# Patient Record
Sex: MALE | Race: WHITE | NOT HISPANIC OR LATINO | Employment: OTHER | URBAN - METROPOLITAN AREA
[De-identification: names, ages, dates, MRNs, and addresses within clinical notes are randomized per-mention and may not be internally consistent; named-entity substitution may affect disease eponyms.]

---

## 2022-07-24 ENCOUNTER — HOSPITAL ENCOUNTER (EMERGENCY)
Facility: MEDICAL CENTER | Age: 82
End: 2022-07-24
Attending: EMERGENCY MEDICINE
Payer: MEDICARE

## 2022-07-24 VITALS
BODY MASS INDEX: 25.38 KG/M2 | HEART RATE: 62 BPM | TEMPERATURE: 96.8 F | SYSTOLIC BLOOD PRESSURE: 105 MMHG | DIASTOLIC BLOOD PRESSURE: 59 MMHG | HEIGHT: 74 IN | RESPIRATION RATE: 16 BRPM | OXYGEN SATURATION: 93 % | WEIGHT: 197.75 LBS

## 2022-07-24 DIAGNOSIS — S51.812A LACERATION OF LEFT FOREARM, INITIAL ENCOUNTER: ICD-10-CM

## 2022-07-24 PROCEDURE — 90715 TDAP VACCINE 7 YRS/> IM: CPT | Performed by: EMERGENCY MEDICINE

## 2022-07-24 PROCEDURE — 90471 IMMUNIZATION ADMIN: CPT

## 2022-07-24 PROCEDURE — 304217 HCHG IRRIGATION SYSTEM

## 2022-07-24 PROCEDURE — 304999 HCHG REPAIR-SIMPLE/INTERMED LEVEL 1

## 2022-07-24 PROCEDURE — 700111 HCHG RX REV CODE 636 W/ 250 OVERRIDE (IP): Performed by: EMERGENCY MEDICINE

## 2022-07-24 PROCEDURE — 99284 EMERGENCY DEPT VISIT MOD MDM: CPT

## 2022-07-24 PROCEDURE — 303353 HCHG DERMABOND SKIN ADHESIVE

## 2022-07-24 RX ADMIN — CLOSTRIDIUM TETANI TOXOID ANTIGEN (FORMALDEHYDE INACTIVATED), CORYNEBACTERIUM DIPHTHERIAE TOXOID ANTIGEN (FORMALDEHYDE INACTIVATED), BORDETELLA PERTUSSIS TOXOID ANTIGEN (GLUTARALDEHYDE INACTIVATED), BORDETELLA PERTUSSIS FILAMENTOUS HEMAGGLUTININ ANTIGEN (FORMALDEHYDE INACTIVATED), BORDETELLA PERTUSSIS PERTACTIN ANTIGEN, AND BORDETELLA PERTUSSIS FIMBRIAE 2/3 ANTIGEN 0.5 ML: 5; 2; 2.5; 5; 3; 5 INJECTION, SUSPENSION INTRAMUSCULAR at 10:40

## 2022-07-24 NOTE — ED NOTES
Reviewed discharge instructions w/ pt and visitor, verbalized understanding to information provided including follow up care, return precautions and wound care, denied questions/concerns.  Pt ambulated from ED w/ visitor.

## 2022-07-24 NOTE — DISCHARGE INSTRUCTIONS
This type of skin injury does not do well with sutures.  We have closed it with Steri-Strips and tissue glue called Dermabond.    Keep our compression bandage in place for 72 hours.  You can then remove it, and return to all normal activities.  The glue is waterproof, so it is all right to get it wet after allowing the first 3 days of initial healing.  Do not scrub the area directly.  There is no reason to apply antibiotic ointment or alcohol or peroxide or anything else.    Gradually, the ends of the Steri-Strips will curl up.  Trim them off, do not pull them off.  Over the course of about a week, they will fully detach, and the glue will fully dissolve.

## 2022-07-24 NOTE — ED TRIAGE NOTES
Chief Complaint   Patient presents with   • Fall   • Laceration     Slipped in the shower this morning , no head injury, no loss of consciousness. Left arm laceration. denies any other injuries.

## 2022-07-24 NOTE — ED PROVIDER NOTES
"ED Provider Note    Scribed for Kristofer Whitney M.D. by Kristofer Whitney M.D.. 7/24/2022,  10:19 AM.    CHIEF COMPLAINT  Chief Complaint   Patient presents with   • Fall   • Laceration       HPI  Star Centeno is a 81 y.o. male who presents to the Emergency Department with a left forearm soft tissue injury after slipping in the shower.  He says that he did fall, but did not hit his head, and got up on his own.  He is quite healthy and strong for his age.  He is awake and alert, no active bleeding, no blood thinners, no significant complaints.  His last tetanus shot was in 2011 and will be updated.  No complaint of recent respiratory or GI illness.    REVIEW OF SYSTEMS  See HPI for further details. All other systems are negative.     PAST MEDICAL HISTORY   No anticoagulation.  No easy bleeding or bruising.    SOCIAL HISTORY  Social History     Tobacco Use   • Smoking status: Former Smoker   • Smokeless tobacco: Never Used   Substance and Sexual Activity   • Alcohol use: Yes     Alcohol/week: 8.4 oz     Types: 14 Glasses of wine per week     Comment: 2 glasses of wine daily   • Drug use: Never   • Sexual activity: Not on file     Social History     Substance and Sexual Activity   Drug Use Never       SURGICAL HISTORY  patient denies any surgical history    CURRENT MEDICATIONS  Home Medications    **Home medications have not yet been reviewed for this encounter**         ALLERGIES  No Known Allergies    PHYSICAL EXAM  VITAL SIGNS: /58   Pulse 64   Temp 36.6 °C (97.8 °F) (Temporal)   Resp 16   Ht 1.88 m (6' 2\")   Wt 89.7 kg (197 lb 12 oz)   SpO2 94%   BMI 25.39 kg/m²   Pulse ox interpretation: I interpret this pulse ox as normal.  Constitutional: Alert in no apparent distress.  HENT: No signs of trauma, Bilateral external ears normal, Nose normal.   Eyes: Pupils are equal and reactive, Conjunctiva normal, Non-icteric.   Neck: Normal range of motion, Supple, No stridor.    Cardiovascular: Normal peripheral " perfusion  Thorax & Lungs: Unlabored respirations, equal chest expansion, no accessory muscle use  Abdomen: Non-distended  Skin: Ulnar aspect of mid left forearm shows an approximately 2.5 cm irregular skin tear/avulsion.  Involves the subcutaneous soft tissue, but no deep tissues or structure exposed.  Back: Normal alignment and ROM  Extremities: No gross deformity  Musculoskeletal: Good range of motion in all major joints.   Neurologic: Alert, Normal motor function, No focal deficits noted.   Psychiatric: Affect normal, Judgment normal, Mood normal.      DIAGNOSTIC STUDIES / PROCEDURES    Laceration Repair Procedure Note    Indication: Skin tear    Procedure: The patient was placed in the appropriate position and anesthesia around the laceration was unnecessary because of Steri-Strip and Dermabond closure.  The area was then irrigated with normal saline. The laceration was closed with Dermabond and steri strips. There were no additional lacerations requiring repair. The wound area was then dressed with a bandage.      Total repaired wound length: 2.5 cm.     Other Items: None    The patient tolerated the procedure well.    Complications: None          COURSE & MEDICAL DECISION MAKING  Nursing notes, VS, PMSFHx reviewed in chart.     10:19 AM Patient seen and examined at bedside.  He has a skin tear type of soft tissue injury, and with his fragile skin which is age-related, this will not suture well, so will be closed with Steri-Strips and Dermabond.  Patient understands this, and agrees.    10:44 AM Laceration closed as above.  Tetanus shot updated.  Patient will leave our bandage in place for 72 hours, then remove it, and wound care instructions were given.     The patient will return for new or worsening symptoms and is stable at the time of discharge.    The patient is referred to a primary physician for blood pressure management, diabetic screening, and for all other preventative health  concerns.      DISPOSITION:  Patient will be discharged home in stable condition.    FOLLOW UP:  No follow-up provider specified.    OUTPATIENT MEDICATIONS:  New Prescriptions    No medications on file         FINAL IMPRESSION  1. Laceration of left forearm, initial encounter

## 2022-07-25 ENCOUNTER — OFFICE VISIT (OUTPATIENT)
Dept: MEDICAL GROUP | Facility: LAB | Age: 82
End: 2022-07-25
Payer: MEDICARE

## 2022-07-25 VITALS
SYSTOLIC BLOOD PRESSURE: 106 MMHG | HEART RATE: 68 BPM | TEMPERATURE: 97.7 F | HEIGHT: 71 IN | DIASTOLIC BLOOD PRESSURE: 64 MMHG | BODY MASS INDEX: 27.3 KG/M2 | WEIGHT: 195 LBS | RESPIRATION RATE: 15 BRPM | OXYGEN SATURATION: 93 %

## 2022-07-25 DIAGNOSIS — I24.0 ISCHEMIC HEART DISEASE DUE TO CORONARY ARTERY OBSTRUCTION (HCC): ICD-10-CM

## 2022-07-25 DIAGNOSIS — E11.9 NON-INSULIN DEPENDENT TYPE 2 DIABETES MELLITUS (HCC): ICD-10-CM

## 2022-07-25 DIAGNOSIS — Z76.89 ENCOUNTER TO ESTABLISH CARE: ICD-10-CM

## 2022-07-25 DIAGNOSIS — Z85.46 HISTORY OF ADENOCARCINOMA OF PROSTATE: ICD-10-CM

## 2022-07-25 DIAGNOSIS — I25.9 ISCHEMIC HEART DISEASE DUE TO CORONARY ARTERY OBSTRUCTION (HCC): ICD-10-CM

## 2022-07-25 DIAGNOSIS — I72.0 CAROTID ARTERY ANEURYSM (HCC): ICD-10-CM

## 2022-07-25 DIAGNOSIS — Z12.5 ENCOUNTER FOR SCREENING FOR MALIGNANT NEOPLASM OF PROSTATE: ICD-10-CM

## 2022-07-25 DIAGNOSIS — I77.810 AORTIC ROOT DILATATION (HCC): ICD-10-CM

## 2022-07-25 PROBLEM — R47.01 APHASIA: Status: ACTIVE | Noted: 2019-09-24

## 2022-07-25 PROBLEM — S22.080A COMPRESSION FRACTURE OF T12 VERTEBRA (HCC): Status: ACTIVE | Noted: 2018-11-27

## 2022-07-25 PROBLEM — R47.01 APHASIA: Status: RESOLVED | Noted: 2019-09-24 | Resolved: 2022-07-25

## 2022-07-25 PROCEDURE — 3078F DIAST BP <80 MM HG: CPT | Performed by: FAMILY MEDICINE

## 2022-07-25 PROCEDURE — 3074F SYST BP LT 130 MM HG: CPT | Performed by: FAMILY MEDICINE

## 2022-07-25 PROCEDURE — 99214 OFFICE O/P EST MOD 30 MIN: CPT | Performed by: FAMILY MEDICINE

## 2022-07-25 RX ORDER — HYDROCHLOROTHIAZIDE 25 MG/1
25 TABLET ORAL DAILY
COMMUNITY
End: 2023-05-18

## 2022-07-25 RX ORDER — METOPROLOL TARTRATE 100 MG/1
TABLET ORAL
COMMUNITY
End: 2022-09-20

## 2022-07-25 RX ORDER — FLUOXETINE 10 MG/1
10 CAPSULE ORAL DAILY
COMMUNITY
Start: 2022-05-19 | End: 2022-07-25

## 2022-07-25 RX ORDER — GLIPIZIDE 10 MG/1
5 TABLET ORAL 2 TIMES DAILY
COMMUNITY
End: 2023-05-11 | Stop reason: SDUPTHER

## 2022-07-25 RX ORDER — QUINAPRIL 20 MG/1
20 TABLET ORAL DAILY
COMMUNITY
End: 2023-05-18 | Stop reason: SDUPTHER

## 2022-07-25 RX ORDER — FLUOXETINE 10 MG/1
1 TABLET ORAL
COMMUNITY
Start: 2022-05-17 | End: 2022-07-25

## 2022-07-25 RX ORDER — CHLORAL HYDRATE 500 MG
CAPSULE ORAL
COMMUNITY
End: 2022-07-25

## 2022-07-25 RX ORDER — EZETIMIBE 10 MG/1
TABLET ORAL
COMMUNITY
End: 2023-05-18 | Stop reason: SDUPTHER

## 2022-07-25 RX ORDER — ASPIRIN 81 MG/1
81 TABLET ORAL DAILY
COMMUNITY

## 2022-07-25 RX ORDER — ALPRAZOLAM 0.25 MG/1
0.25 TABLET ORAL
COMMUNITY
Start: 2022-02-18 | End: 2022-07-25

## 2022-07-25 RX ORDER — GLIPIZIDE 5 MG/1
1 TABLET, FILM COATED, EXTENDED RELEASE ORAL 2 TIMES DAILY
COMMUNITY
Start: 2022-06-13 | End: 2022-07-25

## 2022-07-25 RX ORDER — ROSUVASTATIN CALCIUM 5 MG/1
TABLET, COATED ORAL
COMMUNITY
End: 2022-09-08 | Stop reason: SDUPTHER

## 2022-07-25 SDOH — ECONOMIC STABILITY: HOUSING INSECURITY: IN THE LAST 12 MONTHS, HOW MANY PLACES HAVE YOU LIVED?: 2

## 2022-07-25 SDOH — ECONOMIC STABILITY: TRANSPORTATION INSECURITY
IN THE PAST 12 MONTHS, HAS THE LACK OF TRANSPORTATION KEPT YOU FROM MEDICAL APPOINTMENTS OR FROM GETTING MEDICATIONS?: NO

## 2022-07-25 SDOH — ECONOMIC STABILITY: HOUSING INSECURITY
IN THE LAST 12 MONTHS, WAS THERE A TIME WHEN YOU DID NOT HAVE A STEADY PLACE TO SLEEP OR SLEPT IN A SHELTER (INCLUDING NOW)?: NO

## 2022-07-25 SDOH — HEALTH STABILITY: PHYSICAL HEALTH: ON AVERAGE, HOW MANY DAYS PER WEEK DO YOU ENGAGE IN MODERATE TO STRENUOUS EXERCISE (LIKE A BRISK WALK)?: 0 DAYS

## 2022-07-25 SDOH — ECONOMIC STABILITY: FOOD INSECURITY: WITHIN THE PAST 12 MONTHS, THE FOOD YOU BOUGHT JUST DIDN'T LAST AND YOU DIDN'T HAVE MONEY TO GET MORE.: NEVER TRUE

## 2022-07-25 SDOH — ECONOMIC STABILITY: INCOME INSECURITY: IN THE LAST 12 MONTHS, WAS THERE A TIME WHEN YOU WERE NOT ABLE TO PAY THE MORTGAGE OR RENT ON TIME?: NO

## 2022-07-25 SDOH — ECONOMIC STABILITY: INCOME INSECURITY: HOW HARD IS IT FOR YOU TO PAY FOR THE VERY BASICS LIKE FOOD, HOUSING, MEDICAL CARE, AND HEATING?: NOT HARD AT ALL

## 2022-07-25 SDOH — ECONOMIC STABILITY: TRANSPORTATION INSECURITY
IN THE PAST 12 MONTHS, HAS LACK OF TRANSPORTATION KEPT YOU FROM MEETINGS, WORK, OR FROM GETTING THINGS NEEDED FOR DAILY LIVING?: NO

## 2022-07-25 SDOH — HEALTH STABILITY: MENTAL HEALTH
STRESS IS WHEN SOMEONE FEELS TENSE, NERVOUS, ANXIOUS, OR CAN'T SLEEP AT NIGHT BECAUSE THEIR MIND IS TROUBLED. HOW STRESSED ARE YOU?: TO SOME EXTENT

## 2022-07-25 SDOH — ECONOMIC STABILITY: FOOD INSECURITY: WITHIN THE PAST 12 MONTHS, YOU WORRIED THAT YOUR FOOD WOULD RUN OUT BEFORE YOU GOT MONEY TO BUY MORE.: NEVER TRUE

## 2022-07-25 SDOH — ECONOMIC STABILITY: TRANSPORTATION INSECURITY
IN THE PAST 12 MONTHS, HAS LACK OF RELIABLE TRANSPORTATION KEPT YOU FROM MEDICAL APPOINTMENTS, MEETINGS, WORK OR FROM GETTING THINGS NEEDED FOR DAILY LIVING?: NO

## 2022-07-25 SDOH — HEALTH STABILITY: PHYSICAL HEALTH: ON AVERAGE, HOW MANY MINUTES DO YOU ENGAGE IN EXERCISE AT THIS LEVEL?: 0 MIN

## 2022-07-25 ASSESSMENT — ENCOUNTER SYMPTOMS
CHILLS: 0
WHEEZING: 0
NERVOUS/ANXIOUS: 0
DEPRESSION: 0
PALPITATIONS: 0
BLURRED VISION: 0
NAUSEA: 0
DIARRHEA: 0
CONSTIPATION: 0
VOMITING: 0
ABDOMINAL PAIN: 0
FEVER: 0
SHORTNESS OF BREATH: 0

## 2022-07-25 ASSESSMENT — SOCIAL DETERMINANTS OF HEALTH (SDOH)
HOW OFTEN DO YOU GET TOGETHER WITH FRIENDS OR RELATIVES?: TWICE A WEEK
HOW OFTEN DO YOU ATTENT MEETINGS OF THE CLUB OR ORGANIZATION YOU BELONG TO?: NEVER
HOW MANY DRINKS CONTAINING ALCOHOL DO YOU HAVE ON A TYPICAL DAY WHEN YOU ARE DRINKING: 1 OR 2
HOW OFTEN DO YOU ATTENT MEETINGS OF THE CLUB OR ORGANIZATION YOU BELONG TO?: NEVER
WITHIN THE PAST 12 MONTHS, YOU WORRIED THAT YOUR FOOD WOULD RUN OUT BEFORE YOU GOT THE MONEY TO BUY MORE: NEVER TRUE
HOW OFTEN DO YOU HAVE SIX OR MORE DRINKS ON ONE OCCASION: NEVER
DO YOU BELONG TO ANY CLUBS OR ORGANIZATIONS SUCH AS CHURCH GROUPS UNIONS, FRATERNAL OR ATHLETIC GROUPS, OR SCHOOL GROUPS?: YES
IN A TYPICAL WEEK, HOW MANY TIMES DO YOU TALK ON THE PHONE WITH FAMILY, FRIENDS, OR NEIGHBORS?: MORE THAN THREE TIMES A WEEK
HOW OFTEN DO YOU ATTEND CHURCH OR RELIGIOUS SERVICES?: NEVER
HOW OFTEN DO YOU GET TOGETHER WITH FRIENDS OR RELATIVES?: TWICE A WEEK
HOW OFTEN DO YOU HAVE A DRINK CONTAINING ALCOHOL: 4 OR MORE TIMES A WEEK
IN A TYPICAL WEEK, HOW MANY TIMES DO YOU TALK ON THE PHONE WITH FAMILY, FRIENDS, OR NEIGHBORS?: MORE THAN THREE TIMES A WEEK
HOW OFTEN DO YOU ATTEND CHURCH OR RELIGIOUS SERVICES?: NEVER
HOW HARD IS IT FOR YOU TO PAY FOR THE VERY BASICS LIKE FOOD, HOUSING, MEDICAL CARE, AND HEATING?: NOT HARD AT ALL
DO YOU BELONG TO ANY CLUBS OR ORGANIZATIONS SUCH AS CHURCH GROUPS UNIONS, FRATERNAL OR ATHLETIC GROUPS, OR SCHOOL GROUPS?: YES

## 2022-07-25 ASSESSMENT — LIFESTYLE VARIABLES
SKIP TO QUESTIONS 9-10: 1
HOW OFTEN DO YOU HAVE A DRINK CONTAINING ALCOHOL: 4 OR MORE TIMES A WEEK
AUDIT-C TOTAL SCORE: 4
HOW MANY STANDARD DRINKS CONTAINING ALCOHOL DO YOU HAVE ON A TYPICAL DAY: 1 OR 2
HOW OFTEN DO YOU HAVE SIX OR MORE DRINKS ON ONE OCCASION: NEVER

## 2022-07-25 ASSESSMENT — ANXIETY QUESTIONNAIRES
2. NOT BEING ABLE TO STOP OR CONTROL WORRYING: NOT AT ALL
7. FEELING AFRAID AS IF SOMETHING AWFUL MIGHT HAPPEN: SEVERAL DAYS
5. BEING SO RESTLESS THAT IT IS HARD TO SIT STILL: NOT AT ALL
4. TROUBLE RELAXING: NOT AT ALL
6. BECOMING EASILY ANNOYED OR IRRITABLE: NOT AT ALL
3. WORRYING TOO MUCH ABOUT DIFFERENT THINGS: SEVERAL DAYS
1. FEELING NERVOUS, ANXIOUS, OR ON EDGE: SEVERAL DAYS
GAD7 TOTAL SCORE: 3

## 2022-07-25 ASSESSMENT — PATIENT HEALTH QUESTIONNAIRE - PHQ9
5. POOR APPETITE OR OVEREATING: 1 - SEVERAL DAYS
CLINICAL INTERPRETATION OF PHQ2 SCORE: 2
SUM OF ALL RESPONSES TO PHQ QUESTIONS 1-9: 6

## 2022-07-25 NOTE — PROGRESS NOTES
Star Centeno is a 81 y.o. male here for   Chief Complaint   Patient presents with    Establish Care       HPI:  Star is a very pleasant 81 y.o. male.     #Establish care   -Reviewed all past medical history, family history, social history.  -Reviewed all screening/vaccinations: Due for labs including PSA   -Diet and Exercise: appropriate for age and condition.   -Tobacco, alcohol, recreational drug use: no to all.   -Sexually active: no current partners.       Current medicines (including changes today)  Current Outpatient Medications   Medication Sig Dispense Refill    Coenzyme Q10 (COQ-10) 30 MG Cap       Omega-3 Fatty Acids (FISH OIL) 1000 MG Cap capsule       Probiotic Product (PROBIOTIC DAILY PO) Take  by mouth.      aspirin 81 MG EC tablet Take 81 mg by mouth every day.      ezetimibe (ZETIA) 10 MG Tab       hydroCHLOROthiazide (HYDRODIURIL) 25 MG Tab Take 25 mg by mouth every day.      glipiZIDE (GLUCOTROL) 10 MG Tab Take 5 mg by mouth 2 times a day.      metformin (GLUCOPHAGE) 1000 MG tablet Take 500 mg by mouth 2 times a day with meals.      metoprolol tartrate (LOPRESSOR) 100 MG Tab       quinapril (ACCUPRIL) 20 MG Tab Take 20 mg by mouth every day.      rosuvastatin (CRESTOR) 5 MG Tab       Prenatal Multivit-Min-Fe-FA (PRE-COLE FORMULA PO) Take  by mouth.       No current facility-administered medications for this visit.     He  has a past medical history of Cancer (HCC), Cataract, Hyperlipidemia, and Hypertension.  He  has a past surgical history that includes eye surgery and prostatectomy, radical retro.  Social History     Tobacco Use    Smoking status: Former Smoker     Packs/day: 1.00     Years: 45.00     Pack years: 45.00     Types: Cigarettes     Start date: 1956     Quit date: 2001     Years since quittin.5    Smokeless tobacco: Never Used   Vaping Use    Vaping Use: Never used   Substance Use Topics    Alcohol use: Yes     Alcohol/week: 8.4 oz     Types: 14 Glasses of wine per week  "    Comment: 2 glasses of wine daily    Drug use: Never     Social History     Social History Narrative    Not on file     Family History   Problem Relation Age of Onset    Genetic Disorder Brother     Diabetes Brother     Hypertension Brother     Hyperlipidemia Brother     Genetic Disorder Sister     Diabetes Sister     Genetic Disorder Sister      Family Status   Relation Name Status    Adi Centeno (Not Specified)    Kianna Browne (Not Specified)    Sis Shilpi (Not Specified)     ROS  Review of Systems   Constitutional: Negative for chills and fever.   HENT: Negative for hearing loss.    Eyes: Negative for blurred vision.   Respiratory: Negative for shortness of breath and wheezing.    Cardiovascular: Negative for chest pain and palpitations.   Gastrointestinal: Negative for abdominal pain, constipation, diarrhea, nausea and vomiting.   Psychiatric/Behavioral: Negative for depression. The patient is not nervous/anxious.         Objective:     /64   Pulse 68   Temp 36.5 °C (97.7 °F) (Temporal)   Resp 15   Ht 1.803 m (5' 11\")   Wt 88.5 kg (195 lb)   SpO2 93%  Body mass index is 27.2 kg/m².  Physical Exam:    Constitutional: Alert, no distress.  Skin: Warm, dry, good turgor, no rashes in visible areas.  Eye: Equal, round and reactive, conjunctiva clear, lids normal.  ENMT: Lips without lesions, good dentition, oropharynx clear. TM's pearly gray with normal light reflexes bilaterally  Neck: Trachea midline, no masses, no thyromegaly. No cervical or supraclavicular lymphadenopathy.  Respiratory: Unlabored respiratory effort, lungs clear to auscultation bilaterally, no wheezes, rales, or ronchi.  Cardiovascular: Normal S1, S2, RRR, no murmur, no edema.  Abdomen: Soft, non-tender, no masses, no hepatosplenomegaly.  Psych: Alert and oriented x3, normal affect and mood.        Assessment and Plan:   The following treatment plan was discussed    1. Aortic root dilatation (HCC)    - REFERRAL TO CARDIOLOGY    2. 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 Carotid artery aneurysm (HCC)    - REFERRAL TO CARDIOLOGY    4. Non-insulin dependent type 2 diabetes mellitus (HCC)    - CBC WITHOUT DIFFERENTIAL; Future  - Comp Metabolic Panel; Future  - Lipid Profile; Future  - MICROALBUMIN CREAT RATIO URINE; Future  - HEMOGLOBIN A1C; Future    5. Encounter to establish care  - CBC WITHOUT DIFFERENTIAL; Future  - Comp Metabolic Panel; Future    6. Encounter for screening for malignant neoplasm of prostate    - PROSTATE SPECIFIC AG SCREENING; Future    7. History of adenocarcinoma of prostate      Records requested.  Followup: No follow-ups on file.         This note was created using voice recognition software. I have made every reasonable attempt to correct errors, however, I do anticipate some grammatical errors.

## 2022-08-04 ENCOUNTER — HOSPITAL ENCOUNTER (OUTPATIENT)
Dept: LAB | Facility: MEDICAL CENTER | Age: 82
End: 2022-08-04
Attending: FAMILY MEDICINE
Payer: MEDICARE

## 2022-08-04 DIAGNOSIS — Z12.5 ENCOUNTER FOR SCREENING FOR MALIGNANT NEOPLASM OF PROSTATE: ICD-10-CM

## 2022-08-04 DIAGNOSIS — E11.9 NON-INSULIN DEPENDENT TYPE 2 DIABETES MELLITUS (HCC): ICD-10-CM

## 2022-08-04 DIAGNOSIS — Z76.89 ENCOUNTER TO ESTABLISH CARE: ICD-10-CM

## 2022-08-04 LAB
ALBUMIN SERPL BCP-MCNC: 4.4 G/DL (ref 3.2–4.9)
ALBUMIN/GLOB SERPL: 1.7 G/DL
ALP SERPL-CCNC: 62 U/L (ref 30–99)
ALT SERPL-CCNC: 16 U/L (ref 2–50)
ANION GAP SERPL CALC-SCNC: 12 MMOL/L (ref 7–16)
AST SERPL-CCNC: 22 U/L (ref 12–45)
BILIRUB SERPL-MCNC: 0.6 MG/DL (ref 0.1–1.5)
BUN SERPL-MCNC: 21 MG/DL (ref 8–22)
CALCIUM SERPL-MCNC: 9.5 MG/DL (ref 8.5–10.5)
CHLORIDE SERPL-SCNC: 96 MMOL/L (ref 96–112)
CHOLEST SERPL-MCNC: 123 MG/DL (ref 100–199)
CO2 SERPL-SCNC: 23 MMOL/L (ref 20–33)
CREAT SERPL-MCNC: 0.94 MG/DL (ref 0.5–1.4)
ERYTHROCYTE [DISTWIDTH] IN BLOOD BY AUTOMATED COUNT: 45.4 FL (ref 35.9–50)
EST. AVERAGE GLUCOSE BLD GHB EST-MCNC: 148 MG/DL
FASTING STATUS PATIENT QL REPORTED: NORMAL
GFR SERPLBLD CREATININE-BSD FMLA CKD-EPI: 80 ML/MIN/1.73 M 2
GLOBULIN SER CALC-MCNC: 2.6 G/DL (ref 1.9–3.5)
GLUCOSE SERPL-MCNC: 122 MG/DL (ref 65–99)
HBA1C MFR BLD: 6.8 % (ref 4–5.6)
HCT VFR BLD AUTO: 38.4 % (ref 42–52)
HDLC SERPL-MCNC: 47 MG/DL
HGB BLD-MCNC: 13.2 G/DL (ref 14–18)
LDLC SERPL CALC-MCNC: 57 MG/DL
MCH RBC QN AUTO: 32.5 PG (ref 27–33)
MCHC RBC AUTO-ENTMCNC: 34.4 G/DL (ref 33.7–35.3)
MCV RBC AUTO: 94.6 FL (ref 81.4–97.8)
PLATELET # BLD AUTO: 244 K/UL (ref 164–446)
PMV BLD AUTO: 9.5 FL (ref 9–12.9)
POTASSIUM SERPL-SCNC: 5 MMOL/L (ref 3.6–5.5)
PROT SERPL-MCNC: 7 G/DL (ref 6–8.2)
PSA SERPL-MCNC: <0.02 NG/ML (ref 0–4)
RBC # BLD AUTO: 4.06 M/UL (ref 4.7–6.1)
SODIUM SERPL-SCNC: 131 MMOL/L (ref 135–145)
TRIGL SERPL-MCNC: 96 MG/DL (ref 0–149)
WBC # BLD AUTO: 7.7 K/UL (ref 4.8–10.8)

## 2022-08-04 PROCEDURE — 84153 ASSAY OF PSA TOTAL: CPT

## 2022-08-04 PROCEDURE — 80061 LIPID PANEL: CPT

## 2022-08-04 PROCEDURE — 82570 ASSAY OF URINE CREATININE: CPT

## 2022-08-04 PROCEDURE — 83036 HEMOGLOBIN GLYCOSYLATED A1C: CPT

## 2022-08-04 PROCEDURE — 36415 COLL VENOUS BLD VENIPUNCTURE: CPT

## 2022-08-04 PROCEDURE — 82043 UR ALBUMIN QUANTITATIVE: CPT

## 2022-08-04 PROCEDURE — 85027 COMPLETE CBC AUTOMATED: CPT

## 2022-08-04 PROCEDURE — 80053 COMPREHEN METABOLIC PANEL: CPT

## 2022-08-05 LAB
CREAT UR-MCNC: 78.77 MG/DL
MICROALBUMIN UR-MCNC: <1.2 MG/DL
MICROALBUMIN/CREAT UR: NORMAL MG/G (ref 0–30)

## 2022-09-08 DIAGNOSIS — E78.5 HYPERLIPIDEMIA, UNSPECIFIED HYPERLIPIDEMIA TYPE: ICD-10-CM

## 2022-09-08 RX ORDER — ROSUVASTATIN CALCIUM 5 MG/1
5 TABLET, COATED ORAL DAILY
Qty: 90 TABLET | Refills: 3 | Status: SHIPPED | OUTPATIENT
Start: 2022-09-08 | End: 2023-03-14 | Stop reason: SDUPTHER

## 2022-09-08 NOTE — PROGRESS NOTES
Phone Number Called: [phone] (home)     Call outcome: Spoke to patient regarding message below.    Message: Spoke with Star and let him know that his rosuvastatin was sent to his pharmacy

## 2022-09-08 NOTE — PROGRESS NOTES
Received request via: Patient's daughter Peyton called and requested a refill for him     Was the patient seen in the last year in this department? Yes    Does the patient have an active prescription (recently filled or refills available) for medication(s) requested? No

## 2022-09-17 ENCOUNTER — TELEPHONE (OUTPATIENT)
Dept: CARDIOLOGY | Facility: MEDICAL CENTER | Age: 82
End: 2022-09-17
Payer: MEDICARE

## 2022-09-17 NOTE — TELEPHONE ENCOUNTER
LVM for pt to confirm all testing and notes are in chart. Per chart review pt was last seen by cardio in 10/11/21 when they Saw Dr. Haskins, all notes are available through Care Everywhere.    Pending call back from pt to confirm all  notes and testing are recent in chart.

## 2022-09-20 ENCOUNTER — OFFICE VISIT (OUTPATIENT)
Dept: CARDIOLOGY | Facility: MEDICAL CENTER | Age: 82
End: 2022-09-20
Attending: FAMILY MEDICINE
Payer: MEDICARE

## 2022-09-20 VITALS
RESPIRATION RATE: 16 BRPM | WEIGHT: 201 LBS | BODY MASS INDEX: 28.14 KG/M2 | HEIGHT: 71 IN | DIASTOLIC BLOOD PRESSURE: 72 MMHG | HEART RATE: 57 BPM | SYSTOLIC BLOOD PRESSURE: 124 MMHG | OXYGEN SATURATION: 97 %

## 2022-09-20 DIAGNOSIS — E11.9 NON-INSULIN DEPENDENT TYPE 2 DIABETES MELLITUS (HCC): ICD-10-CM

## 2022-09-20 DIAGNOSIS — I11.9 HYPERTENSIVE HEART DISEASE WITHOUT HEART FAILURE: ICD-10-CM

## 2022-09-20 DIAGNOSIS — I71.21 ANEURYSM OF ASCENDING AORTA (HCC): ICD-10-CM

## 2022-09-20 DIAGNOSIS — I25.10 CORONARY ARTERY DISEASE INVOLVING NATIVE CORONARY ARTERY OF NATIVE HEART WITHOUT ANGINA PECTORIS: ICD-10-CM

## 2022-09-20 DIAGNOSIS — I10 ESSENTIAL HYPERTENSION: ICD-10-CM

## 2022-09-20 DIAGNOSIS — E78.5 DYSLIPIDEMIA: ICD-10-CM

## 2022-09-20 LAB — EKG IMPRESSION: NORMAL

## 2022-09-20 PROCEDURE — 99204 OFFICE O/P NEW MOD 45 MIN: CPT | Performed by: INTERNAL MEDICINE

## 2022-09-20 PROCEDURE — 93000 ELECTROCARDIOGRAM COMPLETE: CPT | Performed by: INTERNAL MEDICINE

## 2022-09-20 RX ORDER — METOPROLOL SUCCINATE 100 MG/1
100 TABLET, EXTENDED RELEASE ORAL DAILY
COMMUNITY
Start: 2022-08-09 | End: 2023-05-18 | Stop reason: SDUPTHER

## 2022-09-20 ASSESSMENT — ENCOUNTER SYMPTOMS
MYALGIAS: 0
WHEEZING: 0
NEAR-SYNCOPE: 0
ORTHOPNEA: 0
PALPITATIONS: 0
FALLS: 0
EXCESSIVE DAYTIME SLEEPINESS: 0
NUMBNESS: 0
BLURRED VISION: 0
LIGHT-HEADEDNESS: 0
PARESTHESIAS: 0
SYNCOPE: 0
IRREGULAR HEARTBEAT: 0
SHORTNESS OF BREATH: 0
SLEEP DISTURBANCES DUE TO BREATHING: 0
PND: 0
FLANK PAIN: 0
FEVER: 0
BLOATING: 0
NAUSEA: 0
WEAKNESS: 0
LOSS OF BALANCE: 0
DIZZINESS: 0
BACK PAIN: 0
CONSTIPATION: 0
VOMITING: 0
DIAPHORESIS: 0
COUGH: 0
DIARRHEA: 0
DECREASED APPETITE: 0
HEADACHES: 0
NIGHT SWEATS: 0
DYSPNEA ON EXERTION: 0
SORE THROAT: 0
DOUBLE VISION: 0

## 2022-09-20 ASSESSMENT — FIBROSIS 4 INDEX: FIB4 SCORE: 1.87

## 2022-09-20 NOTE — PROGRESS NOTES
Cardiology Initial Consultation Note    Date of note:    9/20/2022    Primary Care Provider: Nirmal Gentile M.D.  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 Star Centeno is a 81 y.o. male whose current medical problems include CAD, type 2 diabetes mellitus, hypertension and dyslipidemia who is here for cardiac consultation for CAD and to establish care.    Pertinent history:  Essential hypertension  Non-insulin-dependent type 2 diabetes mellitus  CAD s/p AVA to RCA and left circumflex artery in 2002  Mixed hyperlipidemia    Patient states that he has been doing well from a cardiovascular perspective.  States that about a year ago he felt symptoms concerning for angina.  Did not undergo repeat angiogram and his symptoms dissipated.  Has been feeling well with no chest pain, pressure or shortness of breath.    In terms of physical activity, tries to stay active.  Moved to [place] to be closer to his daughter.    Cardiovascular Risk Factors:  1. Smoking status: Former smoker, quit 2003  2. Type II Diabetes Mellitus: Yes  Lab Results   Component Value Date/Time    HBA1C 6.8 (H) 08/04/2022 11:00 AM    HBA1C 6.4 (H) 06/24/2021 11:54 AM    HBA1C 6.9 (H) 04/16/2021 02:55 PM     3. Hypertension: Yes  4. Dyslipidemia: Yes  Cholesterol,Tot   Date Value Ref Range Status   08/04/2022 123 100 - 199 mg/dL Final     LDL   Date Value Ref Range Status   08/04/2022 57 <100 mg/dL Final     HDL   Date Value Ref Range Status   08/04/2022 47 >=40 mg/dL Final     Triglycerides   Date Value Ref Range Status   08/04/2022 96 0 - 149 mg/dL Final     5. Family history of early Coronary Artery Disease in a first degree relative (Male less than 55 years of age; Female less than 65 years  of age): Denies, brothers have hemochromatosis  6.  Obesity and/or Metabolic Syndrome: No  7. Sedentary lifestyle: No    Review of Systems   Constitutional: Positive for malaise/fatigue. Negative for decreased appetite, diaphoresis, fever and night sweats.   HENT:  Negative for congestion and sore throat.    Eyes:  Negative for blurred vision and double vision.   Cardiovascular:  Negative for chest pain, cyanosis, dyspnea on exertion, irregular heartbeat, leg swelling, near-syncope, orthopnea, palpitations, paroxysmal nocturnal dyspnea and syncope.   Respiratory:  Negative for cough, shortness of breath, sleep disturbances due to breathing and wheezing.    Endocrine: Negative for cold intolerance and heat intolerance.   Musculoskeletal:  Negative for back pain, falls and myalgias.   Gastrointestinal:  Negative for bloating, constipation, diarrhea, nausea and vomiting.   Genitourinary:  Negative for dysuria and flank pain.   Neurological:  Negative for excessive daytime sleepiness, dizziness, headaches, light-headedness, loss of balance, numbness, paresthesias and weakness.       Past Medical History:   Diagnosis Date    Cancer (HCC)     Cataract     Hyperlipidemia     Hypertension          Past Surgical History:   Procedure Laterality Date    EYE SURGERY      PROSTATECTOMY, RADICAL RETRO           Current Outpatient Medications   Medication Sig Dispense Refill    metoprolol SR (TOPROL XL) 100 MG TABLET SR 24 HR Take 100 mg by mouth every day.      rosuvastatin (CRESTOR) 5 MG Tab Take 1 Tablet by mouth every day for 360 days. 90 Tablet 3    Coenzyme Q10 (COQ-10) 30 MG Cap       Omega-3 Fatty Acids (FISH OIL) 1000 MG Cap capsule       Probiotic Product (PROBIOTIC DAILY PO) Take  by mouth.      aspirin 81 MG EC tablet Take 81 mg by mouth every day.      ezetimibe (ZETIA) 10 MG Tab       hydroCHLOROthiazide (HYDRODIURIL) 25 MG Tab Take 25 mg by mouth every day.      glipiZIDE (GLUCOTROL) 10 MG Tab Take 5 mg by mouth 2  times a day.      metformin (GLUCOPHAGE) 1000 MG tablet Take 500 mg by mouth 2 times a day with meals.      quinapril (ACCUPRIL) 20 MG Tab Take 20 mg by mouth every day.      Prenatal Multivit-Min-Fe-FA (PRE-COEL FORMULA PO) Take  by mouth.       No current facility-administered medications for this visit.         No Known Allergies      Family History   Problem Relation Age of Onset    Genetic Disorder Brother     Diabetes Brother     Hypertension Brother     Hyperlipidemia Brother     Genetic Disorder Sister     Diabetes Sister     Genetic Disorder Sister          Social History     Socioeconomic History    Marital status:      Spouse name: Not on file    Number of children: Not on file    Years of education: Not on file    Highest education level: Associate degree: academic program   Occupational History    Not on file   Tobacco Use    Smoking status: Former     Packs/day: 1.00     Years: 45.00     Pack years: 45.00     Types: Cigarettes     Start date: 1956     Quit date: 2001     Years since quittin.7    Smokeless tobacco: Never   Vaping Use    Vaping Use: Never used   Substance and Sexual Activity    Alcohol use: Yes     Alcohol/week: 8.4 oz     Types: 14 Glasses of wine per week     Comment: 2 glasses of wine daily    Drug use: Never    Sexual activity: Not Currently     Partners: Female     Birth control/protection: Condom   Other Topics Concern    Not on file   Social History Narrative    Not on file     Social Determinants of Health     Financial Resource Strain: Low Risk     Difficulty of Paying Living Expenses: Not hard at all   Food Insecurity: No Food Insecurity    Worried About Running Out of Food in the Last Year: Never true    Ran Out of Food in the Last Year: Never true   Transportation Needs: No Transportation Needs    Lack of Transportation (Medical): No    Lack of Transportation (Non-Medical): No   Physical Activity: Inactive    Days of Exercise per Week: 0 days    Minutes  "of Exercise per Session: 0 min   Stress: Stress Concern Present    Feeling of Stress : To some extent   Social Connections: Moderately Isolated    Frequency of Communication with Friends and Family: More than three times a week    Frequency of Social Gatherings with Friends and Family: Twice a week    Attends Yazidism Services: Never    Active Member of Clubs or Organizations: Yes    Attends Club or Organization Meetings: Never    Marital Status:    Intimate Partner Violence: Not on file   Housing Stability: Low Risk     Unable to Pay for Housing in the Last Year: No    Number of Places Lived in the Last Year: 2    Unstable Housing in the Last Year: No         Physical Exam:  Ambulatory Vitals  /72 (BP Location: Left arm, Patient Position: Sitting, BP Cuff Size: Adult)   Pulse (!) 57   Resp 16   Ht 1.803 m (5' 11\")   Wt 91.2 kg (201 lb)   SpO2 97%    Oxygen Therapy:  Pulse Oximetry: 97 %  BP Readings from Last 4 Encounters:   09/20/22 124/72   07/25/22 106/64   07/24/22 105/59       Weight/BMI: Body mass index is 28.03 kg/m².  Wt Readings from Last 4 Encounters:   09/20/22 91.2 kg (201 lb)   07/25/22 88.5 kg (195 lb)   07/24/22 89.7 kg (197 lb 12 oz)         General: Well appearing and in no apparent distress  Eyes: nl conjunctiva, no icteric sclera  ENT: wearing a mask, normal external appearance of ears  Neck: no visible JVP,  no carotid bruits  Lungs: normal respiratory effort, CTAB  Heart: RRR, no murmurs, no rubs or gallops,  no edema bilateral lower extremities. No LV/RV heave on cardiac palpatation. 2+ bilateral radial pulses.  2+ bilateral dp pulses.   Abdomen: soft, non tender, non distended, no masses, normal bowel sounds.  No HSM.  Extremities/MSK: no clubbing, no cyanosis  Neurological: No focal sensory deficits  Psychiatric: Appropriate affect, A/O x 3, intact judgement and insight  Skin: Warm extremities      Lab Data Review:  Lab Results   Component Value Date/Time    CHOLSTRLTOT " 123 08/04/2022 11:00 AM    LDL 57 08/04/2022 11:00 AM    HDL 47 08/04/2022 11:00 AM    TRIGLYCERIDE 96 08/04/2022 11:00 AM       Lab Results   Component Value Date/Time    SODIUM 131 (L) 08/04/2022 11:00 AM    POTASSIUM 5.0 08/04/2022 11:00 AM    CHLORIDE 96 08/04/2022 11:00 AM    CO2 23 08/04/2022 11:00 AM    GLUCOSE 122 (H) 08/04/2022 11:00 AM    BUN 21 08/04/2022 11:00 AM    CREATININE 0.94 08/04/2022 11:00 AM    BUNCREATRAT 15 06/24/2021 11:54 AM     Lab Results   Component Value Date/Time    ALKPHOSPHAT 62 08/04/2022 11:00 AM    ASTSGOT 22 08/04/2022 11:00 AM    ALTSGPT 16 08/04/2022 11:00 AM    TBILIRUBIN 0.6 08/04/2022 11:00 AM      Lab Results   Component Value Date/Time    WBC 7.7 08/04/2022 11:00 AM     Lab Results   Component Value Date/Time    HBA1C 6.8 (H) 08/04/2022 11:00 AM    HBA1C 6.4 (H) 06/24/2021 11:54 AM    HBA1C 6.9 (H) 04/16/2021 02:55 PM         Cardiac Imaging and Procedures Review:    EKG dated 9/20/2022: My personal interpretation is sinus bradycardia, first-degree AV block with  ms    Outside Echo dated 10/11/2021:   LVEF 64%, mild LVH, sclerotic aortic valve    Outside [facility] (7/2/2002):   RT dom, LM-, LAD M20%, LCx M75%, RCA P75%/M90%/D85%  S/p 6 stents to the  RCA     Outside [facility] (11/19/2002):   RT dom, LM-, LAD M20%, LCx M85%, RCA PIS 20%/MIS 50%  S/p stent to the LCx      Radiology test Review:  Outside CT chest without contrast 7/29/2021:  IMPRESSION:     1. Aneurysmal dilatation at the aortic root with maximal diameter of 4.3 cm, not appreciably changed from the prior study allowing for limitations of the study.   2.  Aortic and coronary artery calcifications.   3.  5 mm right lower lobe nodule, stable compared to 10/07/2020.   4.  Emphysematous changes.     Outside CTA chest 10/7/2020:  IMPRESSION:       1.  Mild aortic root dilatation at the level of the sinuses of   Valsalva, 4.3 cm. The remainder of the ascending aorta measures   up to 3.9 cm, near the upper limit of normal.   Minimal fusiform   ectasia of the isthmic segment, 3.3 cm.   Mild dilatation of the   innominate and proximal right subclavian arteries, 1.8 cm and 1.6   cm, respectively.     2. Diffuse atherosclerosis. No evidence of aortic dissection. Few   incidental findings.       Assessment & Plan     1. Coronary artery disease involving native coronary artery of native heart without angina pectoris  EKG    EC-ECHOCARDIOGRAM COMPLETE W/O CONT      2. Essential hypertension        3. Dyslipidemia        4. Non-insulin dependent type 2 diabetes mellitus (HCC)        5. Hypertensive heart disease without heart failure  EC-ECHOCARDIOGRAM COMPLETE W/O CONT      6. Aneurysm of ascending aorta (HCC)  EC-ECHOCARDIOGRAM COMPLETE W/O CONT            Shared Medical Decision Making:  Obtain transthoracic echocardiogram to evaluate underlying cardiac structure and function.  Previous echocardiogram showed hypertensive heart disease with LVH.  Also monitor ascending aorta size given dilated root and ascending aorta.    Blood pressure well controlled.  Continue hydrochlorothiazide 25 mg and quinapril 20 mg daily.    For CAD and ascending aortic aneurysm, continue metoprolol  mg daily.    Lipid panel reviewed with LDL within goal.  Continue Crestor 5 mg and Zetia 10 mg daily.  Has statin intolerance but is able to tolerate 5 mg dose.  Continue co-Q10 supplement.      All of patient that his daughter's excellent questions were answered to the best of my knowledge and to their satisfaction.  It was a pleasure seeing Mr. Star Centeno in my clinic today. Return in about 6 months (around 3/20/2023). Patient is aware to call the cardiology clinic with any questions or concerns.      Trino Sheets MD  [facility]  [facility], [address]  [address] [address]  [place] [address]  Phone: [phone]  Fax: [phone]    Please note that this dictation was created using voice recognition  software. I have made every reasonable attempt to correct obvious errors, but it is possible there are errors of grammar and possibly content that I did not discover before finalizing the note.

## 2022-11-08 ENCOUNTER — PATIENT MESSAGE (OUTPATIENT)
Dept: HEALTH INFORMATION MANAGEMENT | Facility: OTHER | Age: 82
End: 2022-11-08

## 2022-11-16 ENCOUNTER — ANCILLARY PROCEDURE (OUTPATIENT)
Dept: CARDIOLOGY | Facility: MEDICAL CENTER | Age: 82
End: 2022-11-16
Attending: INTERNAL MEDICINE
Payer: MEDICARE

## 2022-11-16 DIAGNOSIS — I11.9 HYPERTENSIVE HEART DISEASE WITHOUT HEART FAILURE: ICD-10-CM

## 2022-11-16 DIAGNOSIS — I25.10 CORONARY ARTERY DISEASE INVOLVING NATIVE CORONARY ARTERY OF NATIVE HEART WITHOUT ANGINA PECTORIS: ICD-10-CM

## 2022-11-16 DIAGNOSIS — I71.21 ANEURYSM OF ASCENDING AORTA (HCC): ICD-10-CM

## 2022-11-16 PROCEDURE — 93306 TTE W/DOPPLER COMPLETE: CPT

## 2022-11-17 LAB
LV EJECT FRACT  99904: 60
LV EJECT FRACT MOD 2C 99903: 69.2
LV EJECT FRACT MOD 4C 99902: 73.12
LV EJECT FRACT MOD BP 99901: 66.84

## 2022-11-17 PROCEDURE — 93306 TTE W/DOPPLER COMPLETE: CPT | Mod: 26 | Performed by: INTERNAL MEDICINE

## 2023-03-08 ENCOUNTER — TELEPHONE (OUTPATIENT)
Dept: HEALTH INFORMATION MANAGEMENT | Facility: OTHER | Age: 83
End: 2023-03-08
Payer: MEDICARE

## 2023-03-09 PROBLEM — I77.89 ECTASIA OF ARTERY (HCC): Status: ACTIVE | Noted: 2023-03-09

## 2023-03-09 PROBLEM — R41.3 MEMORY LOSS: Status: ACTIVE | Noted: 2023-03-09

## 2023-03-09 PROBLEM — I25.10 CORONARY ARTERY DISEASE INVOLVING NATIVE CORONARY ARTERY WITHOUT ANGINA PECTORIS: Status: ACTIVE | Noted: 2023-03-09

## 2023-03-09 PROBLEM — I70.0 AORTIC ATHEROSCLEROSIS (HCC): Status: ACTIVE | Noted: 2023-03-09

## 2023-03-09 PROBLEM — I27.20 PULMONARY HYPERTENSION (HCC): Status: ACTIVE | Noted: 2023-03-09

## 2023-03-09 PROBLEM — N18.2 STAGE 2 CHRONIC KIDNEY DISEASE: Status: ACTIVE | Noted: 2023-03-09

## 2023-03-09 PROBLEM — I77.810 AORTIC ROOT DILATION (HCC): Status: ACTIVE | Noted: 2023-03-09

## 2023-03-12 ENCOUNTER — PATIENT MESSAGE (OUTPATIENT)
Dept: MEDICAL GROUP | Facility: LAB | Age: 83
End: 2023-03-12
Payer: MEDICARE

## 2023-03-12 DIAGNOSIS — E78.5 HYPERLIPIDEMIA, UNSPECIFIED HYPERLIPIDEMIA TYPE: ICD-10-CM

## 2023-03-15 RX ORDER — ROSUVASTATIN CALCIUM 5 MG/1
5 TABLET, COATED ORAL DAILY
Qty: 90 TABLET | Refills: 3 | Status: SHIPPED | OUTPATIENT
Start: 2023-03-15 | End: 2023-05-18 | Stop reason: SDUPTHER

## 2023-03-23 ENCOUNTER — TELEPHONE (OUTPATIENT)
Dept: HEALTH INFORMATION MANAGEMENT | Facility: OTHER | Age: 83
End: 2023-03-23

## 2023-05-02 ENCOUNTER — OFFICE VISIT (OUTPATIENT)
Dept: URGENT CARE | Facility: CLINIC | Age: 83
End: 2023-05-02
Payer: MEDICARE

## 2023-05-02 VITALS
SYSTOLIC BLOOD PRESSURE: 96 MMHG | BODY MASS INDEX: 27.58 KG/M2 | DIASTOLIC BLOOD PRESSURE: 46 MMHG | WEIGHT: 197 LBS | OXYGEN SATURATION: 93 % | HEIGHT: 71 IN | HEART RATE: 73 BPM | RESPIRATION RATE: 18 BRPM | TEMPERATURE: 98 F

## 2023-05-02 DIAGNOSIS — S51.819A SKIN TEAR OF FOREARM WITHOUT COMPLICATION, INITIAL ENCOUNTER: ICD-10-CM

## 2023-05-02 DIAGNOSIS — E11.9 NON-INSULIN DEPENDENT TYPE 2 DIABETES MELLITUS (HCC): ICD-10-CM

## 2023-05-02 PROCEDURE — 99214 OFFICE O/P EST MOD 30 MIN: CPT | Performed by: PHYSICIAN ASSISTANT

## 2023-05-02 RX ORDER — CEPHALEXIN 500 MG/1
500 CAPSULE ORAL 4 TIMES DAILY
Qty: 20 CAPSULE | Refills: 0 | Status: SHIPPED | OUTPATIENT
Start: 2023-05-02 | End: 2023-05-07

## 2023-05-02 ASSESSMENT — ENCOUNTER SYMPTOMS
CHILLS: 0
FEVER: 0
NAUSEA: 0
VOMITING: 0

## 2023-05-02 ASSESSMENT — FIBROSIS 4 INDEX: FIB4 SCORE: 1.89

## 2023-05-02 NOTE — PROGRESS NOTES
Subjective:   Star Centeno is a 82 y.o. male who presents for Laceration (X3days, Right arm, wants to make sure he doesn't have any issues with it about to go on a trip )        Patient with history of type 2 diabetes presents with concerns of a skin tear to his right arm that occurred 3 days ago.  He states that he brushed the arm against a tree while he was working outside.  He has been cleaning the wound with hydrogen peroxide and applying Neosporin.  Denies worsening redness, swelling, pain, purulent discharge from the site.  Denies red streaking.  No nausea, vomiting, fevers, chills.  He believes that his tetanus is up-to-date.  Patient is leaving town tomorrow to attend a wedding in [place].  He is concerned about the wound becoming infected while he is gone.    Review of Systems   Constitutional:  Negative for chills and fever.   Gastrointestinal:  Negative for nausea and vomiting.     PMH:  has a past medical history of Cancer (HCC), Cataract, Hyperlipidemia, and Hypertension.  MEDS:   Current Outpatient Medications:     metFORMIN (GLUCOPHAGE) 500 MG Tab, TAKE 1.5 TABS IN MORNING AND 1 IN THE EVENING, Disp: , Rfl:     cephALEXin (KEFLEX) 500 MG Cap, Take 1 Capsule by mouth 4 times a day for 5 days., Disp: 20 Capsule, Rfl: 0    rosuvastatin (CRESTOR) 5 MG Tab, Take 1 Tablet by mouth every day for 360 days., Disp: 90 Tablet, Rfl: 3    metoprolol SR (TOPROL XL) 100 MG TABLET SR 24 HR, Take 100 mg by mouth every day., Disp: , Rfl:     Coenzyme Q10 (COQ-10) 30 MG Cap, , Disp: , Rfl:     Omega-3 Fatty Acids (FISH OIL) 1000 MG Cap capsule, , Disp: , Rfl:     aspirin 81 MG EC tablet, Take 81 mg by mouth every day., Disp: , Rfl:     ezetimibe (ZETIA) 10 MG Tab, , Disp: , Rfl:     hydroCHLOROthiazide (HYDRODIURIL) 25 MG Tab, Take 25 mg by mouth every day., Disp: , Rfl:     glipiZIDE (GLUCOTROL) 10 MG Tab, Take 5 mg by mouth 2 times a day., Disp: , Rfl:     quinapril (ACCUPRIL) 20 MG Tab, Take 20 mg by mouth  "every day., Disp: , Rfl:     Prenatal Multivit-Min-Fe-FA (PRE- FORMULA PO), Take  by mouth., Disp: , Rfl:     metFORMIN (GLUCOPHAGE) 500 MG Tab, TAKE 1.5 TABLETS BY MOUTH IN THE MORNING AND 1 TABLET BY MOUTH IN THE EVENING (Patient not taking: Reported on 2023), Disp: , Rfl:     Probiotic Product (PROBIOTIC DAILY PO), Take  by mouth. (Patient not taking: Reported on 3/9/2023), Disp: , Rfl:     metformin (GLUCOPHAGE) 1000 MG tablet, Take 500 mg by mouth 2 times a day with meals. (Patient not taking: Reported on 2023), Disp: , Rfl:   ALLERGIES: No Known Allergies  SURGHX:   Past Surgical History:   Procedure Laterality Date    EYE SURGERY      PROSTATECTOMY, RADICAL RETRO       SOCHX:  reports that he quit smoking about 22 years ago. His smoking use included cigarettes. He started smoking about 67 years ago. He has a 45.00 pack-year smoking history. He has never used smokeless tobacco. He reports current alcohol use of about 8.4 oz per week. He reports that he does not use drugs.  FH: Family history was reviewed, no pertinent findings to report   Objective:   BP 96/46   Pulse 73   Temp 36.7 °C (98 °F) (Temporal)   Resp 18   Ht 1.803 m (5' 11\")   Wt 89.4 kg (197 lb)   SpO2 93%   BMI 27.48 kg/m²   Physical Exam  Vitals reviewed.   Constitutional:       General: He is not in acute distress.     Appearance: Normal appearance. He is well-developed. He is not toxic-appearing.   HENT:      Head: Normocephalic and atraumatic.      Right Ear: External ear normal.      Left Ear: External ear normal.      Nose: Nose normal.   Cardiovascular:      Rate and Rhythm: Normal rate and regular rhythm.   Pulmonary:      Effort: Pulmonary effort is normal. No respiratory distress.      Breath sounds: No stridor.   Skin:     General: Skin is dry.      Comments: 2 small skin tears to the dorsal mid right forearm with approximately a centimeter and a half of surrounding ecchymosis.  No erythema.  No edema.  Mildly tender " to palpation.  No warmth.  No abnormal discharge.  No lymphangitis.  Neurovascularly intact distally.   Neurological:      Comments: Alert and oriented.    Psychiatric:         Speech: Speech normal.         Behavior: Behavior normal.         Assessment/Plan:   1. Skin tear of forearm without complication, initial encounter  - cephALEXin (KEFLEX) 500 MG Cap; Take 1 Capsule by mouth 4 times a day for 5 days.  Dispense: 20 Capsule; Refill: 0    2. Non-insulin dependent type 2 diabetes mellitus (HCC)    Other orders  - metFORMIN (GLUCOPHAGE) 500 MG Tab; TAKE 1.5 TABS IN MORNING AND 1 IN THE EVENING  - metFORMIN (GLUCOPHAGE) 500 MG Tab; TAKE 1.5 TABLETS BY MOUTH IN THE MORNING AND 1 TABLET BY MOUTH IN THE EVENING (Patient not taking: Reported on 5/2/2023)    Patient records reviewed.  Tdap last updated in 2022.  Patient has 2 small skin tears of the dorsal right forearm without signs of secondary infection.  As patient will be leaving town and will have limited access to medical resources I will send in a contingent prescription for Keflex.  I would like him to pick this up and take it on his trip with him.  If he develops any signs or symptoms of infection I would like him to begin taking this and seek medical reevaluation.  Wound care instructions reviewed.

## 2023-05-11 ENCOUNTER — PATIENT MESSAGE (OUTPATIENT)
Dept: MEDICAL GROUP | Facility: LAB | Age: 83
End: 2023-05-11
Payer: MEDICARE

## 2023-05-11 ENCOUNTER — PATIENT MESSAGE (OUTPATIENT)
Dept: CARDIOLOGY | Facility: MEDICAL CENTER | Age: 83
End: 2023-05-11
Payer: MEDICARE

## 2023-05-11 DIAGNOSIS — I25.10 CORONARY ARTERY DISEASE INVOLVING NATIVE CORONARY ARTERY OF NATIVE HEART WITHOUT ANGINA PECTORIS: ICD-10-CM

## 2023-05-11 DIAGNOSIS — E78.5 DYSLIPIDEMIA: ICD-10-CM

## 2023-05-11 DIAGNOSIS — E78.5 HYPERLIPIDEMIA, UNSPECIFIED HYPERLIPIDEMIA TYPE: ICD-10-CM

## 2023-05-11 DIAGNOSIS — H72.90 PERFORATION OF TYMPANIC MEMBRANE, UNSPECIFIED LATERALITY: ICD-10-CM

## 2023-05-11 DIAGNOSIS — I10 ESSENTIAL HYPERTENSION: ICD-10-CM

## 2023-05-15 RX ORDER — GLIPIZIDE 5 MG/1
5 TABLET ORAL 2 TIMES DAILY
Qty: 180 TABLET | Refills: 3 | Status: SHIPPED | OUTPATIENT
Start: 2023-05-15 | End: 2023-05-25

## 2023-05-17 NOTE — TELEPHONE ENCOUNTER
Received request via: Pharmacy    Was the patient seen in the last year in this department? Yes    Does the patient have an active prescription (recently filled or refills available) for medication(s) requested? No    Does the patient have [facility] Plus and need 100 day supply (blood pressure, diabetes and cholesterol meds only)? Yes, quantity updated to 100 days

## 2023-05-18 ENCOUNTER — OFFICE VISIT (OUTPATIENT)
Dept: MEDICAL GROUP | Facility: LAB | Age: 83
End: 2023-05-18
Payer: MEDICARE

## 2023-05-18 VITALS
HEIGHT: 71 IN | WEIGHT: 193 LBS | SYSTOLIC BLOOD PRESSURE: 86 MMHG | HEART RATE: 65 BPM | OXYGEN SATURATION: 93 % | TEMPERATURE: 96.5 F | BODY MASS INDEX: 27.02 KG/M2 | DIASTOLIC BLOOD PRESSURE: 56 MMHG

## 2023-05-18 DIAGNOSIS — E11.9 TYPE 2 DIABETES MELLITUS WITHOUT COMPLICATION, WITHOUT LONG-TERM CURRENT USE OF INSULIN (HCC): ICD-10-CM

## 2023-05-18 DIAGNOSIS — R19.7 DIARRHEA, UNSPECIFIED TYPE: ICD-10-CM

## 2023-05-18 PROCEDURE — 99214 OFFICE O/P EST MOD 30 MIN: CPT | Performed by: FAMILY MEDICINE

## 2023-05-18 PROCEDURE — 3074F SYST BP LT 130 MM HG: CPT | Performed by: FAMILY MEDICINE

## 2023-05-18 PROCEDURE — 3078F DIAST BP <80 MM HG: CPT | Performed by: FAMILY MEDICINE

## 2023-05-18 RX ORDER — EZETIMIBE 10 MG/1
10 TABLET ORAL DAILY
Qty: 100 TABLET | Refills: 0 | Status: SHIPPED | OUTPATIENT
Start: 2023-05-18 | End: 2023-12-01 | Stop reason: SDUPTHER

## 2023-05-18 RX ORDER — ROSUVASTATIN CALCIUM 5 MG/1
5 TABLET, COATED ORAL DAILY
Qty: 100 TABLET | Refills: 0 | Status: SHIPPED | OUTPATIENT
Start: 2023-05-18 | End: 2023-08-29

## 2023-05-18 RX ORDER — QUINAPRIL 20 MG/1
20 TABLET ORAL DAILY
Qty: 100 TABLET | Refills: 0 | Status: SHIPPED | OUTPATIENT
Start: 2023-05-18 | End: 2023-05-26

## 2023-05-18 RX ORDER — METOPROLOL SUCCINATE 100 MG/1
100 TABLET, EXTENDED RELEASE ORAL DAILY
Qty: 100 TABLET | Refills: 0 | Status: ON HOLD | OUTPATIENT
Start: 2023-05-18 | End: 2023-10-25 | Stop reason: SDUPTHER

## 2023-05-18 ASSESSMENT — FIBROSIS 4 INDEX: FIB4 SCORE: 1.89

## 2023-05-18 NOTE — PROGRESS NOTES
"Subjective:   Star Centeno is a 82 y.o. male here today for   Chief Complaint   Patient presents with    Other     Handicap placard. X1 week, eats something hurts his stomach and needs to have a bowel movement, feels like he can't empty out his gut when he has a bowel movement, seems like he has to go 5-6 times a day when usually it's once in the morning.     #Changes to bowel movements:  -Over the last week patient's been having issues with bowel movements and occasional abdominal cramping.  He states that symptoms will occur in the morning.  He states that after eating small breakfast he will have some light abdominal cramping followed by some watery, diarrhea like bowel movements.  He states that they are \"small amount\" and occurring 5-6 times in the morning.  He states he is usually back to normal bowel regiment around 11:00 in the morning.  He states this is a change from what he is previously used to.  Normally he is only having 1 bowel movement in the morning with no abdominal cramping.  -Patient denies any new significant changes in diet.  Denies any changes in medication.  Denies any fevers, chills, nausea, vomiting.  Denies any blood in stool, black dark or tarry stools.  Is drinking several glasses of water a day, no concerns for dehydration.    #Type 2 diabetes:  -Currently treated with metformin, glipizide daily.  Has been working on appropriate diet.  No real exercise regiment.  Denies any polydipsia, polyuria, nausea/tingle/pain in the lower extremities.  -Is taking aspirin daily as well as losartan daily.    No Known Allergies      Current medicines (including changes today)  Current Outpatient Medications   Medication Sig Dispense Refill    metFORMIN (GLUCOPHAGE) 500 MG Tab Take 1 Tablet by mouth 2 times a day with meals for 360 days. 200 Tablet 1    glipiZIDE (GLUCOTROL) 5 MG Tab Take 1 Tablet by mouth 2 times a day for 360 days. 180 Tablet 3    rosuvastatin (CRESTOR) 5 MG Tab Take 1 Tablet by " "mouth every day for 360 days. 90 Tablet 3    metoprolol SR (TOPROL XL) 100 MG TABLET SR 24 HR Take 100 mg by mouth every day.      Coenzyme Q10 (COQ-10) 30 MG Cap       Omega-3 Fatty Acids (FISH OIL) 1000 MG Cap capsule       Probiotic Product (PROBIOTIC DAILY PO) Take  by mouth. (Patient not taking: Reported on 3/9/2023)      aspirin 81 MG EC tablet Take 81 mg by mouth every day.      ezetimibe (ZETIA) 10 MG Tab       quinapril (ACCUPRIL) 20 MG Tab Take 20 mg by mouth every day.      Prenatal Multivit-Min-Fe-FA (PRE- FORMULA PO) Take  by mouth.       No current facility-administered medications for this visit.     He  has a past medical history of Cancer (HCC), Cataract, Hyperlipidemia, and Hypertension.    ROS   -See HPI       Objective:     Physical Exam:  BP (!) 86/56   Pulse 65   Temp 35.8 °C (96.5 °F) (Temporal)   Ht 1.803 m (5' 11\")   Wt 87.5 kg (193 lb)   SpO2 93%  Body mass index is 26.92 kg/m².   Constitutional: Alert, no distress.  Skin: Warm, dry, good turgor, no rashes in visible areas.  Eye: Equal, round and reactive, conjunctiva clear, lids normal.  Respiratory: Unlabored respiratory effort, lungs clear to auscultation, no wheezes, no rhonchi.  Cardiovascular: Normal S1, S2, no murmur, no edema.  Abdomen: Soft, non-tender, no masses, no hepatosplenomegaly.  Psych: Alert and oriented x3, normal affect and mood.    Assessment and Plan:     1. Diarrhea, unspecified type  Thin uncertain etiology of diarrhea at this time.  Differential diagnosis includes potential changes to diet versus medications including metformin versus possible infectious etiology.  Discussed conservative treatment measures, continuing with appropriate hydration, continue with high-protein meals especially with metformin.  No significant red flag symptoms at this time we will continue watchful observation of symptoms and follow-up as needed.    2. Type 2 diabetes mellitus without complication, without long-term current use " of insulin (HCC)  Stable.  Patient is due for yearly screenings.  We will order labs as below.  Continue with metformin and glipizide.  - Comp Metabolic Panel; Future  - HEMOGLOBIN A1C; Future  - Lipid Profile; Future  - MICROALBUMIN CREAT RATIO URINE; Future    Followup: No follow-ups on file.         PLEASE NOTE: This dictation was created using voice recognition software. I have made every reasonable attempt to correct obvious errors, but I expect that there are errors of grammar and possibly content that I did not discover before finalizing the note.

## 2023-05-22 ENCOUNTER — HOSPITAL ENCOUNTER (OUTPATIENT)
Dept: LAB | Facility: MEDICAL CENTER | Age: 83
End: 2023-05-22
Attending: FAMILY MEDICINE
Payer: MEDICARE

## 2023-05-22 DIAGNOSIS — E11.9 TYPE 2 DIABETES MELLITUS WITHOUT COMPLICATION, WITHOUT LONG-TERM CURRENT USE OF INSULIN (HCC): ICD-10-CM

## 2023-05-22 DIAGNOSIS — I25.10 CORONARY ARTERY DISEASE INVOLVING NATIVE CORONARY ARTERY OF NATIVE HEART WITHOUT ANGINA PECTORIS: ICD-10-CM

## 2023-05-22 DIAGNOSIS — I10 ESSENTIAL HYPERTENSION: ICD-10-CM

## 2023-05-22 LAB
ALBUMIN SERPL BCP-MCNC: 4 G/DL (ref 3.2–4.9)
ALBUMIN/GLOB SERPL: 1.5 G/DL
ALP SERPL-CCNC: 59 U/L (ref 30–99)
ALT SERPL-CCNC: 9 U/L (ref 2–50)
ANION GAP SERPL CALC-SCNC: 14 MMOL/L (ref 7–16)
AST SERPL-CCNC: 14 U/L (ref 12–45)
BILIRUB SERPL-MCNC: 0.4 MG/DL (ref 0.1–1.5)
BUN SERPL-MCNC: 38 MG/DL (ref 8–22)
CALCIUM ALBUM COR SERPL-MCNC: 9.3 MG/DL (ref 8.5–10.5)
CALCIUM SERPL-MCNC: 9.3 MG/DL (ref 8.5–10.5)
CHLORIDE SERPL-SCNC: 110 MMOL/L (ref 96–112)
CHOLEST SERPL-MCNC: 168 MG/DL (ref 100–199)
CO2 SERPL-SCNC: 18 MMOL/L (ref 20–33)
CREAT SERPL-MCNC: 1.53 MG/DL (ref 0.5–1.4)
CREAT UR-MCNC: 83.53 MG/DL
EST. AVERAGE GLUCOSE BLD GHB EST-MCNC: 148 MG/DL
GFR SERPLBLD CREATININE-BSD FMLA CKD-EPI: 44 ML/MIN/1.73 M 2
GLOBULIN SER CALC-MCNC: 2.7 G/DL (ref 1.9–3.5)
GLUCOSE SERPL-MCNC: 178 MG/DL (ref 65–99)
HBA1C MFR BLD: 6.8 % (ref 4–5.6)
HDLC SERPL-MCNC: 36 MG/DL
LDLC SERPL CALC-MCNC: 95 MG/DL
MICROALBUMIN UR-MCNC: 2.9 MG/DL
MICROALBUMIN/CREAT UR: 35 MG/G (ref 0–30)
POTASSIUM SERPL-SCNC: 4.8 MMOL/L (ref 3.6–5.5)
PROT SERPL-MCNC: 6.7 G/DL (ref 6–8.2)
SODIUM SERPL-SCNC: 142 MMOL/L (ref 135–145)
TRIGL SERPL-MCNC: 187 MG/DL (ref 0–149)

## 2023-05-22 PROCEDURE — 80061 LIPID PANEL: CPT

## 2023-05-22 PROCEDURE — 82043 UR ALBUMIN QUANTITATIVE: CPT

## 2023-05-22 PROCEDURE — 80053 COMPREHEN METABOLIC PANEL: CPT

## 2023-05-22 PROCEDURE — 36415 COLL VENOUS BLD VENIPUNCTURE: CPT

## 2023-05-22 PROCEDURE — 83036 HEMOGLOBIN GLYCOSYLATED A1C: CPT

## 2023-05-22 PROCEDURE — 82570 ASSAY OF URINE CREATININE: CPT

## 2023-05-24 ENCOUNTER — TELEPHONE (OUTPATIENT)
Dept: MEDICAL GROUP | Facility: LAB | Age: 83
End: 2023-05-24
Payer: MEDICARE

## 2023-05-25 ENCOUNTER — OFFICE VISIT (OUTPATIENT)
Dept: MEDICAL GROUP | Facility: LAB | Age: 83
End: 2023-05-25
Payer: MEDICARE

## 2023-05-25 VITALS
OXYGEN SATURATION: 93 % | WEIGHT: 193 LBS | BODY MASS INDEX: 27.02 KG/M2 | SYSTOLIC BLOOD PRESSURE: 110 MMHG | DIASTOLIC BLOOD PRESSURE: 62 MMHG | HEART RATE: 79 BPM | HEIGHT: 71 IN | TEMPERATURE: 97.3 F

## 2023-05-25 DIAGNOSIS — E11.9 TYPE 2 DIABETES MELLITUS WITHOUT COMPLICATION, WITHOUT LONG-TERM CURRENT USE OF INSULIN (HCC): ICD-10-CM

## 2023-05-25 DIAGNOSIS — N18.32 STAGE 3B CHRONIC KIDNEY DISEASE: ICD-10-CM

## 2023-05-25 DIAGNOSIS — M54.50 CHRONIC BILATERAL LOW BACK PAIN WITHOUT SCIATICA: ICD-10-CM

## 2023-05-25 DIAGNOSIS — G89.29 CHRONIC BILATERAL LOW BACK PAIN WITHOUT SCIATICA: ICD-10-CM

## 2023-05-25 PROCEDURE — 3074F SYST BP LT 130 MM HG: CPT | Performed by: FAMILY MEDICINE

## 2023-05-25 PROCEDURE — 3078F DIAST BP <80 MM HG: CPT | Performed by: FAMILY MEDICINE

## 2023-05-25 PROCEDURE — 99214 OFFICE O/P EST MOD 30 MIN: CPT | Performed by: FAMILY MEDICINE

## 2023-05-25 ASSESSMENT — FIBROSIS 4 INDEX: FIB4 SCORE: 1.606557377049180328

## 2023-05-25 NOTE — TELEPHONE ENCOUNTER
ESTABLISHED PATIENT PRE-VISIT PLANNING     Patient was NOT contacted to complete PVP.     Note: Patient will not be contacted if there is no indication to call.     1.  Reviewed notes from the last few office visits within the medical group: Yes    2.  If any orders were placed at last visit or intended to be done for this visit (i.e. 6 mos follow-up), do we have Results/Consult Notes?           Labs - Labs ordered, completed on 5/22/23 and results are in chart.  Note: If patient appointment is for lab review and patient did not complete labs, check with provider if OK to reschedule patient until labs completed.         Imaging - Imaging was not ordered at last office visit.         Referrals - No referrals were ordered at last office visit.    3. Is this appointment scheduled as a Hospital Follow-Up? No    4.  Immunizations were updated in Epic using Reconcile Outside Information activity? Yes    5.  Patient is due for the following Health Maintenance Topics:   Health Maintenance Due   Topic Date Due    DIABETES MONOFILAMENT / LE EXAM  Never done    IMM ZOSTER VACCINES (2 of 2) 09/21/2022    COVID-19 Vaccine (4 - Booster for Moderna series) 12/14/2022    RETINAL SCREENING  06/01/2023     6.  AHA (Pulse8) form printed for Provider? N/A

## 2023-05-26 ENCOUNTER — HOSPITAL ENCOUNTER (OUTPATIENT)
Dept: RADIOLOGY | Facility: MEDICAL CENTER | Age: 83
End: 2023-05-26
Attending: FAMILY MEDICINE
Payer: MEDICARE

## 2023-05-26 DIAGNOSIS — G89.29 CHRONIC BILATERAL LOW BACK PAIN WITHOUT SCIATICA: ICD-10-CM

## 2023-05-26 DIAGNOSIS — M54.50 CHRONIC BILATERAL LOW BACK PAIN WITHOUT SCIATICA: ICD-10-CM

## 2023-05-26 PROCEDURE — 72100 X-RAY EXAM L-S SPINE 2/3 VWS: CPT

## 2023-05-26 RX ORDER — LISINOPRIL 20 MG/1
20 TABLET ORAL DAILY
Qty: 90 TABLET | Refills: 2 | Status: SHIPPED | OUTPATIENT
Start: 2023-05-26 | End: 2023-06-09 | Stop reason: SDUPTHER

## 2023-05-26 RX ORDER — QUINAPRIL 20 MG/1
20 TABLET ORAL DAILY
Qty: 100 TABLET | Refills: 0 | OUTPATIENT
Start: 2023-05-26

## 2023-05-26 NOTE — TELEPHONE ENCOUNTER
-----------------------------------------------------  lisinopril (PRINIVIL) 20 MG Tab 90 Tablet 2/2 5/26/2023     Sig - Route: Take 1 Tablet by mouth every day. - Oral    Sent to pharmacy as: Lisinopril 20 MG Oral Tablet (PRINIVIL)    Notes to Pharmacy: To replace Quinapril 20mg per Dr Sudeep Ewing for Ordering: Required by Trino Sheets M.D.    E-Prescribing Status: Receipt confirmed by pharmacy (5/26/2023  4:31 PM PDT)      Pharmacy    [id number] - [place] - [address]

## 2023-05-30 DIAGNOSIS — E11.9 TYPE 2 DIABETES MELLITUS WITHOUT COMPLICATION, WITHOUT LONG-TERM CURRENT USE OF INSULIN (HCC): ICD-10-CM

## 2023-05-30 DIAGNOSIS — N18.32 STAGE 3B CHRONIC KIDNEY DISEASE: ICD-10-CM

## 2023-08-06 ENCOUNTER — APPOINTMENT (OUTPATIENT)
Dept: TELEHEALTH | Facility: TELEMEDICINE | Age: 83
End: 2023-08-06
Payer: MEDICARE

## 2023-08-08 ENCOUNTER — TELEPHONE (OUTPATIENT)
Dept: MEDICAL GROUP | Facility: LAB | Age: 83
End: 2023-08-08
Payer: MEDICARE

## 2023-08-08 NOTE — TELEPHONE ENCOUNTER
ESTABLISHED PATIENT PRE-VISIT PLANNING     Patient was NOT contacted to complete PVP.     Note: Patient will not be contacted if there is no indication to call.     1.  Reviewed notes from the last few office visits within the medical group: Yes    2.  If any orders were placed at last visit or intended to be done for this visit (i.e. 6 mos follow-up), do we have Results/Consult Notes?           Labs - Labs were not ordered at last office visit.  Note: If patient appointment is for lab review and patient did not complete labs, check with provider if OK to reschedule patient until labs completed.         Imaging - Imaging ordered, completed and results are in chart.         Referrals - No referrals were ordered at last office visit.    3. Is this appointment scheduled as a Hospital Follow-Up? No    4.  Immunizations were updated in Epic using Reconcile Outside Information activity? Yes    5.  Patient is due for the following Health Maintenance Topics:   Health Maintenance Due   Topic Date Due    DIABETES MONOFILAMENT / LE EXAM  Never done    IMM ZOSTER VACCINES (2 of 2) 09/21/2022    COVID-19 Vaccine (2 - Pfizer series) 02/19/2023    RETINAL SCREENING  06/01/2023   6.  AHA (Pulse8) form printed for Provider? N/A

## 2023-08-21 NOTE — PROGRESS NOTES
Cardiology Follow-up Consultation Note    Date of note:    8/22/2023    Primary Care Provider: Nirmal Gentile M.D.    Patient Name: Star Cenetno   YOB: 1940  MRN:              [id number]    Chief Complaint: Follow-up CAD    History of Present Illness: Mr. Star Centeno is a 82 y.o. male whose current medical problems include hypertension, diabetes, CAD status post PCI to RCA and LCx 2002, dyslipidemia, and ascending aortic aneurysm who is here for follow-up CAD.    The patient was previously a patient of Dr. Sheets, seen 9/20/2022.  The patient was doing well during the last visit, and no changes were made to his medications.  Echocardiogram was completed 11/16/2022, which showed normal LVEF as well as normal ascending aortic diameter (CT chest in 2021 however showed ascending aortic aneurysm).     The patient had a previously cardiologist in [place], Dr. Baldeamr Haskins. The patient also saw Dr. Salazar for unclear diagnosis (aneurysm of unclear vessel, Dr. Salazar is interventional radiologist/vascular surgery).  No records are found.    The patient returns today for follow-up.  The patient presents with his daughter.  The patient reports feeling well today.  He is quite active, goes to the gym 3 times a week without any chest pain or shortness of breath.  He denies any orthopnea, PND, or leg swelling.  No palpitations.  No syncope or presyncopal episodes.    Cardiovascular Risk Factors:  1. Smoking status: Smoker  2. Type II Diabetes Mellitus: On medication  Lab Results   Component Value Date/Time    HBA1C 6.8 (H) 05/22/2023 08:34 AM    HBA1C 6.8 (H) 08/04/2022 11:00 AM     3. Hypertension: On medication  4. Dyslipidemia: On statin  Cholesterol,Tot   Date Value Ref Range Status   05/22/2023 168 100 - 199 mg/dL Final     LDL   Date Value Ref Range Status   05/22/2023 95 <100 mg/dL Final     HDL   Date Value Ref Range Status   05/22/2023 36 (A) >=40 mg/dL Final     Triglycerides   Date Value Ref Range  Status   2023 187 (H) 0 - 149 mg/dL Final     5. Family history of early Coronary Artery Disease in a first degree relative (Male less than 55 years of age; Female less than 65 years of age): None  6.  Obesity and/or Metabolic Syndrome: Body mass index is 26.78 kg/m².  7. Sedentary lifestyle: Active, goes to the gym 3 time a week    Review of Systems   Constitutional: Negative for fever, malaise/fatigue and night sweats.   Cardiovascular:  Negative for chest pain, dyspnea on exertion, irregular heartbeat, leg swelling, near-syncope, orthopnea, palpitations, paroxysmal nocturnal dyspnea and syncope.   Respiratory:  Negative for cough, shortness of breath and wheezing.    Gastrointestinal:  Negative for abdominal pain, diarrhea, nausea and vomiting.   Neurological:  Negative for dizziness, focal weakness and weakness.       All other systems reviewed and are negative.       Current Outpatient Medications   Medication Sig Dispense Refill    lisinopril (PRINIVIL) 20 MG Tab Take 1 Tablet by mouth every day. 100 Tablet 3    Empagliflozin 25 MG Tab Take 25 mg by mouth every day for 360 days. 100 Tablet 3    ezetimibe (ZETIA) 10 MG Tab Take 1 Tablet by mouth every day. 100 Tablet 0    metoprolol SR (TOPROL XL) 100 MG TABLET SR 24 HR Take 1 Tablet by mouth every day. 100 Tablet 0    rosuvastatin (CRESTOR) 5 MG Tab Take 1 Tablet by mouth every day. 100 Tablet 0    metFORMIN (GLUCOPHAGE) 500 MG Tab Take 1 Tablet by mouth 2 times a day with meals for 360 days. 200 Tablet 1    Coenzyme Q10 (COQ-10) 30 MG Cap       Omega-3 Fatty Acids (FISH OIL) 1000 MG Cap capsule       aspirin 81 MG EC tablet Take 81 mg by mouth every day.      Prenatal Multivit-Min-Fe-FA (PRE-COLE FORMULA PO) Take  by mouth.      Probiotic Product (PROBIOTIC DAILY PO) Take  by mouth. (Patient not taking: Reported on 3/9/2023)       No current facility-administered medications for this visit.         No Known Allergies  Physical Exam:  Ambulatory  "Vitals  /50 (BP Location: Left arm, Patient Position: Sitting, BP Cuff Size: Adult)   Pulse 66   Resp 16   Ht 1.803 m (5' 11\")   Wt 87.1 kg (192 lb)   SpO2 96%    Oxygen Therapy:  Pulse Oximetry: 96 %  BP Readings from Last 4 Encounters:   08/22/23 100/50   05/25/23 110/62   05/18/23 (!) 86/56   05/02/23 96/46       Weight/BMI: Body mass index is 26.78 kg/m².  Wt Readings from Last 4 Encounters:   08/22/23 87.1 kg (192 lb)   05/25/23 87.5 kg (193 lb)   05/18/23 87.5 kg (193 lb)   05/02/23 89.4 kg (197 lb)       General: Well appearing and in no apparent distress  Eyes: nl conjunctiva, no icteric sclera  ENT: normal external appearance of ears, nose, and throat  Neck: no visible JVP,  no carotid bruits  Lungs: normal respiratory effort, CTAB  Heart: RRR, no murmurs, no rubs or gallops,  no edema bilateral lower extremities. No LV/RV heave on cardiac palpatation. + bilateral radial pulses.  + bilateral dp pulses.   Abdomen: soft, non tender, non distended, no masses, normal bowel sounds.  No HSM.  Extremities/MSK: no clubbing, no cyanosis  Neurological: No focal sensory deficits  Psychiatric: Appropriate affect, A/O x 3, intact judgement and insight  Skin: Warm extremities      Lab Data Review:  Lab Results   Component Value Date/Time    CHOLSTRLTOT 168 05/22/2023 08:34 AM    LDL 95 05/22/2023 08:34 AM    HDL 36 (A) 05/22/2023 08:34 AM    TRIGLYCERIDE 187 (H) 05/22/2023 08:34 AM       Lab Results   Component Value Date/Time    SODIUM 142 05/22/2023 08:34 AM    POTASSIUM 4.8 05/22/2023 08:34 AM    CHLORIDE 110 05/22/2023 08:34 AM    CO2 18 (L) 05/22/2023 08:34 AM    GLUCOSE 178 (H) 05/22/2023 08:34 AM    BUN 38 (H) 05/22/2023 08:34 AM    CREATININE 1.53 (H) 05/22/2023 08:34 AM    BUNCREATRAT 15 06/24/2021 11:54 AM     Lab Results   Component Value Date/Time    ALKPHOSPHAT 59 05/22/2023 08:34 AM    ASTSGOT 14 05/22/2023 08:34 AM    ALTSGPT 9 05/22/2023 08:34 AM    TBILIRUBIN 0.4 05/22/2023 08:34 AM      Lab " Results   Component Value Date/Time    WBC 7.7 08/04/2022 11:00 AM    HEMOGLOBIN 13.2 (L) 08/04/2022 11:00 AM     Lab Results   Component Value Date/Time    HBA1C 6.8 (H) 05/22/2023 08:34 AM    HBA1C 6.8 (H) 08/04/2022 11:00 AM         Cardiac Imaging and Procedures Review:    EKG dated 9/20/2022: My personal interpretation is sinus bradycardia, prolonged TN interval    Echo dated 11/16/2022:   CONCLUSIONS  No prior study is available for comparison.   Normal left ventricular size and systolic function.  Left ventricle ejection fraction estimated to be 60%.  No regional wall motion abnormality noted.  No hemodynamically significant valvular heart disease noted.  Estimated right ventricular systolic pressure is 35 mmHg.    CT chest 7/29/2021:   IMPRESSION:       1. Aneurysmal dilatation at the aortic root with maximal diameter of 4.3 cm, not appreciably changed from the prior study allowing for limitations of the study.     2.  Aortic and coronary artery calcifications.     3.  5 mm right lower lobe nodule, stable compared to 10/07/2020.     4.  Emphysematous changes.      Assessment & Plan     1. Aneurysm of ascending aorta without rupture (HCC)  CT-CTA CHEST WITH & W/O-POST PROCESS    Basic Metabolic Panel      2. H/O heart artery stent        3. Essential hypertension        4. Peripheral artery disease (HCC)        5. Coronary artery disease due to lipid rich plaque        6. Dyslipidemia              Shared Medical Decision Making:    CAD  Status post PCI to RCA and LCx 2002  Asymptomatic  -Continue aspirin 81 mg daily  -Continue rosuvastatin with ezetimibe  -Continue metoprolol 100 mg daily and lisinopril 20 mg daily  -Counseled on heart healthy diet    Hypertension  BP well controlled this visit  -Continue metoprolol 100 mg daily and lisinopril    Dyslipidemia   -Continue rosuvastatin and ezetimibe    Ascending aortic aneurysm  CT chest 7/2021 showed ascending aortic aneurysm measuring 4.3 cm.  Recent echo did  not show any aortic aneurysm.  -We will plan for CT chest with and without postprocessing in order to assess ascending aorta size    Peripheral artery disease  The patient and his daughter reports that the patient was seeing vascular surgery/interventional radiologist for some aneurysm.  Dr. William Salazar ([place]) was seeing him for some vascular problems - we will get records.     Blood clotting disorder in family  Patient brought records of his brother who has blood clotting disorder.  -I have recommended the patient to discuss with primary care physician regarding obtaining blood test      A total of 41 minutes of time was spent on day of encounter reviewing medical record, performing history and examination, counseling, ordering medication/test/consults and documentation.    All of the patient's excellent questions were answered to the best of my knowledge and to his satisfaction.  It was a pleasure seeing Mr. Star Centeno in my clinic today. Return in about 6 months (around 2/22/2024). Patient is aware to call the cardiology clinic with any questions or concerns.      Finesse Monte MD  [facility], [address]  [address] [address]  Phone: [phone]  Fax: [phone]

## 2023-08-22 ENCOUNTER — OFFICE VISIT (OUTPATIENT)
Dept: CARDIOLOGY | Facility: MEDICAL CENTER | Age: 83
End: 2023-08-22
Attending: STUDENT IN AN ORGANIZED HEALTH CARE EDUCATION/TRAINING PROGRAM
Payer: MEDICARE

## 2023-08-22 VITALS
DIASTOLIC BLOOD PRESSURE: 50 MMHG | HEIGHT: 71 IN | SYSTOLIC BLOOD PRESSURE: 100 MMHG | OXYGEN SATURATION: 96 % | HEART RATE: 66 BPM | WEIGHT: 192 LBS | RESPIRATION RATE: 16 BRPM | BODY MASS INDEX: 26.88 KG/M2

## 2023-08-22 DIAGNOSIS — I10 ESSENTIAL HYPERTENSION: ICD-10-CM

## 2023-08-22 DIAGNOSIS — I71.21 ANEURYSM OF ASCENDING AORTA WITHOUT RUPTURE (HCC): ICD-10-CM

## 2023-08-22 DIAGNOSIS — E78.5 DYSLIPIDEMIA: ICD-10-CM

## 2023-08-22 DIAGNOSIS — I25.10 CORONARY ARTERY DISEASE DUE TO LIPID RICH PLAQUE: ICD-10-CM

## 2023-08-22 DIAGNOSIS — I25.83 CORONARY ARTERY DISEASE DUE TO LIPID RICH PLAQUE: ICD-10-CM

## 2023-08-22 DIAGNOSIS — Z95.5 H/O HEART ARTERY STENT: ICD-10-CM

## 2023-08-22 DIAGNOSIS — I73.9 PERIPHERAL ARTERY DISEASE (HCC): ICD-10-CM

## 2023-08-22 PROCEDURE — 99215 OFFICE O/P EST HI 40 MIN: CPT | Performed by: STUDENT IN AN ORGANIZED HEALTH CARE EDUCATION/TRAINING PROGRAM

## 2023-08-22 PROCEDURE — 3074F SYST BP LT 130 MM HG: CPT | Performed by: STUDENT IN AN ORGANIZED HEALTH CARE EDUCATION/TRAINING PROGRAM

## 2023-08-22 PROCEDURE — 99213 OFFICE O/P EST LOW 20 MIN: CPT | Performed by: STUDENT IN AN ORGANIZED HEALTH CARE EDUCATION/TRAINING PROGRAM

## 2023-08-22 PROCEDURE — 3078F DIAST BP <80 MM HG: CPT | Performed by: STUDENT IN AN ORGANIZED HEALTH CARE EDUCATION/TRAINING PROGRAM

## 2023-08-22 ASSESSMENT — ENCOUNTER SYMPTOMS
IRREGULAR HEARTBEAT: 0
PND: 0
FEVER: 0
ORTHOPNEA: 0
DIARRHEA: 0
ABDOMINAL PAIN: 0
VOMITING: 0
PALPITATIONS: 0
NIGHT SWEATS: 0
FOCAL WEAKNESS: 0
NAUSEA: 0
COUGH: 0
NEAR-SYNCOPE: 0
SYNCOPE: 0
DYSPNEA ON EXERTION: 0
SHORTNESS OF BREATH: 0
WHEEZING: 0
WEAKNESS: 0
DIZZINESS: 0

## 2023-08-22 ASSESSMENT — FIBROSIS 4 INDEX: FIB4 SCORE: 1.606557377049180328

## 2023-08-23 ENCOUNTER — PATIENT MESSAGE (OUTPATIENT)
Dept: CARDIOLOGY | Facility: MEDICAL CENTER | Age: 83
End: 2023-08-23
Payer: MEDICARE

## 2023-08-23 DIAGNOSIS — I72.0 CAROTID ARTERY ANEURYSM (HCC): ICD-10-CM

## 2023-08-24 ENCOUNTER — HOSPITAL ENCOUNTER (OUTPATIENT)
Dept: LAB | Facility: MEDICAL CENTER | Age: 83
End: 2023-08-24
Attending: STUDENT IN AN ORGANIZED HEALTH CARE EDUCATION/TRAINING PROGRAM
Payer: MEDICARE

## 2023-08-24 DIAGNOSIS — I71.21 ANEURYSM OF ASCENDING AORTA WITHOUT RUPTURE (HCC): ICD-10-CM

## 2023-08-24 LAB
ANION GAP SERPL CALC-SCNC: 12 MMOL/L (ref 7–16)
BUN SERPL-MCNC: 33 MG/DL (ref 8–22)
CALCIUM SERPL-MCNC: 9 MG/DL (ref 8.5–10.5)
CHLORIDE SERPL-SCNC: 107 MMOL/L (ref 96–112)
CO2 SERPL-SCNC: 21 MMOL/L (ref 20–33)
CREAT SERPL-MCNC: 1.55 MG/DL (ref 0.5–1.4)
GFR SERPLBLD CREATININE-BSD FMLA CKD-EPI: 44 ML/MIN/1.73 M 2
GLUCOSE SERPL-MCNC: 179 MG/DL (ref 65–99)
POTASSIUM SERPL-SCNC: 4.8 MMOL/L (ref 3.6–5.5)
SODIUM SERPL-SCNC: 140 MMOL/L (ref 135–145)

## 2023-08-24 PROCEDURE — 36415 COLL VENOUS BLD VENIPUNCTURE: CPT

## 2023-08-24 PROCEDURE — 80048 BASIC METABOLIC PNL TOTAL CA: CPT

## 2023-08-24 NOTE — PATIENT COMMUNICATION
Finesse Monte M.D.  You 6 minutes ago (11:01 AM)     Thank you - please send referral to vascular surgery for him and let his daughter know too. Thank you so much.        Referral placed.

## 2023-08-25 ENCOUNTER — OFFICE VISIT (OUTPATIENT)
Dept: MEDICAL GROUP | Facility: LAB | Age: 83
End: 2023-08-25
Payer: MEDICARE

## 2023-08-25 VITALS
BODY MASS INDEX: 26.32 KG/M2 | HEART RATE: 56 BPM | HEIGHT: 71 IN | WEIGHT: 188 LBS | SYSTOLIC BLOOD PRESSURE: 108 MMHG | TEMPERATURE: 97.6 F | RESPIRATION RATE: 16 BRPM | DIASTOLIC BLOOD PRESSURE: 62 MMHG | OXYGEN SATURATION: 96 %

## 2023-08-25 DIAGNOSIS — N18.2 STAGE 2 CHRONIC KIDNEY DISEASE: ICD-10-CM

## 2023-08-25 DIAGNOSIS — E11.9 TYPE 2 DIABETES MELLITUS WITHOUT COMPLICATION, WITHOUT LONG-TERM CURRENT USE OF INSULIN (HCC): ICD-10-CM

## 2023-08-25 LAB
HBA1C MFR BLD: 7.1 % (ref ?–5.8)
POCT INT CON NEG: NEGATIVE
POCT INT CON POS: POSITIVE

## 2023-08-25 PROCEDURE — 3074F SYST BP LT 130 MM HG: CPT | Performed by: FAMILY MEDICINE

## 2023-08-25 PROCEDURE — 99214 OFFICE O/P EST MOD 30 MIN: CPT | Performed by: FAMILY MEDICINE

## 2023-08-25 PROCEDURE — 3078F DIAST BP <80 MM HG: CPT | Performed by: FAMILY MEDICINE

## 2023-08-25 PROCEDURE — 83036 HEMOGLOBIN GLYCOSYLATED A1C: CPT | Performed by: FAMILY MEDICINE

## 2023-08-25 ASSESSMENT — FIBROSIS 4 INDEX: FIB4 SCORE: 1.606557377049180328

## 2023-08-25 NOTE — PROGRESS NOTES
Subjective:   Star Centeno is a 82 y.o. male here today for   No chief complaint on file.      #DM  Presents for follow up of diabetes mellitus. He indicates that he is feeling well and denies any symptoms referable to this diagnoses.  Specifically denies chest pain, palpitations, dyspnea, orthopnea, PND or peripheral edema, poyluria, polydipsia, urinary complaints, abdominal complaints, myalgias, numbness, weakness or other related symptoms.   Current medications: Empagliflozin 25 mg, metformin.  Last A1c:   Glycohemoglobin   Date Value Ref Range Status   05/22/2023 6.8 (H) 4.0 - 5.6 % Final     Comment:     Increased risk for diabetes:  5.7 -6.4%  Diabetes:  >6.4%  Glycemic control for adults with diabetes:  <7.0%    The above interpretations are per ADA guidelines.  Diagnosis  of diabetes mellitus on the basis of elevated Hemoglobin A1c  should be confirmed by repeating the Hb A1c test.       Last Microalb/Cr ratio:   Microalbumin, Urine Random   Date Value Ref Range Status   05/22/2023 2.9 mg/dL Final     Creatinine, Urine   Date Value Ref Range Status   05/22/2023 83.53 mg/dL Final     Micro Alb Creat Ratio   Date Value Ref Range Status   05/22/2023 35 (H) 0 - 30 mg/g Final     Last lipid:   Cholesterol,Tot   Date Value Ref Range Status   05/22/2023 168 100 - 199 mg/dL Final     HDL   Date Value Ref Range Status   05/22/2023 36 (A) >=40 mg/dL Final     LDL   Date Value Ref Range Status   05/22/2023 95 <100 mg/dL Final     Triglycerides   Date Value Ref Range Status   05/22/2023 187 (H) 0 - 149 mg/dL Final     Fasting sugars: 100 fasting  Diabetic foot exam: up to date  Retinal eye exam: up to date  ACEi/ARB? yes  Statin? yes  Aspirin? yes  Concomitant HTN? At goal   Nightly foot checks? yes    #CKD:  -Recently labs completed showing slightly worsened GFR.  Patient is working on appropriate hydration, currently on ARB as well as SGLT2 inhibitor.  He is working on improving diet although has been having a lot  "of canned soups.  He is working on decreasing sodium intake.    No Known Allergies      Current medicines (including changes today)  Current Outpatient Medications   Medication Sig Dispense Refill    lisinopril (PRINIVIL) 20 MG Tab Take 1 Tablet by mouth every day. 100 Tablet 3    Empagliflozin 25 MG Tab Take 25 mg by mouth every day for 360 days. 100 Tablet 3    ezetimibe (ZETIA) 10 MG Tab Take 1 Tablet by mouth every day. 100 Tablet 0    metoprolol SR (TOPROL XL) 100 MG TABLET SR 24 HR Take 1 Tablet by mouth every day. 100 Tablet 0    rosuvastatin (CRESTOR) 5 MG Tab Take 1 Tablet by mouth every day. 100 Tablet 0    metFORMIN (GLUCOPHAGE) 500 MG Tab Take 1 Tablet by mouth 2 times a day with meals for 360 days. 200 Tablet 1    Coenzyme Q10 (COQ-10) 30 MG Cap       Omega-3 Fatty Acids (FISH OIL) 1000 MG Cap capsule       Probiotic Product (PROBIOTIC DAILY PO) Take  by mouth. (Patient not taking: Reported on 3/9/2023)      aspirin 81 MG EC tablet Take 81 mg by mouth every day.      Prenatal Multivit-Min-Fe-FA (PRE- FORMULA PO) Take  by mouth.       No current facility-administered medications for this visit.     He  has a past medical history of Cancer (HCC), Cataract, Hyperlipidemia, and Hypertension.    ROS   -See HPI       Objective:     Physical Exam:  Pulse (!) 56   Temp 36.4 °C (97.6 °F) (Temporal)   Resp 16   Ht 1.803 m (5' 10.98\")   Wt 85.3 kg (188 lb)   SpO2 96%  Body mass index is 26.23 kg/m².   Constitutional: Alert, no distress.  Skin: Warm, dry, good turgor, no rashes in visible areas.  Eye: Equal, round and reactive, conjunctiva clear, lids normal.  Respiratory: Unlabored respiratory effort, lungs clear to auscultation, no wheezes, no rhonchi.  Cardiovascular: Normal S1, S2, no murmur, no edema.  Abdomen: Soft, non-tender, no masses, no hepatosplenomegaly.  Psych: Alert and oriented x3, normal affect and mood.    Assessment and Plan:     1. Type 2 diabetes mellitus without complication, without " long-term current use of insulin (HCC)  -Hemoglobin A1c slightly elevated at 7.1%.  Discussed improving diet and exercise regiment.  We will continue with metformin, Jardiance at this time.  We will repeat hemoglobin A1c in 3 to 6 months.  - POCT Hemoglobin A1C  - Diabetic Monofilament LE Exam    2. Stage 2 chronic kidney disease  -GFR now in the 40s.  Patient is asymptomatic; however, given such precipitous follow-up numbers over the last several months we will repeat labs again in 1 month after working on improving hydration as well as decreasing sodium intake.  We will follow-up with patient with results.  - Basic Metabolic Panel; Future    Followup: No follow-ups on file.         PLEASE NOTE: This dictation was created using voice recognition software. I have made every reasonable attempt to correct obvious errors, but I expect that there are errors of grammar and possibly content that I did not discover before finalizing the note.

## 2023-08-27 DIAGNOSIS — E78.5 HYPERLIPIDEMIA, UNSPECIFIED HYPERLIPIDEMIA TYPE: ICD-10-CM

## 2023-08-27 DIAGNOSIS — E78.5 DYSLIPIDEMIA: ICD-10-CM

## 2023-08-27 DIAGNOSIS — I25.10 CORONARY ARTERY DISEASE INVOLVING NATIVE CORONARY ARTERY OF NATIVE HEART WITHOUT ANGINA PECTORIS: ICD-10-CM

## 2023-08-28 NOTE — TELEPHONE ENCOUNTER
Is the patient due for a refill? Yes    Was the patient seen the past year? Yes    Date of last office visit: 8/22/23    Does the patient have an upcoming appointment?  No    Provider to refill:HK    Does the patients insurance require a 100 day supply?  Yes

## 2023-08-29 RX ORDER — ROSUVASTATIN CALCIUM 5 MG/1
5 TABLET, COATED ORAL DAILY
Qty: 100 TABLET | Refills: 3 | Status: SHIPPED | OUTPATIENT
Start: 2023-08-29

## 2023-09-02 ENCOUNTER — HOSPITAL ENCOUNTER (OUTPATIENT)
Dept: RADIOLOGY | Facility: MEDICAL CENTER | Age: 83
End: 2023-09-02
Attending: STUDENT IN AN ORGANIZED HEALTH CARE EDUCATION/TRAINING PROGRAM
Payer: MEDICARE

## 2023-09-02 ENCOUNTER — TELEPHONE (OUTPATIENT)
Dept: HOSPITALIST | Facility: MEDICAL CENTER | Age: 83
End: 2023-09-02

## 2023-09-02 DIAGNOSIS — I71.21 ANEURYSM OF ASCENDING AORTA WITHOUT RUPTURE (HCC): ICD-10-CM

## 2023-09-02 DIAGNOSIS — I26.99 OTHER PULMONARY EMBOLISM WITHOUT ACUTE COR PULMONALE, UNSPECIFIED CHRONICITY (HCC): ICD-10-CM

## 2023-09-02 PROCEDURE — 71275 CT ANGIOGRAPHY CHEST: CPT

## 2023-09-02 PROCEDURE — 700117 HCHG RX CONTRAST REV CODE 255: Performed by: STUDENT IN AN ORGANIZED HEALTH CARE EDUCATION/TRAINING PROGRAM

## 2023-09-02 RX ADMIN — IOHEXOL 80 ML: 350 INJECTION, SOLUTION INTRAVENOUS at 11:46

## 2023-09-02 NOTE — TELEPHONE ENCOUNTER
Contacted by radiologist, patient underwent CTA chest 9/2/2023 which showed incidental finding of nonocclusive right middle lobe pulmonary artery.    Patient is hemodynamically stable, discussed starting Eliquis 10 mg p.o. twice daily x7 days then 5 mg p.o. twice daily, lower extremity venous ultrasound was also ordered for early next week.  Patient understands to call EMS with any new symptoms.    Patient will follow-up with her primary care physician and his own cardiologist Dr. Monte.

## 2023-09-04 ENCOUNTER — PATIENT MESSAGE (OUTPATIENT)
Dept: CARDIOLOGY | Facility: MEDICAL CENTER | Age: 83
End: 2023-09-04
Payer: MEDICARE

## 2023-09-05 DIAGNOSIS — I26.99 OTHER PULMONARY EMBOLISM WITHOUT ACUTE COR PULMONALE, UNSPECIFIED CHRONICITY (HCC): ICD-10-CM

## 2023-09-06 ENCOUNTER — TELEPHONE (OUTPATIENT)
Dept: MEDICAL GROUP | Facility: LAB | Age: 83
End: 2023-09-06
Payer: MEDICARE

## 2023-09-06 NOTE — TELEPHONE ENCOUNTER
ESTABLISHED PATIENT PRE-VISIT PLANNING     Patient was NOT contacted to complete PVP.     Note: Patient will not be contacted if there is no indication to call.     1.  Reviewed notes from the last few office visits within the medical group: Yes    2.  If any orders were placed at last visit or intended to be done for this visit (i.e. 6 mos follow-up), do we have Results/Consult Notes?           Labs - Labs ordered, NOT completed. Patient advised to complete prior to next appointment.  Note: If patient appointment is for lab review and patient did not complete labs, check with provider if OK to reschedule patient until labs completed.         Imaging - Imaging was not ordered at last office visit.         Referrals - No referrals were ordered at last office visit.    3. Is this appointment scheduled as a Hospital Follow-Up? No    4.  Immunizations were updated in Epic using Reconcile Outside Information activity? Yes    5.  Patient is due for the following Health Maintenance Topics:   Health Maintenance Due   Topic Date Due    Zoster (Shingles) Vaccines (2 of 2) 09/21/2022    COVID-19 Vaccine (2 - Pfizer series) 02/19/2023    Diabetes: Retinopathy Screening  06/01/2023    Influenza Vaccine (1) 09/01/2023     6.  AHA (Pulse8) form printed for Provider? N/A

## 2023-09-08 ENCOUNTER — OFFICE VISIT (OUTPATIENT)
Dept: MEDICAL GROUP | Facility: LAB | Age: 83
End: 2023-09-08
Payer: MEDICARE

## 2023-09-08 VITALS
WEIGHT: 193 LBS | BODY MASS INDEX: 27.02 KG/M2 | TEMPERATURE: 97.4 F | SYSTOLIC BLOOD PRESSURE: 124 MMHG | OXYGEN SATURATION: 94 % | HEIGHT: 71 IN | RESPIRATION RATE: 16 BRPM | HEART RATE: 55 BPM | DIASTOLIC BLOOD PRESSURE: 66 MMHG

## 2023-09-08 DIAGNOSIS — Z95.5 H/O HEART ARTERY STENT: ICD-10-CM

## 2023-09-08 DIAGNOSIS — I26.93 SINGLE SUBSEGMENTAL PULMONARY EMBOLISM WITHOUT ACUTE COR PULMONALE (HCC): ICD-10-CM

## 2023-09-08 PROCEDURE — 3074F SYST BP LT 130 MM HG: CPT | Performed by: INTERNAL MEDICINE

## 2023-09-08 PROCEDURE — 99213 OFFICE O/P EST LOW 20 MIN: CPT | Performed by: INTERNAL MEDICINE

## 2023-09-08 PROCEDURE — 3078F DIAST BP <80 MM HG: CPT | Performed by: INTERNAL MEDICINE

## 2023-09-08 ASSESSMENT — FIBROSIS 4 INDEX: FIB4 SCORE: 1.606557377049180328

## 2023-09-08 NOTE — LETTER
Spayee  Nirmal Gentile M.D.  [address]  [place] [address]  Fax: [phone]   Authorization for Release/Disclosure of   Protected Health Information   Name: SOL HARRY : 1940 SSN: [id number]   Address: [address] [place] [address]  [place] [address] Phone:    [phone] (home)    I authorize the entity listed below to release/disclose the PHI below to:   [facility]/Nirmal Gentile M.D. and Chase Paez D.O.   Provider or Entity Name:    [facility]     Address   City, State, Zip    [facility] Phone:     [phone]    Fax:     Reason for request: continuity of care   Information to be released:    [  ] LAST COLONOSCOPY,  including any PATH REPORT and follow-up  [  ] LAST FIT/COLOGUARD RESULT [  ] LAST DEXA  [  ] LAST MAMMOGRAM  [  ] LAST PAP  [  ] LAST LABS [XX] RETINA EXAM REPORT  [  ] IMMUNIZATION RECORDS  [  ] Release all info      [  ] Check here and initial the line next to each item to release ALL health information INCLUDING  _____ Care and treatment for drug and / or alcohol abuse  _____ HIV testing, infection status, or AIDS  _____ Genetic Testing    DATES OF SERVICE OR TIME PERIOD TO BE DISCLOSED: _____________  I understand and acknowledge that:  * This Authorization may be revoked at any time by you in writing, except if your health information has already been used or disclosed.  * Your health information that will be used or disclosed as a result of you signing this authorization could be re-disclosed by the recipient. If this occurs, your re-disclosed health information may no longer be protected by State or Federal laws.  * You may refuse to sign this Authorization. Your refusal will not affect your ability to obtain treatment.  * This Authorization becomes effective upon signing and will  on (date) __________.      If no date is indicated, this Authorization will  one (1) year from the signature date.    Name:  Star Centeno  Signature: Date:   9/8/2023     PLEASE FAX REQUESTED RECORDS BACK TO: [phone]

## 2023-09-08 NOTE — PROGRESS NOTES
CC: Star Centeno is a 82 y.o. male is suffering from   Chief Complaint   Patient presents with    Pulmonary Embolism         SUBJECTIVE:  1. Single subsegmental pulmonary embolism without acute cor pulmonale (HCC)  Patient is here for follow-up has a history of a recently diagnosed pulmonary embolus.  We have discussed that he is on Eliquis but also on aspirin    2. H/O heart artery stent  Patient is being followed locally by cardiology, patient states that he has approximately 4 or 5 coronary stents.  We have discussed the dilemma of having him on low-dose aspirin but also on Eliquis at the same time.        Past social, family, history:   Social History     Tobacco Use    Smoking status: Former     Current packs/day: 0.00     Average packs/day: 1 pack/day for 45.0 years (45.0 ttl pk-yrs)     Types: Cigarettes     Start date: 1956     Quit date: 2001     Years since quittin.6    Smokeless tobacco: Never   Vaping Use    Vaping Use: Never used   Substance Use Topics    Alcohol use: Yes     Alcohol/week: 8.4 oz     Types: 14 Glasses of wine per week     Comment: 2 glasses of wine daily    Drug use: Never         MEDICATIONS:    Current Outpatient Medications:     [START ON 2023] apixaban (ELIQUIS) 5mg Tab, Take 1 Tablet by mouth 2 times a day., Disp: 60 Tablet, Rfl: 3    rosuvastatin (CRESTOR) 5 MG Tab, TAKE 1 TABLET BY MOUTH EVERY DAY, Disp: 100 Tablet, Rfl: 3    lisinopril (PRINIVIL) 20 MG Tab, Take 1 Tablet by mouth every day., Disp: 100 Tablet, Rfl: 3    Empagliflozin 25 MG Tab, Take 25 mg by mouth every day for 360 days., Disp: 100 Tablet, Rfl: 3    ezetimibe (ZETIA) 10 MG Tab, Take 1 Tablet by mouth every day., Disp: 100 Tablet, Rfl: 0    metoprolol SR (TOPROL XL) 100 MG TABLET SR 24 HR, Take 1 Tablet by mouth every day., Disp: 100 Tablet, Rfl: 0    metFORMIN (GLUCOPHAGE) 500 MG Tab, Take 1 Tablet by mouth 2 times a day with meals for 360 days., Disp: 200 Tablet, Rfl: 1    Coenzyme  Q10 (COQ-10) 30 MG Cap, , Disp: , Rfl:     Omega-3 Fatty Acids (FISH OIL) 1000 MG Cap capsule, , Disp: , Rfl:     aspirin 81 MG EC tablet, Take 81 mg by mouth every day., Disp: , Rfl:     Prenatal Multivit-Min-Fe-FA (PRE- FORMULA PO), Take  by mouth., Disp: , Rfl:     PROBLEMS:  Patient Active Problem List    Diagnosis Date Noted    BMI 28.0-28.9,adult 2023    Pulmonary hypertension (HCC) 2023    Stage 2 chronic kidney disease 2023    Aortic root dilation (HCC) 2023    Coronary artery disease involving native coronary artery without angina pectoris 2023    Memory loss 2023    Aortic atherosclerosis ([facility]) 2023    Aneurysm of ascending aorta ([facility]) 2022    Carotid artery aneurysm ([facility]) 09/15/2020    Compression fracture of T12 vertebra ([facility]) 2018    Benign prostatic hyperplasia 2016    Diastolic dysfunction 2016    Ischemic heart disease due to coronary artery obstruction ([facility]) 2016    LVH (left ventricular hypertrophy) 2016    Status post coronary artery balloon dilation 2016    Cataract of both eyes 2016    Current smoker 2016    Essential hypertension, benign 2016    Hyperlipidemia 2016    Non-insulin dependent type 2 diabetes mellitus (HCC) 2016    Obesity 2016    Atherosclerosis of native coronary artery 2006    History of adenocarcinoma of prostate 10/01/2004       REVIEW OF SYSTEMS:  Gen.:  No Nausea, Vomiting, fever, Chills.  Heart: No chest pain.  Lungs:  No shortness of Breath.  Psychological: Ochoa unusual Anxiety depression     PHYSICAL EXAM   Constitutional: Alert, cooperative, not in acute distress.  Cardiovascular:  Rate Rhythm is regular without murmurs rubs clicks.     Thorax & Lungs: Clear to auscultation, no wheezing, rhonchi, or rales  HENT: Normocephalic, Atraumatic.  Eyes: PERRLA, EOMI, Conjunctiva normal.   Neck: Trachia is midline no swelling of the thyroid.  "  Lymphatic: No lymphadenopathy noted.   Neurologic: Alert & oriented x 3, cranial nerves II through XII are intact, Normal motor function, Normal sensory function, No focal deficits noted.   Psychiatric: Affect normal, Judgment normal, Mood normal.     VITAL SIGNS:/66   Pulse (!) 55   Temp 36.3 °C (97.4 °F) (Temporal)   Resp 16   Ht 1.803 m (5' 11\")   Wt 87.5 kg (193 lb)   SpO2 94%   BMI 26.92 kg/m²     Labs: Reviewed    Assessment:                                                     Plan:    1. Single subsegmental pulmonary embolism without acute cor pulmonale (HCC)  Continue Eliquis    2. H/O heart artery stent  Extraordinarily problematic patient has a history of stents that he feels is #5 with previous concerns about possible stent occlusion.  Patient is on aspirin we have discussed the increased risk of bleeding issues.  Note regarding dilemma sent to patient's cardiologist.         "

## 2023-09-11 ENCOUNTER — HOSPITAL ENCOUNTER (OUTPATIENT)
Dept: RADIOLOGY | Facility: MEDICAL CENTER | Age: 83
End: 2023-09-11
Attending: STUDENT IN AN ORGANIZED HEALTH CARE EDUCATION/TRAINING PROGRAM
Payer: MEDICARE

## 2023-09-11 DIAGNOSIS — I26.99 OTHER PULMONARY EMBOLISM WITHOUT ACUTE COR PULMONALE, UNSPECIFIED CHRONICITY (HCC): ICD-10-CM

## 2023-09-25 ENCOUNTER — HOSPITAL ENCOUNTER (OUTPATIENT)
Dept: LAB | Facility: MEDICAL CENTER | Age: 83
End: 2023-09-25
Attending: FAMILY MEDICINE
Payer: MEDICARE

## 2023-09-25 DIAGNOSIS — N18.2 STAGE 2 CHRONIC KIDNEY DISEASE: ICD-10-CM

## 2023-09-25 LAB
ANION GAP SERPL CALC-SCNC: 13 MMOL/L (ref 7–16)
BUN SERPL-MCNC: 38 MG/DL (ref 8–22)
CALCIUM SERPL-MCNC: 9.1 MG/DL (ref 8.5–10.5)
CHLORIDE SERPL-SCNC: 108 MMOL/L (ref 96–112)
CO2 SERPL-SCNC: 18 MMOL/L (ref 20–33)
CREAT SERPL-MCNC: 1.72 MG/DL (ref 0.5–1.4)
GFR SERPLBLD CREATININE-BSD FMLA CKD-EPI: 39 ML/MIN/1.73 M 2
GLUCOSE SERPL-MCNC: 174 MG/DL (ref 65–99)
POTASSIUM SERPL-SCNC: 4.6 MMOL/L (ref 3.6–5.5)
SODIUM SERPL-SCNC: 139 MMOL/L (ref 135–145)

## 2023-09-25 PROCEDURE — 36415 COLL VENOUS BLD VENIPUNCTURE: CPT

## 2023-09-25 PROCEDURE — 80048 BASIC METABOLIC PNL TOTAL CA: CPT

## 2023-10-04 ENCOUNTER — TELEPHONE (OUTPATIENT)
Dept: MEDICAL GROUP | Facility: LAB | Age: 83
End: 2023-10-04
Payer: MEDICARE

## 2023-10-04 NOTE — TELEPHONE ENCOUNTER
ESTABLISHED PATIENT PRE-VISIT PLANNING     Patient was NOT contacted to complete PVP.     Note: Patient will not be contacted if there is no indication to call.     1.  Reviewed notes from the last few office visits within the medical group: Yes    2.  If any orders were placed at last visit or intended to be done for this visit (i.e. 6 mos follow-up), do we have Results/Consult Notes?           Labs - Labs ordered, completed on 9/25/23 and results are in chart.  Note: If patient appointment is for lab review and patient did not complete labs, check with provider if OK to reschedule patient until labs completed.         Imaging - Imaging was not ordered at last office visit.         Referrals - No referrals were ordered at last office visit.    3. Is this appointment scheduled as a Hospital Follow-Up? No    4.  Immunizations were updated in Epic using Reconcile Outside Information activity? Yes    5.  Patient is due for the following Health Maintenance Topics:   Health Maintenance Due   Topic Date Due    Zoster (Shingles) Vaccines (2 of 2) 09/21/2022    COVID-19 Vaccine (2 - Pfizer series) 02/19/2023    Influenza Vaccine (1) 09/01/2023     6.  AHA (Pulse8) form printed for Provider? N/A

## 2023-10-09 ENCOUNTER — OFFICE VISIT (OUTPATIENT)
Dept: MEDICAL GROUP | Facility: LAB | Age: 83
End: 2023-10-09
Payer: MEDICARE

## 2023-10-09 VITALS
SYSTOLIC BLOOD PRESSURE: 124 MMHG | WEIGHT: 192 LBS | BODY MASS INDEX: 26.88 KG/M2 | HEIGHT: 71 IN | HEART RATE: 58 BPM | RESPIRATION RATE: 16 BRPM | OXYGEN SATURATION: 96 % | DIASTOLIC BLOOD PRESSURE: 70 MMHG | TEMPERATURE: 96.9 F

## 2023-10-09 DIAGNOSIS — M25.511 CHRONIC PAIN OF BOTH SHOULDERS: ICD-10-CM

## 2023-10-09 DIAGNOSIS — G47.01 INSOMNIA DUE TO MEDICAL CONDITION: ICD-10-CM

## 2023-10-09 DIAGNOSIS — G89.29 CHRONIC PAIN OF BOTH SHOULDERS: ICD-10-CM

## 2023-10-09 DIAGNOSIS — M25.512 CHRONIC PAIN OF BOTH SHOULDERS: ICD-10-CM

## 2023-10-09 DIAGNOSIS — Z23 NEED FOR VACCINATION: ICD-10-CM

## 2023-10-09 PROCEDURE — 3074F SYST BP LT 130 MM HG: CPT | Performed by: FAMILY MEDICINE

## 2023-10-09 PROCEDURE — G0008 ADMIN INFLUENZA VIRUS VAC: HCPCS | Performed by: FAMILY MEDICINE

## 2023-10-09 PROCEDURE — 99214 OFFICE O/P EST MOD 30 MIN: CPT | Mod: 25 | Performed by: FAMILY MEDICINE

## 2023-10-09 PROCEDURE — 90662 IIV NO PRSV INCREASED AG IM: CPT | Performed by: FAMILY MEDICINE

## 2023-10-09 PROCEDURE — 3078F DIAST BP <80 MM HG: CPT | Performed by: FAMILY MEDICINE

## 2023-10-09 RX ORDER — ALPRAZOLAM 0.25 MG/1
0.25 TABLET ORAL NIGHTLY PRN
Qty: 30 TABLET | Refills: 1 | Status: SHIPPED | OUTPATIENT
Start: 2023-10-09 | End: 2023-11-08

## 2023-10-09 ASSESSMENT — FIBROSIS 4 INDEX: FIB4 SCORE: 1.606557377049180328

## 2023-10-09 NOTE — PROGRESS NOTES
Subjective:   Star Centeno is a 82 y.o. male here today for   No chief complaint on file.    #Shoulder pain   -Ongoing right shoulder pain for the last several months.  Patient states the pain is located both posterior and anterior aspect of shoulders bilaterally, right greater than left.  He states the pain is gotten so bad that he can no longer bring his arms up above his head.  He has been working on working out in the gym, keeping shoulders strong; however, this has become a significant problem recently.  -He has previous history of shoulder pain and has been through several rounds of physical therapy.  He states that they have helped in the past but physical therapy exercises are no longer helping.  -He denies any new trauma.  Denies any numbness, tingling, weakness in upper extremities.  -Is treating with Tylenol but has not been very helpful.  No other palliative/provocative measures noted.    #Insomnia:  -Patient is having significant issues of insomnia due to chronic pain of shoulders as well as ongoing and chronic problem of back pain.  He states that is gone so bad to the point where he will occasionally need a small dose of Xanax to help sleep.  He is very cautious with the use of medication and only using it when absolutely needed.  He denies any drug diversion, concomitance of recreational licit drugs.  Requesting refill of medication at this time.    #Pulmonary embolism:  -Seen on CT scan incidentally while checking on previous aneurysms.  He has been placed on Eliquis.  When starting Eliquis at higher dose he did notice hematuria; however, this continues to resolve as he is now on 5 mg twice daily.  Denies any chest pain, shortness of breath, difficulty breathing.  Is planning on completing lower extremity ultrasound next week.      No Known Allergies      Current medicines (including changes today)  Current Outpatient Medications   Medication Sig Dispense Refill    apixaban (ELIQUIS) 5mg Tab  "Take 1 Tablet by mouth 2 times a day. 60 Tablet 3    rosuvastatin (CRESTOR) 5 MG Tab TAKE 1 TABLET BY MOUTH EVERY  Tablet 3    lisinopril (PRINIVIL) 20 MG Tab Take 1 Tablet by mouth every day. 100 Tablet 3    Empagliflozin 25 MG Tab Take 25 mg by mouth every day for 360 days. 100 Tablet 3    ezetimibe (ZETIA) 10 MG Tab Take 1 Tablet by mouth every day. 100 Tablet 0    metoprolol SR (TOPROL XL) 100 MG TABLET SR 24 HR Take 1 Tablet by mouth every day. 100 Tablet 0    metFORMIN (GLUCOPHAGE) 500 MG Tab Take 1 Tablet by mouth 2 times a day with meals for 360 days. 200 Tablet 1    Coenzyme Q10 (COQ-10) 30 MG Cap       Omega-3 Fatty Acids (FISH OIL) 1000 MG Cap capsule       aspirin 81 MG EC tablet Take 81 mg by mouth every day.      Prenatal Multivit-Min-Fe-FA (PRE- FORMULA PO) Take  by mouth.       No current facility-administered medications for this visit.     He  has a past medical history of Cancer (HCC), Cataract, Hyperlipidemia, and Hypertension.    ROS   -See HPI       Objective:     Physical Exam:  /70   Pulse (!) 58   Temp 36.1 °C (96.9 °F) (Temporal)   Resp 16   Ht 1.803 m (5' 10.98\")   Wt 87.1 kg (192 lb)   SpO2 96%  Body mass index is 26.79 kg/m².   Const: Vitals above. Well-appearing.  CV: Inspected/evaluated for upper extremity edema. Bilateral radial pulses palpated noting below if not 2+. Capillary refill evaluated distally, noting below if greater than 2 seconds.  Skin: Inspected for rash or skin lesions in area of concern.  Neuro/psych: Sensation to light touch evaluated at lateral arm (C5), lateral forearm (C6), middle finger (C7), medial forearm (C8), and medial arm (T1). Sensation to light touch evaluated over deltoid area (axillary nerve), evaluated for scapular winging (long thoracic nerve). Observed for normal mood/affect/insight.  MSK: Gait and posture evaluated.  Examination of neck:  - Evaluated range of motion for flexion, extension, bilateral rotation, and bilateral " tilting. Inspected/palpated spinous processes, paraspinous muscles, Spurling maneuver for tenderness or radiating pain.  Examination of bilateral shoulders:  - Inspected/palpated bilaterally for upper extremity carriage, warmth, and tenderness of the SC joint, clavicle, AC joint, coracoid process, bicipital groove and tendon, and rotator cuff tendons.  - Assessed passive/active range of motion bilaterally in flexion, extension, abduction, external/internal rotation, internal rotation with adduction, noting below for any pain, crepitation, or altered scapulothoracic motion. Drop arm test performed (rotator cuff).  - Assessed muscle strength bilaterally in shoulder flexion, extension, abduction (C5), external rotation (infraspinatus, teres minor), internal rotation (subscapularis), noting below if less than 5/5 or pain. Lift-off test (subscapularis), empty can test (supraspinatus), Speed's test (biceps), Yergason's test (biceps). Observed for muscular atrophy and biceps belly position. Assessed muscle strength bilaterally with wrist extension (C6), wrist flexion (C7), finger flexion (C8), finger abduction (T1), noting below if less than 5/5, or if muscle atrophy is present.  - Assessed stability bilaterally with apprehension/relocation test, anterior/posterior  supine position. Simms's test, grind test to evaluate integrity of labrum. Crossover test, Demetrius-James, and Neers test to evaluate for rotator cuff integrity and AC joint tenderness.    All of the above were found to be normal, except:  -Positive empty can test.  Pain with both external and internal rotation of shoulder against resistance bilaterally.  Significant difficulty with liftoff test, significant difficulty with active range of motion and full extension of arm bilaterally.  You are able to bring arms up in full extension, 180 degrees, with passive extension.  Assessment and Plan:     1. Chronic pain of both shoulders  -Concern for  possible rotator cuff pathology.  Given previous x-rays with physical therapy with no real improvement we will go ahead and get MRIs of the shoulders at this time.  We will also refer to orthopedics for further evaluation and treatment.  Discussed the use of topical Voltaren and Tylenol.  Discussed avoidance of NSAIDs given history of of CKD.  - Referral to Orthopedics  - MR-SHOULDER-W/O LEFT; Future  - MR-SHOULDER-W/O RIGHT; Future    2. Insomnia due to medical condition  -Chronic condition, unstable.  Insomnia most likely due to ongoing pain which will continue to improve as we work on treatment.  In the meantime we will continue Xanax.  Discussed appropriate use.  Patient will follow-up as needed.  -Obtained and reviewed patient utilization report from [facility] database on 10/9/2. to writing prescription for controlled substance II, III or IV per [place] bill . Based on assessment of the report,my physical exam if necessary and the patient's health problem, the prescription is medically necessary.  - ALPRAZolam (XANAX) 0.25 MG Tab; Take 1 Tablet by mouth at bedtime as needed for Sleep for up to 30 days.  Dispense: 30 Tablet; Refill: 1    3. Need for vaccination  - Influenza Vaccine, High Dose (65+ Only)      Followup: No follow-ups on file.         PLEASE NOTE: This dictation was created using voice recognition software. I have made every reasonable attempt to correct obvious errors, but I expect that there are errors of grammar and possibly content that I did not discover before finalizing the note.

## 2023-10-11 ENCOUNTER — PATIENT MESSAGE (OUTPATIENT)
Dept: CARDIOLOGY | Facility: MEDICAL CENTER | Age: 83
End: 2023-10-11
Payer: MEDICARE

## 2023-10-11 NOTE — PATIENT COMMUNICATION
REGINE Caldera R.N. 8 minutes ago (4:17 PM)     Recommend follow up with PCP    -------------------------------------------------------------------------------------------

## 2023-10-12 ENCOUNTER — OFFICE VISIT (OUTPATIENT)
Dept: MEDICAL GROUP | Facility: LAB | Age: 83
End: 2023-10-12
Payer: MEDICARE

## 2023-10-12 ENCOUNTER — HOSPITAL ENCOUNTER (OUTPATIENT)
Facility: MEDICAL CENTER | Age: 83
End: 2023-10-12
Attending: FAMILY MEDICINE
Payer: MEDICARE

## 2023-10-12 VITALS
WEIGHT: 192 LBS | RESPIRATION RATE: 15 BRPM | BODY MASS INDEX: 26.88 KG/M2 | HEART RATE: 65 BPM | OXYGEN SATURATION: 94 % | SYSTOLIC BLOOD PRESSURE: 110 MMHG | TEMPERATURE: 97 F | DIASTOLIC BLOOD PRESSURE: 64 MMHG | HEIGHT: 71 IN

## 2023-10-12 DIAGNOSIS — R31.9 HEMATURIA, UNSPECIFIED TYPE: ICD-10-CM

## 2023-10-12 DIAGNOSIS — I72.0 CAROTID ARTERY ANEURYSM (HCC): ICD-10-CM

## 2023-10-12 DIAGNOSIS — N30.90 CYSTITIS: ICD-10-CM

## 2023-10-12 DIAGNOSIS — I26.99 OTHER PULMONARY EMBOLISM WITHOUT ACUTE COR PULMONALE, UNSPECIFIED CHRONICITY (HCC): ICD-10-CM

## 2023-10-12 LAB
APPEARANCE UR: ABNORMAL
BILIRUB UR STRIP-MCNC: ABNORMAL MG/DL
COLOR UR AUTO: ABNORMAL
GLUCOSE UR STRIP.AUTO-MCNC: 500 MG/DL
KETONES UR STRIP.AUTO-MCNC: NEGATIVE MG/DL
LEUKOCYTE ESTERASE UR QL STRIP.AUTO: NEGATIVE
NITRITE UR QL STRIP.AUTO: POSITIVE
PH UR STRIP.AUTO: 5.5 [PH] (ref 5–8)
PROT UR QL STRIP: 100 MG/DL
RBC UR QL AUTO: ABNORMAL
SP GR UR STRIP.AUTO: 1.01
UROBILINOGEN UR STRIP-MCNC: 1 MG/DL

## 2023-10-12 PROCEDURE — 87086 URINE CULTURE/COLONY COUNT: CPT

## 2023-10-12 PROCEDURE — 99214 OFFICE O/P EST MOD 30 MIN: CPT | Performed by: FAMILY MEDICINE

## 2023-10-12 PROCEDURE — 81002 URINALYSIS NONAUTO W/O SCOPE: CPT | Performed by: FAMILY MEDICINE

## 2023-10-12 PROCEDURE — 3078F DIAST BP <80 MM HG: CPT | Performed by: FAMILY MEDICINE

## 2023-10-12 PROCEDURE — 87186 SC STD MICRODIL/AGAR DIL: CPT

## 2023-10-12 PROCEDURE — 3074F SYST BP LT 130 MM HG: CPT | Performed by: FAMILY MEDICINE

## 2023-10-12 PROCEDURE — 87077 CULTURE AEROBIC IDENTIFY: CPT

## 2023-10-12 RX ORDER — SULFAMETHOXAZOLE AND TRIMETHOPRIM 800; 160 MG/1; MG/1
1 TABLET ORAL 2 TIMES DAILY
Qty: 8 TABLET | Refills: 0 | Status: SHIPPED | OUTPATIENT
Start: 2023-10-12 | End: 2023-10-16

## 2023-10-12 ASSESSMENT — FIBROSIS 4 INDEX: FIB4 SCORE: 1.606557377049180328

## 2023-10-13 NOTE — PROGRESS NOTES
Subjective:   Star Centeno is a 82 y.o. male here today for   Chief Complaint   Patient presents with    Blood in Urine       #Hematuria:  -This been a longstanding issue for the last several weeks after patient started Eliquis for treatment of pulmonary emboli.  At the time he was noticing blood in urine.  When decreasing down to 5 mg twice daily it had resolved for a few days but has since returned.  He is now getting symptoms of dysuria, polyuria especially at night.  Denies any belly pain, flank pain, fevers or chills.    #Carotid artery aneurysm:  -Patient was completing CT angio for evaluation of carotid artery aneurysm.  He states that he is feeling well.  Denies any headaches, lightheadedness, dizziness, changes in vision, difficulty swallowing, difficulty talking, or any other neurologic deficit at this time.    #Pulmonary embolism:  -Continues to be on Eliquis despite symptoms of hematuria.  Is compliant with medication.  Denies any chest pain, shortness of breath, difficulty breathing.      No Known Allergies      Current medicines (including changes today)  Current Outpatient Medications   Medication Sig Dispense Refill    ALPRAZolam (XANAX) 0.25 MG Tab Take 1 Tablet by mouth at bedtime as needed for Sleep for up to 30 days. 30 Tablet 1    apixaban (ELIQUIS) 5mg Tab Take 1 Tablet by mouth 2 times a day. 60 Tablet 3    rosuvastatin (CRESTOR) 5 MG Tab TAKE 1 TABLET BY MOUTH EVERY  Tablet 3    lisinopril (PRINIVIL) 20 MG Tab Take 1 Tablet by mouth every day. 100 Tablet 3    Empagliflozin 25 MG Tab Take 25 mg by mouth every day for 360 days. 100 Tablet 3    ezetimibe (ZETIA) 10 MG Tab Take 1 Tablet by mouth every day. 100 Tablet 0    metoprolol SR (TOPROL XL) 100 MG TABLET SR 24 HR Take 1 Tablet by mouth every day. 100 Tablet 0    metFORMIN (GLUCOPHAGE) 500 MG Tab Take 1 Tablet by mouth 2 times a day with meals for 360 days. 200 Tablet 1    Coenzyme Q10 (COQ-10) 30 MG Cap       Omega-3 Fatty Acids  "(FISH OIL) 1000 MG Cap capsule       aspirin 81 MG EC tablet Take 81 mg by mouth every day.      Prenatal Multivit-Min-Fe-FA (PRE- FORMULA PO) Take  by mouth.       No current facility-administered medications for this visit.     He  has a past medical history of Cancer (HCC), Cataract, Hyperlipidemia, and Hypertension.    ROS   -See HPI     Objective:     Physical Exam:  /64   Pulse 65   Temp 36.1 °C (97 °F) (Temporal)   Resp 15   Ht 1.803 m (5' 10.98\")   Wt 87.1 kg (192 lb)   SpO2 94%  Body mass index is 26.79 kg/m².   Constitutional: Alert, no distress, well-groomed.  Skin: No rashes in visible areas.  Eye: Round. Conjunctiva clear, lids normal. No icterus.   ENMT: Lips pink without lesions, good dentition, moist mucous membranes. Phonation normal.  Neck: No masses, no thyromegaly. Moves freely without pain.  Respiratory: Unlabored respiratory effort, no cough or audible wheeze  Psych: Alert and oriented x3, normal affect and mood.     LABS:  Analysis completed 10/12/2023:   Latest Reference Range & Units Most Recent   POC Color Negative  Red  10/12/23 17:00   POC Appearance Negative  Cloudy  10/12/23 17:00   POC Specific Gravity <1.005 - >1.030  1.015  10/12/23 17:00   POC Urine PH 5.0 - 8.0  5.5  10/12/23 17:00   POC Glucose Negative mg/dL 500  10/12/23 17:00   POC Ketones Negative mg/dL Negative  10/12/23 17:00   POC Protein Negative mg/dL 100  10/12/23 17:00   POC Nitrites Negative  Positive  10/12/23 17:00   POC Leukocyte Esterase Negative  Negative  10/12/23 17:00   POC Blood Negative  Large  10/12/23 17:00   POC Bilirubin Negative mg/dL Small  10/12/23 17:00   POC Urobiligen Negative (0.2) mg/dL 1.0  10/12/23 17:00     Assessment and Plan:     1. Cystitis  -Given patient's symptoms as well as findings of blood, nitrites in urine, I am concerned about potential cystitis.  We will go ahead and culture urine, and begin patient on Bactrim.  We will follow-up with patient with results from " urinalysis.  -Glucose was elevated to 500 on the urine, this is concerning for uncontrolled diabetes.  We will follow-up with patient discuss this at next visit.  - sulfamethoxazole-trimethoprim (BACTRIM DS) 800-160 MG tablet; Take 1 Tablet by mouth 2 times a day for 4 days.  Dispense: 8 Tablet; Refill: 0  - POCT Urinalysis  - URINE CULTURE(NEW); Future    2. Hematuria, unspecified type  -While hematuria could be a side effect from anticoagulant, there is some concerns regarding other potential etiologies given patient's history of adenocarcinoma prostate as well as history of smoking.  Patient did have gladys blood on urine sample today and states that it continues to worsen.  Because of this I do believe further evaluation is needed and we will place urgent referral for urology to discuss potential cystoscopy to rule out any bladder/prostate pathology  - Referral to Urology    3. Carotid artery aneurysm (HCC)  -Seen on CTA completed [place] in 2019.  Will refer to vascular surgery further evaluation and treatment if needed.  - Referral to Vascular Surgery    4. Other pulmonary embolism without acute cor pulmonale, unspecified chronicity (HCC)  -This was seen inadvertently on CTA of chest evaluating aneurysm of ascending aorta.  Again, there is some concerns of side effects of hematuria with the Eliquis.  Because this we will switch to Xarelto.  This is difficult as it is unclear whether hematuria is side effect or secondary to another problem.  I am hoping that with the change of medication and further evaluation with urology that we will hopefully be stable on the Xarelto.  If not in further evaluation and discussion regarding anticoagulation is needed we will place a referral to vascular medicine to help with the case.  - Referral to Vascular Medicine  - rivaroxaban (XARELTO) 20 MG Tab tablet; Take 1 Tablet by mouth every day at 6 PM.  Dispense: 90 Tablet; Refill: 3    Followup: No follow-ups on file.          PLEASE NOTE: This dictation was created using voice recognition software. I have made every reasonable attempt to correct obvious errors, but I expect that there are errors of grammar and possibly content that I did not discover before finalizing the note.

## 2023-10-14 LAB
BACTERIA UR CULT: ABNORMAL
BACTERIA UR CULT: ABNORMAL
SIGNIFICANT IND 70042: ABNORMAL
SITE SITE: ABNORMAL
SOURCE SOURCE: ABNORMAL

## 2023-10-16 ENCOUNTER — TELEPHONE (OUTPATIENT)
Dept: VASCULAR LAB | Facility: MEDICAL CENTER | Age: 83
End: 2023-10-16
Payer: MEDICARE

## 2023-10-17 ENCOUNTER — HOSPITAL ENCOUNTER (OUTPATIENT)
Dept: RADIOLOGY | Facility: MEDICAL CENTER | Age: 83
End: 2023-10-17
Attending: STUDENT IN AN ORGANIZED HEALTH CARE EDUCATION/TRAINING PROGRAM
Payer: MEDICARE

## 2023-10-17 PROCEDURE — 93970 EXTREMITY STUDY: CPT

## 2023-10-18 ENCOUNTER — TELEPHONE (OUTPATIENT)
Dept: VASCULAR SURGERY | Facility: MEDICAL CENTER | Age: 83
End: 2023-10-18
Payer: MEDICARE

## 2023-10-18 NOTE — TELEPHONE ENCOUNTER
Daughter called to schedule vascular referral. I let her know the referral is pending because we have no scan report stating patient has an Carotid artery aneurysm. Daughter said she will being me the report

## 2023-10-21 ENCOUNTER — HOSPITAL ENCOUNTER (INPATIENT)
Facility: MEDICAL CENTER | Age: 83
LOS: 4 days | DRG: 660 | End: 2023-10-25
Attending: EMERGENCY MEDICINE | Admitting: HOSPITALIST
Payer: MEDICARE

## 2023-10-21 ENCOUNTER — APPOINTMENT (OUTPATIENT)
Dept: RADIOLOGY | Facility: MEDICAL CENTER | Age: 83
DRG: 660 | End: 2023-10-21
Attending: EMERGENCY MEDICINE
Payer: MEDICARE

## 2023-10-21 DIAGNOSIS — I10 ESSENTIAL HYPERTENSION: ICD-10-CM

## 2023-10-21 DIAGNOSIS — Z79.01 ANTICOAGULATED: ICD-10-CM

## 2023-10-21 DIAGNOSIS — I25.10 CORONARY ARTERY DISEASE INVOLVING NATIVE CORONARY ARTERY OF NATIVE HEART WITHOUT ANGINA PECTORIS: ICD-10-CM

## 2023-10-21 DIAGNOSIS — R31.9 URINARY TRACT INFECTION WITH HEMATURIA, SITE UNSPECIFIED: ICD-10-CM

## 2023-10-21 DIAGNOSIS — I26.99 OTHER PULMONARY EMBOLISM WITHOUT ACUTE COR PULMONALE, UNSPECIFIED CHRONICITY (HCC): ICD-10-CM

## 2023-10-21 DIAGNOSIS — N30.01 ACUTE CYSTITIS WITH HEMATURIA: ICD-10-CM

## 2023-10-21 DIAGNOSIS — N39.0 URINARY TRACT INFECTION WITH HEMATURIA, SITE UNSPECIFIED: ICD-10-CM

## 2023-10-21 DIAGNOSIS — R10.32 LLQ ABDOMINAL PAIN: ICD-10-CM

## 2023-10-21 DIAGNOSIS — N13.30 HYDROURETERONEPHROSIS: ICD-10-CM

## 2023-10-21 PROBLEM — Z86.711 HISTORY OF PULMONARY EMBOLISM: Status: ACTIVE | Noted: 2023-10-21

## 2023-10-21 PROBLEM — N30.00 ACUTE CYSTITIS: Status: ACTIVE | Noted: 2023-10-21

## 2023-10-21 PROBLEM — N18.9 ACUTE ON CHRONIC RENAL FAILURE (HCC): Status: ACTIVE | Noted: 2023-10-21

## 2023-10-21 PROBLEM — N17.9 ACUTE ON CHRONIC RENAL FAILURE (HCC): Status: ACTIVE | Noted: 2023-10-21

## 2023-10-21 LAB
ALBUMIN SERPL BCP-MCNC: 3.8 G/DL (ref 3.2–4.9)
ALBUMIN/GLOB SERPL: 1.3 G/DL
ALP SERPL-CCNC: 55 U/L (ref 30–99)
ALT SERPL-CCNC: 13 U/L (ref 2–50)
ANION GAP SERPL CALC-SCNC: 13 MMOL/L (ref 7–16)
APPEARANCE UR: ABNORMAL
AST SERPL-CCNC: 13 U/L (ref 12–45)
BACTERIA #/AREA URNS HPF: ABNORMAL /HPF
BASOPHILS # BLD AUTO: 0.3 % (ref 0–1.8)
BASOPHILS # BLD: 0.02 K/UL (ref 0–0.12)
BILIRUB SERPL-MCNC: 0.4 MG/DL (ref 0.1–1.5)
BILIRUB UR QL STRIP.AUTO: ABNORMAL
BUN SERPL-MCNC: 46 MG/DL (ref 8–22)
CALCIUM ALBUM COR SERPL-MCNC: 9.3 MG/DL (ref 8.5–10.5)
CALCIUM SERPL-MCNC: 9.1 MG/DL (ref 8.4–10.2)
CHLORIDE SERPL-SCNC: 107 MMOL/L (ref 96–112)
CO2 SERPL-SCNC: 15 MMOL/L (ref 20–33)
COLOR UR: ABNORMAL
CREAT SERPL-MCNC: 2.54 MG/DL (ref 0.5–1.4)
EOSINOPHIL # BLD AUTO: 0.19 K/UL (ref 0–0.51)
EOSINOPHIL NFR BLD: 2.5 % (ref 0–6.9)
EPI CELLS #/AREA URNS HPF: ABNORMAL /HPF
ERYTHROCYTE [DISTWIDTH] IN BLOOD BY AUTOMATED COUNT: 45.6 FL (ref 35.9–50)
GFR SERPLBLD CREATININE-BSD FMLA CKD-EPI: 24 ML/MIN/1.73 M 2
GLOBULIN SER CALC-MCNC: 2.9 G/DL (ref 1.9–3.5)
GLUCOSE BLD STRIP.AUTO-MCNC: 111 MG/DL (ref 65–99)
GLUCOSE BLD STRIP.AUTO-MCNC: 152 MG/DL (ref 65–99)
GLUCOSE SERPL-MCNC: 159 MG/DL (ref 65–99)
GLUCOSE UR STRIP.AUTO-MCNC: 500 MG/DL
HCT VFR BLD AUTO: 35.7 % (ref 42–52)
HGB BLD-MCNC: 11.8 G/DL (ref 14–18)
IMM GRANULOCYTES # BLD AUTO: 0.04 K/UL (ref 0–0.11)
IMM GRANULOCYTES NFR BLD AUTO: 0.5 % (ref 0–0.9)
KETONES UR STRIP.AUTO-MCNC: NEGATIVE MG/DL
LEUKOCYTE ESTERASE UR QL STRIP.AUTO: NEGATIVE
LIPASE SERPL-CCNC: 114 U/L (ref 11–82)
LYMPHOCYTES # BLD AUTO: 1.37 K/UL (ref 1–4.8)
LYMPHOCYTES NFR BLD: 18.3 % (ref 22–41)
MCH RBC QN AUTO: 31.7 PG (ref 27–33)
MCHC RBC AUTO-ENTMCNC: 33.1 G/DL (ref 32.3–36.5)
MCV RBC AUTO: 96 FL (ref 81.4–97.8)
MICRO URNS: ABNORMAL
MONOCYTES # BLD AUTO: 0.54 K/UL (ref 0–0.85)
MONOCYTES NFR BLD AUTO: 7.2 % (ref 0–13.4)
MUCOUS THREADS #/AREA URNS HPF: ABNORMAL /HPF
NEUTROPHILS # BLD AUTO: 5.31 K/UL (ref 1.82–7.42)
NEUTROPHILS NFR BLD: 71.2 % (ref 44–72)
NITRITE UR QL STRIP.AUTO: POSITIVE
NRBC # BLD AUTO: 0 K/UL
NRBC BLD-RTO: 0 /100 WBC (ref 0–0.2)
PH UR STRIP.AUTO: 5.5 [PH] (ref 5–8)
PLATELET # BLD AUTO: 239 K/UL (ref 164–446)
PMV BLD AUTO: 9.3 FL (ref 9–12.9)
POTASSIUM SERPL-SCNC: 5.2 MMOL/L (ref 3.6–5.5)
PROT SERPL-MCNC: 6.7 G/DL (ref 6–8.2)
PROT UR QL STRIP: 30 MG/DL
RBC # BLD AUTO: 3.72 M/UL (ref 4.7–6.1)
RBC # URNS HPF: >150 /HPF
RBC UR QL AUTO: ABNORMAL
SODIUM SERPL-SCNC: 135 MMOL/L (ref 135–145)
SP GR UR STRIP.AUTO: 1.02
UNIDENT CRYS URNS QL MICRO: ABNORMAL /HPF
WBC # BLD AUTO: 7.5 K/UL (ref 4.8–10.8)
WBC #/AREA URNS HPF: ABNORMAL /HPF

## 2023-10-21 PROCEDURE — 82962 GLUCOSE BLOOD TEST: CPT

## 2023-10-21 PROCEDURE — 770001 HCHG ROOM/CARE - MED/SURG/GYN PRIV*

## 2023-10-21 PROCEDURE — 96365 THER/PROPH/DIAG IV INF INIT: CPT

## 2023-10-21 PROCEDURE — 700102 HCHG RX REV CODE 250 W/ 637 OVERRIDE(OP): Performed by: HOSPITALIST

## 2023-10-21 PROCEDURE — 74178 CT ABD&PLV WO CNTR FLWD CNTR: CPT

## 2023-10-21 PROCEDURE — 700111 HCHG RX REV CODE 636 W/ 250 OVERRIDE (IP): Mod: JZ | Performed by: HOSPITALIST

## 2023-10-21 PROCEDURE — 94760 N-INVAS EAR/PLS OXIMETRY 1: CPT

## 2023-10-21 PROCEDURE — A9270 NON-COVERED ITEM OR SERVICE: HCPCS | Performed by: HOSPITALIST

## 2023-10-21 PROCEDURE — 83690 ASSAY OF LIPASE: CPT

## 2023-10-21 PROCEDURE — 99285 EMERGENCY DEPT VISIT HI MDM: CPT

## 2023-10-21 PROCEDURE — 99223 1ST HOSP IP/OBS HIGH 75: CPT | Mod: AI | Performed by: HOSPITALIST

## 2023-10-21 PROCEDURE — 36415 COLL VENOUS BLD VENIPUNCTURE: CPT

## 2023-10-21 PROCEDURE — 700105 HCHG RX REV CODE 258: Performed by: HOSPITALIST

## 2023-10-21 PROCEDURE — 80053 COMPREHEN METABOLIC PANEL: CPT

## 2023-10-21 PROCEDURE — 700117 HCHG RX CONTRAST REV CODE 255: Performed by: EMERGENCY MEDICINE

## 2023-10-21 PROCEDURE — 81001 URINALYSIS AUTO W/SCOPE: CPT

## 2023-10-21 PROCEDURE — 700105 HCHG RX REV CODE 258: Performed by: EMERGENCY MEDICINE

## 2023-10-21 PROCEDURE — 85025 COMPLETE CBC W/AUTO DIFF WBC: CPT

## 2023-10-21 RX ORDER — SODIUM CHLORIDE 9 MG/ML
500 INJECTION, SOLUTION INTRAVENOUS ONCE
Status: COMPLETED | OUTPATIENT
Start: 2023-10-21 | End: 2023-10-21

## 2023-10-21 RX ORDER — SULFAMETHOXAZOLE AND TRIMETHOPRIM 800; 160 MG/1; MG/1
1 TABLET ORAL 2 TIMES DAILY
Status: ON HOLD | COMMUNITY
Start: 2023-10-12 | End: 2023-10-25

## 2023-10-21 RX ORDER — POLYETHYLENE GLYCOL 3350 17 G/17G
1 POWDER, FOR SOLUTION ORAL
Status: DISCONTINUED | OUTPATIENT
Start: 2023-10-21 | End: 2023-10-25 | Stop reason: HOSPADM

## 2023-10-21 RX ORDER — DEXTROSE MONOHYDRATE 25 G/50ML
25 INJECTION, SOLUTION INTRAVENOUS
Status: DISCONTINUED | OUTPATIENT
Start: 2023-10-21 | End: 2023-10-25 | Stop reason: HOSPADM

## 2023-10-21 RX ORDER — ALPRAZOLAM 0.25 MG/1
0.25 TABLET ORAL NIGHTLY PRN
Status: DISCONTINUED | OUTPATIENT
Start: 2023-10-21 | End: 2023-10-25 | Stop reason: HOSPADM

## 2023-10-21 RX ORDER — ONDANSETRON 4 MG/1
4 TABLET, ORALLY DISINTEGRATING ORAL EVERY 4 HOURS PRN
Status: DISCONTINUED | OUTPATIENT
Start: 2023-10-21 | End: 2023-10-25 | Stop reason: HOSPADM

## 2023-10-21 RX ORDER — LISINOPRIL 5 MG/1
5 TABLET ORAL DAILY
Status: DISCONTINUED | OUTPATIENT
Start: 2023-10-22 | End: 2023-10-25 | Stop reason: HOSPADM

## 2023-10-21 RX ORDER — OXYCODONE HYDROCHLORIDE 5 MG/1
5 TABLET ORAL EVERY 4 HOURS PRN
Status: DISCONTINUED | OUTPATIENT
Start: 2023-10-21 | End: 2023-10-25 | Stop reason: HOSPADM

## 2023-10-21 RX ORDER — CEFTRIAXONE 2 G/1
2000 INJECTION, POWDER, FOR SOLUTION INTRAMUSCULAR; INTRAVENOUS ONCE
Status: DISCONTINUED | OUTPATIENT
Start: 2023-10-21 | End: 2023-10-21

## 2023-10-21 RX ORDER — ROSUVASTATIN CALCIUM 10 MG/1
5 TABLET, COATED ORAL EVERY EVENING
Status: DISCONTINUED | OUTPATIENT
Start: 2023-10-21 | End: 2023-10-25 | Stop reason: HOSPADM

## 2023-10-21 RX ORDER — ACETAMINOPHEN 325 MG/1
650 TABLET ORAL EVERY 6 HOURS PRN
Status: DISCONTINUED | OUTPATIENT
Start: 2023-10-21 | End: 2023-10-25 | Stop reason: HOSPADM

## 2023-10-21 RX ORDER — LABETALOL HYDROCHLORIDE 5 MG/ML
10 INJECTION, SOLUTION INTRAVENOUS EVERY 4 HOURS PRN
Status: DISCONTINUED | OUTPATIENT
Start: 2023-10-21 | End: 2023-10-25 | Stop reason: HOSPADM

## 2023-10-21 RX ORDER — ONDANSETRON 2 MG/ML
4 INJECTION INTRAMUSCULAR; INTRAVENOUS EVERY 4 HOURS PRN
Status: DISCONTINUED | OUTPATIENT
Start: 2023-10-21 | End: 2023-10-25 | Stop reason: HOSPADM

## 2023-10-21 RX ORDER — BISACODYL 10 MG
10 SUPPOSITORY, RECTAL RECTAL
Status: DISCONTINUED | OUTPATIENT
Start: 2023-10-21 | End: 2023-10-25 | Stop reason: HOSPADM

## 2023-10-21 RX ORDER — EZETIMIBE 10 MG/1
10 TABLET ORAL EVERY EVENING
Status: DISCONTINUED | OUTPATIENT
Start: 2023-10-21 | End: 2023-10-25 | Stop reason: HOSPADM

## 2023-10-21 RX ORDER — AMOXICILLIN 250 MG
2 CAPSULE ORAL 2 TIMES DAILY
Status: DISCONTINUED | OUTPATIENT
Start: 2023-10-21 | End: 2023-10-25 | Stop reason: HOSPADM

## 2023-10-21 RX ORDER — METOPROLOL SUCCINATE 100 MG/1
100 TABLET, EXTENDED RELEASE ORAL EVERY EVENING
Status: DISCONTINUED | OUTPATIENT
Start: 2023-10-21 | End: 2023-10-22

## 2023-10-21 RX ADMIN — INSULIN HUMAN 1 UNITS: 100 INJECTION, SOLUTION PARENTERAL at 22:07

## 2023-10-21 RX ADMIN — ROSUVASTATIN 5 MG: 10 TABLET, FILM COATED ORAL at 17:49

## 2023-10-21 RX ADMIN — ALPRAZOLAM 0.25 MG: 0.25 TABLET ORAL at 20:50

## 2023-10-21 RX ADMIN — OXYCODONE HYDROCHLORIDE 5 MG: 5 TABLET ORAL at 22:09

## 2023-10-21 RX ADMIN — CEFTRIAXONE SODIUM 1000 MG: 1 INJECTION, POWDER, FOR SOLUTION INTRAMUSCULAR; INTRAVENOUS at 15:29

## 2023-10-21 RX ADMIN — EZETIMIBE 10 MG: 10 TABLET ORAL at 17:49

## 2023-10-21 RX ADMIN — OXYCODONE HYDROCHLORIDE 5 MG: 5 TABLET ORAL at 15:29

## 2023-10-21 RX ADMIN — IOHEXOL 80 ML: 350 INJECTION, SOLUTION INTRAVENOUS at 13:24

## 2023-10-21 RX ADMIN — SODIUM CHLORIDE 500 ML: 9 INJECTION, SOLUTION INTRAVENOUS at 13:15

## 2023-10-21 ASSESSMENT — LIFESTYLE VARIABLES
HOW MANY TIMES IN THE PAST YEAR HAVE YOU HAD 5 OR MORE DRINKS IN A DAY: 0
TOTAL SCORE: 0
EVER FELT BAD OR GUILTY ABOUT YOUR DRINKING: NO
SUBSTANCE_ABUSE: 0
CONSUMPTION TOTAL: NEGATIVE
TOTAL SCORE: 0
EVER HAD A DRINK FIRST THING IN THE MORNING TO STEADY YOUR NERVES TO GET RID OF A HANGOVER: NO
HAVE YOU EVER FELT YOU SHOULD CUT DOWN ON YOUR DRINKING: NO
HAVE PEOPLE ANNOYED YOU BY CRITICIZING YOUR DRINKING: NO
ALCOHOL_USE: NO
TOTAL SCORE: 0
AVERAGE NUMBER OF DAYS PER WEEK YOU HAVE A DRINK CONTAINING ALCOHOL: 0
ON A TYPICAL DAY WHEN YOU DRINK ALCOHOL HOW MANY DRINKS DO YOU HAVE: 0

## 2023-10-21 ASSESSMENT — ENCOUNTER SYMPTOMS
BRUISES/BLEEDS EASILY: 0
RESPIRATORY NEGATIVE: 1
CHILLS: 1
CARDIOVASCULAR NEGATIVE: 1
DOUBLE VISION: 0
PND: 0
DIAPHORESIS: 0
MYALGIAS: 0
BACK PAIN: 0
DIZZINESS: 0
EYE DISCHARGE: 0
SPUTUM PRODUCTION: 0
FOCAL WEAKNESS: 0
BLOOD IN STOOL: 0
TINGLING: 0
INSOMNIA: 0
STRIDOR: 0
POLYDIPSIA: 0
PSYCHIATRIC NEGATIVE: 1
PHOTOPHOBIA: 0
FALLS: 0
EYES NEGATIVE: 1
WHEEZING: 0
FLANK PAIN: 0
ABDOMINAL PAIN: 1
MUSCULOSKELETAL NEGATIVE: 1
HEARTBURN: 0
SEIZURES: 0
NEUROLOGICAL NEGATIVE: 1
EYE REDNESS: 0
ORTHOPNEA: 0
FEVER: 0
COUGH: 0
SHORTNESS OF BREATH: 0
DEPRESSION: 0
SENSORY CHANGE: 0
SINUS PAIN: 0

## 2023-10-21 ASSESSMENT — PATIENT HEALTH QUESTIONNAIRE - PHQ9
1. LITTLE INTEREST OR PLEASURE IN DOING THINGS: NOT AT ALL
2. FEELING DOWN, DEPRESSED, IRRITABLE, OR HOPELESS: NOT AT ALL
SUM OF ALL RESPONSES TO PHQ9 QUESTIONS 1 AND 2: 0
1. LITTLE INTEREST OR PLEASURE IN DOING THINGS: NOT AT ALL
2. FEELING DOWN, DEPRESSED, IRRITABLE, OR HOPELESS: NOT AT ALL
SUM OF ALL RESPONSES TO PHQ9 QUESTIONS 1 AND 2: 0

## 2023-10-21 ASSESSMENT — PAIN DESCRIPTION - PAIN TYPE
TYPE: ACUTE PAIN

## 2023-10-21 ASSESSMENT — FIBROSIS 4 INDEX: FIB4 SCORE: 1.606557377049180328

## 2023-10-21 NOTE — ASSESSMENT & PLAN NOTE
Home lisinopril dose was reduced due to CRISTOPHER.  Home metoprolol  mg daily has been switched to metoprolol twice daily with parameters due to low heart rate and blood pressure.  Holding aspirin due to hematuria and urological procedure

## 2023-10-21 NOTE — H&P
Hospital Medicine History & Physical Note    Date of Service  10/21/2023    Primary Care Physician  Nirmal Gentile M.D.    Consultants  urology    Specialist Names: Dr Escalona    Code Status  Full Code    Chief Complaint  Chief Complaint   Patient presents with    Abdominal Pain     LLQ with hematuria. Pt. States he is on xarelto. Reports recent tx for UTI and completed antibx course. Denies flank pain, diarrhea, bloody stools, vomiting.        History of Presenting Illness  Star Centeno is a 82 y.o. male who presented 10/21/2023 with left lower quadrant abdominal pain.  The patient's history starts about 6 weeks ago when he first started having pain in the left lower quadrant after he was evaluated for a chest pain and shortness of breath which produced a diagnosis of pulmonary embolism and he has been on Xarelto ever since.  The patient Xarelto will need to be held as now the patient has a UVJ obstruction with hydronephrosis on the left side and hematuria.  Urology at this point is being notified to see if the patient can go to surgery or does he have to wait till the Xarelto is out of his system.  Patient will be given pain management fluid resuscitation for right now I will keep him n.p.o. status..    I discussed the plan of care with patient, family, bedside RN, pharmacy, and emergency room physician Dr. Laurel Barrios .    Review of Systems  Review of Systems   Constitutional:  Positive for chills. Negative for diaphoresis and fever.   HENT: Negative.  Negative for nosebleeds and sinus pain.    Eyes: Negative.  Negative for double vision, photophobia, discharge and redness.   Respiratory: Negative.  Negative for cough, sputum production, shortness of breath, wheezing and stridor.    Cardiovascular: Negative.  Negative for chest pain, orthopnea, leg swelling and PND.   Gastrointestinal:  Positive for abdominal pain (Left lower quadrant). Negative for blood in stool and heartburn.   Genitourinary:   Positive for frequency, hematuria and urgency. Negative for dysuria and flank pain.   Musculoskeletal: Negative.  Negative for back pain, falls and myalgias.   Skin: Negative.  Negative for itching.   Neurological: Negative.  Negative for dizziness, tingling, sensory change, focal weakness and seizures.   Endo/Heme/Allergies: Negative.  Negative for polydipsia. Does not bruise/bleed easily.   Psychiatric/Behavioral: Negative.  Negative for depression, substance abuse and suicidal ideas. The patient does not have insomnia.    All other systems reviewed and are negative.      Past Medical History   has a past medical history of Cancer (HCC), Cataract, Hyperlipidemia, Hypertension, and Pulmonary emboli (HCC).    Surgical History   has a past surgical history that includes eye surgery and prostatectomy, radical retro.     Family History  family history includes Diabetes in his brother and sister; Genetic Disorder in his brother, sister, and sister; Hyperlipidemia in his brother; Hypertension in his brother.   Family history reviewed with patient. There is no family history that is pertinent to the chief complaint.     Social History   reports that he quit smoking about 22 years ago. His smoking use included cigarettes. He started smoking about 67 years ago. He has a 45.0 pack-year smoking history. He has never used smokeless tobacco. He reports current alcohol use of about 8.4 oz of alcohol per week. He reports that he does not use drugs.    Allergies  No Known Allergies    Medications  Prior to Admission Medications   Prescriptions Last Dose Informant Patient Reported? Taking?   ALPRAZolam (XANAX) 0.25 MG Tab PRN at PRN Patient No No   Sig: Take 1 Tablet by mouth at bedtime as needed for Sleep for up to 30 days.   Coenzyme Q10 (COQ-10) 30 MG Cap 10/20/2023 at PM Patient Yes No   Sig: Take 30 mg by mouth every evening.   Empagliflozin 25 MG Tab 10/21/2023 at 1000 Patient No No   Sig: Take 25 mg by mouth every day for  360 days.   Omega-3 Fatty Acids (FISH OIL) 1200 MG Cap 10/21/2023 at 1000 Patient Yes No   Sig: Take 1,200 mg by mouth every morning.   Prenatal Multivit-Min-Fe-FA (PRE-COLE FORMULA PO) 10/21/2023 at 1000 Patient Yes No   Sig: Take 1 Tablet by mouth every morning.   aspirin 81 MG EC tablet 10/20/2023 at PM Patient Yes No   Sig: Take 81 mg by mouth every evening.   ezetimibe (ZETIA) 10 MG Tab 10/20/2023 at PM Patient No No   Sig: Take 1 Tablet by mouth every day.   lisinopril (PRINIVIL) 20 MG Tab 10/21/2023 at 1000 Patient No No   Sig: Take 1 Tablet by mouth every day.   metFORMIN (GLUCOPHAGE) 500 MG Tab 10/21/2023 at 1000 Patient No No   Sig: Take 1 Tablet by mouth 2 times a day with meals for 360 days.   metoprolol SR (TOPROL XL) 100 MG TABLET SR 24 HR 10/20/2023 at PM Patient No No   Sig: Take 1 Tablet by mouth every day.   rivaroxaban (XARELTO) 20 MG Tab tablet 10/20/2023 at PM Patient No No   Sig: Take 1 Tablet by mouth every day at 6 PM.   rosuvastatin (CRESTOR) 5 MG Tab 10/20/2023 at PM Patient No No   Sig: TAKE 1 TABLET BY MOUTH EVERY DAY   sulfamethoxazole-trimethoprim (BACTRIM DS) 800-160 MG tablet COMPLETE at COMPLETE Patient Yes Yes   Sig: Take 1 Tablet by mouth 2 times a day. 4 days      Facility-Administered Medications: None       Physical Exam  Temp:  [36.1 °C (97 °F)] 36.1 °C (97 °F)  Pulse:  [59] 59  Resp:  [14] 14  BP: (126)/(68) 126/68  SpO2:  [94 %] 94 %  Blood Pressure : 126/68   Temperature: 36.1 °C (97 °F)   Pulse: (!) 59   Respiration: 14   Pulse Oximetry: 94 %       Physical Exam  Vitals and nursing note reviewed. Exam conducted with a chaperone present.   Constitutional:       General: He is not in acute distress.     Appearance: Normal appearance. He is well-developed and normal weight. He is ill-appearing. He is not toxic-appearing or diaphoretic.   HENT:      Head: Normocephalic and atraumatic.      Right Ear: External ear normal.      Left Ear: External ear normal.      Nose: Nose  normal.      Mouth/Throat:      Mouth: Mucous membranes are moist.      Pharynx: Oropharynx is clear.   Eyes:      Extraocular Movements: Extraocular movements intact.      Conjunctiva/sclera: Conjunctivae normal.      Pupils: Pupils are equal, round, and reactive to light.   Neck:      Thyroid: No thyromegaly.      Vascular: No JVD.   Cardiovascular:      Rate and Rhythm: Normal rate and regular rhythm.      Pulses: Normal pulses.      Heart sounds: Normal heart sounds.   Pulmonary:      Effort: Pulmonary effort is normal.      Breath sounds: Normal breath sounds.   Chest:      Chest wall: No tenderness.   Abdominal:      General: Abdomen is flat. Bowel sounds are normal. There is no distension.      Palpations: Abdomen is soft. There is no mass.      Tenderness: There is abdominal tenderness in the left lower quadrant. There is no guarding or rebound.   Musculoskeletal:         General: Normal range of motion.      Cervical back: Normal range of motion and neck supple.      Right lower leg: No edema.      Left lower leg: No edema.   Lymphadenopathy:      Cervical: No cervical adenopathy.   Skin:     General: Skin is warm and dry.      Capillary Refill: Capillary refill takes less than 2 seconds.      Findings: No lesion or rash.   Neurological:      General: No focal deficit present.      Mental Status: He is alert and oriented to person, place, and time. Mental status is at baseline.      GCS: GCS eye subscore is 4. GCS verbal subscore is 5. GCS motor subscore is 6.      Cranial Nerves: No cranial nerve deficit.      Deep Tendon Reflexes: Reflexes are normal and symmetric.   Psychiatric:         Mood and Affect: Mood normal.         Behavior: Behavior normal.         Thought Content: Thought content normal.         Judgment: Judgment normal.         Laboratory:  Recent Labs     10/21/23  1208   WBC 7.5   RBC 3.72*   HEMOGLOBIN 11.8*   HEMATOCRIT 35.7*   MCV 96.0   MCH 31.7   MCHC 33.1   RDW 45.6   PLATELETCT 239  "  MPV 9.3     Recent Labs     10/21/23  1208   SODIUM 135   POTASSIUM 5.2   CHLORIDE 107   CO2 15*   GLUCOSE 159*   BUN 46*   CREATININE 2.54*   CALCIUM 9.1     Recent Labs     10/21/23  1208   ALTSGPT 13   ASTSGOT 13   ALKPHOSPHAT 55   TBILIRUBIN 0.4   LIPASE 114*   GLUCOSE 159*         No results for input(s): \"NTPROBNP\" in the last 72 hours.      No results for input(s): \"TROPONINT\" in the last 72 hours.    Imaging:  CT-ABDOMEN & PELVIS UROGRAM   Final Result      1.  Mild to moderate left hydronephrosis and hydroureter is identified extending down the level of the left UVJ. There is urinary bladder wall thickening at this level. Neoplasm causing obstruction is a possibility. Fibrosis or invasion from prostate    carcinoma is also possible.      2.  No evidence of right-sided hydronephrosis.      3.  No enhancing renal mass is identified.      4.  No obstructing calcifications are identified.            Bosniak classification: Not Applicable          X-Ray:  I have personally reviewed the images and compared with prior images.  EKG:  I have personally reviewed the images and compared with prior images.    Assessment/Plan:  Justification for Admission Status  I anticipate this patient will require at least two midnights for appropriate medical management, necessitating inpatient admission because patient has hydronephrosis and this will require at least 48 hours of inpatient management    Patient will need a Med/Surg bed on MEDICAL service .  The need is secondary to hydronephrosis with acute renal failure and acute urinary tract infection and nephrolithiasis.    * Hydronephrosis, left- (present on admission)  Assessment & Plan  Patient reports that he has had hematuria and left lower quadrant abdominal pain for probably about 6 weeks.  The patient at this point has had difficulty with urination as he only urinates very small amounts at one time and has hesitancy.  The patient on imaging studies is found to have " left-sided hydronephrosis with a UVJ stone  Urology at this point is being consulted for evaluation  N.p.o.  Take him off the anticoagulation for now    History of pulmonary embolism  Assessment & Plan  Patient in the beginning of September was having shortness of breath and chest pain and so he was evaluated a CTA of the chest and was found to have pulmonary emboli.  He has been on NOAC ever since.  We will have to hold his Xarelto for now.  Patient did have DVT studies that were negative on 10/17/2023    Acute cystitis  Assessment & Plan  Initiate IV Rocephin  Urine cultures and blood cultures have been requested prior to initiation of antibiotics    Acute on chronic renal failure (HCC)  Assessment & Plan  Patient has chronic renal failure and his creatinine is roughly around 1.5-1.8 currently he is at 2.5  Avoid nephrotoxic medications  Give fluid resuscitation  Monitor renal functions  Acute renal failure is most likely secondary to the hydronephrosis.    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    Essential hypertension, benign- (present on admission)  Assessment & Plan  Optimize blood pressure management keep systolic blood pressure less than 140 diastolic under 90  Continue metoprolol  mg daily  Continue lisinopril 5 mg daily  As needed  labetalol    BMI 28.0-28.9,adult- (present on admission)  Assessment & Plan  Body mass index is 26.44 kg/m².  Outpatient weight loss management program highly recommended    Hyperlipidemia- (present on admission)  Assessment & Plan  Low-fat low-cholesterol diet  Statin  Fasting lipid panel    History of adenocarcinoma of prostate- (present on admission)  Assessment & Plan  Continue follow-ups with urology.    History of smoking- (present on admission)  Assessment & Plan  Patient says he quit smoking about 20 years ago.    Cataract of both eyes- (present on admission)  Assessment & Plan  Outpatient follow-up with ophthalmology on a routine basis        VTE prophylaxis: SCDs/TEDs and therapeutic anticoagulation with Xarelto

## 2023-10-21 NOTE — ASSESSMENT & PLAN NOTE
UA was positive.  Rocephin was empirically started.   cx thus far negative previous cx 10/12 pan-sensitive e.coli  abx changed to ancef, will complete course with oral abx on discharge

## 2023-10-21 NOTE — ED NOTES
Pharmacy Medication Reconciliation    ~Med rec updated and complete per patient & patient family at bedside with medication list. Reviewed list with patient and returned at bedside   ~Allergies have been verified  ~Pt home pharmacy: [facility]      ~Patient reports that he completed a 4 day course of Bactrim DS that was started on 10/12/2023      ~Anticoagulants (rivaroxaban, apixaban, edoxaban, dabigatran, warfarin, enoxaparin) taken in the last 14 days? Yes  ~Anticoagulant: Xarelto, Last dose: 10/20/2023 in the evening

## 2023-10-21 NOTE — ED TRIAGE NOTES
Pt was placed on eliquis on 09/02 for a PE. He was having blood in his urine so his PCP switched him to to Xarelto 1.5 weeks ago. He is still experiencing hematuria and intermittent dysuria. He is also experiencing LLQ pain that developed about 2 weeks ago.     He goes to the gym 3 times a week and noticed some SOB with exertion but otherwise no respiratory symptoms.

## 2023-10-21 NOTE — ASSESSMENT & PLAN NOTE
CT abdomen and pelvis urogram shows left hydronephrosis.  Imaging shows mild to moderate left hydronephrosis and hydroureter extending down to the level of the left UVJ, concern for neoplasm causing obstruction, fibrosis or invasion from prostate carcinoma also possible.    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

## 2023-10-21 NOTE — ASSESSMENT & PLAN NOTE
Diagnosed with pulmonary embolism on September 2, 2023 after work-up for shortness of breath and chest pain.  He was started on Xarelto, which has been held for planned OR within the next 1 to 2 days. Lower extremity venous ultrasound from 10/17/2023 showed no evidence of DVTs.  Resume once cleared by urology

## 2023-10-21 NOTE — ASSESSMENT & PLAN NOTE
Distant hx of prostate cancer treated in 2003 presents with hematuria and flank pain   Found to have left hydronephrosis and left UVJ obstruction   Urology following   S/p ureteroscopy and stenting today, will need OP follow up   PSA neg

## 2023-10-21 NOTE — ED PROVIDER NOTES
ED Provider Note    CHIEF COMPLAINT  Chief Complaint   Patient presents with    Abdominal Pain     LLQ with hematuria. Pt. States he is on xarelto. Reports recent tx for UTI and completed antibx course. Denies flank pain, diarrhea, bloody stools, vomiting.        EXTERNAL RECORDS REVIEWED  Outpatient Notes [facility] evaluation 10/12/2023 for hematuria, longstanding issue for the last several weeks is starting Eliquis for pulmonary embolism.  When decreasing down to 5 mg twice daily resolved for a few days but is since returned.  He is now getting symptoms of dysuria, polyuria especially at night.  Cystitis, given symptoms and nitrates in urine, concern for cystitis.  Go ahead and culture urine, begin patient on Bactrim.  Glucose was elevated, concern for uncontrolled diabetes, will follow-up with next visit.  Hematuria could be a side effect of anticoagulation, concern regarding other etiologies given patient's history of adenocarcinoma prostate as well as history of smoking.  He did have gladys blood on urine sample today and states it continues to worsen.  Will need cystoscopy by urology, referral placed.  History of carotid artery aneurysm referred to vascular.  Other pulmonary embolism without cor pulmonale found on CTA chest while evaluating for aneurysm of the aorta.  Some concerns of side effects with hematuria with Eliquis, will switch to Xarelto.  Urine culture 10/12/2023 E. coli.,  Pansensitive.    HPI/ROS  LIMITATION TO HISTORY   Select: : None  OUTSIDE HISTORIAN(S):  Family daughter    Star Centeno is a 82 y.o. male who presents to the emergency department through triage with his daughter for left lower quadrant abdominal pain.  Patient describes mostly persistent left lower quadrant abdominal pain for 2 to 3 weeks.  He has since been started on Xarelto for incidental PE found on a CTA chest to monitor aortic aneurysm, and states he is also having hematuria.  Although it has improved overall he  still has blood colored urine.  No flank pain.  Initially had some burning in his penile shaft with urination as well, has since been treated for a UTI, awaiting repeat cultures for noted clearance.  No fever or chills.  No nausea or vomiting.  Denies other bleeding or bruising.  No diarrhea, bloody stools.    Patient states he was seen by urology (YEYO Reese) as well, CT urogram scheduled for tomorrow, cystoscopy for December, but left lower quadrant abdominal pain worsened today.     PAST MEDICAL HISTORY   has a past medical history of Cancer (HCC), Cataract, Hyperlipidemia, Hypertension, and Pulmonary emboli (HCC).    SURGICAL HISTORY   has a past surgical history that includes eye surgery and prostatectomy, radical retro.    FAMILY HISTORY  Family History   Problem Relation Age of Onset    Genetic Disorder Brother     Diabetes Brother     Hypertension Brother     Hyperlipidemia Brother     Genetic Disorder Sister     Diabetes Sister     Genetic Disorder Sister        SOCIAL HISTORY  Social History     Tobacco Use    Smoking status: Former     Current packs/day: 0.00     Average packs/day: 1 pack/day for 45.0 years (45.0 ttl pk-yrs)     Types: Cigarettes     Start date: 1956     Quit date: 2001     Years since quittin.8    Smokeless tobacco: Never   Vaping Use    Vaping Use: Never used   Substance and Sexual Activity    Alcohol use: Yes     Alcohol/week: 8.4 oz     Types: 14 Glasses of wine per week     Comment: 2 glasses of wine daily    Drug use: Never    Sexual activity: Not Currently     Partners: Female     Birth control/protection: Condom       CURRENT MEDICATIONS  Home Medications       Reviewed by Rosa Rodriguez (Pharmacy Tech) on 10/21/23 at 1423  Med List Status: Complete     Medication Last Dose Status   ALPRAZolam (XANAX) 0.25 MG Tab PRN Active   aspirin 81 MG EC tablet 10/20/2023 Active   Coenzyme Q10 (COQ-10) 30 MG Cap 10/20/2023 Active   Empagliflozin 25 MG Tab 10/21/2023  "Active   ezetimibe (ZETIA) 10 MG Tab 10/20/2023 Active   lisinopril (PRINIVIL) 20 MG Tab 10/21/2023 Active   metFORMIN (GLUCOPHAGE) 500 MG Tab 10/21/2023 Active   metoprolol SR (TOPROL XL) 100 MG TABLET SR 24 HR 10/20/2023 Active   Omega-3 Fatty Acids (FISH OIL) 1200 MG Cap 10/21/2023 Active   Prenatal Multivit-Min-Fe-FA (PRE-COLE FORMULA PO) 10/21/2023 Active   rivaroxaban (XARELTO) 20 MG Tab tablet 10/20/2023 Active   rosuvastatin (CRESTOR) 5 MG Tab 10/20/2023 Active   sulfamethoxazole-trimethoprim (BACTRIM DS) 800-160 MG tablet COMPLETE Active                    ALLERGIES  No Known Allergies    PHYSICAL EXAM  VITAL SIGNS: /68   Pulse (!) 59   Temp 36.1 °C (97 °F) (Temporal)   Resp 14   Ht 1.803 m (5' 11\")   Wt 86 kg (189 lb 9.5 oz)   SpO2 94%   BMI 26.44 kg/m²    Pulse ox interpretation: I interpret this pulse ox as normal.  Constitutional: Alert in no apparent distress.  HENT: Normocephalic, atraumatic. Bilateral external ears normal, Nose normal.  Slightly dry mucous membranes.    Eyes: Pupils are equal and reactive, Conjunctiva normal.   Neck: Normal range of motion, Supple  Lymphatic: No lymphadenopathy noted.   Cardiovascular: Regular rate and rhythm, no murmurs. Distal pulses intact.    Thorax & Lungs: Normal breath sounds.  No wheezing/rales/ronchi. No increased work of breathing  Abdomen: Soft, non-distended very tender to palpation left lower quadrant without guarding or peritonitis.  No CVA tenderness percussion.  No palpable pulsatile mass.  Skin: Warm, Dry, No erythema, No rash.   Musculoskeletal: Good range of motion in all major joints.  Neurologic: Alert and oriented x4.  Speech clear and cohesive  Psychiatric: Affect normal, Judgment normal, Mood normal.       DIAGNOSTIC STUDIES / PROCEDURES  LABS  Results for orders placed or performed during the hospital encounter of 10/21/23   CBC WITH DIFFERENTIAL   Result Value Ref Range    WBC 7.5 4.8 - 10.8 K/uL    RBC 3.72 (L) 4.70 - 6.10 " M/uL    Hemoglobin 11.8 (L) 14.0 - 18.0 g/dL    Hematocrit 35.7 (L) 42.0 - 52.0 %    MCV 96.0 81.4 - 97.8 fL    MCH 31.7 27.0 - 33.0 pg    MCHC 33.1 32.3 - 36.5 g/dL    RDW 45.6 35.9 - 50.0 fL    Platelet Count 239 164 - 446 K/uL    MPV 9.3 9.0 - 12.9 fL    Neutrophils-Polys 71.20 44.00 - 72.00 %    Lymphocytes 18.30 (L) 22.00 - 41.00 %    Monocytes 7.20 0.00 - 13.40 %    Eosinophils 2.50 0.00 - 6.90 %    Basophils 0.30 0.00 - 1.80 %    Immature Granulocytes 0.50 0.00 - 0.90 %    Nucleated RBC 0.00 0.00 - 0.20 /100 WBC    Neutrophils (Absolute) 5.31 1.82 - 7.42 K/uL    Lymphs (Absolute) 1.37 1.00 - 4.80 K/uL    Monos (Absolute) 0.54 0.00 - 0.85 K/uL    Eos (Absolute) 0.19 0.00 - 0.51 K/uL    Baso (Absolute) 0.02 0.00 - 0.12 K/uL    Immature Granulocytes (abs) 0.04 0.00 - 0.11 K/uL    NRBC (Absolute) 0.00 K/uL   COMP METABOLIC PANEL   Result Value Ref Range    Sodium 135 135 - 145 mmol/L    Potassium 5.2 3.6 - 5.5 mmol/L    Chloride 107 96 - 112 mmol/L    Co2 15 (L) 20 - 33 mmol/L    Anion Gap 13.0 7.0 - 16.0    Glucose 159 (H) 65 - 99 mg/dL    Bun 46 (H) 8 - 22 mg/dL    Creatinine 2.54 (H) 0.50 - 1.40 mg/dL    Calcium 9.1 8.4 - 10.2 mg/dL    Correct Calcium 9.3 8.5 - 10.5 mg/dL    AST(SGOT) 13 12 - 45 U/L    ALT(SGPT) 13 2 - 50 U/L    Alkaline Phosphatase 55 30 - 99 U/L    Total Bilirubin 0.4 0.1 - 1.5 mg/dL    Albumin 3.8 3.2 - 4.9 g/dL    Total Protein 6.7 6.0 - 8.2 g/dL    Globulin 2.9 1.9 - 3.5 g/dL    A-G Ratio 1.3 g/dL   LIPASE   Result Value Ref Range    Lipase 114 (H) 11 - 82 U/L   URINALYSIS (UA)    Specimen: Urine   Result Value Ref Range    Color Annalee     Character Cloudy (A)     Specific Gravity 1.025 <1.035    Ph 5.5 5.0 - 8.0    Glucose 500 (A) Negative mg/dL    Ketones Negative Negative mg/dL    Protein 30 (A) Negative mg/dL    Bilirubin Small (A) Negative    Nitrite Positive (A) Negative    Leukocyte Esterase Negative Negative    Occult Blood Large (A) Negative    Micro Urine Req Microscopic     ESTIMATED GFR   Result Value Ref Range    GFR (CKD-EPI) 24 (A) >60 mL/min/1.73 m 2   URINE MICROSCOPIC (W/UA)   Result Value Ref Range    WBC 0-2 (A) /hpf    RBC >150 (A) /hpf    Bacteria Few (A) None /hpf    Epithelial Cells Rare Few /hpf    Mucous Threads Few /hpf    Urine Crystals Rare Amorphous /hpf     RADIOLOGY  I have independently interpreted the diagnostic imaging associated with this visit and am waiting the final reading from the radiologist.     Radiologist interpretation:   CT-ABDOMEN & PELVIS UROGRAM   Final Result      1.  Mild to moderate left hydronephrosis and hydroureter is identified extending down the level of the left UVJ. There is urinary bladder wall thickening at this level. Neoplasm causing obstruction is a possibility. Fibrosis or invasion from prostate    carcinoma is also possible.      2.  No evidence of right-sided hydronephrosis.      3.  No enhancing renal mass is identified.      4.  No obstructing calcifications are identified.            Bosniak classification: Not Applicable        COURSE & MEDICAL DECISION MAKING    ED Observation Status? Yes; I am placing the patient in to an observation status due to a diagnostic uncertainty as well as therapeutic intensity. Patient placed in observation status at 11:48 AM, 10/21/2023.     Observation plan is as follows: Labs, imaging before final disposition can be made    Upon Reevaluation, the patient's condition has: not improved; and will be escalated to hospitalization.    Patient discharged from ED Observation status at 2:15 PM  (Time) 10/21/23 (Date).     INITIAL ASSESSMENT, COURSE AND PLAN  Care Narrative:   Seen evaluated bedside.  Left lower quadrant abdominal pain, hematuria although on anticoagulation and has been present since initiating early September.  Has been seen by urology, CT urogram scheduled for tomorrow, cystoscopy in December.  History of adenocarcinoma of the prostate as well as smoking.  Add labs, urinalysis,  CT.    Labs without leukocytosis, left shift or bandemia.  He has chronic stable normocytic anemia.  He does have worsening kidney function, creatinine now 2.5 (previously 1.5-1.7, normal prior to 2023).  With a CO2 of 15.  Will add gentle fluid bolus.  Nonspecific elevation in lipase although patient has no upper abdominal pain, normal LFTs.  Will monitor for correction with IV fluid hydration.  Did proceed with CT for advanced imaging and diagnosis.  Awaiting CT results, urinalysis    1:56 PM CT as above with mild to moderate left hydroureteronephrosis extending down the level of the left UVJ.  There is urinary bladder wall thickening at this level, neoplasm causing obstruction is a possibility.  Fibrosis or invasion from prostate carcinoma also possible.  No right-sided hydronephrosis.  No enhancing renal mass.  No obstructing calcifications.  Awaiting urinalysis, will plan admission.    2:16 PM urinalysis positive for nitrite and blood.  Rocephin added.    ADDITIONAL PROBLEM LIST  Adenocarcinoma of the prostate  History of tobacco use  Aortic aneurysm - monitored annually by CT.      DISPOSITION AND DISCUSSIONS  ED evaluation for left lower quadrant abdominal pain likely secondary to the left hydroureteronephrosis and nonspecific obstruction at the UVJ.  He does have worsening renal function with a creatinine now of 2.5, CO2 of 15.  Gentle IV fluid hydration infusing.  Urinalysis is positive for nitrate as well as blood.  Rocephin added.  Urology will be consulted for infected obstruction with ongoing pain and worsening kidney function.  Otherwise clinically well-appearing, no sepsis.  Hemodynamically stable without fever, tachycardia, hypotension.  He will be hospitalized for further evaluation and treatment.    I have discussed management of the patient with the following physicians and CON's:    2:15 PM Dr. Kat is aware of the patient and agreeable to consultation.    2:36 PM Jean Claude still in OR.  Dr. Kat  will follow-up with urgent consult.    Discussion of management with other [facility] or appropriate source(s):   1155 - CT tech, who agrees to do CT urogram as scheduled for tomorrow now at this time in addition to the noncontrast study ordered to evaluate for left lower quadrant abdominal pain, ureteral colic.        FINAL DIAGNOSIS  1. LLQ abdominal pain    2. Hydroureteronephrosis    3. Urinary tract infection with hematuria, site unspecified    4. Anticoagulated           Electronically signed by: Laurel Barrios D.O., 10/21/2023 1:48 PM

## 2023-10-21 NOTE — ASSESSMENT & PLAN NOTE
Home oral medications on hold, SSI with Accu-Cheks and hypoglycemia protocol started.  Ha1c 7.1%, at goal for age

## 2023-10-21 NOTE — ASSESSMENT & PLAN NOTE
Creatinine is elevated from baseline.  Gently hydrate.  CT shows left hydronephrosis.  UA positive.  On Rocephin.  Rocephin changed to Ancef, will complete tx with oral abx on discharge  CRISTOPHER persistent but stable, monitor   Avoid nephrotoxic agents

## 2023-10-21 NOTE — CONSULTS
[facility] [place] Consult/H&P Note    Primary Service: Hospitalist  Attending: Dave Keen M.D.  Patient's Name/MRN: Star Centeno, [id number]    Admit Date:10/21/2023  Today's Date: 10/21/2023   Length of stay:  LOS: 0 days   Room #: -ROOM 10/10 A      Reason for consult/chief complaint: Gross hematuria, left flank pain, left upper urinary tract obstruction    ID/HPI: Star Centeno is a 82 y.o. male patient with history of reported high risk prostate cancer treated around 2003 with a combination of radiation therapy and brachytherapy in the [facility], who has been admitted to [facility] after presenting with gross hematuria and left lower quadrant abdominal pain.  He has been on Xarelto since being diagnosed with a pulmonary embolism in September.  Sometime after that he first noted gross hematuria with some dysuria.  Over the past week he developed intermittent left lower quadrant abdominal pain, which over the past few days has become constant prompting presentation for evaluation.  A CT scan was performed in the ER and depicted left hydroureteronephrosis with no obvious obstructing urolithiasis, with bladder wall thickening in the area of the left UVJ.  Anticoagulation has been held pending urologic evaluation.       Past Medical History:   Past Medical History:   Diagnosis Date    Cancer (HCC)     Cataract     Hyperlipidemia     Hypertension     Pulmonary emboli (HCC)         Past Surgical History:   Past Surgical History:   Procedure Laterality Date    EYE SURGERY      PROSTATECTOMY, RADICAL RETRO          Family History:   Family History   Problem Relation Age of Onset    Genetic Disorder Brother     Diabetes Brother     Hypertension Brother     Hyperlipidemia Brother     Genetic Disorder Sister     Diabetes Sister     Genetic Disorder Sister          Social History:   Social History     Tobacco Use    Smoking status: Former     Current packs/day: 0.00     Average packs/day: 1 pack/day for 45.0  "years (45.0 ttl pk-yrs)     Types: Cigarettes     Start date: 1956     Quit date: 2001     Years since quittin.8    Smokeless tobacco: Never   Vaping Use    Vaping Use: Never used   Substance Use Topics    Alcohol use: Yes     Alcohol/week: 8.4 oz     Types: 14 Glasses of wine per week     Comment: 2 glasses of wine daily    Drug use: Never      Social History     Social History Narrative    Not on file        Allergies: he Patient has no known allergies.    Medications:   (Not in a hospital admission)        Review of Systems  Review of Systems   Genitourinary:         As per HPI   All other systems reviewed and are negative.       Physical Exam  VITAL SIGNS: /68   Pulse 63   Temp 36.1 °C (97 °F) (Temporal)   Resp (!) 61   Ht 1.803 m (5' 11\")   Wt 86 kg (189 lb 9.5 oz)   SpO2 93%   BMI 26.44 kg/m²   Physical Exam  Vitals reviewed.   Constitutional:       Appearance: Normal appearance.   HENT:      Head: Normocephalic.      Nose: Nose normal.      Mouth/Throat:      Pharynx: Oropharynx is clear.   Cardiovascular:      Pulses: Normal pulses.   Pulmonary:      Effort: Pulmonary effort is normal.   Abdominal:      General: Abdomen is flat.      Tenderness: There is no right CVA tenderness or left CVA tenderness.      Comments: Left lower quadrant abdominal tenderness on deep palpation   Musculoskeletal:         General: Normal range of motion.      Cervical back: Normal range of motion.   Neurological:      General: No focal deficit present.      Mental Status: He is alert.   Psychiatric:         Mood and Affect: Mood normal.           Labs:  Recent Labs     10/21/23  1208   WBC 7.5   RBC 3.72*   HEMOGLOBIN 11.8*   HEMATOCRIT 35.7*   MCV 96.0   MCH 31.7   MCHC 33.1   RDW 45.6   PLATELETCT 239   MPV 9.3     Recent Labs     10/21/23  1208   SODIUM 135   POTASSIUM 5.2   CHLORIDE 107   CO2 15*   GLUCOSE 159*   BUN 46*   CREATININE 2.54*   CALCIUM 9.1         Glucose:  Recent Labs     " "10/21/23  1208   GLUCOSE 159*     Coags:  No results for input(s): \"INR\" in the last 72 hours.      Urinalysis:   Recent Labs     10/21/23  1120   COLORURINE Annalee   CLARITY Cloudy*   SPECGRAVITY 1.025   PHURINE 5.5   GLUCOSEUR 500*   KETONES Negative   NITRITE Positive*   OCCULTBLOOD Large*   RBCURINE >150*   BACTERIA Few*   EPITHELCELL Rare       Imaging:  CT-ABDOMEN & PELVIS UROGRAM   Final Result      1.  Mild to moderate left hydronephrosis and hydroureter is identified extending down the level of the left UVJ. There is urinary bladder wall thickening at this level. Neoplasm causing obstruction is a possibility. Fibrosis or invasion from prostate    carcinoma is also possible.      2.  No evidence of right-sided hydronephrosis.      3.  No enhancing renal mass is identified.      4.  No obstructing calcifications are identified.            Bosniak classification: Not Applicable               Assessment/Recommendation   82 y.o.with history of reported high risk prostate cancer treated around 2003 with a combination of radiation therapy and brachytherapy in the [place] (8/2022 PSA undetectable), pulmonary embolism on Xarelto, and chronic kidney disease (GFR 31), admitted for further evaluation for gross hematuria, left lower quadrant abdominal pain, and CT findings suggestive of a possible obstruction at the level of the left UVJ.  As there is no evidence of obstructing urolithiasis on CT, possible etiology of the obstruction includes stricture disease secondary to benign or malignant causes, or urolithiasis that cannot be visualized on imaging (less likely).  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      I reviewed the clinical data with Mr. Centeno and his daughter.  I recommended further evaluation with cystoscopy, left retrograde pyelogram and ureteroscopy, " with possible bladder or left ureteral biopsy, possible stone treatment with laser lithotripsy and/or basketing, and possible left ureteral stent placement.  As this evaluation includes a tissue biopsy of the urothelium (bladder or left ureter), I recommend at least 48 hours off of anticoagulation prior to proceeding with surgery.  Anticoagulation has been held on Saturday, therefore earliest intervention would be on Monday.    Continue to hold anticoagulation until surgery   Please obtain a voided urine cytology  Check PSA given history of prostate cancer  To OR with [facility] urologist or myself early next week  Follow-up in 1 week after surgery to review pathology and formulate treatment plan       Shane Escalona MD     Urology and Urologic Oncology  [address]  [place] [address]   [phone]

## 2023-10-22 LAB
ANION GAP SERPL CALC-SCNC: 14 MMOL/L (ref 7–16)
BUN SERPL-MCNC: 45 MG/DL (ref 8–22)
CALCIUM SERPL-MCNC: 8.7 MG/DL (ref 8.4–10.2)
CHLORIDE SERPL-SCNC: 106 MMOL/L (ref 96–112)
CO2 SERPL-SCNC: 13 MMOL/L (ref 20–33)
CREAT SERPL-MCNC: 2.08 MG/DL (ref 0.5–1.4)
ERYTHROCYTE [DISTWIDTH] IN BLOOD BY AUTOMATED COUNT: 45.3 FL (ref 35.9–50)
GFR SERPLBLD CREATININE-BSD FMLA CKD-EPI: 31 ML/MIN/1.73 M 2
GLUCOSE BLD STRIP.AUTO-MCNC: 121 MG/DL (ref 65–99)
GLUCOSE BLD STRIP.AUTO-MCNC: 124 MG/DL (ref 65–99)
GLUCOSE BLD STRIP.AUTO-MCNC: 145 MG/DL (ref 65–99)
GLUCOSE SERPL-MCNC: 103 MG/DL (ref 65–99)
HCT VFR BLD AUTO: 32.3 % (ref 42–52)
HGB BLD-MCNC: 10.8 G/DL (ref 14–18)
MCH RBC QN AUTO: 31.9 PG (ref 27–33)
MCHC RBC AUTO-ENTMCNC: 33.4 G/DL (ref 32.3–36.5)
MCV RBC AUTO: 95.3 FL (ref 81.4–97.8)
PLATELET # BLD AUTO: 212 K/UL (ref 164–446)
PMV BLD AUTO: 9.4 FL (ref 9–12.9)
POTASSIUM SERPL-SCNC: 5 MMOL/L (ref 3.6–5.5)
RBC # BLD AUTO: 3.39 M/UL (ref 4.7–6.1)
SODIUM SERPL-SCNC: 133 MMOL/L (ref 135–145)
WBC # BLD AUTO: 8.5 K/UL (ref 4.8–10.8)

## 2023-10-22 PROCEDURE — 700105 HCHG RX REV CODE 258: Performed by: HOSPITALIST

## 2023-10-22 PROCEDURE — 770001 HCHG ROOM/CARE - MED/SURG/GYN PRIV*

## 2023-10-22 PROCEDURE — 94760 N-INVAS EAR/PLS OXIMETRY 1: CPT

## 2023-10-22 PROCEDURE — 84153 ASSAY OF PSA TOTAL: CPT

## 2023-10-22 PROCEDURE — 85027 COMPLETE CBC AUTOMATED: CPT

## 2023-10-22 PROCEDURE — 87086 URINE CULTURE/COLONY COUNT: CPT

## 2023-10-22 PROCEDURE — A9270 NON-COVERED ITEM OR SERVICE: HCPCS | Performed by: HOSPITALIST

## 2023-10-22 PROCEDURE — 82962 GLUCOSE BLOOD TEST: CPT | Mod: 91

## 2023-10-22 PROCEDURE — 700111 HCHG RX REV CODE 636 W/ 250 OVERRIDE (IP): Mod: JZ | Performed by: HOSPITALIST

## 2023-10-22 PROCEDURE — 99232 SBSQ HOSP IP/OBS MODERATE 35: CPT | Performed by: INTERNAL MEDICINE

## 2023-10-22 PROCEDURE — 80048 BASIC METABOLIC PNL TOTAL CA: CPT

## 2023-10-22 PROCEDURE — 700102 HCHG RX REV CODE 250 W/ 637 OVERRIDE(OP): Performed by: HOSPITALIST

## 2023-10-22 RX ADMIN — ROSUVASTATIN 5 MG: 10 TABLET, FILM COATED ORAL at 17:55

## 2023-10-22 RX ADMIN — OXYCODONE HYDROCHLORIDE 5 MG: 5 TABLET ORAL at 12:17

## 2023-10-22 RX ADMIN — SENNOSIDES AND DOCUSATE SODIUM 2 TABLET: 50; 8.6 TABLET ORAL at 05:11

## 2023-10-22 RX ADMIN — CEFTRIAXONE SODIUM 1000 MG: 1 INJECTION, POWDER, FOR SOLUTION INTRAMUSCULAR; INTRAVENOUS at 14:07

## 2023-10-22 RX ADMIN — OXYCODONE HYDROCHLORIDE 5 MG: 5 TABLET ORAL at 21:34

## 2023-10-22 RX ADMIN — EZETIMIBE 10 MG: 10 TABLET ORAL at 17:55

## 2023-10-22 RX ADMIN — SENNOSIDES AND DOCUSATE SODIUM 2 TABLET: 50; 8.6 TABLET ORAL at 17:55

## 2023-10-22 ASSESSMENT — PATIENT HEALTH QUESTIONNAIRE - PHQ9
2. FEELING DOWN, DEPRESSED, IRRITABLE, OR HOPELESS: NOT AT ALL
1. LITTLE INTEREST OR PLEASURE IN DOING THINGS: NOT AT ALL
SUM OF ALL RESPONSES TO PHQ9 QUESTIONS 1 AND 2: 0

## 2023-10-22 ASSESSMENT — PAIN DESCRIPTION - PAIN TYPE
TYPE: ACUTE PAIN

## 2023-10-22 ASSESSMENT — ENCOUNTER SYMPTOMS
CARDIOVASCULAR NEGATIVE: 1
VOMITING: 0
CHILLS: 0
MUSCULOSKELETAL NEGATIVE: 1
GASTROINTESTINAL NEGATIVE: 1
NEUROLOGICAL NEGATIVE: 1
NAUSEA: 0
RESPIRATORY NEGATIVE: 1
FLANK PAIN: 0
FEVER: 0
EYES NEGATIVE: 1
PSYCHIATRIC NEGATIVE: 1

## 2023-10-22 NOTE — PROGRESS NOTES
Received report from nightshift RN and assumed care of patient at 0700. Patient is sitting up in bed eating breakfast and denies needs at this time. Call light in reach. Bed in lowest locked position. Hourly rounding to continue.

## 2023-10-22 NOTE — PROGRESS NOTES
Received report from Mt MUHAMMAD. Pt resting in bed, awake. Pt A&O x4, on . Pt denies pain at this time. Pt updated with POC which includes  and blood glucose checks. Call light within reach, bed in lowest position, fall precautions in place, all needs met at this time.     - q2 hr rounding in progress; pulse ox on educated.     - 2010 pt complained on not having a good sleep last night; daughter reported too at bedside; 2050 given prn meds for sleep per MAR.

## 2023-10-22 NOTE — PROGRESS NOTES
Per ER RN, urology was able to see patient and will not be able to do any procedure until Monday. MD updated, diet order received.

## 2023-10-22 NOTE — CARE PLAN
The patient is Stable - Low risk of patient condition declining or worsening    Shift Goals  Clinical Goals: pain control 3/10 or less; free from falls  Patient Goals: rest  Family Goals: n/a    Progress made toward(s) clinical / shift goals:  pain controlled 3/10 or less    Patient is not progressing towards the following goals:

## 2023-10-22 NOTE — PROGRESS NOTES
4 Eyes Skin Assessment Completed by Mt RN and JB Warner.    Head WDL  Ears WDL  Nose WDL  Mouth WDL  Neck WDL  Breast/Chest WDL  Shoulder Blades WDL  Spine WDL  (R) Arm/Elbow/Hand WDL  (L) Arm/Elbow/Hand WDL  Abdomen WDL  Groin WDL  Scrotum/Coccyx/Buttocks Redness and Blanching  (R) Leg WDL  (L) Leg WDL  (R) Heel/Foot/Toe WDL  (L) Heel/Foot/Toe WDL          Devices In Places Blood Pressure Cuff and Pulse Ox      Interventions In Place Pillows    Possible Skin Injury No    Pictures Uploaded Into Epic N/A  Wound Consult Placed N/A  RN Wound Prevention Protocol Ordered No

## 2023-10-22 NOTE — PROGRESS NOTES
Hospital Medicine Daily Progress Note    Date of Service  10/22/2023    Chief Complaint  Star Centeno is a 82 y.o. male admitted 10/21/2023 with left lower quadrant abdominal pain and hematuria.  He has a history of prostate cancer, CKD 3, dyslipidemia, hypertension.     He was recently diagnosed with pulm embolism started on Xarelto.    Hospital Course  On admission, CT abdomen/pelvis urogram showed mild to moderate left hydronephrosis and hydroureter extending down to the level of the left UVJ, concern for neoplasm causing obstruction, fibrosis or invasion from prostate carcinoma also possible.  Urology were consulted.  Xarelto was held.  Plan follow-up within the next few days.    Interval Problem Update  Afebrile.  Blood pressure is 89/45.  Toprol- mg daily has been switched to twice daily metoprolol at lower dose with parameters. Urine culture ordered.    I have discussed this patient's plan of care and discharge plan at IDT rounds today with Case Management, Nursing, Nursing leadership, and other members of the IDT team.    Consultants/Specialty  urology    Code Status  Full Code    Disposition  The patient is not medically cleared for discharge to home or a post-acute facility.  Anticipate discharge to: home with close outpatient follow-up    I have placed the appropriate orders for post-discharge needs.    Review of Systems  Review of Systems   Constitutional:  Positive for malaise/fatigue.   HENT: Negative.     Eyes: Negative.    Respiratory: Negative.     Cardiovascular: Negative.    Gastrointestinal: Negative.    Genitourinary: Negative.    Musculoskeletal: Negative.    Skin: Negative.    Neurological: Negative.    Endo/Heme/Allergies: Negative.    Psychiatric/Behavioral: Negative.          Physical Exam  Temp:  [36.1 °C (97 °F)-36.5 °C (97.7 °F)] 36.3 °C (97.3 °F)  Pulse:  [51-89] 60  Resp:  [14-24] 18  BP: ()/(45-91) 89/45  SpO2:  [88 %-96 %] 93 %    Physical Exam  HENT:      Head:  Normocephalic.      Nose: Nose normal.      Mouth/Throat:      Mouth: Mucous membranes are moist.   Eyes:      Pupils: Pupils are equal, round, and reactive to light.   Cardiovascular:      Rate and Rhythm: Normal rate.   Pulmonary:      Effort: Pulmonary effort is normal.   Abdominal:      Palpations: Abdomen is soft.   Musculoskeletal:      Cervical back: Normal range of motion.      Right lower leg: No edema.      Left lower leg: No edema.   Skin:     General: Skin is warm.   Neurological:      General: No focal deficit present.      Mental Status: He is alert.   Psychiatric:         Mood and Affect: Mood normal.         Fluids    Intake/Output Summary (Last 24 hours) at 10/22/2023 0815  Last data filed at 10/22/2023 0505  Gross per 24 hour   Intake 480 ml   Output --   Net 480 ml       Laboratory  Recent Labs     10/21/23  1208 10/22/23  0034   WBC 7.5 8.5   RBC 3.72* 3.39*   HEMOGLOBIN 11.8* 10.8*   HEMATOCRIT 35.7* 32.3*   MCV 96.0 95.3   MCH 31.7 31.9   MCHC 33.1 33.4   RDW 45.6 45.3   PLATELETCT 239 212   MPV 9.3 9.4     Recent Labs     10/21/23  1208 10/22/23  0034   SODIUM 135 133*   POTASSIUM 5.2 5.0   CHLORIDE 107 106   CO2 15* 13*   GLUCOSE 159* 103*   BUN 46* 45*   CREATININE 2.54* 2.08*   CALCIUM 9.1 8.7                   Imaging  CT-ABDOMEN & PELVIS UROGRAM   Final Result      1.  Mild to moderate left hydronephrosis and hydroureter is identified extending down the level of the left UVJ. There is urinary bladder wall thickening at this level. Neoplasm causing obstruction is a possibility. Fibrosis or invasion from prostate    carcinoma is also possible.      2.  No evidence of right-sided hydronephrosis.      3.  No enhancing renal mass is identified.      4.  No obstructing calcifications are identified.            Bosniak classification: Not Applicable           Assessment/Plan  * Hydronephrosis, left- (present on admission)  Assessment & Plan  CT abdomen and pelvis urogram shows left  hydronephrosis.  Imaging shows mild to moderate left hydronephrosis and hydroureter extending down to the level of the left UVJ, concern for neoplasm causing obstruction, fibrosis or invasion from prostate carcinoma also possible.  Urology were consulted.  Plan for or within 1 to 2 days.  Xarelto was held.    History of pulmonary embolism  Assessment & Plan  Diagnosed with pulmonary embolism on September 2, 2023 after work-up for shortness of breath and chest pain.  He was started on Xarelto, which has been held for planned OR within the next 1 to 2 days. Lower extremity venous ultrasound from 10/17/2023 showed no evidence of DVTs.    Acute cystitis  Assessment & Plan  UA was positive.  Rocephin was empirically started.  Culture is pending.    Acute on chronic renal failure (HCC)  Assessment & Plan  Creatinine is elevated from baseline.  Gently hydrate.  CT shows left hydronephrosis.  UA positive.  On Rocephin.    Non-insulin dependent type 2 diabetes mellitus (HCC)- (present on admission)  Assessment & Plan  Home oral medications on hold, SSI with Accu-Cheks and hypoglycemia protocol started.    Ischemic heart disease due to coronary artery obstruction (HCC)- (present on admission)  Assessment & Plan  Home lisinopril dose was reduced due to CRISTOPHER.  Home metoprolol  mg daily has been switched to metoprolol twice daily with parameters due to low heart rate and blood pressure.    Hyperlipidemia- (present on admission)  Assessment & Plan  Continue Crestor and Zetia.    History of adenocarcinoma of prostate- (present on admission)  Assessment & Plan  Continue outpatient follow-up with urology.    Essential hypertension, benign- (present on admission)  Assessment & Plan  Resume home lisinopril and metoprolol with parameters.    Cataract of both eyes- (present on admission)  Assessment & Plan  Patient follows up with outpatient ophthalmology on a routine basis.      BMI 28.0-28.9,adult- (present on  admission)  Assessment & Plan  Body mass index is 26.44 kg/m².  Recommend outpatient follow-up for weight loss and management.     History of smoking- (present on admission)  Assessment & Plan  Patient reports that he quit 20 years ago         VTE prophylaxis: SCDs.

## 2023-10-22 NOTE — CARE PLAN
The patient is Stable - Low risk of patient condition declining or worsening    Shift Goals  Clinical Goals: Patients pain will stay at a stated tolerable 3/10 throughout shift  Patient Goals: Sleep.  Family Goals: na    Progress made toward(s) clinical / shift goals:  Patients pain has stayed tolerable throughout shift with medication administration and rest.    Problem: Pain - Standard  Goal: Alleviation of pain or a reduction in pain to the patient’s comfort goal  Outcome: Progressing       Patient is not progressing towards the following goals:

## 2023-10-22 NOTE — PROGRESS NOTES
Patient admitted to floor from ER. Alert and oriented x4. Reports tolerable abdominal pain. Safety and fall precautions in place. Call light placed within reach.

## 2023-10-22 NOTE — PROGRESS NOTES
Note to reader: this note follows the APSO format rather than the historical SOAP format. Assessment and plan located at the top of the note for ease of use.    Chief Complaint  82 y.o. year old male here with gross hematuria, left flank pain, left upper urinary tract obstruction      Assessment/Plan  Interval History   81 yo M with hx of CaP admitted for GH, UTI, left hydronephrosis secondary to left UVJ obstruction of unknown source.     Plan:  - Continue abx per hospitalist  - Continue to hold anticoagulation  - Will make NPO at midnight and plan for possible intervention tomorrow pending OR availability. Procedure will be cystoscopy, left retrograde pyelogram and ureteroscopy with possible bladder or left ureteral biopsy, possible stone treatment  - Urology will continue to follow     Patient seen and examined    10/22. No overnight events. Pt reports his GH has resolved at this time. Cr trending down, 2.08 (2.54). H/H 10.8/32.3 (11.8/35.7)         Review of Systems  Physical Exam   Review of Systems   Constitutional:  Negative for chills and fever.   Gastrointestinal:  Negative for nausea and vomiting.   Genitourinary:  Negative for dysuria, flank pain, frequency, hematuria and urgency.     Vitals:    10/21/23 2205 10/22/23 0245 10/22/23 0503 10/22/23 0752   BP: 103/50  (!) 89/45    Pulse: (!) 56 62 (!) 59 60   Resp: 18  17 18   Temp:   36.3 °C (97.3 °F)    TempSrc:   Oral    SpO2: 93% 92% 93% 93%   Weight:       Height:         Physical Exam  Vitals and nursing note reviewed.   Constitutional:       Appearance: Normal appearance.   HENT:      Head: Normocephalic and atraumatic.      Nose: Nose normal.   Neurological:      General: No focal deficit present.      Mental Status: He is alert and oriented to person, place, and time.   Psychiatric:         Mood and Affect: Mood normal.         Behavior: Behavior normal.          Hematology Chemistry   Lab Results   Component Value Date/Time    WBC 8.5 10/22/2023  12:34 AM    HEMOGLOBIN 10.8 (L) 10/22/2023 12:34 AM    HEMATOCRIT 32.3 (L) 10/22/2023 12:34 AM    PLATELETCT 212 10/22/2023 12:34 AM     Lab Results   Component Value Date/Time    SODIUM 133 (L) 10/22/2023 12:34 AM    POTASSIUM 5.0 10/22/2023 12:34 AM    CHLORIDE 106 10/22/2023 12:34 AM    CO2 13 (L) 10/22/2023 12:34 AM    GLUCOSE 103 (H) 10/22/2023 12:34 AM    BUN 45 (H) 10/22/2023 12:34 AM    CREATININE 2.08 (H) 10/22/2023 12:34 AM         Labs not explicitly included in this progress note were reviewed by the author.   Radiology/imaging not explicitly included in this progress note was reviewed by the author.     Labs reviewed and Medications reviewed

## 2023-10-22 NOTE — CARE PLAN
The patient is Stable - Low risk of patient condition declining or worsening    Shift Goals  Clinical Goals: Pt's pain was tolerable at end of shift, monitor blood sugar checks.  Patient Goals: Sleep.  Family Goals: na    Progress made toward(s) clinical / shift goals:      Pt's pain was tolerable at end of shift.     Patient is not progressing towards the following goals:

## 2023-10-23 ENCOUNTER — APPOINTMENT (OUTPATIENT)
Dept: VASCULAR LAB | Facility: MEDICAL CENTER | Age: 83
End: 2023-10-23
Payer: MEDICARE

## 2023-10-23 LAB
ANION GAP SERPL CALC-SCNC: 16 MMOL/L (ref 7–16)
BUN SERPL-MCNC: 43 MG/DL (ref 8–22)
CALCIUM SERPL-MCNC: 8.8 MG/DL (ref 8.4–10.2)
CHLORIDE SERPL-SCNC: 107 MMOL/L (ref 96–112)
CO2 SERPL-SCNC: 12 MMOL/L (ref 20–33)
CREAT SERPL-MCNC: 2.21 MG/DL (ref 0.5–1.4)
ERYTHROCYTE [DISTWIDTH] IN BLOOD BY AUTOMATED COUNT: 44.4 FL (ref 35.9–50)
GFR SERPLBLD CREATININE-BSD FMLA CKD-EPI: 29 ML/MIN/1.73 M 2
GLUCOSE BLD STRIP.AUTO-MCNC: 114 MG/DL (ref 65–99)
GLUCOSE BLD STRIP.AUTO-MCNC: 126 MG/DL (ref 65–99)
GLUCOSE BLD STRIP.AUTO-MCNC: 132 MG/DL (ref 65–99)
GLUCOSE BLD STRIP.AUTO-MCNC: 133 MG/DL (ref 65–99)
GLUCOSE SERPL-MCNC: 131 MG/DL (ref 65–99)
HCT VFR BLD AUTO: 32.7 % (ref 42–52)
HGB BLD-MCNC: 10.8 G/DL (ref 14–18)
MCH RBC QN AUTO: 31.6 PG (ref 27–33)
MCHC RBC AUTO-ENTMCNC: 33 G/DL (ref 32.3–36.5)
MCV RBC AUTO: 95.6 FL (ref 81.4–97.8)
PLATELET # BLD AUTO: 217 K/UL (ref 164–446)
PMV BLD AUTO: 9.4 FL (ref 9–12.9)
POTASSIUM SERPL-SCNC: 5 MMOL/L (ref 3.6–5.5)
RBC # BLD AUTO: 3.42 M/UL (ref 4.7–6.1)
SODIUM SERPL-SCNC: 135 MMOL/L (ref 135–145)
WBC # BLD AUTO: 8 K/UL (ref 4.8–10.8)

## 2023-10-23 PROCEDURE — 82962 GLUCOSE BLOOD TEST: CPT

## 2023-10-23 PROCEDURE — 80048 BASIC METABOLIC PNL TOTAL CA: CPT

## 2023-10-23 PROCEDURE — A9270 NON-COVERED ITEM OR SERVICE: HCPCS | Performed by: HOSPITALIST

## 2023-10-23 PROCEDURE — 700111 HCHG RX REV CODE 636 W/ 250 OVERRIDE (IP): Mod: JZ | Performed by: HOSPITALIST

## 2023-10-23 PROCEDURE — 700105 HCHG RX REV CODE 258: Performed by: HOSPITALIST

## 2023-10-23 PROCEDURE — 700102 HCHG RX REV CODE 250 W/ 637 OVERRIDE(OP): Performed by: HOSPITALIST

## 2023-10-23 PROCEDURE — 99233 SBSQ HOSP IP/OBS HIGH 50: CPT | Performed by: INTERNAL MEDICINE

## 2023-10-23 PROCEDURE — 700101 HCHG RX REV CODE 250: Performed by: HOSPITALIST

## 2023-10-23 PROCEDURE — 85027 COMPLETE CBC AUTOMATED: CPT

## 2023-10-23 PROCEDURE — 770001 HCHG ROOM/CARE - MED/SURG/GYN PRIV*

## 2023-10-23 PROCEDURE — 94760 N-INVAS EAR/PLS OXIMETRY 1: CPT

## 2023-10-23 PROCEDURE — 700102 HCHG RX REV CODE 250 W/ 637 OVERRIDE(OP): Performed by: INTERNAL MEDICINE

## 2023-10-23 PROCEDURE — A9270 NON-COVERED ITEM OR SERVICE: HCPCS | Performed by: INTERNAL MEDICINE

## 2023-10-23 RX ADMIN — SENNOSIDES AND DOCUSATE SODIUM 2 TABLET: 50; 8.6 TABLET ORAL at 17:00

## 2023-10-23 RX ADMIN — SENNOSIDES AND DOCUSATE SODIUM 2 TABLET: 50; 8.6 TABLET ORAL at 05:34

## 2023-10-23 RX ADMIN — METOPROLOL TARTRATE 25 MG: 25 TABLET, FILM COATED ORAL at 05:35

## 2023-10-23 RX ADMIN — ROSUVASTATIN 5 MG: 10 TABLET, FILM COATED ORAL at 17:01

## 2023-10-23 RX ADMIN — EZETIMIBE 10 MG: 10 TABLET ORAL at 17:00

## 2023-10-23 RX ADMIN — DICLOFENAC SODIUM 2 G: 10 GEL TOPICAL at 20:50

## 2023-10-23 RX ADMIN — METOPROLOL TARTRATE 25 MG: 25 TABLET, FILM COATED ORAL at 17:00

## 2023-10-23 RX ADMIN — CEFTRIAXONE SODIUM 1000 MG: 1 INJECTION, POWDER, FOR SOLUTION INTRAMUSCULAR; INTRAVENOUS at 13:40

## 2023-10-23 RX ADMIN — OXYCODONE HYDROCHLORIDE 5 MG: 5 TABLET ORAL at 21:27

## 2023-10-23 ASSESSMENT — ENCOUNTER SYMPTOMS
FLANK PAIN: 0
ABDOMINAL PAIN: 0
PSYCHIATRIC NEGATIVE: 1
NEUROLOGICAL NEGATIVE: 1
NAUSEA: 0
RESPIRATORY NEGATIVE: 1
MUSCULOSKELETAL NEGATIVE: 1
GASTROINTESTINAL NEGATIVE: 1
SHORTNESS OF BREATH: 0
FEVER: 0
EYES NEGATIVE: 1
CARDIOVASCULAR NEGATIVE: 1
CHILLS: 0
VOMITING: 0

## 2023-10-23 ASSESSMENT — FIBROSIS 4 INDEX: FIB4 SCORE: 1.4

## 2023-10-23 ASSESSMENT — PAIN DESCRIPTION - PAIN TYPE
TYPE: ACUTE PAIN

## 2023-10-23 NOTE — PROGRESS NOTES
Note to reader: this note follows the APSO format rather than the historical SOAP format. Assessment and plan located at the top of the note for ease of use.    Chief Complaint  82 y.o. year old male here with gross hematuria, left flank pain, left upper urinary tract obstruction      Assessment/Plan  Interval History   81 yo M with hx of CaP admitted for GH, UTI, left hydronephrosis secondary to left UVJ obstruction of unknown source.     Plan:  - Continue abx per hospitalist  - Continue to hold anticoagulation. Appreciate patient being off anticoagulation for one more day prior to surgery.   - cystoscopy, left retrograde pyelogram and ureteroscopy with possible bladder or left ureteral biopsy, possible stone treatment  planned for tomorrow. Will make NPO at MN.   - Urology will continue to follow. Discussed case with Dr. Londono and patient.     Patient seen and examined    10/23- no acute urologic events overnight. Cr back up 2.21(2.08). understands procedure today. H/h stable. Hematuria has totally resolved. He has no complaints of any pain.     10/22. No overnight events. Pt reports his GH has resolved at this time. Cr trending down, 2.08 (2.54). H/H 10.8/32.3 (11.8/35.7)         Review of Systems  Physical Exam   Review of Systems   Constitutional:  Negative for chills and fever.   Respiratory:  Negative for shortness of breath.    Cardiovascular:  Negative for chest pain.   Gastrointestinal:  Negative for abdominal pain, nausea and vomiting.   Genitourinary:  Negative for dysuria, flank pain, frequency, hematuria and urgency.   All other systems reviewed and are negative.    Vitals:    10/22/23 1700 10/22/23 1759 10/22/23 2115 10/23/23 0535   BP: 100/58 105/55 104/53 113/64   Pulse: 60  60 62   Resp: 18  16 16   Temp: 36.7 °C (98 °F)  36.3 °C (97.3 °F) 36.9 °C (98.4 °F)   TempSrc: Oral  Oral Temporal   SpO2: 91%  94% 91%   Weight:    85.4 kg (188 lb 4.4 oz)   Height:         Physical Exam  Vitals and  nursing note reviewed.   Constitutional:       Appearance: Normal appearance.   HENT:      Head: Normocephalic and atraumatic.      Nose: Nose normal.   Neurological:      General: No focal deficit present.      Mental Status: He is alert and oriented to person, place, and time.   Psychiatric:         Mood and Affect: Mood normal.         Behavior: Behavior normal.          Hematology Chemistry   Lab Results   Component Value Date/Time    WBC 8.0 10/23/2023 01:05 AM    HEMOGLOBIN 10.8 (L) 10/23/2023 01:05 AM    HEMATOCRIT 32.7 (L) 10/23/2023 01:05 AM    PLATELETCT 217 10/23/2023 01:05 AM     Lab Results   Component Value Date/Time    SODIUM 135 10/23/2023 01:05 AM    POTASSIUM 5.0 10/23/2023 01:05 AM    CHLORIDE 107 10/23/2023 01:05 AM    CO2 12 (L) 10/23/2023 01:05 AM    GLUCOSE 131 (H) 10/23/2023 01:05 AM    BUN 43 (H) 10/23/2023 01:05 AM    CREATININE 2.21 (H) 10/23/2023 01:05 AM         Labs not explicitly included in this progress note were reviewed by the author.   Radiology/imaging not explicitly included in this progress note was reviewed by the author.     Core Measures

## 2023-10-23 NOTE — DISCHARGE PLANNING
[facility] TCN chart review completed. Collaborated with CRISTY Duran. Discussed current discharge considerations noting patient discharge plan may be evolving as patient currently followed by Urology with plan noted to possible stone treatment planned for tomorrow (please see Urology surgery, Yanira Arnold P.A.-C. Note 10/23/2023 at 8:33AM). Per kardex, patient documented to ambulate 15feet x2 independent of assistive device.     TCN will continue to follow and collaborate with discharge planning team as additional post acute needs arise. Thank you.    Completed  Choice obtained: None. Please see previous TCN visit with patient at bedside 10/22/2023 at 5:55PM  SCP with [facility] P[facility].  Patient states he will call to make f/u appointment.    Lack of protein in diet history, poor appetite with <50% PO intakes, 13% weight loss in >6 months Poor appetite with <50% PO intakes x 2 weeks, 13% weight loss in >6 months

## 2023-10-23 NOTE — CARE PLAN
The patient is Stable - Low risk of patient condition declining or worsening    Shift Goals  Clinical Goals: Pt will report pain at goal level, 3/10, or below through the night  Patient Goals: Sleep, pain control  Family Goals: To be updated when the procedure will take place tomorrow    Progress made toward(s) clinical / shift goals:  Encouraged Pt to report any pain or discomfort. Applied comfort measures. Gave pain medication to him when he asked for it. Instructed him to notify RN if pain relief was not achieved.      Patient is not progressing towards the following goals:      Problem: Knowledge Deficit - Standard  Goal: Patient and family/care givers will demonstrate understanding of plan of care, disease process/condition, diagnostic tests and medications  Outcome: Progressing     Problem: Pain - Standard  Goal: Alleviation of pain or a reduction in pain to the patient’s comfort goal  Outcome: Progressing     Problem: Hemodynamics  Goal: Patient's hemodynamics, fluid balance and neurologic status will be stable or improve  Outcome: Progressing     Problem: Urinary Elimination  Goal: Establish and maintain regular urinary output  Outcome: Progressing     Problem: Self Care  Goal: Patient will have the ability to perform ADLs independently or with assistance (bathe, groom, dress, toilet and feed)  Outcome: Progressing     Problem: Infection - Standard  Goal: Patient will remain free from infection  Outcome: Progressing

## 2023-10-23 NOTE — PROGRESS NOTES
Hospital Medicine Daily Progress Note    Date of Service  10/23/2023    Chief Complaint  Star Centeno is a 82 y.o. male admitted 10/21/2023 with left lower quadrant abdominal pain and hematuria.  He has a history of prostate cancer, CKD 3, dyslipidemia, hypertension.     He was recently diagnosed with pulm embolism started on Xarelto.    Hospital Course  On admission, CT abdomen/pelvis urogram showed mild to moderate left hydronephrosis and hydroureter extending down to the level of the left UVJ, concern for neoplasm causing obstruction, fibrosis or invasion from prostate carcinoma also possible.  Urology were consulted.  Xarelto was held.      Blood pressure was 89/45.  Toprol- mg daily was switched to twice daily metoprolol at lower dose with parameters. Urine culture was ordered.    Interval Problem Update  Afebrile and hemodynamically stable.  Pain is controlled with multimodal pain regimen.Urine culture is pending.  Patient was planned for cystoscopy, left retrograde pyelogram and uteroscopy with possible bladder or left ureteral biopsy today, now postponed for 10/24/2023.    I have discussed this patient's plan of care and discharge plan at IDT rounds today with Case Management, Nursing, Nursing leadership, and other members of the IDT team.    Consultants/Specialty  urology    Code Status  Full Code    Disposition  The patient is not medically cleared for discharge to home or a post-acute facility.  Anticipate discharge to: home with close outpatient follow-up    I have placed the appropriate orders for post-discharge needs.    Review of Systems  Review of Systems   Constitutional:  Positive for malaise/fatigue.   HENT: Negative.     Eyes: Negative.    Respiratory: Negative.     Cardiovascular: Negative.    Gastrointestinal: Negative.    Genitourinary: Negative.    Musculoskeletal: Negative.    Skin: Negative.    Neurological: Negative.    Endo/Heme/Allergies: Negative.    Psychiatric/Behavioral:  Negative.          Physical Exam  Temp:  [36.3 °C (97.3 °F)-36.9 °C (98.4 °F)] 36.3 °C (97.3 °F)  Pulse:  [60-63] 63  Resp:  [16-18] 17  BP: (100-113)/(47-64) 100/47  SpO2:  [91 %-94 %] 93 %    Physical Exam  HENT:      Head: Normocephalic.      Nose: Nose normal.      Mouth/Throat:      Mouth: Mucous membranes are moist.   Eyes:      Pupils: Pupils are equal, round, and reactive to light.   Cardiovascular:      Rate and Rhythm: Normal rate.   Pulmonary:      Effort: Pulmonary effort is normal.   Abdominal:      Palpations: Abdomen is soft.   Musculoskeletal:      Cervical back: Normal range of motion.      Right lower leg: No edema.      Left lower leg: No edema.   Skin:     General: Skin is warm.   Neurological:      General: No focal deficit present.      Mental Status: He is alert.   Psychiatric:         Mood and Affect: Mood normal.         Fluids    Intake/Output Summary (Last 24 hours) at 10/23/2023 1252  Last data filed at 10/23/2023 1000  Gross per 24 hour   Intake 840 ml   Output 425 ml   Net 415 ml       Laboratory  Recent Labs     10/21/23  1208 10/22/23  0034 10/23/23  0105   WBC 7.5 8.5 8.0   RBC 3.72* 3.39* 3.42*   HEMOGLOBIN 11.8* 10.8* 10.8*   HEMATOCRIT 35.7* 32.3* 32.7*   MCV 96.0 95.3 95.6   MCH 31.7 31.9 31.6   MCHC 33.1 33.4 33.0   RDW 45.6 45.3 44.4   PLATELETCT 239 212 217   MPV 9.3 9.4 9.4     Recent Labs     10/21/23  1208 10/22/23  0034 10/23/23  0105   SODIUM 135 133* 135   POTASSIUM 5.2 5.0 5.0   CHLORIDE 107 106 107   CO2 15* 13* 12*   GLUCOSE 159* 103* 131*   BUN 46* 45* 43*   CREATININE 2.54* 2.08* 2.21*   CALCIUM 9.1 8.7 8.8                   Imaging  CT-ABDOMEN & PELVIS UROGRAM   Final Result      1.  Mild to moderate left hydronephrosis and hydroureter is identified extending down the level of the left UVJ. There is urinary bladder wall thickening at this level. Neoplasm causing obstruction is a possibility. Fibrosis or invasion from prostate    carcinoma is also possible.      2.   No evidence of right-sided hydronephrosis.      3.  No enhancing renal mass is identified.      4.  No obstructing calcifications are identified.            Bosniak classification: Not Applicable           Assessment/Plan  * Hydronephrosis, left- (present on admission)  Assessment & Plan  CT abdomen and pelvis urogram shows left hydronephrosis.  Imaging shows mild to moderate left hydronephrosis and hydroureter extending down to the level of the left UVJ, concern for neoplasm causing obstruction, fibrosis or invasion from prostate carcinoma also possible.  Urology were consulted, planned for  cystoscopy, left retrograde pyelogram and uteroscopy with possible bladder or left ureteral biopsy on 10/24/2023.    History of pulmonary embolism  Assessment & Plan  Diagnosed with pulmonary embolism on September 2, 2023 after work-up for shortness of breath and chest pain.  He was started on Xarelto, which has been held for planned OR within the next 1 to 2 days. Lower extremity venous ultrasound from 10/17/2023 showed no evidence of DVTs.    Acute cystitis  Assessment & Plan  UA was positive.  Rocephin was empirically started.  Culture is pending.    Acute on chronic renal failure (HCC)  Assessment & Plan  Creatinine is elevated from baseline.  Gently hydrate.  CT shows left hydronephrosis.  UA positive.  On Rocephin.    Non-insulin dependent type 2 diabetes mellitus (HCC)- (present on admission)  Assessment & Plan  Home oral medications on hold, SSI with Accu-Cheks and hypoglycemia protocol started.    Ischemic heart disease due to coronary artery obstruction (HCC)- (present on admission)  Assessment & Plan  Home lisinopril dose was reduced due to CRISTOPHER.  Home metoprolol  mg daily has been switched to metoprolol twice daily with parameters due to low heart rate and blood pressure.    Hyperlipidemia- (present on admission)  Assessment & Plan  Continue Crestor and Zetia.    History of adenocarcinoma of prostate- (present  on admission)  Assessment & Plan  Continue outpatient follow-up with urology.    Essential hypertension, benign- (present on admission)  Assessment & Plan  Resume home lisinopril and metoprolol with parameters.    Cataract of both eyes- (present on admission)  Assessment & Plan  Patient follows up with outpatient ophthalmology on a routine basis.      BMI 28.0-28.9,adult- (present on admission)  Assessment & Plan  Body mass index is 26.44 kg/m².  Recommend outpatient follow-up for weight loss and management.     History of smoking- (present on admission)  Assessment & Plan  Patient reports that he quit 20 years ago         VTE prophylaxis: SCDs.

## 2023-10-23 NOTE — CARE PLAN
The patient is Stable - Low risk of patient condition declining or worsening    Shift Goals  Clinical Goals: Monitor for pain, nausea and vomiting  Patient Goals: sleep, rest  Family Goals: NA    Progress made toward(s) clinical / shift goals: Pt denies  pain, nausea and vomiting. Resting comfortably in bed through the shift.     Patient is not progressing towards the following goals:

## 2023-10-23 NOTE — DISCHARGE PLANNING
[facility] TCN chart review completed. Collaborated with Kacy. The most current review of medical record, knowledge of pt's PLOF and social support, LACE+ score of 73 was considered.  No 6 clicks scores.  Per Kardex, patient ambulating 15 feet X 2 No AD.    Pt seen at bedside. Introduced TCN program. Provided education regarding post acute levels of care. Discussed [facility] plan benefits (Meds to Beds, medical uber and [facility] transitional care). Pt verbalizes understanding.     Patient states he lives alone in a single level home and was independent with ADL's, IADL's, mobility (no AD)  and driving at baseline.  He has a walking stick that he uses outdoors only on uneven surfaces.  He denies any concerns with food, housing or transportation.  He states that he is close to his baseline level of function and his daughter can provide transportation home at discharge.  Patient states he was active and going to the gym 3 times a week.      TCN will continue to follow and collaborate with discharge planning team as additional post acute needs arise. Thank you.     Completed today:  Choice obtained: None.  See above.    SCP with [facility] PCP.  Patient states he will call to make f/u appointment.

## 2023-10-23 NOTE — PROGRESS NOTES
Bedside report received from night RN. Assumed care of patient. Alert and oriented X4, wakes easily to voice. On RA in no acute respiratory distress. Daily plan of care discussed. Pt denies pain, nausea and vomiting at this time. Safety measures in place. Hourly rounding ongoing.

## 2023-10-24 ENCOUNTER — APPOINTMENT (OUTPATIENT)
Dept: RADIOLOGY | Facility: MEDICAL CENTER | Age: 83
DRG: 660 | End: 2023-10-24
Attending: UROLOGY
Payer: MEDICARE

## 2023-10-24 ENCOUNTER — ANESTHESIA (OUTPATIENT)
Dept: SURGERY | Facility: MEDICAL CENTER | Age: 83
DRG: 660 | End: 2023-10-24
Payer: MEDICARE

## 2023-10-24 ENCOUNTER — ANESTHESIA EVENT (OUTPATIENT)
Dept: SURGERY | Facility: MEDICAL CENTER | Age: 83
DRG: 660 | End: 2023-10-24
Payer: MEDICARE

## 2023-10-24 LAB
ANION GAP SERPL CALC-SCNC: 14 MMOL/L (ref 7–16)
BACTERIA UR CULT: NORMAL
BUN SERPL-MCNC: 47 MG/DL (ref 8–22)
CALCIUM SERPL-MCNC: 8.9 MG/DL (ref 8.4–10.2)
CHLORIDE SERPL-SCNC: 108 MMOL/L (ref 96–112)
CO2 SERPL-SCNC: 14 MMOL/L (ref 20–33)
CREAT SERPL-MCNC: 2.03 MG/DL (ref 0.5–1.4)
ERYTHROCYTE [DISTWIDTH] IN BLOOD BY AUTOMATED COUNT: 44.3 FL (ref 35.9–50)
GFR SERPLBLD CREATININE-BSD FMLA CKD-EPI: 32 ML/MIN/1.73 M 2
GLUCOSE BLD STRIP.AUTO-MCNC: 127 MG/DL (ref 65–99)
GLUCOSE BLD STRIP.AUTO-MCNC: 130 MG/DL (ref 65–99)
GLUCOSE SERPL-MCNC: 132 MG/DL (ref 65–99)
HCT VFR BLD AUTO: 33.7 % (ref 42–52)
HGB BLD-MCNC: 11.1 G/DL (ref 14–18)
MCH RBC QN AUTO: 31.4 PG (ref 27–33)
MCHC RBC AUTO-ENTMCNC: 32.9 G/DL (ref 32.3–36.5)
MCV RBC AUTO: 95.2 FL (ref 81.4–97.8)
PATHOLOGY CONSULT NOTE: NORMAL
PLATELET # BLD AUTO: 232 K/UL (ref 164–446)
PMV BLD AUTO: 9.5 FL (ref 9–12.9)
POTASSIUM SERPL-SCNC: 4.9 MMOL/L (ref 3.6–5.5)
PSA FREE MFR SERPL: NORMAL %
PSA FREE SERPL-MCNC: NORMAL NG/ML
PSA SERPL-MCNC: <0.1 NG/ML (ref 0–4)
RBC # BLD AUTO: 3.54 M/UL (ref 4.7–6.1)
SIGNIFICANT IND 70042: NORMAL
SITE SITE: NORMAL
SODIUM SERPL-SCNC: 136 MMOL/L (ref 135–145)
SOURCE SOURCE: NORMAL
WBC # BLD AUTO: 9 K/UL (ref 4.8–10.8)

## 2023-10-24 PROCEDURE — 82962 GLUCOSE BLOOD TEST: CPT | Mod: 91

## 2023-10-24 PROCEDURE — 88305 TISSUE EXAM BY PATHOLOGIST: CPT | Mod: 59

## 2023-10-24 PROCEDURE — 88112 CYTOPATH CELL ENHANCE TECH: CPT

## 2023-10-24 PROCEDURE — 160002 HCHG RECOVERY MINUTES (STAT): Performed by: UROLOGY

## 2023-10-24 PROCEDURE — A9270 NON-COVERED ITEM OR SERVICE: HCPCS | Performed by: HOSPITALIST

## 2023-10-24 PROCEDURE — 160035 HCHG PACU - 1ST 60 MINS PHASE I: Performed by: UROLOGY

## 2023-10-24 PROCEDURE — C2617 STENT, NON-COR, TEM W/O DEL: HCPCS | Performed by: UROLOGY

## 2023-10-24 PROCEDURE — 700117 HCHG RX CONTRAST REV CODE 255: Performed by: UROLOGY

## 2023-10-24 PROCEDURE — 700102 HCHG RX REV CODE 250 W/ 637 OVERRIDE(OP): Performed by: INTERNAL MEDICINE

## 2023-10-24 PROCEDURE — 700101 HCHG RX REV CODE 250: Performed by: ANESTHESIOLOGY

## 2023-10-24 PROCEDURE — 99233 SBSQ HOSP IP/OBS HIGH 50: CPT | Performed by: STUDENT IN AN ORGANIZED HEALTH CARE EDUCATION/TRAINING PROGRAM

## 2023-10-24 PROCEDURE — 0T778DZ DILATION OF LEFT URETER WITH INTRALUMINAL DEVICE, VIA NATURAL OR ARTIFICIAL OPENING ENDOSCOPIC: ICD-10-PCS | Performed by: UROLOGY

## 2023-10-24 PROCEDURE — 0TBB8ZX EXCISION OF BLADDER, VIA NATURAL OR ARTIFICIAL OPENING ENDOSCOPIC, DIAGNOSTIC: ICD-10-PCS | Performed by: UROLOGY

## 2023-10-24 PROCEDURE — 160048 HCHG OR STATISTICAL LEVEL 1-5: Performed by: UROLOGY

## 2023-10-24 PROCEDURE — 700111 HCHG RX REV CODE 636 W/ 250 OVERRIDE (IP): Mod: JZ | Performed by: STUDENT IN AN ORGANIZED HEALTH CARE EDUCATION/TRAINING PROGRAM

## 2023-10-24 PROCEDURE — 85027 COMPLETE CBC AUTOMATED: CPT

## 2023-10-24 PROCEDURE — 700111 HCHG RX REV CODE 636 W/ 250 OVERRIDE (IP): Performed by: ANESTHESIOLOGY

## 2023-10-24 PROCEDURE — 160028 HCHG SURGERY MINUTES - 1ST 30 MINS LEVEL 3: Performed by: UROLOGY

## 2023-10-24 PROCEDURE — 94760 N-INVAS EAR/PLS OXIMETRY 1: CPT

## 2023-10-24 PROCEDURE — 74018 RADEX ABDOMEN 1 VIEW: CPT

## 2023-10-24 PROCEDURE — 80048 BASIC METABOLIC PNL TOTAL CA: CPT

## 2023-10-24 PROCEDURE — C1769 GUIDE WIRE: HCPCS | Performed by: UROLOGY

## 2023-10-24 PROCEDURE — 160039 HCHG SURGERY MINUTES - EA ADDL 1 MIN LEVEL 3: Performed by: UROLOGY

## 2023-10-24 PROCEDURE — 770001 HCHG ROOM/CARE - MED/SURG/GYN PRIV*

## 2023-10-24 PROCEDURE — 110371 HCHG SHELL REV 272: Performed by: UROLOGY

## 2023-10-24 PROCEDURE — A9270 NON-COVERED ITEM OR SERVICE: HCPCS | Performed by: INTERNAL MEDICINE

## 2023-10-24 PROCEDURE — 700105 HCHG RX REV CODE 258: Performed by: STUDENT IN AN ORGANIZED HEALTH CARE EDUCATION/TRAINING PROGRAM

## 2023-10-24 PROCEDURE — 160009 HCHG ANES TIME/MIN: Performed by: UROLOGY

## 2023-10-24 PROCEDURE — 700102 HCHG RX REV CODE 250 W/ 637 OVERRIDE(OP): Performed by: HOSPITALIST

## 2023-10-24 PROCEDURE — 700105 HCHG RX REV CODE 258: Performed by: UROLOGY

## 2023-10-24 DEVICE — STENT UROLOGICAL POLARIS 6X26  ULTRA: Type: IMPLANTABLE DEVICE | Status: FUNCTIONAL

## 2023-10-24 RX ORDER — SODIUM CHLORIDE, SODIUM LACTATE, POTASSIUM CHLORIDE, CALCIUM CHLORIDE 600; 310; 30; 20 MG/100ML; MG/100ML; MG/100ML; MG/100ML
INJECTION, SOLUTION INTRAVENOUS CONTINUOUS
Status: DISCONTINUED | OUTPATIENT
Start: 2023-10-24 | End: 2023-10-24 | Stop reason: HOSPADM

## 2023-10-24 RX ORDER — LIDOCAINE HYDROCHLORIDE 20 MG/ML
INJECTION, SOLUTION EPIDURAL; INFILTRATION; INTRACAUDAL; PERINEURAL PRN
Status: DISCONTINUED | OUTPATIENT
Start: 2023-10-24 | End: 2023-10-24 | Stop reason: SURG

## 2023-10-24 RX ORDER — OXYCODONE HCL 5 MG/5 ML
10 SOLUTION, ORAL ORAL
Status: DISCONTINUED | OUTPATIENT
Start: 2023-10-24 | End: 2023-10-24 | Stop reason: HOSPADM

## 2023-10-24 RX ORDER — MIDAZOLAM HYDROCHLORIDE 1 MG/ML
INJECTION INTRAMUSCULAR; INTRAVENOUS PRN
Status: DISCONTINUED | OUTPATIENT
Start: 2023-10-24 | End: 2023-10-24 | Stop reason: SURG

## 2023-10-24 RX ORDER — ONDANSETRON 2 MG/ML
INJECTION INTRAMUSCULAR; INTRAVENOUS PRN
Status: DISCONTINUED | OUTPATIENT
Start: 2023-10-24 | End: 2023-10-24 | Stop reason: SURG

## 2023-10-24 RX ORDER — ROCURONIUM BROMIDE 10 MG/ML
INJECTION, SOLUTION INTRAVENOUS PRN
Status: DISCONTINUED | OUTPATIENT
Start: 2023-10-24 | End: 2023-10-24 | Stop reason: SURG

## 2023-10-24 RX ORDER — OXYCODONE HCL 5 MG/5 ML
5 SOLUTION, ORAL ORAL
Status: DISCONTINUED | OUTPATIENT
Start: 2023-10-24 | End: 2023-10-24 | Stop reason: HOSPADM

## 2023-10-24 RX ORDER — DIPHENHYDRAMINE HYDROCHLORIDE 50 MG/ML
12.5 INJECTION INTRAMUSCULAR; INTRAVENOUS
Status: DISCONTINUED | OUTPATIENT
Start: 2023-10-24 | End: 2023-10-24 | Stop reason: HOSPADM

## 2023-10-24 RX ORDER — HALOPERIDOL 5 MG/ML
1 INJECTION INTRAMUSCULAR
Status: DISCONTINUED | OUTPATIENT
Start: 2023-10-24 | End: 2023-10-24 | Stop reason: HOSPADM

## 2023-10-24 RX ORDER — ONDANSETRON 2 MG/ML
4 INJECTION INTRAMUSCULAR; INTRAVENOUS
Status: DISCONTINUED | OUTPATIENT
Start: 2023-10-24 | End: 2023-10-24 | Stop reason: HOSPADM

## 2023-10-24 RX ORDER — SODIUM CHLORIDE, SODIUM LACTATE, POTASSIUM CHLORIDE, CALCIUM CHLORIDE 600; 310; 30; 20 MG/100ML; MG/100ML; MG/100ML; MG/100ML
INJECTION, SOLUTION INTRAVENOUS CONTINUOUS
Status: ACTIVE | OUTPATIENT
Start: 2023-10-24 | End: 2023-10-24

## 2023-10-24 RX ORDER — EPHEDRINE SULFATE 50 MG/ML
INJECTION, SOLUTION INTRAVENOUS PRN
Status: DISCONTINUED | OUTPATIENT
Start: 2023-10-24 | End: 2023-10-24 | Stop reason: SURG

## 2023-10-24 RX ADMIN — ROSUVASTATIN 5 MG: 10 TABLET, FILM COATED ORAL at 17:22

## 2023-10-24 RX ADMIN — CEFAZOLIN 1 G: 1 INJECTION, POWDER, FOR SOLUTION INTRAMUSCULAR; INTRAVENOUS at 17:20

## 2023-10-24 RX ADMIN — ROCURONIUM BROMIDE 40 MG: 50 INJECTION, SOLUTION INTRAVENOUS at 11:46

## 2023-10-24 RX ADMIN — METOPROLOL TARTRATE 25 MG: 25 TABLET, FILM COATED ORAL at 17:21

## 2023-10-24 RX ADMIN — EZETIMIBE 10 MG: 10 TABLET ORAL at 17:22

## 2023-10-24 RX ADMIN — FENTANYL CITRATE 50 MCG: 50 INJECTION, SOLUTION INTRAMUSCULAR; INTRAVENOUS at 11:46

## 2023-10-24 RX ADMIN — DICLOFENAC SODIUM 2 G: 10 GEL TOPICAL at 19:53

## 2023-10-24 RX ADMIN — SUGAMMADEX 200 MG: 100 INJECTION, SOLUTION INTRAVENOUS at 12:35

## 2023-10-24 RX ADMIN — EPHEDRINE SULFATE 10 MG: 50 INJECTION INTRAVENOUS at 12:08

## 2023-10-24 RX ADMIN — INSULIN HUMAN 2 UNITS: 100 INJECTION, SOLUTION PARENTERAL at 17:24

## 2023-10-24 RX ADMIN — MIDAZOLAM 1 MG: 1 INJECTION, SOLUTION INTRAMUSCULAR; INTRAVENOUS at 11:46

## 2023-10-24 RX ADMIN — OXYCODONE HYDROCHLORIDE 5 MG: 5 TABLET ORAL at 22:47

## 2023-10-24 RX ADMIN — LIDOCAINE HYDROCHLORIDE 100 MG: 20 INJECTION, SOLUTION EPIDURAL; INFILTRATION; INTRACAUDAL at 11:46

## 2023-10-24 RX ADMIN — CEFAZOLIN 1 G: 1 INJECTION, POWDER, FOR SOLUTION INTRAMUSCULAR; INTRAVENOUS at 14:18

## 2023-10-24 RX ADMIN — SODIUM CHLORIDE, POTASSIUM CHLORIDE, SODIUM LACTATE AND CALCIUM CHLORIDE: 600; 310; 30; 20 INJECTION, SOLUTION INTRAVENOUS at 11:40

## 2023-10-24 RX ADMIN — ONDANSETRON 4 MG: 2 INJECTION INTRAMUSCULAR; INTRAVENOUS at 11:46

## 2023-10-24 RX ADMIN — PROPOFOL 150 MG: 10 INJECTION, EMULSION INTRAVENOUS at 11:46

## 2023-10-24 ASSESSMENT — PAIN DESCRIPTION - PAIN TYPE
TYPE: ACUTE PAIN
TYPE: SURGICAL PAIN
TYPE: ACUTE PAIN
TYPE: ACUTE PAIN
TYPE: SURGICAL PAIN

## 2023-10-24 ASSESSMENT — ENCOUNTER SYMPTOMS
ABDOMINAL PAIN: 0
CHILLS: 0
BLURRED VISION: 0
DIZZINESS: 0
SORE THROAT: 0
FEVER: 0
NAUSEA: 0
MYALGIAS: 0
HEADACHES: 0
DIARRHEA: 0
VOMITING: 0
SHORTNESS OF BREATH: 0

## 2023-10-24 ASSESSMENT — FIBROSIS 4 INDEX: FIB4 SCORE: 1.31

## 2023-10-24 ASSESSMENT — PATIENT HEALTH QUESTIONNAIRE - PHQ9
1. LITTLE INTEREST OR PLEASURE IN DOING THINGS: NOT AT ALL
2. FEELING DOWN, DEPRESSED, IRRITABLE, OR HOPELESS: NOT AT ALL
SUM OF ALL RESPONSES TO PHQ9 QUESTIONS 1 AND 2: 0

## 2023-10-24 ASSESSMENT — LIFESTYLE VARIABLES: SUBSTANCE_ABUSE: 0

## 2023-10-24 ASSESSMENT — PAIN SCALES - GENERAL: PAIN_LEVEL: 1

## 2023-10-24 NOTE — PROGRESS NOTES
[facility] Medicine Daily Progress Note    Date of Service  10/24/2023    Chief Complaint  Star Centeno is a 82 y.o. male admitted 10/21/2023 with left lower quadrant abdominal pain and hematuria.      Hospital Course  Star Centeno is a 82 y.o. male admitted 10/21/2023 with left lower quadrant abdominal pain and hematuria.  He has a distant history of prostate cancer, CKD 3, dyslipidemia and hypertension.   He was recently diagnosed with pulm embolism started on Xarelto. He presented to ER on 10/21 with progressive left flank discomfort and development of hematuria.   On admission, CT abdomen/pelvis urogram showed mild to moderate left hydronephrosis and hydroureter extending down to the level of the left UVJ, concerning for neoplasm causing obstruction, fibrosis or invasion from prostate carcinoma also possible.  Urology was consulted and  Xarelto was held.      Blood pressure was soft on admission with BP 89/45.  Toprol- mg daily was switched to twice daily metoprolol at lower dose with parameters. UA was positive for Nitrate and few bacteria and wall as hematuria. He was started on rocephin. Urine culture was ordered and remains negative. Cultures on 10/12 were positive for pan-sensitive E.coli.     Interval Problem Update  Pt seen and examined, no acute issues currently. Denies flank pain or dysuria   - abx changed to Ancef based on previous cultures results to reduce C.diff risk. Consider transition to po abx pending urological interventions   - urology following, ureteroscopy, pyelogram and stent placement done today as well as biopsy.   - 2.5 cm region of severe stenosis noted in left distal ureter, biopsy could not be obtained, stent placed, biopsy taken around left UO, will follow up pathology   - CRISTOPHER stable, persistent metabolic acidosis with bicarb 14. Monitor renal function post procedure, consider oral sodium bicarb if no improvement in MA  - holding xarelto, resume once cleared by urology        I have discussed this patient's plan of care and discharge plan at IDT rounds today with Case Management, Nursing, Nursing leadership, and other members of the IDT team.    Consultants/Specialty  urology    Code Status  Full Code    Disposition  The patient is not medically cleared for discharge to home or a post-acute facility.      I have placed the appropriate orders for post-discharge needs.    Review of Systems  Review of Systems   Constitutional:  Positive for malaise/fatigue. Negative for chills and fever.   HENT:  Negative for sore throat.    Eyes:  Negative for blurred vision.   Respiratory:  Negative for shortness of breath.    Cardiovascular:  Negative for chest pain and leg swelling.   Gastrointestinal:  Negative for abdominal pain, diarrhea, nausea and vomiting.   Genitourinary:  Positive for hematuria. Negative for dysuria and urgency.   Musculoskeletal:  Negative for myalgias.   Neurological:  Negative for dizziness and headaches.   Psychiatric/Behavioral:  Negative for substance abuse.         Physical Exam  Temp:  [36 °C (96.8 °F)-36.7 °C (98 °F)] 36 °C (96.8 °F)  Pulse:  [66-89] 84  Resp:  [14-18] 17  BP: ()/(59-77) 131/76  SpO2:  [91 %-98 %] 94 %    Physical Exam  Vitals and nursing note reviewed.   Constitutional:       Appearance: He is normal weight. He is not ill-appearing.   HENT:      Head: Normocephalic and atraumatic.      Mouth/Throat:      Mouth: Mucous membranes are dry.      Pharynx: Oropharynx is clear.   Eyes:      Conjunctiva/sclera: Conjunctivae normal.   Cardiovascular:      Rate and Rhythm: Normal rate and regular rhythm.   Pulmonary:      Effort: Pulmonary effort is normal.   Abdominal:      Palpations: Abdomen is soft.      Tenderness: There is no right CVA tenderness or left CVA tenderness.   Musculoskeletal:      Cervical back: Normal range of motion.      Right lower leg: No edema.      Left lower leg: No edema.   Skin:     General: Skin is warm.   Neurological:       General: No focal deficit present.      Mental Status: He is alert.   Psychiatric:         Mood and Affect: Mood normal.         Fluids    Intake/Output Summary (Last 24 hours) at 10/24/2023 1502  Last data filed at 10/24/2023 1416  Gross per 24 hour   Intake 960 ml   Output 100 ml   Net 860 ml       Laboratory  Recent Labs     10/22/23  0034 10/23/23  0105 10/24/23  0050   WBC 8.5 8.0 9.0   RBC 3.39* 3.42* 3.54*   HEMOGLOBIN 10.8* 10.8* 11.1*   HEMATOCRIT 32.3* 32.7* 33.7*   MCV 95.3 95.6 95.2   MCH 31.9 31.6 31.4   MCHC 33.4 33.0 32.9   RDW 45.3 44.4 44.3   PLATELETCT 212 217 232   MPV 9.4 9.4 9.5     Recent Labs     10/22/23  0034 10/23/23  0105 10/24/23  0050   SODIUM 133* 135 136   POTASSIUM 5.0 5.0 4.9   CHLORIDE 106 107 108   CO2 13* 12* 14*   GLUCOSE 103* 131* 132*   BUN 45* 43* 47*   CREATININE 2.08* 2.21* 2.03*   CALCIUM 8.7 8.8 8.9                   Imaging  DX-PORTABLE FLUOROSCOPY < 1 HOUR   Final Result      Portable fluoroscopy utilized for 10.3 seconds.      INTERPRETING LOCATION: [address] [place] [address], [address]      [id number] VIEW   Final Result      Portable fluoroscopy as described.      CT-ABDOMEN & PELVIS UROGRAM   Final Result      1.  Mild to moderate left hydronephrosis and hydroureter is identified extending down the level of the left UVJ. There is urinary bladder wall thickening at this level. Neoplasm causing obstruction is a possibility. Fibrosis or invasion from prostate    carcinoma is also possible.      2.  No evidence of right-sided hydronephrosis.      3.  No enhancing renal mass is identified.      4.  No obstructing calcifications are identified.            Bosniak classification: Not Applicable           Assessment/Plan  * Hydronephrosis, left- (present on admission)  Assessment & Plan  CT abdomen and pelvis urogram shows left hydronephrosis.  Imaging shows mild to moderate left hydronephrosis and hydroureter extending down to the level of the left UVJ, concern for neoplasm  causing obstruction, fibrosis or invasion from prostate carcinoma also possible.    Urology were consulted, planned for  cystoscopy, left retrograde pyelogram and uteroscopy with possible bladder or left ureteral biopsy today   2.5 cm area Stenosis of left distal ureter seen, unable to biopsy, UO biopsy taken, will need follow up on pathology   Stent placed   Monitor renal function     History of pulmonary embolism  Assessment & Plan  Diagnosed with pulmonary embolism on September 2, 2023 after work-up for shortness of breath and chest pain.  He was started on Xarelto, which has been held for planned OR within the next 1 to 2 days. Lower extremity venous ultrasound from 10/17/2023 showed no evidence of DVTs.  Resume once cleared by urology     Acute cystitis  Assessment & Plan  UA was positive.  Rocephin was empirically started.   cx thus far negative previous cx 10/12 pan-sensitive e.coli  abx changed to ancef, will complete course with oral abx on discharge     Acute on chronic renal failure (HCC)  Assessment & Plan  Creatinine is elevated from baseline.  Gently hydrate.  CT shows left hydronephrosis.  UA positive.  On Rocephin.  Rocephin changed to Ancef, will complete tx with oral abx on discharge  CRISTOPHER persistent but stable, monitor   Avoid nephrotoxic agents     BMI 28.0-28.9,adult- (present on admission)  Assessment & Plan  Body mass index is 26.44 kg/m².  Recommend outpatient follow-up for weight loss and management.     Aneurysm of ascending aorta (HCC)- (present on admission)  Assessment & Plan  Chronic, stable   Continue OP monitoring     Non-insulin dependent type 2 diabetes mellitus (HCC)- (present on admission)  Assessment & Plan  Home oral medications on hold, SSI with Accu-Cheks and hypoglycemia protocol started.  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   Home metoprolol  mg daily has been switched to metoprolol twice daily with parameters due to low heart rate and blood pressure.  Holding aspirin due to hematuria and urological procedure     Hyperlipidemia- (present on admission)  Assessment & Plan  Continue Crestor and Zetia.    History of adenocarcinoma of prostate- (present on admission)  Assessment & Plan  Distant hx of prostate cancer treated in 2003 presents with hematuria and flank pain   Found to have left hydronephrosis and left UVJ obstruction   Urology following   S/p ureteroscopy and stenting today, will need OP follow up   PSA neg     Essential hypertension, benign- (present on admission)  Assessment & Plan  Resume home lisinopril and metoprolol with parameters.  Renal function and BP stable     History of smoking- (present on admission)  Assessment & Plan  Patient reports that he quit 20 years ago    Cataract of both eyes- (present on admission)  Assessment & Plan  Patient follows up with outpatient ophthalmology on a routine basis.           VTE prophylaxis: SCDs.     Greater than 51 minutes spent prepping to see patient (e.g. review of tests) obtaining and/or reviewing separately obtained history. Performing a medically appropriate examination and/ evaluation.  Counseling and educating the patient/family/caregiver.  Ordering medications, tests, or procedures.  Referring and communicating with other health care professionals.  Documenting clinical information in EPIC.  Independently interpreting results and communicating results to patient/family/caregiver.  Care coordination.

## 2023-10-24 NOTE — ANESTHESIA PROCEDURE NOTES
Airway    Date/Time: 10/24/2023 11:46 AM    Performed by: Hari Dc M.D.  Authorized by: Hari Dc M.D.    Location:  OR  Urgency:  Elective  Indications for Airway Management:  Anesthesia      Spontaneous Ventilation: absent    Sedation Level:  Deep  Preoxygenated: Yes    Patient Position:  Sniffing  Final Airway Type:  Endotracheal airway  Final Endotracheal Airway:  ETT  Cuffed: Yes    Technique Used for Successful ETT Placement:  Direct laryngoscopy    Insertion Site:  Oral  Blade Type:  Odette  Laryngoscope Blade/Videolaryngoscope Blade Size:  3  ETT Size (mm):  8.0  Measured from:  Teeth  ETT to Teeth (cm):  23  Placement Verified by: auscultation and capnometry    Cormack-Lehane Classification:  Grade IIa - partial view of glottis  Number of Attempts at Approach:  1

## 2023-10-24 NOTE — PROGRESS NOTES
4 Eyes Skin Assessment Completed by Eva RN and Conrad RN.    Head WDL  Ears WDL  Nose WDL  Mouth WDL  Neck WDL  Breast/Chest WDL  Shoulder Blades WDL  Spine WDL  (R) Arm/Elbow/Hand Bruising  (L) Arm/Elbow/Hand Bruising  Abdomen WDL  Groin WDL  Scrotum/Coccyx/Buttocks Redness and Blanching  (R) Leg WDL  (L) Leg WDL  (R) Heel/Foot/Toe WDL  (L) Heel/Foot/Toe WDL          Devices In Places Blood Pressure Cuff, Pulse Ox, SCD's, and Nasal Cannula      Interventions In Place NC W/Ear Foams and Pillows    Possible Skin Injury No    Pictures Uploaded Into Epic N/A  Wound Consult Placed N/A  RN Wound Prevention Protocol Ordered No

## 2023-10-24 NOTE — ANESTHESIA TIME REPORT
Anesthesia Start and Stop Event Times     Date Time Event    10/24/2023 1109 Ready for Procedure     1140 Anesthesia Start     1248 Anesthesia Stop        Responsible Staff  10/24/23    Name Role Begin End    Hari Dc M.D. Anesth 1140 1248        Overtime Reason:  no overtime (within assigned shift)    Comments:

## 2023-10-24 NOTE — CARE PLAN
The patient is Stable - Low risk of patient condition declining or worsening    Shift Goals  Clinical Goals: Free from falls or injuries, rest and sleep at least 4 hours, pain controlled at 3/10 or better with current pain control regimen  Patient Goals: Free from falls or injuries, rest and sleep at least 4 hours, pain controlled at 3/10 or better with current pain control regimen  Family Goals:    Progress made toward(s) clinical / shift goals: No falls or injuries, rested and slept at least 4 hours overnight, pain controlled at 3/0 and better with current pain control regimen    Patient is not progressing towards the following goals:

## 2023-10-24 NOTE — ANESTHESIA POSTPROCEDURE EVALUATION
Patient: Star Centeno    Procedure Summary     Date: 10/24/23 Room / Location:  OR  / SURGERY [facility]    Anesthesia Start: 1140 Anesthesia Stop: 1248    Procedures:       CYSTOSCOPY, WITH BLADDER BIOPSY, LEFT RETROGRADE PYELOGRAM (Bladder)      URETEROSCOPY, LEFT (Left: Ureter)      CYSTOSCOPY, WITH URETERAL STENT INSERTION (Left: Ureter) Diagnosis: (hematuria, bladder mass)    Surgeons: Jourdan Londono M.D. Responsible Provider: Hari Dc M.D.    Anesthesia Type: general ASA Status: 2          Final Anesthesia Type: general  Last vitals  BP   Blood Pressure : 119/66    Temp   36 °C (96.8 °F)    Pulse   66   Resp   18    SpO2   96 %      Anesthesia Post Evaluation    Patient location during evaluation: PACU  Patient participation: complete - patient participated  Level of consciousness: awake and alert  Pain score: 1    Airway patency: patent  Anesthetic complications: no  Cardiovascular status: hemodynamically stable  Respiratory status: acceptable  Hydration status: euvolemic    PONV: none          There were no known notable events for this encounter.     Nurse Pain Score: 1 (NPRS)

## 2023-10-24 NOTE — ANESTHESIA PREPROCEDURE EVALUATION
Case: [id number] Date/Time: 10/24/23 1115    Procedures:       CYSTOSCOPY, WITH BLADDER BIOPSY      URETEROSCOPY, LEFT (Left)    Location:  OR 01 / SURGERY [facility]    Surgeons: Jourdan Londono M.D.          Relevant Problems   CARDIAC   (positive) Aneurysm of ascending aorta (HCC)   (positive) Aortic atherosclerosis (HCC)   (positive) Aortic root dilation (HCC)   (positive) Atherosclerosis of native coronary artery   (positive) Carotid artery aneurysm (HCC)   (positive) Coronary artery disease involving native coronary artery without angina pectoris   (positive) Essential hypertension, benign   (positive) Ischemic heart disease due to coronary artery obstruction (HCC)   (positive) Pulmonary hypertension (HCC)         (positive) Acute on chronic renal failure (HCC)   (positive) Hydronephrosis, left   (positive) Stage 2 chronic kidney disease      ENDO   (positive) Non-insulin dependent type 2 diabetes mellitus (HCC)       Physical Exam    Airway   Mallampati: II  TM distance: >3 FB  Neck ROM: full       Cardiovascular - normal exam  Rhythm: regular  Rate: normal  (-) murmur     Dental - normal exam           Pulmonary - normal exam  Breath sounds clear to auscultation     Abdominal    Neurological - normal exam                 Anesthesia Plan    ASA 2       Plan - general       Airway plan will be ETT          Induction: intravenous    Postoperative Plan: Postoperative administration of opioids is intended.    Pertinent diagnostic labs and testing reviewed    Informed Consent:    Anesthetic plan and risks discussed with patient.    Use of blood products discussed with: patient whom consented to blood products.

## 2023-10-24 NOTE — PROGRESS NOTES
"Urology consult note, follow up  Reason for ongoing consultation: hematuria, possible bladder mass    Overnight Events: None    S: No acute events    O:   /65   Pulse 68   Temp 36.3 °C (97.3 °F) (Oral)   Resp 17   Ht 1.803 m (5' 11\")   Wt 85.1 kg (187 lb 9.8 oz)   SpO2 91%   Recent Labs     10/22/23  0034 10/23/23  0105 10/24/23  0050   SODIUM 133* 135 136   POTASSIUM 5.0 5.0 4.9   CHLORIDE 106 107 108   CO2 13* 12* 14*   GLUCOSE 103* 131* 132*   BUN 45* 43* 47*   CREATININE 2.08* 2.21* 2.03*   CALCIUM 8.7 8.8 8.9     Recent Labs     10/22/23  0034 10/23/23  0105 10/24/23  0050   WBC 8.5 8.0 9.0   RBC 3.39* 3.42* 3.54*   HEMOGLOBIN 10.8* 10.8* 11.1*   HEMATOCRIT 32.3* 32.7* 33.7*   MCV 95.3 95.6 95.2   MCH 31.9 31.6 31.4   MCHC 33.4 33.0 32.9   RDW 45.3 44.4 44.3   PLATELETCT 212 217 232   MPV 9.4 9.4 9.5         Exam:  Abdomen soft, benign.        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  s/p radio tx Dr Wright; PSA <0.1 since         Stable.   New recommendations:   To OR for cystoscopy, possible bladder tumor resection, left ureteroscopy, biopsy. Rationale and procedure discussed with patient, all questions answered.   "

## 2023-10-24 NOTE — OR NURSING
1244: Patient arrived from OR via gurney.  No s/s bleeding.     +Stop Bang per protocol for a score of 5/8.    Sedation/Resp Status: Drowsy, eye opening to verbal.  Respirations spontaneous and non-labored.    HR 80s SR; VSS on 6L 02 via mask.     1245: Cont stable, no changes.     1300: Patient c/o urge to void. Denies pain. Provided urinal and education on most likely urge to void sensation is from procedure versus bladder filled with urine. VSS on RA.     1315: Cont stable, no void yet. Denies need for intervention. Bilat hearing aid and glasses in place. Denies pain besides urge to void. Tolerating sips of clears.     1330: Cont stable, warm blankets provided per patient c/o. VSS on 2L 02 via NC. A/OX3.     1344: End Stop Bang per protocol. Denies SOB/dyspnea. A/OX3. VSS on 2L 02 via NC. No void. Meets criteria to transfer to floor room for cont care.

## 2023-10-24 NOTE — OR SURGEON
Immediate Post OP Note    PreOp Diagnosis: left hydroureteronephrosis      PostOp Diagnosis: same; left distal ureteral obstruction; bladder lesion      Procedure(s):  CYSTOSCOPY, WITH BLADDER BIOPSY, LEFT RETROGRADE PYELOGRAM - Wound Class: Clean Contaminated  URETEROSCOPY, LEFT - Wound Class: Clean Contaminated  CYSTOSCOPY, WITH URETERAL STENT INSERTION - Wound Class: Clean Contaminated    Surgeon(s):  Jourdan Londono M.D.    Anesthesiologist/Type of Anesthesia:  Anesthesiologist: Hari Dc M.D./General    Surgical Staff:  Circulator: Zachariah Aldana R.N.  Scrub Person: Andreia Lopez    Specimens removed if any:  ID Type Source Tests Collected by Time Destination   A : Left Ureter Washing Body Fluid Ureter PATHOLOGY MEDICAL CYTOLOGY Jourdan Londono M.D. 10/24/2023 12:10 PM    B : Bladder Lesions Tissue Bladder PATHOLOGY SPECIMEN Jourdan Londono M.D. 10/24/2023 12:31 PM        Estimated Blood Loss: min    Findings: ~2.5cm region of severe stenosis left distal ureter, not clearly stricture,apparent mass, could not obtain biopsy specimen.  26x6 stent  Tiny area of erythema around left UO, biopsy taken    Complications: none        10/24/2023 12:38 PM Jourdan Londono M.D.

## 2023-10-24 NOTE — OP REPORT
DATE OF SERVICE:  10/24/2023     NAME OF OPERATIONS:    1.  Left ureteroscopy.  2.  Left retrograde pyelogram.  3.  Left ureteral stent placement.  4.  Bladder biopsy.     PREOPERATIVE DIAGNOSIS:  Left hydroureteronephrosis.     POSTOPERATIVE DIAGNOSES:    1.  Left hydroureteronephrosis.  2.  Approximately 2.5 cm severe narrowing of the distal left ureter.  3.  Filling defect of the left distal ureter concerning for malignancy, unable   to obtain tissue specimen, cytology taken.  4.  Small area of rough erythema surrounding the left ureteral orifice,   biopsies taken.     INDICATIONS:  Briefly, the patient is an 82-year-old gentleman who has been   having progressive left-sided flank pain.  Additionally, after the onset of   anticoagulation, he has had hematuria.  Imaging demonstrated the presence of   left hydroureteronephrosis.  There is questionable process involving the left   distal ureter by CT scan.  Indications were for operative evaluation.    Informed consent has been obtained.     OPERATION IN DETAIL:  The patient was taken to the operating room and placed   on table in supine position.  After administration of general anesthetic, he   was placed in lithotomy.  Genitals were prepped and draped sterilely.  A   20-Croatian cystoscope was passed in the urethra with visualizing obturator.    The urethra was without stricture.  Prostatic urethra demonstrated an open   prostate that was relatively short.  A cystoscopy was performed, which   demonstrated a small amount of erythema with potentially some reactive changes   around the left ureteral orifice, slightly atypical in its appearance.     A rigid ureteroscope was then passed in through the urethra into the bladder.    I was able to cannulate the left distal ureter and there was normal appearing   in caliber of the transmural ureter.  Just proximal to this, there was a   region of narrowing that appeared to have some tissue filling it, though   difficult to  discern as clearly transitional cell carcinoma, though suspicious   for this.  At this point, retrograde pyelogram was performed, approximately 7   mL of full strength contrast was injected retrograde through this region.    Images demonstrated the presence of approximately 2-3 cm defect with severe   narrowing and dilation of the ureter proximal to this.  Attempts with a 0 tip   stone basket to obtain some tissue after multiple attempts was unsuccessful.    No other biopsy instruments were available.     With the wire already having been placed, decision was made to place a left   ureteral stent given the patient's pain and hydronephrosis.  A 26 cm x   6-Israeli ureteral stent was placed in the usual manner.  Its proximal J was   seen coiling in the left kidney and its distal J in the bladder.  There was   efflux of bloody urine from the left stent with removal of the wire.     Cold cup forceps were used to obtain 2 small biopsies of the area around the   left ureteral orifice of concern.  These were passed off.  A Bugbee electrode   was used to cauterize this region.     The bladder was filled and drained several times.  There was some hematuria   emanating from the left side.  It did not appear to be any bleeding from the   small biopsy sites.  The patient's bladder was drained, the case concluded.    He tolerated the procedure well and was taken to recovery room in stable   condition.        ______________________________  Jourdan Londono MD MCM/CYRUSM    DD:  10/24/2023 12:49  DT:  10/24/2023 13:02    Job#:  [id number]

## 2023-10-24 NOTE — PROGRESS NOTES
Bedside report received from night RN. Assumed care of patient. Alert and oriented X4, wakes easily to voice. VSS. Daily plan of care discussed. Pt denies intolerable pain to abdomen. Safety precautions in place. Hourly rounding ongoing.     1030 Pt off from floor to pre-op.     1400 Pt back to room from PACU

## 2023-10-24 NOTE — PROGRESS NOTES
Received bedside patient report from JB Velasquez. Patient resting comfortably in bed, no complaints at this time. Safety precautions in place. Will continue to monitor.

## 2023-10-25 VITALS
HEIGHT: 71 IN | HEART RATE: 60 BPM | WEIGHT: 187.17 LBS | OXYGEN SATURATION: 93 % | SYSTOLIC BLOOD PRESSURE: 109 MMHG | DIASTOLIC BLOOD PRESSURE: 60 MMHG | BODY MASS INDEX: 26.2 KG/M2 | RESPIRATION RATE: 16 BRPM | TEMPERATURE: 97.6 F

## 2023-10-25 LAB
ANION GAP SERPL CALC-SCNC: 13 MMOL/L (ref 7–16)
BASOPHILS # BLD AUTO: 0.3 % (ref 0–1.8)
BASOPHILS # BLD: 0.03 K/UL (ref 0–0.12)
BUN SERPL-MCNC: 42 MG/DL (ref 8–22)
CALCIUM SERPL-MCNC: 8.9 MG/DL (ref 8.4–10.2)
CHLORIDE SERPL-SCNC: 108 MMOL/L (ref 96–112)
CO2 SERPL-SCNC: 15 MMOL/L (ref 20–33)
CREAT SERPL-MCNC: 1.74 MG/DL (ref 0.5–1.4)
EOSINOPHIL # BLD AUTO: 0.3 K/UL (ref 0–0.51)
EOSINOPHIL NFR BLD: 3.1 % (ref 0–6.9)
ERYTHROCYTE [DISTWIDTH] IN BLOOD BY AUTOMATED COUNT: 46.2 FL (ref 35.9–50)
GFR SERPLBLD CREATININE-BSD FMLA CKD-EPI: 38 ML/MIN/1.73 M 2
GLUCOSE BLD STRIP.AUTO-MCNC: 131 MG/DL (ref 65–99)
GLUCOSE BLD STRIP.AUTO-MCNC: 134 MG/DL (ref 65–99)
GLUCOSE SERPL-MCNC: 158 MG/DL (ref 65–99)
HCT VFR BLD AUTO: 32.4 % (ref 42–52)
HGB BLD-MCNC: 10.9 G/DL (ref 14–18)
IMM GRANULOCYTES # BLD AUTO: 0.05 K/UL (ref 0–0.11)
IMM GRANULOCYTES NFR BLD AUTO: 0.5 % (ref 0–0.9)
LYMPHOCYTES # BLD AUTO: 1.35 K/UL (ref 1–4.8)
LYMPHOCYTES NFR BLD: 13.9 % (ref 22–41)
MCH RBC QN AUTO: 32.2 PG (ref 27–33)
MCHC RBC AUTO-ENTMCNC: 33.6 G/DL (ref 32.3–36.5)
MCV RBC AUTO: 95.9 FL (ref 81.4–97.8)
MONOCYTES # BLD AUTO: 0.67 K/UL (ref 0–0.85)
MONOCYTES NFR BLD AUTO: 6.9 % (ref 0–13.4)
NEUTROPHILS # BLD AUTO: 7.31 K/UL (ref 1.82–7.42)
NEUTROPHILS NFR BLD: 75.3 % (ref 44–72)
NRBC # BLD AUTO: 0 K/UL
NRBC BLD-RTO: 0 /100 WBC (ref 0–0.2)
PLATELET # BLD AUTO: 214 K/UL (ref 164–446)
PMV BLD AUTO: 9.4 FL (ref 9–12.9)
POTASSIUM SERPL-SCNC: 5 MMOL/L (ref 3.6–5.5)
RBC # BLD AUTO: 3.38 M/UL (ref 4.7–6.1)
SODIUM SERPL-SCNC: 136 MMOL/L (ref 135–145)
WBC # BLD AUTO: 9.7 K/UL (ref 4.8–10.8)

## 2023-10-25 PROCEDURE — 700111 HCHG RX REV CODE 636 W/ 250 OVERRIDE (IP): Mod: JZ | Performed by: STUDENT IN AN ORGANIZED HEALTH CARE EDUCATION/TRAINING PROGRAM

## 2023-10-25 PROCEDURE — 99239 HOSP IP/OBS DSCHRG MGMT >30: CPT | Performed by: STUDENT IN AN ORGANIZED HEALTH CARE EDUCATION/TRAINING PROGRAM

## 2023-10-25 PROCEDURE — 80048 BASIC METABOLIC PNL TOTAL CA: CPT

## 2023-10-25 PROCEDURE — 85025 COMPLETE CBC W/AUTO DIFF WBC: CPT

## 2023-10-25 PROCEDURE — 700105 HCHG RX REV CODE 258: Performed by: STUDENT IN AN ORGANIZED HEALTH CARE EDUCATION/TRAINING PROGRAM

## 2023-10-25 PROCEDURE — 82962 GLUCOSE BLOOD TEST: CPT

## 2023-10-25 RX ORDER — CEPHALEXIN 500 MG/1
500 CAPSULE ORAL 3 TIMES DAILY
Qty: 12 CAPSULE | Refills: 0 | Status: ACTIVE | OUTPATIENT
Start: 2023-10-25 | End: 2023-10-29

## 2023-10-25 RX ORDER — SULFAMETHOXAZOLE AND TRIMETHOPRIM 800; 160 MG/1; MG/1
1 TABLET ORAL 2 TIMES DAILY
Qty: 8 TABLET | Refills: 0 | Status: SHIPPED | OUTPATIENT
Start: 2023-10-25 | End: 2023-10-25

## 2023-10-25 RX ORDER — ACETAMINOPHEN 500 MG
500 TABLET ORAL EVERY 6 HOURS PRN
COMMUNITY
Start: 2023-10-25

## 2023-10-25 RX ORDER — METOPROLOL SUCCINATE 50 MG/1
100 TABLET, EXTENDED RELEASE ORAL DAILY
Qty: 30 TABLET | Refills: 1 | Status: ON HOLD | OUTPATIENT
Start: 2023-10-25 | End: 2023-11-06

## 2023-10-25 RX ADMIN — CEFAZOLIN 1 G: 1 INJECTION, POWDER, FOR SOLUTION INTRAMUSCULAR; INTRAVENOUS at 05:38

## 2023-10-25 ASSESSMENT — FIBROSIS 4 INDEX: FIB4 SCORE: 1.42

## 2023-10-25 ASSESSMENT — PAIN DESCRIPTION - PAIN TYPE: TYPE: ACUTE PAIN

## 2023-10-25 NOTE — PROGRESS NOTES
Received report from night shift RN, assumed care of pt. AAOx4, VSS on room air. Pt denies pain or nausea. Sitting up in bed for breakfast. Pt updated on plan of care for the day and answered any questions. Safety precautions in place, pt educated to call for assistance.

## 2023-10-25 NOTE — DISCHARGE PLANNING
SCP TCN chart review completed. Collaborated with CRISTY Duran. Current discharge considerations are home with outpatient f/u. Per chart review, patient on room air. IV ABX switched to PO ABX. Urology has cleared patient for discharge. Patient has f/u appt with urology next week for stent removal. Per kardex, patient ambulating 15 feet this morning independently.    No new TCN needs identified at this time. TCN will continue to follow and collaborate with discharge planning team as additional post acute needs arise. Thank you.    Completed  Choice obtained: None as patient endorses being at functional baseline.  SCP with [facility] PCP.  Patient states he will call to make f/u appointment.

## 2023-10-25 NOTE — PROGRESS NOTES
Pt discharged home in good and stable condition. Reviewed all discharge instructions and answered any questions. IV discontinued. Escorted to ER entrance via  at 1146.

## 2023-10-25 NOTE — CARE PLAN
The patient is Stable - Low risk of patient condition declining or worsening    Shift Goals  Clinical Goals: Pain control, safety  Patient Goals: Sleep, pain control  Family Goals: MORALES    Progress made toward(s) clinical / shift goals:      Problem: Pain - Standard  Goal: Alleviation of pain or a reduction in pain to the patient’s comfort goal  Description: Target End Date:  Prior to discharge or change in level of care    Document on Vitals flowsheet    1.  Document pain using the appropriate pain scale per order or unit policy  2.  Educate and implement non-pharmacologic comfort measures (i.e. relaxation, distraction, massage, cold/heat therapy, etc.)  3.  Pain management medications as ordered  4.  Reassess pain after pain med administration per policy  5.  If opiods administered assess patient's response to pain medication is appropriate per POSS sedation scale  6.  Follow pain management plan developed in collaboration with patient and interdisciplinary team (including palliative care or pain specialists if applicable)  Outcome: Progressing     Problem: Urinary Elimination  Goal: Establish and maintain regular urinary output  Description: Target End Date:  Prior to discharge or change in level of care    Document on I/O and Assessment flowsheets    1.  Evaluate need to continue indwelling catheter every shift  2.  Assess signs and symptoms of urinary retention  3.  Assess post-void residual volumes  4.  Implement bladder training program  5.  Encourage scheduled voidings  6.  Assist patient to sit on bedside commode or toilet for voiding  7.  Educate patient and family/caregiver on use and purpose of urine collection devices (document in Patient Education)  Outcome: Progressing

## 2023-10-25 NOTE — DISCHARGE SUMMARY
Discharge Summary    CHIEF COMPLAINT ON ADMISSION  Chief Complaint   Patient presents with    Abdominal Pain     LLQ with hematuria. Pt. States he is on xarelto. Reports recent tx for UTI and completed antibx course. Denies flank pain, diarrhea, bloody stools, vomiting.        Reason for Admission  Abdominal Pain     Admission Date  10/21/2023    CODE STATUS  Full Code    HPI & HOSPITAL COURSE  Star Centeno is a 82 y.o. male admitted 10/21/2023 with left lower quadrant abdominal pain and hematuria.  He has a distant history of prostate cancer, CKD 3, dyslipidemia and hypertension.   He was recently diagnosed with pulmonary embolism started on Xarelto. He presented to ER on 10/21 with progressive left flank discomfort and development of hematuria. Labs were remarkable for Acute kidney injury with Cr 2.54 (baseline ~1.5).  On admission, CT abdomen/pelvis urogram showed mild to moderate left hydronephrosis and hydroureter extending down to the level of the left UVJ, concerning for neoplasm causing obstruction, fibrosis or invasion from prostate carcinoma also possible.  Urology was consulted and  Xarelto was held.       Blood pressure was soft on admission with BP 89/45.  Toprol- mg daily was switched to twice daily metoprolol at lower dose with parameters. UA was positive for Nitrite, WBC and few bacteria and well as hematuria. Pt had complained of dysuria and chills. He was started on rocephin which was transitioned to ancef and will complete course with keflex.  Urine culture was ordered and remains negative. Cultures on 10/12 were positive for pan-sensitive E.coli.   Patient underwent left ureteroscopy, left retrograde pyelogram and left uretral stent placement as well as bladder biopsy. There was a 2.5 cm area of severe narrowing of the distal left ureter however biopsy could not be taken, cytology was sent.   Renal function did improve with stent placement. Post operative patient was doing well, he had  no pain and was urinating well, urine was yellow with slight pink tinge. He was resumed on his xarelto and aspirin after discussion with urology. He has follow up with urology next week for stent removal and follow up on pathology. He was educated on symptoms that should prompt him to seek medial care.       Therefore, he is discharged in fair and stable condition to home with close outpatient follow-up.    The patient met 2-midnight criteria for an inpatient stay at the time of discharge.    Discharge Date  10/25/2023    FOLLOW UP ITEMS POST DISCHARGE  Urology follow up, stent removal   Pathology follow up   Blood pressure/Heart rate, home metoprolol dosing decreased from 100 mg to 50 mg   Monitor for bleeding with resumption of aspirin/xarelto     DISCHARGE DIAGNOSES  Principal Problem:    Hydronephrosis, left (POA: Yes)  Active Problems:    Cataract of both eyes (POA: Yes)    History of smoking (POA: Yes)    Essential hypertension, benign (POA: Yes)    History of adenocarcinoma of prostate (POA: Yes)      Overview: Formatting of this note might be different from the original.      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 *      FOLLOW UP  Future Appointments   Date Time Provider Department Center   11/3/2023  1:20 PM Nirmal Gentile M.D. [facility] [place]   11/4/2023  1:00 PM [facility] MRI 1 MR [facility]   11/4/2023  1:30 PM [facility] MRI 1 [facility]   11/7/2023 11:00 AM Allegra Haddad M.D. [facility]     Nirmal Gentile M.D.  [address]  [facility] [id number] [phone]    Schedule an appointment as soon as possible for a visit in 1 week(s)  hospital follow up    Jourdan Londono M.D.  [address]  [facility] [id number] [phone]    Schedule an appointment as soon as possible for a visit in 1 week(s)  hospital follow up, call if questions/concerns      MEDICATIONS ON DISCHARGE     Medication List        START taking these medications        Instructions   acetaminophen 500 MG Tabs  Commonly known as: Tylenol   Take 1 Tablet by mouth every 6 hours as needed for Mild Pain or Moderate Pain.  Dose: 500 mg            CHANGE how you take these medications        Instructions   metoprolol SR 50 MG Tb24  What changed: medication strength  Commonly known as: Toprol XL   Take 2 Tablets by mouth every day.  Dose: 100 mg     sulfamethoxazole-trimethoprim 800-160 MG tablet  What changed: additional instructions  Commonly known as: Bactrim DS   Take 1 Tablet by mouth 2 times a day for 4 days.  Dose: 1 Tablet            CONTINUE taking these medications        Instructions   ALPRAZolam 0.25 MG Tabs  Commonly known as: Xanax   Take 1 Tablet by mouth at bedtime as needed for Sleep for up to 30 days.  Dose: 0.25 mg     aspirin 81 MG EC tablet   Take 81 mg by mouth every evening.  Dose: 81 mg     CoQ-10 30 MG Caps   Take 30 mg by mouth every evening.  Dose: 30 mg     Empagliflozin 25 MG Tabs   Take 25 mg by mouth every day for 360 days.  Dose: 25 mg     ezetimibe 10 MG Tabs  Commonly known as: Zetia   Take 1 Tablet by mouth every day.  Dose: 10 mg     Fish Oil 1200 MG Caps   Take 1,200 mg by mouth every morning.  Dose: 1,200 mg     lisinopril 20 MG Tabs  Commonly known as: Prinivil   Doctor's comments: To replace Quinapril 20mg per Dr Sheets  Take 1 Tablet by mouth every day.  Dose: 20 mg     metFORMIN 500 MG Tabs  Commonly known as: Glucophage   Take 1 Tablet by mouth 2 times a day with meals for 360 days.  Dose: 500 mg     PRE-COLE FORMULA PO   Take 1  Tablet by mouth every morning.  Dose: 1 Tablet     rivaroxaban 20 MG Tabs tablet  Commonly known as: Xarelto   Take 1 Tablet by mouth every day at 6 PM.  Dose: 20 mg     rosuvastatin 5 MG Tabs  Commonly known as: Crestor   Doctor's comments: Plan requires a 100 day supply. Thank you  TAKE 1 TABLET BY MOUTH EVERY DAY  Dose: 5 mg     Voltaren 1 % Gel  Generic drug: diclofenac sodium   Apply 2 g topically 4 times a day as needed (Shoulder Pain).  Dose: 2 g              Allergies  No Known Allergies    DIET  Orders Placed This Encounter   Procedures    Diet Order Diet: Cardiac     Standing Status:   Standing     Number of Occurrences:   1     Order Specific Question:   Diet:     Answer:   Cardiac [6]       ACTIVITY  As tolerated.      CONSULTATIONS  Urology     PROCEDURES  10/24/2023  1.  Left ureteroscopy.  2.  Left retrograde pyelogram.  3.  Left ureteral stent placement.  4.  Bladder biopsy.    LABORATORY  Lab Results   Component Value Date    SODIUM 136 10/25/2023    POTASSIUM 5.0 10/25/2023    CHLORIDE 108 10/25/2023    CO2 15 (L) 10/25/2023    GLUCOSE 158 (H) 10/25/2023    BUN 42 (H) 10/25/2023    CREATININE 1.74 (H) 10/25/2023        Lab Results   Component Value Date    WBC 9.7 10/25/2023    HEMOGLOBIN 10.9 (L) 10/25/2023    HEMATOCRIT 32.4 (L) 10/25/2023    PLATELETCT 214 10/25/2023        Total time of the discharge process exceeds 42 minutes.

## 2023-10-25 NOTE — PROGRESS NOTES
"Urology Progress Note    Post op Day # 1    Overnight Events: None    S: No fevers, chills, nausea or vomiting.  No complaints.    O:   /60   Pulse 60   Temp 36.4 °C (97.6 °F) (Temporal)   Resp 16   Ht 1.803 m (5' 11\")   Wt 84.9 kg (187 lb 2.7 oz)   SpO2 93%   Recent Labs     10/23/23  0105 10/24/23  0050 10/25/23  0206   SODIUM 135 136 136   POTASSIUM 5.0 4.9 5.0   CHLORIDE 107 108 108   CO2 12* 14* 15*   GLUCOSE 131* 132* 158*   BUN 43* 47* 42*   CREATININE 2.21* 2.03* 1.74*   CALCIUM 8.8 8.9 8.9     Recent Labs     10/23/23  0105 10/24/23  0050 10/25/23  0206   WBC 8.0 9.0 9.7   RBC 3.42* 3.54* 3.38*   HEMOGLOBIN 10.8* 11.1* 10.9*   HEMATOCRIT 32.7* 33.7* 32.4*   MCV 95.6 95.2 95.9   MCH 31.6 31.4 32.2   MCHC 33.0 32.9 33.6   RDW 44.4 44.3 46.2   PLATELETCT 217 232 214   MPV 9.4 9.5 9.4         Intake/Output Summary (Last 24 hours) at 10/25/2023 0801  Last data filed at 10/24/2023 2145  Gross per 24 hour   Intake 1300 ml   Output 500 ml   Net 800 ml       Exam:  Abdomen soft, benign.          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  s/p radio tx Dr Wright; PSA <0.1 since         PLAN:   - Ambulate, IS.  - Patient is cleared for discharge from urology standpoint.  We will arrange outpt follow up for cysto stent removal and pathology discussion.  - Urology will sign " off, call with questions.

## 2023-10-25 NOTE — CARE PLAN
The patient is Stable - Low risk of patient condition declining or worsening    Shift Goals  Clinical Goals: Safety, pain tolerable >3/10 post intervention  Patient Goals: cystoscopy, rest  Family Goals: updated with POC    Progress made toward(s) clinical / shift goals: Pt denies intolerable pain prior and after the surgery. Safety precautions in place, no falls/injury through the shift.    Patient is not progressing towards the following goals:

## 2023-10-25 NOTE — DISCHARGE INSTRUCTIONS
Take medications as directed   Okay to resume your aspirin and xarelto, if you develop significant blood in your urine, increasing pelvic pain and/or inability to empty your bladder seek medical attention   Complete your antibiotic course, antibiotic prescribed is bactrim, take until gone   Follow up with urology as directed, if you have questions or concerns, call their office

## 2023-10-26 ENCOUNTER — PATIENT OUTREACH (OUTPATIENT)
Dept: MEDICAL GROUP | Facility: LAB | Age: 83
End: 2023-10-26
Payer: MEDICARE

## 2023-10-26 LAB — GLUCOSE BLD STRIP.AUTO-MCNC: 237 MG/DL (ref 65–99)

## 2023-10-26 NOTE — PROGRESS NOTES
Transitional Care Management  TCM Outreach Date and Time: Filed (10/26/2023 11:48 AM)    Discharge Questions  Actual Discharge Date: 10/25/23  Now that you are home, how are you feeling?: Fair (Pt states he slept well; is up and about; does continue to pass dark red blood, no clots, in his urine.  Denies pain in low abdomin or left flank.  Discussed need to increase water intake to ensure urine flow.)  Did you receive any new prescriptions?: Yes  Were you able to get them filled?: Yes  Meds to Bed or Pharmacy filled?: Pharmacy  Do you have any questions about your current medications or new medications (Review Med Rec)?: No  Do you have a follow up appointment scheduled with your PCP?: Yes  Appointment Date: 11/02/23  Appointment Time: 1700  Any issues or paperwork you wish to discuss with your PCP?: No  Does this patient qualify for the CCM program?: No    Transitional Care  Number of attempts made to contact patient: 1  Current or previous attempts competed within two business days of discharge? : Yes  Provided education regarding treatment plan, medications, self-management, ADLs?: Yes (Discussed bloody urine and need to see that subsiding over next 24-48 hours.  If not, discussed need to go to ER for a re-check.  He denies CP, SOB, light headedness or other S/S bleeding. Reviewed symptoms to return to ER for.)  Has patient completed an Advanced Directive?: No  Has the Care Manager's phone number provided?: No  Is there anything else I can help you with?: No    Discharge Summary  Chief Complaint: Left lower quadrant pain; Hematuria  Admitting Diagnosis: Hematuria; Anticoagulation thx.; Hx. PE  Discharge Diagnosis: Hydronephrosis; Hematuria; Ureteroscopy with left uretral stent; DM

## 2023-10-30 ENCOUNTER — HOSPITAL ENCOUNTER (OUTPATIENT)
Facility: MEDICAL CENTER | Age: 83
End: 2023-10-30
Attending: STUDENT IN AN ORGANIZED HEALTH CARE EDUCATION/TRAINING PROGRAM
Payer: MEDICARE

## 2023-10-30 ENCOUNTER — APPOINTMENT (OUTPATIENT)
Dept: RADIOLOGY | Facility: MEDICAL CENTER | Age: 83
DRG: 813 | End: 2023-10-30
Attending: EMERGENCY MEDICINE
Payer: MEDICARE

## 2023-10-30 ENCOUNTER — HOSPITAL ENCOUNTER (INPATIENT)
Facility: MEDICAL CENTER | Age: 83
LOS: 7 days | DRG: 813 | End: 2023-11-06
Attending: EMERGENCY MEDICINE | Admitting: HOSPITALIST
Payer: MEDICARE

## 2023-10-30 DIAGNOSIS — Z86.711 HISTORY OF PULMONARY EMBOLISM: ICD-10-CM

## 2023-10-30 DIAGNOSIS — R31.0 GROSS HEMATURIA: ICD-10-CM

## 2023-10-30 DIAGNOSIS — I26.99 OTHER PULMONARY EMBOLISM WITHOUT ACUTE COR PULMONALE, UNSPECIFIED CHRONICITY (HCC): ICD-10-CM

## 2023-10-30 DIAGNOSIS — R31.9 HEMATURIA, UNSPECIFIED TYPE: ICD-10-CM

## 2023-10-30 DIAGNOSIS — N40.1 BENIGN PROSTATIC HYPERPLASIA WITH URINARY FREQUENCY: ICD-10-CM

## 2023-10-30 DIAGNOSIS — R35.0 BENIGN PROSTATIC HYPERPLASIA WITH URINARY FREQUENCY: ICD-10-CM

## 2023-10-30 PROBLEM — N18.30 CKD (CHRONIC KIDNEY DISEASE), STAGE III: Status: ACTIVE | Noted: 2023-10-30

## 2023-10-30 PROBLEM — E87.1 HYPONATREMIA: Status: ACTIVE | Noted: 2023-10-30

## 2023-10-30 PROBLEM — Z79.01 CHRONIC ANTICOAGULATION: Status: ACTIVE | Noted: 2023-10-30

## 2023-10-30 PROBLEM — D72.829 LEUKOCYTOSIS: Status: ACTIVE | Noted: 2023-10-30

## 2023-10-30 PROBLEM — Z71.89 ACP (ADVANCE CARE PLANNING): Status: ACTIVE | Noted: 2023-10-30

## 2023-10-30 LAB
ALBUMIN SERPL BCP-MCNC: 3.9 G/DL (ref 3.2–4.9)
ALP SERPL-CCNC: 58 U/L (ref 30–99)
ALT SERPL-CCNC: 11 U/L (ref 2–50)
ANION GAP SERPL CALC-SCNC: 12 MMOL/L (ref 7–16)
APTT PPP: 36.1 SEC (ref 24.7–36)
AST SERPL-CCNC: 18 U/L (ref 12–45)
BASOPHILS # BLD AUTO: 0.2 % (ref 0–1.8)
BASOPHILS # BLD: 0.03 K/UL (ref 0–0.12)
BILIRUB CONJ SERPL-MCNC: <0.2 MG/DL (ref 0.1–0.5)
BILIRUB INDIRECT SERPL-MCNC: NORMAL MG/DL (ref 0–1)
BILIRUB SERPL-MCNC: 0.6 MG/DL (ref 0.1–1.5)
BUN SERPL-MCNC: 33 MG/DL (ref 8–22)
CALCIUM SERPL-MCNC: 9 MG/DL (ref 8.4–10.2)
CHLORIDE SERPL-SCNC: 104 MMOL/L (ref 96–112)
CO2 SERPL-SCNC: 18 MMOL/L (ref 20–33)
CREAT SERPL-MCNC: 1.84 MG/DL (ref 0.5–1.4)
EOSINOPHIL # BLD AUTO: 0.35 K/UL (ref 0–0.51)
EOSINOPHIL NFR BLD: 2.6 % (ref 0–6.9)
ERYTHROCYTE [DISTWIDTH] IN BLOOD BY AUTOMATED COUNT: 45.9 FL (ref 35.9–50)
GFR SERPLBLD CREATININE-BSD FMLA CKD-EPI: 36 ML/MIN/1.73 M 2
GLUCOSE SERPL-MCNC: 131 MG/DL (ref 65–99)
HCT VFR BLD AUTO: 33.2 % (ref 42–52)
HGB BLD-MCNC: 10.9 G/DL (ref 14–18)
IMM GRANULOCYTES # BLD AUTO: 0.07 K/UL (ref 0–0.11)
IMM GRANULOCYTES NFR BLD AUTO: 0.5 % (ref 0–0.9)
INR PPP: 1.29 (ref 0.87–1.13)
LYMPHOCYTES # BLD AUTO: 1.64 K/UL (ref 1–4.8)
LYMPHOCYTES NFR BLD: 12.2 % (ref 22–41)
MCH RBC QN AUTO: 31.8 PG (ref 27–33)
MCHC RBC AUTO-ENTMCNC: 32.8 G/DL (ref 32.3–36.5)
MCV RBC AUTO: 96.8 FL (ref 81.4–97.8)
MONOCYTES # BLD AUTO: 0.89 K/UL (ref 0–0.85)
MONOCYTES NFR BLD AUTO: 6.6 % (ref 0–13.4)
NEUTROPHILS # BLD AUTO: 10.51 K/UL (ref 1.82–7.42)
NEUTROPHILS NFR BLD: 77.9 % (ref 44–72)
NRBC # BLD AUTO: 0 K/UL
NRBC BLD-RTO: 0 /100 WBC (ref 0–0.2)
PLATELET # BLD AUTO: 213 K/UL (ref 164–446)
PMV BLD AUTO: 9.1 FL (ref 9–12.9)
POTASSIUM SERPL-SCNC: 5 MMOL/L (ref 3.6–5.5)
PROT SERPL-MCNC: 6.8 G/DL (ref 6–8.2)
PROTHROMBIN TIME: 16.7 SEC (ref 12–14.6)
RBC # BLD AUTO: 3.43 M/UL (ref 4.7–6.1)
SODIUM SERPL-SCNC: 134 MMOL/L (ref 135–145)
WBC # BLD AUTO: 13.5 K/UL (ref 4.8–10.8)

## 2023-10-30 PROCEDURE — 51700 IRRIGATION OF BLADDER: CPT

## 2023-10-30 PROCEDURE — 85730 THROMBOPLASTIN TIME PARTIAL: CPT

## 2023-10-30 PROCEDURE — 770001 HCHG ROOM/CARE - MED/SURG/GYN PRIV*

## 2023-10-30 PROCEDURE — 303105 HCHG CATHETER EXTRA

## 2023-10-30 PROCEDURE — 36415 COLL VENOUS BLD VENIPUNCTURE: CPT

## 2023-10-30 PROCEDURE — 99497 ADVNCD CARE PLAN 30 MIN: CPT | Performed by: HOSPITALIST

## 2023-10-30 PROCEDURE — 80074 ACUTE HEPATITIS PANEL: CPT

## 2023-10-30 PROCEDURE — 76775 US EXAM ABDO BACK WALL LIM: CPT

## 2023-10-30 PROCEDURE — 96374 THER/PROPH/DIAG INJ IV PUSH: CPT

## 2023-10-30 PROCEDURE — 85610 PROTHROMBIN TIME: CPT

## 2023-10-30 PROCEDURE — 99285 EMERGENCY DEPT VISIT HI MDM: CPT

## 2023-10-30 PROCEDURE — 700105 HCHG RX REV CODE 258: Performed by: EMERGENCY MEDICINE

## 2023-10-30 PROCEDURE — 87389 HIV-1 AG W/HIV-1&-2 AB AG IA: CPT

## 2023-10-30 PROCEDURE — 85025 COMPLETE CBC W/AUTO DIFF WBC: CPT

## 2023-10-30 PROCEDURE — 80076 HEPATIC FUNCTION PANEL: CPT

## 2023-10-30 PROCEDURE — 87086 URINE CULTURE/COLONY COUNT: CPT

## 2023-10-30 PROCEDURE — 700111 HCHG RX REV CODE 636 W/ 250 OVERRIDE (IP): Mod: JZ | Performed by: EMERGENCY MEDICINE

## 2023-10-30 PROCEDURE — 99223 1ST HOSP IP/OBS HIGH 75: CPT | Mod: 25,AI | Performed by: HOSPITALIST

## 2023-10-30 PROCEDURE — 80048 BASIC METABOLIC PNL TOTAL CA: CPT

## 2023-10-30 PROCEDURE — 94760 N-INVAS EAR/PLS OXIMETRY 1: CPT

## 2023-10-30 RX ORDER — HYDROMORPHONE HYDROCHLORIDE 1 MG/ML
0.5 INJECTION, SOLUTION INTRAMUSCULAR; INTRAVENOUS; SUBCUTANEOUS
Status: DISCONTINUED | OUTPATIENT
Start: 2023-10-30 | End: 2023-11-06 | Stop reason: HOSPADM

## 2023-10-30 RX ORDER — DEXTROSE MONOHYDRATE 25 G/50ML
25 INJECTION, SOLUTION INTRAVENOUS
Status: DISCONTINUED | OUTPATIENT
Start: 2023-10-30 | End: 2023-11-06 | Stop reason: HOSPADM

## 2023-10-30 RX ORDER — AMOXICILLIN 250 MG
2 CAPSULE ORAL 2 TIMES DAILY
Status: DISCONTINUED | OUTPATIENT
Start: 2023-10-30 | End: 2023-11-06 | Stop reason: HOSPADM

## 2023-10-30 RX ORDER — POLYETHYLENE GLYCOL 3350 17 G/17G
1 POWDER, FOR SOLUTION ORAL
Status: DISCONTINUED | OUTPATIENT
Start: 2023-10-30 | End: 2023-11-06 | Stop reason: HOSPADM

## 2023-10-30 RX ORDER — BISACODYL 10 MG
10 SUPPOSITORY, RECTAL RECTAL
Status: DISCONTINUED | OUTPATIENT
Start: 2023-10-30 | End: 2023-11-06 | Stop reason: HOSPADM

## 2023-10-30 RX ORDER — ACETAMINOPHEN 325 MG/1
650 TABLET ORAL EVERY 6 HOURS PRN
Status: DISCONTINUED | OUTPATIENT
Start: 2023-10-30 | End: 2023-11-06 | Stop reason: HOSPADM

## 2023-10-30 RX ORDER — ONDANSETRON 2 MG/ML
4 INJECTION INTRAMUSCULAR; INTRAVENOUS EVERY 4 HOURS PRN
Status: DISCONTINUED | OUTPATIENT
Start: 2023-10-30 | End: 2023-11-06 | Stop reason: HOSPADM

## 2023-10-30 RX ORDER — ALPRAZOLAM 0.25 MG/1
0.25 TABLET ORAL NIGHTLY PRN
Status: DISCONTINUED | OUTPATIENT
Start: 2023-10-30 | End: 2023-11-06 | Stop reason: HOSPADM

## 2023-10-30 RX ORDER — ROSUVASTATIN CALCIUM 10 MG/1
5 TABLET, COATED ORAL DAILY
Status: DISCONTINUED | OUTPATIENT
Start: 2023-10-30 | End: 2023-11-06 | Stop reason: HOSPADM

## 2023-10-30 RX ORDER — HYDROMORPHONE HYDROCHLORIDE 1 MG/ML
1 INJECTION, SOLUTION INTRAMUSCULAR; INTRAVENOUS; SUBCUTANEOUS
Status: DISCONTINUED | OUTPATIENT
Start: 2023-10-30 | End: 2023-11-06 | Stop reason: HOSPADM

## 2023-10-30 RX ORDER — ONDANSETRON 4 MG/1
4 TABLET, ORALLY DISINTEGRATING ORAL EVERY 4 HOURS PRN
Status: DISCONTINUED | OUTPATIENT
Start: 2023-10-30 | End: 2023-11-06 | Stop reason: HOSPADM

## 2023-10-30 RX ORDER — EZETIMIBE 10 MG/1
10 TABLET ORAL DAILY
Status: DISCONTINUED | OUTPATIENT
Start: 2023-10-30 | End: 2023-11-06 | Stop reason: HOSPADM

## 2023-10-30 RX ORDER — LISINOPRIL 20 MG/1
20 TABLET ORAL DAILY
Status: DISCONTINUED | OUTPATIENT
Start: 2023-10-31 | End: 2023-10-30

## 2023-10-30 RX ADMIN — CEFTRIAXONE SODIUM 1000 MG: 1 INJECTION, POWDER, FOR SOLUTION INTRAMUSCULAR; INTRAVENOUS at 21:45

## 2023-10-30 ASSESSMENT — ENCOUNTER SYMPTOMS
NAUSEA: 0
FEVER: 0
SORE THROAT: 0
NECK PAIN: 0
BRUISES/BLEEDS EASILY: 0
INSOMNIA: 0
WEAKNESS: 0
DEPRESSION: 0
MYALGIAS: 0
COUGH: 0
HEADACHES: 0
PALPITATIONS: 0
SHORTNESS OF BREATH: 0
BLURRED VISION: 0
VOMITING: 0
DIZZINESS: 0
DOUBLE VISION: 0

## 2023-10-30 ASSESSMENT — COGNITIVE AND FUNCTIONAL STATUS - GENERAL
SUGGESTED CMS G CODE MODIFIER DAILY ACTIVITY: CH
MOBILITY SCORE: 24
SUGGESTED CMS G CODE MODIFIER MOBILITY: CH
DAILY ACTIVITIY SCORE: 24

## 2023-10-30 ASSESSMENT — PATIENT HEALTH QUESTIONNAIRE - PHQ9
4. FEELING TIRED OR HAVING LITTLE ENERGY: SEVERAL DAYS
SUM OF ALL RESPONSES TO PHQ QUESTIONS 1-9: 7
8. MOVING OR SPEAKING SO SLOWLY THAT OTHER PEOPLE COULD HAVE NOTICED. OR THE OPPOSITE, BEING SO FIGETY OR RESTLESS THAT YOU HAVE BEEN MOVING AROUND A LOT MORE THAN USUAL: NOT AT ALL
6. FEELING BAD ABOUT YOURSELF - OR THAT YOU ARE A FAILURE OR HAVE LET YOURSELF OR YOUR FAMILY DOWN: NOT AL ALL
5. POOR APPETITE OR OVEREATING: SEVERAL DAYS
2. FEELING DOWN, DEPRESSED, IRRITABLE, OR HOPELESS: MORE THAN HALF THE DAYS
SUM OF ALL RESPONSES TO PHQ9 QUESTIONS 1 AND 2: 4
7. TROUBLE CONCENTRATING ON THINGS, SUCH AS READING THE NEWSPAPER OR WATCHING TELEVISION: NOT AT ALL
1. LITTLE INTEREST OR PLEASURE IN DOING THINGS: MORE THAN HALF THE DAYS
3. TROUBLE FALLING OR STAYING ASLEEP OR SLEEPING TOO MUCH: SEVERAL DAYS
9. THOUGHTS THAT YOU WOULD BE BETTER OFF DEAD, OR OF HURTING YOURSELF: NOT AT ALL

## 2023-10-30 ASSESSMENT — PAIN DESCRIPTION - PAIN TYPE: TYPE: ACUTE PAIN

## 2023-10-30 ASSESSMENT — FIBROSIS 4 INDEX
FIB4 SCORE: 2.14
FIB4 SCORE: 1.42

## 2023-10-31 LAB
ANION GAP SERPL CALC-SCNC: 12 MMOL/L (ref 7–16)
APPEARANCE UR: ABNORMAL
BACTERIA #/AREA URNS HPF: ABNORMAL /HPF
BILIRUB UR QL STRIP.AUTO: NEGATIVE
BUN SERPL-MCNC: 32 MG/DL (ref 8–22)
CALCIUM SERPL-MCNC: 8.9 MG/DL (ref 8.4–10.2)
CHLORIDE SERPL-SCNC: 105 MMOL/L (ref 96–112)
CO2 SERPL-SCNC: 18 MMOL/L (ref 20–33)
COLOR UR: ABNORMAL
CREAT SERPL-MCNC: 1.61 MG/DL (ref 0.5–1.4)
EPI CELLS #/AREA URNS HPF: ABNORMAL /HPF
ERYTHROCYTE [DISTWIDTH] IN BLOOD BY AUTOMATED COUNT: 45.9 FL (ref 35.9–50)
EXPOSED MRN EXMRN: NORMAL
EXPOSED MRN EXMRN: NORMAL
GFR SERPLBLD CREATININE-BSD FMLA CKD-EPI: 42 ML/MIN/1.73 M 2
GLUCOSE BLD STRIP.AUTO-MCNC: 127 MG/DL (ref 65–99)
GLUCOSE BLD STRIP.AUTO-MCNC: 156 MG/DL (ref 65–99)
GLUCOSE BLD STRIP.AUTO-MCNC: 161 MG/DL (ref 65–99)
GLUCOSE BLD STRIP.AUTO-MCNC: 164 MG/DL (ref 65–99)
GLUCOSE SERPL-MCNC: 121 MG/DL (ref 65–99)
GLUCOSE UR STRIP.AUTO-MCNC: 250 MG/DL
HAV IGM SERPL QL IA: NORMAL
HBV CORE IGM SER QL: NORMAL
HBV SURFACE AG SER QL: NORMAL
HBV SURFACE AG SER QL: NORMAL
HCT VFR BLD AUTO: 31 % (ref 42–52)
HCT VFR BLD AUTO: 31.2 % (ref 42–52)
HCT VFR BLD AUTO: 31.6 % (ref 42–52)
HCV AB SER QL: NORMAL
HCV AB SER QL: NORMAL
HGB BLD-MCNC: 10.2 G/DL (ref 14–18)
HGB BLD-MCNC: 10.4 G/DL (ref 14–18)
HGB BLD-MCNC: 10.5 G/DL (ref 14–18)
HIV 1+2 AB+HIV1 P24 AG SERPL QL IA: NORMAL
HIV 1+2 AB+HIV1 P24 AG SERPL QL IA: NORMAL
KETONES UR STRIP.AUTO-MCNC: NEGATIVE MG/DL
LEUKOCYTE ESTERASE UR QL STRIP.AUTO: ABNORMAL
MCH RBC QN AUTO: 32.3 PG (ref 27–33)
MCHC RBC AUTO-ENTMCNC: 33.3 G/DL (ref 32.3–36.5)
MCV RBC AUTO: 96.9 FL (ref 81.4–97.8)
MICRO URNS: ABNORMAL
MUCOUS THREADS #/AREA URNS HPF: ABNORMAL /HPF
NITRITE UR QL STRIP.AUTO: NEGATIVE
PH UR STRIP.AUTO: 5.5 [PH] (ref 5–8)
PLATELET # BLD AUTO: 201 K/UL (ref 164–446)
PMV BLD AUTO: 9.8 FL (ref 9–12.9)
POTASSIUM SERPL-SCNC: 4.5 MMOL/L (ref 3.6–5.5)
PROT UR QL STRIP: NEGATIVE MG/DL
RBC # BLD AUTO: 3.22 M/UL (ref 4.7–6.1)
RBC # URNS HPF: ABNORMAL /HPF
RBC UR QL AUTO: ABNORMAL
SODIUM SERPL-SCNC: 135 MMOL/L (ref 135–145)
SP GR UR STRIP.AUTO: 1.01
WBC # BLD AUTO: 10.6 K/UL (ref 4.8–10.8)
WBC #/AREA URNS HPF: ABNORMAL /HPF

## 2023-10-31 PROCEDURE — 770001 HCHG ROOM/CARE - MED/SURG/GYN PRIV*

## 2023-10-31 PROCEDURE — 81001 URINALYSIS AUTO W/SCOPE: CPT

## 2023-10-31 PROCEDURE — 700111 HCHG RX REV CODE 636 W/ 250 OVERRIDE (IP): Mod: JZ | Performed by: HOSPITALIST

## 2023-10-31 PROCEDURE — 80048 BASIC METABOLIC PNL TOTAL CA: CPT

## 2023-10-31 PROCEDURE — 99233 SBSQ HOSP IP/OBS HIGH 50: CPT | Performed by: INTERNAL MEDICINE

## 2023-10-31 PROCEDURE — 85018 HEMOGLOBIN: CPT

## 2023-10-31 PROCEDURE — 700102 HCHG RX REV CODE 250 W/ 637 OVERRIDE(OP): Performed by: HOSPITALIST

## 2023-10-31 PROCEDURE — 36415 COLL VENOUS BLD VENIPUNCTURE: CPT

## 2023-10-31 PROCEDURE — 87086 URINE CULTURE/COLONY COUNT: CPT

## 2023-10-31 PROCEDURE — 85027 COMPLETE CBC AUTOMATED: CPT

## 2023-10-31 PROCEDURE — 94760 N-INVAS EAR/PLS OXIMETRY 1: CPT

## 2023-10-31 PROCEDURE — 82962 GLUCOSE BLOOD TEST: CPT

## 2023-10-31 PROCEDURE — A9270 NON-COVERED ITEM OR SERVICE: HCPCS | Performed by: HOSPITALIST

## 2023-10-31 PROCEDURE — 85014 HEMATOCRIT: CPT

## 2023-10-31 RX ORDER — OXYBUTYNIN CHLORIDE 5 MG/1
5 TABLET ORAL 3 TIMES DAILY
Status: DISCONTINUED | OUTPATIENT
Start: 2023-10-31 | End: 2023-11-06 | Stop reason: HOSPADM

## 2023-10-31 RX ADMIN — HYDROMORPHONE HYDROCHLORIDE 1 MG: 1 INJECTION, SOLUTION INTRAMUSCULAR; INTRAVENOUS; SUBCUTANEOUS at 19:22

## 2023-10-31 RX ADMIN — ROSUVASTATIN 5 MG: 10 TABLET, FILM COATED ORAL at 00:58

## 2023-10-31 RX ADMIN — SENNOSIDES AND DOCUSATE SODIUM 2 TABLET: 50; 8.6 TABLET ORAL at 05:24

## 2023-10-31 RX ADMIN — ROSUVASTATIN 5 MG: 10 TABLET, FILM COATED ORAL at 17:04

## 2023-10-31 RX ADMIN — INSULIN HUMAN 1 UNITS: 100 INJECTION, SOLUTION PARENTERAL at 11:45

## 2023-10-31 RX ADMIN — EZETIMIBE 10 MG: 10 TABLET ORAL at 17:04

## 2023-10-31 RX ADMIN — INSULIN HUMAN 1 UNITS: 100 INJECTION, SOLUTION PARENTERAL at 16:58

## 2023-10-31 RX ADMIN — SENNOSIDES AND DOCUSATE SODIUM 2 TABLET: 50; 8.6 TABLET ORAL at 17:04

## 2023-10-31 RX ADMIN — EZETIMIBE 10 MG: 10 TABLET ORAL at 01:00

## 2023-10-31 ASSESSMENT — ENCOUNTER SYMPTOMS
DIARRHEA: 0
VOMITING: 0
PALPITATIONS: 0
COUGH: 0
ABDOMINAL PAIN: 0
HALLUCINATIONS: 0
WEAKNESS: 0
BLOOD IN STOOL: 0
NAUSEA: 0
DEPRESSION: 0
FLANK PAIN: 0
DIZZINESS: 0
FOCAL WEAKNESS: 0
SORE THROAT: 0
SHORTNESS OF BREATH: 0
BACK PAIN: 0
HEADACHES: 0
CHILLS: 0
MYALGIAS: 0
FEVER: 0
HEARTBURN: 0

## 2023-10-31 ASSESSMENT — LIFESTYLE VARIABLES
EVER HAD A DRINK FIRST THING IN THE MORNING TO STEADY YOUR NERVES TO GET RID OF A HANGOVER: NO
AVERAGE NUMBER OF DAYS PER WEEK YOU HAVE A DRINK CONTAINING ALCOHOL: 1
HOW MANY TIMES IN THE PAST YEAR HAVE YOU HAD 5 OR MORE DRINKS IN A DAY: 0
EVER FELT BAD OR GUILTY ABOUT YOUR DRINKING: NO
ON A TYPICAL DAY WHEN YOU DRINK ALCOHOL HOW MANY DRINKS DO YOU HAVE: 1
TOTAL SCORE: 0
HAVE PEOPLE ANNOYED YOU BY CRITICIZING YOUR DRINKING: NO
TOTAL SCORE: 0
ALCOHOL_USE: YES
TOTAL SCORE: 0
CONSUMPTION TOTAL: NEGATIVE
HAVE YOU EVER FELT YOU SHOULD CUT DOWN ON YOUR DRINKING: NO

## 2023-10-31 ASSESSMENT — PAIN DESCRIPTION - PAIN TYPE
TYPE: ACUTE PAIN

## 2023-10-31 NOTE — PROGRESS NOTES
Received bedside report from NOC, RN. Assumed care. Pt is A&O 4. Pt has no complaints of pain. Denies N/V as of assessment. Three way rodarte catheter is draining well with pinkish output. Safety precaution in place, call button and belongings placed within reach. All needs met at this time. Hourly rounding in place.

## 2023-10-31 NOTE — ASSESSMENT & PLAN NOTE
-Last Surveillance CTA Chest done on 9/6-stable at 3.5 cm but incidental finding of PE  -Follow up outpatient

## 2023-10-31 NOTE — DISCHARGE PLANNING
HTH/SCP TCN chart review completed. Noted patient is a readmission to [facility] with previous acute hospitalization 10/21-10/25 in the setting of hydronephrosis. Per patient, he was seen at [facility] yesterday.     Collaborated with CRISTY Duran prior to meeting with the pt. The most current review of medical record, knowledge of pt's PLOF and social support, LACE+ score of 77, 6 clicks scores of 24 ADL and 24 mobility were considered.      TCN met with patient at bedside. Patient daughter also at bedside during TCN visit. Introduced self and TCN program. Provided education regarding post acute levels of care. Discussed HTH/SCP plan benefits (Meds to Beds, medical uber and [facility] transitional care). Pt verbalized understanding.     Patient endorsed he resides alone, noting he is recently . Patient stated independence in ADLs, IADLs and driving prior to acute hospitalization. Patient daughter endorsed that patient has been established with a meal delivery service stated to [employer]. During TCN visit, patient was visited by Urology, noting patient has in office follow up scheduled with Dr. ZHANG on Thursday, 11/2. Patient was amendable to consideration of HH, should HH be recommended for nursing needs at time of discharge.     Choice proactively obtained for HH, faxed to [facility] to be stored in media and given to CRISTY. Patient advised he has a follow up scheduled with his primary care physician, confirmed to Dr. Gentile 11/6.     TCN will continue to follow and collaborate with discharge planning team should additional post acute needs arise; although no further acute TCN needs anticipated in patient discharge planning at this time. Thank you.     Completed today  Choice obtained: HH (should HH services be recommended at time of dc)  SCP with [facility] PCP. Follow up appointment scheduled for November 6th at 11:20AM with Dr. Gentile

## 2023-10-31 NOTE — ED NOTES
Pharmacy Medication Reconciliation        ~Medication reconciliation updated and complete per patient and family at bedside   ~Allergies have been verified and updated   ~Patient finished course of Keflex on 10/29 AM  ~Patient home pharmacy :  [facility]

## 2023-10-31 NOTE — ED PROVIDER NOTES
"ED Provider Note    CHIEF COMPLAINT  Chief Complaint   Patient presents with    Urinary Catheter Problem     \"It's blocked up with blood\". Reports he is on blood thinners. States catheter has been blocked since this afternoon.        EXTERNAL RECORDS REVIEWED  Inpatient Notes discharge summary from the 25th reviewed.  Patient was hospitalized with left lower quadrant abdominal pain and hematuria.  He has a distant history of prostate cancer and was recently diagnosed with a pulmonary embolism and started on Xarelto.  He was admitted for acute kidney injury and hematuria.  Patient had a stent placed as he had significant hydronephrosis.  He had biopsy placed at this time as well Xarelto was held while he was in the hospital.    HPI/ROS  LIMITATION TO HISTORY   Select: : None  OUTSIDE HISTORIAN(S):  Family family member at bedside helping with history.  Giving details of the urology visit this morning.    Star Centeno is a 82 y.o. male who presents for evaluation of hematuria and difficulty urinating.  Patient has a recent history of being hospitalized for hematuria he is on Xarelto for PEs that were found 2 months ago it sounds like incidentally when he had some monitoring imaging for his aneurysms.  He denies having any shortness of breath.  This was found on September 2 it appears.  Patient was hospitalized last week for extensive hematuria and was found to have a ureteral stricture.  There is concern that this is cancerous.  He had a stent placed.  He was off of his Xarelto during this hospitalization.  He followed up in urology today he had some urinary retention he had a three-way catheter placed and this was flushed he did have some hematuria that cleared in the office this morning.  He went home with a catheter in place and this afternoon started feeling the inability to urinate and having extensive leakage of bloody urine on his pants as well.  While he was sitting in the waiting room he felt that " blockage resolved his urine is bloody once again.  He has some fullness in his lower abdomen he thinks likely secondary to holding his urine.     PAST MEDICAL HISTORY   has a past medical history of Cancer (HCC), Cataract, Hyperlipidemia, Hypertension, and Pulmonary emboli (HCC).    SURGICAL HISTORY   has a past surgical history that includes eye surgery; prostatectomy, radical retro; cystourethroscopy,biopsies (N/A, 10/24/2023); cysto/uretero/pyeloscopy, dx (Left, 10/24/2023); and cystoscopy,insert ureteral stent (Left, 10/24/2023).    FAMILY HISTORY  Family History   Problem Relation Age of Onset    Genetic Disorder Brother     Diabetes Brother     Hypertension Brother     Hyperlipidemia Brother     Genetic Disorder Sister     Diabetes Sister     Genetic Disorder Sister        SOCIAL HISTORY  Social History     Tobacco Use    Smoking status: Former     Current packs/day: 0.00     Average packs/day: 1 pack/day for 45.0 years (45.0 ttl pk-yrs)     Types: Cigarettes     Start date: 1956     Quit date: 2001     Years since quittin.8    Smokeless tobacco: Never   Vaping Use    Vaping Use: Never used   Substance and Sexual Activity    Alcohol use: Yes     Alcohol/week: 8.4 oz     Types: 14 Glasses of wine per week     Comment: 2 glasses of wine daily    Drug use: Never    Sexual activity: Not Currently     Partners: Female     Birth control/protection: Condom       CURRENT MEDICATIONS  Home Medications       Reviewed by Jess Rosen, PharmD (Pharmacist) on 10/30/23 at 2229  Med List Status: Complete     Medication Last Dose Status   acetaminophen (TYLENOL) 500 MG Tab prn Active   ALPRAZolam (XANAX) 0.25 MG Tab > 30 days Active   aspirin 81 MG EC tablet 10/30/2023 Active   Coenzyme Q10 (COQ-10) 30 MG Cap 10/29/2023 Active   diclofenac sodium (VOLTAREN) 1 % Gel prn Active   Empagliflozin 25 MG Tab 10/30/2023 Active   ezetimibe (ZETIA) 10 MG Tab 10/29/2023 Active   lisinopril (PRINIVIL) 20 MG Tab  "10/30/2023 Active   metFORMIN (GLUCOPHAGE) 500 MG Tab 10/30/2023 Active   metoprolol SR (TOPROL XL) 50 MG TABLET SR 24 HR 10/30/2023 Active   Omega-3 Fatty Acids (FISH OIL) 1200 MG Cap 10/30/2023 Active   Prenatal Multivit-Min-Fe-FA (PRE- FORMULA PO) 10/30/2023 Active   rivaroxaban (XARELTO) 20 MG Tab tablet 10/29/2023 Active   rosuvastatin (CRESTOR) 5 MG Tab 10/29/2023 Active                    ALLERGIES  No Known Allergies    PHYSICAL EXAM  VITAL SIGNS: BP (!) 145/88   Pulse 71   Temp 36.4 °C (97.5 °F) (Temporal)   Resp 16   Ht 1.803 m (5' 11\")   Wt 85 kg (187 lb 6.3 oz)   SpO2 99%   BMI 26.14 kg/m²    Constitutional: Alert in no apparent distress.  HENT: Normocephalic, Atraumatic, Bilateral external ears normal. Nose normal.   Eyes:  Conjunctiva normal, non-icteric.   Heart: Regular rate and rhythm, no murmurs.   Lungs: Non-labored respirations, clear to auscultation  Skin: Warm, Dry, No erythema, No rash.   Neurologic: Alert, Grossly non-focal.   Psychiatric: Affect normal, Judgment normal, Mood normal, Appears appropriate and not intoxicated.   Extremities: Intact distal pulses, No edema, No tenderness.       DIAGNOSTIC STUDIES / PROCEDURES      LABS  Results for orders placed or performed during the hospital encounter of 10/30/23   CBC WITH DIFFERENTIAL   Result Value Ref Range    WBC 13.5 (H) 4.8 - 10.8 K/uL    RBC 3.43 (L) 4.70 - 6.10 M/uL    Hemoglobin 10.9 (L) 14.0 - 18.0 g/dL    Hematocrit 33.2 (L) 42.0 - 52.0 %    MCV 96.8 81.4 - 97.8 fL    MCH 31.8 27.0 - 33.0 pg    MCHC 32.8 32.3 - 36.5 g/dL    RDW 45.9 35.9 - 50.0 fL    Platelet Count 213 164 - 446 K/uL    MPV 9.1 9.0 - 12.9 fL    Neutrophils-Polys 77.90 (H) 44.00 - 72.00 %    Lymphocytes 12.20 (L) 22.00 - 41.00 %    Monocytes 6.60 0.00 - 13.40 %    Eosinophils 2.60 0.00 - 6.90 %    Basophils 0.20 0.00 - 1.80 %    Immature Granulocytes 0.50 0.00 - 0.90 %    Nucleated RBC 0.00 0.00 - 0.20 /100 WBC    Neutrophils (Absolute) 10.51 (H) 1.82 - " 7.42 K/uL    Lymphs (Absolute) 1.64 1.00 - 4.80 K/uL    Monos (Absolute) 0.89 (H) 0.00 - 0.85 K/uL    Eos (Absolute) 0.35 0.00 - 0.51 K/uL    Baso (Absolute) 0.03 0.00 - 0.12 K/uL    Immature Granulocytes (abs) 0.07 0.00 - 0.11 K/uL    NRBC (Absolute) 0.00 K/uL   APTT   Result Value Ref Range    APTT 36.1 (H) 24.7 - 36.0 sec   PROTHROMBIN TIME (INR)   Result Value Ref Range    PT 16.7 (H) 12.0 - 14.6 sec    INR 1.29 (H) 0.87 - 1.13   BASIC METABOLIC PANEL   Result Value Ref Range    Sodium 134 (L) 135 - 145 mmol/L    Potassium 5.0 3.6 - 5.5 mmol/L    Chloride 104 96 - 112 mmol/L    Co2 18 (L) 20 - 33 mmol/L    Glucose 131 (H) 65 - 99 mg/dL    Bun 33 (H) 8 - 22 mg/dL    Creatinine 1.84 (H) 0.50 - 1.40 mg/dL    Calcium 9.0 8.4 - 10.2 mg/dL    Anion Gap 12.0 7.0 - 16.0   ESTIMATED GFR   Result Value Ref Range    GFR (CKD-EPI) 36 (A) >60 mL/min/1.73 m 2   HEPATIC FUNCTION PANEL   Result Value Ref Range    Alkaline Phosphatase 58 30 - 99 U/L    AST(SGOT) 18 12 - 45 U/L    ALT(SGPT) 11 2 - 50 U/L    Total Bilirubin 0.6 0.1 - 1.5 mg/dL    Direct Bilirubin <0.2 0.1 - 0.5 mg/dL    Indirect Bilirubin see below 0.0 - 1.0 mg/dL    Albumin 3.9 3.2 - 4.9 g/dL    Total Protein 6.8 6.0 - 8.2 g/dL         RADIOLOGY  I have independently interpreted the diagnostic imaging associated with this visit and am waiting the final reading from the radiologist.   My preliminary interpretation is as follows: no hydronephrosis  Radiologist interpretation:   US-RENAL   Final Result         1.  Normal renal ultrasound.            COURSE & MEDICAL DECISION MAKING    ED Observation Status? Yes; I am placing the patient in to an observation status due to a diagnostic uncertainty as well as therapeutic intensity. Patient placed in observation status at 6:27 PM, 10/30/2023.     Observation plan is as follows: Labs, bladder irrigation    Upon Reevaluation, the patient's condition has: not improved; and will be escalated to hospitalization.    Patient  discharged from ED Observation status at 10:09 PM (Time) 10/30/2023 (Date).     INITIAL ASSESSMENT, COURSE AND PLAN  Care Narrative: This is an 82-year-old gentleman with a history of nonobstructive PE on anticoagulation and recent hospitalization for hematuria and hydronephrosis secondary to ureteral obstruction thought to be possibly cancerous in nature presenting with recurrent hematuria and catheter obstruction.  Upon my initial evaluation he had some resolution of his blockage his catheter was draining what it was very bloody.    6:27 PM  Dr. Harris, urology consulted.  He recommends irrigating the bladder, will obtain labs and he will call back with more information and see if the patient is doing in a little while.    7:04 PM  I spoke with urology again and at this point he recommends an ultrasound of the kidneys and bladder to see if there is a clot in there.  If he is draining well it is reasonable to have him follow-up as planned tomorrow morning and then again on Thursday.  I do think is reasonable to hold his Xarelto at this time as well.    9:33 PM  Patient reevaluated he has had a few bags of irrigation he is still having Romeo-Aid like urine coming out.  He otherwise feels well.  Ultrasound has been performed.    I spoke with urology again and informed them that his urine is still really not clearing totally.  Patient feels more comfortable with staying in the hospital I do think this is reasonable considering that he is still having bleeding he is likely if he went home to clot again and come back.  Urology also feels that this is agreeable.  I did give the patient a dose of ceftriaxone giving the continuous bladder irrigation for empiric antibiotic coverage.  Labs are obtained his hemoglobin is stable at 10.9 he has a minimally elevated white count.  He has chronic renal insufficiency again at his baseline.  Therefore spoke with Dr. Aguilar, hospitalist who is agreeable to consult for hospitalization.   Patient will be hospitalized in guarded condition.      ADDITIONAL PROBLEM LIST  PE on AC  History of prostate ca s/p radiation  Recent ureteral obstruction s/p stent placement and biopsy    DISPOSITION AND DISCUSSIONS  I have discussed management of the patient with the following physicians and CON's:  Jeff Josue    Discussion of management with other QHP or appropriate source(s): None     Barriers to care at this time, including but not limited to:  none .     Decision tools and prescription drugs considered including, but not limited to: Antibiotics   .    FINAL DIAGNOSIS  1. Hematuria, unspecified type           Electronically signed by: Sylvie Olea M.D., 10/30/2023 5:47 PM

## 2023-10-31 NOTE — HOSPITAL COURSE
History of prostate cancer status post radiation, hyperlipidemia, history of aortic aneurysms on annual surveillance with a recent incidental finding of PE on 9/2, started on anticoagulation therapy who subsequently underwent left ureteral stent placement and bladder biopsy on 10/25 who presented with hematuria.  A three-way catheter was placed by urology as an outpatient however he came to the ER due to ongoing/worsening hematuria and concern for catheter obstruction.

## 2023-10-31 NOTE — CARE PLAN
The patient is Stable - Low risk of patient condition declining or worsening    Shift Goals  Clinical Goals: monitor urine output and CBI  Patient Goals: comfort  Family Goals: Discharge home    Progress made toward(s) clinical / shift goals:  Patient's urine output is monitored and documented.     Patient is not progressing towards the following goals: n/a

## 2023-10-31 NOTE — ASSESSMENT & PLAN NOTE
-Patient on xarelto for Right middle lobe PE (9/2 diagnosed)    Patient and daughter have chosen to stop Xarelto

## 2023-10-31 NOTE — CONSULTS
CERTIFICATE OF RETURN TO WORK    September 2, 2022      Re:   Chang Goldstein  [address]  [place] [place] [facility] [address]                        This is to certify that Chang Goldstein was seen on 9/2/2022 and can return to regular work on 9/10/2022 for until cleared by Orthopedics.        Medical information is private and protected by HIPAA Law.         SIGNATURE:___________________________________________,   9/2/2022      KELLI Gordon PA-C  [facility] [address]  [address]  [place] [address]  Dept Phone: [phone]    [facility] [place] Consult/H&P Note    Primary Service: hospitalist  Attending: Radha Casas D.O.  Patient's Name/MRN: Star Centeno, [id number]    Admit Date:10/30/2023  Today's Date: 10/31/2023   Length of stay:  LOS: 1 day   Room #: 1102/01      Reason for consult/chief complaint: gladys hematuria  ID/HPI: Star Centeno is a 82 y.o. male patient Jarad Lee is a 70 y.o. male patient with hx of high grade CaP treated in 2003 in the [place] with XRT/brachytherapy and has remained TANI. Known to our practice as he most recently underwent cystoscopy, left retrograde pyelogram and ureteroscopy with bladder biopsy and left ureteral stent placement with Dr. Londono 10/25/23. Recent PE diagnosis less than 2 mo prior to admission,  now on Xarelto. Seen in our clinic yesterday for urinary retention with gross hematuria, 20fr rodarte placed and irrigated with low clot burden. Presented later that evening to [facility] for ongoing gross hematuria, which ED then irrigated with low clot burden and asim-aid colored urine persistent. During work up, hct was stable at 33 from 5d ago and RBUS did not show evidence of clot burden or hydro.        Past Medical History:   Past Medical History:   Diagnosis Date    Cancer (HCC)     Cataract     Hyperlipidemia     Hypertension     Pulmonary emboli (HCC)         Past Surgical History:   Past Surgical History:   Procedure Laterality Date    SD CYSTOURETHROSCOPY,BIOPSIES N/A 10/24/2023    Procedure: CYSTOSCOPY, WITH BLADDER BIOPSY, LEFT RETROGRADE PYELOGRAM;  Surgeon: Jourdan Londono M.D.;  Location: [facility];  Service: Urology    SD CYSTO/URETERO/PYELOSCOPY, DX Left 10/24/2023    Procedure: URETEROSCOPY, LEFT;  Surgeon: Jourdan Londono M.D.;  Location: [facility];  Service: Urology    SD CYSTOSCOPY,INSERT URETERAL STENT Left 10/24/2023    Procedure: CYSTOSCOPY, WITH URETERAL STENT INSERTION;  Surgeon: Jourdan Londono M.D.;  [facility];  Service: Urology    EYE SURGERY      PROSTATECTOMY, RADICAL RETRO          Family History:   Family History   Problem Relation Age of Onset    Genetic Disorder Brother     Diabetes Brother     Hypertension Brother     Hyperlipidemia Brother     Genetic Disorder Sister     Diabetes Sister     Genetic Disorder Sister          Social History:   Social History     Tobacco Use    Smoking status: Former     Current packs/day: 0.00     Average packs/day: 1 pack/day for 45.0 years (45.0 ttl pk-yrs)     Types: Cigarettes     Start date: 1956     Quit date: 2001     Years since quittin.8    Smokeless tobacco: Never   Vaping Use    Vaping Use: Never used   Substance Use Topics    Alcohol use: Yes     Alcohol/week: 8.4 oz     Types: 14 Glasses of wine per week     Comment: 2 glasses of wine daily    Drug use: Never      Social History     Social History Narrative    Not on file        Allergies: he Patient has no known allergies.    Medications:   Medications Prior to Admission   Medication Sig Dispense Refill Last Dose    acetaminophen (TYLENOL) 500 MG Tab Take 1 Tablet by mouth every 6 hours as needed for Mild Pain or Moderate Pain.   prn at prn    metoprolol SR (TOPROL XL) 50 MG TABLET SR 24 HR Take 2 Tablets by mouth every day. 30 Tablet 1 10/30/2023 at am    diclofenac sodium (VOLTAREN) 1 % Gel Apply 2 g topically 4 times a day as needed (Shoulder Pain).   prn at prn    rivaroxaban (XARELTO) 20 MG Tab tablet Take 1 Tablet by mouth every day at 6 PM. 90 Tablet 3 10/29/2023 at pm    ALPRAZolam (XANAX) 0.25 MG Tab Take 1 Tablet by mouth at bedtime as needed for Sleep for up to 30 days. 30 Tablet 1 > 30 days at prn    rosuvastatin (CRESTOR) 5 MG Tab TAKE 1 TABLET BY MOUTH EVERY  Tablet 3 10/29/2023 at pm    lisinopril (PRINIVIL) 20 MG Tab Take 1 Tablet by mouth every day. 100 Tablet 3 10/30/2023 at am    Empagliflozin 25 MG Tab Take 25 mg by mouth every day for 360 days. 100 Tablet 3  "10/30/2023 at am    ezetimibe (ZETIA) 10 MG Tab Take 1 Tablet by mouth every day. 100 Tablet 0 10/29/2023 at pm    metFORMIN (GLUCOPHAGE) 500 MG Tab Take 1 Tablet by mouth 2 times a day with meals for 360 days. 200 Tablet 1 10/30/2023 at am    Coenzyme Q10 (COQ-10) 30 MG Cap Take 30 mg by mouth every evening.   10/29/2023 at pm    Omega-3 Fatty Acids (FISH OIL) 1200 MG Cap Take 1,200 mg by mouth every morning.   10/30/2023 at am    aspirin 81 MG EC tablet Take 81 mg by mouth every evening.   10/30/2023 at am    Prenatal Multivit-Min-Fe-FA (PRE- FORMULA PO) Take 1 Tablet by mouth every morning.   10/30/2023 at am         Review of Systems  Review of Systems   Constitutional:  Negative for chills and fever.   Gastrointestinal:  Negative for abdominal pain, nausea and vomiting.   Genitourinary:  Positive for hematuria. Negative for dysuria, flank pain, frequency and urgency.        Physical Exam  VITAL SIGNS: /60   Pulse 61   Temp 36.6 °C (97.9 °F) (Oral)   Resp 18   Ht 1.803 m (5' 10.98\")   Wt 85 kg (187 lb 6.3 oz)   SpO2 94%   BMI 26.15 kg/m²   Physical Exam  Vitals and nursing note reviewed. Exam conducted with a chaperone present.   Constitutional:       Appearance: Normal appearance.   HENT:      Head: Normocephalic and atraumatic.      Nose: Nose normal.   Eyes:      Pupils: Pupils are equal, round, and reactive to light.   Pulmonary:      Effort: Pulmonary effort is normal.   Genitourinary:     Comments: Strawberry lemonade tinged urine  Neurological:      Mental Status: He is alert and oriented to person, place, and time.   Psychiatric:         Mood and Affect: Mood normal.         Behavior: Behavior normal.         Thought Content: Thought content normal.           Labs:  Recent Labs     10/30/23  1836 10/31/23  0042 10/31/23  0652   WBC 13.5* 10.6  --    RBC 3.43* 3.22*  --    HEMOGLOBIN 10.9* 10.4* 10.2*   HEMATOCRIT 33.2* 31.2* 31.0*   MCV 96.8 96.9  --    MCH 31.8 32.3  --    MCHC 32.8 " "33.3  --    RDW 45.9 45.9  --    PLATELETCT 213 201  --    MPV 9.1 9.8  --      Recent Labs     10/30/23  1836 10/31/23  0042   SODIUM 134* 135   POTASSIUM 5.0 4.5   CHLORIDE 104 105   CO2 18* 18*   GLUCOSE 131* 121*   BUN 33* 32*   CREATININE 1.84* 1.61*   CALCIUM 9.0 8.9     Recent Labs     10/30/23  1836   APTT 36.1*   INR 1.29*     Glucose:  Recent Labs     10/30/23  1836 10/31/23  0042   GLUCOSE 131* 121*     Coags:  Recent Labs     10/30/23  1836   INR 1.29*         Urinalysis:   No results for input(s): \"COLORURINE\", \"CLARITY\", \"SPECGRAVITY\", \"PHURINE\", \"GLUCOSEUR\", \"KETONES\", \"NITRITE\", \"OCCULTBLOOD\", \"RBCURINE\", \"BACTERIA\", \"EPITHELCELL\" in the last 72 hours.    Invalid input(s): \"BILRUBINUR\", \"LEUESTERASE\"    Imaging:  US-RENAL   Final Result         1.  Normal renal ultrasound.          @lastct@     Assessment/Recommendation   82 y.o. male with hx of CaP managed with XRT/brachytherapy in 2003 and recent PE (less than 2 mo prior to admission), on Xarelto presenting with ongoing gross hematuria. H/h stable, Ultrasound without visible clot or hydronephrosis.    PLAN:  Home with catheter. Has in office visit to review recent pathology 11/2. Home on lovenox and hope urine remains clear.   Discussed case with Dr. Escalona who has dictated the plan of care. Communicated plan with hospitalist, patient, nurse and family member at bedside.        Yanira Arnold, P.A.-C.   [address] [address]   [phone]           "

## 2023-10-31 NOTE — ED NOTES
Rodarte cath emptied 275 dark thick blood drainage from pt's rodarte cath bag    pt w/ three way rodarte and bladder was irrigated by staff   MD aware that pt's drainage coming into rodarte bag cranberry color   pt tolerated procedure well    report given to Zachariah Schofield

## 2023-10-31 NOTE — ASSESSMENT & PLAN NOTE
"-CODE STATUS and Advanced care Planning conversation was held during admission process. Patient was already in the floor. Daughter and Nursing Staff were present by the bedside. Patient desires to be a FULL CODE. We discussed CODE STATUS in light of Quality of life. Patient reports his wife  abruptly at home earlier this year. Patient states \" I am not ready to give up and at this point don't desire to be DNR\".  Total amount of time allocated to discuss Advanced Care Planning and CODE STATU was 16 min   "

## 2023-10-31 NOTE — ASSESSMENT & PLAN NOTE
In 2001, s/p radiation therapy. PSA negative ever since  Recent biopsy was performed on 10/24 when the left ureteral stent was placed  Pathology showed atypical cells concerning for malignancy which could indicate recurrence  Urology is following

## 2023-10-31 NOTE — H&P
"Hospital Medicine History & Physical Note    Date of Service  10/30/2023    Primary Care Physician  Nirmal Gentile M.D.    Consultants  urology    Specialist Names: ERP discussed this case with Dr. Harris    Code Status  Full Code: CODE Status discussion held with patient during admission. See A/P for more details    Chief Complaint  Chief Complaint   Patient presents with    Urinary Catheter Problem     \"It's blocked up with blood\". Reports he is on blood thinners. States catheter has been blocked since this afternoon.        History of Presenting Illness  Star Centeno is a 82 y.o. male, with distant h/o Prostate cancer and recently started on Xarelto for  Right Middle Lobe PE on 9/2. Admitted to the hospital with Hematuria from 10/21- 10/25 found to have left hydronephrosis and hydroureter ureter at the level of the UVJ underwent uteroscopy and left ureteral stent was placed for severe narrowing.  Biopsy and cytology were sent.  Patient had Xarelto resumed. This morning he was unable to urinate and noticed blood in urine. and follow-up visit earlier in the day with urologist with hematuria, had a three-way Hedrick catheter placed.  He returned to the ED on 10/30/2023 with worsening hematuria.      I discussed the plan of care with patient and ERP Dr. Olea .    Review of Systems  Review of Systems   Constitutional:  Positive for malaise/fatigue. Negative for fever.   HENT:  Negative for congestion and sore throat.    Eyes:  Negative for blurred vision and double vision.   Respiratory:  Negative for cough and shortness of breath.    Cardiovascular:  Negative for chest pain and palpitations.   Gastrointestinal:  Negative for nausea and vomiting.   Genitourinary:  Positive for hematuria. Negative for dysuria and urgency.   Musculoskeletal:  Negative for myalgias and neck pain.   Skin:  Negative for itching and rash.   Neurological:  Negative for dizziness, weakness and headaches.   Endo/Heme/Allergies:  " Does not bruise/bleed easily.   Psychiatric/Behavioral:  Negative for depression. The patient does not have insomnia.        Past Medical History   has a past medical history of Cancer (HCC), Cataract, Hyperlipidemia, Hypertension, and Pulmonary emboli (HCC).    Surgical History   has a past surgical history that includes eye surgery; prostatectomy, radical retro; pr cystourethroscopy,biopsies (N/A, 10/24/2023); pr cysto/uretero/pyeloscopy, dx (Left, 10/24/2023); and pr cystoscopy,insert ureteral stent (Left, 10/24/2023).     Family History  family history includes Diabetes in his brother and sister; Genetic Disorder in his brother, sister, and sister; Hyperlipidemia in his brother; Hypertension in his brother.   Family history reviewed with patient. There is no family history that is pertinent to the chief complaint.     Social History   reports that he quit smoking about 22 years ago. His smoking use included cigarettes. He started smoking about 67 years ago. He has a 45.0 pack-year smoking history. He has never used smokeless tobacco. He reports current alcohol use of about 8.4 oz of alcohol per week. He reports that he does not use drugs.    Allergies  No Known Allergies    Medications  Prior to Admission Medications   Prescriptions Last Dose Informant Patient Reported? Taking?   ALPRAZolam (XANAX) 0.25 MG Tab  Patient No No   Sig: Take 1 Tablet by mouth at bedtime as needed for Sleep for up to 30 days.   Coenzyme Q10 (COQ-10) 30 MG Cap  Patient Yes No   Sig: Take 30 mg by mouth every evening.   Empagliflozin 25 MG Tab  Patient No No   Sig: Take 25 mg by mouth every day for 360 days.   Omega-3 Fatty Acids (FISH OIL) 1200 MG Cap  Patient Yes No   Sig: Take 1,200 mg by mouth every morning.   Prenatal Multivit-Min-Fe-FA (PRE- FORMULA PO)  Patient Yes No   Sig: Take 1 Tablet by mouth every morning.   acetaminophen (TYLENOL) 500 MG Tab   No No   Sig: Take 1 Tablet by mouth every 6 hours as needed for Mild Pain  or Moderate Pain.   aspirin 81 MG EC tablet  Patient Yes No   Sig: Take 81 mg by mouth every evening.   diclofenac sodium (VOLTAREN) 1 % Gel  Patient Yes No   Sig: Apply 2 g topically 4 times a day as needed (Shoulder Pain).   ezetimibe (ZETIA) 10 MG Tab  Patient No No   Sig: Take 1 Tablet by mouth every day.   lisinopril (PRINIVIL) 20 MG Tab  Patient No No   Sig: Take 1 Tablet by mouth every day.   metFORMIN (GLUCOPHAGE) 500 MG Tab  Patient No No   Sig: Take 1 Tablet by mouth 2 times a day with meals for 360 days.   metoprolol SR (TOPROL XL) 50 MG TABLET SR 24 HR   No No   Sig: Take 2 Tablets by mouth every day.   rivaroxaban (XARELTO) 20 MG Tab tablet  Patient No No   Sig: Take 1 Tablet by mouth every day at 6 PM.   rosuvastatin (CRESTOR) 5 MG Tab  Patient No No   Sig: TAKE 1 TABLET BY MOUTH EVERY DAY      Facility-Administered Medications: None       Physical Exam  Temp:  [36.4 °C (97.5 °F)] 36.4 °C (97.5 °F)  Pulse:  [61-71] 67  Resp:  [16-18] 18  BP: (133-145)/(66-88) 133/66  SpO2:  [94 %-99 %] 94 %  Blood Pressure : 133/66   Temperature: 36.4 °C (97.5 °F)   Pulse: 67   Respiration: 18   Pulse Oximetry: 94 %       Physical Exam  Constitutional:       Appearance: Normal appearance.   HENT:      Head: Normocephalic and atraumatic.      Nose: Nose normal.      Mouth/Throat:      Mouth: Mucous membranes are moist.      Pharynx: Oropharynx is clear.   Eyes:      Extraocular Movements: Extraocular movements intact.      Pupils: Pupils are equal, round, and reactive to light.   Cardiovascular:      Rate and Rhythm: Normal rate and regular rhythm.      Pulses: Normal pulses.      Heart sounds: Normal heart sounds.   Pulmonary:      Effort: Pulmonary effort is normal.      Breath sounds: Normal breath sounds.   Abdominal:      General: Abdomen is flat. Bowel sounds are normal.      Palpations: Abdomen is soft.   Genitourinary:     Comments: 3 Way Hedrick Catheter present. Undergoing CBI with red tinged urine, but no clots  "seen at thi time  Musculoskeletal:      Cervical back: Normal range of motion and neck supple.   Skin:     General: Skin is warm and dry.   Neurological:      General: No focal deficit present.      Mental Status: He is alert and oriented to person, place, and time.   Psychiatric:         Mood and Affect: Mood normal.         Behavior: Behavior normal.         Laboratory:  Recent Labs     10/30/23  1836   WBC 13.5*   RBC 3.43*   HEMOGLOBIN 10.9*   HEMATOCRIT 33.2*   MCV 96.8   MCH 31.8   MCHC 32.8   RDW 45.9   PLATELETCT 213   MPV 9.1     Recent Labs     10/30/23  1836   SODIUM 134*   POTASSIUM 5.0   CHLORIDE 104   CO2 18*   GLUCOSE 131*   BUN 33*   CREATININE 1.84*   CALCIUM 9.0     Recent Labs     10/30/23  1836   ALTSGPT 11   ASTSGOT 18   ALKPHOSPHAT 58   TBILIRUBIN 0.6   DBILIRUBIN <0.2   GLUCOSE 131*     Recent Labs     10/30/23  1836   APTT 36.1*   INR 1.29*     No results for input(s): \"NTPROBNP\" in the last 72 hours.      No results for input(s): \"TROPONINT\" in the last 72 hours.    Imaging:  US-RENAL   Final Result         1.  Normal renal ultrasound.          X-Ray:  I have personally reviewed the images and compared with prior images. and My impression is: Renal ultrasound was normal.    Assessment/Plan:  Justification for Admission Status  I anticipate this patient will require at least two midnights for appropriate medical management, necessitating inpatient admission because 82-year-old male, being admitted with gladys hematuria requiring CBI.  Recent history of hematuria, on Xarelto for PE diagnosed early September.  Anticoagulation was resumed.  Patient is following with outpatient urology, had a three-way catheter placed, now requiring CBI.  Urology team will follow.        * Gross hematuria- (present on admission)  Assessment & Plan  -Inpatient Status medical floor  -Hematuria under the context of anticoagulation. Recent admission for hematuria, and xarelto was held and resumed. Recent Left ureteral " "stent placed 1 week ago for severe stricture.  -Seen by Urology team earlier in the Day and 3 way Hedrick catheter was place.  -Catheter irrigated in the ED and remained bleeding/ clotting.   -Undergoing CBI now  -Appreciate Urology Consult and recommendations: ERP discussed this case with Dr. Harris.  -Hold apirin and Xarelto tonight and closely monitor bleeding  -Serial H&H, so far Hgb stable, but BP was low in the ED 90/50.    Leukocytosis  Assessment & Plan  -WBC 13.5. Hedrick Catheter placed today. Started on Rocephin in the ED- will continue for now    Chronic anticoagulation  Assessment & Plan  -Patient on xarelto for Right middle lobe PE. Holding Xarelto for now given ongoing hematuria.     CKD (chronic kidney disease), stage III (HCC)  Assessment & Plan  -Monitor creatinine function. Levels are at baseline. Avoid nephrotoxic medication    Hyponatremia  Assessment & Plan  -Monitor sodium levels in am    ACP (advance care planning)  Assessment & Plan  -CODE STATUS and Advanced care Planning conversation was held during admission process. Patient was already in the floor. Daughter and Nursing Staff were present by the bedside. Patient desires to be a FULL CODE. We discussed CODE STATUS in light of Quality of life. Patient reports his wife  abruptly at home earlier this year. Patient states \" I am not ready to give up and at this point don't desire to be DNR\".  Total amount of time allocated to discuss Advanced Care Planning and CODE STATU was 16 min     Aneurysm of ascending aorta (HCC)- (present on admission)  Assessment & Plan  -Last Surveillance CTA Chest done on :\"There is borderline aneurysmal dilatation of the descending thoracic aorta with ascending thoracic aorta upper limits of normal in caliber at 3.5 cm.\"  -Follow up outpatient    Non-insulin dependent type 2 diabetes mellitus (HCC)- (present on admission)  Assessment & Plan  -Hold Metformin and start RISS    History of adenocarcinoma of " prostate- (present on admission)  Assessment & Plan  -In 2001, s/p radiation therapy. PSA negative ever since    Essential hypertension, benign- (present on admission)  Assessment & Plan  -BP 90/50 in ED. Hold Metoprolol and Lisinopril for now        VTE prophylaxis: SCDs/TEDs

## 2023-10-31 NOTE — CARE PLAN
The patient is Stable - Low risk of patient condition declining or worsening    Shift Goals  Clinical Goals: Pt will remain free of clot formation; CBI protocol w/I's & O's recorded  Patient Goals: Sleep.  Family Goals: Discharge home    Progress made toward(s) clinical / shift goals:  I's & O's strictly recorded through end of shift; Not clots formed during shift.    Patient is not progressing towards the following goals:

## 2023-10-31 NOTE — ASSESSMENT & PLAN NOTE
Blood pressure better today however still on the low normal side   I will continue to hold Metoprolol and Lisinopril for now, as we will start tamsulosin

## 2023-10-31 NOTE — PROGRESS NOTES
4 Eyes Skin Assessment Completed by JB Paul and JB Caldera.    Head WDL  Ears WDL  Nose WDL  Mouth WDL  Neck WDL  Breast/Chest WDL  Shoulder Blades WDL  Spine WDL  (R) Arm/Elbow/Hand Bruising forearm  (L) Arm/Elbow/Hand WDL  Abdomen WDL  Groin WDL  Scrotum/Coccyx/Buttocks WDL  (R) Leg Bruising  (L) Leg Scar on knee  (R) Heel/Foot/Toe WDL  (L) Heel/Foot/Toe WDL          Devices In Places Blood Pressure Cuff, Pulse Ox, and Hedrick      Interventions In Place Pillows    Possible Skin Injury No    Pictures Uploaded Into Epic N/A  Wound Consult Placed N/A  RN Wound Prevention Protocol Ordered No

## 2023-10-31 NOTE — PROGRESS NOTES
Report received from [facility] ED nurse. Pt arrived via gurney. CBI initiated in ED.    Pt alert and oriented, x4.Pain scale of  2/10, declines pharmacological intervention.Daughter at bedside. Pt denies any nausea/SOB/pain. Pt is calm, w/unlabored breathing. Daughter at bedside. Fall precautions in place. Call light within reach. VS taken. Plan of care discussed with patient.

## 2023-10-31 NOTE — PROGRESS NOTES
Hospital Medicine Daily Progress Note    Date of Service  10/31/2023    Chief Complaint  Star Centeno is a 82 y.o. male admitted 10/30/2023 with blood in urine    Hospital Course  History of prostate cancer status post radiation, hyperlipidemia, history of aortic aneurysms on annual surveillance with a recent incidental finding of PE on 9/2, started on anticoagulation therapy who subsequently underwent left ureteral stent placement and bladder biopsy on 10/25 who presented with hematuria.  A three-way catheter was placed by urology as an outpatient however he came to the ER due to ongoing/worsening hematuria and concern for catheter obstruction.    Interval Problem Update  10/31: N.p.o., pending urology recommendations.  Hemoglobin stable.  Urine is clearing with ongoing irrigation.  BP has improved from the 90s to the low 100s systolic.  Xarelto held.  UA pending, hold ceftriaxone    I have discussed this patient's plan of care and discharge plan at IDT rounds today with Case Management, Nursing, Nursing leadership, and other members of the IDT team.    Consultants/Specialty  urology    Code Status  Full Code    Disposition  The patient is not medically cleared for discharge to home or a post-acute facility.  Anticipate discharge to: home with close outpatient follow-up    I have placed the appropriate orders for post-discharge needs.    Review of Systems  Review of Systems   Constitutional:  Negative for chills, fever and malaise/fatigue.   HENT:  Negative for sore throat.    Respiratory:  Negative for cough and shortness of breath.    Cardiovascular:  Negative for chest pain and palpitations.   Gastrointestinal:  Negative for abdominal pain, blood in stool, diarrhea, heartburn, nausea and vomiting.   Genitourinary:  Positive for hematuria. Negative for dysuria and frequency.   Musculoskeletal:  Negative for back pain and myalgias.   Neurological:  Negative for dizziness, focal weakness, weakness and  headaches.   Psychiatric/Behavioral:  Negative for depression and hallucinations.    All other systems reviewed and are negative.       Physical Exam  Temp:  [36.4 °C (97.5 °F)-37.6 °C (99.6 °F)] 36.6 °C (97.9 °F)  Pulse:  [61-71] 61  Resp:  [16-18] 18  BP: ()/(50-88) 108/60  SpO2:  [94 %-99 %] 94 %    Physical Exam  Constitutional:       Appearance: Normal appearance.   HENT:      Head: Normocephalic and atraumatic.      Nose: Nose normal.      Mouth/Throat:      Mouth: Mucous membranes are moist.   Eyes:      Extraocular Movements: Extraocular movements intact.      Pupils: Pupils are equal, round, and reactive to light.   Cardiovascular:      Rate and Rhythm: Normal rate and regular rhythm.      Heart sounds: No murmur heard.  Pulmonary:      Effort: Pulmonary effort is normal. No respiratory distress.      Breath sounds: Normal breath sounds. No stridor.   Abdominal:      General: Abdomen is flat. Bowel sounds are normal. There is no distension.      Palpations: Abdomen is soft.      Tenderness: There is no abdominal tenderness. There is no guarding.   Genitourinary:     Comments: Blood-tinged, dark pink urine in Hedrick bag  Musculoskeletal:         General: No swelling, tenderness or deformity.      Cervical back: Normal range of motion and neck supple.   Skin:     General: Skin is warm and dry.      Coloration: Skin is pale.   Neurological:      General: No focal deficit present.      Mental Status: He is alert and oriented to person, place, and time. Mental status is at baseline.   Psychiatric:         Mood and Affect: Mood normal.         Behavior: Behavior normal.         Thought Content: Thought content normal.         Fluids    Intake/Output Summary (Last 24 hours) at 10/31/2023 0945  Last data filed at 10/31/2023 0800  Gross per 24 hour   Intake 240 ml   Output -7300 ml   Net 7540 ml       Laboratory  Recent Labs     10/30/23  1836 10/31/23  0042 10/31/23  0652   WBC 13.5* 10.6  --    RBC 3.43*  3.22*  --    HEMOGLOBIN 10.9* 10.4* 10.2*   HEMATOCRIT 33.2* 31.2* 31.0*   MCV 96.8 96.9  --    MCH 31.8 32.3  --    MCHC 32.8 33.3  --    RDW 45.9 45.9  --    PLATELETCT 213 201  --    MPV 9.1 9.8  --      Recent Labs     10/30/23  1836 10/31/23  0042   SODIUM 134* 135   POTASSIUM 5.0 4.5   CHLORIDE 104 105   CO2 18* 18*   GLUCOSE 131* 121*   BUN 33* 32*   CREATININE 1.84* 1.61*   CALCIUM 9.0 8.9     Recent Labs     10/30/23  1836   APTT 36.1*   INR 1.29*               Imaging  US-RENAL   Final Result         1.  Normal renal ultrasound.           Assessment/Plan  * Gross hematuria- (present on admission)  Assessment & Plan  -Hematuria under the context of anticoagulation. Recent admission for hematuria, and xarelto was held and resumed.   Recent Left ureteral stent placed 1 week ago for severe stricture (possibly from radiation? Vs prostate ca recurrence)  -Seen by Urology in clinic on day of admission, three-way Hedrick catheter placed however became clotted at home  -Catheter irrigated in the ED and remained bleeding/ clotting.   -Undergoing CBI now, urine is clearing, hemoglobin stable  -Appreciate Urology Consult: Pending recommendations from Dr. Harris.  -Hold apirin and Xarelto (he is only 2 months from his PE diagnosis therefore could consider IVC filter if he is unable to tolerate Xarelto as his thromboembolic risk remains relatively high without anticoagulation  Check hemoglobin every 12  N.p.o. in case cystoscopy as needed  Continue irrigation    Leukocytosis  Assessment & Plan  -WBC 13.5. Hedrick Catheter placed today. Started on Rocephin in the ED  Check procalcitonin  Check UA/Culture  He has no dysuria or symptoms therefore hold ceftriaxone until cultures/UA return    CKD (chronic kidney disease), stage III (HCC)  Assessment & Plan  -Monitor creatinine function. Levels are at baseline. Avoid nephrotoxic medication    Hyponatremia  Assessment & Plan  Improved  Repeat in a.m.    Chronic  anticoagulation  Assessment & Plan  -Patient on xarelto for Right middle lobe PE (9/2 diagnosed)  Hold Xarelto for now given ongoing hematuria.   Consider IVC filter if unable to tolerate anticoagulation    History of pulmonary embolism- (present on admission)  Assessment & Plan  Incidental finding on aneurysm CT screening, initially was on Eliquis and then changed to Xarelto, no reported of bleeding side effect at that time  He developed hematuria following a cystoscopy, left ureteral stent placement with biopsy that was performed on 10/25  Hold Xarelto  Hemoglobin has been stable  Check every 12    Aneurysm of ascending aorta (HCC)- (present on admission)  Assessment & Plan  -Last Surveillance CTA Chest done on 9/6-stable at 3.5 cm but incidental finding of PE  -Follow up outpatient    Non-insulin dependent type 2 diabetes mellitus (HCC)- (present on admission)  Assessment & Plan  Hold Metformin, continue ISS    History of adenocarcinoma of prostate- (present on admission)  Assessment & Plan  In 2001, s/p radiation therapy. PSA negative ever since  Recent biopsy was performed on 10/24 when the left ureteral stent was placed  Pathology showed atypical cells concerning for malignancy which could indicate recurrence  Urology is following    Essential hypertension, benign- (present on admission)  Assessment & Plan  -BP 90/50 in ED-- > 108/60 this AM  Hold Metoprolol and Lisinopril for now         VTE prophylaxis: On hold due to hematuria  I have performed a physical exam and reviewed and updated ROS and Plan today (10/31/2023). In review of yesterday's note (10/30/2023), there are no changes except as documented above.      Total time:  52 minutes.  I spent greater than 50% of the time for patient care, counseling, and coordination on this date, including unit/floor time, and face-to-face time with the patient as per interval events and assessment and plan above

## 2023-10-31 NOTE — ASSESSMENT & PLAN NOTE
11/4/2023   Hematuria stopped with stopping Xarelto  Patient and daugh do not want to be restarted on Xarelto.  They understands and accept the risk of their decision     11/5/2023   Hematuria recurred after the need to re-place a rodarte for retension last night

## 2023-10-31 NOTE — ASSESSMENT & PLAN NOTE
HbA1c 7.1  Hold home metformin and jardiance  Monitor Accuchecks TIDAC and Bedtime  Insulin sliding scale per protocol    11/5/2023   Blood sugars reasonably controlled 150's-160's, will continue current regimen.

## 2023-10-31 NOTE — DIETARY
Nutrition Services:    Per nursing's nutrition screen upon admit, pt was drinking Ensure supplement PTA.     Pt is receiving a cardiac diet w/ recorded PO of 50-75% at breakfast. PO during prior admit in October was adequate at % of all recorded meals    Current BMI is 26.15 indicating that pt is at a healthy wt for his age of 82 years.     Pt also w/ report of wt loss x 6 months. No report of poor PO PTA. Based on review of wt hx in Epic, pt has lost 4.4 kg (4.9%) x 6 months. Wt loss based on wt of 86 kg taken during prior hospitalization on 10/21/23 was 1%. Wt loss is not significant. .     Pt does not appear to be at risk for malnutrition. Current PO intake appears to be adequate.     RD will follow per dept guidelines.

## 2023-11-01 ENCOUNTER — HOME HEALTH ADMISSION (OUTPATIENT)
Dept: HOME HEALTH SERVICES | Facility: HOME HEALTHCARE | Age: 83
End: 2023-11-01
Payer: MEDICARE

## 2023-11-01 ENCOUNTER — APPOINTMENT (OUTPATIENT)
Dept: RADIOLOGY | Facility: MEDICAL CENTER | Age: 83
DRG: 813 | End: 2023-11-01
Attending: INTERNAL MEDICINE
Payer: MEDICARE

## 2023-11-01 LAB
ALBUMIN SERPL BCP-MCNC: 4 G/DL (ref 3.2–4.9)
BASOPHILS # BLD AUTO: 0.4 % (ref 0–1.8)
BASOPHILS # BLD: 0.04 K/UL (ref 0–0.12)
BUN SERPL-MCNC: 29 MG/DL (ref 8–22)
CALCIUM ALBUM COR SERPL-MCNC: 9.5 MG/DL (ref 8.5–10.5)
CALCIUM SERPL-MCNC: 9.5 MG/DL (ref 8.4–10.2)
CHLORIDE SERPL-SCNC: 103 MMOL/L (ref 96–112)
CO2 SERPL-SCNC: 19 MMOL/L (ref 20–33)
CREAT SERPL-MCNC: 1.59 MG/DL (ref 0.5–1.4)
EOSINOPHIL # BLD AUTO: 0.38 K/UL (ref 0–0.51)
EOSINOPHIL NFR BLD: 3.7 % (ref 0–6.9)
ERYTHROCYTE [DISTWIDTH] IN BLOOD BY AUTOMATED COUNT: 45.1 FL (ref 35.9–50)
GFR SERPLBLD CREATININE-BSD FMLA CKD-EPI: 42 ML/MIN/1.73 M 2
GLUCOSE BLD STRIP.AUTO-MCNC: 158 MG/DL (ref 65–99)
GLUCOSE BLD STRIP.AUTO-MCNC: 165 MG/DL (ref 65–99)
GLUCOSE BLD STRIP.AUTO-MCNC: 181 MG/DL (ref 65–99)
GLUCOSE BLD STRIP.AUTO-MCNC: 185 MG/DL (ref 65–99)
GLUCOSE SERPL-MCNC: 152 MG/DL (ref 65–99)
HCT VFR BLD AUTO: 33.9 % (ref 42–52)
HCT VFR BLD AUTO: 35.7 % (ref 42–52)
HGB BLD-MCNC: 11.3 G/DL (ref 14–18)
HGB BLD-MCNC: 11.9 G/DL (ref 14–18)
IMM GRANULOCYTES # BLD AUTO: 0.04 K/UL (ref 0–0.11)
IMM GRANULOCYTES NFR BLD AUTO: 0.4 % (ref 0–0.9)
LYMPHOCYTES # BLD AUTO: 1.8 K/UL (ref 1–4.8)
LYMPHOCYTES NFR BLD: 17.4 % (ref 22–41)
MAGNESIUM SERPL-MCNC: 2.2 MG/DL (ref 1.5–2.5)
MCH RBC QN AUTO: 32.2 PG (ref 27–33)
MCHC RBC AUTO-ENTMCNC: 33.3 G/DL (ref 32.3–36.5)
MCV RBC AUTO: 96.5 FL (ref 81.4–97.8)
MONOCYTES # BLD AUTO: 0.8 K/UL (ref 0–0.85)
MONOCYTES NFR BLD AUTO: 7.8 % (ref 0–13.4)
NEUTROPHILS # BLD AUTO: 7.26 K/UL (ref 1.82–7.42)
NEUTROPHILS NFR BLD: 70.3 % (ref 44–72)
NRBC # BLD AUTO: 0 K/UL
NRBC BLD-RTO: 0 /100 WBC (ref 0–0.2)
PHOSPHATE SERPL-MCNC: 3.9 MG/DL (ref 2.5–4.5)
PLATELET # BLD AUTO: 238 K/UL (ref 164–446)
PMV BLD AUTO: 9.7 FL (ref 9–12.9)
POTASSIUM SERPL-SCNC: 4.3 MMOL/L (ref 3.6–5.5)
RBC # BLD AUTO: 3.7 M/UL (ref 4.7–6.1)
SODIUM SERPL-SCNC: 135 MMOL/L (ref 135–145)
WBC # BLD AUTO: 10.3 K/UL (ref 4.8–10.8)

## 2023-11-01 PROCEDURE — 700111 HCHG RX REV CODE 636 W/ 250 OVERRIDE (IP): Performed by: INTERNAL MEDICINE

## 2023-11-01 PROCEDURE — 85018 HEMOGLOBIN: CPT

## 2023-11-01 PROCEDURE — 700102 HCHG RX REV CODE 250 W/ 637 OVERRIDE(OP): Performed by: HOSPITALIST

## 2023-11-01 PROCEDURE — 700111 HCHG RX REV CODE 636 W/ 250 OVERRIDE (IP): Mod: JZ | Performed by: HOSPITALIST

## 2023-11-01 PROCEDURE — 80069 RENAL FUNCTION PANEL: CPT

## 2023-11-01 PROCEDURE — 85025 COMPLETE CBC W/AUTO DIFF WBC: CPT

## 2023-11-01 PROCEDURE — 770001 HCHG ROOM/CARE - MED/SURG/GYN PRIV*

## 2023-11-01 PROCEDURE — 85014 HEMATOCRIT: CPT

## 2023-11-01 PROCEDURE — 82962 GLUCOSE BLOOD TEST: CPT | Mod: 91

## 2023-11-01 PROCEDURE — A9270 NON-COVERED ITEM OR SERVICE: HCPCS | Performed by: HOSPITALIST

## 2023-11-01 PROCEDURE — 93970 EXTREMITY STUDY: CPT

## 2023-11-01 PROCEDURE — 94760 N-INVAS EAR/PLS OXIMETRY 1: CPT

## 2023-11-01 PROCEDURE — 36415 COLL VENOUS BLD VENIPUNCTURE: CPT

## 2023-11-01 PROCEDURE — 99233 SBSQ HOSP IP/OBS HIGH 50: CPT | Performed by: INTERNAL MEDICINE

## 2023-11-01 PROCEDURE — 83735 ASSAY OF MAGNESIUM: CPT

## 2023-11-01 RX ORDER — ENOXAPARIN SODIUM 100 MG/ML
1 INJECTION SUBCUTANEOUS EVERY 12 HOURS
Qty: 10 EACH | Refills: 0 | Status: ACTIVE | OUTPATIENT
Start: 2023-11-01 | End: 2023-11-06

## 2023-11-01 RX ORDER — ENOXAPARIN SODIUM 100 MG/ML
1 INJECTION SUBCUTANEOUS EVERY 12 HOURS
Status: DISCONTINUED | OUTPATIENT
Start: 2023-11-01 | End: 2023-11-01

## 2023-11-01 RX ADMIN — EZETIMIBE 10 MG: 10 TABLET ORAL at 17:14

## 2023-11-01 RX ADMIN — ENOXAPARIN SODIUM 80 MG: 80 INJECTION SUBCUTANEOUS at 07:55

## 2023-11-01 RX ADMIN — ACETAMINOPHEN 650 MG: 325 TABLET ORAL at 22:33

## 2023-11-01 RX ADMIN — OXYBUTYNIN CHLORIDE 5 MG: 5 TABLET ORAL at 17:14

## 2023-11-01 RX ADMIN — OXYBUTYNIN CHLORIDE 5 MG: 5 TABLET ORAL at 11:16

## 2023-11-01 RX ADMIN — INSULIN HUMAN 1 UNITS: 100 INJECTION, SOLUTION PARENTERAL at 11:16

## 2023-11-01 RX ADMIN — HYDROMORPHONE HYDROCHLORIDE 1 MG: 1 INJECTION, SOLUTION INTRAMUSCULAR; INTRAVENOUS; SUBCUTANEOUS at 12:34

## 2023-11-01 RX ADMIN — INSULIN HUMAN 1 UNITS: 100 INJECTION, SOLUTION PARENTERAL at 20:38

## 2023-11-01 RX ADMIN — HYDROMORPHONE HYDROCHLORIDE 0.5 MG: 1 INJECTION, SOLUTION INTRAMUSCULAR; INTRAVENOUS; SUBCUTANEOUS at 23:40

## 2023-11-01 RX ADMIN — ROSUVASTATIN 5 MG: 10 TABLET, FILM COATED ORAL at 17:14

## 2023-11-01 RX ADMIN — INSULIN HUMAN 1 UNITS: 100 INJECTION, SOLUTION PARENTERAL at 17:20

## 2023-11-01 ASSESSMENT — COGNITIVE AND FUNCTIONAL STATUS - GENERAL
DAILY ACTIVITIY SCORE: 21
MOBILITY SCORE: 22
CLIMB 3 TO 5 STEPS WITH RAILING: A LITTLE
TOILETING: A LITTLE
SUGGESTED CMS G CODE MODIFIER DAILY ACTIVITY: CJ
STANDING UP FROM CHAIR USING ARMS: A LITTLE
DRESSING REGULAR LOWER BODY CLOTHING: A LITTLE
HELP NEEDED FOR BATHING: A LITTLE
SUGGESTED CMS G CODE MODIFIER MOBILITY: CJ

## 2023-11-01 ASSESSMENT — ENCOUNTER SYMPTOMS
HALLUCINATIONS: 0
HEADACHES: 0
WEAKNESS: 0
PALPITATIONS: 0
MYALGIAS: 0
DIARRHEA: 0
SORE THROAT: 0
HEARTBURN: 0
NAUSEA: 0
FOCAL WEAKNESS: 0
SHORTNESS OF BREATH: 0
DIZZINESS: 0
DEPRESSION: 0
CHILLS: 0
BLOOD IN STOOL: 0
VOMITING: 0
FEVER: 0
BACK PAIN: 0
ABDOMINAL PAIN: 0
COUGH: 0

## 2023-11-01 ASSESSMENT — PAIN DESCRIPTION - PAIN TYPE
TYPE: ACUTE PAIN

## 2023-11-01 NOTE — PROGRESS NOTES
"0800 - Received bedside report from NOC, RN. Assumed care. Pt is A&O 4. Pt has complaints of pressure in the bladder with the rodarte and irrigated the rodarte, multiple medium and large clots were irrigated. Declines taking the pain medication and verbalized felt better after irrigating. Denies N/V as of assessment. Safety precaution in place, call button and belongings placed within reach. All needs met at this time. Hourly rounding in place.   1145 - patient requested to a walk and when pateint was about to get up patient verbalized \" I feel pressure\". This Rn manually irrigated with 150cc and only 100cc came out,started to feel resistance on aspiration. This RN informed the PA and ordered to \"turn on the CBI to high flow and clamp at 3pm.   1200 - after the patient had the walk going back to lay down on the bed, patient verbalized \"feeling more pressure\". This RN flushed with 100ml and got 10ml only with resistance, and leaking on the pennis is noted. This RN informed the PA and was instructed to \"stop CBI and hand irrigate\"     "

## 2023-11-01 NOTE — PROGRESS NOTES
"Note to reader: this note follows the APSO format rather than the historical SOAP format. Assessment and plan located at the top of the note for ease of use.    Chief Complaint  82 y.o. year old male here with Urinary Catheter Problem (\"It's blocked up with blood\". Reports he is on blood thinners. States catheter has been blocked since this afternoon. )      Assessment/Plan  Interval History   Active Hospital Problems    Diagnosis     Gross hematuria [R31.0]     Chronic anticoagulation [Z79.01]     Hyponatremia [E87.1]     CKD (chronic kidney disease), stage III (HCC) [N18.30]     Leukocytosis [D72.829]     History of pulmonary embolism [Z86.711]     Aneurysm of ascending aorta (HCC) [I71.21]     Essential hypertension, benign [I10]     Non-insulin dependent type 2 diabetes mellitus (HCC) [E11.9]     History of adenocarcinoma of prostate [Z85.46]      Formatting of this note might be different from the original.  s/p radio tx Dr Wright; PSA <0.1 since        pt seen and examined     11/1- pt requiring manual irrigation last night and this AM with decent size clot burden. Urine now strawberry lemonade and flowing. H/h stable. Admits he does not feel comfortable manually irrigating catheter at home.       Disposition  Stable      PLAN:  I clamped CBI, then he had some clot  obstruction so was unclamped and is now running. Hope urine will stay pink and clot free as we hold anticoags. No role for cystoscopy at this point, but will make NPO at [facility] in case need for OR if continues to have clot obstruction. If remains clear, will plan to keep follow up with our office tomorrow at 4:15PM as scheduled for path discussion.   Discussed case with Dr. Benjamin, nursing staff, hospitalist and family.     Review of Systems  Physical Exam   Review of Systems   Constitutional:  Negative for chills and fever.   Respiratory:  Negative for shortness of breath.    Gastrointestinal:  Negative for abdominal pain, nausea and vomiting. "   Genitourinary:  Positive for hematuria. Negative for dysuria and urgency.   All other systems reviewed and are negative.    Vitals:    10/31/23 1700 10/31/23 1922 10/31/23 2022 11/01/23 0500   BP: 101/58 102/58  90/58   Pulse: 66 80  61   Resp: 16 16  16   Temp: 36.4 °C (97.6 °F) 36.5 °C (97.7 °F)  36.1 °C (97 °F)   TempSrc: Temporal Temporal  Temporal   SpO2: 96% 90% 93% 95%   Weight:       Height:         Physical Exam  Vitals and nursing note reviewed. Exam conducted with a chaperone present.   Constitutional:       Appearance: Normal appearance.   HENT:      Head: Normocephalic and atraumatic.   Eyes:      Pupils: Pupils are equal, round, and reactive to light.   Pulmonary:      Effort: Pulmonary effort is normal.   Genitourinary:     Comments: Hedrick draining strawberry lemonade tinged urine  Skin:     General: Skin is dry.   Neurological:      Mental Status: He is alert and oriented to person, place, and time.   Psychiatric:         Mood and Affect: Mood normal.         Behavior: Behavior normal.          Hematology Chemistry   Lab Results   Component Value Date/Time    WBC 10.3 11/01/2023 07:26 AM    HEMOGLOBIN 11.9 (L) 11/01/2023 07:26 AM    HEMATOCRIT 35.7 (L) 11/01/2023 07:26 AM    PLATELETCT 238 11/01/2023 07:26 AM     Lab Results   Component Value Date/Time    SODIUM 135 11/01/2023 07:26 AM    POTASSIUM 4.3 11/01/2023 07:26 AM    CHLORIDE 103 11/01/2023 07:26 AM    CO2 19 (L) 11/01/2023 07:26 AM    GLUCOSE 152 (H) 11/01/2023 07:26 AM    BUN 29 (H) 11/01/2023 07:26 AM    CREATININE 1.59 (H) 11/01/2023 07:26 AM         Labs not explicitly included in this progress note were reviewed by the author.   Radiology/imaging not explicitly included in this progress note was reviewed by the author.     Core Measures

## 2023-11-01 NOTE — DOCUMENTATION QUERY
"                                                                         [facility]                                                                       Query Response Note      PATIENT:               SOL HARRY  ACCT #:                  [id number]  MRN:                     [id number]  :                      1940  ADMIT DATE:       10/30/2023 5:36 PM  DISCH DATE:          RESPONDING  PROVIDER #:        [id number]           QUERY TEXT:    Please clarify the relationship, if any, between hematuria and Xarelto.    The patient's Clinical Indicators include:  H&P: \"recently started on Xarelto for Right Middle Lobe PE on . ... Hematuria under the context of anticoagulation. Recent admission for hematuria, and xarelto was held and resumed. ... Holding Xarelto for now given ongoing hematuria\"    10/31 PN: \"only 2 months from his PE diagnosis therefore could consider IVC filter if he is unable to tolerate Xarelto as his thromboembolic risk remains relatively high without anticoagulation\"    Labs: Hgb 10.9 > 10.4 > 10.2 > 10.5 > 11.9, INR 1.29, PT 16.7, APTT 36.1    Risk Factors: Recurrent hematuria, urinary retention, recently started on Xarelto for history of PE on   Treatment: CBI, H&H q12h, holding Xarelto, consider IVC filter    Thank You,  Sowmya Fitzpatrick RN  Clinical    Connect via [employer] or [email or web address]  Options provided:   -- Hematuria is due to/associated with use of Xarelto (hemorrhagic disorder due to circulating anticoagulant)   -- Hematuria is not due to/associated with use of Xarelto   -- Other explanation, (please specify other explanation)   -- Unable to determine      Query created by: Sowmya Fitzpatrick on 2023 8:10 AM    RESPONSE TEXT:    Hematuria is due to/associated with use of Xarelto (hemorrhagic disorder due to circulating anticoagulant)          Electronically signed by:  SAMUEL SMITH DO 2023 2:47 PM              "

## 2023-11-01 NOTE — DISCHARGE PLANNING
Case Management Discharge Planning    Admission Date: 10/30/2023  GMLOS: 2.3  ALOS: 2    6-Clicks ADL Score: 24  6-Clicks Mobility Score: 24      Anticipated Discharge Dispo:      DME Needed: No    Action(s) Taken: Updated Provider/Nurse on Discharge Plan    Patient discussed during morning IDT rounds with team. Patient is not medically cleared for discharge at this time.  SWCM acknowledge [facility] health order . TCN has obtained choice form and DPA assist have sent the HH referral to [facility].  SWCM will remain available and continue to assist with safe disposition.     Escalations Completed: None    Medically Clear: No    Next Steps: pending medical clearance    Barriers to Discharge: Medical clearance    Is the patient up for discharge tomorrow: No

## 2023-11-01 NOTE — PROGRESS NOTES
1332-Assumed Care From JB Monte.Patient is alert and oriented x 4.With FC, to  continue CBI as per PA, Draining yellow output.    1545-family at bedside, wants to speak to urologist. Messaged AMANDA Arnold, awaiting reply.    1600- Called Urologist office, left a message    1630- Patient reports abdominal cramping and noted small blood clots, manual irrigation rendered

## 2023-11-01 NOTE — DISCHARGE PLANNING
[facility]/SCP TCN chart review completed. Noted patient acceptance to [facility] 11/1/2023.     Collaborated with CRISTY Duran. Discussed current discharge considerations noted to home with [facility] and outpatient follow up with primary care physician, once patient medically clear.     No new TCN needs anticipated in patient discharge planning at this time. TCN will continue to follow and collaborate with discharge planning team should additional post acute needs arise. Thank you.    Completed  Choice obtained: . Noted patient acceptance to [facility] 11/1/2023  SCP with [facility] PCP. Follow up appointment scheduled for November 6th at 11:20AM with Dr. Gentile

## 2023-11-01 NOTE — FACE TO FACE
Face to Face Supporting Documentation - Home Health    The encounter with this patient was in whole or in part the primary reason for home health admission.    Date of encounter:   Patient:                    MRN:                       YOB: 2025  Star Centeno  [id number]  1940     Home health to see patient for:  Skilled Nursing care for assessment, interventions & education, Home health aide, Physical Therapy evaluation and treatment, and Occupational therapy evaluation and treatment    Skilled need for:  New Onset Medical Diagnosis Hematuria, requiring 3 way rodarte, rodarte care    Skilled nursing interventions to include:  Line/Drain/Airway education and care- 3 way rodarte    Homebound status evidenced by:  Need the aid of supportive devices such as crutches, canes, wheelchairs or walkers. Leaving home requires a considerable and taxing effort. There is a normal inability to leave the home.    Community Physician to provide follow up care: Nirmal Gentile M.D.     Optional Interventions? No      I certify the face to face encounter for this home health care referral meets the CMS requirements and the encounter/clinical assessment with the patient was, in whole, or in part, for the medical condition(s) listed above, which is the primary reason for home health care. Based on my clinical findings: the service(s) are medically necessary, support the need for home health care, and the homebound criteria are met.  I certify that this patient has had a face to face encounter by myself.  Radha Casas D.O. - NPI: [id number]

## 2023-11-01 NOTE — CARE PLAN
The patient is Stable - Low risk of patient condition declining or worsening    Shift Goals  Clinical Goals: irrigate the rodarte and monitor for bleeding and clots from the rodarte  Patient Goals: comfort  Family Goals: Discharge home    Progress made toward(s) clinical / shift goals:  As now, patient is on CBI, no clots note, patient is much better after receiving the pain medication    Patient is not progressing towards the following goals:    Problem: Pain - Standard  Goal: Alleviation of pain or a reduction in pain to the patient’s comfort goal  Outcome: Progressing     Problem: Urinary Elimination  Goal: Establish and maintain regular urinary output  Outcome: Progressing     Problem: Infection - Standard  Goal: Patient will remain free from infection  Outcome: Progressing     Problem: Diabetes Management  Goal: Patient will achieve and maintain glucose in satisfactory range  Outcome: Progressing     Problem: Knowledge Deficit - Diabetes  Goal: Patient will demonstrate knowledge of insulin injection, symptoms, and treatment of hypoglycemia and diet prior to discharge  Outcome: Progressing     Problem: Discharge Planning - Diabetes  Goal: Patient's continuum of care needs will be met  Outcome: Progressing     Problem: Skin Integrity - Diabetes  Goal: Patient's skin on legs and feet will remain intact while hospitalized  Outcome: Progressing     Problem: Infection - Diabetes  Goal: Patient will remain free from signs and symptoms of infection  Outcome: Progressing  Goal: Promotion wound healing, line and drain management  Outcome: Progressing

## 2023-11-01 NOTE — PROGRESS NOTES
On-call Hospitalist made aware of patient's continued complaint of pain post opioid administration, describes pain as spasm in the bladder.

## 2023-11-01 NOTE — DISCHARGE PLANNING
ATTN: Case Management  RE: Referral for Home Health    As of 11/1/23, we have accepted the Home Health referral for the patient listed above.    A [facility] clinician will be out to see the patient within 48 hours. If you have any questions or concerns regarding the patient’s transition to Home Health, please do not hesitate to contact us at [phone].      We look forward to collaborating with you,  [facility] Health Team

## 2023-11-01 NOTE — CARE PLAN
The patient is Stable - Low risk of patient condition declining or worsening    Shift Goals  Clinical Goals: Free from falls or injuries, rest and sleep at least 4 hours, pain controlled at 3/10 or better with current regimen  Patient Goals: Free from falls or injuries, rest and sleep at least 4 hours, pain controlled at 3/10 or better with current regimen  Family Goals: Discharge home    Progress made toward(s) clinical / shift goals: No falls or injuries overnight, patient was able to sleep and rest for at least 4 hours, pain was controlled at 3/10 or better with current ordered regimen    Patient is not progressing towards the following goals:

## 2023-11-01 NOTE — DISCHARGE PLANNING
Received Choice form at: 7513  Agency/Facility name: [facility]  Referral sent per Choice form at: 8512

## 2023-11-01 NOTE — PROGRESS NOTES
Hospital Medicine Daily Progress Note    Date of Service  11/1/2023    Chief Complaint  Star Centeno is a 82 y.o. male admitted 10/30/2023 with blood in urine    Hospital Course  History of prostate cancer status post radiation, hyperlipidemia, history of aortic aneurysms on annual surveillance with a recent incidental finding of PE on 9/2, started on anticoagulation therapy who subsequently underwent left ureteral stent placement and bladder biopsy on 10/25 who presented with hematuria.  A three-way catheter was placed by urology as an outpatient however he came to the ER due to ongoing/worsening hematuria and concern for catheter obstruction.    Interval Problem Update  10/31: N.p.o., pending urology recommendations.  Hemoglobin stable.  Urine is clearing with ongoing irrigation.  BP has improved from the 90s to the low 100s systolic.  Xarelto held.  UA pending, hold ceftriaxone    11/1: Last night developed recurrent clots requiring flushing associated with abdominal distention and bladder fullness.,  Recurred again this morning.  Hemoglobin stable.  Discussed with urology no plan for repeat cystoscopy.  Hold lovenox.  If unable to tolerate anticoagulation, consider IVC filter.  Order LE US    I have discussed this patient's plan of care and discharge plan at IDT rounds today with Case Management, Nursing, Nursing leadership, and other members of the IDT team.    Consultants/Specialty  urology    Code Status  Full Code    Disposition  The patient is not medically cleared for discharge to home or a post-acute facility.  Anticipate discharge to: home with organized home healthcare and close outpatient follow-up    I have placed the appropriate orders for post-discharge needs.    Review of Systems  Review of Systems   Constitutional:  Negative for chills, fever and malaise/fatigue.   HENT:  Negative for sore throat.    Respiratory:  Negative for cough and shortness of breath.    Cardiovascular:  Negative for  chest pain and palpitations.   Gastrointestinal:  Negative for abdominal pain, blood in stool, diarrhea, heartburn, nausea and vomiting.   Genitourinary:  Positive for dysuria and hematuria. Negative for frequency.   Musculoskeletal:  Negative for back pain and myalgias.   Neurological:  Negative for dizziness, focal weakness, weakness and headaches.   Psychiatric/Behavioral:  Negative for depression and hallucinations.    All other systems reviewed and are negative.       Physical Exam  Temp:  [36.1 °C (97 °F)-36.6 °C (97.8 °F)] 36.6 °C (97.8 °F)  Pulse:  [61-98] 98  Resp:  [16-18] 18  BP: ()/(54-58) 95/54  SpO2:  [90 %-96 %] 95 %    Physical Exam  Constitutional:       Appearance: Normal appearance.      Comments: Uncomfortable appearing   HENT:      Head: Normocephalic and atraumatic.      Nose: Nose normal.      Mouth/Throat:      Mouth: Mucous membranes are moist.   Eyes:      Extraocular Movements: Extraocular movements intact.      Pupils: Pupils are equal, round, and reactive to light.   Cardiovascular:      Rate and Rhythm: Normal rate and regular rhythm.      Heart sounds: No murmur heard.  Pulmonary:      Effort: Pulmonary effort is normal. No respiratory distress.      Breath sounds: Normal breath sounds. No stridor.   Abdominal:      General: Abdomen is flat. Bowel sounds are normal. There is no distension.      Palpations: Abdomen is soft.      Tenderness: There is no abdominal tenderness. There is no guarding.   Genitourinary:     Comments: Blood-tinged, light red urine in Hedrick bag  Musculoskeletal:         General: No swelling, tenderness or deformity.      Cervical back: Normal range of motion and neck supple.   Skin:     General: Skin is warm and dry.      Coloration: Skin is pale.   Neurological:      General: No focal deficit present.      Mental Status: He is alert and oriented to person, place, and time. Mental status is at baseline.   Psychiatric:         Mood and Affect: Mood normal.          Behavior: Behavior normal.         Thought Content: Thought content normal.         Fluids    Intake/Output Summary (Last 24 hours) at 11/1/2023 1249  Last data filed at 11/1/2023 1200  Gross per 24 hour   Intake --   Output -4650 ml   Net 4650 ml         Laboratory  Recent Labs     10/30/23  1836 10/31/23  0042 10/31/23  0652 10/31/23  2139 11/01/23  0726   WBC 13.5* 10.6  --   --  10.3   RBC 3.43* 3.22*  --   --  3.70*   HEMOGLOBIN 10.9* 10.4* 10.2* 10.5* 11.9*   HEMATOCRIT 33.2* 31.2* 31.0* 31.6* 35.7*   MCV 96.8 96.9  --   --  96.5   MCH 31.8 32.3  --   --  32.2   MCHC 32.8 33.3  --   --  33.3   RDW 45.9 45.9  --   --  45.1   PLATELETCT 213 201  --   --  238   MPV 9.1 9.8  --   --  9.7       Recent Labs     10/30/23  1836 10/31/23  0042 11/01/23  0726   SODIUM 134* 135 135   POTASSIUM 5.0 4.5 4.3   CHLORIDE 104 105 103   CO2 18* 18* 19*   GLUCOSE 131* 121* 152*   BUN 33* 32* 29*   CREATININE 1.84* 1.61* 1.59*   CALCIUM 9.0 8.9 9.5       Recent Labs     10/30/23  1836   APTT 36.1*   INR 1.29*                 Imaging  US-RENAL   Final Result         1.  Normal renal ultrasound.           Assessment/Plan  * Gross hematuria- (present on admission)  Assessment & Plan  -Hematuria under the context of anticoagulation. Recent admission for hematuria, and xarelto was held and resumed.   Recent Left ureteral stent placed 1 week ago for severe stricture (possibly from radiation? Vs prostate ca recurrence)  -Seen by Urology in clinic on day of admission, three-way Hedrick catheter placed however became clotted at home  -Catheter irrigated in the ED and remained bleeding/ clotting.   -With CBI he has had recurrent clots, unable to tolerate lovenox  -Discussed with urology, no plan for repeat cystoscopy  -Hold apirin and Xarelto/Lovenox  -He may be a candidate for IVC filter, this will need to be discussed prior to discharge if he is unable to tolerate Lovenox  Check hemoglobin again in a.m.  Continue  irrigation    Leukocytosis  Assessment & Plan  -WBC 13.5. Hedrick Catheter placed today. Started on Rocephin in the ED  WBC normalized  He has no dysuria or symptoms therefore no antibiotics needed at this time    CKD (chronic kidney disease), stage III (HCC)  Assessment & Plan  -Monitor creatinine function. Levels are at baseline. Avoid nephrotoxic medication    Hyponatremia  Assessment & Plan  Improved  Repeat in a.m.    Chronic anticoagulation  Assessment & Plan  -Patient on xarelto for Right middle lobe PE (9/2 diagnosed)  Hold Xarelto for now given ongoing hematuria.   Consider IVC filter if unable to tolerate anticoagulation    History of pulmonary embolism- (present on admission)  Assessment & Plan  Incidental finding on aneurysm CT screening, initially was on Eliquis and then changed to Xarelto, no reported of bleeding side effect at that time  He developed hematuria following a cystoscopy, left ureteral stent placement with biopsy that was performed on 10/25  Hold Xarelto- high risk of bleeding recurrence with anticoagulation  Hemoglobin has been stable, repeat in a.m.  Consider IVC filter    Aneurysm of ascending aorta (HCC)- (present on admission)  Assessment & Plan  -Last Surveillance CTA Chest done on 9/6-stable at 3.5 cm but incidental finding of PE  -Follow up outpatient    Non-insulin dependent type 2 diabetes mellitus (HCC)- (present on admission)  Assessment & Plan  Hold Metformin, continue ISS    History of adenocarcinoma of prostate- (present on admission)  Assessment & Plan  In 2001, s/p radiation therapy. PSA negative ever since  Recent biopsy was performed on 10/24 when the left ureteral stent was placed  Pathology showed atypical cells concerning for malignancy which could indicate recurrence  Urology is following    Essential hypertension, benign- (present on admission)  Assessment & Plan  -BP 90/50 in ED-- > 108/60 this AM  Hold Metoprolol and Lisinopril for now         VTE prophylaxis: On  hold due to hematuria  I have performed a physical exam and reviewed and updated ROS and Plan today (11/1/2023). In review of yesterday's note (10/31/2023), there are no changes except as documented above.      Total time:  51 minutes.  I spent greater than 50% of the time for patient care, counseling, and coordination on this date, including unit/floor time, and face-to-face time with the patient as per interval events and assessment and plan above

## 2023-11-02 PROBLEM — N18.32 STAGE 3B CHRONIC KIDNEY DISEASE: Chronic | Status: ACTIVE | Noted: 2023-10-30

## 2023-11-02 LAB
ALBUMIN SERPL BCP-MCNC: 3.4 G/DL (ref 3.2–4.9)
BACTERIA UR CULT: NORMAL
BACTERIA UR CULT: NORMAL
BASOPHILS # BLD AUTO: 0.4 % (ref 0–1.8)
BASOPHILS # BLD: 0.04 K/UL (ref 0–0.12)
BUN SERPL-MCNC: 29 MG/DL (ref 8–22)
CALCIUM ALBUM COR SERPL-MCNC: 9.2 MG/DL (ref 8.5–10.5)
CALCIUM SERPL-MCNC: 8.7 MG/DL (ref 8.4–10.2)
CHLORIDE SERPL-SCNC: 103 MMOL/L (ref 96–112)
CO2 SERPL-SCNC: 16 MMOL/L (ref 20–33)
CREAT SERPL-MCNC: 1.69 MG/DL (ref 0.5–1.4)
EOSINOPHIL # BLD AUTO: 0.43 K/UL (ref 0–0.51)
EOSINOPHIL NFR BLD: 4.7 % (ref 0–6.9)
ERYTHROCYTE [DISTWIDTH] IN BLOOD BY AUTOMATED COUNT: 44.7 FL (ref 35.9–50)
GFR SERPLBLD CREATININE-BSD FMLA CKD-EPI: 39 ML/MIN/1.73 M 2
GLUCOSE BLD STRIP.AUTO-MCNC: 139 MG/DL (ref 65–99)
GLUCOSE BLD STRIP.AUTO-MCNC: 142 MG/DL (ref 65–99)
GLUCOSE BLD STRIP.AUTO-MCNC: 182 MG/DL (ref 65–99)
GLUCOSE BLD STRIP.AUTO-MCNC: 226 MG/DL (ref 65–99)
GLUCOSE SERPL-MCNC: 146 MG/DL (ref 65–99)
HCT VFR BLD AUTO: 31.5 % (ref 42–52)
HGB BLD-MCNC: 10.3 G/DL (ref 14–18)
IMM GRANULOCYTES # BLD AUTO: 0.07 K/UL (ref 0–0.11)
IMM GRANULOCYTES NFR BLD AUTO: 0.8 % (ref 0–0.9)
LYMPHOCYTES # BLD AUTO: 1.36 K/UL (ref 1–4.8)
LYMPHOCYTES NFR BLD: 14.8 % (ref 22–41)
MAGNESIUM SERPL-MCNC: 2 MG/DL (ref 1.5–2.5)
MCH RBC QN AUTO: 31.2 PG (ref 27–33)
MCHC RBC AUTO-ENTMCNC: 32.7 G/DL (ref 32.3–36.5)
MCV RBC AUTO: 95.5 FL (ref 81.4–97.8)
MONOCYTES # BLD AUTO: 0.74 K/UL (ref 0–0.85)
MONOCYTES NFR BLD AUTO: 8 % (ref 0–13.4)
NEUTROPHILS # BLD AUTO: 6.56 K/UL (ref 1.82–7.42)
NEUTROPHILS NFR BLD: 71.3 % (ref 44–72)
NRBC # BLD AUTO: 0 K/UL
NRBC BLD-RTO: 0 /100 WBC (ref 0–0.2)
PHOSPHATE SERPL-MCNC: 4.4 MG/DL (ref 2.5–4.5)
PLATELET # BLD AUTO: 210 K/UL (ref 164–446)
PMV BLD AUTO: 10 FL (ref 9–12.9)
POTASSIUM SERPL-SCNC: 4.6 MMOL/L (ref 3.6–5.5)
RBC # BLD AUTO: 3.3 M/UL (ref 4.7–6.1)
SIGNIFICANT IND 70042: NORMAL
SIGNIFICANT IND 70042: NORMAL
SITE SITE: NORMAL
SITE SITE: NORMAL
SODIUM SERPL-SCNC: 135 MMOL/L (ref 135–145)
SOURCE SOURCE: NORMAL
SOURCE SOURCE: NORMAL
WBC # BLD AUTO: 9.2 K/UL (ref 4.8–10.8)

## 2023-11-02 PROCEDURE — 80069 RENAL FUNCTION PANEL: CPT

## 2023-11-02 PROCEDURE — 82962 GLUCOSE BLOOD TEST: CPT | Mod: 91

## 2023-11-02 PROCEDURE — 770001 HCHG ROOM/CARE - MED/SURG/GYN PRIV*

## 2023-11-02 PROCEDURE — 700102 HCHG RX REV CODE 250 W/ 637 OVERRIDE(OP): Performed by: HOSPITALIST

## 2023-11-02 PROCEDURE — 94760 N-INVAS EAR/PLS OXIMETRY 1: CPT

## 2023-11-02 PROCEDURE — A9270 NON-COVERED ITEM OR SERVICE: HCPCS | Performed by: HOSPITALIST

## 2023-11-02 PROCEDURE — 83735 ASSAY OF MAGNESIUM: CPT

## 2023-11-02 PROCEDURE — 700111 HCHG RX REV CODE 636 W/ 250 OVERRIDE (IP): Mod: JZ | Performed by: HOSPITALIST

## 2023-11-02 PROCEDURE — 85025 COMPLETE CBC W/AUTO DIFF WBC: CPT

## 2023-11-02 PROCEDURE — 36415 COLL VENOUS BLD VENIPUNCTURE: CPT

## 2023-11-02 PROCEDURE — 99233 SBSQ HOSP IP/OBS HIGH 50: CPT | Performed by: INTERNAL MEDICINE

## 2023-11-02 RX ADMIN — INSULIN HUMAN 2 UNITS: 100 INJECTION, SOLUTION PARENTERAL at 20:52

## 2023-11-02 RX ADMIN — INSULIN HUMAN 1 UNITS: 100 INJECTION, SOLUTION PARENTERAL at 06:01

## 2023-11-02 RX ADMIN — HYDROMORPHONE HYDROCHLORIDE 1 MG: 1 INJECTION, SOLUTION INTRAMUSCULAR; INTRAVENOUS; SUBCUTANEOUS at 20:18

## 2023-11-02 RX ADMIN — HYDROMORPHONE HYDROCHLORIDE 1 MG: 1 INJECTION, SOLUTION INTRAMUSCULAR; INTRAVENOUS; SUBCUTANEOUS at 03:33

## 2023-11-02 RX ADMIN — OXYBUTYNIN CHLORIDE 5 MG: 5 TABLET ORAL at 18:20

## 2023-11-02 RX ADMIN — HYDROMORPHONE HYDROCHLORIDE 0.5 MG: 1 INJECTION, SOLUTION INTRAMUSCULAR; INTRAVENOUS; SUBCUTANEOUS at 16:04

## 2023-11-02 RX ADMIN — OXYBUTYNIN CHLORIDE 5 MG: 5 TABLET ORAL at 12:45

## 2023-11-02 RX ADMIN — HYDROMORPHONE HYDROCHLORIDE 1 MG: 1 INJECTION, SOLUTION INTRAMUSCULAR; INTRAVENOUS; SUBCUTANEOUS at 07:23

## 2023-11-02 RX ADMIN — EZETIMIBE 10 MG: 10 TABLET ORAL at 18:20

## 2023-11-02 RX ADMIN — ROSUVASTATIN 5 MG: 10 TABLET, FILM COATED ORAL at 18:20

## 2023-11-02 RX ADMIN — SENNOSIDES AND DOCUSATE SODIUM 2 TABLET: 50; 8.6 TABLET ORAL at 18:20

## 2023-11-02 ASSESSMENT — ENCOUNTER SYMPTOMS
DIARRHEA: 0
CONSTIPATION: 0
VOMITING: 0
SHORTNESS OF BREATH: 0
CHILLS: 0
FEVER: 0
NAUSEA: 0
BACK PAIN: 0
COUGH: 0
ABDOMINAL PAIN: 0
PALPITATIONS: 0
DIZZINESS: 0
HEADACHES: 0

## 2023-11-02 ASSESSMENT — PAIN DESCRIPTION - PAIN TYPE
TYPE: ACUTE PAIN

## 2023-11-02 ASSESSMENT — PATIENT HEALTH QUESTIONNAIRE - PHQ9
SUM OF ALL RESPONSES TO PHQ9 QUESTIONS 1 AND 2: 0
1. LITTLE INTEREST OR PLEASURE IN DOING THINGS: NOT AT ALL
1. LITTLE INTEREST OR PLEASURE IN DOING THINGS: NOT AT ALL
SUM OF ALL RESPONSES TO PHQ9 QUESTIONS 1 AND 2: 0
2. FEELING DOWN, DEPRESSED, IRRITABLE, OR HOPELESS: NOT AT ALL
2. FEELING DOWN, DEPRESSED, IRRITABLE, OR HOPELESS: NOT AT ALL

## 2023-11-02 NOTE — DISCHARGE PLANNING
[facility]/SCP TCN chart review completed. Noted patient acceptance to [facility] 11/1/2023.      Collaborated with CRISTY Duran. Discussed current discharge considerations noted to home with [facility] and outpatient follow up with primary care physician, once patient medically clear. Per chart review, noted patient remains inpatient for ongoing medical needs and is followed by urology at this time.      No new TCN needs anticipated in patient discharge planning at this time. TCN will continue to follow and collaborate with discharge planning team should additional post acute needs arise. Thank you.     Completed  Choice obtained: . Noted patient acceptance to [facility] 11/1/2023  SCP with [facility] PCP. Follow up appointment scheduled for November 6th at 11:20AM with Dr. Gentile

## 2023-11-02 NOTE — PROGRESS NOTES
"Hospital Medicine Daily Progress Note    Date of Service  11/2/2023    Chief Complaint  Star Centeno is a 82 y.o. male admitted 10/30/2023 with   Chief Complaint   Patient presents with    Urinary Catheter Problem     \"It's blocked up with blood\". Reports he is on blood thinners. States catheter has been blocked since this afternoon.          Hospital Course  History of prostate cancer status post radiation, hyperlipidemia, history of aortic aneurysms on annual surveillance with a recent incidental finding of PE on 9/2, started on anticoagulation therapy who subsequently underwent left ureteral stent placement and bladder biopsy on 10/25 who presented with hematuria.  A three-way catheter was placed by urology as an outpatient however he came to the ER due to ongoing/worsening hematuria and concern for catheter obstruction.    Interval Problem Update  Patient stated no acute problems.  Spoke with daughter at bedside.  We discussed potential plans  - Family is to speak with urology provider on future plans.  This includes any surgery, we will hold Xarelto.  -If no further surgery we will trial Xarelto and see if patient has any recurrent hematuria.  -If patient's renal function improves tomorrow, we will check a CTA chest prior to restarting Xarelto.  If there is no more pulmonary embolism we may be able to stop anticoagulation.  I reviewed lower extremity ultrasound from 11/1/2023 which were negative for any DVT.  -If urology team results show any signs of cancer, I recommended that patient may need to proceed with IVC filter in the future.    I have discussed this patient's plan of care and discharge plan at IDT rounds today with Case Management, Nursing, Nursing leadership, and other members of the IDT team.    Consultants/Specialty  urology    Code Status  Full Code    Disposition  The patient is not medically cleared for discharge to home or a post-acute facility.  Anticipate discharge to: home with " organized home healthcare and close outpatient follow-up    I have placed the appropriate orders for post-discharge needs.    Review of Systems  Review of Systems   Constitutional:  Negative for chills, fever and malaise/fatigue.   Respiratory:  Negative for cough and shortness of breath.    Cardiovascular:  Negative for chest pain and palpitations.   Gastrointestinal:  Negative for abdominal pain, constipation, diarrhea, nausea and vomiting.   Genitourinary:  Positive for hematuria.   Musculoskeletal:  Negative for back pain and joint pain.   Neurological:  Negative for dizziness and headaches.   All other systems reviewed and are negative.       Physical Exam  Temp:  [36.4 °C (97.6 °F)-36.7 °C (98 °F)] 36.6 °C (97.9 °F)  Pulse:  [62-75] 68  Resp:  [16-20] 16  BP: (106-153)/(63-72) 136/67  SpO2:  [91 %-96 %] 92 %    Physical Exam  Vitals and nursing note reviewed.   Constitutional:       General: He is not in acute distress.     Appearance: He is not diaphoretic.   HENT:      Mouth/Throat:      Mouth: Mucous membranes are dry.      Pharynx: No oropharyngeal exudate.   Cardiovascular:      Rate and Rhythm: Normal rate and regular rhythm.      Pulses: Normal pulses.      Heart sounds: Normal heart sounds. No murmur heard.  Pulmonary:      Effort: Pulmonary effort is normal. No respiratory distress.      Breath sounds: Normal breath sounds. No wheezing or rales.   Abdominal:      General: Bowel sounds are normal. There is no distension.      Tenderness: There is no abdominal tenderness.   Genitourinary:     Comments: Hedrick catheter in place, no hematuria  Musculoskeletal:         General: No swelling or tenderness. Normal range of motion.   Skin:     General: Skin is warm.      Capillary Refill: Capillary refill takes less than 2 seconds.      Coloration: Skin is not jaundiced or pale.   Neurological:      General: No focal deficit present.      Mental Status: He is alert and oriented to person, place, and time.  Mental status is at baseline.      Motor: No weakness.   Psychiatric:         Mood and Affect: Mood normal.         Behavior: Behavior normal.         Thought Content: Thought content normal.         Judgment: Judgment normal.         Fluids    Intake/Output Summary (Last 24 hours) at 11/2/2023 1435  Last data filed at 11/2/2023 0830  Gross per 24 hour   Intake 540 ml   Output 300 ml   Net 240 ml       Laboratory  Recent Labs     10/31/23  0042 10/31/23  0652 11/01/23  0726 11/01/23  1251 11/02/23  0130   WBC 10.6  --  10.3  --  9.2   RBC 3.22*  --  3.70*  --  3.30*   HEMOGLOBIN 10.4*   < > 11.9* 11.3* 10.3*   HEMATOCRIT 31.2*   < > 35.7* 33.9* 31.5*   MCV 96.9  --  96.5  --  95.5   MCH 32.3  --  32.2  --  31.2   MCHC 33.3  --  33.3  --  32.7   RDW 45.9  --  45.1  --  44.7   PLATELETCT 201  --  238  --  210   MPV 9.8  --  9.7  --  10.0    < > = values in this interval not displayed.     Recent Labs     10/31/23  0042 11/01/23  0726 11/02/23  0130   SODIUM 135 135 135   POTASSIUM 4.5 4.3 4.6   CHLORIDE 105 103 103   CO2 18* 19* 16*   GLUCOSE 121* 152* 146*   BUN 32* 29* 29*   CREATININE 1.61* 1.59* 1.69*   CALCIUM 8.9 9.5 8.7     Recent Labs     10/30/23  1836   APTT 36.1*   INR 1.29*               Imaging  US-EXTREMITY VENOUS LOWER BILAT   Final Result      US-RENAL   Final Result         1.  Normal renal ultrasound.           Assessment/Plan  * Gross hematuria- (present on admission)  Assessment & Plan  -Hematuria under the context of anticoagulation. Recent admission for hematuria, and xarelto was held and resumed.   Recent Left ureteral stent placed 1 week ago for severe stricture (possibly from radiation? Vs prostate ca recurrence)  -Seen by Urology in clinic on day of admission, three-way Hedrick catheter placed however became clotted at home  -Catheter irrigated in the ED and remained bleeding/ clotting.     11/2:  -I spoke with [facility] [place] team, Continue CBI    Leukocytosis- (present on  admission)  Assessment & Plan  WBC 9.3    Stage 3b chronic kidney disease (HCC)- (present on admission)  Assessment & Plan  -Monitor creatinine function. Levels are at baseline. Avoid nephrotoxic medication    Hyponatremia- (present on admission)  Assessment & Plan  sodium 135    Chronic anticoagulation- (present on admission)  Assessment & Plan  -Patient on xarelto for Right middle lobe PE (9/2 diagnosed)    11/2:  - Family is to speak with urology provider on future plans.  This includes any surgery, we will hold Xarelto.  -If no further surgery we will trial Xarelto and see if patient has any recurrent hematuria.  -If patient's renal function improves tomorrow, we will check a CTA chest prior to restarting Xarelto.  If there is no more pulmonary embolism we may be able to stop anticoagulation.  I reviewed lower extremity ultrasound from 11/1/2023 which were negative for any DVT.  -If urology team results show any signs of cancer, I recommended that patient may need to proceed with IVC filter in the future.    History of pulmonary embolism- (present on admission)  Assessment & Plan  Incidental finding on aneurysm CT screening, initially was on Eliquis and then changed to Xarelto, no reported of bleeding side effect at that time  He developed hematuria following a cystoscopy, left ureteral stent placement with biopsy that was performed on 10/25  Hold Xarelto- high risk of bleeding recurrence with anticoagulation  Hemoglobin has been stable, repeat in a.m.  Consider IVC filter    Aneurysm of ascending aorta (HCC)- (present on admission)  Assessment & Plan  -Last Surveillance CTA Chest done on 9/6-stable at 3.5 cm but incidental finding of PE  -Follow up outpatient    Non-insulin dependent type 2 diabetes mellitus (HCC)- (present on admission)  Assessment & Plan  T2DM, no/on home insulin, HbA1c 7.1  -hold home metformin and jardiance  -Monitor Accuchecks TIDAC and Bedtime  -Insulin sliding scale, as per  protocol  -Diabetic diet    History of adenocarcinoma of prostate- (present on admission)  Assessment & Plan  In 2001, s/p radiation therapy. PSA negative ever since  Recent biopsy was performed on 10/24 when the left ureteral stent was placed  Pathology showed atypical cells concerning for malignancy which could indicate recurrence  Urology is following    Essential hypertension, benign- (present on admission)  Assessment & Plan  -BP 90/50 in ED-- > 108/60 this AM  Hold Metoprolol and Lisinopril for now         VTE prophylaxis:   SCDs/TEDs   pharmacologic prophylaxis contraindicated due to Hematuria      I have performed a physical exam and reviewed and updated ROS and Plan today (11/2/2023). In review of yesterday's note (11/1/2023), there are no changes except as documented above.      Total time spent 51 minutes. I spent greater than 50% of the time for patient care, counseling, and coordination on this date, including unit/floor time, and face-to-face time with the patient as per interval events, my own review of patient's imaging and lab analysis and developing my assessment and plan above.

## 2023-11-02 NOTE — PROGRESS NOTES
Received bedside report from day shift RN at 19:15.   Assumed pt care. Pt seen AO4, No pain and nausea reported at this time. CBI on going.  POC and goals discussed. Instructed to be NPO at midnight.  Safety and fall precautions in place.   Bed locked and lowest position.  Instructed pt to use call light, verbalized understanding.   Call light kept within reach.  No other needs reported at this time.   Implementation of POC in progress.

## 2023-11-02 NOTE — PROGRESS NOTES
Received bed side report from morning nurse. Pt alert and oriented x4, no complaints of pain, n/v at this time.

## 2023-11-02 NOTE — CARE PLAN
The patient is Stable - Low risk of patient condition declining or worsening    Shift Goals  Clinical Goals: Monitor CBI output for clots, NPO at midnight  Patient Goals: Rest and comfort, update with POC  Family Goals: Discharge home    Progress made toward(s) clinical / shift goals:  CBI output monitored, no clots noted overnight. Pt urine output pink-tinged. PRN pain meds given per mar. Pt seen asleep with calm unlabored breathing post interventions. PT updated with POC during rounds. Comfort measures offered, heated blanket and heat packs. Pt NPO since midnight.    Patient is not progressing towards the following goals:

## 2023-11-02 NOTE — PROGRESS NOTES
"Urology Progress Note    S: Seen and examined. No fevers, chills, nausea or vomiting.  Output via rodarte remains clear yellow, with CBI clamped.  No hematuria, no blood clots.    O:   BP (!) 153/72   Pulse 69   Temp 36.4 °C (97.6 °F) (Oral)   Resp 18   Ht 1.803 m (5' 10.98\")   Wt 85 kg (187 lb 6.3 oz)   SpO2 92%   Recent Labs     10/31/23  0042 11/01/23  0726 11/02/23  0130   SODIUM 135 135 135   POTASSIUM 4.5 4.3 4.6   CHLORIDE 105 103 103   CO2 18* 19* 16*   GLUCOSE 121* 152* 146*   BUN 32* 29* 29*   CREATININE 1.61* 1.59* 1.69*   CALCIUM 8.9 9.5 8.7     Recent Labs     10/31/23  0042 10/31/23  0652 11/01/23  0726 11/01/23  1251 11/02/23  0130   WBC 10.6  --  10.3  --  9.2   RBC 3.22*  --  3.70*  --  3.30*   HEMOGLOBIN 10.4*   < > 11.9* 11.3* 10.3*   HEMATOCRIT 31.2*   < > 35.7* 33.9* 31.5*   MCV 96.9  --  96.5  --  95.5   MCH 32.3  --  32.2  --  31.2   MCHC 33.3  --  33.3  --  32.7   RDW 45.9  --  45.1  --  44.7   PLATELETCT 201  --  238  --  210   MPV 9.8  --  9.7  --  10.0    < > = values in this interval not displayed.         Intake/Output Summary (Last 24 hours) at 11/2/2023 0920  Last data filed at 11/2/2023 0701  Gross per 24 hour   Intake 720 ml   Output 1350 ml   Net -630 ml       Exam:  Gen:NAD  ABD: Abdomen soft, no TTP.   Urine: rodarte with clear yellow output.    A/P:    Active Hospital Problems    Diagnosis     Gross hematuria [R31.0]     Chronic anticoagulation [Z79.01]     Hyponatremia [E87.1]     CKD (chronic kidney disease), stage III (HCC) [N18.30]     Leukocytosis [D72.829]     History of pulmonary embolism [Z86.711]     Aneurysm of ascending aorta (HCC) [I71.21]     Essential hypertension, benign [I10]     Non-insulin dependent type 2 diabetes mellitus (HCC) [E11.9]     History of adenocarcinoma of prostate [Z85.46]      Formatting of this note might be different from the original.  s/p radio tx Dr Wright; PSA <0.1 since       Plan:  - continue rodarte catheter. Monitor for hematuria, blood " clots.  - patient has an outpatient appointment for pathology discussion  - no further acute urology intervention. Urology signing off.

## 2023-11-02 NOTE — PROGRESS NOTES
BSSR received from night RN. Pt is resting comfortably in bed, not in any distress on RA. AAOx4. Pt reported pain, rest and reposition for relief. CBI in place, draining clear pink tinged urine. No clots noted. Discussed POC. Fall risk precaution in place, bed and strip alarm, locked bed in lowest position and call light within reach. All needs met at this time. Hourly rounding in place.

## 2023-11-03 ENCOUNTER — APPOINTMENT (OUTPATIENT)
Dept: MEDICAL GROUP | Facility: LAB | Age: 83
End: 2023-11-03
Payer: MEDICARE

## 2023-11-03 ENCOUNTER — APPOINTMENT (OUTPATIENT)
Dept: RADIOLOGY | Facility: MEDICAL CENTER | Age: 83
DRG: 813 | End: 2023-11-03
Attending: INTERNAL MEDICINE
Payer: MEDICARE

## 2023-11-03 LAB
ANION GAP SERPL CALC-SCNC: 10 MMOL/L (ref 7–16)
BUN SERPL-MCNC: 27 MG/DL (ref 8–22)
CALCIUM SERPL-MCNC: 8.4 MG/DL (ref 8.4–10.2)
CHLORIDE SERPL-SCNC: 105 MMOL/L (ref 96–112)
CO2 SERPL-SCNC: 19 MMOL/L (ref 20–33)
CREAT SERPL-MCNC: 1.47 MG/DL (ref 0.5–1.4)
ERYTHROCYTE [DISTWIDTH] IN BLOOD BY AUTOMATED COUNT: 43.5 FL (ref 35.9–50)
GFR SERPLBLD CREATININE-BSD FMLA CKD-EPI: 46 ML/MIN/1.73 M 2
GLUCOSE BLD STRIP.AUTO-MCNC: 138 MG/DL (ref 65–99)
GLUCOSE BLD STRIP.AUTO-MCNC: 167 MG/DL (ref 65–99)
GLUCOSE BLD STRIP.AUTO-MCNC: 167 MG/DL (ref 65–99)
GLUCOSE BLD STRIP.AUTO-MCNC: 236 MG/DL (ref 65–99)
GLUCOSE SERPL-MCNC: 166 MG/DL (ref 65–99)
HCT VFR BLD AUTO: 30.4 % (ref 42–52)
HGB BLD-MCNC: 10 G/DL (ref 14–18)
MCH RBC QN AUTO: 31.3 PG (ref 27–33)
MCHC RBC AUTO-ENTMCNC: 32.9 G/DL (ref 32.3–36.5)
MCV RBC AUTO: 95.3 FL (ref 81.4–97.8)
PLATELET # BLD AUTO: 215 K/UL (ref 164–446)
PMV BLD AUTO: 9.7 FL (ref 9–12.9)
POTASSIUM SERPL-SCNC: 4.4 MMOL/L (ref 3.6–5.5)
RBC # BLD AUTO: 3.19 M/UL (ref 4.7–6.1)
SODIUM SERPL-SCNC: 134 MMOL/L (ref 135–145)
WBC # BLD AUTO: 8.7 K/UL (ref 4.8–10.8)

## 2023-11-03 PROCEDURE — 770001 HCHG ROOM/CARE - MED/SURG/GYN PRIV*

## 2023-11-03 PROCEDURE — 94760 N-INVAS EAR/PLS OXIMETRY 1: CPT

## 2023-11-03 PROCEDURE — 700117 HCHG RX CONTRAST REV CODE 255: Performed by: INTERNAL MEDICINE

## 2023-11-03 PROCEDURE — A9270 NON-COVERED ITEM OR SERVICE: HCPCS | Performed by: HOSPITALIST

## 2023-11-03 PROCEDURE — 99233 SBSQ HOSP IP/OBS HIGH 50: CPT | Performed by: INTERNAL MEDICINE

## 2023-11-03 PROCEDURE — 82962 GLUCOSE BLOOD TEST: CPT

## 2023-11-03 PROCEDURE — 700102 HCHG RX REV CODE 250 W/ 637 OVERRIDE(OP): Performed by: HOSPITALIST

## 2023-11-03 PROCEDURE — 99418 PROLNG IP/OBS E/M EA 15 MIN: CPT | Performed by: INTERNAL MEDICINE

## 2023-11-03 PROCEDURE — 71275 CT ANGIOGRAPHY CHEST: CPT

## 2023-11-03 PROCEDURE — 36415 COLL VENOUS BLD VENIPUNCTURE: CPT

## 2023-11-03 PROCEDURE — 85027 COMPLETE CBC AUTOMATED: CPT

## 2023-11-03 PROCEDURE — 700111 HCHG RX REV CODE 636 W/ 250 OVERRIDE (IP): Performed by: HOSPITALIST

## 2023-11-03 PROCEDURE — 80048 BASIC METABOLIC PNL TOTAL CA: CPT

## 2023-11-03 RX ADMIN — OXYBUTYNIN CHLORIDE 5 MG: 5 TABLET ORAL at 13:41

## 2023-11-03 RX ADMIN — INSULIN HUMAN 1 UNITS: 100 INJECTION, SOLUTION PARENTERAL at 20:26

## 2023-11-03 RX ADMIN — HYDROMORPHONE HYDROCHLORIDE 1 MG: 1 INJECTION, SOLUTION INTRAMUSCULAR; INTRAVENOUS; SUBCUTANEOUS at 08:13

## 2023-11-03 RX ADMIN — ROSUVASTATIN 5 MG: 10 TABLET, FILM COATED ORAL at 18:01

## 2023-11-03 RX ADMIN — OXYBUTYNIN CHLORIDE 5 MG: 5 TABLET ORAL at 18:01

## 2023-11-03 RX ADMIN — INSULIN HUMAN 1 UNITS: 100 INJECTION, SOLUTION PARENTERAL at 05:43

## 2023-11-03 RX ADMIN — INSULIN HUMAN 2 UNITS: 100 INJECTION, SOLUTION PARENTERAL at 13:51

## 2023-11-03 RX ADMIN — OXYBUTYNIN CHLORIDE 5 MG: 5 TABLET ORAL at 05:20

## 2023-11-03 RX ADMIN — EZETIMIBE 10 MG: 10 TABLET ORAL at 18:01

## 2023-11-03 RX ADMIN — IOHEXOL 100 ML: 350 INJECTION, SOLUTION INTRAVENOUS at 12:17

## 2023-11-03 RX ADMIN — SENNOSIDES AND DOCUSATE SODIUM 2 TABLET: 50; 8.6 TABLET ORAL at 05:20

## 2023-11-03 ASSESSMENT — PAIN DESCRIPTION - PAIN TYPE
TYPE: ACUTE PAIN
TYPE: ACUTE PAIN

## 2023-11-03 ASSESSMENT — ENCOUNTER SYMPTOMS
CHILLS: 0
PALPITATIONS: 0
COUGH: 0
BACK PAIN: 0
HEADACHES: 0
DIARRHEA: 0
NAUSEA: 0
VOMITING: 0
DIZZINESS: 0
ABDOMINAL PAIN: 0
SHORTNESS OF BREATH: 0
CONSTIPATION: 0
FEVER: 0

## 2023-11-03 ASSESSMENT — PATIENT HEALTH QUESTIONNAIRE - PHQ9
SUM OF ALL RESPONSES TO PHQ9 QUESTIONS 1 AND 2: 0
1. LITTLE INTEREST OR PLEASURE IN DOING THINGS: NOT AT ALL
2. FEELING DOWN, DEPRESSED, IRRITABLE, OR HOPELESS: NOT AT ALL
2. FEELING DOWN, DEPRESSED, IRRITABLE, OR HOPELESS: NOT AT ALL
1. LITTLE INTEREST OR PLEASURE IN DOING THINGS: NOT AT ALL
SUM OF ALL RESPONSES TO PHQ9 QUESTIONS 1 AND 2: 0

## 2023-11-03 NOTE — PROGRESS NOTES
"[facility] Medicine Daily Progress Note    Date of Service  11/3/2023    Chief Complaint  Star Centeno is a 82 y.o. male admitted 10/30/2023 with   Chief Complaint   Patient presents with    Urinary Catheter Problem     \"It's blocked up with blood\". Reports he is on blood thinners. States catheter has been blocked since this afternoon.          Hospital Course  History of prostate cancer status post radiation, hyperlipidemia, history of aortic aneurysms on annual surveillance with a recent incidental finding of PE on 9/2, started on anticoagulation therapy who subsequently underwent left ureteral stent placement and bladder biopsy on 10/25 who presented with hematuria.  A three-way catheter was placed by urology as an outpatient however he came to the ER due to ongoing/worsening hematuria and concern for catheter obstruction.    Interval Problem Update  11/2:  Patient stated no acute problems.  Spoke with daughter at bedside.  We discussed potential plans  - Family is to speak with urology provider on future plans.  This includes any surgery, we will hold Xarelto.  -If no further surgery we will trial Xarelto and see if patient has any recurrent hematuria.  -If patient's renal function improves tomorrow, we will check a CTA chest prior to restarting Xarelto.  If there is no more pulmonary embolism we may be able to stop anticoagulation.  I reviewed lower extremity ultrasound from 11/1/2023 which were negative for any DVT.  -If urology team results show any signs of cancer, I recommended that patient may need to proceed with IVC filter in the future.    11/3:  I spoke with patient and daughter at bedside.  We discussed his options at the moment.  Urology was recommending to remove Hedrick catheter today.  I requested to wait on removal until patient and daughter decide on anticoagulation as we would need to trial Xarelto and monitor for hematuria.      I spent an extra 15 minutes on patient's case, I came back to " bedside as daughter and patient's request.  - We discussed further on patient's risk including a CTA chest to determine if he has any pulmonary embolism further.  I reiterated that we do have a negative DVT ultrasound.  I discussed that patient has no further symptoms including pleuritic chest pain, shortness of breath or tachycardia in regards to his PE.  I showed them at bedside, CTA chest of 9/2/2023 and showed them the site of the pulmonary embolism and relative size.  - After letting the daughter and patient talk, they informed they wish to proceed with a CTA chest.  I placed orders and we were able to obtain today.    I spent an extra 15 minutes on patient's case for the second time today.  I came back to bedside and discussed with daughter and patient.  - We discussed the results of the CTA chest.  I called radiologist before and confirmed that the test was equivocal, there is no complete sign of pulmonary embolism.  - Daughter stated despite the CTA chest, there requesting to stop Xarelto.  I explained to them since the CTA chest is equivocal there is no concrete evidence of any further pulmonary embolism that choice to stop Xarelto with evidence of a negative venous duplex ultrasound, could be clinically appropriate at this time as he is a high risk for further hematuria.  Daughter and patient agreed on discontinuing Xarelto.  - As we are no longer going to be on any anticoagulation, I placed order for Hedrick catheter removal.  - Patient will be monitored overnight for any complications regarding urinary retention or hematuria.  He may be discharged likely tomorrow if there are no complications.        I have discussed this patient's plan of care and discharge plan at IDT rounds today with Case Management, Nursing, Nursing leadership, and other members of the IDT team.    Consultants/Specialty  urology    Code Status  Full Code    Disposition  The patient is not medically cleared for discharge to home or a  post-acute facility.  Anticipate discharge to: home with close outpatient follow-up    I have placed the appropriate orders for post-discharge needs.    Review of Systems  Review of Systems   Constitutional:  Negative for chills, fever and malaise/fatigue.   Respiratory:  Negative for cough and shortness of breath.    Cardiovascular:  Negative for chest pain and palpitations.   Gastrointestinal:  Negative for abdominal pain, constipation, diarrhea, nausea and vomiting.   Genitourinary:  Negative for hematuria.   Musculoskeletal:  Negative for back pain and joint pain.   Neurological:  Negative for dizziness and headaches.   All other systems reviewed and are negative.       Physical Exam  Temp:  [36 °C (96.8 °F)-36.9 °C (98.4 °F)] 36.4 °C (97.6 °F)  Pulse:  [67-96] 69  Resp:  [16-17] 16  BP: ()/(59-72) 112/61  SpO2:  [91 %-95 %] 93 %    Physical Exam  Vitals and nursing note reviewed.   Constitutional:       General: He is not in acute distress.     Appearance: He is not diaphoretic.   HENT:      Mouth/Throat:      Mouth: Mucous membranes are dry.      Pharynx: No oropharyngeal exudate.   Cardiovascular:      Rate and Rhythm: Normal rate and regular rhythm.      Pulses: Normal pulses.      Heart sounds: Normal heart sounds. No murmur heard.  Pulmonary:      Effort: Pulmonary effort is normal. No respiratory distress.      Breath sounds: Normal breath sounds. No wheezing or rales.   Abdominal:      General: Bowel sounds are normal. There is no distension.      Tenderness: There is no abdominal tenderness.   Genitourinary:     Comments: Hedrick catheter in place, no hematuria  Musculoskeletal:         General: No swelling or tenderness. Normal range of motion.   Skin:     General: Skin is warm.      Capillary Refill: Capillary refill takes less than 2 seconds.      Coloration: Skin is not jaundiced or pale.   Neurological:      General: No focal deficit present.      Mental Status: He is alert and oriented to  person, place, and time. Mental status is at baseline.      Motor: No weakness.   Psychiatric:         Mood and Affect: Mood normal.         Behavior: Behavior normal.         Thought Content: Thought content normal.         Judgment: Judgment normal.         Fluids    Intake/Output Summary (Last 24 hours) at 11/3/2023 1545  Last data filed at 11/3/2023 0900  Gross per 24 hour   Intake 1140 ml   Output 1500 ml   Net -360 ml       Laboratory  Recent Labs     11/01/23  0726 11/01/23  1251 11/02/23  0130 11/03/23  0125   WBC 10.3  --  9.2 8.7   RBC 3.70*  --  3.30* 3.19*   HEMOGLOBIN 11.9* 11.3* 10.3* 10.0*   HEMATOCRIT 35.7* 33.9* 31.5* 30.4*   MCV 96.5  --  95.5 95.3   MCH 32.2  --  31.2 31.3   MCHC 33.3  --  32.7 32.9   RDW 45.1  --  44.7 43.5   PLATELETCT 238  --  210 215   MPV 9.7  --  10.0 9.7     Recent Labs     11/01/23  0726 11/02/23  0130 11/03/23  0125   SODIUM 135 135 134*   POTASSIUM 4.3 4.6 4.4   CHLORIDE 103 103 105   CO2 19* 16* 19*   GLUCOSE 152* 146* 166*   BUN 29* 29* 27*   CREATININE 1.59* 1.69* 1.47*   CALCIUM 9.5 8.7 8.4                     Imaging  CT-CTA CHEST PULMONARY ARTERY W/ RECONS   Final Result   Addendum (preliminary) 1 of 1         Findings were discussed with DAVID POWER on 11/3/2023 1:54 PM.      Final      1.  Question residual nonocclusive thrombus within the right middle lobe pulmonary artery. Otherwise no pulmonary artery emboli identified.      2.   Bilateral lung base atelectasis. No pleural effusions.      3.  Atherosclerotic changes including coronary artery calcifications.            US-EXTREMITY VENOUS LOWER BILAT   Final Result      US-RENAL   Final Result         1.  Normal renal ultrasound.           Assessment/Plan  * Gross hematuria- (present on admission)  Assessment & Plan  I spoke with patient and daughter at bedside.  We discussed his options at the moment.  Urology was recommending to remove Hedrick catheter today.  I spoke with PA and requested to wait on  removal until patient and daughter decide on anticoagulation as we would need to trial Xarelto and monitor for hematuria.      I spent an extra 15 minutes on patient's case, first occurrence.  I came back to bedside as daughter and patient's request.  - We discussed further on patient's risk including a CTA chest to determine if he has any pulmonary embolism further.  I reiterated that we do have a negative DVT ultrasound.  I discussed that patient has no further symptoms including pleuritic chest pain, shortness of breath or tachycardia in regards to his PE.  I showed them at bedside, CTA chest of 9/2/2023 and showed them the site of the pulmonary embolism and relative size.  - After letting the daughter and patient talk, they informed they wish to proceed with a CTA chest.  I placed orders and we were able to obtain today.    I spent an extra 15 minutes on patient's case for the second time today.  I came back to bedside and discussed with daughter and patient.  - We discussed the results of the CTA chest.  I called radiologist before and confirmed that the test was equivocal, there is no complete sign of pulmonary embolism.  - Daughter stated despite the CTA chest, there requesting to stop Xarelto.  I explained to them since the CTA chest is equivocal there is no concrete evidence of any further pulmonary embolism that choice to stop Xarelto with evidence of a negative venous duplex ultrasound, could be clinically appropriate at this time as he is a high risk for further hematuria.  Daughter and patient agreed on discontinuing Xarelto.  - As we are no longer going to be on any anticoagulation, I placed order for Hedrick catheter removal.  - Patient will be monitored overnight for any complications regarding urinary retention or hematuria.  He may be discharged likely tomorrow if there are no complications.    Leukocytosis- (present on admission)  Assessment & Plan  WBC 9.3    Stage 3b chronic kidney disease (HCC)-  (present on admission)  Assessment & Plan  -Monitor creatinine function. Levels are at baseline. Avoid nephrotoxic medication    Hyponatremia- (present on admission)  Assessment & Plan  sodium 135    Chronic anticoagulation- (present on admission)  Assessment & Plan  -Patient on xarelto for Right middle lobe PE (9/2 diagnosed)    Patient and daughter have chosen to stop Xarelto    History of pulmonary embolism- (present on admission)  Assessment & Plan  CTA chest equivocal. Pt and daughter have chosen to stop Xarelto.    Aneurysm of ascending aorta (HCC)- (present on admission)  Assessment & Plan  -Last Surveillance CTA Chest done on 9/6-stable at 3.5 cm but incidental finding of PE  -Follow up outpatient    Non-insulin dependent type 2 diabetes mellitus (HCC)- (present on admission)  Assessment & Plan  T2DM, no/on home insulin, HbA1c 7.1  -hold home metformin and jardiance  -Monitor Accuchecks TIDAC and Bedtime  -Insulin sliding scale, as per protocol  -Diabetic diet    History of adenocarcinoma of prostate- (present on admission)  Assessment & Plan  In 2001, s/p radiation therapy. PSA negative ever since  Recent biopsy was performed on 10/24 when the left ureteral stent was placed  Pathology showed atypical cells concerning for malignancy which could indicate recurrence  Urology is following    Essential hypertension, benign- (present on admission)  Assessment & Plan  -BP 90/50 in ED-- > 108/60 this AM  Hold Metoprolol and Lisinopril for now         VTE prophylaxis:   SCDs/TEDs      I have performed a physical exam and reviewed and updated ROS and Plan today (11/3/2023). In review of yesterday's note (11/2/2023), there are no changes except as documented above.      Total time spent 52 minutes. I spent greater than 50% of the time for patient care, counseling, and coordination on this date, including unit/floor time, and face-to-face time with the patient as per interval events, my own review of patient's imaging  and lab analysis and developing my assessment and plan above.

## 2023-11-03 NOTE — DISCHARGE PLANNING
SCP TCN chart review completed. Collaborated with Renee. Current discharge considerations are home with home health. Note [facility] has accepted.     No new TCN needs identified at this time. TCN will continue to follow and collaborate with discharge planning team as additional post acute needs arise. Thank you.    Completed  Choice obtained: . Noted patient acceptance to [facility] 11/1/2023  SCP with [facility] PCP. Follow up appointment scheduled for November 6th at 11:20AM with Dr. Gentile

## 2023-11-03 NOTE — PROGRESS NOTES
"Urology Progress Note    S: 11/3- seen and examined at bedside. Urine crystal clear on very low flow CBI. H/h stable. Cr 1.47.     11/2 Seen and examined. No fevers, chills, nausea or vomiting.  Output via rodarte remains clear yellow, with CBI clamped.  No hematuria, no blood clots.    O:   /59   Pulse 68   Temp 36.4 °C (97.5 °F) (Temporal)   Resp 16   Ht 1.803 m (5' 10.98\")   Wt 85 kg (187 lb 6.3 oz)   SpO2 95%   Recent Labs     11/01/23  0726 11/02/23  0130 11/03/23  0125   SODIUM 135 135 134*   POTASSIUM 4.3 4.6 4.4   CHLORIDE 103 103 105   CO2 19* 16* 19*   GLUCOSE 152* 146* 166*   BUN 29* 29* 27*   CREATININE 1.59* 1.69* 1.47*   CALCIUM 9.5 8.7 8.4     Recent Labs     11/01/23  0726 11/01/23  1251 11/02/23  0130 11/03/23  0125   WBC 10.3  --  9.2 8.7   RBC 3.70*  --  3.30* 3.19*   HEMOGLOBIN 11.9* 11.3* 10.3* 10.0*   HEMATOCRIT 35.7* 33.9* 31.5* 30.4*   MCV 96.5  --  95.5 95.3   MCH 32.2  --  31.2 31.3   MCHC 33.3  --  32.7 32.9   RDW 45.1  --  44.7 43.5   PLATELETCT 238  --  210 215   MPV 9.7  --  10.0 9.7         Intake/Output Summary (Last 24 hours) at 11/2/2023 0920  Last data filed at 11/2/2023 0701  Gross per 24 hour   Intake 720 ml   Output 1350 ml   Net -630 ml       Exam:  Gen:NAD  ABD: Abdomen soft, no TTP.   Urine: rodarte with clear yellow output on clamped CBI.     A/P:    Active Hospital Problems    Diagnosis     Gross hematuria [R31.0]     Chronic anticoagulation [Z79.01]     Hyponatremia [E87.1]     Stage 3b chronic kidney disease (HCC) [N18.32]     Leukocytosis [D72.829]     History of pulmonary embolism [Z86.711]     Aneurysm of ascending aorta (HCC) [I71.21]     Essential hypertension, benign [I10]     Non-insulin dependent type 2 diabetes mellitus (HCC) [E11.9]     History of adenocarcinoma of prostate [Z85.46]      Formatting of this note might be different from the original.  s/p radio tx Dr Wright; PSA <0.1 since       Plan:  - continue rodarte catheter. Spoke with hospital team " who would  prefer to keep catheter in while patient is transitioned to Xarelto to watch for return of hematuria returns. If hematuria does return, will plan for CTA.   - Will plan for TOV if urine remains clear on Xarelto x24h. Will reassess in the AM.

## 2023-11-03 NOTE — PROGRESS NOTES
Assumed care of patient at 1900.  Bedside report given by Dorothea MUHAMMAD.  CBI dripping slowly and urine remains light tea colored.  Patient resting in bed and denies needs at shift change.

## 2023-11-03 NOTE — DISCHARGE PLANNING
Case Management Discharge Planning    Admission Date: 10/30/2023  GMLOS: 3.7  ALOS: 4    6-Clicks ADL Score: 21  6-Clicks Mobility Score: 22      Anticipated Discharge Dispo: Discharge Disposition: D/T to home under A care in anticipation of covered skilled care (06)    DME Needed: No    Action(s) Taken: Updated Provider/Nurse on Discharge Plan    Patient discussed during morning IDT rounds with team. Patient is not medically cleared for discharge at this time.    Home health is set up with : [facility] () [phone].  [facility] will remain available and continue to assist with safe disposition.     Escalations Completed: None    Medically Clear: No    Next Steps: pending medical clearance    Barriers to Discharge: Medical clearance    Is the patient up for discharge tomorrow: No

## 2023-11-03 NOTE — PROGRESS NOTES
BSSR received from night RN. Pt is resting comfortably in bed, not in any distress on 1L at 95%. AAOx4. Pt reported pain, medicated per MAR. CBI in place and running in slow drip, draining clear urine, small fragments noted. Discussed POC. Fall risk precaution in place, bed and strip alarm, locked bed in lowest position and call light within reach. All needs met at this time. Hourly rounding in place.     1050: Pt transported to CT via bed in a stable condition.  1230: Pt back on the floor in a stable conditio from CT.  1530: Floridalma burt per MD order.  1600: Pt voided 200 mL clear yellow urine.

## 2023-11-03 NOTE — CARE PLAN
The patient is Stable - Low risk of patient condition declining or worsening    Shift Goals  Clinical Goals: Pt's CBI and urine output will be monitored, pain manage 3/10 or less during this shift  Patient Goals: Able to manage pain, rest comfortably  Family Goals: n/a    Progress made toward(s) clinical / shift goals: Unclamped CBI on slow drip, started to drain bloody urine. No clots noted. Pt reported bladder pain, medicated per MAR. Reported pain improved post intervention. Denies any N/V.      Patient is not progressing towards the following goals:

## 2023-11-03 NOTE — CARE PLAN
The patient is Stable - Low risk of patient condition declining or worsening    Shift Goals  Clinical Goals: Pain control, Rest  Patient Goals: Pain control, sleep  Family Goals: n/a    Progress made toward(s) clinical / shift goals:  Patient progressing as expected. Pain control adequate for condition.     Patient is not progressing towards the following goals:

## 2023-11-04 ENCOUNTER — APPOINTMENT (OUTPATIENT)
Dept: RADIOLOGY | Facility: MEDICAL CENTER | Age: 83
End: 2023-11-04
Attending: FAMILY MEDICINE
Payer: MEDICARE

## 2023-11-04 LAB
ALBUMIN SERPL BCP-MCNC: 3.3 G/DL (ref 3.2–4.9)
BUN SERPL-MCNC: 27 MG/DL (ref 8–22)
CALCIUM ALBUM COR SERPL-MCNC: 9.3 MG/DL (ref 8.5–10.5)
CALCIUM SERPL-MCNC: 8.7 MG/DL (ref 8.4–10.2)
CHLORIDE SERPL-SCNC: 100 MMOL/L (ref 96–112)
CO2 SERPL-SCNC: 17 MMOL/L (ref 20–33)
CREAT SERPL-MCNC: 1.56 MG/DL (ref 0.5–1.4)
ERYTHROCYTE [DISTWIDTH] IN BLOOD BY AUTOMATED COUNT: 43 FL (ref 35.9–50)
GFR SERPLBLD CREATININE-BSD FMLA CKD-EPI: 43 ML/MIN/1.73 M 2
GLUCOSE BLD STRIP.AUTO-MCNC: 147 MG/DL (ref 65–99)
GLUCOSE BLD STRIP.AUTO-MCNC: 152 MG/DL (ref 65–99)
GLUCOSE BLD STRIP.AUTO-MCNC: 156 MG/DL (ref 65–99)
GLUCOSE BLD STRIP.AUTO-MCNC: 167 MG/DL (ref 65–99)
GLUCOSE SERPL-MCNC: 149 MG/DL (ref 65–99)
HCT VFR BLD AUTO: 30.6 % (ref 42–52)
HGB BLD-MCNC: 10.2 G/DL (ref 14–18)
MAGNESIUM SERPL-MCNC: 1.9 MG/DL (ref 1.5–2.5)
MCH RBC QN AUTO: 31.5 PG (ref 27–33)
MCHC RBC AUTO-ENTMCNC: 33.3 G/DL (ref 32.3–36.5)
MCV RBC AUTO: 94.4 FL (ref 81.4–97.8)
PHOSPHATE SERPL-MCNC: 3.6 MG/DL (ref 2.5–4.5)
PLATELET # BLD AUTO: 208 K/UL (ref 164–446)
PMV BLD AUTO: 9.7 FL (ref 9–12.9)
POTASSIUM SERPL-SCNC: 4.1 MMOL/L (ref 3.6–5.5)
RBC # BLD AUTO: 3.24 M/UL (ref 4.7–6.1)
SODIUM SERPL-SCNC: 131 MMOL/L (ref 135–145)
WBC # BLD AUTO: 8.1 K/UL (ref 4.8–10.8)

## 2023-11-04 PROCEDURE — 94760 N-INVAS EAR/PLS OXIMETRY 1: CPT

## 2023-11-04 PROCEDURE — A9270 NON-COVERED ITEM OR SERVICE: HCPCS | Performed by: HOSPITALIST

## 2023-11-04 PROCEDURE — 700102 HCHG RX REV CODE 250 W/ 637 OVERRIDE(OP): Performed by: HOSPITALIST

## 2023-11-04 PROCEDURE — 82962 GLUCOSE BLOOD TEST: CPT

## 2023-11-04 PROCEDURE — 83735 ASSAY OF MAGNESIUM: CPT

## 2023-11-04 PROCEDURE — 36415 COLL VENOUS BLD VENIPUNCTURE: CPT

## 2023-11-04 PROCEDURE — 51798 US URINE CAPACITY MEASURE: CPT

## 2023-11-04 PROCEDURE — 85027 COMPLETE CBC AUTOMATED: CPT

## 2023-11-04 PROCEDURE — 99233 SBSQ HOSP IP/OBS HIGH 50: CPT | Performed by: HOSPITALIST

## 2023-11-04 PROCEDURE — 80069 RENAL FUNCTION PANEL: CPT

## 2023-11-04 PROCEDURE — 770001 HCHG ROOM/CARE - MED/SURG/GYN PRIV*

## 2023-11-04 RX ORDER — TAMSULOSIN HYDROCHLORIDE 0.4 MG/1
0.4 CAPSULE ORAL
Status: DISCONTINUED | OUTPATIENT
Start: 2023-11-04 | End: 2023-11-06 | Stop reason: HOSPADM

## 2023-11-04 RX ADMIN — ROSUVASTATIN 5 MG: 10 TABLET, FILM COATED ORAL at 17:32

## 2023-11-04 RX ADMIN — INSULIN HUMAN 1 UNITS: 100 INJECTION, SOLUTION PARENTERAL at 05:43

## 2023-11-04 RX ADMIN — TAMSULOSIN HYDROCHLORIDE 0.4 MG: 0.4 CAPSULE ORAL at 15:25

## 2023-11-04 RX ADMIN — INSULIN HUMAN 1 UNITS: 100 INJECTION, SOLUTION PARENTERAL at 21:10

## 2023-11-04 RX ADMIN — EZETIMIBE 10 MG: 10 TABLET ORAL at 17:32

## 2023-11-04 RX ADMIN — OXYBUTYNIN CHLORIDE 5 MG: 5 TABLET ORAL at 05:39

## 2023-11-04 RX ADMIN — INSULIN HUMAN 1 UNITS: 100 INJECTION, SOLUTION PARENTERAL at 17:33

## 2023-11-04 ASSESSMENT — ENCOUNTER SYMPTOMS
SHORTNESS OF BREATH: 0
STRIDOR: 0
COUGH: 0
ABDOMINAL PAIN: 1
DIARRHEA: 0
HEADACHES: 0
PALPITATIONS: 0
FEVER: 0
CHILLS: 0
CONSTIPATION: 0
FALLS: 0

## 2023-11-04 ASSESSMENT — PATIENT HEALTH QUESTIONNAIRE - PHQ9
1. LITTLE INTEREST OR PLEASURE IN DOING THINGS: NOT AT ALL
SUM OF ALL RESPONSES TO PHQ9 QUESTIONS 1 AND 2: 0
2. FEELING DOWN, DEPRESSED, IRRITABLE, OR HOPELESS: NOT AT ALL

## 2023-11-04 ASSESSMENT — PAIN DESCRIPTION - PAIN TYPE
TYPE: ACUTE PAIN
TYPE: ACUTE PAIN

## 2023-11-04 NOTE — CARE PLAN
The patient is Stable - Low risk of patient condition declining or worsening    Shift Goals  Clinical Goals: Monitor pt's urine color and clarity; pt's BG will remain WNL with insulin therapy  Patient Goals: sleep and rest; home tomorrow  Family Goals: NA    Progress made toward(s) clinical / shift goals:  Pt reports urinary frequency and hesitancy and mild distention but able to void 400 ml clear, yellow urine during this shift; Bladder scan this AM at 550. Shayy CESPEDES hospitalist, received order for straight cath; unable to advance catheter, pt with a lot of pain and with resistance.      Problem: Knowledge Deficit - Standard  Goal: Patient and family/care givers will demonstrate understanding of plan of care, disease process/condition, diagnostic tests and medications  Outcome: Progressing     Problem: Pain - Standard  Goal: Alleviation of pain or a reduction in pain to the patient’s comfort goal  Outcome: Progressing     Problem: Urinary Elimination  Goal: Establish and maintain regular urinary output  Outcome: Progressing     Problem: Diabetes Management  Goal: Patient will achieve and maintain glucose in satisfactory range  Outcome: Progressing     Problem: Fall Risk  Goal: Patient will remain free from falls  Outcome: Progressing         Patient is not progressing towards the following goals: NA

## 2023-11-04 NOTE — CARE PLAN
The patient is Stable - Low risk of patient condition declining or worsening    Shift Goals  Clinical Goals: Pt's CBI will be monitored, pain will be controlled during this shift  Patient Goals: Able to manage pain, rodarte out and rest comfortably  Family Goals: n/a    Progress made toward(s) clinical / shift goals:  Rodarte discontinued per order. Voided post removal. Pain managed post intervention. Ambulated in the vasques. Pt did not sustain any fall during this shift.     Patient is not progressing towards the following goals:

## 2023-11-04 NOTE — PROGRESS NOTES
"Urology Progress Note    S:   11/4- Pt is improved today. Family member at bedside. Rodarte was removed last night. Pt able to void small amounts. Clear yellow urine. PVR 500cc. Cr slightly worse which may be 2/2 urinary retention vs contrast from study yesterday.     11/3- seen and examined at bedside. Urine crystal clear on very low flow CBI. H/h stable. Cr 1.47.     11/2 Seen and examined. No fevers, chills, nausea or vomiting.  Output via rodarte remains clear yellow, with CBI clamped.  No hematuria, no blood clots.    O:   /63   Pulse 76   Temp 36.4 °C (97.6 °F) (Temporal)   Resp 18   Ht 1.803 m (5' 10.98\")   Wt 85 kg (187 lb 6.3 oz)   SpO2 93%   Recent Labs     11/02/23 0130 11/03/23  0125 11/04/23 0122   SODIUM 135 134* 131*   POTASSIUM 4.6 4.4 4.1   CHLORIDE 103 105 100   CO2 16* 19* 17*   GLUCOSE 146* 166* 149*   BUN 29* 27* 27*   CREATININE 1.69* 1.47* 1.56*   CALCIUM 8.7 8.4 8.7     Recent Labs     11/02/23  0130 11/03/23  0125 11/04/23  0122   WBC 9.2 8.7 8.1   RBC 3.30* 3.19* 3.24*   HEMOGLOBIN 10.3* 10.0* 10.2*   HEMATOCRIT 31.5* 30.4* 30.6*   MCV 95.5 95.3 94.4   MCH 31.2 31.3 31.5   MCHC 32.7 32.9 33.3   RDW 44.7 43.5 43.0   PLATELETCT 210 215 208   MPV 10.0 9.7 9.7         Intake/Output Summary (Last 24 hours) at 11/2/2023 0920  Last data filed at 11/2/2023 0701  Gross per 24 hour   Intake 720 ml   Output 1350 ml   Net -630 ml       Exam:  Gen:NAD  ABD: Abdomen soft, no TTP.   Urine: rodarte with clear yellow output on clamped CBI.     A/P:    Active Hospital Problems    Diagnosis     Gross hematuria [R31.0]     Chronic anticoagulation [Z79.01]     Hyponatremia [E87.1]     Stage 3b chronic kidney disease (HCC) [N18.32]     Leukocytosis [D72.829]     History of pulmonary embolism [Z86.711]     Aneurysm of ascending aorta (HCC) [I71.21]     Essential hypertension, benign [I10]     Non-insulin dependent type 2 diabetes mellitus (HCC) [E11.9]     History of adenocarcinoma of prostate [Z85.46]  "     Formatting of this note might be different from the original.  s/p radio tx Dr Wright; PSA <0.1 since       Plan:  - Stop oxybutynin due to retention. Keep getting q4hr PVRs  -Will start flomax, to stop if pts blood pressure drops below 100/60  -Will consider RBUS to evaluate for hydro.   -If PVRs remain high and RBUS with hydro will replace rodarte.     Urology to follow   Nayely Mayen has directed this plan of care.

## 2023-11-04 NOTE — PROGRESS NOTES
Received report from day shift RN. Pt resting in bed. Pt A&O X 4, on RA, SPO2 93%. Pt denies pain at this time. No signs of respiratory distress, VSS. Pt updated with POC, fall precautions in place, call light within reach. All needs met at this time.     0004 Pt reports hesitancy with urination and mild distention. 150 ml clear yellow urine output charted since start of shift. Bladder scan performed, 325 ml urine scanned. Will continue to monitor.    0615 Pt continue to report hesistancy and mild distention. Bladder scan results this AM at 550 ml. Shayy CESPEEDS hospitalist, received order for straight cath; unable to advance catheter, pt with a lot of pain and with resistance.

## 2023-11-04 NOTE — PROGRESS NOTES
BSSR received from night RN. Pt is sitting up in bed eating breakfast, not in any distress on RA at 95%. AAOx4. Pt reported abdominal and bladder cramps, rest for relief. Denies any N/V. Discussed POC. Fall risk precaution in place, bed and strip alarm, locked bed in lowest position and call light within reach. All needs met at this time. Hourly rounding in place.

## 2023-11-04 NOTE — PROGRESS NOTES
"[facility] Medicine Daily Progress Note    Date of Service  11/4/2023    Chief Complaint  Star Centeno is a 82 y.o. male admitted 10/30/2023 with   Chief Complaint   Patient presents with    Urinary Catheter Problem     \"It's blocked up with blood\". Reports he is on blood thinners. States catheter has been blocked since this afternoon.      Hospital Course  History of prostate cancer status post radiation, hyperlipidemia, history of aortic aneurysms on annual surveillance with a recent incidental finding of PE on 9/2, started on anticoagulation therapy who subsequently underwent left ureteral stent placement and bladder biopsy on 10/25 who presented with hematuria.  A three-way catheter was placed by urology as an outpatient however he came to the ER due to ongoing/worsening hematuria and concern for catheter obstruction.    Interval Problem Update  11/2:  Patient stated no acute problems.  Spoke with daughter at bedside.  We discussed potential plans  - Family is to speak with urology provider on future plans.  This includes any surgery, we will hold Xarelto.  -If no further surgery we will trial Xarelto and see if patient has any recurrent hematuria.  -If patient's renal function improves tomorrow, we will check a CTA chest prior to restarting Xarelto.  If there is no more pulmonary embolism we may be able to stop anticoagulation.  I reviewed lower extremity ultrasound from 11/1/2023 which were negative for any DVT.  -If urology team results show any signs of cancer, I recommended that patient may need to proceed with IVC filter in the future.    11/3:  I spoke with patient and daughter at bedside.  We discussed his options at the moment.  Urology was recommending to remove Hedrick catheter today.  I requested to wait on removal until patient and daughter decide on anticoagulation as we would need to trial Xarelto and monitor for hematuria.      I spent an extra 15 minutes on patient's case, I came back to bedside " as daughter and patient's request.  - We discussed further on patient's risk including a CTA chest to determine if he has any pulmonary embolism further.  I reiterated that we do have a negative DVT ultrasound.  I discussed that patient has no further symptoms including pleuritic chest pain, shortness of breath or tachycardia in regards to his PE.  I showed them at bedside, CTA chest of 9/2/2023 and showed them the site of the pulmonary embolism and relative size.  - After letting the daughter and patient talk, they informed they wish to proceed with a CTA chest.  I placed orders and we were able to obtain today.    I spent an extra 15 minutes on patient's case for the second time today.  I came back to bedside and discussed with daughter and patient.  - We discussed the results of the CTA chest.  I called radiologist before and confirmed that the test was equivocal, there is no complete sign of pulmonary embolism.  - Daughter stated despite the CTA chest, there requesting to stop Xarelto.  I explained to them since the CTA chest is equivocal there is no concrete evidence of any further pulmonary embolism that choice to stop Xarelto with evidence of a negative venous duplex ultrasound, could be clinically appropriate at this time as he is a high risk for further hematuria.  Daughter and patient agreed on discontinuing Xarelto.  - As we are no longer going to be on any anticoagulation, I placed order for Hedrick catheter removal.  - Patient will be monitored overnight for any complications regarding urinary retention or hematuria.  He may be discharged likely tomorrow if there are no complications.    11/4/2023   Hemodynamically stable overnight, heart rate 70s-80s, saturating well on room air  Patient is still having urinary retention.  Creatinine 1.56, slightly worse compared to yesterday.  I discussed with urology, plan to stop oxybutynin.  We will try starting tamsulosin.  His blood pressure may be a barrier.  If  this fails, the patient will likely need reinsertion of the Hedrick catheter.  Continue to monitor renal output and renal function.  If renal function worsens may need to repeat renal ultrasound.  Plan of care discussed in detail with patient and daughter.  All questions were answered.  Blood sugars reasonably controlled 147-156, will continue current regimen.  I have discussed this patient's plan of care and discharge plan at IDT rounds today with Case Management, Nursing, Nursing leadership, and other members of the IDT team.    Consultants/Specialty  urology    Code Status  Full Code    Disposition  Not medically cleared, still having urinary retension   I have placed the appropriate orders for post-discharge needs.    Review of Systems  Review of Systems   Constitutional:  Negative for chills and fever.   Respiratory:  Negative for cough, shortness of breath and stridor.    Cardiovascular:  Negative for chest pain and palpitations.   Gastrointestinal:  Positive for abdominal pain (Suprapubic). Negative for constipation and diarrhea.   Genitourinary:  Negative for hematuria.        Difficulty emptying bladder   Musculoskeletal:  Negative for falls.   Neurological:  Negative for headaches.      Physical Exam  Temp:  [36.4 °C (97.6 °F)-36.7 °C (98.1 °F)] 36.4 °C (97.6 °F)  Pulse:  [76-85] 76  Resp:  [16-18] 18  BP: (111-121)/(63-68) 111/63  SpO2:  [90 %-94 %] 93 %    Physical Exam  Vitals and nursing note reviewed.   Constitutional:       General: He is not in acute distress.     Appearance: He is not diaphoretic.   HENT:      Mouth/Throat:      Mouth: Mucous membranes are dry.      Pharynx: No oropharyngeal exudate.   Cardiovascular:      Rate and Rhythm: Normal rate and regular rhythm.      Pulses: Normal pulses.      Heart sounds: Normal heart sounds. No murmur heard.  Pulmonary:      Effort: Pulmonary effort is normal. No respiratory distress.      Breath sounds: Normal breath sounds. No wheezing or rales.    Abdominal:      General: Bowel sounds are normal. There is no distension.      Tenderness: There is no abdominal tenderness.   Genitourinary:     Comments: Hedrick catheter in place, no hematuria  Musculoskeletal:         General: No swelling or tenderness. Normal range of motion.   Skin:     General: Skin is warm.      Capillary Refill: Capillary refill takes less than 2 seconds.      Coloration: Skin is not jaundiced or pale.   Neurological:      General: No focal deficit present.      Mental Status: He is alert and oriented to person, place, and time. Mental status is at baseline.      Motor: No weakness.   Psychiatric:         Mood and Affect: Mood normal.         Behavior: Behavior normal.         Thought Content: Thought content normal.         Judgment: Judgment normal.       Fluids    Intake/Output Summary (Last 24 hours) at 11/4/2023 1417  Last data filed at 11/4/2023 1408  Gross per 24 hour   Intake 600 ml   Output 2720 ml   Net -2120 ml     Laboratory  Recent Labs     11/02/23  0130 11/03/23  0125 11/04/23  0122   WBC 9.2 8.7 8.1   RBC 3.30* 3.19* 3.24*   HEMOGLOBIN 10.3* 10.0* 10.2*   HEMATOCRIT 31.5* 30.4* 30.6*   MCV 95.5 95.3 94.4   MCH 31.2 31.3 31.5   MCHC 32.7 32.9 33.3   RDW 44.7 43.5 43.0   PLATELETCT 210 215 208   MPV 10.0 9.7 9.7     Recent Labs     11/02/23  0130 11/03/23  0125 11/04/23  0122   SODIUM 135 134* 131*   POTASSIUM 4.6 4.4 4.1   CHLORIDE 103 105 100   CO2 16* 19* 17*   GLUCOSE 146* 166* 149*   BUN 29* 27* 27*   CREATININE 1.69* 1.47* 1.56*   CALCIUM 8.7 8.4 8.7     Imaging  CT-CTA CHEST PULMONARY ARTERY W/ RECONS   Final Result   Addendum (preliminary) 1 of 1         Findings were discussed with DAVID POWER on 11/3/2023 1:54 PM.      Final      1.  Question residual nonocclusive thrombus within the right middle lobe pulmonary artery. Otherwise no pulmonary artery emboli identified.      2.   Bilateral lung base atelectasis. No pleural effusions.      3.  Atherosclerotic changes  including coronary artery calcifications.            US-EXTREMITY VENOUS LOWER BILAT   Final Result      US-RENAL   Final Result         1.  Normal renal ultrasound.         Assessment/Plan  * Gross hematuria- (present on admission)  Assessment & Plan  11/4/2023   Hematuria stopped with stopping Xarelto  Patient and daugh do not want to be restarted on Xarelto.  They understands and accept the risk of their decision     Stage 3b chronic kidney disease (HCC)- (present on admission)  Assessment & Plan  Avoid/minimize nephrotoxins as much as possible, renally dose medications, monitor inputs and outputs     Chronic anticoagulation- (present on admission)  Assessment & Plan  -Patient on xarelto for Right middle lobe PE (9/2 diagnosed)    Patient and daughter have chosen to stop Xarelto     History of pulmonary embolism- (present on admission)  Assessment & Plan  CTA chest equivocal. Pt and daughter have chosen to stop Xarelto.     Aneurysm of ascending aorta (HCC)- (present on admission)  Assessment & Plan  -Last Surveillance CTA Chest done on 9/6-stable at 3.5 cm but incidental finding of PE  -Follow up outpatient    Non-insulin dependent type 2 diabetes mellitus (HCC)- (present on admission)  Assessment & Plan  HbA1c 7.1  Hold home metformin and jardiance  Monitor Accuchecks TIDAC and Bedtime  Insulin sliding scale per protocol    11/4/2023   Blood sugars reasonably controlled 147-156, will continue current regimen.     History of adenocarcinoma of prostate- (present on admission)  Assessment & Plan  In 2001, s/p radiation therapy. PSA negative ever since  Recent biopsy was performed on 10/24 when the left ureteral stent was placed  Pathology showed atypical cells concerning for malignancy which could indicate recurrence  Urology is following      Essential hypertension, benign- (present on admission)  Assessment & Plan  Blood pressure better today however still on the low normal side  I will continue to hold  Metoprolol and Lisinopril for now, as we will start tamsulosin       VTE prophylaxis: SCDs was on apixaban, holding for hematuria    Total time spent is 55 minutes.    This included review of patient chart since admission, face-to-face interview, physical examination, lab review and analysis.  In addition, I spoke with patient and nurse, charge nurse, pharmacy, case management during discharge planning IDT rounds.    I also discussed with urology.

## 2023-11-04 NOTE — CARE PLAN
The patient is Stable - Low risk of patient condition declining or worsening    Shift Goals  Clinical Goals: Pt's urine output will be monitored during this shft  Patient Goals: Able to pee, rest comfortably  Family Goals: n/a    Progress made toward(s) clinical / shift goals:  Q4 PVR. Urine output monitored. Showered. Ambulated in the vasques. Pt did not sustain any fall during this shift.    Patient is not progressing towards the following goals:

## 2023-11-05 LAB
ANION GAP SERPL CALC-SCNC: 16 MMOL/L (ref 7–16)
BASOPHILS # BLD AUTO: 0.4 % (ref 0–1.8)
BASOPHILS # BLD: 0.03 K/UL (ref 0–0.12)
BUN SERPL-MCNC: 26 MG/DL (ref 8–22)
CALCIUM SERPL-MCNC: 8.8 MG/DL (ref 8.4–10.2)
CHLORIDE SERPL-SCNC: 101 MMOL/L (ref 96–112)
CO2 SERPL-SCNC: 17 MMOL/L (ref 20–33)
CREAT SERPL-MCNC: 1.35 MG/DL (ref 0.5–1.4)
EOSINOPHIL # BLD AUTO: 0.65 K/UL (ref 0–0.51)
EOSINOPHIL NFR BLD: 8.8 % (ref 0–6.9)
ERYTHROCYTE [DISTWIDTH] IN BLOOD BY AUTOMATED COUNT: 43.2 FL (ref 35.9–50)
GFR SERPLBLD CREATININE-BSD FMLA CKD-EPI: 51 ML/MIN/1.73 M 2
GLUCOSE BLD STRIP.AUTO-MCNC: 128 MG/DL (ref 65–99)
GLUCOSE BLD STRIP.AUTO-MCNC: 156 MG/DL (ref 65–99)
GLUCOSE BLD STRIP.AUTO-MCNC: 163 MG/DL (ref 65–99)
GLUCOSE BLD STRIP.AUTO-MCNC: 176 MG/DL (ref 65–99)
GLUCOSE SERPL-MCNC: 140 MG/DL (ref 65–99)
HCT VFR BLD AUTO: 30.1 % (ref 42–52)
HGB BLD-MCNC: 10 G/DL (ref 14–18)
IMM GRANULOCYTES # BLD AUTO: 0.03 K/UL (ref 0–0.11)
IMM GRANULOCYTES NFR BLD AUTO: 0.4 % (ref 0–0.9)
LYMPHOCYTES # BLD AUTO: 1.54 K/UL (ref 1–4.8)
LYMPHOCYTES NFR BLD: 20.8 % (ref 22–41)
MAGNESIUM SERPL-MCNC: 2 MG/DL (ref 1.5–2.5)
MCH RBC QN AUTO: 31.6 PG (ref 27–33)
MCHC RBC AUTO-ENTMCNC: 33.2 G/DL (ref 32.3–36.5)
MCV RBC AUTO: 95.3 FL (ref 81.4–97.8)
MONOCYTES # BLD AUTO: 0.64 K/UL (ref 0–0.85)
MONOCYTES NFR BLD AUTO: 8.6 % (ref 0–13.4)
NEUTROPHILS # BLD AUTO: 4.51 K/UL (ref 1.82–7.42)
NEUTROPHILS NFR BLD: 61 % (ref 44–72)
NRBC # BLD AUTO: 0 K/UL
NRBC BLD-RTO: 0 /100 WBC (ref 0–0.2)
PLATELET # BLD AUTO: 212 K/UL (ref 164–446)
PMV BLD AUTO: 10 FL (ref 9–12.9)
POTASSIUM SERPL-SCNC: 4 MMOL/L (ref 3.6–5.5)
RBC # BLD AUTO: 3.16 M/UL (ref 4.7–6.1)
SODIUM SERPL-SCNC: 134 MMOL/L (ref 135–145)
WBC # BLD AUTO: 7.4 K/UL (ref 4.8–10.8)

## 2023-11-05 PROCEDURE — 94760 N-INVAS EAR/PLS OXIMETRY 1: CPT

## 2023-11-05 PROCEDURE — 80048 BASIC METABOLIC PNL TOTAL CA: CPT

## 2023-11-05 PROCEDURE — 770001 HCHG ROOM/CARE - MED/SURG/GYN PRIV*

## 2023-11-05 PROCEDURE — 36415 COLL VENOUS BLD VENIPUNCTURE: CPT

## 2023-11-05 PROCEDURE — 51798 US URINE CAPACITY MEASURE: CPT

## 2023-11-05 PROCEDURE — 700111 HCHG RX REV CODE 636 W/ 250 OVERRIDE (IP): Mod: JZ | Performed by: HOSPITALIST

## 2023-11-05 PROCEDURE — 700102 HCHG RX REV CODE 250 W/ 637 OVERRIDE(OP): Performed by: HOSPITALIST

## 2023-11-05 PROCEDURE — 99232 SBSQ HOSP IP/OBS MODERATE 35: CPT | Performed by: HOSPITALIST

## 2023-11-05 PROCEDURE — A9270 NON-COVERED ITEM OR SERVICE: HCPCS | Performed by: HOSPITALIST

## 2023-11-05 PROCEDURE — 82962 GLUCOSE BLOOD TEST: CPT | Mod: 91

## 2023-11-05 PROCEDURE — 85025 COMPLETE CBC W/AUTO DIFF WBC: CPT

## 2023-11-05 PROCEDURE — 83735 ASSAY OF MAGNESIUM: CPT

## 2023-11-05 RX ADMIN — HYDROMORPHONE HYDROCHLORIDE 1 MG: 1 INJECTION, SOLUTION INTRAMUSCULAR; INTRAVENOUS; SUBCUTANEOUS at 05:28

## 2023-11-05 RX ADMIN — INSULIN HUMAN 1 UNITS: 100 INJECTION, SOLUTION PARENTERAL at 11:36

## 2023-11-05 RX ADMIN — EZETIMIBE 10 MG: 10 TABLET ORAL at 16:39

## 2023-11-05 RX ADMIN — INSULIN HUMAN 1 UNITS: 100 INJECTION, SOLUTION PARENTERAL at 20:49

## 2023-11-05 RX ADMIN — INSULIN HUMAN 1 UNITS: 100 INJECTION, SOLUTION PARENTERAL at 05:33

## 2023-11-05 RX ADMIN — SENNOSIDES AND DOCUSATE SODIUM 2 TABLET: 50; 8.6 TABLET ORAL at 16:38

## 2023-11-05 RX ADMIN — ROSUVASTATIN 5 MG: 10 TABLET, FILM COATED ORAL at 18:00

## 2023-11-05 ASSESSMENT — PATIENT HEALTH QUESTIONNAIRE - PHQ9
2. FEELING DOWN, DEPRESSED, IRRITABLE, OR HOPELESS: NOT AT ALL
3. TROUBLE FALLING OR STAYING ASLEEP OR SLEEPING TOO MUCH: SEVERAL DAYS
SUM OF ALL RESPONSES TO PHQ9 QUESTIONS 1 AND 2: 0
4. FEELING TIRED OR HAVING LITTLE ENERGY: SEVERAL DAYS
6. FEELING BAD ABOUT YOURSELF - OR THAT YOU ARE A FAILURE OR HAVE LET YOURSELF OR YOUR FAMILY DOWN: NOT AL ALL
1. LITTLE INTEREST OR PLEASURE IN DOING THINGS: NOT AT ALL
SUM OF ALL RESPONSES TO PHQ QUESTIONS 1-9: 3
7. TROUBLE CONCENTRATING ON THINGS, SUCH AS READING THE NEWSPAPER OR WATCHING TELEVISION: NOT AT ALL
8. MOVING OR SPEAKING SO SLOWLY THAT OTHER PEOPLE COULD HAVE NOTICED. OR THE OPPOSITE, BEING SO FIGETY OR RESTLESS THAT YOU HAVE BEEN MOVING AROUND A LOT MORE THAN USUAL: NOT AT ALL
9. THOUGHTS THAT YOU WOULD BE BETTER OFF DEAD, OR OF HURTING YOURSELF: NOT AT ALL
5. POOR APPETITE OR OVEREATING: SEVERAL DAYS

## 2023-11-05 ASSESSMENT — ENCOUNTER SYMPTOMS
SHORTNESS OF BREATH: 0
CHILLS: 0
FEVER: 0
COUGH: 0
ABDOMINAL PAIN: 1
FALLS: 0
CONSTIPATION: 0
PALPITATIONS: 0
STRIDOR: 0
HEADACHES: 0
DIARRHEA: 0

## 2023-11-05 ASSESSMENT — PAIN DESCRIPTION - PAIN TYPE: TYPE: ACUTE PAIN

## 2023-11-05 NOTE — PROGRESS NOTES
"Urology Progress Note    S:     11/5- Cr improved 1.35(1.56) after rodarte was replaced. Pt reports improvement of pain but still with hematuria.      11/4- Pt is improved today. Family member at bedside. Rodarte was removed last night. Pt able to void small amounts. Clear yellow urine. PVR 500cc. Cr slightly worse which may be 2/2 urinary retention vs contrast from study yesterday.     11/3- seen and examined at bedside. Urine crystal clear on very low flow CBI. H/h stable. Cr 1.47.     11/2 Seen and examined. No fevers, chills, nausea or vomiting.  Output via rodarte remains clear yellow, with CBI clamped.  No hematuria, no blood clots.    O:   /58   Pulse 60   Temp 36.8 °C (98.3 °F) (Temporal)   Resp 16   Ht 1.803 m (5' 10.98\")   Wt 85 kg (187 lb 6.3 oz)   SpO2 90%   Recent Labs     11/03/23 0125 11/04/23 0122 11/05/23 0133   SODIUM 134* 131* 134*   POTASSIUM 4.4 4.1 4.0   CHLORIDE 105 100 101   CO2 19* 17* 17*   GLUCOSE 166* 149* 140*   BUN 27* 27* 26*   CREATININE 1.47* 1.56* 1.35   CALCIUM 8.4 8.7 8.8     Recent Labs     11/03/23 0125 11/04/23 0122 11/05/23 0133   WBC 8.7 8.1 7.4   RBC 3.19* 3.24* 3.16*   HEMOGLOBIN 10.0* 10.2* 10.0*   HEMATOCRIT 30.4* 30.6* 30.1*   MCV 95.3 94.4 95.3   MCH 31.3 31.5 31.6   MCHC 32.9 33.3 33.2   RDW 43.5 43.0 43.2   PLATELETCT 215 208 212   MPV 9.7 9.7 10.0         Intake/Output Summary (Last 24 hours) at 11/2/2023 0920  Last data filed at 11/2/2023 0701  Gross per 24 hour   Intake 720 ml   Output 1350 ml   Net -630 ml       Exam:  Gen:NAD  ABD: Abdomen soft, no TTP.   Urine: rodarte with clear yellow output on clamped CBI.     A/P:    Active Hospital Problems    Diagnosis     Gross hematuria [R31.0]     Chronic anticoagulation [Z79.01]     Hyponatremia [E87.1]     Stage 3b chronic kidney disease (HCC) [N18.32]     Leukocytosis [D72.829]     History of pulmonary embolism [Z86.711]     Aneurysm of ascending aorta (HCC) [I71.21]     Essential hypertension, benign [I10] "     Non-insulin dependent type 2 diabetes mellitus (HCC) [E11.9]     History of adenocarcinoma of prostate [Z85.46]      Formatting of this note might be different from the original.  s/p radio tx Dr Wright; PSA <0.1 since       Plan:  -Stop oxybutynin due to retention.   -Continue flomax, to stop if pts blood pressure drops below 100/60  -Flush rodarte for clots, can restart CBI if darkening urine.     Urology to follow   Nayely Mayen has directed this plan of care.

## 2023-11-05 NOTE — PROGRESS NOTES
04:45 CNA informs this RN pt urine output is bloody. No clots noted. Pt bladder scanned -.  MD made aware at hrs 04:58 order in place to insert rodarte

## 2023-11-05 NOTE — PROGRESS NOTES
Received bedside report from day shift RN at 19:15.   Assumed pt care. Pt seen AO4, reports pain at 3/10, declines need for pain meds at this time. No nausea reported at this time.   POC and goals discussed. Whiteboard updated.  Safety and fall precautions in place. Bed locked and lowest position. Bed alarm on.  Instructed pt to use call light, verbalized understanding.   Call light kept within reach.  No other needs reported at this time.   Implementation of POC in progress.

## 2023-11-05 NOTE — CARE PLAN
The patient is Stable - Low risk of patient condition declining or worsening    Shift Goals  Clinical Goals: pt will report pain less than 3/10 post prn admin for duration of shift  Patient Goals: Void, comfort  Family Goals: n/a    Progress made toward(s) clinical / shift goals:  Pt did not require PRN pain med admin for duration of shift and did not report pain above 3/10 throughout the day     Patient is not progressing towards the following goals:

## 2023-11-05 NOTE — PROGRESS NOTES
0740: Report received from NOC RN, assumed care of pt.   POC and medications reviewed with pt. Visible chest rise chest fall sleeping at this time   Safety measures in place.  Hourly rounding in place.

## 2023-11-05 NOTE — CARE PLAN
The patient is Stable - Low risk of patient condition declining or worsening    Shift Goals  Clinical Goals: Q4 bladder scan, check pvr this shift  Patient Goals: Void, comfort  Family Goals: n/a    Progress made toward(s) clinical / shift goals:  Q4 bladder scans done, pt was retainingabove 500 per bladder scan this shift, pt urine output also bloody towards the end of shift, rodarte inserted per order. Comfort measures offered, heated blankets, pain med PRN.    Patient is not progressing towards the following goals: Pt was retaining, output bloody

## 2023-11-05 NOTE — PROGRESS NOTES
"[facility] Medicine Daily Progress Note    Date of Service  11/5/2023    Chief Complaint  Star Centeno is a 82 y.o. male admitted 10/30/2023 with   Chief Complaint   Patient presents with    Urinary Catheter Problem     \"It's blocked up with blood\". Reports he is on blood thinners. States catheter has been blocked since this afternoon.      Hospital Course  History of prostate cancer status post radiation, hyperlipidemia, history of aortic aneurysms on annual surveillance with a recent incidental finding of PE on 9/2, started on anticoagulation therapy who subsequently underwent left ureteral stent placement and bladder biopsy on 10/25 who presented with hematuria.  A three-way catheter was placed by urology as an outpatient however he came to the ER due to ongoing/worsening hematuria and concern for catheter obstruction.    Interval Problem Update  11/2:  Patient stated no acute problems.  Spoke with daughter at bedside.  We discussed potential plans  - Family is to speak with urology provider on future plans.  This includes any surgery, we will hold Xarelto.  -If no further surgery we will trial Xarelto and see if patient has any recurrent hematuria.  -If patient's renal function improves tomorrow, we will check a CTA chest prior to restarting Xarelto.  If there is no more pulmonary embolism we may be able to stop anticoagulation.  I reviewed lower extremity ultrasound from 11/1/2023 which were negative for any DVT.  -If urology team results show any signs of cancer, I recommended that patient may need to proceed with IVC filter in the future.    11/3:  I spoke with patient and daughter at bedside.  We discussed his options at the moment.  Urology was recommending to remove Hedrick catheter today.  I requested to wait on removal until patient and daughter decide on anticoagulation as we would need to trial Xarelto and monitor for hematuria.      I spent an extra 15 minutes on patient's case, I came back to bedside " as daughter and patient's request.  - We discussed further on patient's risk including a CTA chest to determine if he has any pulmonary embolism further.  I reiterated that we do have a negative DVT ultrasound.  I discussed that patient has no further symptoms including pleuritic chest pain, shortness of breath or tachycardia in regards to his PE.  I showed them at bedside, CTA chest of 9/2/2023 and showed them the site of the pulmonary embolism and relative size.  - After letting the daughter and patient talk, they informed they wish to proceed with a CTA chest.  I placed orders and we were able to obtain today.    I spent an extra 15 minutes on patient's case for the second time today.  I came back to bedside and discussed with daughter and patient.  - We discussed the results of the CTA chest.  I called radiologist before and confirmed that the test was equivocal, there is no complete sign of pulmonary embolism.  - Daughter stated despite the CTA chest, there requesting to stop Xarelto.  I explained to them since the CTA chest is equivocal there is no concrete evidence of any further pulmonary embolism that choice to stop Xarelto with evidence of a negative venous duplex ultrasound, could be clinically appropriate at this time as he is a high risk for further hematuria.  Daughter and patient agreed on discontinuing Xarelto.  - As we are no longer going to be on any anticoagulation, I placed order for Hedrick catheter removal.  - Patient will be monitored overnight for any complications regarding urinary retention or hematuria.  He may be discharged likely tomorrow if there are no complications.    11/4/2023   Hemodynamically stable overnight, heart rate 70s-80s, saturating well on room air  Patient is still having urinary retention.  Creatinine 1.56, slightly worse compared to yesterday.  I discussed with urology, plan to stop oxybutynin.  We will try starting tamsulosin.  His blood pressure may be a barrier.  If  this fails, the patient will likely need reinsertion of the Hedrick catheter.  Continue to monitor renal output and renal function.  If renal function worsens may need to repeat renal ultrasound.  Plan of care discussed in detail with patient and daughter.  All questions were answered.  Blood sugars reasonably controlled 147-156, will continue current regimen.    11/5/2023   HR ~ 60's   Saturating well on RA  I will continue to hold Metoprolol and Lisinopril for now, as we will start tamsulosin  Urinary retention reoccurred. Hedrick re-placed   Having hematuria and clots this morning.  I discussed with urology, who will follow. May need CBI if gets worse.     Hb 10 > 10.2, I will continue to monitor.  Blood sugars reasonably controlled 150's-160's, will continue current regimen.     Renal function continue to improve.   I have discussed this patient's plan of care and discharge plan at IDT rounds today with Case Management, Nursing, Nursing leadership, and other members of the IDT team.    Consultants/Specialty  urology    Code Status  Full Code    Disposition  Not medically cleared, still having hematuria, and urinary retension   I have placed the appropriate orders for post-discharge needs.     Review of Systems  Review of Systems   Constitutional:  Negative for chills and fever.   Respiratory:  Negative for cough, shortness of breath and stridor.    Cardiovascular:  Negative for chest pain and palpitations.   Gastrointestinal:  Positive for abdominal pain (Suprapubic, improving). Negative for constipation and diarrhea.   Genitourinary:  Positive for hematuria.        Difficulty emptying bladder   Musculoskeletal:  Negative for falls.   Neurological:  Negative for headaches.      Physical Exam  Temp:  [36.1 °C (97 °F)-36.8 °C (98.3 °F)] 36.1 °C (97 °F)  Pulse:  [59-83] 59  Resp:  [16-18] 16  BP: ()/(58-65) 104/64  SpO2:  [90 %-97 %] 95 %    Physical Exam  Vitals and nursing note reviewed.   Constitutional:        General: He is not in acute distress.     Appearance: He is not diaphoretic.   HENT:      Right Ear: External ear normal.      Left Ear: External ear normal.      Mouth/Throat:      Mouth: Mucous membranes are dry.      Pharynx: No oropharyngeal exudate.   Cardiovascular:      Rate and Rhythm: Normal rate and regular rhythm.      Pulses: Normal pulses.      Heart sounds: Normal heart sounds. No murmur heard.  Pulmonary:      Effort: Pulmonary effort is normal. No respiratory distress.      Breath sounds: Normal breath sounds. No wheezing or rales.   Abdominal:      General: Bowel sounds are normal. There is no distension.      Tenderness: There is no abdominal tenderness.   Genitourinary:     Comments: Hedrick catheter in place, hematuria re-occurred   Musculoskeletal:         General: No swelling or tenderness. Normal range of motion.   Skin:     General: Skin is warm.      Capillary Refill: Capillary refill takes less than 2 seconds.      Coloration: Skin is not jaundiced or pale.   Neurological:      General: No focal deficit present.      Mental Status: He is alert and oriented to person, place, and time. Mental status is at baseline.      Motor: No weakness.   Psychiatric:         Mood and Affect: Mood normal.         Behavior: Behavior normal.       Fluids    Intake/Output Summary (Last 24 hours) at 11/5/2023 1219  Last data filed at 11/5/2023 1000  Gross per 24 hour   Intake 1200 ml   Output 1670 ml   Net -470 ml     Laboratory  Recent Labs     11/03/23  0125 11/04/23  0122 11/05/23 0133   WBC 8.7 8.1 7.4   RBC 3.19* 3.24* 3.16*   HEMOGLOBIN 10.0* 10.2* 10.0*   HEMATOCRIT 30.4* 30.6* 30.1*   MCV 95.3 94.4 95.3   MCH 31.3 31.5 31.6   MCHC 32.9 33.3 33.2   RDW 43.5 43.0 43.2   PLATELETCT 215 208 212   MPV 9.7 9.7 10.0     Recent Labs     11/03/23  0125 11/04/23  0122 11/05/23 0133   SODIUM 134* 131* 134*   POTASSIUM 4.4 4.1 4.0   CHLORIDE 105 100 101   CO2 19* 17* 17*   GLUCOSE 166* 149* 140*   BUN 27* 27* 26*    CREATININE 1.47* 1.56* 1.35   CALCIUM 8.4 8.7 8.8     Imaging  CT-CTA CHEST PULMONARY ARTERY W/ RECONS   Final Result   Addendum (preliminary) 1 of 1         Findings were discussed with DAVID POWER on 11/3/2023 1:54 PM.      Final      1.  Question residual nonocclusive thrombus within the right middle lobe pulmonary artery. Otherwise no pulmonary artery emboli identified.      2.   Bilateral lung base atelectasis. No pleural effusions.      3.  Atherosclerotic changes including coronary artery calcifications.            US-EXTREMITY VENOUS LOWER BILAT   Final Result      US-RENAL   Final Result         1.  Normal renal ultrasound.         Assessment/Plan  * Gross hematuria- (present on admission)  Assessment & Plan  11/4/2023   Hematuria stopped with stopping Xarelto  Patient and daugh do not want to be restarted on Xarelto.  They understands and accept the risk of their decision     11/5/2023   Hematuria recurred after the need to re-place a rodarte for retension last night     Stage 3b chronic kidney disease (HCC)- (present on admission)  Assessment & Plan  Avoid/minimize nephrotoxins as much as possible, renally dose medications, monitor inputs and outputs     Essential hypertension, benign- (present on admission)  Assessment & Plan  Blood pressure better today however still on the low normal side   I will continue to hold Metoprolol and Lisinopril for now, as we will start tamsulosin    Chronic anticoagulation- (present on admission)  Assessment & Plan  -Patient on xarelto for Right middle lobe PE (9/2 diagnosed)    Patient and daughter have chosen to stop Xarelto     History of pulmonary embolism- (present on admission)  Assessment & Plan  CTA chest equivocal. Pt and daughter have chosen to stop Xarelto.     Aneurysm of ascending aorta (HCC)- (present on admission)  Assessment & Plan  -Last Surveillance CTA Chest done on 9/6-stable at 3.5 cm but incidental finding of PE  -Follow up  outpatient    Non-insulin dependent type 2 diabetes mellitus (HCC)- (present on admission)  Assessment & Plan  HbA1c 7.1  Hold home metformin and jardiance  Monitor Accuchecks TIDAC and Bedtime  Insulin sliding scale per protocol    11/5/2023   Blood sugars reasonably controlled 150's-160's, will continue current regimen.      History of adenocarcinoma of prostate- (present on admission)  Assessment & Plan  In 2001, s/p radiation therapy. PSA negative ever since  Recent biopsy was performed on 10/24 when the left ureteral stent was placed  Pathology showed atypical cells concerning for malignancy which could indicate recurrence  Urology is following         VTE prophylaxis: SCDs was on apixaban, holding for hematuria  I discussed with urology.

## 2023-11-06 ENCOUNTER — APPOINTMENT (OUTPATIENT)
Dept: MEDICAL GROUP | Facility: LAB | Age: 83
End: 2023-11-06
Payer: MEDICARE

## 2023-11-06 VITALS
DIASTOLIC BLOOD PRESSURE: 57 MMHG | SYSTOLIC BLOOD PRESSURE: 106 MMHG | OXYGEN SATURATION: 97 % | HEART RATE: 79 BPM | BODY MASS INDEX: 26.23 KG/M2 | HEIGHT: 71 IN | RESPIRATION RATE: 17 BRPM | WEIGHT: 187.39 LBS | TEMPERATURE: 98.2 F

## 2023-11-06 PROBLEM — E87.1 HYPONATREMIA: Status: RESOLVED | Noted: 2023-10-30 | Resolved: 2023-11-06

## 2023-11-06 PROBLEM — D72.829 LEUKOCYTOSIS: Status: RESOLVED | Noted: 2023-10-30 | Resolved: 2023-11-06

## 2023-11-06 PROBLEM — Z79.01 CHRONIC ANTICOAGULATION: Status: RESOLVED | Noted: 2023-10-30 | Resolved: 2023-11-06

## 2023-11-06 PROBLEM — R31.0 GROSS HEMATURIA: Status: RESOLVED | Noted: 2023-10-30 | Resolved: 2023-11-06

## 2023-11-06 LAB
ANION GAP SERPL CALC-SCNC: 11 MMOL/L (ref 7–16)
BASOPHILS # BLD AUTO: 0.4 % (ref 0–1.8)
BASOPHILS # BLD: 0.03 K/UL (ref 0–0.12)
BUN SERPL-MCNC: 26 MG/DL (ref 8–22)
CALCIUM SERPL-MCNC: 8.8 MG/DL (ref 8.4–10.2)
CHLORIDE SERPL-SCNC: 103 MMOL/L (ref 96–112)
CO2 SERPL-SCNC: 20 MMOL/L (ref 20–33)
CREAT SERPL-MCNC: 1.32 MG/DL (ref 0.5–1.4)
EOSINOPHIL # BLD AUTO: 0.65 K/UL (ref 0–0.51)
EOSINOPHIL NFR BLD: 7.7 % (ref 0–6.9)
ERYTHROCYTE [DISTWIDTH] IN BLOOD BY AUTOMATED COUNT: 43.4 FL (ref 35.9–50)
GFR SERPLBLD CREATININE-BSD FMLA CKD-EPI: 53 ML/MIN/1.73 M 2
GLUCOSE BLD STRIP.AUTO-MCNC: 156 MG/DL (ref 65–99)
GLUCOSE BLD STRIP.AUTO-MCNC: 172 MG/DL (ref 65–99)
GLUCOSE SERPL-MCNC: 121 MG/DL (ref 65–99)
HCT VFR BLD AUTO: 30.1 % (ref 42–52)
HGB BLD-MCNC: 10 G/DL (ref 14–18)
IMM GRANULOCYTES # BLD AUTO: 0.03 K/UL (ref 0–0.11)
IMM GRANULOCYTES NFR BLD AUTO: 0.4 % (ref 0–0.9)
LYMPHOCYTES # BLD AUTO: 1.66 K/UL (ref 1–4.8)
LYMPHOCYTES NFR BLD: 19.7 % (ref 22–41)
MAGNESIUM SERPL-MCNC: 2.1 MG/DL (ref 1.5–2.5)
MCH RBC QN AUTO: 31.7 PG (ref 27–33)
MCHC RBC AUTO-ENTMCNC: 33.2 G/DL (ref 32.3–36.5)
MCV RBC AUTO: 95.6 FL (ref 81.4–97.8)
MONOCYTES # BLD AUTO: 0.71 K/UL (ref 0–0.85)
MONOCYTES NFR BLD AUTO: 8.4 % (ref 0–13.4)
NEUTROPHILS # BLD AUTO: 5.34 K/UL (ref 1.82–7.42)
NEUTROPHILS NFR BLD: 63.4 % (ref 44–72)
NRBC # BLD AUTO: 0 K/UL
NRBC BLD-RTO: 0 /100 WBC (ref 0–0.2)
PLATELET # BLD AUTO: 232 K/UL (ref 164–446)
PMV BLD AUTO: 9.7 FL (ref 9–12.9)
POTASSIUM SERPL-SCNC: 3.9 MMOL/L (ref 3.6–5.5)
RBC # BLD AUTO: 3.15 M/UL (ref 4.7–6.1)
SODIUM SERPL-SCNC: 134 MMOL/L (ref 135–145)
WBC # BLD AUTO: 8.4 K/UL (ref 4.8–10.8)

## 2023-11-06 PROCEDURE — A9270 NON-COVERED ITEM OR SERVICE: HCPCS | Performed by: HOSPITALIST

## 2023-11-06 PROCEDURE — 99239 HOSP IP/OBS DSCHRG MGMT >30: CPT | Performed by: HOSPITALIST

## 2023-11-06 PROCEDURE — 85025 COMPLETE CBC W/AUTO DIFF WBC: CPT

## 2023-11-06 PROCEDURE — 80048 BASIC METABOLIC PNL TOTAL CA: CPT

## 2023-11-06 PROCEDURE — 82962 GLUCOSE BLOOD TEST: CPT

## 2023-11-06 PROCEDURE — 700102 HCHG RX REV CODE 250 W/ 637 OVERRIDE(OP): Performed by: HOSPITALIST

## 2023-11-06 PROCEDURE — 36415 COLL VENOUS BLD VENIPUNCTURE: CPT

## 2023-11-06 PROCEDURE — 94760 N-INVAS EAR/PLS OXIMETRY 1: CPT

## 2023-11-06 PROCEDURE — 700111 HCHG RX REV CODE 636 W/ 250 OVERRIDE (IP): Mod: JZ | Performed by: HOSPITALIST

## 2023-11-06 PROCEDURE — 83735 ASSAY OF MAGNESIUM: CPT

## 2023-11-06 RX ORDER — OXYCODONE HYDROCHLORIDE 5 MG/1
5 TABLET ORAL EVERY 4 HOURS PRN
Qty: 9 TABLET | Refills: 0 | Status: SHIPPED | OUTPATIENT
Start: 2023-11-06 | End: 2023-11-09

## 2023-11-06 RX ORDER — TAMSULOSIN HYDROCHLORIDE 0.4 MG/1
0.4 CAPSULE ORAL
Qty: 30 CAPSULE | Refills: 1 | Status: SHIPPED | OUTPATIENT
Start: 2023-11-07

## 2023-11-06 RX ADMIN — TAMSULOSIN HYDROCHLORIDE 0.4 MG: 0.4 CAPSULE ORAL at 07:54

## 2023-11-06 RX ADMIN — HYDROMORPHONE HYDROCHLORIDE 0.5 MG: 1 INJECTION, SOLUTION INTRAMUSCULAR; INTRAVENOUS; SUBCUTANEOUS at 02:29

## 2023-11-06 RX ADMIN — INSULIN HUMAN 1 UNITS: 100 INJECTION, SOLUTION PARENTERAL at 05:34

## 2023-11-06 RX ADMIN — INSULIN HUMAN 1 UNITS: 100 INJECTION, SOLUTION PARENTERAL at 11:47

## 2023-11-06 RX ADMIN — SENNOSIDES AND DOCUSATE SODIUM 2 TABLET: 50; 8.6 TABLET ORAL at 05:34

## 2023-11-06 ASSESSMENT — PAIN DESCRIPTION - PAIN TYPE
TYPE: ACUTE PAIN

## 2023-11-06 NOTE — CARE PLAN
The patient is Stable - Low risk of patient condition declining or worsening    Shift Goals  Clinical Goals: Rest  Patient Goals: Rest  Family Goals: n/a    Progress made toward(s) clinical / shift goals:    Problem: Knowledge Deficit - Standard  Goal: Patient and family/care givers will demonstrate understanding of plan of care, disease process/condition, diagnostic tests and medications  Outcome: Progressing     Problem: Urinary Elimination  Goal: Establish and maintain regular urinary output  Outcome: Progressing       Patient is not progressing towards the following goals:

## 2023-11-06 NOTE — CARE PLAN
The patient is Stable - Low risk of patient condition declining or worsening    Shift Goals  Clinical Goals: Rest, comfort  Patient Goals: Rest, comfort  Family Goals: n/a    Progress made toward(s) clinical / shift goals:  Patient progressing as expected. Pain controled adequately with ordered medications.    Patient is not progressing towards the following goals:

## 2023-11-06 NOTE — DISCHARGE INSTRUCTIONS
Discharge Instructions per Ignacia Sanchez M.D.  Keep a daily long of your blood pressure and heart rate taken 3 times a day and take it to your primary car physician or cardiologist for review. This will help guide any medication / dosages adjustments if needed.    Follow-up with primary care provider within 5 days.  Follow-up with urology in 10 days.  DIAGNOSIS: Gross hematuria, urinary retention    Return to ER if you develop any of the following symptoms fever, chills, weakness, difficulty with urination, abdominal pain or blood in urine.

## 2023-11-06 NOTE — PROGRESS NOTES
Assumed care of patient at 1900.  Bedside report from Conrad MUHAMMAD.  Patient is resting in bed at this time and denies needs.  Hedrick catheter is patent and draining clear light tea colored urine.  Bed alarm remains on and call light remains in reach of patient. Hourly rounding continues.

## 2023-11-06 NOTE — DISCHARGE SUMMARY
"Discharge Summary    CHIEF COMPLAINT ON ADMISSION  Chief Complaint   Patient presents with    Urinary Catheter Problem     \"It's blocked up with blood\". Reports he is on blood thinners. States catheter has been blocked since this afternoon.        Reason for Admission  Unable to Urinate     Admission Date  10/30/2023    CODE STATUS  Full Code    HPI & HOSPITAL COURSE  This is a 82 y.o. male with a past medical history of prostate cancer s/p radiation, hyperlipidemia, aortic aneurysm, history of pulmonary embolism was on anticoagulation with Xarelto, but had a recent biopsy on 10/25 presenting with hematuria, urinary retention and severe pain.  Anticoagulation was stopped.  Hedrick catheter was placed and started on bladder irrigation.  Symptoms gradually improved.  The patient had another bleed, obstructing clots after restarting Xarelto.  Hematuria stopped, Hedrick catheter was removed. The patient and daughter want to stop Xarelto.  They understand and accept the risk of their decision.  Venous ultrasound did not show evidence for clot.  Bleeding stopped after Xarelto was discontinued however the patient also had another urinary outflow obstruction and required Hedrick catheter placement again.  Patient was started on tamsulosin that did not help with pain and urine flow.  His metoprolol was discontinued due to hypotension.    Therefore, he is discharged in guarded and stable condition to home with close outpatient follow-up.    The patient met 2-midnight criteria for an inpatient stay at the time of discharge.    Discharge Date  11/6/2023     DISCHARGE DIAGNOSES  Principal Problem (Resolved):    Gross hematuria (POA: Yes)  Active Problems:    Essential hypertension, benign (POA: Yes)    Stage 3b chronic kidney disease (HCC) (Chronic) (POA: Yes)    History of adenocarcinoma of prostate (POA: Yes)    Non-insulin dependent type 2 diabetes mellitus (HCC) (POA: Yes)    Aneurysm of ascending aorta (HCC) (POA: Yes)    History " of pulmonary embolism (POA: Yes)  Resolved Problems:    Chronic anticoagulation (POA: Yes)    Hyponatremia (POA: Yes)    Leukocytosis (POA: Yes)    FOLLOW UP  Future Appointments   Date Time Provider Department Center   11/7/2023 10:30 AM [facility]   11/7/2023 11:00 AM Allegra Haddad M.D. [facility]   11/28/2023  8:30 AM [facility] MRI 1 YEHUDA Siegel   11/28/2023  9:00 AM [facility] MRI 1 MR DESTINEE Siegel     [facility]  [address]  [place] [address]  [phone]    Nirmal Gentile M.D.  [address] [facility] [address] [facility] [address]  [place] [address]  [phone]  Follow up in 5 day(s)    Preston Mayen M.D.  [address] [facility] [place] [address] [phone]  Follow up in 10 day(s)      MEDICATIONS ON DISCHARGE     Medication List        START taking these medications        Instructions   oxyCODONE immediate-release 5 MG Tabs  Commonly known as: Roxicodone   Take 1 Tablet by mouth every four hours as needed for Severe Pain for up to 3 days.  Dose: 5 mg     tamsulosin 0.4 MG capsule  Start taking on: November 7, 2023  Commonly known as: Flomax   Take 1 Capsule by mouth 1/2 hour after breakfast.  Dose: 0.4 mg            CONTINUE taking these medications        Instructions   acetaminophen 500 MG Tabs  Commonly known as: Tylenol   Take 1 Tablet by mouth every 6 hours as needed for Mild Pain or Moderate Pain.  Dose: 500 mg     ALPRAZolam 0.25 MG Tabs  Commonly known as: Xanax   Take 1 Tablet by mouth at bedtime as needed for Sleep for up to 30 days.  Dose: 0.25 mg     aspirin 81 MG EC tablet   Take 81 mg by mouth every evening.  Dose: 81 mg     CoQ-10 30 MG Caps   Take 30 mg by mouth every evening.  Dose: 30 mg     Empagliflozin 25 MG Tabs   Take 25 mg by mouth every day for 360 days.  Dose: 25 mg     ezetimibe 10 MG Tabs  Commonly known as: Zetia   Take 1 Tablet by mouth every day.  Dose: 10 mg     Fish Oil 1200 MG Caps   Take 1,200 mg by mouth every morning.  Dose:  1,200 mg     lisinopril 20 MG Tabs  Commonly known as: Prinivil   Doctor's comments: To replace Quinapril 20mg per Dr Sheets  Take 1 Tablet by mouth every day.  Dose: 20 mg     metFORMIN 500 MG Tabs  Commonly known as: Glucophage   Take 1 Tablet by mouth 2 times a day with meals for 360 days.  Dose: 500 mg     PRE-COLE FORMULA PO   Take 1 Tablet by mouth every morning.  Dose: 1 Tablet     rosuvastatin 5 MG Tabs  Commonly known as: Crestor   Doctor's comments: Plan requires a 100 day supply. Thank you  TAKE 1 TABLET BY MOUTH EVERY DAY  Dose: 5 mg     Voltaren 1 % Gel  Generic drug: diclofenac sodium   Apply 2 g topically 4 times a day as needed (Shoulder Pain).  Dose: 2 g            STOP taking these medications      metoprolol SR 50 MG Tb24  Commonly known as: Toprol XL     rivaroxaban 20 MG Tabs tablet  Commonly known as: Xarelto            Allergies  No Known Allergies    DIET  Orders Placed This Encounter   Procedures    Diet Order Diet: Cardiac     Standing Status:   Standing     Number of Occurrences:   1     Order Specific Question:   Diet:     Answer:   Cardiac [6]     LABORATORY  Lab Results   Component Value Date    SODIUM 134 (L) 2023    POTASSIUM 3.9 2023    CHLORIDE 103 2023    CO2 20 2023    GLUCOSE 121 (H) 2023    BUN 26 (H) 2023    CREATININE 1.32 2023      Lab Results   Component Value Date    WBC 8.4 2023    HEMOGLOBIN 10.0 (L) 2023    HEMATOCRIT 30.1 (L) 2023    PLATELETCT 232 2023      Total time of the discharge process exceeds 41 minutes.

## 2023-11-06 NOTE — DISCHARGE PLANNING
HTH/SCP TCN chart review completed. Noted patient an anticipated discharge today. Noted need for patient after hospital visit follow up to be rescheduled.     TCN contacted hospital scheduling with appointment scheduled for Monday, November 13th at 11:20AM with Dr. Gentile.     TCN will continue to follow and collaborate with discharge planning team should additional post acute needs arise. Thank you.    Completed  Choice obtained: . Noted patient acceptance to [facility] 11/1/2023  SCP with [facility] PCP. Follow up appointment scheduled for November 13th at 11:20AM with Dr. Gentile

## 2023-11-06 NOTE — PROGRESS NOTES
"Urology Progress Note    S:     11/6- Seen and examined, sitting up in bed, preparing to discharge per hospitalist. Rodarte connected to leg bag, urine clear yellow. AFVSS, H/H stable, Cr WNL. No pain.    11/5- Cr improved 1.35(1.56) after rodarte was replaced. Pt reports improvement of pain but still with hematuria.      11/4- Pt is improved today. Family member at bedside. Rodarte was removed last night. Pt able to void small amounts. Clear yellow urine. PVR 500cc. Cr slightly worse which may be 2/2 urinary retention vs contrast from study yesterday.     11/3- seen and examined at bedside. Urine crystal clear on very low flow CBI. H/h stable. Cr 1.47.     11/2 Seen and examined. No fevers, chills, nausea or vomiting.  Output via rodarte remains clear yellow, with CBI clamped.  No hematuria, no blood clots.    O:   /57   Pulse 79   Temp 36.8 °C (98.2 °F) (Temporal)   Resp 17   Ht 1.803 m (5' 10.98\")   Wt 85 kg (187 lb 6.3 oz)   SpO2 97%   Recent Labs     11/04/23  0122 11/05/23  0133 11/06/23  0041   SODIUM 131* 134* 134*   POTASSIUM 4.1 4.0 3.9   CHLORIDE 100 101 103   CO2 17* 17* 20   GLUCOSE 149* 140* 121*   BUN 27* 26* 26*   CREATININE 1.56* 1.35 1.32   CALCIUM 8.7 8.8 8.8     Recent Labs     11/04/23  0122 11/05/23  0133 11/06/23  0041   WBC 8.1 7.4 8.4   RBC 3.24* 3.16* 3.15*   HEMOGLOBIN 10.2* 10.0* 10.0*   HEMATOCRIT 30.6* 30.1* 30.1*   MCV 94.4 95.3 95.6   MCH 31.5 31.6 31.7   MCHC 33.3 33.2 33.2   RDW 43.0 43.2 43.4   PLATELETCT 208 212 232   MPV 9.7 10.0 9.7         Intake/Output Summary (Last 24 hours) at 11/2/2023 0920  Last data filed at 11/2/2023 0701  Gross per 24 hour   Intake 720 ml   Output 1350 ml   Net -630 ml       Exam:  Gen:NAD  ABD: Abdomen soft, no TTP.   Urine: rodarte with clear yellow urine in leg bag    A/P:    Active Hospital Problems    Diagnosis     Stage 3b chronic kidney disease (HCC) [N18.32]     History of pulmonary embolism [Z86.711]     Aneurysm of ascending aorta (HCC) " [I71.21]     Essential hypertension, benign [I10]     Non-insulin dependent type 2 diabetes mellitus (HCC) [E11.9]     History of adenocarcinoma of prostate [Z85.46]      Formatting of this note might be different from the original.  s/p radio tx Dr Wright; PSA <0.1 since       Plan:  -Continue flomax upon discharge, recommend discontinuing oxybutynin at this time  - Pt cleared to discharge home with rodarte in place, will plan on outpt TOV  - No further urologic intervention anticipated during this admission. Urology signing off, please call with questions.    Dr Escalona is aware of consult and has directed this patient's plan of care.

## 2023-11-07 ENCOUNTER — PATIENT OUTREACH (OUTPATIENT)
Dept: MEDICAL GROUP | Facility: LAB | Age: 83
End: 2023-11-07
Payer: MEDICARE

## 2023-11-07 ENCOUNTER — HOME CARE VISIT (OUTPATIENT)
Dept: HOME HEALTH SERVICES | Facility: HOME HEALTHCARE | Age: 83
End: 2023-11-07
Payer: MEDICARE

## 2023-11-07 VITALS
OXYGEN SATURATION: 95 % | BODY MASS INDEX: 27.58 KG/M2 | DIASTOLIC BLOOD PRESSURE: 58 MMHG | HEIGHT: 71 IN | RESPIRATION RATE: 18 BRPM | TEMPERATURE: 98.5 F | WEIGHT: 197 LBS | SYSTOLIC BLOOD PRESSURE: 100 MMHG | HEART RATE: 63 BPM

## 2023-11-07 PROCEDURE — G0495 RN CARE TRAIN/EDU IN HH: HCPCS

## 2023-11-07 PROCEDURE — 665001 SOC-HOME HEALTH

## 2023-11-07 ASSESSMENT — FIBROSIS 4 INDEX: FIB4 SCORE: 1.97

## 2023-11-07 ASSESSMENT — ENCOUNTER SYMPTOMS
PAIN SEVERITY GOAL: 0/10
PERSON REPORTING PAIN: PATIENT
SUBJECTIVE PAIN PROGRESSION: UNCHANGED
LOWEST PAIN SEVERITY IN PAST 24 HOURS: 0/10
NAUSEA: DENIES
HIGHEST PAIN SEVERITY IN PAST 24 HOURS: 7/10
STOOL FREQUENCY: DAILY
LAST BOWEL MOVEMENT: 66785
VOMITING: DENIES
DIARRHEA: 1
DENIES PAIN: 1

## 2023-11-07 ASSESSMENT — ACTIVITIES OF DAILY LIVING (ADL): OASIS_M1830: 03

## 2023-11-07 NOTE — Clinical Note
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

## 2023-11-07 NOTE — Clinical Note
[facility] reconciliation process done. Medication list left in home care folder. See MAR for drug allergy interactions. There are no major drug to drug interactions.                      The best contact phone number is [phone] and Star  manages the medications.

## 2023-11-07 NOTE — PROGRESS NOTES
Discharge Instructions    Discharged to home by car with relative. Discharged via wheelchair, hospital escort: Yes.  Special equipment needed: Medium and Large Urinary leg bags    Be sure to schedule a follow-up appointment with your primary care doctor or any specialists as instructed.     Discharge Plan:   Influenza Vaccine Indication: Patient Refuses (Pt already received flu vaccine)    I understand that a diet low in cholesterol, fat, and sodium is recommended for good health. Unless I have been given specific instructions below for another diet, I accept this instruction as my diet prescription.   Other diet: Cardiac/Diabetic    Special Instructions: None    -Is this patient being discharged with medication to prevent blood clots?  No    Is patient discharged on Warfarin / Coumadin?   No

## 2023-11-08 ENCOUNTER — HOME CARE VISIT (OUTPATIENT)
Dept: HOME HEALTH SERVICES | Facility: HOME HEALTHCARE | Age: 83
End: 2023-11-08
Payer: MEDICARE

## 2023-11-08 NOTE — Clinical Note
ON CALL RN NOTE:    DIANE for MD:    Call received from patient at 1800m on 11/8/23 stating he had been discharged from the hospital 2 days ago, and now his catheter was leaking. His leg bag was 3/4 full. Denies fevers, N/V, fatigue, bladder pain, malaise or rigors. He was encouraged to empty the catheter bag now, switch to a night time large bag, and call back if he was not producing ample urine (75 mL/hr) in the bag or had any other symptoms or complications. Patient verbalized underastanding. RN scheduled for 11/9/23 visit.     Thank you,    Catrachito Gregory RN  [phone]

## 2023-11-08 NOTE — PROGRESS NOTES
Transitional Care Management  TCM Outreach Date and Time: Filed (11/7/2023 10:12 AM)    Discharge Questions  Actual Discharge Date: 11/06/23  Now that you are home, how are you feeling?: Good  Did you receive any new prescriptions?: Yes  Were you able to get them filled?: Yes  Meds to Bed or Pharmacy filled?: Pharmacy  Do you have any questions about your current medications or new medications (Review Med Rec)?: No  Do you have a follow up appointment scheduled with your PCP?: Yes  Appointment Date: 11/13/23  Appointment Time: 1120  Any issues or paperwork you wish to discuss with your PCP?: No  Does this patient qualify for the CCM program?: Yes (routed to Radha Dos Santos RN. Unable to enroll, patient on service with )    Transitional Care  Number of attempts made to contact patient: 1  Current or previous attempts competed within two business days of discharge? : Yes  Provided education regarding treatment plan, medications, self-management, ADLs?: Yes (HH has started service. Advised patient to contact  on call for any catheter related issues or questions. Has f/u scheduled w/ Urology.)  Has patient completed an Advanced Directive?: No  Has the Care Manager's phone number provided?: No  Is there anything else I can help you with?: No    Discharge Summary  Chief Complaint: Urinary Catheter Problem  Admitting Diagnosis: Gross hematuria (599.71 (ICD-9-CM))  Discharge Diagnosis: Gross Hematuria

## 2023-11-08 NOTE — CASE COMMUNICATION
noted  ----- Message -----  From: Joanne Blue R.N.  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

## 2023-11-09 ENCOUNTER — HOME CARE VISIT (OUTPATIENT)
Dept: HOME HEALTH SERVICES | Facility: HOME HEALTHCARE | Age: 83
End: 2023-11-09
Payer: MEDICARE

## 2023-11-09 ENCOUNTER — DOCUMENTATION (OUTPATIENT)
Dept: MEDICAL GROUP | Facility: PHYSICIAN GROUP | Age: 83
End: 2023-11-09
Payer: MEDICARE

## 2023-11-09 VITALS
RESPIRATION RATE: 18 BRPM | DIASTOLIC BLOOD PRESSURE: 50 MMHG | SYSTOLIC BLOOD PRESSURE: 90 MMHG | HEART RATE: 64 BPM | TEMPERATURE: 97 F | OXYGEN SATURATION: 95 %

## 2023-11-09 PROCEDURE — G0495 RN CARE TRAIN/EDU IN HH: HCPCS

## 2023-11-09 ASSESSMENT — ENCOUNTER SYMPTOMS: DENIES PAIN: 1

## 2023-11-09 NOTE — CASE COMMUNICATION
noted  ----- Message -----  From: Ranjit Gregory R.N.  Sent: 11/8/2023   6:05 PM PST  To: Stephanie Main R.N.; Malka Zaman R.N.; *      ON CALL RN NOTE:    DIANE for MD:    Call received from patient at 1800m on 11/8/23 stating he had been discharged from the hospital 2 days ago, and now his catheter was leaking. His leg bag was 3/4 full. Denies fevers, N/V, fatigue, bladder pain, malaise or rigors. He was encouraged to empty the jourdan ter bag now, switch to a night time large bag, and call back if he was not producing ample urine (75 mL/hr) in the bag or had any other symptoms or complications. Patient verbalized underastanding. RN scheduled for 11/9/23 visit.     Thank you,    Catrachito Gregory RN  [phone]

## 2023-11-09 NOTE — PROGRESS NOTES
Medication chart review for [facility] services    Received referral from [facility].   Medications reviewed  compared with discharge summary if available.  Discharge summary date, if applicable:   23    Current medication list per [facility]     Medication list one, patient is currently taking    Current Outpatient Medications:     Jardiance, 25 mg, Oral, DAILY    bismuth subsalicylate, 30 mL, Oral, Q6HRS PRN    Imodium A-D, 30 mL, Oral, BID PRN    Melatonin, 10 mg, Oral, HS PRN    tamsulosin, 0.4 mg, Oral, AFTER BREAKFAST    oxyCODONE immediate-release, 5 mg, Oral, Q4HRS PRN    acetaminophen, 500 mg, Oral, Q6HRS PRN    diclofenac sodium, 2 g, Topical, 4X/DAY PRN    rosuvastatin, 5 mg, Oral, DAILY    lisinopril, 20 mg, Oral, DAILY    Empagliflozin, 25 mg, Oral, DAILY    ezetimibe, 10 mg, Oral, DAILY    metFORMIN, 500 mg, Oral, BID WITH MEALS    CoQ-10, 30 mg, Oral, Q EVENING    Fish Oil, 1,200 mg, Oral, QAM    aspirin, 81 mg, Oral, Q EVENING    Prenatal Multivit-Min-Fe-FA (PRE- FORMULA PO), 1 Tablet, Oral, QAM      Medication list two, drugs that the patient has been prescribed or recommended to take by their healthcare provider on discharge summary  START taking these medications         Instructions   oxyCODONE immediate-release 5 MG Tabs  Commonly known as: Roxicodone    Take 1 Tablet by mouth every four hours as needed for Severe Pain for up to 3 days.  Dose: 5 mg      tamsulosin 0.4 MG capsule  Start taking on: 2023  Commonly known as: Flomax    Take 1 Capsule by mouth 1/2 hour after breakfast.  Dose: 0.4 mg                CONTINUE taking these medications         Instructions   acetaminophen 500 MG Tabs  Commonly known as: Tylenol    Take 1 Tablet by mouth every 6 hours as needed for Mild Pain or Moderate Pain.  Dose: 500 mg      ALPRAZolam 0.25 MG Tabs  Commonly known as: Xanax    Take 1 Tablet by mouth at bedtime as needed for Sleep for up to 30 days.  Dose: 0.25 mg       aspirin 81 MG EC tablet    Take 81 mg by mouth every evening.  Dose: 81 mg      CoQ-10 30 MG Caps    Take 30 mg by mouth every evening.  Dose: 30 mg      Empagliflozin 25 MG Tabs    Take 25 mg by mouth every day for 360 days.  Dose: 25 mg      ezetimibe 10 MG Tabs  Commonly known as: Zetia    Take 1 Tablet by mouth every day.  Dose: 10 mg      Fish Oil 1200 MG Caps    Take 1,200 mg by mouth every morning.  Dose: 1,200 mg      lisinopril 20 MG Tabs  Commonly known as: Prinivil    Doctor's comments: To replace Quinapril 20mg per Dr Sheets  Take 1 Tablet by mouth every day.  Dose: 20 mg      metFORMIN 500 MG Tabs  Commonly known as: Glucophage    Take 1 Tablet by mouth 2 times a day with meals for 360 days.  Dose: 500 mg      PRE- FORMULA PO    Take 1 Tablet by mouth every morning.  Dose: 1 Tablet      rosuvastatin 5 MG Tabs  Commonly known as: Crestor    Doctor's comments: Plan requires a 100 day supply. Thank you  TAKE 1 TABLET BY MOUTH EVERY DAY  Dose: 5 mg      Voltaren 1 % Gel  Generic drug: diclofenac sodium    Apply 2 g topically 4 times a day as needed (Shoulder Pain).  Dose: 2 g                STOP taking these medications       metoprolol SR 50 MG Tb24  Commonly known as: Toprol XL      rivaroxaban 20 MG Tabs tablet  Commonly known as: Xarelto                 No Known Allergies    Labs     Lab Results   Component Value Date/Time    SODIUM 134 (L) 2023 12:41 AM    POTASSIUM 3.9 2023 12:41 AM    CHLORIDE 103 2023 12:41 AM    CO2 20 2023 12:41 AM    GLUCOSE 121 (H) 2023 12:41 AM    BUN 26 (H) 2023 12:41 AM    CREATININE 1.32 2023 12:41 AM    BUNCREATRAT 15 2021 11:54 AM     Lab Results   Component Value Date/Time    ALKPHOSPHAT 58 10/30/2023 06:36 PM    ASTSGOT 18 10/30/2023 06:36 PM    ALTSGPT 11 10/30/2023 06:36 PM    TBILIRUBIN 0.6 10/30/2023 06:36 PM    INR 1.29 (H) 10/30/2023 06:36 PM    ALBUMIN 3.3 2023 01:22 AM        Assessment for clinically  significant drug interactions, drug omissions/additions, duplicative therapies.            CC   Nirmal Gentile M.D.  [address]  [place] [address]  Fax: [phone]    [facility]  Phone [phone] fax [phone]    This note was created using voice recognition software (Dragon). The accuracy of the dictation is limited by the abilities of the software. I have reviewed the note prior to signing, however some errors in grammar and context are still possible. If you have any questions related to this note please do not hesitate to contact our office.

## 2023-11-10 NOTE — CASE COMMUNICATION
1728 hrs call received from patient through [employer].  Pt states rodarte cath is leaking occasionally.  He states it leaked last night and someone came out and checked it and could not find anything wrong with it.  The pt denies pain and states that the catheter is draining.  Advised pt to place a towel around the leaking and a SN will be sent out for a followup visit tomorrow.  Pt states he would prefer the visit as early in the am as  possible.

## 2023-11-11 ASSESSMENT — ENCOUNTER SYMPTOMS
STOOL FREQUENCY: DAILY
LAST BOWEL MOVEMENT: 66787
BOWEL PATTERN NORMAL: 1
ARTHRALGIAS: 1
LIMITED RANGE OF MOTION: 1

## 2023-11-11 NOTE — CASE COMMUNICATION
noted  ----- Message -----  From: Екатерина Stewart R.N.  Sent: 11/9/2023   6:27 PM PST  To: Stephanie Main R.N.; Malka Zaman R.N.      1728 hrs call received from patient through [employer].  Pt states rodarte cath is leaking occasionally.  He states it leaked last night and someone came out and checked it and could not find anything wrong with it.  The pt denies pain and states that the catheter is draining.  Advised pt to place a  towel around the leaking and a SN will be sent out for a followup visit tomorrow.  Pt states he would prefer the visit as early in the am as possible.

## 2023-11-13 ENCOUNTER — OFFICE VISIT (OUTPATIENT)
Dept: MEDICAL GROUP | Facility: LAB | Age: 83
End: 2023-11-13
Payer: MEDICARE

## 2023-11-13 ENCOUNTER — TELEPHONE (OUTPATIENT)
Dept: MEDICAL GROUP | Facility: LAB | Age: 83
End: 2023-11-13

## 2023-11-13 ENCOUNTER — HOME CARE VISIT (OUTPATIENT)
Dept: HOME HEALTH SERVICES | Facility: HOME HEALTHCARE | Age: 83
End: 2023-11-13
Payer: MEDICARE

## 2023-11-13 VITALS
OXYGEN SATURATION: 94 % | DIASTOLIC BLOOD PRESSURE: 52 MMHG | WEIGHT: 180 LBS | HEART RATE: 73 BPM | SYSTOLIC BLOOD PRESSURE: 94 MMHG | BODY MASS INDEX: 25.2 KG/M2 | TEMPERATURE: 97 F | RESPIRATION RATE: 16 BRPM | HEIGHT: 71 IN

## 2023-11-13 VITALS
RESPIRATION RATE: 18 BRPM | SYSTOLIC BLOOD PRESSURE: 90 MMHG | TEMPERATURE: 97.8 F | OXYGEN SATURATION: 96 % | DIASTOLIC BLOOD PRESSURE: 50 MMHG | HEART RATE: 68 BPM

## 2023-11-13 DIAGNOSIS — I10 ESSENTIAL HYPERTENSION, BENIGN: ICD-10-CM

## 2023-11-13 DIAGNOSIS — Z09 HOSPITAL DISCHARGE FOLLOW-UP: Primary | ICD-10-CM

## 2023-11-13 DIAGNOSIS — N13.30 HYDRONEPHROSIS, UNSPECIFIED HYDRONEPHROSIS TYPE: ICD-10-CM

## 2023-11-13 PROCEDURE — G0152 HHCP-SERV OF OT,EA 15 MIN: HCPCS

## 2023-11-13 PROCEDURE — 3078F DIAST BP <80 MM HG: CPT | Performed by: FAMILY MEDICINE

## 2023-11-13 PROCEDURE — 3074F SYST BP LT 130 MM HG: CPT | Performed by: FAMILY MEDICINE

## 2023-11-13 PROCEDURE — 99214 OFFICE O/P EST MOD 30 MIN: CPT | Performed by: FAMILY MEDICINE

## 2023-11-13 PROCEDURE — G0180 MD CERTIFICATION HHA PATIENT: HCPCS | Performed by: FAMILY MEDICINE

## 2023-11-13 RX ORDER — LISINOPRIL 10 MG/1
10 TABLET ORAL DAILY
Qty: 90 TABLET | Refills: 3 | Status: SHIPPED | OUTPATIENT
Start: 2023-11-13 | End: 2024-03-18

## 2023-11-13 ASSESSMENT — FIBROSIS 4 INDEX: FIB4 SCORE: 1.97

## 2023-11-13 NOTE — TELEPHONE ENCOUNTER
Received voicemail from FIOR MEZA ph. [phone]     Reporting Patient is having low home reading today was 90/50, Previously 100 / 58.     Did not request a valorie back, Please advise for records review.

## 2023-11-13 NOTE — Clinical Note
Occupational Therapy Evaluation completed 11/13/2023. Requesting authorization for 2w1, 1w2 effective 11/13/2023 for continued skilled OT.

## 2023-11-14 ENCOUNTER — HOME CARE VISIT (OUTPATIENT)
Dept: HOME HEALTH SERVICES | Facility: HOME HEALTHCARE | Age: 83
End: 2023-11-14
Payer: MEDICARE

## 2023-11-14 VITALS
HEART RATE: 60 BPM | OXYGEN SATURATION: 93 % | DIASTOLIC BLOOD PRESSURE: 50 MMHG | SYSTOLIC BLOOD PRESSURE: 120 MMHG | RESPIRATION RATE: 16 BRPM | TEMPERATURE: 97.4 F

## 2023-11-14 VITALS
TEMPERATURE: 97.4 F | OXYGEN SATURATION: 93 % | SYSTOLIC BLOOD PRESSURE: 120 MMHG | RESPIRATION RATE: 16 BRPM | DIASTOLIC BLOOD PRESSURE: 50 MMHG | HEART RATE: 60 BPM

## 2023-11-14 PROCEDURE — G0151 HHCP-SERV OF PT,EA 15 MIN: HCPCS

## 2023-11-14 PROCEDURE — G0495 RN CARE TRAIN/EDU IN HH: HCPCS

## 2023-11-14 ASSESSMENT — BALANCE ASSESSMENTS
NUDGED SCORE: 1
NUDGED: 1 - STAGGERS, GRABS, CATCHES SELF
ATTEMPTS TO ARISE: 2 - ABLE TO RISE, ONE ATTEMPT
ARISING SCORE: 1
SITTING BALANCE: 1 - STEADY, SAFE
STANDING BALANCE: 1 - STEADY BUT WIDE STANCE AND USES CANE OR OTHER SUPPORT
TURNING 360 DEGREES STEPS: 1 - CONTINUOUS STEPS
ARISES: 1 - ABLE, USES ARMS TO HELP
IMMEDIATE STANDING BALANCE FIRST 5 SECONDS: 2 - STEADY WITHOUT WALKER OR OTHER SUPPORT
EYES CLOSED AT MAXIMUM POSITION NUDGED: 0 - UNSTEADY
SITTING DOWN: 1 - USES ARMS OR NOT SMOOTH MOTION
BALANCE SCORE: 10

## 2023-11-14 ASSESSMENT — ENCOUNTER SYMPTOMS
HIGHEST PAIN SEVERITY IN PAST 24 HOURS: 0/10
BOWEL PATTERN NORMAL: 1
STOOL FREQUENCY: DAILY
DENIES PAIN: 1
PERSON REPORTING PAIN: PATIENT
LAST BOWEL MOVEMENT: 66792
DENIES PAIN: 1
PERSON REPORTING PAIN: PATIENT
LOWEST PAIN SEVERITY IN PAST 24 HOURS: 0/10
ARTHRALGIAS: 1
VOMITING: DENIES
PAIN SEVERITY GOAL: 0/10
NAUSEA: DENIES
PAIN: DENIES ANY PAIN OR DISCOMFORT AT TIME OF NURSING VISIT.

## 2023-11-14 ASSESSMENT — GAIT ASSESSMENTS
TRUNK: 1 - NO SWAY BUT FLEXION OF KNEES OR BACK OR SPREADS ARMS WHILE WALKING
PATH: 2 - STRAIGHT WITHOUT WALKING AID
PATH SCORE: 2
TRUNK SCORE: 1
INITIATION OF GAIT IMMEDIATELY AFTER GO: 1 - NO HESITANCY
STEP CONTINUITY: 1 - STEPS APPEAR CONTINUOUS
BALANCE AND GAIT SCORE: 20
STEP SYMMETRY: 1 - RIGHT AND LEFT STEP LENGTH APPEAR EQUAL
WALKING STANCE: 0 - HEELS APART
GAIT SCORE: 10

## 2023-11-14 ASSESSMENT — ACTIVITIES OF DAILY LIVING (ADL)
GROOMING_COMMENTS: SEE OT NOTES
AMBULATION ASSISTANCE: INDEPENDENT
PHYSICAL TRANSFERS ASSESSED: 1
BATHING_COMMENTS: SEE OT NOTES
AMBULATION ASSISTANCE: 1
FEEDING_COMMENTS: SEE OT NOTES
PHYSICAL_TRANSFERS_DEVICES: USE OF B UES TO ASSIST
CURRENT_FUNCTION: INDEPENDENT

## 2023-11-14 NOTE — CASE COMMUNICATION
"I agree with these changes.  ----- Message -----  From: Cornelia Dwyer R.N.  Sent: 11/14/2023   7:13 AM PST  To: Joanne Blue R.N.      Quality Review Completed for 11/7 SOC OASIS by JACKY Dwyer RN on 11/14/2023:     Edits completed by JACKY Dwyer RN:     1.  and  diagnosis coding updated per chart review  2.  changed to no. Supportive documentation for UTI in last 14 days not found.   3. Narrative documents supervisio n or min level of assist across all functional status items. Changed , 1810, 1820, 1845 to 2.  to 3  4.  is taking antiplatelet aspirin with indication noted  5. Checked exercises prescribed and walker to Activities Permitted on 485 form.  Checked bleeding precautions and ambulate only w/assist to 485 Safety Measures.   6. Added HTN template for interventions to care plan per 11/13 PCP OV note, monitor BP  \"Given continue d concerns for hypotension we will decrease lisinopril from 20 mg to 10.  He will continue to be checking blood pressures, monitor for concerning symptoms, follow-up as needed. \"  "

## 2023-11-14 NOTE — CASE COMMUNICATION
"Quality Review Completed for 11/7 SOC OASIS by JACKY Dwyer RN on 11/14/2023:     Edits completed by JACKY Dwyer RN:     1.  and  diagnosis coding updated per chart review  2.  changed to no. Supportive documentation for UTI in last 14 days not found.   3. Narrative documents supervision or min level of assist across all functional status items. Changed , 1810, 1820, 1845 to 2.  to 3  4.  is taking antiplatele t aspirin with indication noted  5. Checked exercises prescribed and walker to Activities Permitted on 485 form.  Checked bleeding precautions and ambulate only w/assist to 485 Safety Measures.   6. Added HTN template for interventions to care plan per 11/13 PCP OV note, monitor BP  \"Given continued concerns for hypotension we will decrease lisinopril from 20 mg to 10.  He will continue to be checking blood pressures, monitor for concer mary symptoms, follow-up as needed. \"  "

## 2023-11-14 NOTE — Clinical Note
History pertinent to today's visit- ureteral stent with presence of indwelling catheter.   VS: /50 Site Left arm Position Sitting Cuff Size Adult Resp 16 SpO2 93% Pulse 60 Temp 36.3 C ( 97.4 F) Temp Source Temporal  PAIN: denies any pain   APPETITE/FLUID: good  GI/: had 16f/10 ml catheter removed by Urologrhys Cervantes. New 18F/10 ml placed by Urologrhys Cervantes; care plan updated.  Last BM this morning- normal   MEDICATION CHANGES: Lisinopril decreased to 10 mg daily.  FALLS: none  RESP: lungs clear                                                                                                                                                                                                                                                                                                                                 OXYGEN USE: none  SKIN: intact  NEXT NURSING VISIT: 11/17/23  CC: CM

## 2023-11-15 ENCOUNTER — HOME CARE VISIT (OUTPATIENT)
Dept: HOME HEALTH SERVICES | Facility: HOME HEALTHCARE | Age: 83
End: 2023-11-15
Payer: MEDICARE

## 2023-11-15 SDOH — ECONOMIC STABILITY: HOUSING INSECURITY: HOME SAFETY: OT CONTACT INFO AND FUTURE APPOINTMENT WRITTEN ON PT CALENDAR / BINDER.

## 2023-11-15 ASSESSMENT — ACTIVITIES OF DAILY LIVING (ADL)
DRESSING_LB_CURRENT_FUNCTION: STAND BY ASSIST
TRANSPORTATION: INDEPENDENT
TELEPHONE USE ASSESSED: 1
BATHING_CURRENT_FUNCTION: STAND BY ASSIST
HOUSEKEEPING ASSESSED: 1
FEEDING ASSESSED: 1
LAUNDRY ASSESSED: 1
SHOPPING ASSESSED: 1
ORAL_CARE_CURRENT_FUNCTION: INDEPENDENT
PREPARING MEALS: NEEDS ASSISTANCE
DRESSING_UB_CURRENT_FUNCTION: STAND BY ASSIST
TOILETING: INDEPENDENT
LAUNDRY: NEEDS ASSISTANCE
GROOMING_CURRENT_FUNCTION: INDEPENDENT
GROOMING ASSESSED: 1
USING THE TELPHONE: INDEPENDENT
SHOPPING: NEEDS ASSISTANCE
TOILETING: 1
BATHING ASSESSED: 1
ORAL_CARE_ASSESSED: 1
TRANSPORTATION ASSESSED: 1
LIGHT HOUSEKEEPING: NEEDS ASSISTANCE
FEEDING: INDEPENDENT

## 2023-11-15 ASSESSMENT — ENCOUNTER SYMPTOMS
PERSON REPORTING PAIN: PATIENT
HIGHEST PAIN SEVERITY IN PAST 24 HOURS: 6/10
LOWEST PAIN SEVERITY IN PAST 24 HOURS: 0/10
PAIN: 1

## 2023-11-15 NOTE — TELEPHONE ENCOUNTER
As discussed in appointment on 11/13/2023, we will decrease lisinopril from 20 to 10 mg.  Thank you.

## 2023-11-16 NOTE — CASE COMMUNICATION
Missed OT visit due to pt with conflicting MD appointment today. Will resume OT next week as scheduled.

## 2023-11-17 ENCOUNTER — HOME CARE VISIT (OUTPATIENT)
Dept: HOME HEALTH SERVICES | Facility: HOME HEALTHCARE | Age: 83
End: 2023-11-17
Payer: MEDICARE

## 2023-11-17 ENCOUNTER — APPOINTMENT (OUTPATIENT)
Dept: RADIOLOGY | Facility: MEDICAL CENTER | Age: 83
DRG: 871 | End: 2023-11-17
Attending: EMERGENCY MEDICINE
Payer: MEDICARE

## 2023-11-17 ENCOUNTER — HOSPITAL ENCOUNTER (INPATIENT)
Facility: MEDICAL CENTER | Age: 83
LOS: 3 days | DRG: 871 | End: 2023-11-21
Attending: EMERGENCY MEDICINE | Admitting: STUDENT IN AN ORGANIZED HEALTH CARE EDUCATION/TRAINING PROGRAM
Payer: MEDICARE

## 2023-11-17 ENCOUNTER — HOSPITAL ENCOUNTER (OUTPATIENT)
Dept: RADIOLOGY | Facility: MEDICAL CENTER | Age: 83
DRG: 871 | End: 2023-11-17
Attending: FAMILY MEDICINE
Payer: MEDICARE

## 2023-11-17 DIAGNOSIS — N30.00 ACUTE CYSTITIS WITHOUT HEMATURIA: ICD-10-CM

## 2023-11-17 DIAGNOSIS — I95.9 HYPOTENSION, UNSPECIFIED HYPOTENSION TYPE: ICD-10-CM

## 2023-11-17 DIAGNOSIS — M25.511 CHRONIC PAIN OF BOTH SHOULDERS: ICD-10-CM

## 2023-11-17 DIAGNOSIS — M25.512 CHRONIC PAIN OF BOTH SHOULDERS: ICD-10-CM

## 2023-11-17 DIAGNOSIS — A41.9 SEPSIS WITH ACUTE ORGAN DYSFUNCTION AND SEPTIC SHOCK, DUE TO UNSPECIFIED ORGANISM, UNSPECIFIED ORGAN DYSFUNCTION TYPE (HCC): Primary | ICD-10-CM

## 2023-11-17 DIAGNOSIS — E86.0 DEHYDRATION: ICD-10-CM

## 2023-11-17 DIAGNOSIS — R65.21 SEPSIS WITH ACUTE ORGAN DYSFUNCTION AND SEPTIC SHOCK, DUE TO UNSPECIFIED ORGANISM, UNSPECIFIED ORGAN DYSFUNCTION TYPE (HCC): Primary | ICD-10-CM

## 2023-11-17 DIAGNOSIS — M25.511 ACUTE PAIN OF BOTH SHOULDERS: ICD-10-CM

## 2023-11-17 DIAGNOSIS — N12 PYELONEPHRITIS: ICD-10-CM

## 2023-11-17 DIAGNOSIS — N17.9 AKI (ACUTE KIDNEY INJURY) (HCC): ICD-10-CM

## 2023-11-17 DIAGNOSIS — M25.512 ACUTE PAIN OF BOTH SHOULDERS: ICD-10-CM

## 2023-11-17 DIAGNOSIS — G89.29 CHRONIC PAIN OF BOTH SHOULDERS: ICD-10-CM

## 2023-11-17 DIAGNOSIS — E87.1 HYPONATREMIA: ICD-10-CM

## 2023-11-17 DIAGNOSIS — R50.9 FEVER, UNSPECIFIED FEVER CAUSE: ICD-10-CM

## 2023-11-17 DIAGNOSIS — I24.89 DEMAND ISCHEMIA OF MYOCARDIUM (HCC): ICD-10-CM

## 2023-11-17 DIAGNOSIS — D64.9 ANEMIA, UNSPECIFIED TYPE: ICD-10-CM

## 2023-11-17 LAB
ALBUMIN SERPL BCP-MCNC: 3.3 G/DL (ref 3.2–4.9)
ALBUMIN/GLOB SERPL: 1.1 G/DL
ALP SERPL-CCNC: 54 U/L (ref 30–99)
ALT SERPL-CCNC: 9 U/L (ref 2–50)
ANION GAP SERPL CALC-SCNC: 12 MMOL/L (ref 7–16)
APPEARANCE UR: ABNORMAL
AST SERPL-CCNC: 19 U/L (ref 12–45)
BACTERIA #/AREA URNS HPF: ABNORMAL /HPF
BASOPHILS # BLD AUTO: 0.1 % (ref 0–1.8)
BASOPHILS # BLD: 0.02 K/UL (ref 0–0.12)
BILIRUB SERPL-MCNC: 0.5 MG/DL (ref 0.1–1.5)
BILIRUB UR QL STRIP.AUTO: NEGATIVE
BUN SERPL-MCNC: 41 MG/DL (ref 8–22)
CALCIUM ALBUM COR SERPL-MCNC: 9 MG/DL (ref 8.5–10.5)
CALCIUM SERPL-MCNC: 8.4 MG/DL (ref 8.5–10.5)
CHLORIDE SERPL-SCNC: 98 MMOL/L (ref 96–112)
CO2 SERPL-SCNC: 15 MMOL/L (ref 20–33)
COLOR UR: YELLOW
CREAT SERPL-MCNC: 2.33 MG/DL (ref 0.5–1.4)
EKG IMPRESSION: NORMAL
EOSINOPHIL # BLD AUTO: 0 K/UL (ref 0–0.51)
EOSINOPHIL NFR BLD: 0 % (ref 0–6.9)
EPI CELLS #/AREA URNS HPF: NEGATIVE /HPF
ERYTHROCYTE [DISTWIDTH] IN BLOOD BY AUTOMATED COUNT: 45.1 FL (ref 35.9–50)
FLUAV RNA SPEC QL NAA+PROBE: NEGATIVE
FLUBV RNA SPEC QL NAA+PROBE: NEGATIVE
GFR SERPLBLD CREATININE-BSD FMLA CKD-EPI: 27 ML/MIN/1.73 M 2
GLOBULIN SER CALC-MCNC: 3 G/DL (ref 1.9–3.5)
GLUCOSE SERPL-MCNC: 188 MG/DL (ref 65–99)
GLUCOSE UR STRIP.AUTO-MCNC: >=1000 MG/DL
HCT VFR BLD AUTO: 27.7 % (ref 42–52)
HGB BLD-MCNC: 9.1 G/DL (ref 14–18)
HYALINE CASTS #/AREA URNS LPF: ABNORMAL /LPF
IMM GRANULOCYTES # BLD AUTO: 0.12 K/UL (ref 0–0.11)
IMM GRANULOCYTES NFR BLD AUTO: 0.7 % (ref 0–0.9)
KETONES UR STRIP.AUTO-MCNC: NEGATIVE MG/DL
LACTATE SERPL-SCNC: 1.5 MMOL/L (ref 0.5–2)
LEUKOCYTE ESTERASE UR QL STRIP.AUTO: ABNORMAL
LYMPHOCYTES # BLD AUTO: 0.94 K/UL (ref 1–4.8)
LYMPHOCYTES NFR BLD: 5.4 % (ref 22–41)
MAGNESIUM SERPL-MCNC: 2 MG/DL (ref 1.5–2.5)
MCH RBC QN AUTO: 31.3 PG (ref 27–33)
MCHC RBC AUTO-ENTMCNC: 32.9 G/DL (ref 32.3–36.5)
MCV RBC AUTO: 95.2 FL (ref 81.4–97.8)
MICRO URNS: ABNORMAL
MONOCYTES # BLD AUTO: 1.29 K/UL (ref 0–0.85)
MONOCYTES NFR BLD AUTO: 7.4 % (ref 0–13.4)
NEUTROPHILS # BLD AUTO: 14.96 K/UL (ref 1.82–7.42)
NEUTROPHILS NFR BLD: 86.4 % (ref 44–72)
NITRITE UR QL STRIP.AUTO: NEGATIVE
NRBC # BLD AUTO: 0 K/UL
NRBC BLD-RTO: 0 /100 WBC (ref 0–0.2)
NT-PROBNP SERPL IA-MCNC: 3924 PG/ML (ref 0–125)
PH UR STRIP.AUTO: 5 [PH] (ref 5–8)
PHOSPHATE SERPL-MCNC: 2.5 MG/DL (ref 2.5–4.5)
PLATELET # BLD AUTO: 199 K/UL (ref 164–446)
PMV BLD AUTO: 9.4 FL (ref 9–12.9)
POTASSIUM SERPL-SCNC: 4.5 MMOL/L (ref 3.6–5.5)
PROCALCITONIN SERPL-MCNC: 4.81 NG/ML
PROT SERPL-MCNC: 6.3 G/DL (ref 6–8.2)
PROT UR QL STRIP: 100 MG/DL
RBC # BLD AUTO: 2.91 M/UL (ref 4.7–6.1)
RBC # URNS HPF: ABNORMAL /HPF
RBC UR QL AUTO: ABNORMAL
RSV RNA SPEC QL NAA+PROBE: NEGATIVE
SARS-COV-2 RNA RESP QL NAA+PROBE: NOTDETECTED
SODIUM SERPL-SCNC: 125 MMOL/L (ref 135–145)
SP GR UR STRIP.AUTO: 1.02
TROPONIN T SERPL-MCNC: 50 NG/L (ref 6–19)
UROBILINOGEN UR STRIP.AUTO-MCNC: 0.2 MG/DL
WBC # BLD AUTO: 17.3 K/UL (ref 4.8–10.8)
WBC #/AREA URNS HPF: ABNORMAL /HPF

## 2023-11-17 PROCEDURE — 84100 ASSAY OF PHOSPHORUS: CPT

## 2023-11-17 PROCEDURE — 84145 PROCALCITONIN (PCT): CPT

## 2023-11-17 PROCEDURE — 0241U HCHG SARS-COV-2 COVID-19 NFCT DS RESP RNA 4 TRGT ED POC: CPT

## 2023-11-17 PROCEDURE — 99285 EMERGENCY DEPT VISIT HI MDM: CPT

## 2023-11-17 PROCEDURE — 93005 ELECTROCARDIOGRAM TRACING: CPT | Performed by: EMERGENCY MEDICINE

## 2023-11-17 PROCEDURE — C9803 HOPD COVID-19 SPEC COLLECT: HCPCS

## 2023-11-17 PROCEDURE — 81001 URINALYSIS AUTO W/SCOPE: CPT

## 2023-11-17 PROCEDURE — G0299 HHS/HOSPICE OF RN EA 15 MIN: HCPCS

## 2023-11-17 PROCEDURE — 83880 ASSAY OF NATRIURETIC PEPTIDE: CPT

## 2023-11-17 PROCEDURE — 700102 HCHG RX REV CODE 250 W/ 637 OVERRIDE(OP): Performed by: EMERGENCY MEDICINE

## 2023-11-17 PROCEDURE — 73221 MRI JOINT UPR EXTREM W/O DYE: CPT | Mod: LT

## 2023-11-17 PROCEDURE — 87186 SC STD MICRODIL/AGAR DIL: CPT | Mod: 91

## 2023-11-17 PROCEDURE — 83605 ASSAY OF LACTIC ACID: CPT

## 2023-11-17 PROCEDURE — A9270 NON-COVERED ITEM OR SERVICE: HCPCS | Performed by: EMERGENCY MEDICINE

## 2023-11-17 PROCEDURE — 84484 ASSAY OF TROPONIN QUANT: CPT

## 2023-11-17 PROCEDURE — 87086 URINE CULTURE/COLONY COUNT: CPT

## 2023-11-17 PROCEDURE — 87040 BLOOD CULTURE FOR BACTERIA: CPT

## 2023-11-17 PROCEDURE — 85025 COMPLETE CBC W/AUTO DIFF WBC: CPT

## 2023-11-17 PROCEDURE — 700105 HCHG RX REV CODE 258: Performed by: EMERGENCY MEDICINE

## 2023-11-17 PROCEDURE — 87077 CULTURE AEROBIC IDENTIFY: CPT

## 2023-11-17 PROCEDURE — 700117 HCHG RX CONTRAST REV CODE 255: Performed by: EMERGENCY MEDICINE

## 2023-11-17 PROCEDURE — 80053 COMPREHEN METABOLIC PANEL: CPT

## 2023-11-17 PROCEDURE — 83735 ASSAY OF MAGNESIUM: CPT

## 2023-11-17 PROCEDURE — 96374 THER/PROPH/DIAG INJ IV PUSH: CPT

## 2023-11-17 PROCEDURE — 71275 CT ANGIOGRAPHY CHEST: CPT

## 2023-11-17 PROCEDURE — 74177 CT ABD & PELVIS W/CONTRAST: CPT

## 2023-11-17 PROCEDURE — 71045 X-RAY EXAM CHEST 1 VIEW: CPT

## 2023-11-17 PROCEDURE — 93005 ELECTROCARDIOGRAM TRACING: CPT

## 2023-11-17 PROCEDURE — 73221 MRI JOINT UPR EXTREM W/O DYE: CPT | Mod: RT

## 2023-11-17 RX ORDER — ACETAMINOPHEN 500 MG
1000 TABLET ORAL ONCE
Status: COMPLETED | OUTPATIENT
Start: 2023-11-17 | End: 2023-11-17

## 2023-11-17 RX ORDER — CEFTRIAXONE 1 G/1
1000 INJECTION, POWDER, FOR SOLUTION INTRAMUSCULAR; INTRAVENOUS ONCE
Status: COMPLETED | OUTPATIENT
Start: 2023-11-18 | End: 2023-11-18

## 2023-11-17 RX ORDER — SODIUM CHLORIDE, SODIUM LACTATE, POTASSIUM CHLORIDE, AND CALCIUM CHLORIDE .6; .31; .03; .02 G/100ML; G/100ML; G/100ML; G/100ML
30 INJECTION, SOLUTION INTRAVENOUS ONCE
Status: COMPLETED | OUTPATIENT
Start: 2023-11-17 | End: 2023-11-18

## 2023-11-17 RX ADMIN — IOHEXOL 100 ML: 350 INJECTION, SOLUTION INTRAVENOUS at 23:45

## 2023-11-17 RX ADMIN — SODIUM CHLORIDE, POTASSIUM CHLORIDE, SODIUM LACTATE AND CALCIUM CHLORIDE 2655 ML: 600; 310; 30; 20 INJECTION, SOLUTION INTRAVENOUS at 22:35

## 2023-11-17 RX ADMIN — ACETAMINOPHEN 1000 MG: 500 TABLET, FILM COATED ORAL at 22:57

## 2023-11-17 ASSESSMENT — ENCOUNTER SYMPTOMS
HYPERTENSION: 1
LOWER EXTREMITY EDEMA: 1
LIMITED RANGE OF MOTION: 1
BOWEL PATTERN NORMAL: 1
LAST BOWEL MOVEMENT: 66794
MUSCLE WEAKNESS: 1

## 2023-11-17 ASSESSMENT — FIBROSIS 4 INDEX: FIB4 SCORE: 1.97

## 2023-11-17 NOTE — Clinical Note
Falls Template         Date & Time of fall: 11/16/23 around 1130pm           Cause of fall:  mechanical            Location:  bedroom , daughter's house. reported pt slipped on the rug as he was trying to use the rest room and fell. he tried to grab the chair to get up , daughter and son in law assisted him up.            Was the fall witnessed?  no                         Actions taken by patient:   he tried to grab the chair to get up , daughter and son in law assisted him up. denies injury              Any new injury?  no                           Any recent medication changes?   no            Did patient have appropriate DME available?   has cane             Actions Taken: instructed on fall prevention, remove rug . notified PCP,  TEAM

## 2023-11-17 NOTE — Clinical Note
received call from pt's daughter and said to come to her house , that pt fell last night as he was trying to use the rest room. pt in bed awake alert and oriented x4, reported that he slipped last night in a carpet and landed on the floor, tried to get up by grabing chair, daughter and son in law assisted him up. reports no injury. assessment done b/p 104/58.pt was incontinent of urine , brief wet. Melissa , daughter said rodarte was discontined last wednesday in urology clinic.has uretral stent .pt managed medications. pt not taking blood thinners was discontinued.  daughter said pt's low b/p could be due to tamsulosin. informed her that's one of side effects of the medication., they were told in urology clinic that he might have dribbling or frequency in urination. urinal provided..pt ambulated in the living area using cane.has increased weakness as reported. instructed to increased hydration/fluids, safety/fall prevention , s/s of UTI . pt has MRI appt this afternoon . instructed if conditon gets worsts like chest pains, breathing poblems or falls to notify PCP or go to ER. this sn also called left message to Dr Gentile voice mail.  pt/daughter eating oatmeal as nurse was leaving. .  Pt/Cg response to the services provided: denies chest pains, respiratory problem .     addendum -suggested to get life alert , since pt lives by himself.. pt's binder not available ,left in pt's house.

## 2023-11-18 ENCOUNTER — HOME CARE VISIT (OUTPATIENT)
Dept: HOME HEALTH SERVICES | Facility: HOME HEALTHCARE | Age: 83
End: 2023-11-18
Payer: MEDICARE

## 2023-11-18 VITALS
SYSTOLIC BLOOD PRESSURE: 104 MMHG | TEMPERATURE: 98 F | OXYGEN SATURATION: 97 % | HEART RATE: 80 BPM | DIASTOLIC BLOOD PRESSURE: 56 MMHG | RESPIRATION RATE: 17 BRPM

## 2023-11-18 PROBLEM — E87.1 HYPONATREMIA: Status: ACTIVE | Noted: 2023-11-18

## 2023-11-18 PROBLEM — I95.9 HYPOTENSION: Status: ACTIVE | Noted: 2023-11-18

## 2023-11-18 PROBLEM — A41.9 SEPSIS (HCC): Status: ACTIVE | Noted: 2023-11-18

## 2023-11-18 LAB
ALBUMIN SERPL BCP-MCNC: 2.9 G/DL (ref 3.2–4.9)
ALBUMIN/GLOB SERPL: 1.1 G/DL
ALP SERPL-CCNC: 50 U/L (ref 30–99)
ALT SERPL-CCNC: 13 U/L (ref 2–50)
ANION GAP SERPL CALC-SCNC: 13 MMOL/L (ref 7–16)
AST SERPL-CCNC: 18 U/L (ref 12–45)
BASOPHILS # BLD AUTO: 0.2 % (ref 0–1.8)
BASOPHILS # BLD: 0.02 K/UL (ref 0–0.12)
BILIRUB SERPL-MCNC: 0.3 MG/DL (ref 0.1–1.5)
BUN SERPL-MCNC: 34 MG/DL (ref 8–22)
CALCIUM ALBUM COR SERPL-MCNC: 9.2 MG/DL (ref 8.5–10.5)
CALCIUM SERPL-MCNC: 8.3 MG/DL (ref 8.5–10.5)
CHLORIDE SERPL-SCNC: 101 MMOL/L (ref 96–112)
CO2 SERPL-SCNC: 16 MMOL/L (ref 20–33)
CREAT SERPL-MCNC: 1.81 MG/DL (ref 0.5–1.4)
EOSINOPHIL # BLD AUTO: 0.01 K/UL (ref 0–0.51)
EOSINOPHIL NFR BLD: 0.1 % (ref 0–6.9)
ERYTHROCYTE [DISTWIDTH] IN BLOOD BY AUTOMATED COUNT: 46.8 FL (ref 35.9–50)
GFR SERPLBLD CREATININE-BSD FMLA CKD-EPI: 36 ML/MIN/1.73 M 2
GLOBULIN SER CALC-MCNC: 2.7 G/DL (ref 1.9–3.5)
GLUCOSE BLD STRIP.AUTO-MCNC: 150 MG/DL (ref 65–99)
GLUCOSE BLD STRIP.AUTO-MCNC: 154 MG/DL (ref 65–99)
GLUCOSE BLD STRIP.AUTO-MCNC: 171 MG/DL (ref 65–99)
GLUCOSE BLD STRIP.AUTO-MCNC: 193 MG/DL (ref 65–99)
GLUCOSE SERPL-MCNC: 140 MG/DL (ref 65–99)
HCT VFR BLD AUTO: 27.1 % (ref 42–52)
HGB BLD-MCNC: 8.8 G/DL (ref 14–18)
IMM GRANULOCYTES # BLD AUTO: 0.08 K/UL (ref 0–0.11)
IMM GRANULOCYTES NFR BLD AUTO: 0.6 % (ref 0–0.9)
LACTATE SERPL-SCNC: 0.3 MMOL/L (ref 0.5–2)
LYMPHOCYTES # BLD AUTO: 0.74 K/UL (ref 1–4.8)
LYMPHOCYTES NFR BLD: 5.7 % (ref 22–41)
MCH RBC QN AUTO: 31.5 PG (ref 27–33)
MCHC RBC AUTO-ENTMCNC: 32.5 G/DL (ref 32.3–36.5)
MCV RBC AUTO: 97.1 FL (ref 81.4–97.8)
MONOCYTES # BLD AUTO: 0.77 K/UL (ref 0–0.85)
MONOCYTES NFR BLD AUTO: 6 % (ref 0–13.4)
NEUTROPHILS # BLD AUTO: 11.3 K/UL (ref 1.82–7.42)
NEUTROPHILS NFR BLD: 87.4 % (ref 44–72)
NRBC # BLD AUTO: 0 K/UL
NRBC BLD-RTO: 0 /100 WBC (ref 0–0.2)
PLATELET # BLD AUTO: 167 K/UL (ref 164–446)
PMV BLD AUTO: 9.4 FL (ref 9–12.9)
POTASSIUM SERPL-SCNC: 4 MMOL/L (ref 3.6–5.5)
PROT SERPL-MCNC: 5.6 G/DL (ref 6–8.2)
RBC # BLD AUTO: 2.79 M/UL (ref 4.7–6.1)
SODIUM SERPL-SCNC: 130 MMOL/L (ref 135–145)
WBC # BLD AUTO: 12.9 K/UL (ref 4.8–10.8)

## 2023-11-18 PROCEDURE — 85025 COMPLETE CBC W/AUTO DIFF WBC: CPT

## 2023-11-18 PROCEDURE — 80053 COMPREHEN METABOLIC PANEL: CPT

## 2023-11-18 PROCEDURE — 82962 GLUCOSE BLOOD TEST: CPT | Mod: 91

## 2023-11-18 PROCEDURE — 99291 CRITICAL CARE FIRST HOUR: CPT | Performed by: HOSPITALIST

## 2023-11-18 PROCEDURE — 700111 HCHG RX REV CODE 636 W/ 250 OVERRIDE (IP): Mod: JZ | Performed by: EMERGENCY MEDICINE

## 2023-11-18 PROCEDURE — 700101 HCHG RX REV CODE 250: Performed by: STUDENT IN AN ORGANIZED HEALTH CARE EDUCATION/TRAINING PROGRAM

## 2023-11-18 PROCEDURE — 51798 US URINE CAPACITY MEASURE: CPT

## 2023-11-18 PROCEDURE — 770000 HCHG ROOM/CARE - INTERMEDIATE ICU *

## 2023-11-18 PROCEDURE — A9270 NON-COVERED ITEM OR SERVICE: HCPCS | Performed by: STUDENT IN AN ORGANIZED HEALTH CARE EDUCATION/TRAINING PROGRAM

## 2023-11-18 PROCEDURE — 700105 HCHG RX REV CODE 258: Performed by: STUDENT IN AN ORGANIZED HEALTH CARE EDUCATION/TRAINING PROGRAM

## 2023-11-18 PROCEDURE — 99223 1ST HOSP IP/OBS HIGH 75: CPT | Mod: AI | Performed by: STUDENT IN AN ORGANIZED HEALTH CARE EDUCATION/TRAINING PROGRAM

## 2023-11-18 PROCEDURE — 700102 HCHG RX REV CODE 250 W/ 637 OVERRIDE(OP): Performed by: STUDENT IN AN ORGANIZED HEALTH CARE EDUCATION/TRAINING PROGRAM

## 2023-11-18 PROCEDURE — 700111 HCHG RX REV CODE 636 W/ 250 OVERRIDE (IP): Performed by: STUDENT IN AN ORGANIZED HEALTH CARE EDUCATION/TRAINING PROGRAM

## 2023-11-18 PROCEDURE — 83605 ASSAY OF LACTIC ACID: CPT

## 2023-11-18 RX ORDER — TAMSULOSIN HYDROCHLORIDE 0.4 MG/1
0.4 CAPSULE ORAL
Status: DISCONTINUED | OUTPATIENT
Start: 2023-11-18 | End: 2023-11-21 | Stop reason: HOSPADM

## 2023-11-18 RX ORDER — SODIUM CHLORIDE 9 MG/ML
INJECTION, SOLUTION INTRAVENOUS CONTINUOUS
Status: ACTIVE | OUTPATIENT
Start: 2023-11-18 | End: 2023-11-21

## 2023-11-18 RX ORDER — ROSUVASTATIN CALCIUM 5 MG/1
5 TABLET, COATED ORAL DAILY
Status: DISCONTINUED | OUTPATIENT
Start: 2023-11-18 | End: 2023-11-21 | Stop reason: HOSPADM

## 2023-11-18 RX ORDER — EZETIMIBE 10 MG/1
10 TABLET ORAL EVERY EVENING
Status: DISCONTINUED | OUTPATIENT
Start: 2023-11-18 | End: 2023-11-21 | Stop reason: HOSPADM

## 2023-11-18 RX ORDER — HEPARIN SODIUM 5000 [USP'U]/ML
5000 INJECTION, SOLUTION INTRAVENOUS; SUBCUTANEOUS EVERY 8 HOURS
Status: DISCONTINUED | OUTPATIENT
Start: 2023-11-18 | End: 2023-11-21 | Stop reason: HOSPADM

## 2023-11-18 RX ORDER — SODIUM CHLORIDE 9 MG/ML
1000 INJECTION, SOLUTION INTRAVENOUS ONCE
Status: COMPLETED | OUTPATIENT
Start: 2023-11-18 | End: 2023-11-18

## 2023-11-18 RX ORDER — ACETAMINOPHEN 500 MG
500 TABLET ORAL EVERY 6 HOURS PRN
Status: DISCONTINUED | OUTPATIENT
Start: 2023-11-18 | End: 2023-11-21 | Stop reason: HOSPADM

## 2023-11-18 RX ORDER — METOPROLOL SUCCINATE 50 MG/1
50 TABLET, EXTENDED RELEASE ORAL 2 TIMES DAILY
COMMUNITY
End: 2023-11-27

## 2023-11-18 RX ORDER — NOREPINEPHRINE BITARTRATE 0.03 MG/ML
0-1 INJECTION, SOLUTION INTRAVENOUS CONTINUOUS
Status: DISCONTINUED | OUTPATIENT
Start: 2023-11-18 | End: 2023-11-19

## 2023-11-18 RX ORDER — AMOXICILLIN 250 MG
2 CAPSULE ORAL 2 TIMES DAILY
Status: DISCONTINUED | OUTPATIENT
Start: 2023-11-18 | End: 2023-11-21 | Stop reason: HOSPADM

## 2023-11-18 RX ORDER — ASPIRIN 81 MG/1
81 TABLET ORAL DAILY
Status: DISCONTINUED | OUTPATIENT
Start: 2023-11-18 | End: 2023-11-21 | Stop reason: HOSPADM

## 2023-11-18 RX ORDER — CEPHALEXIN 500 MG/1
500 CAPSULE ORAL 4 TIMES DAILY
Status: ON HOLD | COMMUNITY
End: 2023-11-21

## 2023-11-18 RX ORDER — POLYETHYLENE GLYCOL 3350 17 G/17G
1 POWDER, FOR SOLUTION ORAL
Status: DISCONTINUED | OUTPATIENT
Start: 2023-11-18 | End: 2023-11-21 | Stop reason: HOSPADM

## 2023-11-18 RX ORDER — BISACODYL 10 MG
10 SUPPOSITORY, RECTAL RECTAL
Status: DISCONTINUED | OUTPATIENT
Start: 2023-11-18 | End: 2023-11-21 | Stop reason: HOSPADM

## 2023-11-18 RX ADMIN — CEFTRIAXONE SODIUM 2000 MG: 10 INJECTION, POWDER, FOR SOLUTION INTRAVENOUS at 17:53

## 2023-11-18 RX ADMIN — INSULIN HUMAN 1 UNITS: 100 INJECTION, SOLUTION PARENTERAL at 12:16

## 2023-11-18 RX ADMIN — SODIUM CHLORIDE 1000 ML: 9 INJECTION, SOLUTION INTRAVENOUS at 01:46

## 2023-11-18 RX ADMIN — ASPIRIN 81 MG: 81 TABLET, COATED ORAL at 05:56

## 2023-11-18 RX ADMIN — TAMSULOSIN HYDROCHLORIDE 0.4 MG: 0.4 CAPSULE ORAL at 07:42

## 2023-11-18 RX ADMIN — EZETIMIBE 10 MG: 10 TABLET ORAL at 17:53

## 2023-11-18 RX ADMIN — ROSUVASTATIN CALCIUM 5 MG: 5 TABLET, FILM COATED ORAL at 05:55

## 2023-11-18 RX ADMIN — INSULIN HUMAN 1 UNITS: 100 INJECTION, SOLUTION PARENTERAL at 17:59

## 2023-11-18 RX ADMIN — DOCUSATE SODIUM 50 MG AND SENNOSIDES 8.6 MG 2 TABLET: 8.6; 5 TABLET, FILM COATED ORAL at 05:55

## 2023-11-18 RX ADMIN — SODIUM CHLORIDE: 9 INJECTION, SOLUTION INTRAVENOUS at 03:01

## 2023-11-18 RX ADMIN — NOREPINEPHRINE BITARTRATE 0.05 MCG/KG/MIN: 1 INJECTION, SOLUTION, CONCENTRATE INTRAVENOUS at 07:07

## 2023-11-18 RX ADMIN — HEPARIN SODIUM 5000 UNITS: 5000 INJECTION, SOLUTION INTRAVENOUS; SUBCUTANEOUS at 22:36

## 2023-11-18 RX ADMIN — HEPARIN SODIUM 5000 UNITS: 5000 INJECTION, SOLUTION INTRAVENOUS; SUBCUTANEOUS at 14:35

## 2023-11-18 RX ADMIN — CEFTRIAXONE SODIUM 1000 MG: 1 INJECTION, POWDER, FOR SOLUTION INTRAMUSCULAR; INTRAVENOUS at 00:30

## 2023-11-18 RX ADMIN — HEPARIN SODIUM 5000 UNITS: 5000 INJECTION, SOLUTION INTRAVENOUS; SUBCUTANEOUS at 05:56

## 2023-11-18 RX ADMIN — INSULIN HUMAN 1 UNITS: 100 INJECTION, SOLUTION PARENTERAL at 20:53

## 2023-11-18 ASSESSMENT — COGNITIVE AND FUNCTIONAL STATUS - GENERAL
SUGGESTED CMS G CODE MODIFIER DAILY ACTIVITY: CK
WALKING IN HOSPITAL ROOM: A LITTLE
CLIMB 3 TO 5 STEPS WITH RAILING: A LOT
WALKING IN HOSPITAL ROOM: A LOT
SUGGESTED CMS G CODE MODIFIER MOBILITY: CK
WALKING IN HOSPITAL ROOM: A LITTLE
DAILY ACTIVITIY SCORE: 18
HELP NEEDED FOR BATHING: A LITTLE
TURNING FROM BACK TO SIDE WHILE IN FLAT BAD: A LITTLE
PERSONAL GROOMING: A LITTLE
MOBILITY SCORE: 16
DRESSING REGULAR LOWER BODY CLOTHING: A LITTLE
MOVING FROM LYING ON BACK TO SITTING ON SIDE OF FLAT BED: A LITTLE
SUGGESTED CMS G CODE MODIFIER MOBILITY: CK
DAILY ACTIVITIY SCORE: 22
MOBILITY SCORE: 19
STANDING UP FROM CHAIR USING ARMS: A LITTLE
DRESSING REGULAR LOWER BODY CLOTHING: A LITTLE
SUGGESTED CMS G CODE MODIFIER DAILY ACTIVITY: CJ
DRESSING REGULAR UPPER BODY CLOTHING: A LITTLE
MOVING TO AND FROM BED TO CHAIR: A LITTLE
SUGGESTED CMS G CODE MODIFIER MOBILITY: CK
TURNING FROM BACK TO SIDE WHILE IN FLAT BAD: A LITTLE
EATING MEALS: A LITTLE
CLIMB 3 TO 5 STEPS WITH RAILING: A LOT
MOBILITY SCORE: 17
STANDING UP FROM CHAIR USING ARMS: A LOT
HELP NEEDED FOR BATHING: A LITTLE
MOVING TO AND FROM BED TO CHAIR: A LITTLE
CLIMB 3 TO 5 STEPS WITH RAILING: A LITTLE
MOVING FROM LYING ON BACK TO SITTING ON SIDE OF FLAT BED: A LITTLE
STANDING UP FROM CHAIR USING ARMS: A LOT
TOILETING: A LITTLE

## 2023-11-18 ASSESSMENT — LIFESTYLE VARIABLES
TOTAL SCORE: 0
EVER FELT BAD OR GUILTY ABOUT YOUR DRINKING: NO
HAVE PEOPLE ANNOYED YOU BY CRITICIZING YOUR DRINKING: NO
ALCOHOL_USE: YES
CONSUMPTION TOTAL: INCOMPLETE
ON A TYPICAL DAY WHEN YOU DRINK ALCOHOL HOW MANY DRINKS DO YOU HAVE: 1
HAVE YOU EVER FELT YOU SHOULD CUT DOWN ON YOUR DRINKING: NO
EVER HAD A DRINK FIRST THING IN THE MORNING TO STEADY YOUR NERVES TO GET RID OF A HANGOVER: NO
SUBSTANCE_ABUSE: 0

## 2023-11-18 ASSESSMENT — ENCOUNTER SYMPTOMS
DRY SKIN: 1
WHEEZING: 0
PALPITATIONS: 0
ABDOMINAL PAIN: 0
HEADACHES: 0
HALLUCINATIONS: 0
ARTHRALGIAS: 1
WEAKNESS: 1
SPEECH CHANGE: 0
NERVOUS/ANXIOUS: 0
SPUTUM PRODUCTION: 0
FOCAL WEAKNESS: 0
TINGLING: 0
HEMOPTYSIS: 0
DIARRHEA: 0
SINUS PAIN: 0
SENSORY CHANGE: 0
SHORTNESS OF BREATH: 0
VOMITING: 0
STRIDOR: 0
DENIES PAIN: 1
CHILLS: 0
EYE DISCHARGE: 0
BACK PAIN: 0
DIZZINESS: 0
DOUBLE VISION: 0
FEVER: 0
TREMORS: 0
NECK PAIN: 0
FLANK PAIN: 0
PERSON REPORTING PAIN: PATIENT
COUGH: 0
ORTHOPNEA: 0
BLURRED VISION: 0
NAUSEA: 0
DEPRESSION: 0

## 2023-11-18 ASSESSMENT — PATIENT HEALTH QUESTIONNAIRE - PHQ9
SUM OF ALL RESPONSES TO PHQ9 QUESTIONS 1 AND 2: 0
1. LITTLE INTEREST OR PLEASURE IN DOING THINGS: NOT AT ALL
2. FEELING DOWN, DEPRESSED, IRRITABLE, OR HOPELESS: NOT AT ALL
1. LITTLE INTEREST OR PLEASURE IN DOING THINGS: NOT AT ALL
2. FEELING DOWN, DEPRESSED, IRRITABLE, OR HOPELESS: NOT AT ALL
SUM OF ALL RESPONSES TO PHQ9 QUESTIONS 1 AND 2: 0

## 2023-11-18 ASSESSMENT — PAIN DESCRIPTION - PAIN TYPE
TYPE: ACUTE PAIN

## 2023-11-18 ASSESSMENT — FIBROSIS 4 INDEX
FIB4 SCORE: 2.67

## 2023-11-18 NOTE — ED NOTES
Break RN: MD notified of persistent hypotension with bolus infusing. Departing BP 82/52mmhg- MD aware. Per MD, continue transport to floor- MD will see patient in person on [facility]. Pt very sleepy- arouses to gentle stimulation.

## 2023-11-18 NOTE — PROGRESS NOTES
"Pt arrived to SMT with 2 ED RN's. Pt slid over to bed, VS taken, pt attached to 2L NC. SPB lower than 100 consecutively, with MAP between 68-61. Pts abd distended. LS diminished. Rapid was eventually called due to persistent MAPS below 65 as per protocol, and being worried of fluid overload of pt as 4L had already been given to sustain adequate BP's with elevated BNP of 3924.     Hospitalist updated about BP, was \"ok with MAP of 61,\" gave new parameter of MAP goal of 60 for concern.     Pt was bladder scanned for 553 after unable to urinate with feeling like he needed to    RRT RN's took over patient at that time, rodarte was inserted, BP was monitored.     Discussed RRT care with daughter at bedside and discussed rodarte placement. Daughter was understanding and worried about his BP. Ensured she would be updated if anything happened if she went home.     Daughter called about transfer to [facility], daughter understood.   "

## 2023-11-18 NOTE — CARE PLAN
The patient is Watcher - Medium risk of patient condition declining or worsening         Progress made toward(s) clinical / shift goals:  MAP will remain above 65 during shift        Problem: Knowledge Deficit - Standard  Goal: Patient and family/care givers will demonstrate understanding of plan of care, disease process/condition, diagnostic tests and medications  Outcome: Progressing  Note: Pt educated regarding plan of care and medications. All questions answered.      Problem: Skin Integrity  Goal: Skin integrity is maintained or improved  Outcome: Progressing  Note: Pt turned and positioned every two hours. Incontinence care provided and barrier paste applied as needed.

## 2023-11-18 NOTE — ED NOTES
Pt to [facility] 1 with gabriel RN and Stephanie RN on Zoll monitoring, care relinquished at bedside. Pt transferred on 2L NC and Zoll monitor. Care relinquished.

## 2023-11-18 NOTE — H&P
Hospital Medicine History & Physical Note    Date of Service  11/18/2023    Primary Care Physician  Nirmal Gentile M.D.      Code Status  Full Code    Chief Complaint  Chief Complaint   Patient presents with    Hypotension     EMS arrival 70s/40s, on arrival to [facility] 92/58. + response to fluid.     Weakness     X24 hours, denies falls however pt states he slipped on carpet last night and caught himself.       History of Presenting Illness  Star Centeno is a 82 y.o. male with past medical history of prostate cancer status postradiation, hyperlipidemia, aortic aneurysm, history of pulmonary embolism, previously on anticoagulation who presented 11/17/2023 with worsening generalized weakness.  Of note, patient had a recent hospital admission in October 2023.  He had a left ureteroscopy, left retrograde pyelogram, left ureteral stent placement with bladder biopsy for left-sided hydroureteronephrosis.  He did have a follow-up visit with outpatient urology on Monday, where he had a second cystoscopy as well.   Patient was brought to the hospital by his daughter.  She was concerned that the patient was experiencing worsening generalized weakness.  She said that the patient was having difficulty ambulating.  She was concerned that the patient had a low systolic blood pressure.  Patient currently denies any fevers, chills, nausea, vomiting.  He does report increasing frequency, but denies any dysuria.  Patient without any abdominal pain.  In the emergency department, patient was found to be febrile to 100.8, and he was hypotensive as per EMS with systolic blood pressure in the 60s.  Patient had leukocytosis greater than 17,000, with worsening kidney function, and a creatinine of 2.33.  Sepsis protocol was initiated, and patient was given appropriate IV fluids, and patient's blood pressure improved.     I discussed the plan of care with patient and family.    Review of Systems  Review of Systems   Constitutional:   Negative for chills and fever.   HENT:  Negative for congestion and sinus pain.    Eyes:  Negative for blurred vision and double vision.   Respiratory:  Negative for cough, hemoptysis, sputum production, shortness of breath and wheezing.    Cardiovascular:  Negative for chest pain, palpitations and orthopnea.   Gastrointestinal:  Negative for abdominal pain, diarrhea, nausea and vomiting.   Genitourinary:  Positive for frequency and urgency. Negative for dysuria, flank pain and hematuria.   Musculoskeletal:  Negative for back pain and neck pain.   Neurological:  Positive for weakness. Negative for dizziness, tingling, tremors, sensory change, speech change, focal weakness and headaches.   Psychiatric/Behavioral:  Negative for depression, hallucinations, substance abuse and suicidal ideas. The patient is not nervous/anxious.        Past Medical History   has a past medical history of Cancer (HCC), Cataract, Hyperlipidemia, Hypertension, and Pulmonary emboli (HCC).    Surgical History   has a past surgical history that includes eye surgery; prostatectomy, radical retro; pr cystourethroscopy,biopsies (N/A, 10/24/2023); pr cysto/uretero/pyeloscopy, dx (Left, 10/24/2023); and pr cystoscopy,insert ureteral stent (Left, 10/24/2023).     Family History  family history includes Diabetes in his brother and sister; Genetic Disorder in his brother, sister, and sister; Hyperlipidemia in his brother; Hypertension in his brother.   Family history reviewed with patient. There is no family history that is pertinent to the chief complaint.     Social History   reports that he quit smoking about 22 years ago. His smoking use included cigarettes. He started smoking about 67 years ago. He has a 45.0 pack-year smoking history. He has never used smokeless tobacco. He reports current alcohol use of about 8.4 oz of alcohol per week. He reports that he does not use drugs.    Allergies  No Known Allergies    Medications  Prior to Admission  Medications   Prescriptions Last Dose Informant Patient Reported? Taking?   Coenzyme Q10 (COQ-10) 30 MG Cap UNK at UNK Patient Yes No   Sig: Take 30 mg by mouth every evening. Indications: heart health   Empagliflozin 25 MG Tab  Patient No No   Sig: Take 25 mg by mouth every day for 360 days.   Loperamide HCl (IMODIUM A-D) 1 MG/7.5ML Liquid   Yes No   Sig: Take 30 mL by mouth 2 times a day as needed (diarrhea). Indications: Diarrhea   Melatonin 10 MG Cap   Yes No   Sig: Take 10 mg by mouth at bedtime as needed (sleep). Indications: sleep   Omega-3 Fatty Acids (FISH OIL) 1200 MG Cap  Patient Yes No   Sig: Take 1,200 mg by mouth every morning. Indications: heart health   Prenatal Multivit-Min-Fe-FA (PRE-COLE FORMULA PO)  Patient Yes No   Sig: Take 1 Tablet by mouth every morning. Indications: supplement   acetaminophen (TYLENOL) 500 MG Tab PRN at PRN  No No   Sig: Take 1 Tablet by mouth every 6 hours as needed for Mild Pain or Moderate Pain.   aspirin 81 MG EC tablet 2023 at AM Patient Yes No   Sig: Take 81 mg by mouth every evening. Indications: blood thinner   bismuth subsalicylate (PEPTO-BISMOL) 262 MG/15ML Suspension UNK at UNK  Yes No   Sig: Take 30 mL by mouth every 6 hours as needed (diarrhea). Indications: Diarrhea, Heartburn   diclofenac sodium (VOLTAREN) 1 % Gel  Patient Yes No   Sig: Apply 2 g topically 4 times a day as needed (Shoulder Pain). Indications: Joint Damage causing Pain and Loss of Function   ezetimibe (ZETIA) 10 MG Tab  Patient No No   Sig: Take 1 Tablet by mouth every day.   lisinopril (PRINIVIL) 10 MG Tab   No No   Sig: Take 1 Tablet by mouth every day.   metFORMIN (GLUCOPHAGE) 500 MG Tab  Patient No No   Sig: Take 1 Tablet by mouth 2 times a day with meals for 360 days.   rosuvastatin (CRESTOR) 5 MG Tab  Patient No No   Sig: TAKE 1 TABLET BY MOUTH EVERY DAY   tamsulosin (FLOMAX) 0.4 MG capsule   No No   Sig: Take 1 Capsule by mouth 1/2 hour after breakfast.      Facility-Administered  Medications: None       Physical Exam  Temp:  [36.7 °C (98 °F)-38.1 °C (100.5 °F)] 38.1 °C (100.5 °F)  Pulse:  [69-92] 69  Resp:  [15-22] 20  BP: ()/(46-56) 109/51  SpO2:  [83 %-97 %] 93 %  Blood Pressure : 109/51   Temperature: (!) 38.1 °C (100.5 °F)   Pulse: 69   Respiration: 20   Pulse Oximetry: 93 %       Physical Exam  Constitutional:       General: He is not in acute distress.     Appearance: Normal appearance. He is normal weight. He is not ill-appearing, toxic-appearing or diaphoretic.   HENT:      Head: Normocephalic and atraumatic.      Mouth/Throat:      Mouth: Mucous membranes are moist.   Eyes:      Pupils: Pupils are equal, round, and reactive to light.   Cardiovascular:      Rate and Rhythm: Normal rate and regular rhythm.      Pulses: Normal pulses.      Heart sounds: Normal heart sounds. No murmur heard.     No friction rub. No gallop.   Pulmonary:      Effort: Pulmonary effort is normal. No respiratory distress.      Breath sounds: Normal breath sounds. No stridor. No wheezing, rhonchi or rales.   Chest:      Chest wall: No tenderness.   Abdominal:      General: There is no distension.      Palpations: There is no mass.      Tenderness: There is no abdominal tenderness. There is no right CVA tenderness, left CVA tenderness, guarding or rebound.      Hernia: No hernia is present.   Musculoskeletal:         General: No swelling, tenderness, deformity or signs of injury.      Right lower leg: No edema.      Left lower leg: No edema.   Skin:     General: Skin is warm and dry.      Capillary Refill: Capillary refill takes less than 2 seconds.      Coloration: Skin is not jaundiced or pale.      Findings: No bruising, erythema, lesion or rash.   Neurological:      General: No focal deficit present.      Mental Status: He is alert and oriented to person, place, and time. Mental status is at baseline.      Cranial Nerves: No cranial nerve deficit.      Sensory: No sensory deficit.      Motor: No  weakness.      Coordination: Coordination normal.   Psychiatric:         Mood and Affect: Mood normal.         Laboratory:  Recent Labs     11/17/23 2220   WBC 17.3*   RBC 2.91*   HEMOGLOBIN 9.1*   HEMATOCRIT 27.7*   MCV 95.2   MCH 31.3   MCHC 32.9   RDW 45.1   PLATELETCT 199   MPV 9.4     Recent Labs     11/17/23 2220   SODIUM 125*   POTASSIUM 4.5   CHLORIDE 98   CO2 15*   GLUCOSE 188*   BUN 41*   CREATININE 2.33*   CALCIUM 8.4*     Recent Labs     11/17/23 2220   ALTSGPT 9   ASTSGOT 19   ALKPHOSPHAT 54   TBILIRUBIN 0.5   GLUCOSE 188*         Recent Labs     11/17/23 2220   NTPROBNP 3924*         Recent Labs     11/17/23 2220   TROPONINT 50*       Imaging:  CT-ABDOMEN-PELVIS WITH   Final Result      1.  Interval placement of a LEFT ureteral stent with reduced but persistent LEFT hydroureteronephrosis, perinephric fat stranding and delayed LEFT nephrogram suspicious for some degree of ongoing obstructive uropathy and possibly infection   2.  Gas in the LEFT   3.  Findings suspicious for cystitis renal collecting system and urinary bladder to be due to the recent instrumentation or infection.   4.  Atherosclerosis   5.  Colonic diverticulosis      CT-CTA CHEST PULMONARY ARTERY W/ RECONS   Final Result      1.  No central or segmental pulmonary embolus or evidence of other acute intrathoracic pathology   2.  Emphysema   3.  Atherosclerosis            DX-CHEST-PORTABLE (1 VIEW)   Final Result      1.  No acute cardiac or pulmonary abnormalities are identified.   2.  Atherosclerosis          X-Ray:  I have personally reviewed the images and compared with prior images.  EKG:  I have personally reviewed the images and compared with prior images.    Assessment/Plan:  Justification for Admission Status  I anticipate this patient will require at least two midnights for appropriate medical management, necessitating inpatient admission because sepsis secondary to urinary cause, requiring IV antibiotics.  There are some  continuous left-sided hydronephrosis.  Recommend urology consult    Patient will need a Telemetry bed on MEDICAL service .  The need is secondary to sepsis secondary to urinary cause..    * Sepsis (HCC)- (present on admission)  Assessment & Plan  This is Sepsis Present on admission  SIRS criteria identified on my evaluation include: Fever, with temperature greater than 100.9 deg F and Leukocytosis, with WBC greater than 12,000  Clinical indicators of end organ dysfunction include Hypotension with systolic blood pressure less than 90 or MAP less than 65 and Acute Renal Failure, with a finding of creatinine greater than 2 (patient does not have ESRD or CKD  Source is urinary  Sepsis protocol initiated  Crystalloid Fluid Administration: Fluid resuscitation ordered per standard protocol - 30 mL/kg per current or ideal body weight  IV antibiotics as appropriate for source of sepsis  Reassessment: I have reassessed the patient's hemodynamic status    Acute kidney injury superimposed on chronic kidney disease (HCC)- (present on admission)  Assessment & Plan  CT imaging: Interval placement of a LEFT ureteral stent with reduced but persistent LEFT hydroureteronephrosis, perinephric fat stranding and delayed LEFT nephrogram suspicious for some degree of ongoing obstructive uropathy and possibly infection   Patient on a recent hospital admission, where he was seen by urology service back in October 2023.  He had a left ureteroscopy, with a left retrograde pyelogram, and left ureteral stent placement for left-sided hydroureteronephrosis.  According to the patient's daughter, patient was seen by outpatient urology services on Monday where he had second cystoscopy as well.    At baseline, patient does have CKD stage IIIa.  Avoid nephrotoxic medication  Renally dose all medications  Continue with maintenance IV fluids.    Recommend day team consult urology services.      Hyponatremia  Assessment & Plan  Likely secondary to poor  p.o. intake.  Continue with normal saline    Hypotension  Assessment & Plan  Secondary to sepsis.  Resolved after aggressive IV fluids.  Continue with IV fluids.    Non-insulin dependent type 2 diabetes mellitus (HCC)- (present on admission)  Assessment & Plan  Insulin sliding scale  Hypoglycemia protocol      Hyperlipidemia- (present on admission)  Assessment & Plan  Continue with Zetia    Benign prostatic hyperplasia- (present on admission)  Assessment & Plan  Continue with tamsulosin        VTE prophylaxis: heparin ppx

## 2023-11-18 NOTE — PROGRESS NOTES
4 Eyes Skin Assessment Completed by JB Garcia and Danika HINOJOSA RN.    Head WDL  Ears WDL  Nose WDL  Mouth WDL  Neck WDL  Breast/Chest WDL  Shoulder Blades WDL  Spine WDL  (R) Arm/Elbow/Hand WDL  (L) Arm/Elbow/Hand WDL  Abdomen WDL  Groin WDL  Scrotum/Coccyx/Buttocks WDL  (R) Leg Bruising  (L) Leg Bruising  (R) Heel/Foot/Toe WDL  (L) Heel/Foot/Toe WDL          Devices In Places Tele Box, Blood Pressure Cuff, Pulse Ox, and Hedrick      Interventions In Place Gray Ear Foams and Waffle Overlay    Possible Skin Injury No    Pictures Uploaded Into Epic N/A  Wound Consult Placed N/A  RN Wound Prevention Protocol Ordered No

## 2023-11-18 NOTE — PROGRESS NOTES
Hospital Medicine Daily Progress Note    Date of Service  11/18/2023    Chief Complaint  weakness    Hospital Course  Star Centeno is a 82 y.o. male with a history of prostate cancer, hyperlipidemia, aortic aneurysm, hx of PE.  He was admitted 11/17/2023 with weakness and difficulty ambulating. Significantly he had a recent hospital admit in October 2023. He had a left ureteroscopy, left retrograde pyelogram, left ureteral stent placement with bladder biopsy for left-sided hydroureteronephrosis.  He did have a follow-up visit with outpatient urology on Monday, where he had a second cystoscopy as well.     In ER had fever of 100.8, wbc:17.3, hypotensive with systolic in 60's, Na:125, BNP:3924 and Cr:2.33. UA: Many bacteria    He was admitted for Sepsis.    Interval Problem Update  11/18: lethargic. On pressor support with levophed with titration to assist in his septic shock.  I have met with the patient and her daughter at bedside.  Updates given.  I have called [facility] to give update.  He has followed with Dr Escalona at [facility].  Patient awake and oriented with clear speech.  Had fever and chills last night but better this am.    Critical Care time:35 minutes.  Patient in septic shock with need of levophed titration, fluids and antibiotics.    I have discussed this patient's plan of care and discharge plan at IDT rounds today with Case Management, Nursing, Nursing leadership, and other members of the IDT team.      Code Status  Full Code    Disposition  The patient is not medically cleared for discharge to home or a post-acute facility.  Anticipate discharge to: home with close outpatient follow-up    I have placed the appropriate orders for post-discharge needs.    Review of Systems  Review of Systems   Constitutional:  Negative for chills, fever and malaise/fatigue.   Eyes:  Negative for discharge.   Respiratory:  Negative for cough, shortness of breath and stridor.    Cardiovascular:  Negative for chest  pain, palpitations and leg swelling.   Gastrointestinal:  Negative for abdominal pain, diarrhea and nausea.   Genitourinary:  Negative for dysuria and hematuria.   Musculoskeletal:  Negative for back pain and joint pain.   Skin:  Negative for rash.   Neurological:  Negative for dizziness, speech change and headaches.   Psychiatric/Behavioral:  The patient is not nervous/anxious.         Physical Exam  Temp:  [36.2 °C (97.1 °F)-38.1 °C (100.5 °F)] 36.2 °C (97.1 °F)  Pulse:  [65-92] 78  Resp:  [15-43] 24  BP: ()/(45-62) 109/58  SpO2:  [77 %-99 %] 95 %    Physical Exam  Vitals reviewed.   Constitutional:       Appearance: He is ill-appearing. He is not diaphoretic.   HENT:      Head: Normocephalic and atraumatic.      Nose: Nose normal.      Mouth/Throat:      Mouth: Mucous membranes are moist.      Pharynx: No oropharyngeal exudate.   Eyes:      General: No scleral icterus.        Right eye: No discharge.         Left eye: No discharge.      Extraocular Movements: Extraocular movements intact.      Conjunctiva/sclera: Conjunctivae normal.   Cardiovascular:      Rate and Rhythm: Normal rate and regular rhythm.      Pulses:           Radial pulses are 2+ on the right side and 2+ on the left side.        Dorsalis pedis pulses are 2+ on the right side and 2+ on the left side.      Heart sounds: No murmur heard.  Pulmonary:      Effort: Pulmonary effort is normal. No respiratory distress.      Breath sounds: Normal breath sounds. No wheezing or rales.   Abdominal:      General: Bowel sounds are normal. There is no distension.      Palpations: Abdomen is soft.      Tenderness: There is no abdominal tenderness.   Musculoskeletal:         General: No swelling or tenderness.      Cervical back: Neck supple. No muscular tenderness.      Right lower leg: No edema.      Left lower leg: No edema.   Lymphadenopathy:      Cervical: No cervical adenopathy.   Skin:     Coloration: Skin is pale. Skin is not jaundiced.    Neurological:      General: No focal deficit present.      Mental Status: He is alert and oriented to person, place, and time. Mental status is at baseline.      Cranial Nerves: No cranial nerve deficit.   Psychiatric:         Mood and Affect: Mood normal.         Behavior: Behavior normal.         Fluids    Intake/Output Summary (Last 24 hours) at 11/18/2023 1304  Last data filed at 11/18/2023 0800  Gross per 24 hour   Intake 584.35 ml   Output 1275 ml   Net -690.65 ml       Laboratory  Recent Labs     11/17/23 2220 11/18/23  1205   WBC 17.3* 12.9*   RBC 2.91* 2.79*   HEMOGLOBIN 9.1* 8.8*   HEMATOCRIT 27.7* 27.1*   MCV 95.2 97.1   MCH 31.3 31.5   MCHC 32.9 32.5   RDW 45.1 46.8   PLATELETCT 199 167   MPV 9.4 9.4     Recent Labs     11/17/23 2220 11/18/23  1205   SODIUM 125* 130*   POTASSIUM 4.5 4.0   CHLORIDE 98 101   CO2 15* 16*   GLUCOSE 188* 140*   BUN 41* 34*   CREATININE 2.33* 1.81*   CALCIUM 8.4* 8.3*                   Imaging  CT-ABDOMEN-PELVIS WITH   Final Result      1.  Interval placement of a LEFT ureteral stent with reduced but persistent LEFT hydroureteronephrosis, perinephric fat stranding and delayed LEFT nephrogram suspicious for some degree of ongoing obstructive uropathy and possibly infection   2.  Gas in the LEFT   3.  Findings suspicious for cystitis renal collecting system and urinary bladder to be due to the recent instrumentation or infection.   4.  Atherosclerosis   5.  Colonic diverticulosis      CT-CTA CHEST PULMONARY ARTERY W/ RECONS   Final Result      1.  No central or segmental pulmonary embolus or evidence of other acute intrathoracic pathology   2.  Emphysema   3.  Atherosclerosis            DX-CHEST-PORTABLE (1 VIEW)   Final Result      1.  No acute cardiac or pulmonary abnormalities are identified.   2.  Atherosclerosis           Assessment/Plan  * Sepsis (HCC)- (present on admission)  Assessment & Plan  Likely urological source after recent cystoscopy and history of ureteral  stent in October.  UA positive for UTI  I have given a courtesy call to [facility] to alert them of patient's admit  Monitoring cultures  Shock and giving fluids  Titrating pressor support with levophed with goal MAP>65  Monitor I/O's labs, vitals.  ceftriaxone    Hyponatremia  Assessment & Plan  11/18 Na:130 <125  Concern of volume deficit  IV fluids  Monitor bmp    Hypotension  Assessment & Plan  Sepsis with shock  Titrate levophed  IV fluids  Monitor I/O's  antibiotics    Acute kidney injury superimposed on chronic kidney disease (HCC)- (present on admission)  Assessment & Plan  CT imaging: Interval placement of a LEFT ureteral stent with reduced but persistent LEFT hydroureteronephrosis, perinephric fat stranding and delayed LEFT nephrogram suspicious for some degree of ongoing obstructive uropathy and possibly infection   Patient on a recent hospital admission, where he was seen by urology service back in October 2023.  He had a left ureteroscopy, with a left retrograde pyelogram, and left ureteral stent placement for left-sided hydroureteronephrosis.  According to the patient's daughter, patient was seen by outpatient urology services on Monday where he had second cystoscopy as well.    At baseline, patient does have CKD stage IIIa.  Avoid nephrotoxic medication  Renally dose all medications  Continue with maintenance IV fluids.  11/18 Cr:1.81 <2.33    Recommend day team consult urology services.      Non-insulin dependent type 2 diabetes mellitus (HCC)- (present on admission)  Assessment & Plan  Monitor accuchecks and cover with SSI  Diabetic diet  Hypoglycemic protocol  Holding outpatient empagliflozen and metformin  A1c:7.1 in past 2 months    Hyperlipidemia- (present on admission)  Assessment & Plan  Continue with Zetia    Benign prostatic hyperplasia- (present on admission)  Assessment & Plan  Continue with tamsulosin         VTE prophylaxis:    heparin ppx      I have performed a physical exam and  reviewed and updated ROS and Plan today (11/18/2023). In review of yesterday's note (11/17/2023), there are no changes except as documented above.

## 2023-11-18 NOTE — CONSULTS
UROLOGY Consult Note:    CARRILLO Marquez  Date & Time note created:    11/18/2023   2:35 PM       Patient ID:   Name:             Star Centeno   YOB: 1940  Age:                 82 y.o.  male   MRN:               [id number]                                                             Reason for Consult:      urosepsis    History of Present Illness:    This is a 82 year old male who is known to [facility] [place] as he has a history of prostate cancer treated with XRT/brachytherapy, and is now s/p cystoscopy, left retrograde pyelogram and ureteroscopy, with bladder bx and left ureteral stent placement 10/25/2023 for hydronephrosis, and is also s/p outpatient cystoscopy 11/13/2023.   He was admitted with weakness, and was found to have a fever, WBC 17.3, and was hypotensive.  He has received pressors this morning, and has also been started on antibiotics. A CT scan was obtained and shows the left ureteral stent is in proper position, however, there is also a findings of small amount of air in the left renal pelvis.  At the time of the consult, he reports chills, feeling febrile. Denies chest pain, shortness of breath.  States that recently had difficulty voiding. Now has rodarte catheter in place, output clear.  He denies lightheadedness, dizziness. Denies flank pain, abdominal pain.    Review of Systems:      Constitutional: See HPI  Eyes: Denies changes in vision, no eye pain  Ears/Nose/Throat/Mouth: Denies nasal congestion or sore throat   Cardiovascular: no chest pain, no palpitations   Respiratory: no shortness of breath , Denies cough  Gastrointestinal/Hepatic: See HPI  Genitourinary: See HPI  Musculoskeletal/Rheum: Denies  joint pain and swelling, no edema  Skin: Denies rash  Neurological: Denies headache, confusion, memory loss or focal weakness/parasthesias  Psychiatric: denies mood disorder   Endocrine: Verna thyroid problems  Heme/Oncology/Lymph Nodes: Denies enlarged lymph nodes, denies  brusing or known bleeding disorder  All other systems were reviewed and are negative (AMA/CMS criteria)                Past Medical History:   Past Medical History:   Diagnosis Date    Cancer (HCC)     Cataract     Hyperlipidemia     Hypertension     Pulmonary emboli (HCC)      Active Hospital Problems    Diagnosis     Sepsis (HCC) [A41.9]     Hypotension [I95.9]     Hyponatremia [E87.1]     Acute kidney injury superimposed on chronic kidney disease (HCC) [N17.9, N18.9]     Benign prostatic hyperplasia [N40.0]     Hyperlipidemia [E78.5]     Non-insulin dependent type 2 diabetes mellitus (HCC) [E11.9]        Past Surgical History:  Past Surgical History:   Procedure Laterality Date    WA CYSTOURETHROSCOPY,BIOPSIES N/A 10/24/2023    Procedure: CYSTOSCOPY, WITH BLADDER BIOPSY, LEFT RETROGRADE PYELOGRAM;  Surgeon: Jourdan Londono M.D.;  Location: SURGERY [facility];  Service: Urology    WA CYSTO/URETERO/PYELOSCOPY, DX Left 10/24/2023    Procedure: URETEROSCOPY, LEFT;  Surgeon: Jourdan Londono M.D.;  Location: SURGERY [facility];  Service: Urology    WA CYSTOSCOPY,INSERT URETERAL STENT Left 10/24/2023    Procedure: CYSTOSCOPY, WITH URETERAL STENT INSERTION;  Surgeon: Jourdan Londono M.D.;  Location: SURGERY [facility];  Service: Urology    EYE SURGERY      PROSTATECTOMY, RADICAL RETRO         Hospital Medications:    Current Facility-Administered Medications:     acetaminophen (Tylenol) tablet 500 mg, 500 mg, Oral, Q6HRS PRN, Corey Felipe M.D.    aspirin EC tablet 81 mg, 81 mg, Oral, DAILY, Corey Felipe M.D., 81 mg at 11/18/23 0556    ezetimibe (Zetia) tablet 10 mg, 10 mg, Oral, Q EVENING, Corey Felipe M.D.    rosuvastatin (Crestor) tablet 5 mg, 5 mg, Oral, DAILY, Corey Felipe M.D., 5 mg at 11/18/23 0555    tamsulosin (Flomax) capsule 0.4 mg, 0.4 mg, Oral, AFTER BREAKFAST, Corey Felipe M.D., 0.4 mg at 11/18/23 0742    senna-docusate (Pericolace Or Senokot S) 8.6-50 MG per tablet 2  Tablet, 2 Tablet, Oral, BID, 2 Tablet at 11/18/23 0555 **AND** polyethylene glycol/lytes (Miralax) PACKET 1 Packet, 1 Packet, Oral, QDAY PRN **AND** magnesium hydroxide (Milk Of Magnesia) suspension 30 mL, 30 mL, Oral, QDAY PRN **AND** bisacodyl (Dulcolax) suppository 10 mg, 10 mg, Rectal, QDAY PRN, Corey Felipe M.D.    NS infusion, , Intravenous, Continuous, Corey Felipe M.D., Last Rate: 100 mL/hr at 11/18/23 0400, Rate Verify at 11/18/23 0400    heparin injection 5,000 Units, 5,000 Units, Subcutaneous, Q8HRS, Corey Felipe M.D., 5,000 Units at 11/18/23 0556    insulin regular (HumuLIN R,NovoLIN R) injection, 1-6 Units, Subcutaneous, 4X/DAY ACHS, 1 Units at 11/18/23 1216 **AND** POC blood glucose manual result, , , Q AC AND BEDTIME(S) **AND** NOTIFY MD and PharmD, , , Once **AND** Administer 20 grams of glucose (approximately 8 ounces of fruit juice) every 15 minutes PRN FSBG less than 70 mg/dL, , , PRN **AND** dextrose 10 % BOLUS 25 g, 25 g, Intravenous, Q15 MIN PRN, Corey Felipe M.D.    cefTRIAXone (Rocephin) syringe 2,000 mg, 2,000 mg, Intravenous, Q EVENING, Corey Felipe M.D.    norepinephrine (Levophed) 8 mg in 250 mL NS infusion (premix), 0-1 mcg/kg/min (Ideal), Intravenous, Continuous, Corey Felipe M.D., Stopped at 11/18/23 1132    Current Outpatient Medications:  Medications Prior to Admission   Medication Sig Dispense Refill Last Dose    cephALEXin (KEFLEX) 500 MG Cap Take 500 mg by mouth 4 times a day. 4 day course started 10/25/23   10/29/2023 at COMPLETED    metoprolol SR (TOPROL XL) 50 MG TABLET SR 24 HR Take 50 mg by mouth 2 times a day.   11/17/2023 at AM    lisinopril (PRINIVIL) 10 MG Tab Take 1 Tablet by mouth every day. 90 Tablet 3 UNK at UNK    bismuth subsalicylate (PEPTO-BISMOL) 262 MG/15ML Suspension Take 30 mL by mouth every 6 hours as needed (diarrhea). Indications: Diarrhea, Heartburn   UNK at PRN    Loperamide HCl (IMODIUM A-D) 1 MG/7.5ML Liquid Take 30 mL by mouth 2 times  a day as needed (diarrhea). Indications: Diarrhea   UNK at PRN    Melatonin 10 MG Cap Take 10 mg by mouth at bedtime as needed (sleep). Indications: sleep   UNK at PRN    tamsulosin (FLOMAX) 0.4 MG capsule Take 1 Capsule by mouth 1/2 hour after breakfast. 30 Capsule 1 2023 at AM    acetaminophen (TYLENOL) 500 MG Tab Take 1 Tablet by mouth every 6 hours as needed for Mild Pain or Moderate Pain.   UNK at PRN    diclofenac sodium (VOLTAREN) 1 % Gel Apply 2 g topically 4 times a day as needed (Shoulder Pain). Indications: Joint Damage causing Pain and Loss of Function   UNK at PRN    rosuvastatin (CRESTOR) 5 MG Tab TAKE 1 TABLET BY MOUTH EVERY  Tablet 3 2023 at PM    Empagliflozin 25 MG Tab Take 25 mg by mouth every day for 360 days. 100 Tablet 3 2023 at AM    ezetimibe (ZETIA) 10 MG Tab Take 1 Tablet by mouth every day. 100 Tablet 0 2023 at PM    metFORMIN (GLUCOPHAGE) 500 MG Tab Take 1 Tablet by mouth 2 times a day with meals for 360 days. 200 Tablet 1 2023 at AM    Coenzyme Q10 (COQ-10) 30 MG Cap Take 30 mg by mouth every evening. Indications: heart health   2023 at PM    Omega-3 Fatty Acids (FISH OIL) 1200 MG Cap Take 1,200 mg by mouth every morning. Indications: heart health   2023 at AM    aspirin 81 MG EC tablet Take 81 mg by mouth every evening. Indications: blood thinner   2023 at AM    Prenatal Multivit-Min-Fe-FA (PRE-COEL FORMULA PO) Take 1 Tablet by mouth every morning. Indications: supplement   2023 at AM       Medication Allergy:  No Known Allergies    Family History:  Family History   Problem Relation Age of Onset    Genetic Disorder Brother     Diabetes Brother     Hypertension Brother     Hyperlipidemia Brother     Genetic Disorder Sister     Diabetes Sister     Genetic Disorder Sister        Social History:  Social History     Socioeconomic History    Marital status:      Spouse name: Not on file    Number of children: Not on file     Years of education: Not on file    Highest education level: Associate degree: academic program   Occupational History    Not on file   Tobacco Use    Smoking status: Former     Current packs/day: 0.00     Average packs/day: 1 pack/day for 45.0 years (45.0 ttl pk-yrs)     Types: Cigarettes     Start date: 1956     Quit date: 2001     Years since quittin.8    Smokeless tobacco: Never   Vaping Use    Vaping Use: Never used   Substance and Sexual Activity    Alcohol use: Yes     Alcohol/week: 8.4 oz     Types: 14 Glasses of wine per week     Comment: 2 glasses of wine daily    Drug use: Never    Sexual activity: Not Currently     Partners: Female     Birth control/protection: Condom   Other Topics Concern    Not on file   Social History Narrative    Not on file     Social Determinants of Health     Financial Resource Strain: Low Risk  (2022)    Overall Financial Resource Strain (CARDIA)     Difficulty of Paying Living Expenses: Not hard at all   Food Insecurity: No Food Insecurity (2022)    Hunger Vital Sign     Worried About Running Out of Food in the Last Year: Never true     Ran Out of Food in the Last Year: Never true   Transportation Needs: No Transportation Needs (2022)    PRAPARE - Transportation     Lack of Transportation (Medical): No     Lack of Transportation (Non-Medical): No   Physical Activity: Inactive (2022)    Exercise Vital Sign     Days of Exercise per Week: 0 days     Minutes of Exercise per Session: 0 min   Stress: Stress Concern Present (2022)    [employer] - Occupational Stress Questionnaire     Feeling of Stress : To some extent   Social Connections: Feeling Socially Isolated (2023)    OASIS : Social Isolation     Frequency of experiencing loneliness or isolation: Often   Intimate Partner Violence: Not on file   Housing Stability: Low Risk  (2022)    Housing Stability Vital Sign     Unable to Pay for Housing in the  "Last Year: No     Number of Places Lived in the Last Year: 2     Unstable Housing in the Last Year: No         Physical Exam:  Vitals/ General Appearance:   Weight/BMI: Body mass index is 26.26 kg/m².  /60   Pulse 70   Temp 36.2 °C (97.1 °F) (Temporal)   Resp 18   Ht 1.803 m (5' 11\")   Wt 85.4 kg (188 lb 4.4 oz)   SpO2 89%   Vitals:    23 1100 23 1115 23 1130 23 1400   BP: 100/56 109/58  122/60   Pulse: 73 71 78 70   Resp: (!) 24 (!) 22 (!) 24 18   Temp:       TempSrc:       SpO2: 95% 95% 95% 89%   Weight:       Height:         Oxygen Therapy:  Pulse Oximetry: 89 %, O2 (LPM): 2, O2 Delivery Device: Nasal Cannula    Constitutional:  No acute distress  HENMT:  Normocephalic, Atraumatic  Eyes:  EOMI  Lungs:  Normal respiratory effort.   Abdomen: Soft, No tenderness, No guarding, No rebound tenderness  : No CVAT  Skin: Warm, Dry, No erythema  Neurologic: Alert & oriented x 3  Psychiatric: Affect normal, Judgment normal, Mood normal.      MDM (Data Review):     Records reviewed and summarized in current documentation    Lab Data Review:  Recent Results (from the past 24 hour(s))   EKG    Collection Time: 23 10:19 PM   Result Value Ref Range    Report       [facility]    Test Date:  2023  Pt Name:    SOL HARRY                   Department: ER  MRN:        [id number]                      Room:       [facility]  Gender:     Male                         Technician: [id number]  :        [id number]                   Requested By:ER TRIAGE PROTOCOL  Order #:    [id number]                    Reading MD: Jourdan Payton    Measurements  Intervals                                Axis  Rate:       78                           P:          113  DC:         243                          QRS:        43  QRSD:       90                           T:          8  QT:         362  QTc:        413    Interpretive Statements  Sinus rhythm  Prolonged DC " interval  Compared to ECG 09/20/2022 15:41:34  Sinus bradycardia no longer present  ST (T wave) deviation no longer present  Electronically Signed On 11- 22:51:40 PST by Jourdan Payton     CBC WITH DIFFERENTIAL    Collection Time: 11/17/23 10:20 PM   Result Value Ref Range    WBC 17.3 (H) 4.8 - 10.8 K/uL    RBC 2.91 (L) 4.70 - 6.10 M/uL    Hemoglobin 9.1 (L) 14.0 - 18.0 g/dL    Hematocrit 27.7 (L) 42.0 - 52.0 %    MCV 95.2 81.4 - 97.8 fL    MCH 31.3 27.0 - 33.0 pg    MCHC 32.9 32.3 - 36.5 g/dL    RDW 45.1 35.9 - 50.0 fL    Platelet Count 199 164 - 446 K/uL    MPV 9.4 9.0 - 12.9 fL    Neutrophils-Polys 86.40 (H) 44.00 - 72.00 %    Lymphocytes 5.40 (L) 22.00 - 41.00 %    Monocytes 7.40 0.00 - 13.40 %    Eosinophils 0.00 0.00 - 6.90 %    Basophils 0.10 0.00 - 1.80 %    Immature Granulocytes 0.70 0.00 - 0.90 %    Nucleated RBC 0.00 0.00 - 0.20 /100 WBC    Neutrophils (Absolute) 14.96 (H) 1.82 - 7.42 K/uL    Lymphs (Absolute) 0.94 (L) 1.00 - 4.80 K/uL    Monos (Absolute) 1.29 (H) 0.00 - 0.85 K/uL    Eos (Absolute) 0.00 0.00 - 0.51 K/uL    Baso (Absolute) 0.02 0.00 - 0.12 K/uL    Immature Granulocytes (abs) 0.12 (H) 0.00 - 0.11 K/uL    NRBC (Absolute) 0.00 K/uL   COMP METABOLIC PANEL    Collection Time: 11/17/23 10:20 PM   Result Value Ref Range    Sodium 125 (L) 135 - 145 mmol/L    Potassium 4.5 3.6 - 5.5 mmol/L    Chloride 98 96 - 112 mmol/L    Co2 15 (L) 20 - 33 mmol/L    Anion Gap 12.0 7.0 - 16.0    Glucose 188 (H) 65 - 99 mg/dL    Bun 41 (H) 8 - 22 mg/dL    Creatinine 2.33 (H) 0.50 - 1.40 mg/dL    Calcium 8.4 (L) 8.5 - 10.5 mg/dL    Correct Calcium 9.0 8.5 - 10.5 mg/dL    AST(SGOT) 19 12 - 45 U/L    ALT(SGPT) 9 2 - 50 U/L    Alkaline Phosphatase 54 30 - 99 U/L    Total Bilirubin 0.5 0.1 - 1.5 mg/dL    Albumin 3.3 3.2 - 4.9 g/dL    Total Protein 6.3 6.0 - 8.2 g/dL    Globulin 3.0 1.9 - 3.5 g/dL    A-G Ratio 1.1 g/dL   Magnesium    Collection Time: 11/17/23 10:20 PM   Result Value Ref Range    Magnesium 2.0  1.5 - 2.5 mg/dL   Phosphorus    Collection Time: 11/17/23 10:20 PM   Result Value Ref Range    Phosphorus 2.5 2.5 - 4.5 mg/dL   Troponin    Collection Time: 11/17/23 10:20 PM   Result Value Ref Range    Troponin T 50 (H) 6 - 19 ng/L   proBrain Natriuretic Peptide, NT    Collection Time: 11/17/23 10:20 PM   Result Value Ref Range    NT-proBNP 3924 (H) 0 - 125 pg/mL   PROCALCITONIN    Collection Time: 11/17/23 10:20 PM   Result Value Ref Range    Procalcitonin 4.81 (H) <0.25 ng/mL   ESTIMATED GFR    Collection Time: 11/17/23 10:20 PM   Result Value Ref Range    GFR (CKD-EPI) 27 (A) >60 mL/min/1.73 m 2   LACTIC ACID    Collection Time: 11/17/23 10:32 PM   Result Value Ref Range    Lactic Acid 1.5 0.5 - 2.0 mmol/L   BLOOD CULTURE    Collection Time: 11/17/23 10:32 PM    Specimen: Peripheral; Blood   Result Value Ref Range    Significant Indicator NEG     Source BLD     Site PERIPHERAL     Culture Result       No Growth  Note: Blood cultures are incubated for 5 days and  are monitored continuously.Positive blood cultures  are called to the RN and reported as soon as  they are identified.     POC CoV-2, FLU A/B, RSV by PCR    Collection Time: 11/17/23 10:38 PM   Result Value Ref Range    POC Influenza A RNA, PCR Negative Negative    POC Influenza B RNA, PCR Negative Negative    POC RSV, by PCR Negative Negative    POC SARS-CoV-2, PCR NotDetected    URINALYSIS    Collection Time: 11/17/23 11:15 PM    Specimen: Urine   Result Value Ref Range    Color Yellow     Character Cloudy (A)     Specific Gravity 1.017 <1.035    Ph 5.0 5.0 - 8.0    Glucose >=1000 (A) Negative mg/dL    Ketones Negative Negative mg/dL    Protein 100 (A) Negative mg/dL    Bilirubin Negative Negative    Urobilinogen, Urine 0.2 Negative    Nitrite Negative Negative    Leukocyte Esterase Moderate (A) Negative    Occult Blood Moderate (A) Negative    Micro Urine Req Microscopic    URINE MICROSCOPIC (W/UA)    Collection Time: 11/17/23 11:15 PM   Result Value  Ref Range    WBC  (A) /hpf    RBC 10-20 (A) /hpf    Bacteria Many (A) None /hpf    Epithelial Cells Negative /hpf    Hyaline Cast 0-2 /lpf   LACTIC ACID    Collection Time: 11/18/23  1:48 AM   Result Value Ref Range    Lactic Acid 0.3 (L) 0.5 - 2.0 mmol/L   POCT glucose device results    Collection Time: 11/18/23  7:45 AM   Result Value Ref Range    POC Glucose, Blood 150 (H) 65 - 99 mg/dL   POCT glucose device results    Collection Time: 11/18/23 11:53 AM   Result Value Ref Range    POC Glucose, Blood 154 (H) 65 - 99 mg/dL   CBC WITH DIFFERENTIAL    Collection Time: 11/18/23 12:05 PM   Result Value Ref Range    WBC 12.9 (H) 4.8 - 10.8 K/uL    RBC 2.79 (L) 4.70 - 6.10 M/uL    Hemoglobin 8.8 (L) 14.0 - 18.0 g/dL    Hematocrit 27.1 (L) 42.0 - 52.0 %    MCV 97.1 81.4 - 97.8 fL    MCH 31.5 27.0 - 33.0 pg    MCHC 32.5 32.3 - 36.5 g/dL    RDW 46.8 35.9 - 50.0 fL    Platelet Count 167 164 - 446 K/uL    MPV 9.4 9.0 - 12.9 fL    Neutrophils-Polys 87.40 (H) 44.00 - 72.00 %    Lymphocytes 5.70 (L) 22.00 - 41.00 %    Monocytes 6.00 0.00 - 13.40 %    Eosinophils 0.10 0.00 - 6.90 %    Basophils 0.20 0.00 - 1.80 %    Immature Granulocytes 0.60 0.00 - 0.90 %    Nucleated RBC 0.00 0.00 - 0.20 /100 WBC    Neutrophils (Absolute) 11.30 (H) 1.82 - 7.42 K/uL    Lymphs (Absolute) 0.74 (L) 1.00 - 4.80 K/uL    Monos (Absolute) 0.77 0.00 - 0.85 K/uL    Eos (Absolute) 0.01 0.00 - 0.51 K/uL    Baso (Absolute) 0.02 0.00 - 0.12 K/uL    Immature Granulocytes (abs) 0.08 0.00 - 0.11 K/uL    NRBC (Absolute) 0.00 K/uL   Comp Metabolic Panel    Collection Time: 11/18/23 12:05 PM   Result Value Ref Range    Sodium 130 (L) 135 - 145 mmol/L    Potassium 4.0 3.6 - 5.5 mmol/L    Chloride 101 96 - 112 mmol/L    Co2 16 (L) 20 - 33 mmol/L    Anion Gap 13.0 7.0 - 16.0    Glucose 140 (H) 65 - 99 mg/dL    Bun 34 (H) 8 - 22 mg/dL    Creatinine 1.81 (H) 0.50 - 1.40 mg/dL    Calcium 8.3 (L) 8.5 - 10.5 mg/dL    Correct Calcium 9.2 8.5 - 10.5 mg/dL    AST(SGOT)  18 12 - 45 U/L    ALT(SGPT) 13 2 - 50 U/L    Alkaline Phosphatase 50 30 - 99 U/L    Total Bilirubin 0.3 0.1 - 1.5 mg/dL    Albumin 2.9 (L) 3.2 - 4.9 g/dL    Total Protein 5.6 (L) 6.0 - 8.2 g/dL    Globulin 2.7 1.9 - 3.5 g/dL    A-G Ratio 1.1 g/dL   ESTIMATED GFR    Collection Time: 11/18/23 12:05 PM   Result Value Ref Range    GFR (CKD-EPI) 36 (A) >60 mL/min/1.73 m 2       Imaging/Procedures Review:    Reviewed    MDM (Assessment and Plan):     Active Hospital Problems    Diagnosis     Sepsis (HCC) [A41.9]     Hypotension [I95.9]     Hyponatremia [E87.1]     Acute kidney injury superimposed on chronic kidney disease (HCC) [N17.9, N18.9]     Benign prostatic hyperplasia [N40.0]     Hyperlipidemia [E78.5]     Non-insulin dependent type 2 diabetes mellitus (HCC) [E11.9]      82 year old male who is known to [facility] [place] as he has a history of prostate cancer treated with XRT/brachytherapy, and is s/p cystoscopy, left retrograde pyelogram and ureteroscopy, with bladder bx and left ureteral stent placement 10/25/2023 for hydronephrosis, and is also s/p outpatient cystoscopy 11/13/2023.  Now admitted with urosepsis.  Case and images reviewed with Dr. Blake.  The stent appears to be in proper position, but there is a finding of small amount of air in the left renal pelvis.  Urology recommends to continue antibiotics and supportive care.  Continue rodarte catheter and trend urinary output.  Urology will continue follow.    Dr. Blake is aware of this consult and the direction of the plan of care.

## 2023-11-18 NOTE — PROGRESS NOTES
Rapid was called on patient due to concerns with systolic blood pressure of 89 and MAP of 60.  I arrived at bedside, and patient had a blood pressure of approximately 98/61.  Patient was asymptomatic.  Otherwise vital signs stable.  He had some mild abdominal distention secondary to urinary retention.  Instructed nursing staff to insert a Hedrick catheter for bladder scan of 500 cc  Recommend continuing maintenance IV fluids at 100 cc/h.

## 2023-11-18 NOTE — ED PROVIDER NOTES
"ED Provider Note    Scribed for Jourdan Payton by Juanis Springer. 11/17/2023  10:25 PM    Primary care provider: Nirmal Gentile M.D.  Means of arrival: EMS  History obtained from: Patient  History limited by: None    CHIEF COMPLAINT  Chief Complaint   Patient presents with    Hypotension     EMS arrival 70s/40s, on arrival to [facility] 92/58. + response to fluid.     Weakness     X24 hours, denies falls however pt states he slipped on carpet last night and caught himself.     EXTERNAL RECORDS REVIEWED  Inpatient Notes The patient was admitted for 7 days to the hospital with gross hematuria blocking his urinary catheter.     HPI/ROS  LIMITATION TO HISTORY   Select: : None  OUTSIDE HISTORIAN(S):  Family Daughter at bedside to confirm sequence of events and collateral information provided.     HPI  Star Centeno is a 82 y.o. male who has history of prostate cancer, hyperlipidemia, aortic aneurysm and pulmonary embolism presents to the Emergency Department via EMS for evaluation of generalized weakness onset 1 day ago. The patient's daughter notes that the patient lives alone, but has recently been staying with them after being hospitalized. The patient is having a physician assistant visiting his home and following up with his care. His daughter reports that the patient was found with a blood clot in September. Further into September, the patient's abdominal pain started and continued to go \"downhill from there.\" She notes that he was taken off his anticoagulants after all this. The patient's daughter reports that the patient's weakness started yesterday and this resulted in the patient having a fall yesterday. The patient's daughter noticed that the patient had continued weakness and decided to call his team, but was unable to get a response. The patient's physician assistant came by today and the patient's blood pressure was 70s/40s. He was treated with 700 mL NS and 100 mL LR from [facility] " "prior to arrival.The patient denies any nausea, vomiting, rashes, back pain, chest pain, abdominal pain, shortness of breath, pain with urination, cough or fever.  The patient is on Flomax.     REVIEW OF SYSTEMS  As above, all other systems reviewed and are negative.   See HPI for further details.     PAST MEDICAL HISTORY   has a past medical history of Cancer (HCC), Cataract, Hyperlipidemia, Hypertension, and Pulmonary emboli (HCC).    SURGICAL HISTORY   has a past surgical history that includes eye surgery; prostatectomy, radical retro; cystourethroscopy,biopsies (N/A, 10/24/2023); cysto/uretero/pyeloscopy, dx (Left, 10/24/2023); and cystoscopy,insert ureteral stent (Left, 10/24/2023).    SOCIAL HISTORY  Social History     Tobacco Use    Smoking status: Former     Current packs/day: 0.00     Average packs/day: 1 pack/day for 45.0 years (45.0 ttl pk-yrs)     Types: Cigarettes     Start date: 1956     Quit date: 2001     Years since quittin.8    Smokeless tobacco: Never   Vaping Use    Vaping Use: Never used   Substance Use Topics    Alcohol use: Yes     Alcohol/week: 8.4 oz     Types: 14 Glasses of wine per week     Comment: 2 glasses of wine daily    Drug use: Never      Social History     Substance and Sexual Activity   Drug Use Never     FAMILY HISTORY  Family History   Problem Relation Age of Onset    Genetic Disorder Brother     Diabetes Brother     Hypertension Brother     Hyperlipidemia Brother     Genetic Disorder Sister     Diabetes Sister     Genetic Disorder Sister      CURRENT MEDICATIONS  Home Medications    **Home medications have not yet been reviewed for this encounter**       ALLERGIES  No Known Allergies    PHYSICAL EXAM    VITAL SIGNS:   Vitals:    23 2213 23 2300   BP: (!) 81/52 106/52   Pulse: 92 78   Resp: (!) 22 (!) 22   Temp: (!) 38.1 °C (100.5 °F)    TempSrc: Oral    SpO2: 91% 93%   Weight: 88.5 kg (195 lb)    Height: 1.803 m (5' 11\")      Vitals: My interpretation: " hypotensive, not tachycardic, febrile, not hypoxic    Reinterpretation of vitals: Improving with IV fluids    Cardiac Monitor Interpretation: The cardiac monitor revealed normal Sinus Rhythm as interpreted by me. The cardiac monitor was ordered secondary to the patient's history of hypotensive septic shock and to monitor for dysrhythmia and/or tachycardia.    PE:   Gen: sitting comfortably, speaking clearly, appears in no acute distress   ENT: Mucous membranes moist, posterior pharynx clear, uvula midline, nares patent bilaterally   Neck: Supple, FROM  Pulmonary: Lungs are clear to auscultation bilaterally. No tachypnea  CV:  RRR, no murmur appreciated, pulses 2+ in both upper and lower extremities  Abdomen: soft, NT/ND; no rebound/guarding  : no CVA or suprapubic tenderness   Neuro: A&Ox4 (person, place, time, situation), speech fluent, gait steady, no focal deficits appreciated  Skin: No rash or lesions.  No pallor or jaundice.  No cyanosis.  Warm and dry.     DIAGNOSTIC STUDIES / PROCEDURES    LABS  Results for orders placed or performed during the hospital encounter of 11/17/23   LACTIC ACID   Result Value Ref Range    Lactic Acid 1.5 0.5 - 2.0 mmol/L   CBC WITH DIFFERENTIAL   Result Value Ref Range    WBC 17.3 (H) 4.8 - 10.8 K/uL    RBC 2.91 (L) 4.70 - 6.10 M/uL    Hemoglobin 9.1 (L) 14.0 - 18.0 g/dL    Hematocrit 27.7 (L) 42.0 - 52.0 %    MCV 95.2 81.4 - 97.8 fL    MCH 31.3 27.0 - 33.0 pg    MCHC 32.9 32.3 - 36.5 g/dL    RDW 45.1 35.9 - 50.0 fL    Platelet Count 199 164 - 446 K/uL    MPV 9.4 9.0 - 12.9 fL    Neutrophils-Polys 86.40 (H) 44.00 - 72.00 %    Lymphocytes 5.40 (L) 22.00 - 41.00 %    Monocytes 7.40 0.00 - 13.40 %    Eosinophils 0.00 0.00 - 6.90 %    Basophils 0.10 0.00 - 1.80 %    Immature Granulocytes 0.70 0.00 - 0.90 %    Nucleated RBC 0.00 0.00 - 0.20 /100 WBC    Neutrophils (Absolute) 14.96 (H) 1.82 - 7.42 K/uL    Lymphs (Absolute) 0.94 (L) 1.00 - 4.80 K/uL    Monos (Absolute) 1.29 (H) 0.00 -  0.85 K/uL    Eos (Absolute) 0.00 0.00 - 0.51 K/uL    Baso (Absolute) 0.02 0.00 - 0.12 K/uL    Immature Granulocytes (abs) 0.12 (H) 0.00 - 0.11 K/uL    NRBC (Absolute) 0.00 K/uL   COMP METABOLIC PANEL   Result Value Ref Range    Sodium 125 (L) 135 - 145 mmol/L    Potassium 4.5 3.6 - 5.5 mmol/L    Chloride 98 96 - 112 mmol/L    Co2 15 (L) 20 - 33 mmol/L    Anion Gap 12.0 7.0 - 16.0    Glucose 188 (H) 65 - 99 mg/dL    Bun 41 (H) 8 - 22 mg/dL    Creatinine 2.33 (H) 0.50 - 1.40 mg/dL    Calcium 8.4 (L) 8.5 - 10.5 mg/dL    Correct Calcium 9.0 8.5 - 10.5 mg/dL    AST(SGOT) 19 12 - 45 U/L    ALT(SGPT) 9 2 - 50 U/L    Alkaline Phosphatase 54 30 - 99 U/L    Total Bilirubin 0.5 0.1 - 1.5 mg/dL    Albumin 3.3 3.2 - 4.9 g/dL    Total Protein 6.3 6.0 - 8.2 g/dL    Globulin 3.0 1.9 - 3.5 g/dL    A-G Ratio 1.1 g/dL   URINALYSIS    Specimen: Urine   Result Value Ref Range    Color Yellow     Character Cloudy (A)     Specific Gravity 1.017 <1.035    Ph 5.0 5.0 - 8.0    Glucose >=1000 (A) Negative mg/dL    Ketones Negative Negative mg/dL    Protein 100 (A) Negative mg/dL    Bilirubin Negative Negative    Urobilinogen, Urine 0.2 Negative    Nitrite Negative Negative    Leukocyte Esterase Moderate (A) Negative    Occult Blood Moderate (A) Negative    Micro Urine Req Microscopic    Magnesium   Result Value Ref Range    Magnesium 2.0 1.5 - 2.5 mg/dL   Phosphorus   Result Value Ref Range    Phosphorus 2.5 2.5 - 4.5 mg/dL   Troponin   Result Value Ref Range    Troponin T 50 (H) 6 - 19 ng/L   proBrain Natriuretic Peptide, NT   Result Value Ref Range    NT-proBNP 3924 (H) 0 - 125 pg/mL   PROCALCITONIN   Result Value Ref Range    Procalcitonin 4.81 (H) <0.25 ng/mL   ESTIMATED GFR   Result Value Ref Range    GFR (CKD-EPI) 27 (A) >60 mL/min/1.73 m 2   URINE MICROSCOPIC (W/UA)   Result Value Ref Range    WBC  (A) /hpf    RBC 10-20 (A) /hpf    Bacteria Many (A) None /hpf    Epithelial Cells Negative /hpf    Hyaline Cast 0-2 /lpf   EKG    Result Value Ref Range    Report       [facility] Emergency Dept.    Test Date:  2023  Pt Name:    SOL HARRY                   Department: ER  MRN:        [id number]                      Room:       RD 07  Gender:     Male                         Technician: [id number]  :        [id number]                   Requested By:ER TRIAGE PROTOCOL  Order #:    [id number]                    Reading MD: Jourdan Payton    Measurements  Intervals                                Axis  Rate:       78                           P:          113  NM:         243                          QRS:        43  QRSD:       90                           T:          8  QT:         362  QTc:        413    Interpretive Statements  Sinus rhythm  Prolonged NM interval  Compared to ECG 2022 15:41:34  Sinus bradycardia no longer present  ST (T wave) deviation no longer present  Electronically Signed On 2023 22:51:40 PST by Jourdan Payton     POC CoV-2, FLU A/B, RSV by PCR   Result Value Ref Range    POC Influenza A RNA, PCR Negative Negative    POC Influenza B RNA, PCR Negative Negative    POC RSV, by PCR Negative Negative    POC SARS-CoV-2, PCR NotDetected       All labs reviewed by me. Labs were compared to prior labs if they were available. Significant for normal lactic acid, leukocytosis 17, slightly worsening anemia with hemoglobin of 9 down from a baseline around 10, electrolytes show severe derangements with hyponatremia and decreased bicarbonate to 15, normal glucose, worsening CRISTOPHER on CKD with creatinine of 2.33, normal liver enzymes, normal bilirubin, urinalysis shows obvious infection, magnesium and phosphorus are normal, troponin minimally elevated at 50, likely type II demand ischemia secondary to sepsis, BNP mildly elevated 3000 124, procalcitonin elevated at 4.8, flu, COVID, RSV negative.    RADIOLOGY  I have independently interpreted the diagnostic imaging associated with this visit and am waiting  the final reading from the radiologist.   My preliminary interpretation is a follows: No focal consolidative process appreciated on my independent interpretation  Radiologist interpretation is as follows:  CT-ABDOMEN-PELVIS WITH   Final Result      1.  Interval placement of a LEFT ureteral stent with reduced but persistent LEFT hydroureteronephrosis, perinephric fat stranding and delayed LEFT nephrogram suspicious for some degree of ongoing obstructive uropathy and possibly infection   2.  Gas in the LEFT   3.  Findings suspicious for cystitis renal collecting system and urinary bladder to be due to the recent instrumentation or infection.   4.  Atherosclerosis   5.  Colonic diverticulosis      CT-CTA CHEST PULMONARY ARTERY W/ RECONS   Final Result      1.  No central or segmental pulmonary embolus or evidence of other acute intrathoracic pathology   2.  Emphysema   3.  Atherosclerosis            DX-CHEST-PORTABLE (1 VIEW)   Final Result      1.  No acute cardiac or pulmonary abnormalities are identified.   2.  Atherosclerosis        COURSE & MEDICAL DECISION MAKING  Nursing notes, VS, PMSFHx, labs, imaging, EKG reviewed in chart.    Heart Score: High    Ddx: Sepsis, septic shock, hypotension, flu, COVID, RSV, pneumonia, UTI, intra-abdominal pathology    MDM: 10:25 PM Star Centeno is a 82 y.o. male who presented with acute severe hypotensive septic shock.  Daughter at bedside provides most the collateral formation.  Patient noted to have generalized weakness for the last 2 days, multiple falls at home but no sustaining injuries.  Tonight he was so weak that daughter at bedside called EMS when they arrived blood pressure was 60 systolic and they started an IV and IV fluids and transported the patient here.  Reviewed EMS call sheet which was helpful regarding patient's course of illness.  Patient arrives here in septic shock with blood pressure of 80 systolic, temperature of 100.5 Fahrenheit, not hypoxic.  Started  on 30 cc/kg bolus of IV fluids.  He is actually quite well-appearing on exam and has no complaints other than generalized weakness.  No neck stiffness, rash back pain, no IV drug use, alcohol or tobacco products.  Does have significant medical history and has had multiple UTIs and hematuria in the past requiring admission for three-way Hedrick catheter irrigation.  He denies dysuria or hematuria currently.  He is started on Tylenol for his fever.  Initiated septic work-up here in the ED.  Chest x-ray thankfully shows no focal consolidative process on my independent or potation and radiologist agrees.  EKG with normal sinus rhythm, no ischemic changes or arrhythmia.  Daughter does note that he has had multiple Hedrick catheter changes recently and underwent cystoscopy recently as well and concern for possible urinary source of infection.  All labs reviewed by me. Labs were compared to prior labs if they were available. Significant for normal lactic acid, leukocytosis 17, slightly worsening anemia with hemoglobin of 9 down from a baseline around 10, electrolytes show severe derangements with hyponatremia and decreased bicarbonate to 15, normal glucose, worsening CRISTOPHER on CKD with creatinine of 2.33, normal liver enzymes, normal bilirubin, urinalysis shows obvious infection, magnesium and phosphorus are normal, troponin minimally elevated at 50, likely type II demand ischemia secondary to sepsis, BNP mildly elevated 3000 124, procalcitonin elevated at 4.8, flu, COVID, RSV negative.  Patient started on ceftriaxone IV 1 g for pyelonephritis.  Blood pressure improving with fluids and patient is fluid responsive.  No indication for vasopressor agents at this time.  Patient critically ill and will require admission to the hospitalist team for further evaluation and treatment.  Discussed with patient and his daughter at bedside and they verbalized understanding plan and are amenable.  Patient did undergo CTA PE study as he was  borderline hypoxic around 87% and has a history of PE and is not anticoagulated, also added CT abdomen pelvis to evaluate for mild abdominal discomfort in the setting of acute septic shock.  CTA PE study was negative.  CT abdomen pelvis showed findings consistent with cystitis.  Also noted to have some improved but persistent left hydronephrosis despite stent being in place.  We will have urology follow-up in the morning.    11:29 PM - The patient was reevaluated at bedside. I reviewed results with the patient and his daughter. Informed the patient and his daughter of the new plan of care. They were given the opportunity to ask questions. The patient and his daughter are both agreeable and understanding of the plan of care.    CRITICAL CARE TIME 39 minutes: Acute severe hypotensive septic shock requiring large-volume fluid resuscitation, emergent admission and frequent reevaluations  There was a very real possibility of deterioration of the patient's condition.  This patient required the highest level of care.  I provided critical care services which included: review of the medical record, treatment orders, ordering and reviewing test results, frequent reevaluation of the patient's condition and response to treatment, as well as discussing the case with appropriate personnel and various consultants. The critical care time associated with the care of this patient is exclusive of any procedures or specific interventions.     HYDRATION: Based on the patient's presentation of Acute Vomiting, Dehydration and Inability to take oral fluids the patient was given IV fluids. IV Hydration was used because oral hydration was not adequate alone. Upon recheck following hydration, the patient was improved.       ADDITIONAL PROBLEM LIST AND DISPOSITION    I have discussed management of the patient with the following physicians and CON's: Hospitalist    Discussion of management with other QHP or appropriate source(s): None      Decision tools and prescription drugs considered including, but not limited to: Antibiotics ceftriaxone .    FINAL IMPRESSION  1. Sepsis with acute organ dysfunction and septic shock, due to unspecified organism, unspecified organ dysfunction type (HCC) Acute   2. Anemia, unspecified type Acute   3. Fever, unspecified fever cause Acute   4. Hypotension, unspecified hypotension type Acute   5. CRISTOPHER (acute kidney injury) (HCC) Acute   6. Demand ischemia of myocardium Acute   7. Dehydration Acute   8. Hyponatremia Acute   9. Pyelonephritis Acute      IJuanis (Scribe), am scribing for, and in the presence of, Jourdan Payton.    Electronically signed by: Juanis Springer (Scribe), 11/17/2023    IJourdan personally performed the services described in this documentation, as scribed by Juanis Springer in my presence, and it is both accurate and complete.    The note accurately reflects work and decisions made by me.  Jourdan Payton  11/17/2023  11:07 PM

## 2023-11-18 NOTE — ASSESSMENT & PLAN NOTE
Clinically stabilizing 11/20  CT imaging: Interval placement of a LEFT ureteral stent with reduced but persistent LEFT hydroureteronephrosis, perinephric fat stranding and delayed LEFT nephrogram suspicious for some degree of ongoing obstructive uropathy and possibly infection   Patient on a recent hospital admission, where he was seen by urology service back in October 2023.  He had a left ureteroscopy, with a left retrograde pyelogram, and left ureteral stent placement for left-sided hydroureteronephrosis.  According to the patient's daughter, patient was seen by outpatient urology services on Monday where he had second cystoscopy as well.  Hx of CKD stage IIIa.  Avoid nephrotoxic medication  Renally dose all medications  Decrease IVF's slowly as oral intake improves  CR remains mostly stable 11/20    I personally reviewed the bmp on 11/20

## 2023-11-18 NOTE — ASSESSMENT & PLAN NOTE
Blood sugars are decently controlled 11/20  Monitor accuchecks and cover with SSI  Diabetic diet  Hypoglycemic protocol  Holding outpatient empagliflozen and metformin  A1c:7.1 in past 2 months    I personally reviewed the blood sugars on 11/20

## 2023-11-18 NOTE — PROGRESS NOTES
[facility] Acceptance Note    Called by hospitalist for transfer to IMCU for UTI, recent ureteral stent, sepsis, hypotension.  Patient not in distress, lactic acid low but still borderline BP after 4 L crystalloid.  Appropriate for transfer to IMCU under the care of the hospitalist.  Urology to be consulted, prior culture with pansensitive E. coli on ceftriaxone.

## 2023-11-18 NOTE — ED NOTES
Break RN: MD Felipe notified of continued hypotension- 82/49mmhg. Per MD, give another 1000cc bolus. MD does not want to upgrade pt to IMCU at this time. RN started bolus and sent repeat lactic

## 2023-11-18 NOTE — ASSESSMENT & PLAN NOTE
Had septic shock  2/2 complicated UTI  Initially started on empiric IV CTX but switch to IV unasyn given polymicrobial Ucx  Bcx's are ngtd  Improving  Monitor uop, condom cath in place    I personally reviewed each of the above cultures on 11/20

## 2023-11-18 NOTE — PROGRESS NOTES
4 Eyes Skin Assessment Completed by Candis, JB and JB Garnica.    Head WDL  Ears WDL  Nose WDL  Mouth WDL  Neck WDL  Breast/Chest WDL  Shoulder Blades WDL  Spine WDL  (R) Arm/Elbow/Hand WDL  (L) Arm/Elbow/Hand WDL  Abdomen WDL  Groin WDL  Scrotum/Coccyx/Buttocks WDL  (R) Leg WDL  (L) Leg WDL  (R) Heel/Foot/Toe WDL  (L) Heel/Foot/Toe WDL          Devices In Places ECG, Tele Box, Blood Pressure Cuff, Pulse Ox, Hderick, and Nasal Cannula      Interventions In Place Gray Ear Foams    Possible Skin Injury No    Pictures Uploaded Into Epic N/A  Wound Consult Placed N/A  RN Wound Prevention Protocol Ordered No

## 2023-11-18 NOTE — ED TRIAGE NOTES
Star Centeno  82 y.o. male  Chief Complaint   Patient presents with    Hypotension     EMS arrival 70s/40s, on arrival to [facility] 92/58. + response to fluid.     Weakness     X24 hours, denies falls however pt states he slipped on carpet last night and caught himself.     BIB [facility] from home for above. Pt recently had renal stents placed, is having home PA visiting and following care. When the PA came to pts home today, pt was hypotensive 70s/40s. Pt received 700ml NS from PA and 100ml LR from [facility].     Pt is alert, oriented, and follows commands. Pt speaking in full sentences and responds appropriately to questions. No acute distress noted in triage and respirations are even and unlabored.     Pt to red 07, EKG completed, care assumed, chart up for ERP.

## 2023-11-19 ENCOUNTER — HOME CARE VISIT (OUTPATIENT)
Dept: HOME HEALTH SERVICES | Facility: HOME HEALTHCARE | Age: 83
End: 2023-11-19
Payer: MEDICARE

## 2023-11-19 LAB
ANION GAP SERPL CALC-SCNC: 15 MMOL/L (ref 7–16)
BUN SERPL-MCNC: 30 MG/DL (ref 8–22)
CALCIUM SERPL-MCNC: 8.3 MG/DL (ref 8.5–10.5)
CHLORIDE SERPL-SCNC: 100 MMOL/L (ref 96–112)
CO2 SERPL-SCNC: 15 MMOL/L (ref 20–33)
CREAT SERPL-MCNC: 1.63 MG/DL (ref 0.5–1.4)
ERYTHROCYTE [DISTWIDTH] IN BLOOD BY AUTOMATED COUNT: 44 FL (ref 35.9–50)
GFR SERPLBLD CREATININE-BSD FMLA CKD-EPI: 41 ML/MIN/1.73 M 2
GLUCOSE BLD STRIP.AUTO-MCNC: 144 MG/DL (ref 65–99)
GLUCOSE BLD STRIP.AUTO-MCNC: 150 MG/DL (ref 65–99)
GLUCOSE BLD STRIP.AUTO-MCNC: 161 MG/DL (ref 65–99)
GLUCOSE SERPL-MCNC: 134 MG/DL (ref 65–99)
HCT VFR BLD AUTO: 26.6 % (ref 42–52)
HGB BLD-MCNC: 9.1 G/DL (ref 14–18)
MCH RBC QN AUTO: 31.8 PG (ref 27–33)
MCHC RBC AUTO-ENTMCNC: 34.2 G/DL (ref 32.3–36.5)
MCV RBC AUTO: 93 FL (ref 81.4–97.8)
PLATELET # BLD AUTO: 160 K/UL (ref 164–446)
PMV BLD AUTO: 9.4 FL (ref 9–12.9)
POTASSIUM SERPL-SCNC: 4 MMOL/L (ref 3.6–5.5)
RBC # BLD AUTO: 2.86 M/UL (ref 4.7–6.1)
SODIUM SERPL-SCNC: 130 MMOL/L (ref 135–145)
WBC # BLD AUTO: 11.7 K/UL (ref 4.8–10.8)

## 2023-11-19 PROCEDURE — 700111 HCHG RX REV CODE 636 W/ 250 OVERRIDE (IP): Performed by: HOSPITALIST

## 2023-11-19 PROCEDURE — 99233 SBSQ HOSP IP/OBS HIGH 50: CPT | Performed by: HOSPITALIST

## 2023-11-19 PROCEDURE — 770006 HCHG ROOM/CARE - MED/SURG/GYN SEMI*

## 2023-11-19 PROCEDURE — 80048 BASIC METABOLIC PNL TOTAL CA: CPT

## 2023-11-19 PROCEDURE — 82962 GLUCOSE BLOOD TEST: CPT

## 2023-11-19 PROCEDURE — 700101 HCHG RX REV CODE 250: Performed by: HOSPITALIST

## 2023-11-19 PROCEDURE — 700111 HCHG RX REV CODE 636 W/ 250 OVERRIDE (IP): Performed by: STUDENT IN AN ORGANIZED HEALTH CARE EDUCATION/TRAINING PROGRAM

## 2023-11-19 PROCEDURE — 700102 HCHG RX REV CODE 250 W/ 637 OVERRIDE(OP): Performed by: STUDENT IN AN ORGANIZED HEALTH CARE EDUCATION/TRAINING PROGRAM

## 2023-11-19 PROCEDURE — 85027 COMPLETE CBC AUTOMATED: CPT

## 2023-11-19 PROCEDURE — A9270 NON-COVERED ITEM OR SERVICE: HCPCS | Performed by: STUDENT IN AN ORGANIZED HEALTH CARE EDUCATION/TRAINING PROGRAM

## 2023-11-19 RX ADMIN — HEPARIN SODIUM 5000 UNITS: 5000 INJECTION, SOLUTION INTRAVENOUS; SUBCUTANEOUS at 21:04

## 2023-11-19 RX ADMIN — EZETIMIBE 10 MG: 10 TABLET ORAL at 18:22

## 2023-11-19 RX ADMIN — CEFTRIAXONE SODIUM 2000 MG: 10 INJECTION, POWDER, FOR SOLUTION INTRAVENOUS at 21:04

## 2023-11-19 RX ADMIN — HEPARIN SODIUM 5000 UNITS: 5000 INJECTION, SOLUTION INTRAVENOUS; SUBCUTANEOUS at 06:05

## 2023-11-19 RX ADMIN — INSULIN HUMAN 1 UNITS: 100 INJECTION, SOLUTION PARENTERAL at 21:03

## 2023-11-19 RX ADMIN — DOCUSATE SODIUM 50 MG AND SENNOSIDES 8.6 MG 2 TABLET: 8.6; 5 TABLET, FILM COATED ORAL at 18:22

## 2023-11-19 RX ADMIN — ASPIRIN 81 MG: 81 TABLET, COATED ORAL at 06:06

## 2023-11-19 RX ADMIN — DOCUSATE SODIUM 50 MG AND SENNOSIDES 8.6 MG 2 TABLET: 8.6; 5 TABLET, FILM COATED ORAL at 06:06

## 2023-11-19 RX ADMIN — HEPARIN SODIUM 5000 UNITS: 5000 INJECTION, SOLUTION INTRAVENOUS; SUBCUTANEOUS at 15:06

## 2023-11-19 RX ADMIN — ROSUVASTATIN CALCIUM 5 MG: 5 TABLET, FILM COATED ORAL at 06:05

## 2023-11-19 RX ADMIN — TAMSULOSIN HYDROCHLORIDE 0.4 MG: 0.4 CAPSULE ORAL at 08:24

## 2023-11-19 ASSESSMENT — ENCOUNTER SYMPTOMS
NERVOUS/ANXIOUS: 0
HEADACHES: 0
DIZZINESS: 0
NAUSEA: 0
CHILLS: 0
FLANK PAIN: 0
FEVER: 0
SORE THROAT: 0
PALPITATIONS: 0
SHORTNESS OF BREATH: 0
ABDOMINAL PAIN: 0

## 2023-11-19 ASSESSMENT — PAIN DESCRIPTION - PAIN TYPE
TYPE: ACUTE PAIN

## 2023-11-19 ASSESSMENT — FIBROSIS 4 INDEX: FIB4 SCORE: 2.62

## 2023-11-19 NOTE — CARE PLAN
The patient is Stable - Low risk of patient condition declining or worsening    Shift Goals  Clinical Goals: Hemodynamic stability  Patient Goals: rest  Family Goals: updates    Progress made toward(s) clinical / shift goals:    Problem: Skin Integrity  Goal: Skin integrity is maintained or improved  Outcome: Progressing     Problem: Fall Risk  Goal: Patient will remain free from falls  Outcome: Progressing     Problem: Pain - Standard  Goal: Alleviation of pain or a reduction in pain to the patient’s comfort goal  Outcome: Progressing       Patient is not progressing towards the following goals:

## 2023-11-19 NOTE — PROGRESS NOTES
Hospital Medicine Daily Progress Note    Date of Service  11/19/2023    Chief Complaint  weakness    Hospital Course  Star Centeno is a hard of hearing 82 y.o. male with a history of prostate cancer (CRT/brachytherapy), hyperlipidemia, aortic aneurysm, hx of PE.  He was admitted 11/17/2023 with weakness and difficulty ambulating. Significantly he had a recent hospital admit in October 2023. He had a left ureteroscopy, left retrograde pyelogram, left ureteral stent placement with bladder biopsy for left-sided hydroureteronephrosis.  He did have a follow-up visit with outpatient urology on Monday, where he had a second cystoscopy as well.      In ER had fever of 100.8, wbc:17.3, hypotensive with systolic in 60's, Na:125, BNP:3924 and Cr:2.33. UA: Many bacteria.  He was admitted for septic shock with complicated UTI.    Interval Problem Update  11/19: Alert and oriented.  Speech clear. Off levophed since yesterday afternoon.  Eating breakfast and conversant this am.  States feeling slightly better.   Improved WBC and Cr this am.  No growth on Blood or urine culture.  Transfer to medical floor. Mobilize.    I have discussed this patient's plan of care and discharge plan at IDT rounds today with Case Management, Nursing, Nursing leadership, and other members of the IDT team.    Consultants/Specialty  urology    Code Status  Full Code    Disposition  The patient is not medically cleared for discharge to home or a post-acute facility.      I have placed the appropriate orders for post-discharge needs.    Review of Systems  Review of Systems   Constitutional:  Positive for malaise/fatigue. Negative for chills and fever.   HENT:  Negative for sore throat.    Respiratory:  Negative for shortness of breath.    Cardiovascular:  Negative for palpitations and leg swelling.   Gastrointestinal:  Negative for abdominal pain and nausea.   Genitourinary:  Negative for dysuria, flank pain and hematuria.   Musculoskeletal:  Negative  for joint pain.   Neurological:  Negative for dizziness and headaches.   Psychiatric/Behavioral:  The patient is not nervous/anxious.         Physical Exam  Temp:  [36 °C (96.8 °F)-36.5 °C (97.7 °F)] 36 °C (96.8 °F)  Pulse:  [65-97] 82  Resp:  [18-43] 27  BP: ()/(52-71) 120/60  SpO2:  [78 %-96 %] 93 %    Physical Exam  Vitals reviewed.   Constitutional:       Appearance: Normal appearance. He is not ill-appearing or diaphoretic.      Comments: Hard of hearing and right ear better for hearing   HENT:      Head: Normocephalic and atraumatic.      Nose: Nose normal.      Mouth/Throat:      Mouth: Mucous membranes are moist.      Pharynx: No oropharyngeal exudate.   Eyes:      General: No scleral icterus.        Right eye: No discharge.         Left eye: No discharge.      Extraocular Movements: Extraocular movements intact.      Conjunctiva/sclera: Conjunctivae normal.   Cardiovascular:      Rate and Rhythm: Normal rate and regular rhythm.      Pulses:           Radial pulses are 2+ on the right side and 2+ on the left side.        Dorsalis pedis pulses are 2+ on the right side and 2+ on the left side.      Heart sounds: No murmur heard.  Pulmonary:      Effort: Pulmonary effort is normal. No respiratory distress.      Breath sounds: Normal breath sounds. No wheezing or rales.   Abdominal:      General: Bowel sounds are normal. There is no distension.      Palpations: Abdomen is soft.      Tenderness: There is no abdominal tenderness.   Musculoskeletal:         General: No swelling or tenderness.      Cervical back: Neck supple. No tenderness. No muscular tenderness.      Right lower leg: No edema.      Left lower leg: No edema.   Skin:     Coloration: Skin is pale. Skin is not jaundiced.   Neurological:      General: No focal deficit present.      Mental Status: He is alert and oriented to person, place, and time. Mental status is at baseline.      Cranial Nerves: No cranial nerve deficit.   Psychiatric:          Mood and Affect: Mood normal.         Behavior: Behavior normal.         Fluids    Intake/Output Summary (Last 24 hours) at 11/19/2023 1034  Last data filed at 11/19/2023 0800  Gross per 24 hour   Intake 720 ml   Output 2300 ml   Net -1580 ml       Laboratory  Recent Labs     11/17/23  2220 11/18/23  1205 11/19/23  0109   WBC 17.3* 12.9* 11.7*   RBC 2.91* 2.79* 2.86*   HEMOGLOBIN 9.1* 8.8* 9.1*   HEMATOCRIT 27.7* 27.1* 26.6*   MCV 95.2 97.1 93.0   MCH 31.3 31.5 31.8   MCHC 32.9 32.5 34.2   RDW 45.1 46.8 44.0   PLATELETCT 199 167 160*   MPV 9.4 9.4 9.4     Recent Labs     11/17/23 2220 11/18/23  1205 11/19/23  0109   SODIUM 125* 130* 130*   POTASSIUM 4.5 4.0 4.0   CHLORIDE 98 101 100   CO2 15* 16* 15*   GLUCOSE 188* 140* 134*   BUN 41* 34* 30*   CREATININE 2.33* 1.81* 1.63*   CALCIUM 8.4* 8.3* 8.3*                   Imaging  CT-ABDOMEN-PELVIS WITH   Final Result      1.  Interval placement of a LEFT ureteral stent with reduced but persistent LEFT hydroureteronephrosis, perinephric fat stranding and delayed LEFT nephrogram suspicious for some degree of ongoing obstructive uropathy and possibly infection   2.  Gas in the LEFT   3.  Findings suspicious for cystitis renal collecting system and urinary bladder to be due to the recent instrumentation or infection.   4.  Atherosclerosis   5.  Colonic diverticulosis      CT-CTA CHEST PULMONARY ARTERY W/ RECONS   Final Result      1.  No central or segmental pulmonary embolus or evidence of other acute intrathoracic pathology   2.  Emphysema   3.  Atherosclerosis            DX-CHEST-PORTABLE (1 VIEW)   Final Result      1.  No acute cardiac or pulmonary abnormalities are identified.   2.  Atherosclerosis           Assessment/Plan  * Sepsis (HCC)- (present on admission)  Assessment & Plan  Likely urological source after recent cystoscopy and history of ureteral stent in October.  UA positive for UTI.  Given recent procedures and stent I will extend antibiotics out to 7 days  for treatment of complicated UTI in a male.  Urology consulting  Monitoring cultures  Resolve of shock.  Wean fluids as able.  Monitor I/O's labs, vitals.  Ceftriaxone x 7 days    Hyponatremia  Assessment & Plan  11/19 Na:130 <130 <125  Concern of volume deficit  IV fluids  Monitor bmp    Hypotension  Assessment & Plan  Sepsis with shock  Titrate levophed  IV fluids  Monitor I/O's  antibiotics    Acute kidney injury superimposed on chronic kidney disease (HCC)- (present on admission)  Assessment & Plan  CT imaging: Interval placement of a LEFT ureteral stent with reduced but persistent LEFT hydroureteronephrosis, perinephric fat stranding and delayed LEFT nephrogram suspicious for some degree of ongoing obstructive uropathy and possibly infection   Patient on a recent hospital admission, where he was seen by urology service back in October 2023.  He had a left ureteroscopy, with a left retrograde pyelogram, and left ureteral stent placement for left-sided hydroureteronephrosis.  According to the patient's daughter, patient was seen by outpatient urology services on Monday where he had second cystoscopy as well.  Hx of CKD stage IIIa.  Avoid nephrotoxic medication  Renally dose all medications  Continue with maintenance IV fluids.  11/19 Cr:1.63 <1.81<2.33    Non-insulin dependent type 2 diabetes mellitus (HCC)- (present on admission)  Assessment & Plan  Monitor accuchecks and cover with SSI  Diabetic diet  Hypoglycemic protocol  Holding outpatient empagliflozen and metformin  A1c:7.1 in past 2 months    Hyperlipidemia- (present on admission)  Assessment & Plan  Continue with Zetia    Benign prostatic hyperplasia- (present on admission)  Assessment & Plan  Continue with tamsulosin         VTE prophylaxis:    heparin ppx      I have performed a physical exam and reviewed and updated ROS and Plan today (11/19/2023). In review of yesterday's note (11/18/2023), there are no changes except as documented above.

## 2023-11-19 NOTE — PROGRESS NOTES
"Urology Progress Note      S: Seen and examined. Doing much better this morning. Denies fevers,  nausea or vomiting.  Reports chills have improved. Off pressors.  Cr improved 1.63.     O:   /61   Pulse 81   Temp 36 °C (96.8 °F) (Temporal)   Resp 18   Ht 1.803 m (5' 11\")   Wt 88.2 kg (194 lb 7.1 oz)   SpO2 92%   Recent Labs     11/17/23 2220 11/18/23  1205 11/19/23  0109   SODIUM 125* 130* 130*   POTASSIUM 4.5 4.0 4.0   CHLORIDE 98 101 100   CO2 15* 16* 15*   GLUCOSE 188* 140* 134*   BUN 41* 34* 30*   CREATININE 2.33* 1.81* 1.63*   CALCIUM 8.4* 8.3* 8.3*     Recent Labs     11/17/23 2220 11/18/23  1205 11/19/23  0109   WBC 17.3* 12.9* 11.7*   RBC 2.91* 2.79* 2.86*   HEMOGLOBIN 9.1* 8.8* 9.1*   HEMATOCRIT 27.7* 27.1* 26.6*   MCV 95.2 97.1 93.0   MCH 31.3 31.5 31.8   MCHC 32.9 32.5 34.2   RDW 45.1 46.8 44.0   PLATELETCT 199 167 160*   MPV 9.4 9.4 9.4         Intake/Output Summary (Last 24 hours) at 11/19/2023 1143  Last data filed at 11/19/2023 0800  Gross per 24 hour   Intake 720 ml   Output 2300 ml   Net -1580 ml       Exam: Gen:NAD   ABD:Abdomen soft, no TTP.   : No CVAT, rodarte in place with clear output    A/P:    Active Hospital Problems    Diagnosis     Sepsis (HCC) [A41.9]     Hypotension [I95.9]     Hyponatremia [E87.1]     Acute kidney injury superimposed on chronic kidney disease (HCC) [N17.9, N18.9]     Benign prostatic hyperplasia [N40.0]     Hyperlipidemia [E78.5]     Non-insulin dependent type 2 diabetes mellitus (HCC) [E11.9]      82 year old male with a history of prostate cancer treated with XRT/brachytherapy, and is s/p cystoscopy, left retrograde pyelogram and ureteroscopy, with bladder bx and left ureteral stent placement 10/25/2023 for hydronephrosis, and is also s/p outpatient cystoscopy 11/13/2023.  Now admitted with urosepsis, improving with antibiotics.  Plan:  - monitor pain control  - monitor urinary output  - continue antibiotics  - patient has upcoming follow up as a " outpatient for ongoing workup and management of stent. Will anticipate seeing as a outpatient   - no further acute urologic intervention. Urology signing off.

## 2023-11-19 NOTE — CARE PLAN
The patient is Stable - Low risk of patient condition declining or worsening    Shift Goals  Clinical Goals: Hemodynamic stability  Patient Goals: rest  Family Goals: updates    Progress made toward(s) clinical / shift goals:  Pt will remain hemodynamically stable during shift        Problem: Knowledge Deficit - Standard  Goal: Patient and family/care givers will demonstrate understanding of plan of care, disease process/condition, diagnostic tests and medications  Outcome: Progressing  Note: Pt educated regarding plan of care and medications. All questions answered.      Problem: Fall Risk  Goal: Patient will remain free from falls  Outcome: Progressing  Note: Fall precautions in place. Bed in lowest position. Non-skid socks in place. Personal possessions within reach. Mobility sign on door. Bed-alarm on. Call light within reach. Pt educated regarding fall prevention and states understanding.      Problem: Pain - Standard  Goal: Alleviation of pain or a reduction in pain to the patient’s comfort goal  Note: Pt assessed for pain regularly and medicated PRN per MAR.

## 2023-11-20 ENCOUNTER — HOME CARE VISIT (OUTPATIENT)
Dept: HOME HEALTH SERVICES | Facility: HOME HEALTHCARE | Age: 83
End: 2023-11-20
Payer: MEDICARE

## 2023-11-20 LAB
ANION GAP SERPL CALC-SCNC: 15 MMOL/L (ref 7–16)
BACTERIA UR CULT: ABNORMAL
BUN SERPL-MCNC: 31 MG/DL (ref 8–22)
CALCIUM SERPL-MCNC: 8.3 MG/DL (ref 8.5–10.5)
CHLORIDE SERPL-SCNC: 101 MMOL/L (ref 96–112)
CO2 SERPL-SCNC: 15 MMOL/L (ref 20–33)
CREAT SERPL-MCNC: 1.53 MG/DL (ref 0.5–1.4)
ERYTHROCYTE [DISTWIDTH] IN BLOOD BY AUTOMATED COUNT: 48.2 FL (ref 35.9–50)
GFR SERPLBLD CREATININE-BSD FMLA CKD-EPI: 44 ML/MIN/1.73 M 2
GLUCOSE BLD STRIP.AUTO-MCNC: 142 MG/DL (ref 65–99)
GLUCOSE BLD STRIP.AUTO-MCNC: 169 MG/DL (ref 65–99)
GLUCOSE BLD STRIP.AUTO-MCNC: 197 MG/DL (ref 65–99)
GLUCOSE SERPL-MCNC: 194 MG/DL (ref 65–99)
HCT VFR BLD AUTO: 29.6 % (ref 42–52)
HGB BLD-MCNC: 9.3 G/DL (ref 14–18)
MCH RBC QN AUTO: 31.3 PG (ref 27–33)
MCHC RBC AUTO-ENTMCNC: 31.4 G/DL (ref 32.3–36.5)
MCV RBC AUTO: 99.7 FL (ref 81.4–97.8)
PLATELET # BLD AUTO: 143 K/UL (ref 164–446)
PMV BLD AUTO: 9.7 FL (ref 9–12.9)
POTASSIUM SERPL-SCNC: 4.1 MMOL/L (ref 3.6–5.5)
RBC # BLD AUTO: 2.97 M/UL (ref 4.7–6.1)
SIGNIFICANT IND 70042: ABNORMAL
SITE SITE: ABNORMAL
SODIUM SERPL-SCNC: 131 MMOL/L (ref 135–145)
SOURCE SOURCE: ABNORMAL
WBC # BLD AUTO: 7.4 K/UL (ref 4.8–10.8)

## 2023-11-20 PROCEDURE — A9270 NON-COVERED ITEM OR SERVICE: HCPCS | Performed by: STUDENT IN AN ORGANIZED HEALTH CARE EDUCATION/TRAINING PROGRAM

## 2023-11-20 PROCEDURE — 700105 HCHG RX REV CODE 258: Performed by: HOSPITALIST

## 2023-11-20 PROCEDURE — 700105 HCHG RX REV CODE 258: Performed by: INTERNAL MEDICINE

## 2023-11-20 PROCEDURE — 770006 HCHG ROOM/CARE - MED/SURG/GYN SEMI*

## 2023-11-20 PROCEDURE — 85027 COMPLETE CBC AUTOMATED: CPT

## 2023-11-20 PROCEDURE — 99232 SBSQ HOSP IP/OBS MODERATE 35: CPT | Performed by: INTERNAL MEDICINE

## 2023-11-20 PROCEDURE — 36415 COLL VENOUS BLD VENIPUNCTURE: CPT

## 2023-11-20 PROCEDURE — 82962 GLUCOSE BLOOD TEST: CPT | Mod: 91

## 2023-11-20 PROCEDURE — 700102 HCHG RX REV CODE 250 W/ 637 OVERRIDE(OP): Performed by: STUDENT IN AN ORGANIZED HEALTH CARE EDUCATION/TRAINING PROGRAM

## 2023-11-20 PROCEDURE — 80048 BASIC METABOLIC PNL TOTAL CA: CPT

## 2023-11-20 PROCEDURE — 700111 HCHG RX REV CODE 636 W/ 250 OVERRIDE (IP): Mod: JZ

## 2023-11-20 PROCEDURE — 700111 HCHG RX REV CODE 636 W/ 250 OVERRIDE (IP): Mod: JZ | Performed by: INTERNAL MEDICINE

## 2023-11-20 PROCEDURE — 700111 HCHG RX REV CODE 636 W/ 250 OVERRIDE (IP): Performed by: STUDENT IN AN ORGANIZED HEALTH CARE EDUCATION/TRAINING PROGRAM

## 2023-11-20 PROCEDURE — 97165 OT EVAL LOW COMPLEX 30 MIN: CPT

## 2023-11-20 PROCEDURE — 97163 PT EVAL HIGH COMPLEX 45 MIN: CPT

## 2023-11-20 RX ORDER — ONDANSETRON 2 MG/ML
4 INJECTION INTRAMUSCULAR; INTRAVENOUS EVERY 4 HOURS PRN
Status: DISCONTINUED | OUTPATIENT
Start: 2023-11-20 | End: 2023-11-21 | Stop reason: HOSPADM

## 2023-11-20 RX ADMIN — AMPICILLIN AND SULBACTAM 3 G: 1; 2 INJECTION, POWDER, FOR SOLUTION INTRAMUSCULAR; INTRAVENOUS at 18:53

## 2023-11-20 RX ADMIN — DOCUSATE SODIUM 50 MG AND SENNOSIDES 8.6 MG 2 TABLET: 8.6; 5 TABLET, FILM COATED ORAL at 04:59

## 2023-11-20 RX ADMIN — DOCUSATE SODIUM 50 MG AND SENNOSIDES 8.6 MG 2 TABLET: 8.6; 5 TABLET, FILM COATED ORAL at 18:45

## 2023-11-20 RX ADMIN — HEPARIN SODIUM 5000 UNITS: 5000 INJECTION, SOLUTION INTRAVENOUS; SUBCUTANEOUS at 05:00

## 2023-11-20 RX ADMIN — INSULIN HUMAN 1 UNITS: 100 INJECTION, SOLUTION PARENTERAL at 10:37

## 2023-11-20 RX ADMIN — SODIUM CHLORIDE: 9 INJECTION, SOLUTION INTRAVENOUS at 03:06

## 2023-11-20 RX ADMIN — TAMSULOSIN HYDROCHLORIDE 0.4 MG: 0.4 CAPSULE ORAL at 10:36

## 2023-11-20 RX ADMIN — ASPIRIN 81 MG: 81 TABLET, COATED ORAL at 05:00

## 2023-11-20 RX ADMIN — ROSUVASTATIN CALCIUM 5 MG: 5 TABLET, FILM COATED ORAL at 05:00

## 2023-11-20 RX ADMIN — EZETIMIBE 10 MG: 10 TABLET ORAL at 18:45

## 2023-11-20 RX ADMIN — HEPARIN SODIUM 5000 UNITS: 5000 INJECTION, SOLUTION INTRAVENOUS; SUBCUTANEOUS at 20:56

## 2023-11-20 RX ADMIN — INSULIN HUMAN 1 UNITS: 100 INJECTION, SOLUTION PARENTERAL at 20:56

## 2023-11-20 RX ADMIN — ONDANSETRON 4 MG: 2 INJECTION INTRAMUSCULAR; INTRAVENOUS at 00:12

## 2023-11-20 RX ADMIN — AMPICILLIN AND SULBACTAM 3 G: 1; 2 INJECTION, POWDER, FOR SOLUTION INTRAMUSCULAR; INTRAVENOUS at 23:39

## 2023-11-20 RX ADMIN — SODIUM CHLORIDE: 9 INJECTION, SOLUTION INTRAVENOUS at 21:02

## 2023-11-20 ASSESSMENT — COGNITIVE AND FUNCTIONAL STATUS - GENERAL
CLIMB 3 TO 5 STEPS WITH RAILING: A LITTLE
DRESSING REGULAR UPPER BODY CLOTHING: A LITTLE
TOILETING: A LITTLE
MOBILITY SCORE: 23
SUGGESTED CMS G CODE MODIFIER MOBILITY: CI
HELP NEEDED FOR BATHING: A LITTLE
DAILY ACTIVITIY SCORE: 19
PERSONAL GROOMING: A LITTLE
DRESSING REGULAR LOWER BODY CLOTHING: A LITTLE
SUGGESTED CMS G CODE MODIFIER DAILY ACTIVITY: CK

## 2023-11-20 ASSESSMENT — GAIT ASSESSMENTS
DISTANCE (FEET): 400
ASSISTIVE DEVICE: FRONT WHEEL WALKER
GAIT LEVEL OF ASSIST: SUPERVISED
DEVIATION: BRADYKINETIC

## 2023-11-20 ASSESSMENT — ENCOUNTER SYMPTOMS
SORE THROAT: 0
VOMITING: 1
DIZZINESS: 0
FEVER: 0
SHORTNESS OF BREATH: 0
NAUSEA: 1
PALPITATIONS: 0
NERVOUS/ANXIOUS: 0
HEADACHES: 0
ABDOMINAL PAIN: 0
ROS GI COMMENTS: LAST NIGHT
FLANK PAIN: 0

## 2023-11-20 ASSESSMENT — ACTIVITIES OF DAILY LIVING (ADL): TOILETING: INDEPENDENT

## 2023-11-20 ASSESSMENT — PAIN DESCRIPTION - PAIN TYPE
TYPE: ACUTE PAIN
TYPE: ACUTE PAIN

## 2023-11-20 NOTE — THERAPY
Physical Therapy   Initial Evaluation     Patient Name: Star Centeno  Age:  82 y.o., Sex:  male  Medical Record #: [id number]  Today's Date: 11/20/2023          Assessment  Patient is 82 y.o. male w/ hx of prostate CA, HLD, aortic aneurysm, PE.  Admitted w/ weakness.  Recently admitted in October of 2023 for ureteral stent.  He is lives alone in a single story house.  Recently .  HE was mobile w/ a fww.  Today, he is able to mobilize at spv level w/o loss of balance and w/o need of physical assist.  DAughter present t/o session, and available to assist if needed.  No acute PT needs.  Plan    Physical Therapy Initial Treatment Plan   Duration: Discharge Needs Only    DC Equipment Recommendations: None  Discharge Recommendations: Recommend home health for continued physical therapy services        Objective       11/20/23 0957   Prior Living Situation   Housing / Facility 1 Story House   Steps Into Home 0   Steps In Home 0   Equipment Owned Front-Wheel Walker   Lives with - Patient's Self Care Capacity Alone and Able to Care For Self   Comments recently    Prior Level of Functional Mobility   Bed Mobility Independent   Transfer Status Independent   Ambulation Independent   Assistive Devices Used Front-Wheel Walker   Cognition    Level of Consciousness Alert   Balance Assessment   Sitting Balance (Static) Fair +   Sitting Balance (Dynamic) Fair +   Standing Balance (Static) Fair +   Standing Balance (Dynamic) Fair +   Weight Shift Sitting Good   Weight Shift Standing Good   Comments w/ fww   Bed Mobility    Supine to Sit Supervised   Gait Analysis   Gait Level Of Assist Supervised   Assistive Device Front Wheel Walker   Distance (Feet) 400   Deviation Bradykinetic   Functional Mobility   Sit to Stand Supervised   Bed, Chair, Wheelchair Transfer Supervised   Physical Therapy Initial Treatment Plan    Duration Discharge Needs Only   Anticipated Discharge Equipment and Recommendations   DC Equipment  Recommendations None   Discharge Recommendations Recommend home health for continued physical therapy services

## 2023-11-20 NOTE — DISCHARGE PLANNING
Received Choice form at 1021  Agency/Facility Name: [facility]   Referral sent per Choice form @ Unable to fax referral.  Currently no home health order.     Received Choice form at 1030  Agency/Facility Name: [facility]  Referral sent per Choice form @ Unable to fax referral.  Currently no home health order.

## 2023-11-20 NOTE — DISCHARGE PLANNING
"HTH/SCP TCN chart review completed. Noted patient is a readmission with previous acute hospitalization 0/21-10/25/2023 in the setting of hydronephrosis, left. Per chart review, patient was followed by [facility] previous to acute hospitalization.     Collaborated with CRISTY Whitt prior to meeting with the pt. The most current review of medical record, knowledge of pt's PLOF and social support, LACE+ score of 78, 6 clicks scores of 22 ADL and 16 mobility were considered.      TCN met with patient at bedside. Patient daughter, Melissa, was also present. Introduced self and TCN program. Patient and wife stated familiarity with TCN from previous acute hospitalization.     Provided education regarding post acute levels of care. Discussed HTH/SCP plan benefits (Meds to Beds, medical uber and [facility] transitional care). Pt and daughter verbalized understanding.     Patient endorsed he resides alone with DME in the home stated to four-wheeled walker, shower bench, cane and grab bars in all bathrooms. Patient noted he does not have a front-wheel walker at home.     Appreciate orders preset for PT and OT in the setting of patient complaints of increased weakness and will appreciate recommendations in patient discharge planning. Choice proactively obtained for HH (for resumption of HH through [facility]) and DME (should patient have any assistive device needs based on therapy consult recommendations), faxed to [facility] and given to CRISTY. Patient stated interest in in-home physician visit specifically to assist with \"checking vitals more frequently.\" Discussed possible benefit from transitional care through [facility] with patient verbalizing understanding and agreement. Referral sent to [facility] at patient request.     TCN will continue to follow and collaborate with discharge planning team as additional post acute needs arise. Thank you.     Completed today  Orders noted for PT/OT consults. Will appreciate PT and OT recommendations in patient " discharge planning.   Choice obtained:  DME (for assistive device if recommended by therapy)  SCP with [facility] PCP. [facility] referral sent 11/20/2023

## 2023-11-20 NOTE — CASE COMMUNICATION
noted  ----- Message -----  From: Leola Barrett, OT  Sent: 11/15/2023   6:20 PM PST  To: Stephanie Main R.N.; Isaías Puckett      Occupational Therapy Evaluation completed 11/13/2023. Requesting authorization for 2w1, 1w2 effective 11/13/2023 for continued skilled OT.

## 2023-11-20 NOTE — CASE COMMUNICATION
noted  ----- Message -----  From: Annie Chi R.N.  Sent: 11/18/2023   9:45 AM PST  To: Stephanie Main R.N.; Malka Zaman R.N.; *       received call from pt's daughter and said to come to her house , that pt fell last night as he was trying to use the rest room. pt in bed awake alert and oriented x4, reported that he slipped last night in a carpet and landed on the floor, tried to get up by grabing chair, daughter and son in law  assisted him up. reports no injury. assessment done b/p 104/58.pt was incontinent of urine , brief wet. Melissa , daughter said rodarte was discontined last wednesday in urology clinic.has uretral stent .pt managed medications. pt not taking blood thinners was discontinued.  daughter said pt's low b/p could be due to tamsulosin. informed her that's one of side effects of the medication., they were told in urology clinic that he might have  dribbling or frequency in urination. urinal provided..pt ambulated in the living area using cane.has increased weakness as reported. instructed to increased hydration/fluids, safety/fall prevention , s/s of UTI . pt has MRI appt this afternoon . instructed if conditon gets worsts like chest pains, breathing poblems or falls to notify PCP or go to ER. this sn also called left message to Dr Gentile voice mail.  pt/daughter eating oa tmeal as nurse was leaving. .  Pt/Cg response to the services provided: denies chest pains, respiratory problem .     addendum -suggested to get life alert , since pt lives by himself.. pt's binder not available ,left in pt's house.

## 2023-11-20 NOTE — PROGRESS NOTES
Patient x2 episodes of vomiting . [facility] Zofran IV administered once

## 2023-11-20 NOTE — PROGRESS NOTES
Hospital Medicine Daily Progress Note    Date of Service  11/20/2023    Chief Complaint  weakness    Hospital Course  Star Centeno is a hard of hearing 82 y.o. male with a history of prostate cancer (CRT/brachytherapy), hyperlipidemia, aortic aneurysm, hx of PE.  He was admitted 11/17/2023 with weakness and difficulty ambulating. Significantly he had a recent hospital admit in October 2023. He had a left ureteroscopy, left retrograde pyelogram, left ureteral stent placement with bladder biopsy for left-sided hydroureteronephrosis.  He did have a follow-up visit with outpatient urology on Monday, where he had a second cystoscopy as well.      In ER had fever of 100.8, wbc:17.3, hypotensive with systolic in 60's, Na:125, BNP:3924 and Cr:2.33. UA: Many bacteria.  He was admitted for septic shock with complicated UTI.    Interval Problem Update  11/20  Transferred to the general medical floor. Small bouts of N/V last night but feels fine this AM. He ate a large dinner last night for the first time in several days. No other acute issues. Remains with condom cath in place and good urine output. Afebrile.     I have discussed this patient's plan of care and discharge plan at IDT rounds today with Case Management, Nursing, Nursing leadership, and other members of the IDT team.    Consultants/Specialty  urology    Code Status  Full Code    Disposition  The patient is not medically cleared for discharge to home or a post-acute facility.  Anticipate discharge to: home with organized home healthcare and close outpatient follow-up    I have placed the appropriate orders for post-discharge needs.    Review of Systems  Review of Systems   Constitutional:  Positive for malaise/fatigue (slowly improving). Negative for fever.   HENT:  Negative for sore throat.    Respiratory:  Negative for shortness of breath.    Cardiovascular:  Negative for palpitations and leg swelling.   Gastrointestinal:  Positive for nausea and vomiting.  Negative for abdominal pain.        Last night   Genitourinary:  Negative for dysuria, flank pain and hematuria.   Musculoskeletal:  Negative for joint pain.   Neurological:  Negative for dizziness and headaches.   Psychiatric/Behavioral:  The patient is not nervous/anxious.         Physical Exam  Temp:  [36 °C (96.8 °F)-37.5 °C (99.5 °F)] 36.9 °C (98.5 °F)  Pulse:  [60-95] 67  Resp:  [15-18] 15  BP: (109-153)/(59-73) 122/64  SpO2:  [92 %-97 %] 92 %    Physical Exam  Vitals reviewed.   Constitutional:       Appearance: Normal appearance. He is not ill-appearing or diaphoretic.      Comments: Hard of hearing and right ear better for hearing  Sitting upright in chair, appears comfortable   HENT:      Head: Normocephalic and atraumatic.      Nose: Nose normal.      Mouth/Throat:      Mouth: Mucous membranes are moist.      Pharynx: No oropharyngeal exudate.   Eyes:      General:         Right eye: No discharge.         Left eye: No discharge.      Extraocular Movements: Extraocular movements intact.      Conjunctiva/sclera: Conjunctivae normal.   Cardiovascular:      Rate and Rhythm: Normal rate and regular rhythm.      Pulses:           Radial pulses are 2+ on the right side and 2+ on the left side.        Dorsalis pedis pulses are 2+ on the right side and 2+ on the left side.      Heart sounds: No murmur heard.  Pulmonary:      Effort: Pulmonary effort is normal. No respiratory distress.      Breath sounds: Normal breath sounds. No wheezing or rales.   Abdominal:      General: Bowel sounds are normal. There is no distension.      Palpations: Abdomen is soft.      Tenderness: There is no abdominal tenderness.   Genitourinary:     Comments: Condom catheter in place, normal appearing yellow urine in the collecting bag  Musculoskeletal:         General: No swelling or tenderness.      Cervical back: Neck supple. No tenderness. No muscular tenderness.      Right lower leg: No edema.      Left lower leg: No edema.    Skin:     Coloration: Skin is pale. Skin is not jaundiced.   Neurological:      General: No focal deficit present.      Mental Status: He is alert and oriented to person, place, and time. Mental status is at baseline.      Cranial Nerves: No cranial nerve deficit.   Psychiatric:         Mood and Affect: Mood normal.         Behavior: Behavior normal.         Fluids    Intake/Output Summary (Last 24 hours) at 11/20/2023 1524  Last data filed at 11/20/2023 1119  Gross per 24 hour   Intake 2685.13 ml   Output 2120 ml   Net 565.13 ml       Laboratory  Recent Labs     11/18/23  1205 11/19/23  0109 11/20/23  0457   WBC 12.9* 11.7* 7.4   RBC 2.79* 2.86* 2.97*   HEMOGLOBIN 8.8* 9.1* 9.3*   HEMATOCRIT 27.1* 26.6* 29.6*   MCV 97.1 93.0 99.7*   MCH 31.5 31.8 31.3   MCHC 32.5 34.2 31.4*   RDW 46.8 44.0 48.2   PLATELETCT 167 160* 143*   MPV 9.4 9.4 9.7     Recent Labs     11/18/23  1205 11/19/23  0109 11/20/23  0457   SODIUM 130* 130* 131*   POTASSIUM 4.0 4.0 4.1   CHLORIDE 101 100 101   CO2 16* 15* 15*   GLUCOSE 140* 134* 194*   BUN 34* 30* 31*   CREATININE 1.81* 1.63* 1.53*   CALCIUM 8.3* 8.3* 8.3*                   Imaging  CT-ABDOMEN-PELVIS WITH   Final Result      1.  Interval placement of a LEFT ureteral stent with reduced but persistent LEFT hydroureteronephrosis, perinephric fat stranding and delayed LEFT nephrogram suspicious for some degree of ongoing obstructive uropathy and possibly infection   2.  Gas in the LEFT   3.  Findings suspicious for cystitis renal collecting system and urinary bladder to be due to the recent instrumentation or infection.   4.  Atherosclerosis   5.  Colonic diverticulosis      CT-CTA CHEST PULMONARY ARTERY W/ RECONS   Final Result      1.  No central or segmental pulmonary embolus or evidence of other acute intrathoracic pathology   2.  Emphysema   3.  Atherosclerosis            DX-CHEST-PORTABLE (1 VIEW)   Final Result      1.  No acute cardiac or pulmonary abnormalities are identified.    2.  Atherosclerosis           Assessment/Plan  * Sepsis (HCC)- (present on admission)  Assessment & Plan  Had septic shock  2/2 complicated UTI  Initially started on empiric IV CTX but switch to IV unasyn given polymicrobial Ucx  Bcx's are ngtd  Improving  Monitor uop, condom cath in place    I personally reviewed each of the above cultures on 11/20      Hyponatremia- (present on admission)  Assessment & Plan  stabilizing    Hypotension- (present on admission)  Assessment & Plan  Sepsis with shock  Resolved     Acute kidney injury superimposed on chronic kidney disease (HCC)- (present on admission)  Assessment & Plan  Clinically stabilizing 11/20  CT imaging: Interval placement of a LEFT ureteral stent with reduced but persistent LEFT hydroureteronephrosis, perinephric fat stranding and delayed LEFT nephrogram suspicious for some degree of ongoing obstructive uropathy and possibly infection   Patient on a recent hospital admission, where he was seen by urology service back in October 2023.  He had a left ureteroscopy, with a left retrograde pyelogram, and left ureteral stent placement for left-sided hydroureteronephrosis.  According to the patient's daughter, patient was seen by outpatient urology services on Monday where he had second cystoscopy as well.  Hx of CKD stage IIIa.  Avoid nephrotoxic medication  Renally dose all medications  Decrease IVF's slowly as oral intake improves  CR remains mostly stable 11/20    I personally reviewed the bmp on 11/20    Non-insulin dependent type 2 diabetes mellitus (HCC)- (present on admission)  Assessment & Plan  Blood sugars are decently controlled 11/20  Monitor accuchecks and cover with SSI  Diabetic diet  Hypoglycemic protocol  Holding outpatient empagliflozen and metformin  A1c:7.1 in past 2 months    I personally reviewed the blood sugars on 11/20    Hyperlipidemia- (present on admission)  Assessment & Plan  Continue with Zetia    Benign prostatic hyperplasia-  (present on admission)  Assessment & Plan  Continue with tamsulosin         VTE prophylaxis:    heparin ppx      I have performed a physical exam and reviewed and updated ROS and Plan today (11/20/2023). In review of yesterday's note (11/19/2023), there are no changes except as documented above.

## 2023-11-20 NOTE — CASE COMMUNICATION
noted  ----- Message -----  From: Annie Chi R.N.  Sent: 11/18/2023  10:10 AM PST  To: Stephanie Main R.N.; Malka Zaman R.N.; *      On Call around 750pm - Received a call from Melissa , pt's daughter and said pt is weaker, thinks blood pressure is low due to tamsulosin  and he almost fall but landed in a couch. states pt not eating well . No reports of chest pains, breathing hard. pt is alert that pt had 2-3 bottled water today .  Instructed that she needs to bring pt to ER to be evaluated. She said she didnt really want pt to go to hospital. Informed her of Doctor 's service and agreed, Telephone number given [phone]. Melissa said she will call right away.  Follow-up call , left message

## 2023-11-20 NOTE — CASE COMMUNICATION
I agree to change for Transfer [facility]  ----- Message -----  From: Pamela Portillo R.N.  Sent: 11/20/2023   5:55 AM PST  To: Stephanie Main R.N.      Quality Review for 11.18.232 [place] OASIS performed on by JOHN Portillo RN on 11.20.2023:    Edits completed by JOHN Portillo RN:  1. Changed  D to yes and E to na

## 2023-11-20 NOTE — CARE PLAN
The patient is Watcher - Medium risk of patient condition declining or worsening    Shift Goals  Clinical Goals: Monitor labs  Patient Goals: Rest  Family Goals: updates    Progress made toward(s) clinical / shift goals:      Patient is not progressing towards the following goals:   Patient received A&Ox4. Assessed and updated on plan of care. Needs addressed at this time.

## 2023-11-20 NOTE — CASE COMMUNICATION
Quality Review for 11.18.232 [place] OASIS performed on by JOHN Portillo RN on 11.20.2023:    Edits completed by JOHN Portillo RN:  1. Changed  D to yes and E to na

## 2023-11-20 NOTE — THERAPY
Occupational Therapy   Initial Evaluation     Patient Name: Star Centeno  Age:  82 y.o., Sex:  male  Medical Record #: [id number]  Today's Date: 11/20/2023     Precautions  Precautions: Fall Risk    Assessment    Patient is 82 y.o. male admitted with a chief complaint of weakness and difficulty ambulating, found to have sepsis and complicated UTI. Other pertinent medical history includes prostate cancer, HLD, aortic aneurysm, and PE. Pt seen for OT evaluation. Pt donned shoes, stood to wash face/hands, and performed toilet transfer w/ supv-SBA. Pt was recently  and lives alone, however, has a supportive daughter who can assist as needed. Patient will not be actively followed for occupational therapy services at this time, however may be seen if requested by physician for 1 more visit within 30 days to address any discharge or equipment needs.       Plan    Occupational Therapy Initial Treatment Plan   Duration: Evaluation only    DC Equipment Recommendations: None  Discharge Recommendations: Recommend home health for continued occupational therapy services      Objective     11/20/23 1413   Prior Living Situation   Prior Services None   Housing / Facility 1 Story House   Steps Into Home 0   Steps In Home 0   Bathroom Set up Walk In Shower;Built-In Shower Chair;Grab Bars   Equipment Owned 4-Wheel Walker;Single Point Cane;Grab Bar(s) In Tub / Shower;Raised Toilet Seat With Arms   Lives with - Patient's Self Care Capacity Alone and Able to Care For Self   Comments Pt has a supportive daughter who lives nearby. Recently .   Prior Level of ADL Function   Self Feeding Independent   Grooming / Hygiene Independent   Bathing Independent   Dressing Independent   Toileting Independent   Prior Level of IADL Function   Home Management Requires Assist   Shopping Requires Assist   Prior Level Of Mobility Independent With Device in Community;Independent With Device in Home  (typically uses cane)   Driving /  Transportation Relatives / Others Provide Transportation   Occupation (Pre-Hospital Vocational) Retired Due To Age   History of Falls   History of Falls   (denied history of falls)   Precautions   Precautions Fall Risk   Pain   Pain Scales 0 to 10 Scale    Pain 0 - 10 Group   Therapist Pain Assessment Post Activity Pain Same as Prior to Activity;0;Nurse Notified   Cognition    Cognition / Consciousness WDL   Level of Consciousness Alert   Comments Very pleasant and cooperative   Active ROM Upper Body   Active ROM Upper Body  WDL   Strength Upper Body   Upper Body Strength  WDL   Sensation Upper Body   Upper Extremity Sensation  WDL   Upper Body Muscle Tone   Upper Body Muscle Tone  WDL   Coordination Upper Body   Comments WFL, not formally assessed   Balance Assessment   Sitting Balance (Static) Fair +   Sitting Balance (Dynamic) Fair +   Standing Balance (Static) Fair   Standing Balance (Dynamic) Fair   Weight Shift Sitting Fair   Weight Shift Standing Fair   Comments w/ FWW   Bed Mobility    Supine to Sit Supervised   Sit to Supine Supervised   Scooting Supervised   Rolling Supervised   Comments no use of features   ADL Assessment   Grooming Supervision;Standing  (washed face/hands at sink)   Lower Body Dressing Supervision  (don shoes)   Toileting   (toilet transfer only)   Functional Mobility   Sit to Stand Supervised   Bed, Chair, Wheelchair Transfer Supervised   Toilet Transfers Supervised   Transfer Method Stand Step   Mobility EOB>bathroom>bed   Comments w/ FWW   Visual Perception   Comments   (corrective lenses present at bedside)   Activity Tolerance   Sitting in Chair ~1-2 min on toilet   Sitting Edge of Bed <5 min   Standing >5 min   Comments functional   Education Group   Education Provided Role of Occupational Therapist   Role of Occupational Therapist Patient Response Patient;Acceptance;Explanation;Verbal Demonstration

## 2023-11-20 NOTE — CASE COMMUNICATION
noted  ----- Message -----  From: Leola Barrett, OT  Sent: 11/15/2023   6:20 PM PST  To: Stephanie Main R.N.; Isaías Puckett      Missed OT visit due to pt with conflicting MD appointment today. Will resume OT next week as scheduled.

## 2023-11-20 NOTE — CASE COMMUNICATION
Quality Review for 11.18.232 Transfer OASIS performed on by JOHN Portillo RN on 11.20.2023:                        Edits completed by JOHN Portillo RN:            1. Changed  D to yes and E to na

## 2023-11-20 NOTE — CASE COMMUNICATION
noted  ----- Message -----  From: Annie Chi R.N.  Sent: 11/18/2023   9:55 AM PST  To: Stephanie Main R.N.; Malka Zaman R.N.; *       Falls Template         Date & Time of fall: 11/16/23 around 1130pm           Cause of fall:  mechanical            Location:  bedroom , daughter's house. reported pt slipped on the rug as he was trying to use the rest room and fell. he tried to grab the chair to get up , daughter and son in law  assisted him up.            Was the fall witnessed?  no                         Actions taken by patient:   he tried to grab the chair to get up , daughter and son in law assisted him up. denies injury              Any new injury?  no                           Any recent medication changes?   no            Did patient have appropriate DME available?   has cane             Actions Taken: instructed on fall prevention, remove rug . notif ied PCP,  TEAM

## 2023-11-21 ENCOUNTER — PHARMACY VISIT (OUTPATIENT)
Dept: PHARMACY | Facility: MEDICAL CENTER | Age: 83
End: 2023-11-21
Payer: COMMERCIAL

## 2023-11-21 VITALS
HEIGHT: 71 IN | BODY MASS INDEX: 27.22 KG/M2 | DIASTOLIC BLOOD PRESSURE: 63 MMHG | WEIGHT: 194.45 LBS | SYSTOLIC BLOOD PRESSURE: 135 MMHG | RESPIRATION RATE: 17 BRPM | OXYGEN SATURATION: 99 % | HEART RATE: 69 BPM | TEMPERATURE: 97.4 F

## 2023-11-21 LAB
ANION GAP SERPL CALC-SCNC: 12 MMOL/L (ref 7–16)
BASOPHILS # BLD AUTO: 0.2 % (ref 0–1.8)
BASOPHILS # BLD: 0.02 K/UL (ref 0–0.12)
BUN SERPL-MCNC: 32 MG/DL (ref 8–22)
CALCIUM SERPL-MCNC: 8.1 MG/DL (ref 8.5–10.5)
CHLORIDE SERPL-SCNC: 104 MMOL/L (ref 96–112)
CO2 SERPL-SCNC: 17 MMOL/L (ref 20–33)
CREAT SERPL-MCNC: 1.54 MG/DL (ref 0.5–1.4)
EOSINOPHIL # BLD AUTO: 0.2 K/UL (ref 0–0.51)
EOSINOPHIL NFR BLD: 2 % (ref 0–6.9)
ERYTHROCYTE [DISTWIDTH] IN BLOOD BY AUTOMATED COUNT: 44.7 FL (ref 35.9–50)
GFR SERPLBLD CREATININE-BSD FMLA CKD-EPI: 44 ML/MIN/1.73 M 2
GLUCOSE BLD STRIP.AUTO-MCNC: 159 MG/DL (ref 65–99)
GLUCOSE SERPL-MCNC: 145 MG/DL (ref 65–99)
HCT VFR BLD AUTO: 27.5 % (ref 42–52)
HGB BLD-MCNC: 9.1 G/DL (ref 14–18)
IMM GRANULOCYTES # BLD AUTO: 0.11 K/UL (ref 0–0.11)
IMM GRANULOCYTES NFR BLD AUTO: 1.1 % (ref 0–0.9)
LYMPHOCYTES # BLD AUTO: 0.82 K/UL (ref 1–4.8)
LYMPHOCYTES NFR BLD: 8.3 % (ref 22–41)
MCH RBC QN AUTO: 30.8 PG (ref 27–33)
MCHC RBC AUTO-ENTMCNC: 33.1 G/DL (ref 32.3–36.5)
MCV RBC AUTO: 93.2 FL (ref 81.4–97.8)
MONOCYTES # BLD AUTO: 0.94 K/UL (ref 0–0.85)
MONOCYTES NFR BLD AUTO: 9.5 % (ref 0–13.4)
NEUTROPHILS # BLD AUTO: 7.83 K/UL (ref 1.82–7.42)
NEUTROPHILS NFR BLD: 78.9 % (ref 44–72)
NRBC # BLD AUTO: 0 K/UL
NRBC BLD-RTO: 0 /100 WBC (ref 0–0.2)
PLATELET # BLD AUTO: 170 K/UL (ref 164–446)
PMV BLD AUTO: 9.7 FL (ref 9–12.9)
POTASSIUM SERPL-SCNC: 3.6 MMOL/L (ref 3.6–5.5)
RBC # BLD AUTO: 2.95 M/UL (ref 4.7–6.1)
SODIUM SERPL-SCNC: 133 MMOL/L (ref 135–145)
WBC # BLD AUTO: 9.9 K/UL (ref 4.8–10.8)

## 2023-11-21 PROCEDURE — A9270 NON-COVERED ITEM OR SERVICE: HCPCS | Performed by: STUDENT IN AN ORGANIZED HEALTH CARE EDUCATION/TRAINING PROGRAM

## 2023-11-21 PROCEDURE — 85025 COMPLETE CBC W/AUTO DIFF WBC: CPT

## 2023-11-21 PROCEDURE — 700105 HCHG RX REV CODE 258: Performed by: INTERNAL MEDICINE

## 2023-11-21 PROCEDURE — 36415 COLL VENOUS BLD VENIPUNCTURE: CPT

## 2023-11-21 PROCEDURE — RXMED WILLOW AMBULATORY MEDICATION CHARGE: Performed by: STUDENT IN AN ORGANIZED HEALTH CARE EDUCATION/TRAINING PROGRAM

## 2023-11-21 PROCEDURE — 99239 HOSP IP/OBS DSCHRG MGMT >30: CPT | Performed by: STUDENT IN AN ORGANIZED HEALTH CARE EDUCATION/TRAINING PROGRAM

## 2023-11-21 PROCEDURE — 80048 BASIC METABOLIC PNL TOTAL CA: CPT

## 2023-11-21 PROCEDURE — 82962 GLUCOSE BLOOD TEST: CPT

## 2023-11-21 PROCEDURE — 700102 HCHG RX REV CODE 250 W/ 637 OVERRIDE(OP): Performed by: STUDENT IN AN ORGANIZED HEALTH CARE EDUCATION/TRAINING PROGRAM

## 2023-11-21 PROCEDURE — 700111 HCHG RX REV CODE 636 W/ 250 OVERRIDE (IP): Performed by: STUDENT IN AN ORGANIZED HEALTH CARE EDUCATION/TRAINING PROGRAM

## 2023-11-21 PROCEDURE — 700111 HCHG RX REV CODE 636 W/ 250 OVERRIDE (IP): Mod: JZ | Performed by: INTERNAL MEDICINE

## 2023-11-21 RX ORDER — AMOXICILLIN AND CLAVULANATE POTASSIUM 875; 125 MG/1; MG/1
1 TABLET, FILM COATED ORAL 2 TIMES DAILY
Qty: 28 TABLET | Refills: 0 | Status: ACTIVE | OUTPATIENT
Start: 2023-11-21 | End: 2023-12-05

## 2023-11-21 RX ADMIN — HEPARIN SODIUM 5000 UNITS: 5000 INJECTION, SOLUTION INTRAVENOUS; SUBCUTANEOUS at 05:16

## 2023-11-21 RX ADMIN — ROSUVASTATIN CALCIUM 5 MG: 5 TABLET, FILM COATED ORAL at 05:19

## 2023-11-21 RX ADMIN — ASPIRIN 81 MG: 81 TABLET, COATED ORAL at 05:16

## 2023-11-21 RX ADMIN — TAMSULOSIN HYDROCHLORIDE 0.4 MG: 0.4 CAPSULE ORAL at 10:47

## 2023-11-21 RX ADMIN — AMPICILLIN AND SULBACTAM 3 G: 1; 2 INJECTION, POWDER, FOR SOLUTION INTRAMUSCULAR; INTRAVENOUS at 05:16

## 2023-11-21 RX ADMIN — INSULIN HUMAN 1 UNITS: 100 INJECTION, SOLUTION PARENTERAL at 10:47

## 2023-11-21 RX ADMIN — DOCUSATE SODIUM 50 MG AND SENNOSIDES 8.6 MG 2 TABLET: 8.6; 5 TABLET, FILM COATED ORAL at 05:16

## 2023-11-21 ASSESSMENT — PAIN DESCRIPTION - PAIN TYPE: TYPE: ACUTE PAIN

## 2023-11-21 NOTE — DISCHARGE PLANNING
ATTN: Case Management  RE: Referral for Home Health    As of 11/21/23, we have accepted the Home Health referral for the patient listed above.    A [facility] clinician will be out to see the patient within 48 hours. If you have any questions or concerns regarding the patient's transition to Home Health, please do not hesitate to contact us at [phone].      We look forward to collaborating with you,  [facility] Home Health Team

## 2023-11-21 NOTE — CASE COMMUNICATION
noted  ----- Message -----  From: Comfort Dillard, PT  Sent: 11/14/2023   2:10 PM PST  To: Stephanie Main R.N.; Leola Barrett, OT; *      PT eval completed, no furthe auth requested.  Eval only.

## 2023-11-21 NOTE — FACE TO FACE
Face to Face Note  -  Durable Medical Equipment    Carlos Juarez M.D. - NPI: [id number]  I certify that this patient is under my care and that they had a durable medical equipment(DME)face to face encounter by myself that meets the physician DME face-to-face encounter requirements with this patient on:    Date of encounter:   Patient:                    MRN:                       YOB: 2025  Star Centeno  [id number]  1940     The encounter with the patient was in whole, or in part, for the following medical condition, which is the primary reason for durable medical equipment:  Other - sepsis, UTI    I certify that, based on my findings, the following durable medical equipment is medically necessary:    Walkers.    My Clinical findings support the need for the above equipment due to:  Abnormal Gait

## 2023-11-21 NOTE — DISCHARGE PLANNING
HTH/SCP TCN chart review completed. Collaborated with CRISTY Saleh. Discussed current discharge considerations noted to home with home healthcare and [facility]. Per chart review, noted HH request for clarification regarding patient antibiotic needs at discharge. Clarified with CM with update provided to HH.     No further acute TCN needs anticipated in patient discharge planning at this time. TCN will continue to follow and collaborate with discharge planning team should additional post acute needs arise prior to patient anticipated discharge today. Thank you.    Completed  PT with recommendations for home health for continued physical therapy services on 11/20/2023  OT with recommendations for home health for continued occupational therapy services on 11/20/2023  Choice obtained:  DME (for assistive device if recommended by therapy)  SCP with [facility] PCP. [facility] referral sent 11/20/2023

## 2023-11-21 NOTE — DISCHARGE SUMMARY
Discharge Summary    CHIEF COMPLAINT ON ADMISSION  Chief Complaint   Patient presents with    Hypotension     EMS arrival 70s/40s, on arrival to [facility] 92/58. + response to fluid.     Weakness     X24 hours, denies falls however pt states he slipped on carpet last night and caught himself.       Reason for Admission  EMS     Admission Date  11/17/2023    CODE STATUS  Full Code    HPI & HOSPITAL COURSE  Star Centeno is a hard of hearing 82 y.o. male with a history of prostate cancer (CRT/brachytherapy), hyperlipidemia, aortic aneurysm, hx of PE.  He was admitted 11/17/2023 with weakness and difficulty ambulating. Significantly he had a recent hospital admit in October 2023. He had a left ureteroscopy, left retrograde pyelogram, left ureteral stent placement with bladder biopsy for left-sided hydroureteronephrosis.  He did have a follow-up visit with outpatient urology on Monday, where he had a second cystoscopy as well.   On presentation, patient had fever of 100.8, wbc:17.3, hypotensive with systolic in 60's, Na:125, BNP:3924 and Cr:2.33. UA: Many bacteria.  He was admitted for septic shock with complicated UTI. He was admitted to the [facility] for IV pressor support. He was treated with antibiotics and improved. His septic shock resolved and patient is feeling well on day of discharge. Urology consulted, no interventions performed. They recommend close outpatient follow up in their office. Urine culture growing klebsiella and enterococcus faecalis. Discussed with pharmacy, given KASSI's, patient is discharged on augmentin for 2 week course. He is urinating well without difficulty. Home health resumed for patient on discharge. He is to follow up with PCP and urology.    Therefore, he is discharged in fair and stable condition to home with organized home healthcare and close outpatient follow-up.    The patient met 2-midnight criteria for an inpatient stay at the time of discharge.    Discharge Date  11/21/2023    FOLLOW  UP ITEMS POST DISCHARGE  Take medications as prescribed.  Follow up with PCP and urology.    DISCHARGE DIAGNOSES  Principal Problem:    Sepsis (HCC) (POA: Yes)  Active Problems:    Benign prostatic hyperplasia (POA: Yes)    Hyperlipidemia (POA: Yes)    Non-insulin dependent type 2 diabetes mellitus (HCC) (POA: Yes)    Acute kidney injury superimposed on chronic kidney disease (HCC) (POA: Yes)    Hypotension (POA: Yes)    Hyponatremia (POA: Yes)  Resolved Problems:    * No resolved hospital problems. *      FOLLOW UP  Future Appointments   Date Time Provider Department Center   11/22/2023 11:00 AM Onel Leonard M.D. [facility] None   12/6/2023  9:30 AM [facility]   12/12/2023 10:00 AM DOUBLE R MRI 1 [facility] Monica ELIZALDE.     Nirmal Gnetile M.D.  [address] [address]  [phone]    Follow up in 1 week(s)        MEDICATIONS ON DISCHARGE     Medication List        START taking these medications        Instructions   amoxicillin-clavulanate 875-125 MG Tabs  Commonly known as: Augmentin   Take 1 Tablet by mouth 2 times a day for 14 days.  Dose: 1 Tablet            CONTINUE taking these medications        Instructions   acetaminophen 500 MG Tabs  Commonly known as: Tylenol   Take 1 Tablet by mouth every 6 hours as needed for Mild Pain or Moderate Pain.  Dose: 500 mg     aspirin 81 MG EC tablet   Take 81 mg by mouth every evening. Indications: blood thinner  Dose: 81 mg     bismuth subsalicylate 262 MG/15ML Susp  Commonly known as: Pepto-Bismol   Take 30 mL by mouth every 6 hours as needed (diarrhea). Indications: Diarrhea, Heartburn  Dose: 30 mL     CoQ-10 30 MG Caps   Take 30 mg by mouth every evening. Indications: heart health  Dose: 30 mg     Empagliflozin 25 MG Tabs   Take 25 mg by mouth every day for 360 days.  Dose: 25 mg     ezetimibe 10 MG Tabs  Commonly known as: Zetia   Take 1 Tablet by mouth every day.  Dose: 10 mg     Fish Oil 1200 MG Caps   Take 1,200 mg by  mouth every morning. Indications: heart health  Dose: 1,200 mg     Imodium A-D 1 MG/7.5ML Liqd  Generic drug: Loperamide HCl   Take 30 mL by mouth 2 times a day as needed (diarrhea). Indications: Diarrhea  Dose: 30 mL     lisinopril 10 MG Tabs  Commonly known as: Prinivil   Take 1 Tablet by mouth every day.  Dose: 10 mg     Melatonin 10 MG Caps   Take 10 mg by mouth at bedtime as needed (sleep). Indications: sleep  Dose: 10 mg     metFORMIN 500 MG Tabs  Commonly known as: Glucophage   Take 1 Tablet by mouth 2 times a day with meals for 360 days.  Dose: 500 mg     metoprolol SR 50 MG Tb24  Commonly known as: Toprol XL   Take 50 mg by mouth 2 times a day.  Dose: 50 mg     PRE-COLE FORMULA PO   Take 1 Tablet by mouth every morning. Indications: supplement  Dose: 1 Tablet     rosuvastatin 5 MG Tabs  Commonly known as: Crestor   Doctor's comments: Plan requires a 100 day supply. Thank you  TAKE 1 TABLET BY MOUTH EVERY DAY  Dose: 5 mg     tamsulosin 0.4 MG capsule  Commonly known as: Flomax   Take 1 Capsule by mouth 1/2 hour after breakfast.  Dose: 0.4 mg     Voltaren 1 % Gel  Generic drug: diclofenac sodium   Apply 2 g topically 4 times a day as needed (Shoulder Pain). Indications: Joint Damage causing Pain and Loss of Function  Dose: 2 g            STOP taking these medications      cephALEXin 500 MG Caps  Commonly known as: Keflex              Allergies  No Known Allergies    DIET  Orders Placed This Encounter   Procedures    Diet Order Diet: Consistent CHO (Diabetic); Second Modifier: (optional): Renal     Standing Status:   Standing     Number of Occurrences:   1     Order Specific Question:   Diet:     Answer:   Consistent CHO (Diabetic) [4]     Order Specific Question:   Second Modifier: (optional)     Answer:   Renal [8]       ACTIVITY  As tolerated.  Weight bearing as tolerated    CONSULTATIONS  urology    PROCEDURES  none    LABORATORY  Lab Results   Component Value Date    SODIUM 133 (L) 2023     POTASSIUM 3.6 11/21/2023    CHLORIDE 104 11/21/2023    CO2 17 (L) 11/21/2023    GLUCOSE 145 (H) 11/21/2023    BUN 32 (H) 11/21/2023    CREATININE 1.54 (H) 11/21/2023        Lab Results   Component Value Date    WBC 9.9 11/21/2023    HEMOGLOBIN 9.1 (L) 11/21/2023    HEMATOCRIT 27.5 (L) 11/21/2023    PLATELETCT 170 11/21/2023        Total time of the discharge process exceeds 34 minutes.

## 2023-11-21 NOTE — CARE PLAN
The patient is Stable - Low risk of patient condition declining or worsening    Shift Goals  Clinical Goals: Pt will be OOB in chair for all meals, walk x 3  Patient Goals: walk x 3  Family Goals: daughter at bedside    Progress made toward(s) clinical / shift goals:  OOB for breakfast, walk x2 no N/V    Patient is not progressing towards the following goals:

## 2023-11-21 NOTE — FACE TO FACE
Face to Face Supporting Documentation - Home Health    The encounter with this patient was in whole or in part the primary reason for home health admission.    Date of encounter:   Patient:                    MRN:                       YOB: 2025  Star Centeno  [id number]  1940     Home health to see patient for:  Skilled Nursing care for assessment, interventions & education, Physical Therapy evaluation and treatment, and Occupational therapy evaluation and treatment    Skilled need for:  Recent Deterioration of Health Status sepsis due to UTI    Skilled nursing interventions to include:  Comment: PT/OT    Homebound status evidenced by:  Needs the assistance of another person in order to leave the home. Leaving home requires a considerable and taxing effort. There is a normal inability to leave the home.    Community Physician to provide follow up care: Nirmal Gentile M.D.     Optional Interventions? No      I certify the face to face encounter for this home health care referral meets the CMS requirements and the encounter/clinical assessment with the patient was, in whole, or in part, for the medical condition(s) listed above, which is the primary reason for home health care. Based on my clinical findings: the service(s) are medically necessary, support the need for home health care, and the homebound criteria are met.  I certify that this patient has had a face to face encounter by myself.  Carlos Juarez M.D. - NPI: [id number]

## 2023-11-21 NOTE — CASE COMMUNICATION
I agree with change  ----- Message -----  From: Pamela Portillo R.N.  Sent: 11/20/2023   7:40 AM PST  To: Stephanie Main R.N.      Quality Review for 11.18.232 Transfer OASIS performed on by JOHN Portillo RN on 11.20.2023:                        Edits completed by JOHN Portillo RN:            1. Changed  D to yes and E to na

## 2023-11-21 NOTE — DISCHARGE PLANNING
Thank you for sending this referral to [facility]. Home Health will need clarification if patient is DC home with IV abx.    Referral placed on hold.     Thanks,  [facility]

## 2023-11-21 NOTE — CARE PLAN
The patient is Stable - Low risk of patient condition declining or worsening    Shift Goals  Clinical Goals: IV Antibiotics  Patient Goals: Rest  Family Goals:     Progress made toward(s) clinical / shift goals:      Patient is not progressing towards the following goals:    Assessed and plan of care discussed. Concerns addressed  Denies pain or discomfort  Needs addressed at this time.

## 2023-11-21 NOTE — PROGRESS NOTES
Discharge instructions given to patient and medication delivered to bedside by RN. DME FWW delivered to bedside as well. 2 PIV removed. Pt transported to main lobby via WC and floor RN

## 2023-11-21 NOTE — DISCHARGE PLANNING
Care Transition Team Final Discharge Disposition    Actual Discharge Information  Discharge Disposition: D/T to home under HHA care in anticipation of covered skilled care (06)    Pt cleared for DC today   He is current with [facility] - requested Dr. Juarez to place HH orders to resume  RN CM met with pt at bedside and updated to above  Choice form signed and scanned to DPA  Reviewed and provided copy of IMM   Pt confirms he has transportation home today, provided by his daughter    Care Transition Team Assessment    Met with pt at bedside, introduced self and explained  role. Pt is A&Ox4 and agreed for visit for initial assessment.  Pt reports he is currently living with his daughter, Melissa. He normally lives alone and is IADL's, drives, and uses a cane or 4WW at baseline for mobility.   He is current with [facility] currently and wishes to resume services.   His DC goal is for DC to home with HH resumed upon medical clearance. He anticipates his daughter will provide transportation home.     Information Source  Orientation Level: Oriented X4  Information Given By: Patient  Who is responsible for making decisions for patient? : Patient    Readmission Evaluation  Is this a readmission?: Yes - unplanned readmission  Why do you think you were readmitted?: increased weakness  Were there new prescriptions you were supposed to fill after you were discharged?: Yes, prescriptions filled  Did you understand your discharge instructions?: Yes  Did you have enough support after your last discharge?: Yes    Elopement Risk  Legal Hold: No  Ambulatory or Self Mobile in Wheelchair: Yes  Disoriented: No  Psychiatric Symptoms: None  History of Wandering: No  Elopement this Admit: No  Vocalizing Wanting to Leave: No  Displays Behaviors, Body Language Wanting to Leave: No-Not at Risk for Elopement  Elopement Risk: Not at Risk for Elopement    Interdisciplinary Discharge Planning  Primary Care Physician: Dr. Kinney T  Seferino  Lives with - Patient's Self Care Capacity: Alone and Able to Care For Self (currently staying with his daughter Melissa)  Patient or legal guardian wants to designate a caregiver: No  Support Systems: Children  Housing / Facility: [address]  Do You Take your Prescribed Medications Regularly: Yes  Able to Return to Previous ADL's: Yes  Prior Services: Skilled Home Health Services  Patient Prefers to be Discharged to:: home with HH resume, current with [facility]   Assistance Needed: Yes  Durable Medical Equipment: Walker, Other - Specify (cane and 4WW)    Discharge Preparedness  What is your plan after discharge?: Home health care, Home with help  What are your discharge supports?: Child  Prior Functional Level: Ambulatory, Independent with Medication Management, Needs Assist with Activities of Daily Living, Uses Walker, Independent with Activities of Daily Living (was independent prior, now needing assistance)    Functional Assesment  Prior Functional Level: Ambulatory, Independent with Medication Management, Needs Assist with Activities of Daily Living, Uses Walker, Independent with Activities of Daily Living (was independent prior, now needing assistance)    Finances  Financial Barriers to Discharge: No  Prescription Coverage: Yes    Vision / Hearing Impairment  Vision Impairment : No  Right Eye Vision: Wears Glasses  Left Eye Vision: Wears Glasses  Hearing Impairment : Yes  Hearing Impairment: Both Ears, Hearing Device(s) Available  Does Pt Need Special Equipment for the Hearing Impaired?: Yes-But Does not Need for Facility to Arrange Equipment    Advance Directive  Advance Directive?: None    Domestic Abuse  Have you ever been the victim of abuse or violence?: No  Physical Abuse or Sexual Abuse: No  Verbal Abuse or Emotional Abuse: No  Possible Abuse/Neglect Reported to:: Not Applicable    Anticipated Discharge Information  Discharge Disposition: D/T to home under A care in anticipation of  covered skilled care (06)

## 2023-11-21 NOTE — DISCHARGE PLANNING
Received Choice form at 1136  Agency/Facility Name: [facility]   Referral sent per Choice form @ 1200     Received Choice form at   Agency/Facility Name: [facility]  Referral sent per Choice form @ [phone]

## 2023-11-22 ENCOUNTER — OFFICE VISIT (OUTPATIENT)
Dept: VASCULAR LAB | Facility: MEDICAL CENTER | Age: 83
End: 2023-11-22
Attending: FAMILY MEDICINE
Payer: MEDICARE

## 2023-11-22 ENCOUNTER — PATIENT OUTREACH (OUTPATIENT)
Dept: MEDICAL GROUP | Facility: LAB | Age: 83
End: 2023-11-22

## 2023-11-22 ENCOUNTER — HOME CARE VISIT (OUTPATIENT)
Dept: HOME HEALTH SERVICES | Facility: HOME HEALTHCARE | Age: 83
End: 2023-11-22
Payer: MEDICARE

## 2023-11-22 VITALS
HEART RATE: 78 BPM | WEIGHT: 188 LBS | HEIGHT: 71 IN | SYSTOLIC BLOOD PRESSURE: 91 MMHG | BODY MASS INDEX: 26.32 KG/M2 | DIASTOLIC BLOOD PRESSURE: 50 MMHG

## 2023-11-22 DIAGNOSIS — R31.0 GROSS HEMATURIA: ICD-10-CM

## 2023-11-22 DIAGNOSIS — K55.1 ATHEROSCLEROSIS OF SUPERIOR MESENTERIC ARTERY (HCC): ICD-10-CM

## 2023-11-22 DIAGNOSIS — I71.21 ANEURYSM OF ASCENDING AORTA WITHOUT RUPTURE (HCC): ICD-10-CM

## 2023-11-22 DIAGNOSIS — I72.0 CAROTID ARTERY ANEURYSM (HCC): ICD-10-CM

## 2023-11-22 DIAGNOSIS — Z95.5 STATUS POST CORONARY ARTERY BALLOON DILATION: ICD-10-CM

## 2023-11-22 DIAGNOSIS — I70.0 AORTIC ATHEROSCLEROSIS (HCC): ICD-10-CM

## 2023-11-22 DIAGNOSIS — I25.10 CORONARY ARTERY DISEASE INVOLVING NATIVE CORONARY ARTERY OF NATIVE HEART WITHOUT ANGINA PECTORIS: ICD-10-CM

## 2023-11-22 DIAGNOSIS — N18.32 STAGE 3B CHRONIC KIDNEY DISEASE: Chronic | ICD-10-CM

## 2023-11-22 DIAGNOSIS — I27.20 PULMONARY HYPERTENSION (HCC): ICD-10-CM

## 2023-11-22 DIAGNOSIS — I10 PRIMARY HYPERTENSION: ICD-10-CM

## 2023-11-22 DIAGNOSIS — I71.9 DESCENDING AORTIC ANEURYSM (HCC): ICD-10-CM

## 2023-11-22 DIAGNOSIS — I26.99 OTHER ACUTE PULMONARY EMBOLISM WITHOUT ACUTE COR PULMONALE (HCC): ICD-10-CM

## 2023-11-22 DIAGNOSIS — I51.7 LVH (LEFT VENTRICULAR HYPERTROPHY): ICD-10-CM

## 2023-11-22 DIAGNOSIS — I70.8 CELIAC ARTERY ATHEROSCLEROSIS: ICD-10-CM

## 2023-11-22 DIAGNOSIS — Z86.711 HISTORY OF PULMONARY EMBOLISM: ICD-10-CM

## 2023-11-22 PROBLEM — N32.89 MASS OF URINARY BLADDER: Status: ACTIVE | Noted: 2023-11-13

## 2023-11-22 PROCEDURE — 99212 OFFICE O/P EST SF 10 MIN: CPT

## 2023-11-22 PROCEDURE — 99204 OFFICE O/P NEW MOD 45 MIN: CPT | Performed by: FAMILY MEDICINE

## 2023-11-22 PROCEDURE — 3074F SYST BP LT 130 MM HG: CPT | Performed by: FAMILY MEDICINE

## 2023-11-22 PROCEDURE — 3078F DIAST BP <80 MM HG: CPT | Performed by: FAMILY MEDICINE

## 2023-11-22 ASSESSMENT — ENCOUNTER SYMPTOMS
PND: 0
CHILLS: 0
COUGH: 0
WHEEZING: 0
BRUISES/BLEEDS EASILY: 0
ORTHOPNEA: 0
SPUTUM PRODUCTION: 0
BLOOD IN STOOL: 0
PALPITATIONS: 0
FEVER: 0
HEMOPTYSIS: 0
SHORTNESS OF BREATH: 0
CLAUDICATION: 0

## 2023-11-22 ASSESSMENT — FIBROSIS 4 INDEX: FIB4 SCORE: 2.47

## 2023-11-22 NOTE — PROGRESS NOTES
INITIAL VASCULAR ANTICOAGULATION VISIT  11/22/23     Star Centeno is a 82 y.o. male who presents for initial visit   Initially referred by Nirmal Gentile* for length of therapy (LOT) determination and management of anticoagulation in context of acute venothromboembolic disease, est 11/2023    Subjective      INITIAL VISIT HISTORY:   Had prior bladder bx with hematuria 10/25/23 and stopped OAC and Hedrick placed w/o resolution of hematuria.  Had been on xarelto but stopped during that time.  Had BLE vneous duplex x 2 that showed no DVT.  Pt denied CP, SOB at that time.   Admitted again recently, then d/c home from [facility] yesterday after septic shock, tx with abx, ureteral stenting per uorlogy.  Had ICU admit with pressor support.  Positive cultures.    Prior anuria admitted 11/6/23.  Had not been restarted on xarelto during this time.       VTE disease / Anticoagulation:   Date of initiation of anticoagulation: 9/2023   Date of Diagnosis: same   Type of Venous thromboembolic disease (VTE): incidental RML PE - asymptomatic   Initial visit hx/sx:  in 9/2023 had CTA for ascending AA surveillance and noted to have incidentally found RML PE.  Subsequent CTA showed resolution 11/2023.   No symptoms at the time of initial dx, felt well.    Denies current SOB, CP, leg swelling, edema, leg pain, cyanosis of digits, redness of extremities.  Antithrombotic therapy at time of VTE event: none   Current antithrombotic agent: none   VTE tx course: Xarelto 15mg BID x 21d, then 20mg daily for about 3 weeks total   Complications: none, tolerating well, no bleeding or bruising   Adherence: reports complete, no missed doses    _____PROVOKING FACTORS:  Any personal VTE hx? No  Any family VTE hx? No  Recent surgery ? No  Recent trauma ? No  Smoker?  reports that he quit smoking about 22 years ago. His smoking use included cigarettes. He started smoking about 67 years ago. He has a 45.0 pack-year smoking history. He has never used  smokeless tobacco.    Extended travel? No  Other periods of immobility? No  Other known or potential risk factors for VTE disease:  no  MEN:  Hx of cancer or abnormal cancer screenings: possible bladder malignancy - under current evaluation for definitive dx   Hx of Testosterone therapy: no  Hypercoagulability work-up completed?  No    Thoracic Aortic Aneurysm  Type: ascending, descending   Initial visit hx: noted on CT 2021 at 4.3cm, moved from [facility] 2/2022  Current sx: denies chest, back, abdominal pain, SOB, dysphagia, worsening cough, hemoptysis.  _____Pertinent pmhx:  Hx of HTN: yes  Hx of tobacco:   reports that he quit smoking about 22 years ago. His smoking use included cigarettes. He started smoking about 67 years ago. He has a 45.0 pack-year smoking history. He has never used smokeless tobacco.  No hx of Connective tissue disease such as Marfans, Alan-Danlos, Loeys-Damaris  No hx of inflammatory/autoimmune vasculitis (Takayasu's arteritis, Giant Cell arteritis)  No hx of infective aortitis, HIV, syphilis   No hx of MVA, chest wall trauma, deceleration injuries  No hx of Biscuspid Aortic Valve per echo   No hx of anabolic steroid use or stimulants (cocaine, methamphetamine, etc)  _____Pertinent fhx:  No fhx of connective tissue disease   No fhx of aneurysmal disease or known familial thoracic aortic aneurysm syndrome      Patient Active Problem List   Diagnosis    Benign prostatic hyperplasia    Carotid artery aneurysm (HCC)    Cataract of both eyes    Compression fracture of T12 vertebra (HCC)    History of smoking    Diastolic dysfunction    Essential hypertension, benign    History of adenocarcinoma of prostate    Hyperlipidemia    Ischemic heart disease due to coronary artery obstruction (HCC)    LVH (left ventricular hypertrophy)    Non-insulin dependent type 2 diabetes mellitus (HCC)    Status post coronary artery balloon dilation    Obesity    Atherosclerosis of native coronary artery    Aneurysm  of ascending aorta (HCC)    BMI 28.0-28.9,adult    Pulmonary hypertension (HCC)    Stage 2 chronic kidney disease    Aortic root dilation (HCC)    Coronary artery disease involving native coronary artery without angina pectoris    Memory loss    Aortic atherosclerosis (HCC)    Hydronephrosis, left    Acute kidney injury superimposed on chronic kidney disease (HCC)    Acute cystitis    History of pulmonary embolism    Stage 3b chronic kidney disease (HCC)    Sepsis (HCC)    Hypotension    Hyponatremia    Mass of urinary bladder    Celiac artery atherosclerosis    Atherosclerosis of superior mesenteric artery (HCC)    Descending aortic aneurysm (HCC)      Past Surgical History:   Procedure Laterality Date    WY CYSTOURETHROSCOPY,BIOPSIES N/A 10/24/2023    Procedure: CYSTOSCOPY, WITH BLADDER BIOPSY, LEFT RETROGRADE PYELOGRAM;  Surgeon: Jourdan Londono M.D.;  Location: SURGERY [facility];  Service: Urology    WY CYSTO/URETERO/PYELOSCOPY, DX Left 10/24/2023    Procedure: URETEROSCOPY, LEFT;  Surgeon: Jourdan Londono M.D.;  Location: SURGERY [facility];  Service: Urology    WY CYSTOSCOPY,INSERT URETERAL STENT Left 10/24/2023    Procedure: CYSTOSCOPY, WITH URETERAL STENT INSERTION;  Surgeon: Jourdan Londono M.D.;  Location: SURGERY [facility];  Service: Urology    EYE SURGERY      PROSTATECTOMY, RADICAL RETRO         Current Outpatient Medications:     amoxicillin-clavulanate, 1 Tablet, Oral, BID, Taking    metoprolol SR, 50 mg, Oral, BID, Taking    lisinopril, 10 mg, Oral, DAILY, Taking    bismuth subsalicylate, 30 mL, Oral, Q6HRS PRN, Taking    Imodium A-D, 30 mL, Oral, BID PRN, Taking    Melatonin, 10 mg, Oral, HS PRN, Taking    tamsulosin, 0.4 mg, Oral, AFTER BREAKFAST, Taking    acetaminophen, 500 mg, Oral, Q6HRS PRN, Taking    diclofenac sodium, 2 g, Topical, 4X/DAY PRN, Taking    rosuvastatin, 5 mg, Oral, DAILY, Taking    Empagliflozin, 25 mg, Oral, DAILY, Taking    ezetimibe, 10 mg, Oral,  "DAILY, Taking    metFORMIN, 500 mg, Oral, BID WITH MEALS, Taking    CoQ-10, 30 mg, Oral, Q EVENING, Taking    Fish Oil, 1,200 mg, Oral, QAM, Taking    aspirin, 81 mg, Oral, Q EVENING, Taking    Prenatal Multivit-Min-Fe-FA (PRE-COLE FORMULA PO), 1 Tablet, Oral, QAM, Taking     No Known Allergies    Family History   Problem Relation Age of Onset    Genetic Disorder Brother     Diabetes Brother     Hypertension Brother     Hyperlipidemia Brother     Genetic Disorder Sister     Diabetes Sister     Genetic Disorder Sister      Social History     Tobacco Use    Smoking status: Former     Current packs/day: 0.00     Average packs/day: 1 pack/day for 45.0 years (45.0 ttl pk-yrs)     Types: Cigarettes     Start date: 1956     Quit date: 2001     Years since quittin.9    Smokeless tobacco: Never   Vaping Use    Vaping Use: Never used   Substance Use Topics    Alcohol use: Yes     Alcohol/week: 8.4 oz     Types: 14 Glasses of wine per week     Comment: 2 glasses of wine daily    Drug use: Never       DIET AND EXERCISE:  Weight Change:stable   Wt Readings from Last 3 Encounters:   23 85.3 kg (188 lb)   23 88.2 kg (194 lb 7.1 oz)   23 81.6 kg (180 lb)      Diet: common adult  Exercise: no regular exercise program     Review of Systems   Constitutional:  Negative for chills, fever and malaise/fatigue.   HENT:  Negative for nosebleeds.    Respiratory:  Negative for cough, hemoptysis, sputum production, shortness of breath and wheezing.    Cardiovascular:  Negative for chest pain, palpitations, orthopnea, claudication, leg swelling and PND.   Gastrointestinal:  Negative for blood in stool.   Endo/Heme/Allergies:  Does not bruise/bleed easily.           Objective    Vitals:    23 1106 23 1110   BP: (!) 83/39 91/50   BP Location: Left arm Left arm   Patient Position: Sitting Sitting   BP Cuff Size: Adult Adult   Pulse:  78   Weight: 85.3 kg (188 lb)    Height: 1.803 m (5' 11\")       BP " Readings from Last 5 Encounters:   11/22/23 91/50   11/21/23 135/63   11/17/23 104/56   11/14/23 120/50   11/14/23 120/50      Body mass index is 26.22 kg/m².  Physical Exam  Vitals reviewed.   Constitutional:       Appearance: Normal appearance.   HENT:      Head: Normocephalic and atraumatic.      Nose: Nose normal.      Mouth/Throat:      Mouth: Mucous membranes are moist.      Pharynx: Oropharynx is clear.   Eyes:      Extraocular Movements: Extraocular movements intact.      Conjunctiva/sclera: Conjunctivae normal.   Cardiovascular:      Rate and Rhythm: Normal rate and regular rhythm.      Pulses: Normal pulses.           Carotid pulses are 2+ on the right side and 2+ on the left side.       Radial pulses are 2+ on the right side and 2+ on the left side.        Dorsalis pedis pulses are 2+ on the right side and 2+ on the left side.        Posterior tibial pulses are 2+ on the right side and 2+ on the left side.      Heart sounds: Normal heart sounds.      Comments:    Spider telangectasia:       RLE:  None      LLE: none   Varicosities:           RLE: none      LLE: none   Corona phlebectatica:      RLE:  None        LLE:  None   Cording:         RLE:  None     LLE: None   Pulmonary:      Effort: Pulmonary effort is normal.      Breath sounds: Normal breath sounds.   Musculoskeletal:         General: Normal range of motion.      Cervical back: Normal range of motion and neck supple.      Right lower leg: No edema.      Left lower leg: No edema.   Skin:     General: Skin is warm and dry.      Capillary Refill: Capillary refill takes less than 2 seconds.   Neurological:      General: No focal deficit present.      Mental Status: He is alert and oriented to person, place, and time. Mental status is at baseline.   Psychiatric:         Mood and Affect: Mood normal.         Behavior: Behavior normal.         Lab Results   Component Value Date    CHOLSTRLTOT 168 05/22/2023    LDL 95 05/22/2023    HDL 36 (A)  "05/22/2023    TRIGLYCERIDE 187 (H) 05/22/2023      No results found for: \"LIPOPROTA\"   No results found for: \"APOB\"    Lab Results   Component Value Date    PROTHROMBTM 16.7 (H) 10/30/2023    INR 1.29 (H) 10/30/2023       Lab Results   Component Value Date    HBA1C 7.1 (A) 08/25/2023      Lab Results   Component Value Date    SODIUM 133 (L) 11/21/2023    POTASSIUM 3.6 11/21/2023    CHLORIDE 104 11/21/2023    CO2 17 (L) 11/21/2023    GLUCOSE 145 (H) 11/21/2023    BUN 32 (H) 11/21/2023    CREATININE 1.54 (H) 11/21/2023        Lab Results   Component Value Date    WBC 9.9 11/21/2023    RBC 2.95 (L) 11/21/2023    HEMOGLOBIN 9.1 (L) 11/21/2023    HEMATOCRIT 27.5 (L) 11/21/2023    MCV 93.2 11/21/2023    MCH 30.8 11/21/2023    MCHC 33.1 11/21/2023    MPV 9.7 11/21/2023        VASCULAR IMAGING:    CT chest 7/2021  1. Aneurysmal dilatation at the aortic root with maximal diameter of 4.3 cm, not appreciably changed from the prior study allowing for limitations of the study.   2.  Aortic and coronary artery calcifications.   3.  5 mm right lower lobe nodule, stable compared to 10/07/2020.   4.  Emphysematous changes.     CTA chest 10/2021  1.  Mild aortic root dilatation at the level of the sinuses of   Valsalva, 4.3 cm. The remainder of the ascending aorta measures   up to 3.9 cm, near the upper limit of normal.  Minimal fusiform   ectasia of the isthmic segment, 3.3 cm.   Mild dilatation of the   innominate and proximal right subclavian arteries, 1.8 cm and 1.6   cm, respectively.   2. Diffuse atherosclerosis. No evidence of aortic dissection. Few   incidental findings.     Echo 11/2022  No prior study is available for comparison.   Normal left ventricular size and systolic function.  Left ventricle ejection fraction estimated to be 60%.  No regional wall motion abnormality noted.  No hemodynamically significant valvular heart disease noted.  Estimated right ventricular systolic pressure is 35 mmHg.  Aorta  Normal aortic " root for body surface area. The ascending aorta diameter is 3.2 cm.    CTA chest 9/2/23   Aorta and vasculature:  Ascending thoracic aorta measures 3.5 cm in maximum diameter without dissection. Descending thoracic aorta measures 3.1 cm in maximum diameter without dissection. Visualized upper abdominal aorta is normal in caliber without   dissection. Great vessel origins are patent with atherosclerotic plaque at the origins. Celiac axis and SMA are patent with moderate calcific plaque at both origins an estimated less than 50% stenosis.  1.  There is borderline aneurysmal dilatation of the descending thoracic aorta with ascending thoracic aorta upper limits of normal in caliber at 3.5 cm.  2.  No evidence of thoracic aortic dissection.  3.  incidentally noted is a nonocclusive right middle lobe pulmonary artery embolism.  4.  Lung parenchyma demonstrates changes suggestive of emphysema favored over interstitial lung disease.    BLE venous 10/2023   No deep venous thrombosis.     BLE venous 11/2023    No deep venous thrombosis.     CTPA 11/17/23  1.  No central or segmental pulmonary embolus or evidence of other acute intrathoracic pathology  2.  Emphysema  3.  Atherosclerosis    CT a/p 11/17/23   Vasculature: Diffuse calcific atherosclerotic plaque.  1.  Interval placement of a LEFT ureteral stent with reduced but persistent LEFT hydroureteronephrosis, perinephric fat stranding and delayed LEFT nephrogram suspicious for some degree of ongoing obstructive uropathy and possibly infection  2.  Gas in the LEFT  3.  Findings suspicious for cystitis renal collecting system and urinary bladder to be due to the recent instrumentation or infection.  4.  Atherosclerosis  5.  Colonic diverticulosis            Medical Decision Making:  Today's Assessment / Status / Plan:     1. Aneurysm of ascending aorta without rupture (HCC)  CT-CTA CHEST WITH & W/O-POST PROCESS    Basic Metabolic Panel    Lipid Profile      2. Descending  aortic aneurysm (HCC)  CT-CTA CHEST WITH & W/O-POST PROCESS      3. Aortic atherosclerosis (HCC)        4. History of pulmonary embolism  D-DIMER      5. Pulmonary hypertension (HCC)        6. Status post coronary artery balloon dilation        7. Stage 3b chronic kidney disease (HCC)        8. LVH (left ventricular hypertrophy)        9. Primary hypertension  CBC WITHOUT DIFFERENTIAL      10. Carotid artery aneurysm (HCC)        11. Coronary artery disease involving native coronary artery of native heart without angina pectoris        12. Atherosclerosis of superior mesenteric artery (HCC)        13. Celiac artery atherosclerosis        14. Gross hematuria        15. Other acute pulmonary embolism without acute cor pulmonale (HCC)  D-DIMER    CBC WITHOUT DIFFERENTIAL        PATIENT TYPE: Secondary Prevention    Etiology of Established CVD if Present:     1) VTE disease   9/2023 - incidentally found, acute, asympatomtic RML PE - stable, no sx   Resolved per CTPA 11/2023, had negative BLE venous duplex   As reviewed if bladder malignancy found, could increase risks for future VTE events   Thrombophilia/hypercoag evaluation:  not recommended  Plan:  - no imaging surveillance needed at this time  - antithrombotic plan as noted below   - counseled on signs and symptoms of acute VTE that require seeking prompt attention in the ED to include shortness of breath, chest pain, pain with deep inhalation, acute leg swelling and/or pain in calf or leg   - elevate legs as much as possible, use compression stockings/socks if directed by your provider  - Avoid hormonal therapies including estrogen or testosterone-containing meds, or raloxifene or tamoxifene (commonly used for osteoporosis)  - Avoid sedentary periods  - continue complete avoidance of tobacco products  - if having any invasive procedure,please make sure the doctor knows of your history of blood clots and current anticoagulation status    2) Ascending aortic aneurysm  - stable, asymptomatic  7/2021 CT = 4.3cm   10/2020 CTA = 4.3cm   9/2023 CTA = 3.5cm - sizing discrepancy from prior   - as reviewed with pt, surg repair indicated if 5.5+cm or rapid expansion rate (defined as >0.5 cm in 1 year or >0.3 cm/y in 2  consecutive years for those with sporadic aneurysms, and >0.3 cm in 1 year for those with hereditary thoracic Ao disease (HTAD) or bicuspid AV) due to increase risks for dissection/rupture  -no known HTAD, so presumed sporadic development from cystic medial degeneration  - No reported fhx or pmhx of connective tissue disease  - Echo shows no biscupid Ao valve  - at initial visit - reviewed anatomy, risks, emergency s/s requiring immediate attention, and thresholds for surgical intervention and gave handout   Plan:   - continue med mgmt   - recommend 1st degree relatives get screened with echo for TAA as recommended by 2022 ACC guideline  - check echo to r/o expansion and bicuspid Ao valve   - consider AAA duplex to eval for comorbid AAA  - if normal exams, then repeat annual echo surveillance, and consider CTA Q3-5yr or if indications of rapid expansion noted  - focus on ARB and BB for BP control  - activity restrictions including no extreme endurance activities, smoking, stimulants, and extreme weight lifting >20lbs     3) descending AA - presumed atherosclerosis related, no sx   9/2023 CTA = 3.1cm - no growth over time   - as reviewed with pt, surg repair indicated if 5.5+cm or rapid expansion rate (defined as >0.5 cm in 1 year or >0.3 cm/y in 2  Plan:  - annual CTA chest for surveillance (Sept)  - continue med mgmt     4) CAD status post PCI to RCA and LCx 2002   Plan:  - continue secondary prevention med mgmt   - continue ongoing care with cardiology     ANTITHROMBOTIC THERAPY:  Date of initiation: 9/2023 - only completed 3 weeks   HAS-BLED bleeding risk calc ([email or web address]): 2 pt, 4.1%, moderate risk   Factors to consider for indefinite OAC: Unprovoked VTE event and Male  sex   Last CBC, BMP: reviewed above   Expected duration: was only able to complete 3 weeks of OAC but subsequent CTA showed no persistent clot.  Early interruption can increase risk for recurrent VTE, so we will monitor closely   Antithrombotic therapy plan:  - continue ASA 81mg daily   - may consider restart of OAC if new VTE event   - stressed strict adherence to tx and avoid early termination due to increased risk for recurrent VTE  - check CBC, BMP (CMP if any underlying liver disease), and monitor CrCl as needed Q6mo while on DOAC    LIPID MANAGEMENT:   Qualifies for Statin Therapy Based on 2018 ACC/AHA Guidelines: n/a  The ASCVD Risk score (Park MILNER, et al., 2019) failed to calculate., N/A  Major ASCVD events: None  High-risk conditions: N/A  Currently on Statin: Yes  Tx goals: LDL-C <100 (if HTG, consider apoB <90, non-HDL-C <130)  At goal? yes  Plan:   - reinforced ongoing TLC measures as noted   - monitor labs   Meds:   - continue rosuva 5mg daily  - continue zetia 10mg daily     BLOOD PRESSURE CONTROL   Office BP Goal ACC/AHA (2017) goal <130/80  Home BP at goal:  yes  Office BP at goal:  yes - possibly hypoTN   24h ABPM:  not ordered to date   Plan:   Monitoring:   - start/continue home BP monitoring, reviewed correct technique, provide BP log and instructions  - order 24h ABPM:  NO  - monitor lytes/gfr routinely   - contact office if BP consistently >140/>90 for discussion of tx adjustments   Medications:  - continue lisinopril 10mg daily     GLYCEMIC STATUS:  Diabetic, T2  Goal A1c < 7.5 reasonable   Lab Results   Component Value Date    HBA1C 7.1 (A) 08/25/2023      Lab Results   Component Value Date/Time    MALBCRT 35 (H) 05/22/2023 08:34 AM    MICROALBUR 2.9 05/22/2023 08:34 AM     Plan:  - continue current medication plan   - recommmend for routine care with PCP (or endocrine) to include regular A1c monitoring, annual albumin/creatinine ratio (ACR), annual diabetic retinopathy screening, foot  exams, annual flu vaccine, and updates to pneumonia vaccines as appropriate      LIFESTYLE INTERVENTIONS:    SMOKING: Stages of Change: Maintenance (sustained change >6mo)    reports that he quit smoking about 22 years ago. His smoking use included cigarettes. He started smoking about 67 years ago. He has a 45.0 pack-year smoking history. He has never used smokeless tobacco.   - continued complete avoidance of all tobacco products     PHYSICAL ACTIVITY: continue healthy activity to improve CV fitness.  In general, targeting >150min/week of moderate-level activity or as much as tolerated in light of functional status and co-morbidities     WEIGHT MANAGEMENT AND NUTRITION: Mediterranean style dietary approach       OTHER:    # bladder mass - pending further w/u for eval for malignancy   - if malignancy will have further increase risk for VTE events    # ureteral stent - in situ, pending retrieval     Instructed to follow-up with PCP for remainder of adult medical needs: yes  We will partner with other providers in the management of established vascular disease and cardiometabolic risk factors.    Studies to Be Obtained: none    Labs to Be Obtained: as noted above     Follow up in: march, sooner sean Leonard M.D.  [facility]   [facility]   [phone]

## 2023-11-23 ENCOUNTER — HOME CARE VISIT (OUTPATIENT)
Dept: HOME HEALTH SERVICES | Facility: HOME HEALTHCARE | Age: 83
End: 2023-11-23
Payer: MEDICARE

## 2023-11-23 VITALS
RESPIRATION RATE: 16 BRPM | OXYGEN SATURATION: 94 % | SYSTOLIC BLOOD PRESSURE: 108 MMHG | DIASTOLIC BLOOD PRESSURE: 52 MMHG | HEART RATE: 75 BPM | TEMPERATURE: 96.9 F

## 2023-11-23 LAB
BACTERIA BLD CULT: NORMAL
BACTERIA BLD CULT: NORMAL
SIGNIFICANT IND 70042: NORMAL
SIGNIFICANT IND 70042: NORMAL
SITE SITE: NORMAL
SITE SITE: NORMAL
SOURCE SOURCE: NORMAL
SOURCE SOURCE: NORMAL

## 2023-11-23 PROCEDURE — G0493 RN CARE EA 15 MIN HH/HOSPICE: HCPCS

## 2023-11-23 ASSESSMENT — ENCOUNTER SYMPTOMS
LAST BOWEL MOVEMENT: 66801
VOMITING: DENIES
NAUSEA: PATIENT DENIES
PERSON REPORTING PAIN: PATIENT
HYPOTENSION: 1
DENIES PAIN: 1
STOOL FREQUENCY: MORE THAN TWICE DAILY
BOWEL PATTERN NORMAL: 1

## 2023-11-23 ASSESSMENT — ACTIVITIES OF DAILY LIVING (ADL)
PHYSICAL TRANSFERS ASSESSED: 1
OASIS_M1830: 03
AMBULATION ASSISTANCE: MODERATE ASSIST
CURRENT_FUNCTION: MODERATE ASSIST
AMBULATION ASSISTANCE: 1

## 2023-11-23 NOTE — Clinical Note
HH Resumption    Primary DX/skilled need: UTI, Sepsis, hx of prostate cancer (radiation about 20 yrs ago per patient), Hyperlipidemia, Aortic aneurysm, hx of PE (Xarelto D/C'd due to hematuria)            SN Frequencies: 2w2, 1w2, 3 PRN            Zip Code: [address]            Disciplines ordered: SN, PT, SLP, RD (Patient declined OT at resumption)            Insurance Authorization: [facility] [employer] [facility]            Certification Period: 11/7/23 - 1/5/24            Special considerations: Contact [facility] Melissa for scheduling appt. [phone]. Patient sometimes goes to r's house at [address] [place] [address]. Please verify address when you call and schedule.

## 2023-11-23 NOTE — Clinical Note
Patient is resumed to [facility] services on 11/23/23. Medication reconciliation process done. Medication list left in home care folder. See MAR for drug allergy interactions. There are no major drug to drug interactions.                   The best contact phone number is [phone] and daughter Melissa manages the medications.

## 2023-11-24 ENCOUNTER — HOME CARE VISIT (OUTPATIENT)
Dept: HOME HEALTH SERVICES | Facility: HOME HEALTHCARE | Age: 83
End: 2023-11-24
Payer: MEDICARE

## 2023-11-24 NOTE — CASE COMMUNICATION
noted  ----- Message -----  From: Ayanna Francois R.N.  Sent: 11/23/2023  11:35 AM PST  To: Stephanie Main R.N.; Malka Zaman R.N.; *      HH Resumption    Primary DX/skilled need: UTI, Sepsis, hx of prostate cancer (radiation about 20 yrs ago per patient), Hyperlipidemia, Aortic aneurysm, hx of PE (Xarelto D/C'd due to hematuria)            SN Frequencies: 2w2, 1w2, 3 PRN            Zip Code: [address]            Disciplines ordered: SN, PT , SLP, RD (Patient declined OT at resumption)            Insurance Authorization: [facility] [employer] [facility]            Certification Period: 11/7/23 - 1/5/24            Special considerations: Contact shala Torres for scheduling appt. [phone]. Patient sometimes goes to [facility]'s house at [address] [place] [address]. Please verify address when you call and schedule.

## 2023-11-25 ENCOUNTER — HOME CARE VISIT (OUTPATIENT)
Dept: HOME HEALTH SERVICES | Facility: HOME HEALTHCARE | Age: 83
End: 2023-11-25
Payer: MEDICARE

## 2023-11-25 NOTE — CASE COMMUNICATION
"FYI-MISSED SNV, Patient refused snv, requesting SNV next week, states\" I am ok, I don't need nursing visit today\""

## 2023-11-27 ENCOUNTER — HOME CARE VISIT (OUTPATIENT)
Dept: HOME HEALTH SERVICES | Facility: HOME HEALTHCARE | Age: 83
End: 2023-11-27
Payer: MEDICARE

## 2023-11-27 ENCOUNTER — APPOINTMENT (OUTPATIENT)
Dept: RADIOLOGY | Facility: MEDICAL CENTER | Age: 83
End: 2023-11-27
Attending: FAMILY MEDICINE
Payer: MEDICARE

## 2023-11-27 ENCOUNTER — DOCUMENTATION (OUTPATIENT)
Dept: MEDICAL GROUP | Facility: PHYSICIAN GROUP | Age: 83
End: 2023-11-27

## 2023-11-27 ENCOUNTER — OFFICE VISIT (OUTPATIENT)
Dept: MEDICAL GROUP | Facility: LAB | Age: 83
End: 2023-11-27
Payer: MEDICARE

## 2023-11-27 VITALS
TEMPERATURE: 97.4 F | OXYGEN SATURATION: 91 % | HEART RATE: 70 BPM | DIASTOLIC BLOOD PRESSURE: 50 MMHG | SYSTOLIC BLOOD PRESSURE: 86 MMHG | RESPIRATION RATE: 18 BRPM

## 2023-11-27 VITALS
HEART RATE: 64 BPM | HEIGHT: 71 IN | BODY MASS INDEX: 25.34 KG/M2 | OXYGEN SATURATION: 96 % | DIASTOLIC BLOOD PRESSURE: 62 MMHG | TEMPERATURE: 96.5 F | SYSTOLIC BLOOD PRESSURE: 90 MMHG | RESPIRATION RATE: 16 BRPM | WEIGHT: 181 LBS

## 2023-11-27 DIAGNOSIS — I10 ESSENTIAL HYPERTENSION, BENIGN: ICD-10-CM

## 2023-11-27 DIAGNOSIS — A41.9 SEPSIS, DUE TO UNSPECIFIED ORGANISM, UNSPECIFIED WHETHER ACUTE ORGAN DYSFUNCTION PRESENT (HCC): ICD-10-CM

## 2023-11-27 DIAGNOSIS — Z09 HOSPITAL DISCHARGE FOLLOW-UP: Primary | ICD-10-CM

## 2023-11-27 PROCEDURE — G0151 HHCP-SERV OF PT,EA 15 MIN: HCPCS

## 2023-11-27 PROCEDURE — 99495 TRANSJ CARE MGMT MOD F2F 14D: CPT | Performed by: FAMILY MEDICINE

## 2023-11-27 PROCEDURE — 3078F DIAST BP <80 MM HG: CPT | Performed by: FAMILY MEDICINE

## 2023-11-27 PROCEDURE — 3074F SYST BP LT 130 MM HG: CPT | Performed by: FAMILY MEDICINE

## 2023-11-27 RX ORDER — METOPROLOL SUCCINATE 25 MG/1
25 TABLET, EXTENDED RELEASE ORAL DAILY
Qty: 90 TABLET | Refills: 3 | Status: SHIPPED | OUTPATIENT
Start: 2023-11-27

## 2023-11-27 SDOH — ECONOMIC STABILITY: HOUSING INSECURITY
HOME SAFETY: EDUCATED IN RECOMMENDATION OF NIGHT LIGHTS AND GRAB BAR/RAILING AT STEPS FOR FALL PREVENTION.  PATIENT ABLE TO SPEAKBACK VERBAL UNDERSTANDING.

## 2023-11-27 ASSESSMENT — BALANCE ASSESSMENTS
NUDGED: 1 - STAGGERS, GRABS, CATCHES SELF
ATTEMPTS TO ARISE: 2 - ABLE TO RISE, ONE ATTEMPT
ARISING SCORE: 1
EYES CLOSED AT MAXIMUM POSITION NUDGED: 0 - UNSTEADY
IMMEDIATE STANDING BALANCE FIRST 5 SECONDS: 2 - STEADY WITHOUT WALKER OR OTHER SUPPORT
ARISES: 1 - ABLE, USES ARMS TO HELP
BALANCE SCORE: 11
NUDGED SCORE: 1
SITTING BALANCE: 1 - STEADY, SAFE
STANDING BALANCE: 1 - STEADY BUT WIDE STANCE AND USES CANE OR OTHER SUPPORT
SITTING DOWN: 1 - USES ARMS OR NOT SMOOTH MOTION
TURNING 360 DEGREES STEPS: 1 - CONTINUOUS STEPS

## 2023-11-27 ASSESSMENT — ENCOUNTER SYMPTOMS
PAIN LOCATION - PAIN FREQUENCY: INTERMITTENT
PAIN LOCATION - PAIN SEVERITY: 0/10
PAIN: 1
PAIN LOCATION - PAIN SEVERITY: 0/10
PAIN LOCATION: LEFT SHOULDER
ARTHRALGIAS: 1
HIGHEST PAIN SEVERITY IN PAST 24 HOURS: 6/10
SUBJECTIVE PAIN PROGRESSION: UNCHANGED
PERSON REPORTING PAIN: PATIENT
MUSCLE WEAKNESS: 1
PAIN LOCATION: RIGHT SHOULDER
LOWEST PAIN SEVERITY IN PAST 24 HOURS: 0/10
PAIN LOCATION - EXACERBATING FACTORS: REACHING
PAIN LOCATION - PAIN FREQUENCY: INTERMITTENT
PAIN LOCATION - EXACERBATING FACTORS: REACHING
PAIN LOCATION - PAIN DURATION: CHRONIC
PAIN LOCATION - PAIN DURATION: CHRONIC
PAIN LOCATION - PAIN QUALITY: ACHY
PAIN LOCATION - PAIN QUALITY: ACHY

## 2023-11-27 ASSESSMENT — GAIT ASSESSMENTS
WALKING STANCE: 0 - HEELS APART
INITIATION OF GAIT IMMEDIATELY AFTER GO: 1 - NO HESITANCY
TRUNK SCORE: 1
PATH SCORE: 1
GAIT SCORE: 9
BALANCE AND GAIT SCORE: 20
TRUNK: 1 - NO SWAY BUT FLEXION OF KNEES OR BACK OR SPREADS ARMS WHILE WALKING
PATH: 1 - MILD/MODERATE DEVIATION OR USES WALKING AID
STEP SYMMETRY: 1 - RIGHT AND LEFT STEP LENGTH APPEAR EQUAL
STEP CONTINUITY: 1 - STEPS APPEAR CONTINUOUS

## 2023-11-27 ASSESSMENT — ACTIVITIES OF DAILY LIVING (ADL)
AMBULATION_DISTANCE/DURATION_TOLERATED: 100 FEET
AMBULATION ASSISTANCE ON FLAT SURFACES: 1

## 2023-11-27 ASSESSMENT — FIBROSIS 4 INDEX: FIB4 SCORE: 2.47

## 2023-11-27 NOTE — CASE COMMUNICATION
PT evaluation completed on 11/27/23.  Frequency 1 week 1, 2 week 1, 1 week 1 effective 11/27/23.  Please assist with authorization as needed.

## 2023-11-27 NOTE — CASE COMMUNICATION
"noted  ----- Message -----  From: Rahel Sandoval R.N.  Sent: 11/25/2023   3:00 PM PST  To: Stephanie Main R.N.; Nirmal Gentile M.D.      FYI-MISSED SNV, Patient refused snv, requesting SNV next week, states\" I am ok, I don't need nursing visit today\""

## 2023-11-27 NOTE — PROGRESS NOTES
Medication chart review for [facility] services    Received referral from [facility].   Medications reviewed  compared with discharge summary if available.  Discharge summary date, if applicable:   23    Current medication list per [facility]     Medication list one, patient is currently taking    Current Outpatient Medications:     amoxicillin-clavulanate, 1 Tablet, Oral, BID    metoprolol SR, 50 mg, Oral, BID    lisinopril, 10 mg, Oral, DAILY    bismuth subsalicylate, 30 mL, Oral, Q6HRS PRN    Imodium A-D, 30 mL, Oral, BID PRN    Melatonin, 10 mg, Oral, HS PRN    tamsulosin, 0.4 mg, Oral, AFTER BREAKFAST    acetaminophen, 500 mg, Oral, Q6HRS PRN    diclofenac sodium, 2 g, Topical, 4X/DAY PRN    rosuvastatin, 5 mg, Oral, DAILY    Empagliflozin, 25 mg, Oral, DAILY    ezetimibe, 10 mg, Oral, DAILY    metFORMIN, 500 mg, Oral, BID WITH MEALS    CoQ-10, 30 mg, Oral, Q EVENING    Fish Oil, 1,200 mg, Oral, QAM    aspirin, 81 mg, Oral, Q EVENING    Prenatal Multivit-Min-Fe-FA (PRE- FORMULA PO), 1 Tablet, Oral, QAM      Medication list two, drugs that the patient has been prescribed or recommended to take by their healthcare provider on discharge summary  Medication List          START taking these medications         Instructions   amoxicillin-clavulanate 875-125 MG Tabs  Commonly known as: Augmentin    Take 1 Tablet by mouth 2 times a day for 14 days.  Dose: 1 Tablet                CONTINUE taking these medications         Instructions   acetaminophen 500 MG Tabs  Commonly known as: Tylenol    Take 1 Tablet by mouth every 6 hours as needed for Mild Pain or Moderate Pain.  Dose: 500 mg      aspirin 81 MG EC tablet    Take 81 mg by mouth every evening. Indications: blood thinner  Dose: 81 mg      bismuth subsalicylate 262 MG/15ML Susp  Commonly known as: Pepto-Bismol    Take 30 mL by mouth every 6 hours as needed (diarrhea). Indications: Diarrhea, Heartburn  Dose: 30 mL      CoQ-10 30 MG Caps    Take 30  mg by mouth every evening. Indications: heart health  Dose: 30 mg      Empagliflozin 25 MG Tabs    Take 25 mg by mouth every day for 360 days.  Dose: 25 mg      ezetimibe 10 MG Tabs  Commonly known as: Zetia    Take 1 Tablet by mouth every day.  Dose: 10 mg      Fish Oil 1200 MG Caps    Take 1,200 mg by mouth every morning. Indications: heart health  Dose: 1,200 mg      Imodium A-D 1 MG/7.5ML Liqd  Generic drug: Loperamide HCl    Take 30 mL by mouth 2 times a day as needed (diarrhea). Indications: Diarrhea  Dose: 30 mL      lisinopril 10 MG Tabs  Commonly known as: Prinivil    Take 1 Tablet by mouth every day.  Dose: 10 mg      Melatonin 10 MG Caps    Take 10 mg by mouth at bedtime as needed (sleep). Indications: sleep  Dose: 10 mg      metFORMIN 500 MG Tabs  Commonly known as: Glucophage    Take 1 Tablet by mouth 2 times a day with meals for 360 days.  Dose: 500 mg      metoprolol SR 50 MG Tb24  Commonly known as: Toprol XL    Take 50 mg by mouth 2 times a day.  Dose: 50 mg      PRE- FORMULA PO    Take 1 Tablet by mouth every morning. Indications: supplement  Dose: 1 Tablet      rosuvastatin 5 MG Tabs  Commonly known as: Crestor    Doctor's comments: Plan requires a 100 day supply. Thank you  TAKE 1 TABLET BY MOUTH EVERY DAY  Dose: 5 mg      tamsulosin 0.4 MG capsule  Commonly known as: Flomax    Take 1 Capsule by mouth 1/2 hour after breakfast.  Dose: 0.4 mg      Voltaren 1 % Gel  Generic drug: diclofenac sodium    Apply 2 g topically 4 times a day as needed (Shoulder Pain). Indications: Joint Damage causing Pain and Loss of Function  Dose: 2 g                STOP taking these medications       cephALEXin 500 MG Caps  Commonly known as: Keflex       No Known Allergies    Labs     Lab Results   Component Value Date/Time    SODIUM 133 (L) 2023 01:51 AM    POTASSIUM 3.6 2023 01:51 AM    CHLORIDE 104 2023 01:51 AM    CO2 17 (L) 2023 01:51 AM    GLUCOSE 145 (H) 2023 01:51 AM    BUN 32  (H) 11/21/2023 01:51 AM    CREATININE 1.54 (H) 11/21/2023 01:51 AM    BUNCREATRAT 15 06/24/2021 11:54 AM     Lab Results   Component Value Date/Time    ALKPHOSPHAT 50 11/18/2023 12:05 PM    ASTSGOT 18 11/18/2023 12:05 PM    ALTSGPT 13 11/18/2023 12:05 PM    TBILIRUBIN 0.3 11/18/2023 12:05 PM    INR 1.29 (H) 10/30/2023 06:36 PM    ALBUMIN 2.9 (L) 11/18/2023 12:05 PM        Assessment for clinically significant drug interactions, drug omissions/additions, duplicative therapies.            CC   Nirmal Gentile M.D.  [address] [address]  Fax: [phone]    [facility]  Phone [phone] fax [phone]    This note was created using voice recognition software (Dragon). The accuracy of the dictation is limited by the abilities of the software. I have reviewed the note prior to signing, however some errors in grammar and context are still possible. If you have any questions related to this note please do not hesitate to contact our office.

## 2023-11-27 NOTE — PROGRESS NOTES
"Subjective:     Star Centeno is a 82 y.o. male who presents for Hospital Follow-up.    Transitional Care Management  TCM Outreach Date and Time: Filed (11/22/2023 11:38 AM)    Discharge Questions  Actual Discharge Date: 11/21/23  Did you receive any new prescriptions?: Yes  Do you have a follow up appointment scheduled with your PCP?: Yes  Appointment Date: 11/27/23  Appointment Time: 1000  Any issues or paperwork you wish to discuss with your PCP?: No (Not at this time as patient is in [facility])  Does this patient qualify for the CCM program?: No    Transitional Care  Number of attempts made to contact patient: 1  Current or previous attempts competed within two business days of discharge? : Yes  Has patient completed an Advanced Directive?: No  Has the Care Manager's phone number provided?: No    Discharge Summary  Chief Complaint: EMS to ER:  Sudden onset complete body weakness; near syncopy  Admitting Diagnosis: Complicated UTI; Sepsis;Cystoscopy 11/13/23  Discharge Diagnosis: Septis; Complicated UTI; CRISTOPHER; DM        HPI:   Recently hospitalized for urosepsis.  Discharge summary as below:     \"Star Centeno is a hard of hearing 82 y.o. male with a history of prostate cancer (CRT/brachytherapy), hyperlipidemia, aortic aneurysm, hx of PE.  He was admitted 11/17/2023 with weakness and difficulty ambulating. Significantly he had a recent hospital admit in October 2023. He had a left ureteroscopy, left retrograde pyelogram, left ureteral stent placement with bladder biopsy for left-sided hydroureteronephrosis.  He did have a follow-up visit with outpatient urology on Monday, where he had a second cystoscopy as well.   On presentation, patient had fever of 100.8, wbc:17.3, hypotensive with systolic in 60's, Na:125, BNP:3924 and Cr:2.33. UA: Many bacteria.  He was admitted for septic shock with complicated UTI. He was admitted to the [facility] for IV pressor support. He was treated with antibiotics and improved. His " "septic shock resolved and patient is feeling well on day of discharge. Urology consulted, no interventions performed. They recommend close outpatient follow up in their office. Urine culture growing klebsiella and enterococcus faecalis. Discussed with pharmacy, given KASSI's, patient is discharged on augmentin for 2 week course. He is urinating well without difficulty. Home health resumed for patient on discharge. He is to follow up with PCP and urology.\"    -Today patient states that he is feeling well.  Urination has returned back to normal, denies any blood in urine.  Denies any fevers, chills, back pain.  Patient does continue to have some occasional lightheadedness, dizziness.  Blood pressure today low at 90/62.    Current medicines (including reconciliation performed today)  Current Outpatient Medications   Medication Sig Dispense Refill    metoprolol SR (TOPROL XL) 25 MG TABLET SR 24 HR Take 1 Tablet by mouth every day. 90 Tablet 3    amoxicillin-clavulanate (AUGMENTIN) 875-125 MG Tab Take 1 Tablet by mouth 2 times a day for 14 days. 28 Tablet 0    lisinopril (PRINIVIL) 10 MG Tab Take 1 Tablet by mouth every day. 90 Tablet 3    bismuth subsalicylate (PEPTO-BISMOL) 262 MG/15ML Suspension Take 30 mL by mouth every 6 hours as needed (diarrhea). Indications: Diarrhea, Heartburn      Loperamide HCl (IMODIUM A-D) 1 MG/7.5ML Liquid Take 30 mL by mouth 2 times a day as needed (diarrhea). Indications: Diarrhea      Melatonin 10 MG Cap Take 10 mg by mouth at bedtime as needed (sleep). Indications: sleep      tamsulosin (FLOMAX) 0.4 MG capsule Take 1 Capsule by mouth 1/2 hour after breakfast. 30 Capsule 1    acetaminophen (TYLENOL) 500 MG Tab Take 1 Tablet by mouth every 6 hours as needed for Mild Pain or Moderate Pain.      diclofenac sodium (VOLTAREN) 1 % Gel Apply 2 g topically 4 times a day as needed (Shoulder Pain). Indications: Joint Damage causing Pain and Loss of Function      rosuvastatin (CRESTOR) 5 MG Tab TAKE " "1 TABLET BY MOUTH EVERY  Tablet 3    Empagliflozin 25 MG Tab Take 25 mg by mouth every day for 360 days. 100 Tablet 3    ezetimibe (ZETIA) 10 MG Tab Take 1 Tablet by mouth every day. 100 Tablet 0    metFORMIN (GLUCOPHAGE) 500 MG Tab Take 1 Tablet by mouth 2 times a day with meals for 360 days. 200 Tablet 1    Coenzyme Q10 (COQ-10) 30 MG Cap Take 30 mg by mouth every evening. Indications: heart health      Omega-3 Fatty Acids (FISH OIL) 1200 MG Cap Take 1,200 mg by mouth every morning. Indications: heart health      aspirin 81 MG EC tablet Take 81 mg by mouth every evening. Indications: blood thinner      Prenatal Multivit-Min-Fe-FA (PRE- FORMULA PO) Take 1 Tablet by mouth every morning. Indications: supplement       No current facility-administered medications for this visit.       Allergies:   Patient has no known allergies.    Social History     Tobacco Use    Smoking status: Former     Current packs/day: 0.00     Average packs/day: 1 pack/day for 45.0 years (45.0 ttl pk-yrs)     Types: Cigarettes     Start date: 1956     Quit date: 2001     Years since quittin.9    Smokeless tobacco: Never   Vaping Use    Vaping Use: Never used   Substance Use Topics    Alcohol use: Yes     Alcohol/week: 8.4 oz     Types: 14 Glasses of wine per week     Comment: 2 glasses of wine daily    Drug use: Never       ROS:  See HPI    Objective:     Vitals:    23 1026   BP: 90/62   Pulse: 64   Resp: 16   Temp: 35.8 °C (96.5 °F)   TempSrc: Temporal   SpO2: 96%   Weight: 82.1 kg (181 lb)   Height: 1.803 m (5' 10.98\")     Body mass index is 25.26 kg/m².    Physical Exam:  Constitutional: Alert, no distress, well-groomed.  Skin: No rashes in visible areas.  Eye: Round. Conjunctiva clear, lids normal. No icterus.   ENMT: Lips pink without lesions, good dentition, moist mucous membranes. Phonation normal.  Neck: No masses, no thyromegaly. Moves freely without pain.  Respiratory: Unlabored respiratory effort, no " cough or audible wheeze  Psych: Alert and oriented x3, normal affect and mood.    Assessment and Plan:   1. Hospital discharge follow-up  - Chart and discharge summary were reviewed.   - Hospitalization and results reviewed with patient.   - Medications reviewed including instructions regarding high risk medications, dosing and side effects.  - Recommended Services: No services needed at this time  - Advance directive/POLST on file?  Yes    2. Sepsis, due to unspecified organism, unspecified whether acute organ dysfunction present (HCC)  -Secondary to UTI.  Patient will continue to follow-up with urology.  Most likely combination of recent colonization from catheter as well as coming from higher in the urinary tract system due to ureter stent.  Stent is placed due to unknown mass that is causing obstruction.  He is following up with urology to discuss biopsy of this mass.  Strict turn precautions given, patient will follow-up with urology soon.    3. Essential hypertension, benign  -Given ongoing symptoms of lightheadedness and dizziness with blood pressure of 90/62 we will go ahead and decrease metoprolol from 50 to 25 mg.  Patient will continue to monitor symptoms, follow-up as needed.  - metoprolol SR (TOPROL XL) 25 MG TABLET SR 24 HR; Take 1 Tablet by mouth every day.  Dispense: 90 Tablet; Refill: 3          Follow-up:Return in about 4 weeks (around 12/25/2023).    Face-to-face transitional care management services with HIGH (today's visit is within days post discharge & LACE+ score 59+) medical decision complexity were provided.

## 2023-11-28 ENCOUNTER — HOME CARE VISIT (OUTPATIENT)
Dept: HOME HEALTH SERVICES | Facility: HOME HEALTHCARE | Age: 83
End: 2023-11-28
Payer: MEDICARE

## 2023-11-28 ENCOUNTER — APPOINTMENT (OUTPATIENT)
Dept: RADIOLOGY | Facility: MEDICAL CENTER | Age: 83
End: 2023-11-28
Attending: PHYSICIAN ASSISTANT
Payer: MEDICARE

## 2023-11-28 VITALS
OXYGEN SATURATION: 98 % | HEART RATE: 69 BPM | DIASTOLIC BLOOD PRESSURE: 60 MMHG | RESPIRATION RATE: 18 BRPM | TEMPERATURE: 97.4 F | SYSTOLIC BLOOD PRESSURE: 92 MMHG

## 2023-11-28 VITALS — DIASTOLIC BLOOD PRESSURE: 40 MMHG | SYSTOLIC BLOOD PRESSURE: 74 MMHG

## 2023-11-28 PROCEDURE — G0153 HHCP-SVS OF S/L PATH,EA 15MN: HCPCS

## 2023-11-28 PROCEDURE — G0495 RN CARE TRAIN/EDU IN HH: HCPCS

## 2023-11-28 ASSESSMENT — ENCOUNTER SYMPTOMS
FATIGUES EASILY: 1
DENIES PAIN: 1
STOOL FREQUENCY: DAILY
HYPOTENSION: 1
BOWEL PATTERN NORMAL: 1
LAST BOWEL MOVEMENT: 66806

## 2023-11-28 NOTE — CASE COMMUNICATION
BP 74/40 uponarrival. Has not picked up new dose of metoprolol yet so took old dose of 50mg. BP cuff at daughter's. Instructed to check BP prior to taking meds. Gave soup-1500mg Na.  Message left for MA requesting parameters for BP meds.  BP 84/40 at end of visit

## 2023-11-28 NOTE — CASE COMMUNICATION
noted  ----- Message -----  From: Breanna Hickey, PT  Sent: 11/27/2023   3:43 PM PST  To: Stephanie Main R.N.; Malka Zaman R.N.; *      PT evaluation completed on 11/27/23.  Frequency 1 week 1, 2 week 1, 1 week 1 effective 11/27/23.  Please assist with authorization as needed.

## 2023-11-28 NOTE — CASE COMMUNICATION
noted  ----- Message -----  From: Breanna Hickey, PT  Sent: 11/27/2023   3:43 PM PST  To: Stephanie Main R.N.; Malka Zaman R.N.; *      Patient's BP is outside of normal parameters at 86/50 during PT evaluation on 11/27/23. Patient denies any dizziness, light headedness, chest pain.  All other vitals are within HH parameters.  Patient reports he went to PCP this morning who decreased his BP medication due to recent low BPs.

## 2023-11-30 ENCOUNTER — HOME CARE VISIT (OUTPATIENT)
Dept: HOME HEALTH SERVICES | Facility: HOME HEALTHCARE | Age: 83
End: 2023-11-30
Payer: MEDICARE

## 2023-11-30 VITALS
TEMPERATURE: 97.5 F | RESPIRATION RATE: 18 BRPM | OXYGEN SATURATION: 94 % | SYSTOLIC BLOOD PRESSURE: 110 MMHG | HEART RATE: 67 BPM | DIASTOLIC BLOOD PRESSURE: 50 MMHG

## 2023-11-30 PROCEDURE — G0180 MD CERTIFICATION HHA PATIENT: HCPCS | Performed by: FAMILY MEDICINE

## 2023-11-30 PROCEDURE — G0495 RN CARE TRAIN/EDU IN HH: HCPCS

## 2023-11-30 ASSESSMENT — ENCOUNTER SYMPTOMS
BOWEL PATTERN NORMAL: 1
STOOL FREQUENCY: DAILY
LAST BOWEL MOVEMENT: 66808
ARTHRALGIAS: 1
DENIES PAIN: 1

## 2023-11-30 NOTE — Clinical Note
I agree with these changes.   ----- Message -----  From: Cornelia Dwyer R.N.  Sent: 11/30/2023  12:07 PM PST  To: Ayanna Francois R.N.      Quality Review Completed for 11/23 JOLLY OASIS by JACKY Dwyer, RN on 12/1/2023:     Edits completed by JACKY Dwyer RN:     1.  and  diagnosis coding updated per chart review  2.  is yes, UTI treatment ongoing  3. Narrative reports Mod level of assist for functional status items. Changed , 1810, 1820, 1845, 1850 to 2  4.  changed to 3 per guidelines when ambulation is supervised  5.  changed to 4 per therapy  6. Resolved OT interventions, no visits planned

## 2023-11-30 NOTE — CASE COMMUNICATION
Quality Review Completed for 11/23 [facility] OASIS by JACKY Dwyer RN on 12/1/2023:     Edits completed by JACKY Dwyer RN:     1.  and  diagnosis coding updated per chart review  2.  is yes, UTI treatment ongoing  3. Narrative reports Mod level of assist for functional status items. Changed , 1810, 1820, 1845, 1850 to 2  4.  changed to 3 per guidelines when ambulation is supervised  5.  changed to 4 per therapy  6.  Resolved OT interventions, no visits planned

## 2023-12-01 DIAGNOSIS — I25.10 CORONARY ARTERY DISEASE INVOLVING NATIVE CORONARY ARTERY OF NATIVE HEART WITHOUT ANGINA PECTORIS: ICD-10-CM

## 2023-12-01 DIAGNOSIS — E78.5 HYPERLIPIDEMIA, UNSPECIFIED HYPERLIPIDEMIA TYPE: ICD-10-CM

## 2023-12-01 DIAGNOSIS — E78.5 DYSLIPIDEMIA: ICD-10-CM

## 2023-12-01 RX ORDER — EZETIMIBE 10 MG/1
10 TABLET ORAL DAILY
Qty: 100 TABLET | Refills: 2 | Status: SHIPPED | OUTPATIENT
Start: 2023-12-01

## 2023-12-01 ASSESSMENT — ENCOUNTER SYMPTOMS
PERSON REPORTING PAIN: PATIENT
DENIES PAIN: 1

## 2023-12-02 NOTE — TELEPHONE ENCOUNTER
Is the patient due for a refill? Yes    Was the patient seen the past year? Yes    Date of last office visit: 08/22/23    Does the patient have an upcoming appointment?  No    Provider to refill:HK    Does the patients insurance require a 100 day supply?  Yes

## 2023-12-02 NOTE — CASE COMMUNICATION
noted  ----- Message -----  From: Autumn Fish, SLP  Sent: 12/1/2023   1:15 PM PST  To: Stephanie Main R.N.; Isaías Puckett      Please assist with auth as indicated for ST 1w1, 2w2, effective 11/28/23

## 2023-12-04 ENCOUNTER — HOME CARE VISIT (OUTPATIENT)
Dept: HOME HEALTH SERVICES | Facility: HOME HEALTHCARE | Age: 83
End: 2023-12-04
Payer: MEDICARE

## 2023-12-05 ENCOUNTER — HOME CARE VISIT (OUTPATIENT)
Dept: HOME HEALTH SERVICES | Facility: HOME HEALTHCARE | Age: 83
End: 2023-12-05
Payer: MEDICARE

## 2023-12-05 VITALS
TEMPERATURE: 97.5 F | SYSTOLIC BLOOD PRESSURE: 80 MMHG | DIASTOLIC BLOOD PRESSURE: 50 MMHG | HEART RATE: 72 BPM | RESPIRATION RATE: 18 BRPM | OXYGEN SATURATION: 95 %

## 2023-12-05 PROCEDURE — G0495 RN CARE TRAIN/EDU IN HH: HCPCS

## 2023-12-05 ASSESSMENT — ENCOUNTER SYMPTOMS
STOOL FREQUENCY: DAILY
LOWEST PAIN SEVERITY IN PAST 24 HOURS: 0/10
LAST BOWEL MOVEMENT: 66813
HIGHEST PAIN SEVERITY IN PAST 24 HOURS: 7/10
PAIN: 1
PAIN LOCATION: LOW BACK
PAIN LOCATION - PAIN FREQUENCY: INTERMITTENT
PAIN LOCATION - PAIN QUALITY: ACHE
PERSON REPORTING PAIN: PATIENT
PAIN LOCATION - PAIN DURATION: ONGOING
BOWEL PATTERN NORMAL: 1
SUBJECTIVE PAIN PROGRESSION: UNCHANGED
PAIN LOCATION - RELIEVING FACTORS: BACK BRACE
PAIN LOCATION - PAIN SEVERITY: 7/10
PAIN SEVERITY GOAL: 0/10

## 2023-12-06 ENCOUNTER — HOSPITAL ENCOUNTER (OUTPATIENT)
Dept: RADIOLOGY | Facility: MEDICAL CENTER | Age: 83
End: 2023-12-06
Attending: UROLOGY
Payer: MEDICARE

## 2023-12-06 ENCOUNTER — HOME CARE VISIT (OUTPATIENT)
Dept: HOME HEALTH SERVICES | Facility: HOME HEALTHCARE | Age: 83
End: 2023-12-06
Payer: MEDICARE

## 2023-12-06 VITALS
HEART RATE: 72 BPM | SYSTOLIC BLOOD PRESSURE: 100 MMHG | OXYGEN SATURATION: 96 % | RESPIRATION RATE: 18 BRPM | DIASTOLIC BLOOD PRESSURE: 60 MMHG | TEMPERATURE: 97.6 F

## 2023-12-06 DIAGNOSIS — R33.9 RETENTION OF URINE, UNSPECIFIED: ICD-10-CM

## 2023-12-06 PROCEDURE — G0153 HHCP-SVS OF S/L PATH,EA 15MN: HCPCS

## 2023-12-06 PROCEDURE — G0151 HHCP-SERV OF PT,EA 15 MIN: HCPCS

## 2023-12-06 PROCEDURE — 78708 K FLOW/FUNCT IMAGE W/DRUG: CPT

## 2023-12-06 PROCEDURE — 700111 HCHG RX REV CODE 636 W/ 250 OVERRIDE (IP)

## 2023-12-06 RX ORDER — FUROSEMIDE 10 MG/ML
INJECTION INTRAMUSCULAR; INTRAVENOUS
Status: COMPLETED
Start: 2023-12-06 | End: 2023-12-06

## 2023-12-06 RX ADMIN — FUROSEMIDE 20 MG: 10 INJECTION INTRAMUSCULAR; INTRAVENOUS at 09:45

## 2023-12-06 ASSESSMENT — ENCOUNTER SYMPTOMS
PAIN LOCATION - PAIN FREQUENCY: FREQUENT
PAIN LOCATION - PAIN SEVERITY: 4/10
PERSON REPORTING PAIN: PATIENT
PAIN LOCATION: BACK
PAIN LOCATION - PAIN QUALITY: DULL ACHE
PAIN: 1

## 2023-12-07 ENCOUNTER — HOSPITAL ENCOUNTER (OUTPATIENT)
Dept: RADIOLOGY | Facility: MEDICAL CENTER | Age: 83
End: 2023-12-07
Attending: UROLOGY
Payer: MEDICARE

## 2023-12-07 DIAGNOSIS — R33.9 RETENTION OF URINE, UNSPECIFIED: ICD-10-CM

## 2023-12-07 PROCEDURE — 700117 HCHG RX CONTRAST REV CODE 255: Performed by: UROLOGY

## 2023-12-07 PROCEDURE — A9579 GAD-BASE MR CONTRAST NOS,1ML: HCPCS | Performed by: UROLOGY

## 2023-12-07 PROCEDURE — 72197 MRI PELVIS W/O & W/DYE: CPT

## 2023-12-07 RX ADMIN — GADOTERIDOL 15 ML: 279.3 INJECTION, SOLUTION INTRAVENOUS at 14:29

## 2023-12-10 ASSESSMENT — ENCOUNTER SYMPTOMS
PAIN LOCATION - PAIN SEVERITY: 3/10
PAIN LOCATION: BACK
PERSON REPORTING PAIN: PATIENT
PAIN: 1

## 2023-12-12 ENCOUNTER — HOME CARE VISIT (OUTPATIENT)
Dept: HOME HEALTH SERVICES | Facility: HOME HEALTHCARE | Age: 83
End: 2023-12-12
Payer: MEDICARE

## 2023-12-12 VITALS
SYSTOLIC BLOOD PRESSURE: 90 MMHG | HEART RATE: 63 BPM | OXYGEN SATURATION: 98 % | RESPIRATION RATE: 18 BRPM | TEMPERATURE: 97.4 F | DIASTOLIC BLOOD PRESSURE: 50 MMHG

## 2023-12-12 PROCEDURE — 665001 SOC-HOME HEALTH

## 2023-12-12 PROCEDURE — G0153 HHCP-SVS OF S/L PATH,EA 15MN: HCPCS

## 2023-12-13 ENCOUNTER — HOME CARE VISIT (OUTPATIENT)
Dept: HOME HEALTH SERVICES | Facility: HOME HEALTHCARE | Age: 83
End: 2023-12-13
Payer: MEDICARE

## 2023-12-13 ENCOUNTER — HOSPITAL ENCOUNTER (OUTPATIENT)
Facility: MEDICAL CENTER | Age: 83
End: 2023-12-13
Attending: STUDENT IN AN ORGANIZED HEALTH CARE EDUCATION/TRAINING PROGRAM
Payer: MEDICARE

## 2023-12-13 LAB
ANION GAP SERPL CALC-SCNC: 12 MMOL/L (ref 7–16)
BUN SERPL-MCNC: 39 MG/DL (ref 8–22)
CALCIUM SERPL-MCNC: 9.1 MG/DL (ref 8.5–10.5)
CHLORIDE SERPL-SCNC: 103 MMOL/L (ref 96–112)
CO2 SERPL-SCNC: 19 MMOL/L (ref 20–33)
CREAT SERPL-MCNC: 1.53 MG/DL (ref 0.5–1.4)
GFR SERPLBLD CREATININE-BSD FMLA CKD-EPI: 44 ML/MIN/1.73 M 2
GLUCOSE SERPL-MCNC: 153 MG/DL (ref 65–99)
POTASSIUM SERPL-SCNC: 4.8 MMOL/L (ref 3.6–5.5)
SODIUM SERPL-SCNC: 134 MMOL/L (ref 135–145)

## 2023-12-13 PROCEDURE — 87086 URINE CULTURE/COLONY COUNT: CPT

## 2023-12-13 PROCEDURE — 80048 BASIC METABOLIC PNL TOTAL CA: CPT

## 2023-12-13 PROCEDURE — 87077 CULTURE AEROBIC IDENTIFY: CPT

## 2023-12-13 PROCEDURE — G0151 HHCP-SERV OF PT,EA 15 MIN: HCPCS

## 2023-12-13 PROCEDURE — 87186 SC STD MICRODIL/AGAR DIL: CPT

## 2023-12-14 ENCOUNTER — HOME CARE VISIT (OUTPATIENT)
Dept: HOME HEALTH SERVICES | Facility: HOME HEALTHCARE | Age: 83
End: 2023-12-14
Payer: MEDICARE

## 2023-12-14 ENCOUNTER — HOSPITAL ENCOUNTER (OUTPATIENT)
Facility: MEDICAL CENTER | Age: 83
End: 2023-12-14
Attending: UROLOGY | Admitting: UROLOGY
Payer: MEDICARE

## 2023-12-14 VITALS
HEART RATE: 94 BPM | OXYGEN SATURATION: 96 % | RESPIRATION RATE: 18 BRPM | DIASTOLIC BLOOD PRESSURE: 50 MMHG | SYSTOLIC BLOOD PRESSURE: 100 MMHG

## 2023-12-14 PROCEDURE — G0493 RN CARE EA 15 MIN HH/HOSPICE: HCPCS

## 2023-12-14 ASSESSMENT — ENCOUNTER SYMPTOMS
PAIN: 1
PAIN LOCATION: BACK
PAIN LOCATION - PAIN FREQUENCY: FREQUENT
LAST BOWEL MOVEMENT: 66822
PAIN LOCATION - PAIN QUALITY: ACHE
PAIN LOCATION - PAIN DURATION: YEARS
PERSON REPORTING PAIN: PATIENT
SUBJECTIVE PAIN PROGRESSION: UNCHANGED
BOWEL PATTERN NORMAL: 1
PERSON REPORTING PAIN: PATIENT
HIGHEST PAIN SEVERITY IN PAST 24 HOURS: 6/10
PAIN SEVERITY GOAL: 0/10
PAIN LOCATION - RELIEVING FACTORS: BACK BRACE
PAIN LOCATION - PAIN SEVERITY: 3/10
PAIN LOCATION - PAIN SEVERITY: 3/10
STOOL FREQUENCY: DAILY
PAIN: 1
LOWEST PAIN SEVERITY IN PAST 24 HOURS: 0/10

## 2023-12-14 ASSESSMENT — ACTIVITIES OF DAILY LIVING (ADL)
HOME_HEALTH_OASIS: 00
OASIS_M1830: 01

## 2023-12-14 NOTE — Clinical Note
Hi Dr. Gentile,  This patient had RD order for diabetes education. I spoke with RN who stated that he is doing much better and is actually discharging today.  Unfortunately, due to high patient caseload I was unable to see him prior to [facility]. I apologize for this.  If he would like education and ongoing monitoring, he can be referred to outpatient.  I again, apologize.  Happy Holidays,  Selena

## 2023-12-15 NOTE — CASE COMMUNICATION
noted  ----- Message -----  From: Selena Grajeda R.D.  Sent: 12/14/2023  10:03 AM PST  To: Stephanie Main R.N.; Nirmal Gentile M.D.      Hi Dr. Gentile,  This patient had RD order for diabetes education. I spoke with RN who stated that he is doing much better and is actually discharging today.  Unfortunately, due to high patient caseload I was unable to see him [facility]. I apologize for this.  I f he would like education and ongoing monitoring, he can be referred to outpatient.  I again, apologize.  Happy Holidays,  Selena

## 2023-12-16 VITALS
SYSTOLIC BLOOD PRESSURE: 100 MMHG | RESPIRATION RATE: 18 BRPM | HEART RATE: 68 BPM | OXYGEN SATURATION: 95 % | DIASTOLIC BLOOD PRESSURE: 60 MMHG | TEMPERATURE: 97.8 F

## 2023-12-16 ASSESSMENT — ENCOUNTER SYMPTOMS
PAIN LOCATION: BACK
PAIN LOCATION - PAIN QUALITY: DULL ACHE
PERSON REPORTING PAIN: PATIENT
PAIN: 1
PAIN LOCATION - PAIN FREQUENCY: FREQUENT
PAIN LOCATION - PAIN SEVERITY: 3/10

## 2023-12-18 NOTE — CASE COMMUNICATION
NOTED  ----- Message -----  From: Dorothy Gao, PT  Sent: 12/16/2023  10:26 PM PST  To: Stephanie Main R.N.      Missed PT visit-treating PT was out sick.  Patient will be seen on Wed as scheduled.

## 2023-12-18 NOTE — CASE COMMUNICATION
noted  ----- Message -----  From: Dorothy Gao, PT  Sent: 12/16/2023  10:26 PM PST  To: Stephanie Main R.N.      Patient discharged from [facility] effective 12/13/2023 with all goals met.

## 2023-12-20 ENCOUNTER — APPOINTMENT (OUTPATIENT)
Dept: ADMISSIONS | Facility: MEDICAL CENTER | Age: 83
End: 2023-12-20
Attending: UROLOGY
Payer: MEDICARE

## 2023-12-20 ENCOUNTER — APPOINTMENT (OUTPATIENT)
Dept: RADIOLOGY | Facility: MEDICAL CENTER | Age: 83
End: 2023-12-20
Attending: PHYSICAL MEDICINE & REHABILITATION
Payer: MEDICARE

## 2023-12-20 DIAGNOSIS — M47.816 LUMBAR SPONDYLOSIS: ICD-10-CM

## 2023-12-20 PROCEDURE — 72148 MRI LUMBAR SPINE W/O DYE: CPT

## 2024-01-02 ENCOUNTER — PATIENT MESSAGE (OUTPATIENT)
Dept: MEDICAL GROUP | Facility: LAB | Age: 84
End: 2024-01-02

## 2024-01-02 ENCOUNTER — PRE-ADMISSION TESTING (OUTPATIENT)
Dept: ADMISSIONS | Facility: MEDICAL CENTER | Age: 84
End: 2024-01-02
Attending: UROLOGY
Payer: MEDICARE

## 2024-01-02 VITALS — HEIGHT: 71 IN | BODY MASS INDEX: 25.24 KG/M2

## 2024-01-02 DIAGNOSIS — E11.9 TYPE 2 DIABETES MELLITUS WITHOUT COMPLICATION, WITHOUT LONG-TERM CURRENT USE OF INSULIN (HCC): ICD-10-CM

## 2024-01-02 RX ORDER — OXYCODONE HYDROCHLORIDE 5 MG/1
5 TABLET ORAL EVERY 6 HOURS PRN
Status: ON HOLD | COMMUNITY

## 2024-01-02 NOTE — PREPROCEDURE INSTRUCTIONS
"Preadmit appointment: \" Preparing for your Procedure information\" sheet given to patient with verbal and written instructions. Patient instructed to continue prescribed medications through the day before surgery, instructed to take the following medications the day of surgery per anesthesia protocol:metoprolol, Flomax.       Pt states, \"no issues with anesthesia\".  Fasting guidelines per JEANNE's instructions for type of diet and when to initiate NPO status.      All Pt's questions and concerns answered or addressed.  "

## 2024-01-02 NOTE — PROGRESS NOTES
Subjective:   Star Centeno is a 83 y.o. male here today for   Chief Complaint   Patient presents with    Other     Blood pressure.    Medication Management       #DM  Presents for follow up of diabetes mellitus. He indicates that he is feeling well and denies any symptoms referable to this diagnoses.  Specifically denies chest pain, palpitations, dyspnea, orthopnea, PND or peripheral edema, poyluria, polydipsia, urinary complaints, abdominal complaints, myalgias, numbness, weakness or other related symptoms.   Current medications: Metformin, empagliflozin.  Last A1c:   Glycohemoglobin   Date Value Ref Range Status   08/25/2023 7.1 (A) 5.8 % Final     Last Microalb/Cr ratio:   Microalbumin, Urine Random   Date Value Ref Range Status   05/22/2023 2.9 mg/dL Final     Creatinine, Urine   Date Value Ref Range Status   05/22/2023 83.53 mg/dL Final     Micro Alb Creat Ratio   Date Value Ref Range Status   05/22/2023 35 (H) 0 - 30 mg/g Final     Last lipid:   Cholesterol,Tot   Date Value Ref Range Status   05/22/2023 168 100 - 199 mg/dL Final     HDL   Date Value Ref Range Status   05/22/2023 36 (A) >=40 mg/dL Final     LDL   Date Value Ref Range Status   05/22/2023 95 <100 mg/dL Final     Triglycerides   Date Value Ref Range Status   05/22/2023 187 (H) 0 - 149 mg/dL Final     Fasting sugars:   Diabetic foot exam: up to date  Retinal eye exam: up to date  ACEi/ARB? yes  Statin? yes  Aspirin? yes  Concomitant HTN? At goal   Nightly foot checks? Yes.    # Back pain:  -Chronic ongoing condition.  Back pain is located in the lower back, lumbar area.  Patient denies any radiating pain.  Does state that standing, walking for long peers of time to make the pain is worse.  Denies any acute trauma.  Is treating with OTC analgesics with only little improvement.  Patient is here for further evaluation.  Patient denies any saddle paresthesia, bowel/bladder dysfunction    No Known Allergies      Current medicines (including changes  today)  Current Outpatient Medications   Medication Sig Dispense Refill    oxyCODONE immediate-release (ROXICODONE) 5 MG Tab 5 mg.      ezetimibe (ZETIA) 10 MG Tab Take 1 Tablet by mouth every day. Indications: High Amount of Fats in the Blood 100 Tablet 2    metoprolol SR (TOPROL XL) 25 MG TABLET SR 24 HR Take 1 Tablet by mouth every day. 90 Tablet 3    lisinopril (PRINIVIL) 10 MG Tab Take 1 Tablet by mouth every day. 90 Tablet 3    bismuth subsalicylate (PEPTO-BISMOL) 262 MG/15ML Suspension Take 30 mL by mouth every 6 hours as needed (diarrhea). Indications: Diarrhea, Heartburn      Loperamide HCl (IMODIUM A-D) 1 MG/7.5ML Liquid Take 30 mL by mouth 2 times a day as needed (diarrhea). Indications: Diarrhea      Melatonin 10 MG Cap Take 10 mg by mouth at bedtime as needed (sleep). Indications: sleep      tamsulosin (FLOMAX) 0.4 MG capsule Take 1 Capsule by mouth 1/2 hour after breakfast. 30 Capsule 1    acetaminophen (TYLENOL) 500 MG Tab Take 1 Tablet by mouth every 6 hours as needed for Mild Pain or Moderate Pain.      diclofenac sodium (VOLTAREN) 1 % Gel Apply 2 g topically 4 times a day as needed (Shoulder Pain). Indications: Joint Damage causing Pain and Loss of Function      rosuvastatin (CRESTOR) 5 MG Tab TAKE 1 TABLET BY MOUTH EVERY  Tablet 3    Empagliflozin 25 MG Tab Take 25 mg by mouth every day for 360 days. (Patient taking differently: Take 25 mg by mouth every day. evening  Indications: Type 2 Diabetes) 100 Tablet 3    metFORMIN (GLUCOPHAGE) 500 MG Tab Take 1 Tablet by mouth 2 times a day with meals for 360 days. 200 Tablet 1    Coenzyme Q10 (COQ-10) 30 MG Cap Take 30 mg by mouth every evening. Indications: heart health      Omega-3 Fatty Acids (FISH OIL) 1200 MG Cap Take 1,200 mg by mouth every morning. Indications: heart health      aspirin 81 MG EC tablet Take 81 mg by mouth every evening. Indications: blood thinner      Prenatal Multivit-Min-Fe-FA (PRE-COLE FORMULA PO) Take 1 Tablet by mouth  "every morning. Indications: supplement       No current facility-administered medications for this visit.     He  has a past medical history of Cancer ([facility]), Cataract, Diabetes (HCC), High cholesterol, History of heart artery stent, Hyperlipidemia, Hypertension, Pulmonary emboli (HCC), and Renal disorder.    ROS          Objective:     Physical Exam:  /62   Pulse 79   Temp 36.3 °C (97.3 °F) (Temporal)   Ht 1.803 m (5' 11\")   Wt 87.5 kg (193 lb)   SpO2 93%  Body mass index is 26.92 kg/m².   Constitutional: Alert, no distress, well-groomed.  Skin: No rashes in visible areas.  Eye: Round. Conjunctiva clear, lids normal. No icterus.   ENMT: Lips pink without lesions, good dentition, moist mucous membranes. Phonation normal.  Neck: No masses, no thyromegaly. Moves freely without pain.  Respiratory: Unlabored respiratory effort, no cough or audible wheeze  Psych: Alert and oriented x3, normal affect and mood.       Assessment and Plan:     1. Type 2 diabetes mellitus without complication, without long-term current use of insulin ([facility])  -Chronic condition, stable.  Will continue with empagliflozin.  Will refill medications at this time.  Continue work on appropriate diet and exercise regiment.  -Reviewed labs including hemoglobin A1c, protein creatinine ratio, lipid profile, all of which are up-to-date with no concerns.    2. Stage 3b chronic kidney disease (HCC)  -Chronic addition, stable.  Blood pressure is at goal.  Reviewed creatinine levels.  Reviewed GFR, protein creatinine ratio.  Continue the appropriate diet and exercise regimen including appropriate hydration, low-sodium.  Discussed avoidance of NSAIDs.    3. Chronic bilateral low back pain without sciatica  -Discussed the use of Tylenol (avoidance of NSAIDs given chronic kidney disease.  We discussed going to physical therapy; however, patient would first like to complete x-ray which we will order at this time.  Will follow-up with patient with " results.  - DX-LUMBAR SPINE-2 OR 3 VIEWS; Future      Followup: Return in about 3 months (around 8/25/2023).         PLEASE NOTE: This dictation was created using voice recognition software. I have made every reasonable attempt to correct obvious errors, but I expect that there are errors of grammar and possibly content that I did not discover before finalizing the note.

## 2024-01-04 ENCOUNTER — APPOINTMENT (OUTPATIENT)
Dept: RADIOLOGY | Facility: MEDICAL CENTER | Age: 84
End: 2024-01-04
Attending: UROLOGY
Payer: MEDICARE

## 2024-01-04 ENCOUNTER — HOSPITAL ENCOUNTER (OUTPATIENT)
Facility: MEDICAL CENTER | Age: 84
End: 2024-01-05
Attending: UROLOGY | Admitting: UROLOGY
Payer: MEDICARE

## 2024-01-04 ENCOUNTER — ANESTHESIA EVENT (OUTPATIENT)
Dept: SURGERY | Facility: MEDICAL CENTER | Age: 84
End: 2024-01-04
Payer: MEDICARE

## 2024-01-04 ENCOUNTER — ANESTHESIA (OUTPATIENT)
Dept: SURGERY | Facility: MEDICAL CENTER | Age: 84
End: 2024-01-04
Payer: MEDICARE

## 2024-01-04 DIAGNOSIS — A41.9 SEPSIS WITH ACUTE RENAL FAILURE AND SEPTIC SHOCK, DUE TO UNSPECIFIED ORGANISM, UNSPECIFIED ACUTE RENAL FAILURE TYPE (HCC): ICD-10-CM

## 2024-01-04 DIAGNOSIS — R65.21 SEPSIS WITH ACUTE RENAL FAILURE AND SEPTIC SHOCK, DUE TO UNSPECIFIED ORGANISM, UNSPECIFIED ACUTE RENAL FAILURE TYPE (HCC): ICD-10-CM

## 2024-01-04 DIAGNOSIS — N17.9 SEPSIS WITH ACUTE RENAL FAILURE AND SEPTIC SHOCK, DUE TO UNSPECIFIED ORGANISM, UNSPECIFIED ACUTE RENAL FAILURE TYPE (HCC): ICD-10-CM

## 2024-01-04 PROBLEM — N13.30 HYDRONEPHROSIS OF LEFT KIDNEY: Status: ACTIVE | Noted: 2024-01-04

## 2024-01-04 LAB
CYTOLOGY REG CYTOL: NORMAL
CYTOLOGY REG CYTOL: NORMAL
GLUCOSE BLD STRIP.AUTO-MCNC: 162 MG/DL (ref 65–99)
GLUCOSE BLD STRIP.AUTO-MCNC: 178 MG/DL (ref 65–99)
GRAM STN SPEC: NORMAL
GRAM STN SPEC: NORMAL
PATHOLOGY CONSULT NOTE: NORMAL
SIGNIFICANT IND 70042: NORMAL
SIGNIFICANT IND 70042: NORMAL
SITE SITE: NORMAL
SITE SITE: NORMAL
SOURCE SOURCE: NORMAL
SOURCE SOURCE: NORMAL

## 2024-01-04 PROCEDURE — C2617 STENT, NON-COR, TEM W/O DEL: HCPCS | Performed by: UROLOGY

## 2024-01-04 PROCEDURE — 88112 CYTOPATH CELL ENHANCE TECH: CPT

## 2024-01-04 PROCEDURE — 87077 CULTURE AEROBIC IDENTIFY: CPT | Mod: 91

## 2024-01-04 PROCEDURE — 96372 THER/PROPH/DIAG INJ SC/IM: CPT | Mod: XU

## 2024-01-04 PROCEDURE — 160009 HCHG ANES TIME/MIN: Performed by: UROLOGY

## 2024-01-04 PROCEDURE — G0378 HOSPITAL OBSERVATION PER HR: HCPCS

## 2024-01-04 PROCEDURE — 700111 HCHG RX REV CODE 636 W/ 250 OVERRIDE (IP): Performed by: ANESTHESIOLOGY

## 2024-01-04 PROCEDURE — 87205 SMEAR GRAM STAIN: CPT | Mod: 91

## 2024-01-04 PROCEDURE — 96375 TX/PRO/DX INJ NEW DRUG ADDON: CPT | Mod: XU

## 2024-01-04 PROCEDURE — 74018 RADEX ABDOMEN 1 VIEW: CPT

## 2024-01-04 PROCEDURE — 700105 HCHG RX REV CODE 258: Performed by: UROLOGY

## 2024-01-04 PROCEDURE — 87186 SC STD MICRODIL/AGAR DIL: CPT

## 2024-01-04 PROCEDURE — 700101 HCHG RX REV CODE 250: Performed by: ANESTHESIOLOGY

## 2024-01-04 PROCEDURE — 160039 HCHG SURGERY MINUTES - EA ADDL 1 MIN LEVEL 3: Performed by: UROLOGY

## 2024-01-04 PROCEDURE — 700102 HCHG RX REV CODE 250 W/ 637 OVERRIDE(OP): Performed by: UROLOGY

## 2024-01-04 PROCEDURE — 87086 URINE CULTURE/COLONY COUNT: CPT | Mod: XU

## 2024-01-04 PROCEDURE — C1758 CATHETER, URETERAL: HCPCS | Performed by: UROLOGY

## 2024-01-04 PROCEDURE — 94760 N-INVAS EAR/PLS OXIMETRY 1: CPT

## 2024-01-04 PROCEDURE — A9270 NON-COVERED ITEM OR SERVICE: HCPCS | Performed by: UROLOGY

## 2024-01-04 PROCEDURE — 88305 TISSUE EXAM BY PATHOLOGIST: CPT | Mod: 59

## 2024-01-04 PROCEDURE — 87075 CULTR BACTERIA EXCEPT BLOOD: CPT | Mod: XU

## 2024-01-04 PROCEDURE — 700105 HCHG RX REV CODE 258: Performed by: ANESTHESIOLOGY

## 2024-01-04 PROCEDURE — 160035 HCHG PACU - 1ST 60 MINS PHASE I: Performed by: UROLOGY

## 2024-01-04 PROCEDURE — 82962 GLUCOSE BLOOD TEST: CPT

## 2024-01-04 PROCEDURE — 160002 HCHG RECOVERY MINUTES (STAT): Performed by: UROLOGY

## 2024-01-04 PROCEDURE — 160028 HCHG SURGERY MINUTES - 1ST 30 MINS LEVEL 3: Performed by: UROLOGY

## 2024-01-04 PROCEDURE — 160048 HCHG OR STATISTICAL LEVEL 1-5: Performed by: UROLOGY

## 2024-01-04 PROCEDURE — 700117 HCHG RX CONTRAST REV CODE 255: Performed by: UROLOGY

## 2024-01-04 PROCEDURE — 160036 HCHG PACU - EA ADDL 30 MINS PHASE I: Performed by: UROLOGY

## 2024-01-04 DEVICE — STENT UROLOGICAL POLARIS 8X26  ULTRA: Type: IMPLANTABLE DEVICE | Site: URETER | Status: FUNCTIONAL

## 2024-01-04 RX ORDER — KETOROLAC TROMETHAMINE 30 MG/ML
INJECTION, SOLUTION INTRAMUSCULAR; INTRAVENOUS PRN
Status: DISCONTINUED | OUTPATIENT
Start: 2024-01-04 | End: 2024-01-05 | Stop reason: SURG

## 2024-01-04 RX ORDER — HYDROMORPHONE HYDROCHLORIDE 1 MG/ML
0.1 INJECTION, SOLUTION INTRAMUSCULAR; INTRAVENOUS; SUBCUTANEOUS
Status: DISCONTINUED | OUTPATIENT
Start: 2024-01-04 | End: 2024-01-04 | Stop reason: HOSPADM

## 2024-01-04 RX ORDER — ONDANSETRON 2 MG/ML
4 INJECTION INTRAMUSCULAR; INTRAVENOUS
Status: DISCONTINUED | OUTPATIENT
Start: 2024-01-04 | End: 2024-01-04 | Stop reason: HOSPADM

## 2024-01-04 RX ORDER — DIPHENHYDRAMINE HYDROCHLORIDE 50 MG/ML
12.5 INJECTION INTRAMUSCULAR; INTRAVENOUS
Status: DISCONTINUED | OUTPATIENT
Start: 2024-01-04 | End: 2024-01-04 | Stop reason: HOSPADM

## 2024-01-04 RX ORDER — SODIUM CHLORIDE, SODIUM LACTATE, POTASSIUM CHLORIDE, CALCIUM CHLORIDE 600; 310; 30; 20 MG/100ML; MG/100ML; MG/100ML; MG/100ML
1000 INJECTION, SOLUTION INTRAVENOUS ONCE
Status: COMPLETED | OUTPATIENT
Start: 2024-01-04 | End: 2024-01-04

## 2024-01-04 RX ORDER — MEPERIDINE HYDROCHLORIDE 25 MG/ML
12.5 INJECTION INTRAMUSCULAR; INTRAVENOUS; SUBCUTANEOUS
Status: DISCONTINUED | OUTPATIENT
Start: 2024-01-04 | End: 2024-01-04 | Stop reason: HOSPADM

## 2024-01-04 RX ORDER — DEXAMETHASONE SODIUM PHOSPHATE 4 MG/ML
4 INJECTION, SOLUTION INTRA-ARTICULAR; INTRALESIONAL; INTRAMUSCULAR; INTRAVENOUS; SOFT TISSUE
Status: DISCONTINUED | OUTPATIENT
Start: 2024-01-04 | End: 2024-01-05 | Stop reason: HOSPADM

## 2024-01-04 RX ORDER — DIPHENHYDRAMINE HYDROCHLORIDE 50 MG/ML
25 INJECTION INTRAMUSCULAR; INTRAVENOUS EVERY 6 HOURS PRN
Status: DISCONTINUED | OUTPATIENT
Start: 2024-01-04 | End: 2024-01-05 | Stop reason: HOSPADM

## 2024-01-04 RX ORDER — ONDANSETRON 2 MG/ML
4 INJECTION INTRAMUSCULAR; INTRAVENOUS EVERY 4 HOURS PRN
Status: DISCONTINUED | OUTPATIENT
Start: 2024-01-04 | End: 2024-01-05 | Stop reason: HOSPADM

## 2024-01-04 RX ORDER — CEFAZOLIN SODIUM 1 G/3ML
INJECTION, POWDER, FOR SOLUTION INTRAMUSCULAR; INTRAVENOUS PRN
Status: DISCONTINUED | OUTPATIENT
Start: 2024-01-04 | End: 2024-01-05 | Stop reason: SURG

## 2024-01-04 RX ORDER — DEXAMETHASONE SODIUM PHOSPHATE 4 MG/ML
INJECTION, SOLUTION INTRA-ARTICULAR; INTRALESIONAL; INTRAMUSCULAR; INTRAVENOUS; SOFT TISSUE PRN
Status: DISCONTINUED | OUTPATIENT
Start: 2024-01-04 | End: 2024-01-05 | Stop reason: SURG

## 2024-01-04 RX ORDER — POLYETHYLENE GLYCOL 3350 17 G/17G
1 POWDER, FOR SOLUTION ORAL DAILY
Status: DISCONTINUED | OUTPATIENT
Start: 2024-01-05 | End: 2024-01-05 | Stop reason: HOSPADM

## 2024-01-04 RX ORDER — ONDANSETRON 2 MG/ML
INJECTION INTRAMUSCULAR; INTRAVENOUS PRN
Status: DISCONTINUED | OUTPATIENT
Start: 2024-01-04 | End: 2024-01-05 | Stop reason: SURG

## 2024-01-04 RX ORDER — OXYCODONE HCL 5 MG/5 ML
10 SOLUTION, ORAL ORAL
Status: DISCONTINUED | OUTPATIENT
Start: 2024-01-04 | End: 2024-01-04 | Stop reason: HOSPADM

## 2024-01-04 RX ORDER — HYDROMORPHONE HYDROCHLORIDE 1 MG/ML
0.4 INJECTION, SOLUTION INTRAMUSCULAR; INTRAVENOUS; SUBCUTANEOUS
Status: DISCONTINUED | OUTPATIENT
Start: 2024-01-04 | End: 2024-01-04 | Stop reason: HOSPADM

## 2024-01-04 RX ORDER — ACETAMINOPHEN 500 MG
1000 TABLET ORAL EVERY 6 HOURS PRN
Status: DISCONTINUED | OUTPATIENT
Start: 2024-01-09 | End: 2024-01-05 | Stop reason: HOSPADM

## 2024-01-04 RX ORDER — SCOLOPAMINE TRANSDERMAL SYSTEM 1 MG/1
1 PATCH, EXTENDED RELEASE TRANSDERMAL
Status: DISCONTINUED | OUTPATIENT
Start: 2024-01-04 | End: 2024-01-05 | Stop reason: HOSPADM

## 2024-01-04 RX ORDER — HALOPERIDOL 5 MG/ML
1 INJECTION INTRAMUSCULAR
Status: DISCONTINUED | OUTPATIENT
Start: 2024-01-04 | End: 2024-01-04 | Stop reason: HOSPADM

## 2024-01-04 RX ORDER — TAMSULOSIN HYDROCHLORIDE 0.4 MG/1
0.4 CAPSULE ORAL
Status: DISCONTINUED | OUTPATIENT
Start: 2024-01-05 | End: 2024-01-05 | Stop reason: HOSPADM

## 2024-01-04 RX ORDER — CHOLECALCIFEROL (VITAMIN D3) 125 MCG
10 CAPSULE ORAL NIGHTLY PRN
Status: DISCONTINUED | OUTPATIENT
Start: 2024-01-04 | End: 2024-01-05 | Stop reason: HOSPADM

## 2024-01-04 RX ORDER — OXYCODONE HCL 5 MG/5 ML
5 SOLUTION, ORAL ORAL
Status: DISCONTINUED | OUTPATIENT
Start: 2024-01-04 | End: 2024-01-04 | Stop reason: HOSPADM

## 2024-01-04 RX ORDER — CEFTRIAXONE 2 G/1
2 INJECTION, POWDER, FOR SOLUTION INTRAMUSCULAR; INTRAVENOUS ONCE
Status: DISCONTINUED | OUTPATIENT
Start: 2024-01-04 | End: 2024-01-04 | Stop reason: HOSPADM

## 2024-01-04 RX ORDER — DEXTROSE MONOHYDRATE 25 G/50ML
25 INJECTION, SOLUTION INTRAVENOUS
Status: DISCONTINUED | OUTPATIENT
Start: 2024-01-04 | End: 2024-01-05 | Stop reason: HOSPADM

## 2024-01-04 RX ORDER — CEFTRIAXONE 1 G/1
INJECTION, POWDER, FOR SOLUTION INTRAMUSCULAR; INTRAVENOUS PRN
Status: DISCONTINUED | OUTPATIENT
Start: 2024-01-04 | End: 2024-01-05 | Stop reason: SURG

## 2024-01-04 RX ORDER — SODIUM CHLORIDE, SODIUM LACTATE, POTASSIUM CHLORIDE, CALCIUM CHLORIDE 600; 310; 30; 20 MG/100ML; MG/100ML; MG/100ML; MG/100ML
INJECTION, SOLUTION INTRAVENOUS CONTINUOUS
Status: ACTIVE | OUTPATIENT
Start: 2024-01-04 | End: 2024-01-04

## 2024-01-04 RX ORDER — HALOPERIDOL 5 MG/ML
1 INJECTION INTRAMUSCULAR EVERY 6 HOURS PRN
Status: DISCONTINUED | OUTPATIENT
Start: 2024-01-04 | End: 2024-01-05 | Stop reason: HOSPADM

## 2024-01-04 RX ORDER — HYDROMORPHONE HYDROCHLORIDE 1 MG/ML
0.2 INJECTION, SOLUTION INTRAMUSCULAR; INTRAVENOUS; SUBCUTANEOUS
Status: DISCONTINUED | OUTPATIENT
Start: 2024-01-04 | End: 2024-01-04 | Stop reason: HOSPADM

## 2024-01-04 RX ORDER — SODIUM CHLORIDE 9 MG/ML
INJECTION, SOLUTION INTRAVENOUS CONTINUOUS
Status: ACTIVE | OUTPATIENT
Start: 2024-01-04 | End: 2024-01-05

## 2024-01-04 RX ORDER — ACETAMINOPHEN 500 MG
1000 TABLET ORAL EVERY 6 HOURS
Status: DISCONTINUED | OUTPATIENT
Start: 2024-01-04 | End: 2024-01-05 | Stop reason: HOSPADM

## 2024-01-04 RX ADMIN — ONDANSETRON 4 MG: 2 INJECTION INTRAMUSCULAR; INTRAVENOUS at 12:53

## 2024-01-04 RX ADMIN — KETOROLAC TROMETHAMINE 30 MG: 30 INJECTION, SOLUTION INTRAMUSCULAR; INTRAVENOUS at 12:48

## 2024-01-04 RX ADMIN — CEFTRIAXONE SODIUM 2 G: 1 INJECTION, POWDER, FOR SOLUTION INTRAMUSCULAR; INTRAVENOUS at 13:05

## 2024-01-04 RX ADMIN — CEFAZOLIN 2 G: 1 INJECTION, POWDER, FOR SOLUTION INTRAMUSCULAR; INTRAVENOUS at 12:43

## 2024-01-04 RX ADMIN — SODIUM CHLORIDE, POTASSIUM CHLORIDE, SODIUM LACTATE AND CALCIUM CHLORIDE: 600; 310; 30; 20 INJECTION, SOLUTION INTRAVENOUS at 10:44

## 2024-01-04 RX ADMIN — SODIUM CHLORIDE, POTASSIUM CHLORIDE, SODIUM LACTATE AND CALCIUM CHLORIDE 1000 ML: 600; 310; 30; 20 INJECTION, SOLUTION INTRAVENOUS at 09:46

## 2024-01-04 RX ADMIN — PROPOFOL 100 MG: 10 INJECTION, EMULSION INTRAVENOUS at 12:47

## 2024-01-04 RX ADMIN — SUGAMMADEX 200 MG: 100 INJECTION, SOLUTION INTRAVENOUS at 14:07

## 2024-01-04 RX ADMIN — ACETAMINOPHEN 1000 MG: 500 TABLET ORAL at 20:30

## 2024-01-04 RX ADMIN — MEPERIDINE HYDROCHLORIDE 12.5 MG: 25 INJECTION INTRAMUSCULAR; INTRAVENOUS; SUBCUTANEOUS at 15:30

## 2024-01-04 RX ADMIN — ROCURONIUM BROMIDE 50 MG: 10 INJECTION, SOLUTION INTRAVENOUS at 12:49

## 2024-01-04 RX ADMIN — INSULIN HUMAN 2 UNITS: 100 INJECTION, SOLUTION PARENTERAL at 20:42

## 2024-01-04 RX ADMIN — DEXAMETHASONE SODIUM PHOSPHATE 4 MG: 4 INJECTION INTRA-ARTICULAR; INTRALESIONAL; INTRAMUSCULAR; INTRAVENOUS; SOFT TISSUE at 12:48

## 2024-01-04 RX ADMIN — SODIUM CHLORIDE 1000 ML: 9 INJECTION, SOLUTION INTRAVENOUS at 20:37

## 2024-01-04 ASSESSMENT — LIFESTYLE VARIABLES
HOW MANY TIMES IN THE PAST YEAR HAVE YOU HAD 5 OR MORE DRINKS IN A DAY: 0
EVER HAD A DRINK FIRST THING IN THE MORNING TO STEADY YOUR NERVES TO GET RID OF A HANGOVER: NO
ALCOHOL_USE: YES
HAVE YOU EVER FELT YOU SHOULD CUT DOWN ON YOUR DRINKING: NO
HAVE PEOPLE ANNOYED YOU BY CRITICIZING YOUR DRINKING: NO
AVERAGE NUMBER OF DAYS PER WEEK YOU HAVE A DRINK CONTAINING ALCOHOL: 5
CONSUMPTION TOTAL: NEGATIVE
TOTAL SCORE: 0
ON A TYPICAL DAY WHEN YOU DRINK ALCOHOL HOW MANY DRINKS DO YOU HAVE: 1
TOTAL SCORE: 0
TOTAL SCORE: 0
EVER FELT BAD OR GUILTY ABOUT YOUR DRINKING: NO

## 2024-01-04 ASSESSMENT — PAIN DESCRIPTION - PAIN TYPE
TYPE: SURGICAL PAIN
TYPE: ACUTE PAIN

## 2024-01-04 ASSESSMENT — COGNITIVE AND FUNCTIONAL STATUS - GENERAL
STANDING UP FROM CHAIR USING ARMS: A LITTLE
TURNING FROM BACK TO SIDE WHILE IN FLAT BAD: A LITTLE
SUGGESTED CMS G CODE MODIFIER DAILY ACTIVITY: CJ
TOILETING: A LITTLE
WALKING IN HOSPITAL ROOM: A LITTLE
MOVING FROM LYING ON BACK TO SITTING ON SIDE OF FLAT BED: A LITTLE
MOVING TO AND FROM BED TO CHAIR: A LITTLE
HELP NEEDED FOR BATHING: A LITTLE
DRESSING REGULAR UPPER BODY CLOTHING: A LITTLE
DAILY ACTIVITIY SCORE: 20
SUGGESTED CMS G CODE MODIFIER MOBILITY: CK
MOBILITY SCORE: 19
DRESSING REGULAR LOWER BODY CLOTHING: A LITTLE

## 2024-01-04 ASSESSMENT — PATIENT HEALTH QUESTIONNAIRE - PHQ9
8. MOVING OR SPEAKING SO SLOWLY THAT OTHER PEOPLE COULD HAVE NOTICED. OR THE OPPOSITE, BEING SO FIGETY OR RESTLESS THAT YOU HAVE BEEN MOVING AROUND A LOT MORE THAN USUAL: NOT AT ALL
9. THOUGHTS THAT YOU WOULD BE BETTER OFF DEAD, OR OF HURTING YOURSELF: NOT AT ALL
SUM OF ALL RESPONSES TO PHQ9 QUESTIONS 1 AND 2: 0
9. THOUGHTS THAT YOU WOULD BE BETTER OFF DEAD, OR OF HURTING YOURSELF: NOT AT ALL
1. LITTLE INTEREST OR PLEASURE IN DOING THINGS: NOT AT ALL
7. TROUBLE CONCENTRATING ON THINGS, SUCH AS READING THE NEWSPAPER OR WATCHING TELEVISION: NOT AT ALL
4. FEELING TIRED OR HAVING LITTLE ENERGY: SEVERAL DAYS
2. FEELING DOWN, DEPRESSED, IRRITABLE, OR HOPELESS: NOT AT ALL
5. POOR APPETITE OR OVEREATING: NOT AT ALL
SUM OF ALL RESPONSES TO PHQ9 QUESTIONS 1 AND 2: 0
1. LITTLE INTEREST OR PLEASURE IN DOING THINGS: NOT AT ALL
6. FEELING BAD ABOUT YOURSELF - OR THAT YOU ARE A FAILURE OR HAVE LET YOURSELF OR YOUR FAMILY DOWN: NOT AL ALL
8. MOVING OR SPEAKING SO SLOWLY THAT OTHER PEOPLE COULD HAVE NOTICED. OR THE OPPOSITE, BEING SO FIGETY OR RESTLESS THAT YOU HAVE BEEN MOVING AROUND A LOT MORE THAN USUAL: NOT AT ALL
3. TROUBLE FALLING OR STAYING ASLEEP OR SLEEPING TOO MUCH: SEVERAL DAYS
7. TROUBLE CONCENTRATING ON THINGS, SUCH AS READING THE NEWSPAPER OR WATCHING TELEVISION: NOT AT ALL
SUM OF ALL RESPONSES TO PHQ QUESTIONS 1-9: 2
5. POOR APPETITE OR OVEREATING: NOT AT ALL

## 2024-01-04 ASSESSMENT — FIBROSIS 4 INDEX
FIB4 SCORE: 2.496150883013531049
FIB4 SCORE: 2.496150883013531049

## 2024-01-04 NOTE — H&P
No changes to below note. To OR for cystoscopy, possible TURBT, left ureteroscopy and biopsy with ureteral stent replacement. Will obtain cytology from both bladder and left upper tract. Antibiotics with Ceftriaxone today, then continue Augmentin post-op.     _______________________  Shane Escalona MD  Urologic Surgical Oncology  [facility]    ---  Assessment & Plan  hydronephrosis  s/p cystoscopy, left retrograde pyelogram and ureteroscopy, with bladder bx and left ureteral stent placement by Dr. Londono on 10/25/2023. Findings consisted of L hydroureteronephrosis, 2.5cm severe narrowing of the distal L ureter. Cystoscopy on 11/13/2023 with no obvious new disease in bladder. The stent was left in place. Several days after that did require admission for urosepsis after self-catheterization attempt. Now voiding independently, nocturia 4-5x, no signs of infection, and completed anticoagulation.     MAG 3 scan (12/06/2023) depicted retention of tracer in the left kidney with some Lasix response, concerning for partial obstruction. MRI (12/07/2023) depicted mild diffuse bladder wall thickening and trabeculation. Somewhat more focal thickening about the LEFT UVJ which may be inflammatory or less likely neoplastic. No adenopathy demonstrated. Imaging reviewed in detail with patient and family.    - Given reassuring results but presumed need for ureteral stent given possible obstruction despite the stent, I recommended 3 month Left ureteroscopy with biopsy and stent placement, as well as cytology  - If biopsy once again was not conclusive for malignancy, then it may be more likely that the left ureteral narrowing is a benign stricture and we could subsequently consider endoscopic balloon dilation with stent placement, followed by removal of the stent and repeat of Mag-3  - Patient elected to proceed next available. Will be a hospital stay. given previous complicated UTI. Pre-op with PAC to include labs, urine  culture, and also will need medical clearance.  - Preop urine culture: if growth of bacteria, treat based on culture data with at least 3d of ABx prior to surgery; if no growth of bacteria, treat on the basis of 11/17/23 UC (Klebsiella and Enterococcus) with Augmentin.    Order cystoscopy, with ureteroscopy, with retrograde ureteropyelogram, with stent placement (SURG)  mass of urinary bladder  Bladder wall and left distal ureter thickening.     MRI (12/07/2023) depicted mild diffuse bladder wall thickening and trabeculation. Somewhat more focal thickening about the LEFT UVJ which may be inflammatory or less likely neoplastic. No adenopathy demonstrated.    - UA 12/11/2023: Large +++ Blood  - Discussed MRI was assuring for low chance of neoplasm.  Received and discussed urinalysis, dipstick results  clot retention of urine  Multiple episodes of clot retention (about 8-9 times) which prompted hospitalization from 10/30/2023 to 11/06/2023. Rodarte was removed (after discussion via televisit with patient and Dr. ZHANG on 11/02/2023) on 11/03/2023 during hospital admission, though patient was unable to void, residuals ~500cc, rodarte was replaced on 11/04/2023. Patient was discharged on 11/06/2023 with rodarte catheter in place and resolution of gross hematuria.     TOV (11/15/2023) with 300cc in, 300 out, however patient with spasms during filling. A re-attempted filling with 400cc in, patient spasmed out 400cc. Lastly, the bladder was filled with 200cc, though bladder spasms were also appreciated at this time. PVR=35cc.    Patient notes occasional dysuria when he begins to urinate, and resolves towards the end. Previously discussed this is most likely due to his stent. UA ( 11/29/2023) showing +1 leuks, and +3 blood.  abnormal findings on diagnostic imaging of urinary organs  s/p cystoscopy, left retrograde pyelogram and ureteroscopy, with bladder bx and left ureteral stent placement by Dr. Londono on 10/25/2023. Findings  consisted of L hydroureteronephrosis, 2.5cm severe narrowing of the distal L ureter. Circumferential narrowing of the L distal ureter concerning for malignancy. Unable to obtain a tissue specimen given space restriction, cytology was taken. There was some rough erythema surrounding the L UO, biopsy taken. 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 Both are possible given history of radiation. Left stent remains in place as hematuria resolved.    L4 follow-up visit based on complexity and time spent (30 minutes), more than half of which was spent directly with the patient and additionally included non-direct time for chart review, documentation, and coordination of care.      history of malignant neoplasm of prostate  Hx CaP, diagnosed in 2004. Treated with brachytherapy at that time. Latest PSA <0.02 in 08/2022.  History of Present Illness  82yoM with history of high-grade prostate cancer (treated ~2003 in the [place] with XRT/brachytherapy, presented to the ER on 10/2023 for GH, LLQ abdominal pain. CTU findings (10/2023) suggestive of a possible obstruction at the level of the left UVJ with no evidence of obstructive stone. S/p cystoscopy, left retrograde pyelogram and ureteroscopy, with bladder bx and left ureteral stent placement by Dr. Londono on 10/25/2023. Findings consisted of L hydroureteronephrosis, 2.5cm severe narrowing of the distal L ureter. Filling defect of the L distal ureter concerning for malignancy. Unable to obtain a tissue specimen, cytology was taken, There was some rough erythema surrounding the L UO, biopsy taken. Pathology was benign, but cytology found to have atypical cells, suspicious for malignancy. Multiple episodes of clot retention on 10/30/23 required hand irrigation and  CBI in the ER. Rodarte was removed (after discussion via tele-visit with patient and Dr. Escalona on 2023) on 2023 during hospital admission, though patient was unable to void, residuals ~500cc, rodarte was replaced on 2023. Patient was discharged on 2023 with rodarte catheter in place and resolution of gross hematuria. 2023 cystoscopy with no evidence of malignancy in bladder, L stent remained in place as no hematuria and to help with kidney function and dilation of ureter. TOV 2023 successful.       Patient returns 2023 to discuss imaging results and next steps of care. MAG 3 scan (2023) depicted retention of tracer in the left kidney with some Lasix response, concerning for partial obstruction. MRI (2023) depicted mild diffuse bladder wall thickening and trabeculation. Somewhat more focal thickening about the LEFT UVJ which may be inflammatory or less likely neoplastic. No adenopathy demonstrated. He reports nocturia 4-5x. No issues with infections. Completed anticoagulation.     Renal Function:  2023: BUN: 26, Cr: 1.32, GFR: 53   2023: BUN: 27, Cr: 1.35, GFR: 43   2023: BUN: 29, Cr: 1.69, GFR: 39     Cultures:  10/30/2023: UCx: negative  10/12/2023: UCx: E coli    PSA Timeline:  2022: Total PSA: <0.02    Imagin2023: MRI Pelvis w/wo: Mild diffuse bladder wall thickening and trabeculation. Somewhat more focal thickening about the LEFT Ureterovesicular junction which may be inflammatory or less likely neoplastic.   No adenopathy demonstrated.  2023: MAG 3: Normal right kidney. Retention of tracer in the left kidney with some Lasix response, concerning for partial obstruction. 40% Function on the left and 60% on the right.   10/2023 CTU showed mild to moderate left hydronephrosis and hydroureter extending down to the level of the left UVJ, concerning for neoplasm causing obstruction, fibrosis or invasion from prostate carcinoma  also possible.   Review of Systems  ROS as noted in the HPI  Screening  None recorded  Physical Exam  UN - Problem focused exam - MALE    Constitutional  General Appearance: well-nourished, healthy-appearing, cooperative, well groomed  Level of Distress: no acute distress    Respiratory  Respiratory Movements normal respiration effort, good air entry    Skin  General Appearance of extremities: no edema  Inspection and palpation: no rash    Neurological/Psychiatric  Neurological/Psychiatric: normal mood, normal affect, (normal) orientation: person, (normal) orientation: time, (normal) orientation: place

## 2024-01-04 NOTE — OR NURSING
1423 To PACU from OR by a gurantonio; pt sleeping, respirations spontaneous and nonlabored.    1435 pt care transferred to JB Molina.

## 2024-01-04 NOTE — OR NURSING
Pt allergies and NPO status verified. Home medications reconciled, preferred pharmacy verified. Belongings secured in locker. Pt verbalizes understanding of pain scale, expected course of stay, and plan of care. Surgical site verified with pt. IV access established. All questions answered. Bed in lowest position, call light within reach.   MD Rogers updated on initial and rechecked BP. Per MD Rogers, provide LR bolus. MD updated on BP following bolus. Per MD, will come to bedside to assess.

## 2024-01-04 NOTE — ANESTHESIA PROCEDURE NOTES
Airway    Date/Time: 1/4/2024 12:46 PM    Performed by: Chris Rogers M.D.  Authorized by: Chris Rogers M.D.    Location:  OR  Urgency:  Elective  Indications for Airway Management:  Anesthesia      Spontaneous Ventilation: absent    Sedation Level:  Deep  Preoxygenated: Yes    Final Airway Type:  Supraglottic airway  Final Supraglottic Airway:  Standard LMA    SGA Size:  3  Number of Attempts at Approach:  1

## 2024-01-04 NOTE — OR NURSING
1435: Report rec'd, care assumed. Pt is awake and alert. Denies pain or nausea, but says he is freezing, Arabella hugger applied. 3-way irrigation rodarte in place, will wean per order.    1500: Pt remains pain/nausea free, but still feels like he is freezing applied despite warmer.    1530: No change, Demerol given for shivers.    1536: Pt feels like he is starting to warm.    1540: Dr. Hua at bedside. Rodarte irrigant has been stopped for 20 minutes. Drainage remains light pink, will cap port.    1601: Irrigant = 3300ml in that started in OR. Output in PACU = 450. Bladder is soft, non distended. Meets criteria for transfer to room. Report given to SYEDA Sultana RN.

## 2024-01-04 NOTE — OP REPORT
Full Operative Note    Patient Name: Star Centeno    MRN: [id number]    Date of Surgery: 01/04/24    Pre-operative diagnosis: Left hydronephrosis and atypical urine cytology in the setting of history of radiation therapy  Post-operative diagnosis: Same    Procedure:  Cystoscopy, left ureteroscopy and ureteral biopsy  Left ureteral stent exchange  Complex catheter placement over wire    Anesthesiologist: Chris Rogers M.D.        Anesthesia Type: General    Estimated Blood Loss: Minimal    Fluids in: Crystalloid only    Specimen:   1.  Urine from bladder for cytology  2.  Urine from the left upper tract for culture and cytology  3.  Left distal ureteral biopsy  4.  Left most distal ureteral biopsy    Drains:  1.  Left 8 Chinese 26 cm double-J ureteral stent, not on strings  2.  20 Chinese three-way catheter in bladder, 20 mL in balloon, on CBI    Indications for Prodecure: The patient is a 83 y.o. male with the above history.  He is status post cystoscopy, left retrograde pyelogram and ureteroscopy, with bladder bx and left ureteral stent placement by Dr. Londono on 10/25/2023. Findings consisted of L hydroureteronephrosis, 2.5cm severe narrowing of the distal L ureter. Cystoscopy on 11/13/2023 with no obvious new disease in bladder. The stent was left in place. Several days after that did require admission for urosepsis after self-catheterization attempt. MAG 3 scan (12/06/2023) depicted retention of tracer in the left kidney with some Lasix response, concerning for partial obstruction. MRI (12/07/2023) depicted mild diffuse bladder wall thickening and trabeculation. Somewhat more focal thickening about the LEFT UVJ which may be inflammatory or less likely neoplastic. No adenopathy demonstrated. Imaging reviewed in detail with patient and family.     - Given reassuring results but presumed need for ureteral stent given possible obstruction despite the stent, I recommended 3 month Left ureteroscopy with biopsy  and stent placement, as well as cytology  - If biopsy once again was not conclusive for malignancy, then it may be more likely that the left ureteral narrowing is a benign stricture and we could subsequently consider endoscopic balloon dilation with stent placement, followed by removal of the stent and repeat of Mag-3  - Patient elected to proceed and presents today for this procedure. The risks, benefits and alternatives were explained to the patient in detail and he agreed to proceed. Consent was obtained.     Procedure Findings:   1.  Urethra required dilation with sounds to 26 Thai.  The prostatic urethra was very attenuated given the setting of prostate brachytherapy.  There was an anterior urethral false past that required navigation with a wire.  There were no urethral lesions concerning for malignancy.  The urethra could not accommodate a 20 Thai cystoscope.  2.  Bladder with severe trabeculations and no obvious malignancy.  The indwelling left ureteral stent was visualized emanating from the left ureteral orifice.  3.  Left ureteroscopy with no obvious lesions nor papillary tumors in the ureter.  There was a narrowing of approximately 2 to 3 cm involving the distal ureter as was previously described.  3 biopsies were taken at different levels along this narrowing.  The tissue at the left UVJ was also biopsied and sent separately for pathology evaluation.  4.  Left hydro and ureteral nephrosis with decompression of cloudy appearing urine from the left renal pelvis (total of 15 mL).  This was sent for both culture and cytology.  5.  Uncomplicated placement of left ureteral stent  6.  Complex placement of urethral catheter over a wire given the above noted findings in the urethra.      Description of Procedure: The patient was taken to the operating room, placed in supine position on the operating table. Once general anesthesia was achieved, the patient was repositioned to the lithotomy position.  The  perineum was prepped and draped in the standard fashion. A surgical time-out was performed.    The urethra was serially dilated with sounds.  The urethra could not accommodate a 25 Kuwaiti cystoscope.  The urethra could not accommodate a 20 Kuwaiti cystoscope, but the assistance of wire was required at the level of the prostatic urethra and the false passage.  Once in the bladder, the bladder was emptied and the urine was sent for cytology.  The above findings were noted.  The left ureteral stent was grasped at the most distal curl and pulled to just past the tip of the urethra by removing the cystoscope.  The stent was cannulated with a sensor wire that was noted to curl in the presumed left renal pelvis on fluoroscopy.  The stent was removed and discarded.  With the wire in place, a 6 Kuwaiti catheter was cannulated over the wire, the wire was removed, and a left-sided fluoroscopy was performed, with the above findings noted.  The left upper tract was decompressed.  The wire was replaced and the left ureter and the open-ended catheter was removed.  The semirigid ureteroscope was inserted via the urethra into the left ureter with the wire in place.  The above findings were noted and biopsies obtained.  Hemostasis was appropriate.  The semirigid ureteroscope was removed from the body.  The cystoscope was reinserted into the bladder and the bladder was emptied of irrigant and a few clots that had accumulated.  The cystoscope was removed.  The wire was backloaded onto the cystoscope, however the cystoscope could not be reinserted into the bladder due to resistance at the level of the prostatic urethra, presumably secondary to the false passage, therefore the cystoscope was removed.  The ureteral stent was loaded onto the wire and passed into the ureter with the assistance of the pusher under fluoroscopic guidance.  Once in the wire was removed, good curl was noted fluoroscopically in the left renal pelvis and in the  bladder.  The cystoscope was reinserted into the bladder and the distal curl of the stent was confirmed within the bladder.  A wire was placed into the bladder via the cystoscope.  The cystoscope was removed.  A Hedrick catheter was inserted into the bladder over the wire.  The wire was removed.  The catheter was inflated with 20 mL of sterile water and placed to gravity drainage.  This completed the surgery.  All counts were correct.    Post-operative plan: Admit for Observation given history of urosepsis following procedures.  Ceftriaxone will provide coverage today, and an additional dose of Ceftriaxone will be administered tomorrow.  Antihypertensives held given soft blood pressures (SBP 70-80) preoperatively; can be resumed if hypertensive. Labs will be checked tomorrow.  The CBI will be weaned today and if the irrigant from the bladder remains pink, then the CBI port can be clamped.  Discharge home on postoperative day 1 if clinically appropriate to complete an additional 7 days of Augmentin antibiotic.  The catheter will remain in place for 5 days and be removed in [facility] clinic next Wednesday.  He will also follow-up with me in 1 to 2 weeks to review pathology results.    _______________________  Shane Escalona MD  Urologic Surgical Oncology  [facility]

## 2024-01-04 NOTE — ANESTHESIA PREPROCEDURE EVALUATION
Case: [id number] Date/Time: 01/04/24 1045    Procedures:       CYSTOSCOPY, WITH LEFT URETEROSCOPY, WITH URETERAL BIOPSY WITH LEFT STENT PLACEMENT (Left: Ureter)      URETEROSCOPY (Ureter)    Anesthesia type: General    Pre-op diagnosis: HYDRONEPHROSIS    Location:  OR 04 / SURGERY [facility]    Surgeons: Shane ALVAREZ M.D.            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)   (positive) Pulmonary hypertension (HCC)         (positive) Acute kidney injury superimposed on chronic kidney disease (HCC)   (positive) Hydronephrosis, left   (positive) Stage 2 chronic kidney disease   (positive) Stage 3b chronic kidney disease (HCC)      ENDO   (positive) Non-insulin dependent type 2 diabetes mellitus (HCC)       Physical Exam    Airway   Mallampati: II  TM distance: >3 FB  Neck ROM: full       Cardiovascular - normal exam  Rhythm: regular  Rate: normal  (-) murmur     Dental - normal exam           Pulmonary - normal exam  Breath sounds clear to auscultation     Abdominal    Neurological - normal exam                   Anesthesia Plan    ASA 4       Plan - general       Airway plan will be LMA          Induction: intravenous    Postoperative Plan: Postoperative administration of opioids is intended.    Pertinent diagnostic labs and testing reviewed    Informed Consent:    Anesthetic plan and risks discussed with patient.    Use of blood  products discussed with: patient whom consented to blood products.

## 2024-01-05 ENCOUNTER — PHARMACY VISIT (OUTPATIENT)
Dept: PHARMACY | Facility: MEDICAL CENTER | Age: 84
End: 2024-01-05
Payer: COMMERCIAL

## 2024-01-05 VITALS
DIASTOLIC BLOOD PRESSURE: 57 MMHG | WEIGHT: 180.78 LBS | HEART RATE: 67 BPM | OXYGEN SATURATION: 94 % | BODY MASS INDEX: 25.31 KG/M2 | TEMPERATURE: 97.8 F | RESPIRATION RATE: 18 BRPM | SYSTOLIC BLOOD PRESSURE: 120 MMHG | HEIGHT: 71 IN

## 2024-01-05 LAB
ANION GAP SERPL CALC-SCNC: 15 MMOL/L (ref 7–16)
BUN SERPL-MCNC: 30 MG/DL (ref 8–22)
CALCIUM SERPL-MCNC: 8.5 MG/DL (ref 8.4–10.2)
CHLORIDE SERPL-SCNC: 108 MMOL/L (ref 96–112)
CO2 SERPL-SCNC: 17 MMOL/L (ref 20–33)
CREAT SERPL-MCNC: 1.53 MG/DL (ref 0.5–1.4)
ERYTHROCYTE [DISTWIDTH] IN BLOOD BY AUTOMATED COUNT: 48.5 FL (ref 35.9–50)
GFR SERPLBLD CREATININE-BSD FMLA CKD-EPI: 44 ML/MIN/1.73 M 2
GLUCOSE BLD STRIP.AUTO-MCNC: 115 MG/DL (ref 65–99)
GLUCOSE BLD STRIP.AUTO-MCNC: 146 MG/DL (ref 65–99)
GLUCOSE SERPL-MCNC: 119 MG/DL (ref 65–99)
HCT VFR BLD AUTO: 32 % (ref 42–52)
HGB BLD-MCNC: 10.4 G/DL (ref 14–18)
MCH RBC QN AUTO: 31.3 PG (ref 27–33)
MCHC RBC AUTO-ENTMCNC: 32.5 G/DL (ref 32.3–36.5)
MCV RBC AUTO: 96.4 FL (ref 81.4–97.8)
PLATELET # BLD AUTO: 257 K/UL (ref 164–446)
PMV BLD AUTO: 9.4 FL (ref 9–12.9)
POTASSIUM SERPL-SCNC: 5 MMOL/L (ref 3.6–5.5)
RBC # BLD AUTO: 3.32 M/UL (ref 4.7–6.1)
SODIUM SERPL-SCNC: 140 MMOL/L (ref 135–145)
WBC # BLD AUTO: 13.7 K/UL (ref 4.8–10.8)

## 2024-01-05 PROCEDURE — 80048 BASIC METABOLIC PNL TOTAL CA: CPT

## 2024-01-05 PROCEDURE — RXMED WILLOW AMBULATORY MEDICATION CHARGE: Performed by: PHYSICIAN ASSISTANT

## 2024-01-05 PROCEDURE — 94760 N-INVAS EAR/PLS OXIMETRY 1: CPT

## 2024-01-05 PROCEDURE — A9270 NON-COVERED ITEM OR SERVICE: HCPCS | Performed by: UROLOGY

## 2024-01-05 PROCEDURE — 700105 HCHG RX REV CODE 258: Performed by: UROLOGY

## 2024-01-05 PROCEDURE — 96365 THER/PROPH/DIAG IV INF INIT: CPT

## 2024-01-05 PROCEDURE — 82962 GLUCOSE BLOOD TEST: CPT | Mod: 91

## 2024-01-05 PROCEDURE — G0378 HOSPITAL OBSERVATION PER HR: HCPCS

## 2024-01-05 PROCEDURE — 700111 HCHG RX REV CODE 636 W/ 250 OVERRIDE (IP): Mod: JZ | Performed by: UROLOGY

## 2024-01-05 PROCEDURE — 85027 COMPLETE CBC AUTOMATED: CPT

## 2024-01-05 PROCEDURE — 700102 HCHG RX REV CODE 250 W/ 637 OVERRIDE(OP): Performed by: UROLOGY

## 2024-01-05 PROCEDURE — 36415 COLL VENOUS BLD VENIPUNCTURE: CPT

## 2024-01-05 RX ORDER — AMOXICILLIN AND CLAVULANATE POTASSIUM 500; 125 MG/1; MG/1
1 TABLET, FILM COATED ORAL 2 TIMES DAILY
Qty: 14 TABLET | Refills: 0 | Status: ACTIVE | OUTPATIENT
Start: 2024-01-05 | End: 2024-01-12

## 2024-01-05 RX ADMIN — TAMSULOSIN HYDROCHLORIDE 0.4 MG: 0.4 CAPSULE ORAL at 09:11

## 2024-01-05 RX ADMIN — POLYETHYLENE GLYCOL 3350 1 PACKET: 17 POWDER, FOR SOLUTION ORAL at 05:20

## 2024-01-05 RX ADMIN — ACETAMINOPHEN 1000 MG: 500 TABLET ORAL at 04:43

## 2024-01-05 RX ADMIN — CEFTRIAXONE SODIUM 1000 MG: 1 INJECTION, POWDER, FOR SOLUTION INTRAMUSCULAR; INTRAVENOUS at 05:19

## 2024-01-05 ASSESSMENT — PATIENT HEALTH QUESTIONNAIRE - PHQ9
SUM OF ALL RESPONSES TO PHQ9 QUESTIONS 1 AND 2: 0
7. TROUBLE CONCENTRATING ON THINGS, SUCH AS READING THE NEWSPAPER OR WATCHING TELEVISION: NOT AT ALL
6. FEELING BAD ABOUT YOURSELF - OR THAT YOU ARE A FAILURE OR HAVE LET YOURSELF OR YOUR FAMILY DOWN: NOT AL ALL
8. MOVING OR SPEAKING SO SLOWLY THAT OTHER PEOPLE COULD HAVE NOTICED. OR THE OPPOSITE, BEING SO FIGETY OR RESTLESS THAT YOU HAVE BEEN MOVING AROUND A LOT MORE THAN USUAL: NOT AT ALL
4. FEELING TIRED OR HAVING LITTLE ENERGY: SEVERAL DAYS
1. LITTLE INTEREST OR PLEASURE IN DOING THINGS: NOT AT ALL
9. THOUGHTS THAT YOU WOULD BE BETTER OFF DEAD, OR OF HURTING YOURSELF: NOT AT ALL
5. POOR APPETITE OR OVEREATING: NOT AT ALL
3. TROUBLE FALLING OR STAYING ASLEEP OR SLEEPING TOO MUCH: SEVERAL DAYS

## 2024-01-05 ASSESSMENT — PAIN DESCRIPTION - PAIN TYPE: TYPE: SURGICAL PAIN

## 2024-01-05 ASSESSMENT — PAIN SCALES - GENERAL: PAIN_LEVEL: 2

## 2024-01-05 NOTE — PROGRESS NOTES
0400 CNA reported that Star temp is 100.0   0405 Give a cold compress, re-check the temp is 99.1  0600- temp 98.9

## 2024-01-05 NOTE — DISCHARGE SUMMARY
Discharge Summary    CHIEF COMPLAINT ON ADMISSION  No chief complaint on file.      Reason for Admission  HYDRONEPHROSIS     Admission Date  1/4/2024    CODE STATUS  Full Code    HPI & HOSPITAL COURSE  This is a 83 y.o. male here s/p cystoscopy left ureteroscopy/biopsy and stent placement. Kept overnight for obs due to history of sepsis and IV abx. Had no acute overnight urologic events, labs and vitals were stable prior to dc. Cr 1.53, near baseline. He was afebrile overnight. Rodarte was draining straw colored urine. Tolerating po without nausea or vomiting. Anxious for dc.   No notes on file    Therefore, he is discharged in good and stable condition to home with close outpatient follow-up.    The patient recovered much more quickly than anticipated on admission.    Discharge Date  1/5/23    FOLLOW UP ITEMS POST DISCHARGE  Our office this upcoming Wed for rodarte removal.     DISCHARGE DIAGNOSES  Principal Problem:    Hydronephrosis of left kidney (POA: Yes)  Resolved Problems:    * No resolved hospital problems. *      FOLLOW UP  Future Appointments   Date Time Provider Department Center   3/18/2024 10:00 AM Onel Leonard M.D. [facility] None   9/9/2024 11:00 AM [facility] CT 2 DILCIA Siegel     No follow-up provider specified.    MEDICATIONS ON DISCHARGE     Medication List        START taking these medications        Instructions   amoxicillin-clavulanate 500-125 MG Tabs  Commonly known as: Augmentin   Take 1 Tablet by mouth 2 times a day for 7 days.  Dose: 1 Tablet            CONTINUE taking these medications        Instructions   acetaminophen 500 MG Tabs  Commonly known as: Tylenol   Take 1 Tablet by mouth every 6 hours as needed for Mild Pain or Moderate Pain.  Dose: 500 mg     aspirin 81 MG EC tablet   Take 81 mg by mouth every evening. Indications: blood thinner  Dose: 81 mg     bismuth subsalicylate 262 MG/15ML Susp  Commonly known as: Pepto-Bismol   Take 30 mL by mouth every 6 hours as needed (diarrhea).  Indications: Diarrhea, Heartburn  Dose: 30 mL     CoQ-10 30 MG Caps   Take 30 mg by mouth every evening. Indications: heart health  Dose: 30 mg     Empagliflozin 25 MG Tabs   Take 25 mg by mouth every day for 360 days.  Dose: 25 mg     ezetimibe 10 MG Tabs  Commonly known as: Zetia   Take 1 Tablet by mouth every day. Indications: High Amount of Fats in the Blood  Dose: 10 mg     Fish Oil 1200 MG Caps   Take 1,200 mg by mouth every morning. Indications: heart health  Dose: 1,200 mg     Imodium A-D 1 MG/7.5ML Liqd  Generic drug: Loperamide HCl   Take 30 mL by mouth 2 times a day as needed (diarrhea). Indications: Diarrhea  Dose: 30 mL     lisinopril 10 MG Tabs  Commonly known as: Prinivil   Take 1 Tablet by mouth every day.  Dose: 10 mg     Melatonin 10 MG Caps   Take 10 mg by mouth at bedtime as needed (sleep). Indications: sleep  Dose: 10 mg     metFORMIN 500 MG Tabs  Commonly known as: Glucophage   Take 1 Tablet by mouth 2 times a day with meals for 360 days.  Dose: 500 mg     metoprolol SR 25 MG Tb24  Commonly known as: Toprol XL   Take 1 Tablet by mouth every day.  Dose: 25 mg     oxyCODONE immediate-release 5 MG Tabs  Commonly known as: Roxicodone   5 mg.  Dose: 5 mg     PRE- FORMULA PO   Take 1 Tablet by mouth every morning. Indications: supplement  Dose: 1 Tablet     rosuvastatin 5 MG Tabs  Commonly known as: Crestor   Doctor's comments: Plan requires a 100 day supply. Thank you  TAKE 1 TABLET BY MOUTH EVERY DAY  Dose: 5 mg     tamsulosin 0.4 MG capsule  Commonly known as: Flomax   Take 1 Capsule by mouth 1/2 hour after breakfast.  Dose: 0.4 mg     Voltaren 1 % Gel  Generic drug: diclofenac sodium   Apply 2 g topically 4 times a day as needed (Shoulder Pain). Indications: Joint Damage causing Pain and Loss of Function  Dose: 2 g              Allergies  No Known Allergies    DIET  Orders Placed This Encounter   Procedures    Diet Order Diet: Regular     Standing Status:   Standing     Number of  Occurrences:   1     Order Specific Question:   Diet:     Answer:   Regular [1]       ACTIVITY  As tolerated.  Weight bearing as tolerated    CONSULTATIONS  None    PROCEDURES  Cystoscopy left ureteroscopy with biopsy and stent placement with Dr. Escalona 1/4.    LABORATORY  Lab Results   Component Value Date    SODIUM 140 01/05/2024    POTASSIUM 5.0 01/05/2024    CHLORIDE 108 01/05/2024    CO2 17 (L) 01/05/2024    GLUCOSE 119 (H) 01/05/2024    BUN 30 (H) 01/05/2024    CREATININE 1.53 (H) 01/05/2024        Lab Results   Component Value Date    WBC 13.7 (H) 01/05/2024    HEMOGLOBIN 10.4 (L) 01/05/2024    HEMATOCRIT 32.0 (L) 01/05/2024    PLATELETCT 257 01/05/2024        Total time of the discharge process exceeds 35 minutes.

## 2024-01-05 NOTE — CARE PLAN
The patient is Stable - Low risk of patient condition declining or worsening    Shift Goals  Clinical Goals: To prevent  blood clots from the bladder  Patient Goals: rest comfortably    Progress made toward(s) clinical / shift goals:  Pt seen sleeping in between rounds, visual monitoring for 3 ways f/c  urine down drain pink-tinged urine, no complaints of abdominal pain noted.     Patient is not progressing towards the following goals:

## 2024-01-05 NOTE — ANESTHESIA POSTPROCEDURE EVALUATION
Patient: Star Centeno    Procedure Summary       Date: 01/04/24 Room / Location:  OR  / SURGERY [facility]    Anesthesia Start: 1236 Anesthesia Stop: 1432    Procedures:       CYSTOSCOPY, WITH LEFT URETEROSCOPY, WITH URETERAL BIOPSY WITH LEFT STENT REPLACEMENT (Left: Ureter)      URETEROSCOPY (Ureter) Diagnosis: (HYDRONEPHROSIS)    Surgeons: Shane ALVAREZ M.D. Responsible Provider: Chris Rogers M.D.    Anesthesia Type: general ASA Status: 4            Final Anesthesia Type: general  Last vitals  BP   Blood Pressure : 96/55    Temp   37.8 °C (100 °F)    Pulse   78   Resp   18    SpO2   92 %      Anesthesia Post Evaluation    Patient location during evaluation: PACU  Patient participation: complete - patient participated  Level of consciousness: awake and alert  Pain score: 2    Airway patency: patent  Anesthetic complications: no  Cardiovascular status: hemodynamically stable  Respiratory status: acceptable  Hydration status: euvolemic    PONV: none          There were no known notable events for this encounter.     Nurse Pain Score: 2 (NPRS)

## 2024-01-05 NOTE — CARE PLAN
The patient is Stable - Low risk of patient condition declining or worsening    Shift Goals  Clinical Goals: Monitor CBI  Patient Goals: go home    Progress made toward(s) clinical / shift goals:  CBI running well.    Patient is not progressing towards the following goals: n/a

## 2024-01-05 NOTE — PROGRESS NOTES
4 Eyes Skin Assessment Completed by Michael RN and JB Reynoso.    Head WDL  Ears WDL  Nose WDL  Mouth WDL  Neck WDL  Breast/Chest WDL  Shoulder Blades WDL  Spine WDL  (R) Arm/Elbow/Hand WDL  (L) Arm/Elbow/Hand Bruising  Abdomen WDL  Groin WDL  Scrotum/Coccyx/Buttocks WDL  (R) Leg WDL  (L) Leg Bruising  (R) Heel/Foot/Toe WDL  (L) Heel/Foot/Toe WDL          Devices In Places Pulse Ox and Hedrick      Interventions In Place Pillows    Possible Skin Injury No    Pictures Uploaded Into Epic N/A  Wound Consult Placed N/A  RN Wound Prevention Protocol Ordered No

## 2024-01-05 NOTE — ANESTHESIA TIME REPORT
Anesthesia Start and Stop Event Times       Date Time Event    1/4/2024 1236 Anesthesia Start     1239 Ready for Procedure     1432 Anesthesia Stop          Responsible Staff  01/04/24      Name Role Begin End    Chris Rogers M.D. Anesth 1236 1432          Overtime Reason:  no overtime (within assigned shift)    Comments:

## 2024-01-05 NOTE — PROGRESS NOTES
1835 - Patient arrived on floor from PACU, A&Ox4, on RA, reviewed orders, and medication requisition and admission profile done. Oriented Patient to the floor. Plan of care discussed with Patient and answered questions asked. Safety precautions in place and hourly rounding established with CNA.

## 2024-01-05 NOTE — OR NURSING
1601: Report received from Jesse MUHAMMAD, patient resting quietly, respirations even and unlabored, pain tolerable, denies nausea, STOP Bang complete    1630: No change in status, respirations even and unlabored, pain remains tolerable, denies nausea    1700: Sleeping and rouses to verbal stimuli, respirations even and unlabored    1800: no change in status, tolerating crackers and juice, denies pain/nausea, respirations even and unlabored    1815: Report given Kathryn MUHAMMAD and awaiting transport    1824: Transported

## 2024-01-05 NOTE — PROGRESS NOTES
0700 - Bedside report received from JB Michael. Alert and oriented X4, on RA in no acute respiratory distress. Daily plan of care discussed. Patient complains of no pain, CBI still continuous and pink tinged. No other needs at this time. Call light and personal belongings within reach. Hourly rounding in place. Chart reviewed for recent labs, notes, and orders.    Patient medically cleared by MD. Went over After Visit Summary with Patient and answered questions. Patient verbalized understanding. Removed IV and transportation requested.

## 2024-01-06 LAB
BACTERIA UR CULT: ABNORMAL
GRAM STN SPEC: ABNORMAL
GRAM STN SPEC: ABNORMAL
SIGNIFICANT IND 70042: ABNORMAL
SIGNIFICANT IND 70042: ABNORMAL
SITE SITE: ABNORMAL
SITE SITE: ABNORMAL
SOURCE SOURCE: ABNORMAL
SOURCE SOURCE: ABNORMAL

## 2024-01-07 LAB
BACTERIA SPEC ANAEROBE CULT: NORMAL
BACTERIA SPEC ANAEROBE CULT: NORMAL
SIGNIFICANT IND 70042: NORMAL
SIGNIFICANT IND 70042: NORMAL
SITE SITE: NORMAL
SITE SITE: NORMAL
SOURCE SOURCE: NORMAL
SOURCE SOURCE: NORMAL

## 2024-01-18 ENCOUNTER — TELEPHONE (OUTPATIENT)
Dept: CARDIOLOGY | Facility: MEDICAL CENTER | Age: 84
End: 2024-01-18
Payer: MEDICARE

## 2024-01-18 NOTE — TELEPHONE ENCOUNTER
Last OV: 08/22/2023  Proposed Surgery: Ureterectomy with Bladder Cuff  Surgery Date: TBD  Requesting Office Name: Emily Cervantes   Fax Number: [phone]  Preference of Location (default is surgery center unless specified by Cardiologist or CON)  Prior Clearance Addressed: No      Anticoags/Antiplatelets: Aspirin  Anticoags/Antiplatelet managed by Cardiology? No Provider to advise.    Outstanding Cardiac Imaging : No  Stent, Cardiac Devices, or Catheterization: No  Ablation, TAVR/Valve (including open heart), Cardioversion: No  Recent Cardiac Hospitalization: Yes  Date:  11/17/2023            When: Hospitalized in the last 6 months. Forward to provider to review.   History (cardiac history):   Past Medical History:   Diagnosis Date    Cancer (HCC)     Cataract     Diabetes (HCC)     High cholesterol     History of heart artery stent     Hyperlipidemia     Hypertension     Pulmonary emboli (HCC)     Renal disorder      Office is requesting to hold ASPIRIN for 7 days. Please advise if cleared and ok to proceed. Thank you!        Surgical Clearance Letter Sent: No Provider to advise.   **Scan clearance request letter into [employer].**

## 2024-01-18 NOTE — LETTER
PROCEDURE/SURGERY CLEARANCE FORM      Encounter Date: 1/18/2024    Patient: Star Centeno  YOB: 1940    CARDIOLOGIST:  Finesse Monte M.D.    REFERRING DOCTOR:  No ref. provider found    The above patient is cleared to have the following procedure/surgery: Ureterectomy with Bladder Cuff                                            Additional comments: Okay to hold aspirin for 7 days.  Okay to proceed.  He is a moderate risk patient for an intermediate risk procedure.  No further cardiac workup necessary prior to surgery.        Electronically signed         MD Signature   Finesse Monte M.D.

## 2024-01-19 NOTE — TELEPHONE ENCOUNTER
Per HK - Okay to hold aspirin for 7 days.  Okay to proceed.  He is a moderate risk patient for an intermediate risk procedure.  No further cardiac workup necessary prior to surgery.    Letter faxed.

## 2024-01-24 ENCOUNTER — APPOINTMENT (OUTPATIENT)
Dept: MEDICAL GROUP | Facility: LAB | Age: 84
End: 2024-01-24
Payer: MEDICARE

## 2024-01-25 ENCOUNTER — APPOINTMENT (OUTPATIENT)
Dept: ADMISSIONS | Facility: MEDICAL CENTER | Age: 84
DRG: 657 | End: 2024-01-25
Attending: UROLOGY
Payer: MEDICARE

## 2024-01-29 ENCOUNTER — PRE-ADMISSION TESTING (OUTPATIENT)
Dept: ADMISSIONS | Facility: MEDICAL CENTER | Age: 84
DRG: 657 | End: 2024-01-29
Attending: UROLOGY
Payer: MEDICARE

## 2024-01-29 RX ORDER — ALPRAZOLAM 0.25 MG/1
0.25 TABLET ORAL 3 TIMES DAILY PRN
Status: ON HOLD | COMMUNITY

## 2024-01-30 ENCOUNTER — PRE-ADMISSION TESTING (OUTPATIENT)
Dept: ADMISSIONS | Facility: MEDICAL CENTER | Age: 84
DRG: 657 | End: 2024-01-30
Attending: UROLOGY
Payer: MEDICARE

## 2024-01-30 DIAGNOSIS — Z01.810 PRE-OPERATIVE CARDIOVASCULAR EXAMINATION: ICD-10-CM

## 2024-01-30 DIAGNOSIS — Z01.812 PRE-OPERATIVE LABORATORY EXAMINATION: ICD-10-CM

## 2024-01-30 LAB
ALBUMIN SERPL BCP-MCNC: 4 G/DL (ref 3.2–4.9)
ALBUMIN/GLOB SERPL: 1.5 G/DL
ALP SERPL-CCNC: 71 U/L (ref 30–99)
ALT SERPL-CCNC: 13 U/L (ref 2–50)
ANION GAP SERPL CALC-SCNC: 13 MMOL/L (ref 7–16)
APPEARANCE UR: ABNORMAL
APTT PPP: 29.2 SEC (ref 24.7–36)
AST SERPL-CCNC: 15 U/L (ref 12–45)
BACTERIA #/AREA URNS HPF: ABNORMAL /HPF
BASOPHILS # BLD AUTO: 0.5 % (ref 0–1.8)
BASOPHILS # BLD: 0.05 K/UL (ref 0–0.12)
BILIRUB SERPL-MCNC: 0.2 MG/DL (ref 0.1–1.5)
BILIRUB UR QL STRIP.AUTO: NEGATIVE
BUN SERPL-MCNC: 36 MG/DL (ref 8–22)
CALCIUM ALBUM COR SERPL-MCNC: 9 MG/DL (ref 8.5–10.5)
CALCIUM SERPL-MCNC: 9 MG/DL (ref 8.5–10.5)
CHLORIDE SERPL-SCNC: 104 MMOL/L (ref 96–112)
CO2 SERPL-SCNC: 20 MMOL/L (ref 20–33)
COLOR UR: YELLOW
CREAT SERPL-MCNC: 1.63 MG/DL (ref 0.5–1.4)
EOSINOPHIL # BLD AUTO: 0.43 K/UL (ref 0–0.51)
EOSINOPHIL NFR BLD: 4.5 % (ref 0–6.9)
EPI CELLS #/AREA URNS HPF: ABNORMAL /HPF
ERYTHROCYTE [DISTWIDTH] IN BLOOD BY AUTOMATED COUNT: 51.3 FL (ref 35.9–50)
EST. AVERAGE GLUCOSE BLD GHB EST-MCNC: 146 MG/DL
GFR SERPLBLD CREATININE-BSD FMLA CKD-EPI: 41 ML/MIN/1.73 M 2
GLOBULIN SER CALC-MCNC: 2.7 G/DL (ref 1.9–3.5)
GLUCOSE SERPL-MCNC: 216 MG/DL (ref 65–99)
GLUCOSE UR STRIP.AUTO-MCNC: >=1000 MG/DL
HBA1C MFR BLD: 6.7 % (ref 4–5.6)
HCT VFR BLD AUTO: 38.4 % (ref 42–52)
HGB BLD-MCNC: 12.2 G/DL (ref 14–18)
HYALINE CASTS #/AREA URNS LPF: ABNORMAL /LPF
IMM GRANULOCYTES # BLD AUTO: 0.05 K/UL (ref 0–0.11)
IMM GRANULOCYTES NFR BLD AUTO: 0.5 % (ref 0–0.9)
INR PPP: 0.97 (ref 0.87–1.13)
KETONES UR STRIP.AUTO-MCNC: NEGATIVE MG/DL
LEUKOCYTE ESTERASE UR QL STRIP.AUTO: ABNORMAL
LYMPHOCYTES # BLD AUTO: 2.28 K/UL (ref 1–4.8)
LYMPHOCYTES NFR BLD: 24 % (ref 22–41)
MCH RBC QN AUTO: 30.6 PG (ref 27–33)
MCHC RBC AUTO-ENTMCNC: 31.8 G/DL (ref 32.3–36.5)
MCV RBC AUTO: 96.2 FL (ref 81.4–97.8)
MICRO URNS: ABNORMAL
MONOCYTES # BLD AUTO: 0.58 K/UL (ref 0–0.85)
MONOCYTES NFR BLD AUTO: 6.1 % (ref 0–13.4)
NEUTROPHILS # BLD AUTO: 6.11 K/UL (ref 1.82–7.42)
NEUTROPHILS NFR BLD: 64.4 % (ref 44–72)
NITRITE UR QL STRIP.AUTO: NEGATIVE
NRBC # BLD AUTO: 0 K/UL
NRBC BLD-RTO: 0 /100 WBC (ref 0–0.2)
PH UR STRIP.AUTO: 6 [PH] (ref 5–8)
PLATELET # BLD AUTO: 235 K/UL (ref 164–446)
PMV BLD AUTO: 9.9 FL (ref 9–12.9)
POTASSIUM SERPL-SCNC: 4.8 MMOL/L (ref 3.6–5.5)
PROT SERPL-MCNC: 6.7 G/DL (ref 6–8.2)
PROT UR QL STRIP: 100 MG/DL
PROTHROMBIN TIME: 13 SEC (ref 12–14.6)
RBC # BLD AUTO: 3.99 M/UL (ref 4.7–6.1)
RBC # URNS HPF: ABNORMAL /HPF
RBC UR QL AUTO: ABNORMAL
SODIUM SERPL-SCNC: 137 MMOL/L (ref 135–145)
SP GR UR STRIP.AUTO: 1.02
UROBILINOGEN UR STRIP.AUTO-MCNC: 0.2 MG/DL
WBC # BLD AUTO: 9.5 K/UL (ref 4.8–10.8)
WBC #/AREA URNS HPF: ABNORMAL /HPF

## 2024-01-30 PROCEDURE — 87086 URINE CULTURE/COLONY COUNT: CPT

## 2024-01-30 PROCEDURE — 36415 COLL VENOUS BLD VENIPUNCTURE: CPT

## 2024-01-30 PROCEDURE — 83036 HEMOGLOBIN GLYCOSYLATED A1C: CPT

## 2024-01-30 PROCEDURE — 93005 ELECTROCARDIOGRAM TRACING: CPT

## 2024-01-30 PROCEDURE — 85610 PROTHROMBIN TIME: CPT

## 2024-01-30 PROCEDURE — 81001 URINALYSIS AUTO W/SCOPE: CPT

## 2024-01-30 PROCEDURE — 80053 COMPREHEN METABOLIC PANEL: CPT

## 2024-01-30 PROCEDURE — 85730 THROMBOPLASTIN TIME PARTIAL: CPT

## 2024-01-30 PROCEDURE — 85025 COMPLETE CBC W/AUTO DIFF WBC: CPT

## 2024-01-31 LAB — EKG IMPRESSION: NORMAL

## 2024-01-31 PROCEDURE — 93010 ELECTROCARDIOGRAM REPORT: CPT | Performed by: INTERNAL MEDICINE

## 2024-02-01 LAB
BACTERIA UR CULT: NORMAL
SIGNIFICANT IND 70042: NORMAL
SITE SITE: NORMAL
SOURCE SOURCE: NORMAL

## 2024-02-02 ENCOUNTER — ANESTHESIA (OUTPATIENT)
Dept: SURGERY | Facility: MEDICAL CENTER | Age: 84
DRG: 657 | End: 2024-02-02
Payer: MEDICARE

## 2024-02-02 ENCOUNTER — ANESTHESIA EVENT (OUTPATIENT)
Dept: SURGERY | Facility: MEDICAL CENTER | Age: 84
DRG: 657 | End: 2024-02-02
Payer: MEDICARE

## 2024-02-02 ENCOUNTER — HOSPITAL ENCOUNTER (INPATIENT)
Facility: MEDICAL CENTER | Age: 84
LOS: 3 days | DRG: 657 | End: 2024-02-05
Attending: UROLOGY | Admitting: UROLOGY
Payer: MEDICARE

## 2024-02-02 ENCOUNTER — APPOINTMENT (OUTPATIENT)
Dept: RADIOLOGY | Facility: MEDICAL CENTER | Age: 84
DRG: 657 | End: 2024-02-02
Attending: UROLOGY
Payer: MEDICARE

## 2024-02-02 PROBLEM — E87.20 METABOLIC ACIDOSIS: Status: ACTIVE | Noted: 2024-02-02

## 2024-02-02 PROBLEM — E87.5 HYPERKALEMIA: Status: ACTIVE | Noted: 2024-02-02

## 2024-02-02 PROBLEM — R09.02 POSTOPERATIVE HYPOXIA: Status: ACTIVE | Noted: 2024-02-02

## 2024-02-02 PROBLEM — Z98.890 POSTOPERATIVE HYPOXIA: Status: ACTIVE | Noted: 2024-02-02

## 2024-02-02 LAB
ANION GAP SERPL CALC-SCNC: 13 MMOL/L (ref 7–16)
ANION GAP SERPL CALC-SCNC: 14 MMOL/L (ref 7–16)
B-OH-BUTYR SERPL-MCNC: 0.07 MMOL/L (ref 0.02–0.27)
BASE EXCESS BLDV CALC-SCNC: -5 MMOL/L
BODY TEMPERATURE: 36.1 CENTIGRADE
BUN SERPL-MCNC: 29 MG/DL (ref 8–22)
BUN SERPL-MCNC: 30 MG/DL (ref 8–22)
CALCIUM SERPL-MCNC: 8.6 MG/DL (ref 8.5–10.5)
CALCIUM SERPL-MCNC: 8.8 MG/DL (ref 8.5–10.5)
CHLORIDE SERPL-SCNC: 105 MMOL/L (ref 96–112)
CHLORIDE SERPL-SCNC: 105 MMOL/L (ref 96–112)
CO2 SERPL-SCNC: 16 MMOL/L (ref 20–33)
CO2 SERPL-SCNC: 18 MMOL/L (ref 20–33)
CREAT SERPL-MCNC: 1.36 MG/DL (ref 0.5–1.4)
CREAT SERPL-MCNC: 1.57 MG/DL (ref 0.5–1.4)
ERYTHROCYTE [DISTWIDTH] IN BLOOD BY AUTOMATED COUNT: 50 FL (ref 35.9–50)
GFR SERPLBLD CREATININE-BSD FMLA CKD-EPI: 43 ML/MIN/1.73 M 2
GFR SERPLBLD CREATININE-BSD FMLA CKD-EPI: 51 ML/MIN/1.73 M 2
GLUCOSE BLD STRIP.AUTO-MCNC: 138 MG/DL (ref 65–99)
GLUCOSE BLD STRIP.AUTO-MCNC: 172 MG/DL (ref 65–99)
GLUCOSE BLD STRIP.AUTO-MCNC: 183 MG/DL (ref 65–99)
GLUCOSE BLD STRIP.AUTO-MCNC: 233 MG/DL (ref 65–99)
GLUCOSE BLD STRIP.AUTO-MCNC: 290 MG/DL (ref 65–99)
GLUCOSE SERPL-MCNC: 178 MG/DL (ref 65–99)
GLUCOSE SERPL-MCNC: 297 MG/DL (ref 65–99)
HCO3 BLDV-SCNC: 19 MMOL/L (ref 24–28)
HCT VFR BLD AUTO: 34 % (ref 42–52)
HGB BLD-MCNC: 11.3 G/DL (ref 14–18)
INHALED O2 FLOW RATE: ABNORMAL L/MIN
MAGNESIUM SERPL-MCNC: 2.1 MG/DL (ref 1.5–2.5)
MCH RBC QN AUTO: 31.9 PG (ref 27–33)
MCHC RBC AUTO-ENTMCNC: 33.2 G/DL (ref 32.3–36.5)
MCV RBC AUTO: 96 FL (ref 81.4–97.8)
PATHOLOGY CONSULT NOTE: NORMAL
PCO2 BLDV: 35 MMHG (ref 41–51)
PH BLDV: 7.36 [PH] (ref 7.31–7.45)
PLATELET # BLD AUTO: 180 K/UL (ref 164–446)
PMV BLD AUTO: 9.2 FL (ref 9–12.9)
PO2 BLDV: 32.8 MMHG (ref 25–40)
POTASSIUM SERPL-SCNC: 5.3 MMOL/L (ref 3.6–5.5)
POTASSIUM SERPL-SCNC: 5.9 MMOL/L (ref 3.6–5.5)
RBC # BLD AUTO: 3.54 M/UL (ref 4.7–6.1)
SAO2 % BLDV: 61.5 %
SODIUM SERPL-SCNC: 134 MMOL/L (ref 135–145)
SODIUM SERPL-SCNC: 137 MMOL/L (ref 135–145)
WBC # BLD AUTO: 13.2 K/UL (ref 4.8–10.8)

## 2024-02-02 PROCEDURE — 74018 RADEX ABDOMEN 1 VIEW: CPT

## 2024-02-02 PROCEDURE — 160036 HCHG PACU - EA ADDL 30 MINS PHASE I: Performed by: UROLOGY

## 2024-02-02 PROCEDURE — 770020 HCHG ROOM/CARE - TELE (206)

## 2024-02-02 PROCEDURE — 160048 HCHG OR STATISTICAL LEVEL 1-5: Performed by: UROLOGY

## 2024-02-02 PROCEDURE — 160035 HCHG PACU - 1ST 60 MINS PHASE I: Performed by: UROLOGY

## 2024-02-02 PROCEDURE — 700102 HCHG RX REV CODE 250 W/ 637 OVERRIDE(OP): Performed by: STUDENT IN AN ORGANIZED HEALTH CARE EDUCATION/TRAINING PROGRAM

## 2024-02-02 PROCEDURE — 502714 HCHG ROBOTIC SURGERY SERVICES: Performed by: UROLOGY

## 2024-02-02 PROCEDURE — 99223 1ST HOSP IP/OBS HIGH 75: CPT | Mod: AI | Performed by: INTERNAL MEDICINE

## 2024-02-02 PROCEDURE — A9270 NON-COVERED ITEM OR SERVICE: HCPCS | Performed by: UROLOGY

## 2024-02-02 PROCEDURE — 88307 TISSUE EXAM BY PATHOLOGIST: CPT

## 2024-02-02 PROCEDURE — 0T778DZ DILATION OF LEFT URETER WITH INTRALUMINAL DEVICE, VIA NATURAL OR ARTIFICIAL OPENING ENDOSCOPIC: ICD-10-PCS | Performed by: UROLOGY

## 2024-02-02 PROCEDURE — 160042 HCHG SURGERY MINUTES - EA ADDL 1 MIN LEVEL 5: Performed by: UROLOGY

## 2024-02-02 PROCEDURE — 82010 KETONE BODYS QUAN: CPT

## 2024-02-02 PROCEDURE — 700105 HCHG RX REV CODE 258: Performed by: INTERNAL MEDICINE

## 2024-02-02 PROCEDURE — 700101 HCHG RX REV CODE 250: Performed by: STUDENT IN AN ORGANIZED HEALTH CARE EDUCATION/TRAINING PROGRAM

## 2024-02-02 PROCEDURE — 160041 HCHG SURGERY MINUTES - EA ADDL 1 MIN LEVEL 4: Performed by: UROLOGY

## 2024-02-02 PROCEDURE — 82803 BLOOD GASES ANY COMBINATION: CPT

## 2024-02-02 PROCEDURE — 700111 HCHG RX REV CODE 636 W/ 250 OVERRIDE (IP): Mod: JZ | Performed by: STUDENT IN AN ORGANIZED HEALTH CARE EDUCATION/TRAINING PROGRAM

## 2024-02-02 PROCEDURE — 83735 ASSAY OF MAGNESIUM: CPT

## 2024-02-02 PROCEDURE — 700101 HCHG RX REV CODE 250: Performed by: UROLOGY

## 2024-02-02 PROCEDURE — 80048 BASIC METABOLIC PNL TOTAL CA: CPT

## 2024-02-02 PROCEDURE — 700105 HCHG RX REV CODE 258: Performed by: UROLOGY

## 2024-02-02 PROCEDURE — 700111 HCHG RX REV CODE 636 W/ 250 OVERRIDE (IP): Mod: JZ

## 2024-02-02 PROCEDURE — 160009 HCHG ANES TIME/MIN: Performed by: UROLOGY

## 2024-02-02 PROCEDURE — 88331 PATH CONSLTJ SURG 1 BLK 1SPC: CPT

## 2024-02-02 PROCEDURE — C2617 STENT, NON-COR, TEM W/O DEL: HCPCS | Performed by: UROLOGY

## 2024-02-02 PROCEDURE — 88305 TISSUE EXAM BY PATHOLOGIST: CPT | Mod: 59

## 2024-02-02 PROCEDURE — 36415 COLL VENOUS BLD VENIPUNCTURE: CPT

## 2024-02-02 PROCEDURE — 160031 HCHG SURGERY MINUTES - 1ST 30 MINS LEVEL 5: Performed by: UROLOGY

## 2024-02-02 PROCEDURE — 8E0W4CZ ROBOTIC ASSISTED PROCEDURE OF TRUNK REGION, PERCUTANEOUS ENDOSCOPIC APPROACH: ICD-10-PCS | Performed by: UROLOGY

## 2024-02-02 PROCEDURE — 110371 HCHG SHELL REV 272: Performed by: UROLOGY

## 2024-02-02 PROCEDURE — 700111 HCHG RX REV CODE 636 W/ 250 OVERRIDE (IP): Performed by: UROLOGY

## 2024-02-02 PROCEDURE — 160002 HCHG RECOVERY MINUTES (STAT): Performed by: UROLOGY

## 2024-02-02 PROCEDURE — 82962 GLUCOSE BLOOD TEST: CPT

## 2024-02-02 PROCEDURE — A9270 NON-COVERED ITEM OR SERVICE: HCPCS | Performed by: STUDENT IN AN ORGANIZED HEALTH CARE EDUCATION/TRAINING PROGRAM

## 2024-02-02 PROCEDURE — 0TS74ZZ REPOSITION LEFT URETER, PERCUTANEOUS ENDOSCOPIC APPROACH: ICD-10-PCS | Performed by: UROLOGY

## 2024-02-02 PROCEDURE — 07BC4ZX EXCISION OF PELVIS LYMPHATIC, PERCUTANEOUS ENDOSCOPIC APPROACH, DIAGNOSTIC: ICD-10-PCS | Performed by: UROLOGY

## 2024-02-02 PROCEDURE — C1769 GUIDE WIRE: HCPCS | Performed by: UROLOGY

## 2024-02-02 PROCEDURE — 700105 HCHG RX REV CODE 258: Performed by: STUDENT IN AN ORGANIZED HEALTH CARE EDUCATION/TRAINING PROGRAM

## 2024-02-02 PROCEDURE — 700102 HCHG RX REV CODE 250 W/ 637 OVERRIDE(OP): Performed by: UROLOGY

## 2024-02-02 PROCEDURE — 85027 COMPLETE CBC AUTOMATED: CPT

## 2024-02-02 PROCEDURE — 160029 HCHG SURGERY MINUTES - 1ST 30 MINS LEVEL 4: Performed by: UROLOGY

## 2024-02-02 DEVICE — STENT UROLOGICAL POLARIS 6X28  ULTRA: Type: IMPLANTABLE DEVICE | Status: FUNCTIONAL

## 2024-02-02 RX ORDER — SCOLOPAMINE TRANSDERMAL SYSTEM 1 MG/1
1 PATCH, EXTENDED RELEASE TRANSDERMAL
Status: DISCONTINUED | OUTPATIENT
Start: 2024-02-02 | End: 2024-02-05 | Stop reason: HOSPADM

## 2024-02-02 RX ORDER — OXYCODONE HYDROCHLORIDE 5 MG/1
5 TABLET ORAL
Status: DISCONTINUED | OUTPATIENT
Start: 2024-02-02 | End: 2024-02-02

## 2024-02-02 RX ORDER — TAMSULOSIN HYDROCHLORIDE 0.4 MG/1
0.4 CAPSULE ORAL
Status: DISCONTINUED | OUTPATIENT
Start: 2024-02-03 | End: 2024-02-05 | Stop reason: HOSPADM

## 2024-02-02 RX ORDER — CELECOXIB 200 MG/1
200 CAPSULE ORAL ONCE
Status: COMPLETED | OUTPATIENT
Start: 2024-02-02 | End: 2024-02-02

## 2024-02-02 RX ORDER — OXYCODONE HYDROCHLORIDE 10 MG/1
10 TABLET ORAL
Status: DISCONTINUED | OUTPATIENT
Start: 2024-02-02 | End: 2024-02-05 | Stop reason: HOSPADM

## 2024-02-02 RX ORDER — ALPRAZOLAM 0.25 MG/1
0.25 TABLET ORAL NIGHTLY PRN
Status: DISCONTINUED | OUTPATIENT
Start: 2024-02-02 | End: 2024-02-05 | Stop reason: HOSPADM

## 2024-02-02 RX ORDER — SODIUM CHLORIDE, SODIUM LACTATE, POTASSIUM CHLORIDE, CALCIUM CHLORIDE 600; 310; 30; 20 MG/100ML; MG/100ML; MG/100ML; MG/100ML
INJECTION, SOLUTION INTRAVENOUS
Status: DISCONTINUED | OUTPATIENT
Start: 2024-02-02 | End: 2024-02-02 | Stop reason: SURG

## 2024-02-02 RX ORDER — HEPARIN SODIUM 5000 [USP'U]/ML
5000 INJECTION, SOLUTION INTRAVENOUS; SUBCUTANEOUS EVERY 8 HOURS
Status: DISCONTINUED | OUTPATIENT
Start: 2024-02-02 | End: 2024-02-05 | Stop reason: HOSPADM

## 2024-02-02 RX ORDER — DIPHENHYDRAMINE HYDROCHLORIDE 50 MG/ML
25 INJECTION INTRAMUSCULAR; INTRAVENOUS EVERY 6 HOURS PRN
Status: DISCONTINUED | OUTPATIENT
Start: 2024-02-02 | End: 2024-02-05 | Stop reason: HOSPADM

## 2024-02-02 RX ORDER — CEFAZOLIN SODIUM 1 G/3ML
INJECTION, POWDER, FOR SOLUTION INTRAMUSCULAR; INTRAVENOUS PRN
Status: DISCONTINUED | OUTPATIENT
Start: 2024-02-02 | End: 2024-02-02 | Stop reason: SURG

## 2024-02-02 RX ORDER — ACETAMINOPHEN 500 MG
1000 TABLET ORAL EVERY 6 HOURS
Status: DISCONTINUED | OUTPATIENT
Start: 2024-02-03 | End: 2024-02-05 | Stop reason: HOSPADM

## 2024-02-02 RX ORDER — HYDRALAZINE HYDROCHLORIDE 20 MG/ML
5 INJECTION INTRAMUSCULAR; INTRAVENOUS
Status: DISCONTINUED | OUTPATIENT
Start: 2024-02-02 | End: 2024-02-02 | Stop reason: HOSPADM

## 2024-02-02 RX ORDER — HALOPERIDOL 5 MG/ML
1 INJECTION INTRAMUSCULAR EVERY 6 HOURS PRN
Status: DISCONTINUED | OUTPATIENT
Start: 2024-02-02 | End: 2024-02-05 | Stop reason: HOSPADM

## 2024-02-02 RX ORDER — SODIUM CHLORIDE, SODIUM LACTATE, POTASSIUM CHLORIDE, AND CALCIUM CHLORIDE .6; .31; .03; .02 G/100ML; G/100ML; G/100ML; G/100ML
500 INJECTION, SOLUTION INTRAVENOUS ONCE
Status: ACTIVE | OUTPATIENT
Start: 2024-02-02 | End: 2024-02-03

## 2024-02-02 RX ORDER — LABETALOL HYDROCHLORIDE 5 MG/ML
10 INJECTION, SOLUTION INTRAVENOUS EVERY 4 HOURS PRN
Status: DISCONTINUED | OUTPATIENT
Start: 2024-02-02 | End: 2024-02-05 | Stop reason: HOSPADM

## 2024-02-02 RX ORDER — SODIUM CHLORIDE 9 MG/ML
INJECTION, SOLUTION INTRAVENOUS CONTINUOUS
Status: DISCONTINUED | OUTPATIENT
Start: 2024-02-02 | End: 2024-02-03

## 2024-02-02 RX ORDER — DEXTROSE MONOHYDRATE 25 G/50ML
25 INJECTION, SOLUTION INTRAVENOUS
Status: DISCONTINUED | OUTPATIENT
Start: 2024-02-02 | End: 2024-02-03

## 2024-02-02 RX ORDER — ONDANSETRON 2 MG/ML
4 INJECTION INTRAMUSCULAR; INTRAVENOUS EVERY 4 HOURS PRN
Status: DISCONTINUED | OUTPATIENT
Start: 2024-02-02 | End: 2024-02-05 | Stop reason: HOSPADM

## 2024-02-02 RX ORDER — OXYCODONE HYDROCHLORIDE 5 MG/1
2.5 TABLET ORAL
Status: DISCONTINUED | OUTPATIENT
Start: 2024-02-02 | End: 2024-02-02

## 2024-02-02 RX ORDER — ROCURONIUM BROMIDE 10 MG/ML
INJECTION, SOLUTION INTRAVENOUS PRN
Status: DISCONTINUED | OUTPATIENT
Start: 2024-02-02 | End: 2024-02-02 | Stop reason: SURG

## 2024-02-02 RX ORDER — HYDROMORPHONE HYDROCHLORIDE 1 MG/ML
0.5 INJECTION, SOLUTION INTRAMUSCULAR; INTRAVENOUS; SUBCUTANEOUS
Status: DISCONTINUED | OUTPATIENT
Start: 2024-02-02 | End: 2024-02-05 | Stop reason: HOSPADM

## 2024-02-02 RX ORDER — AMOXICILLIN 250 MG
2 CAPSULE ORAL EVERY EVENING
Status: DISCONTINUED | OUTPATIENT
Start: 2024-02-02 | End: 2024-02-02

## 2024-02-02 RX ORDER — HYDROMORPHONE HYDROCHLORIDE 1 MG/ML
0.1 INJECTION, SOLUTION INTRAMUSCULAR; INTRAVENOUS; SUBCUTANEOUS
Status: DISCONTINUED | OUTPATIENT
Start: 2024-02-02 | End: 2024-02-02 | Stop reason: HOSPADM

## 2024-02-02 RX ORDER — HYDROMORPHONE HYDROCHLORIDE 1 MG/ML
0.4 INJECTION, SOLUTION INTRAMUSCULAR; INTRAVENOUS; SUBCUTANEOUS
Status: DISCONTINUED | OUTPATIENT
Start: 2024-02-02 | End: 2024-02-02 | Stop reason: HOSPADM

## 2024-02-02 RX ORDER — HALOPERIDOL 5 MG/ML
1 INJECTION INTRAMUSCULAR
Status: DISCONTINUED | OUTPATIENT
Start: 2024-02-02 | End: 2024-02-02 | Stop reason: HOSPADM

## 2024-02-02 RX ORDER — LIDOCAINE HYDROCHLORIDE 20 MG/ML
INJECTION, SOLUTION EPIDURAL; INFILTRATION; INTRACAUDAL; PERINEURAL PRN
Status: DISCONTINUED | OUTPATIENT
Start: 2024-02-02 | End: 2024-02-02 | Stop reason: SURG

## 2024-02-02 RX ORDER — EPHEDRINE SULFATE 50 MG/ML
INJECTION, SOLUTION INTRAVENOUS PRN
Status: DISCONTINUED | OUTPATIENT
Start: 2024-02-02 | End: 2024-02-02 | Stop reason: SURG

## 2024-02-02 RX ORDER — DEXTROSE MONOHYDRATE 25 G/50ML
25 INJECTION, SOLUTION INTRAVENOUS
Status: DISCONTINUED | OUTPATIENT
Start: 2024-02-02 | End: 2024-02-02 | Stop reason: HOSPADM

## 2024-02-02 RX ORDER — ACETAMINOPHEN 500 MG
1000 TABLET ORAL ONCE
Status: COMPLETED | OUTPATIENT
Start: 2024-02-02 | End: 2024-02-02

## 2024-02-02 RX ORDER — ONDANSETRON 2 MG/ML
4 INJECTION INTRAMUSCULAR; INTRAVENOUS
Status: DISCONTINUED | OUTPATIENT
Start: 2024-02-02 | End: 2024-02-02 | Stop reason: HOSPADM

## 2024-02-02 RX ORDER — BUPIVACAINE HYDROCHLORIDE AND EPINEPHRINE 5; 5 MG/ML; UG/ML
INJECTION, SOLUTION EPIDURAL; INTRACAUDAL; PERINEURAL
Status: DISCONTINUED | OUTPATIENT
Start: 2024-02-02 | End: 2024-02-02 | Stop reason: HOSPADM

## 2024-02-02 RX ORDER — DEXAMETHASONE SODIUM PHOSPHATE 4 MG/ML
4 INJECTION, SOLUTION INTRA-ARTICULAR; INTRALESIONAL; INTRAMUSCULAR; INTRAVENOUS; SOFT TISSUE
Status: DISCONTINUED | OUTPATIENT
Start: 2024-02-02 | End: 2024-02-05 | Stop reason: HOSPADM

## 2024-02-02 RX ORDER — ONDANSETRON 4 MG/1
4 TABLET, ORALLY DISINTEGRATING ORAL EVERY 4 HOURS PRN
Status: DISCONTINUED | OUTPATIENT
Start: 2024-02-02 | End: 2024-02-02

## 2024-02-02 RX ORDER — ACETAMINOPHEN 325 MG/1
650 TABLET ORAL EVERY 6 HOURS PRN
Status: DISCONTINUED | OUTPATIENT
Start: 2024-02-02 | End: 2024-02-02

## 2024-02-02 RX ORDER — OXYCODONE HYDROCHLORIDE 5 MG/1
5 TABLET ORAL
Status: DISCONTINUED | OUTPATIENT
Start: 2024-02-02 | End: 2024-02-05 | Stop reason: HOSPADM

## 2024-02-02 RX ORDER — ONDANSETRON 2 MG/ML
INJECTION INTRAMUSCULAR; INTRAVENOUS PRN
Status: DISCONTINUED | OUTPATIENT
Start: 2024-02-02 | End: 2024-02-02 | Stop reason: SURG

## 2024-02-02 RX ORDER — AMOXICILLIN AND CLAVULANATE POTASSIUM 500; 125 MG/1; MG/1
1 TABLET, FILM COATED ORAL 2 TIMES DAILY
Status: ON HOLD | COMMUNITY
End: 2024-02-05

## 2024-02-02 RX ORDER — HYDROMORPHONE HYDROCHLORIDE 1 MG/ML
0.25 INJECTION, SOLUTION INTRAMUSCULAR; INTRAVENOUS; SUBCUTANEOUS
Status: DISCONTINUED | OUTPATIENT
Start: 2024-02-02 | End: 2024-02-02

## 2024-02-02 RX ORDER — ONDANSETRON 2 MG/ML
4 INJECTION INTRAMUSCULAR; INTRAVENOUS EVERY 4 HOURS PRN
Status: DISCONTINUED | OUTPATIENT
Start: 2024-02-02 | End: 2024-02-02

## 2024-02-02 RX ORDER — METOPROLOL SUCCINATE 25 MG/1
25 TABLET, EXTENDED RELEASE ORAL DAILY
Status: DISCONTINUED | OUTPATIENT
Start: 2024-02-03 | End: 2024-02-05 | Stop reason: HOSPADM

## 2024-02-02 RX ORDER — SODIUM CHLORIDE, SODIUM LACTATE, POTASSIUM CHLORIDE, CALCIUM CHLORIDE 600; 310; 30; 20 MG/100ML; MG/100ML; MG/100ML; MG/100ML
INJECTION, SOLUTION INTRAVENOUS CONTINUOUS
Status: DISCONTINUED | OUTPATIENT
Start: 2024-02-02 | End: 2024-02-02 | Stop reason: HOSPADM

## 2024-02-02 RX ORDER — ACETAMINOPHEN 500 MG
1000 TABLET ORAL EVERY 6 HOURS PRN
Status: DISCONTINUED | OUTPATIENT
Start: 2024-02-08 | End: 2024-02-05 | Stop reason: HOSPADM

## 2024-02-02 RX ORDER — HYDROMORPHONE HYDROCHLORIDE 1 MG/ML
0.2 INJECTION, SOLUTION INTRAMUSCULAR; INTRAVENOUS; SUBCUTANEOUS
Status: DISCONTINUED | OUTPATIENT
Start: 2024-02-02 | End: 2024-02-02 | Stop reason: HOSPADM

## 2024-02-02 RX ORDER — HEPARIN SODIUM 5000 [USP'U]/ML
5000 INJECTION, SOLUTION INTRAVENOUS; SUBCUTANEOUS EVERY 8 HOURS
Status: DISCONTINUED | OUTPATIENT
Start: 2024-02-03 | End: 2024-02-02

## 2024-02-02 RX ORDER — EPHEDRINE SULFATE 50 MG/ML
5 INJECTION, SOLUTION INTRAVENOUS
Status: DISCONTINUED | OUTPATIENT
Start: 2024-02-02 | End: 2024-02-02 | Stop reason: HOSPADM

## 2024-02-02 RX ORDER — DIPHENHYDRAMINE HYDROCHLORIDE 50 MG/ML
12.5 INJECTION INTRAMUSCULAR; INTRAVENOUS
Status: DISCONTINUED | OUTPATIENT
Start: 2024-02-02 | End: 2024-02-02 | Stop reason: HOSPADM

## 2024-02-02 RX ORDER — SODIUM CHLORIDE, SODIUM LACTATE, POTASSIUM CHLORIDE, CALCIUM CHLORIDE 600; 310; 30; 20 MG/100ML; MG/100ML; MG/100ML; MG/100ML
INJECTION, SOLUTION INTRAVENOUS CONTINUOUS
Status: ACTIVE | OUTPATIENT
Start: 2024-02-02 | End: 2024-02-02

## 2024-02-02 RX ORDER — OXYCODONE HCL 5 MG/5 ML
10 SOLUTION, ORAL ORAL
Status: DISCONTINUED | OUTPATIENT
Start: 2024-02-02 | End: 2024-02-02 | Stop reason: HOSPADM

## 2024-02-02 RX ORDER — ROSUVASTATIN CALCIUM 5 MG/1
5 TABLET, COATED ORAL DAILY
Status: DISCONTINUED | OUTPATIENT
Start: 2024-02-03 | End: 2024-02-05 | Stop reason: HOSPADM

## 2024-02-02 RX ORDER — OXYCODONE HCL 5 MG/5 ML
5 SOLUTION, ORAL ORAL
Status: DISCONTINUED | OUTPATIENT
Start: 2024-02-02 | End: 2024-02-02 | Stop reason: HOSPADM

## 2024-02-02 RX ORDER — CEFTRIAXONE 1 G/1
INJECTION, POWDER, FOR SOLUTION INTRAMUSCULAR; INTRAVENOUS PRN
Status: DISCONTINUED | OUTPATIENT
Start: 2024-02-02 | End: 2024-02-02 | Stop reason: SURG

## 2024-02-02 RX ORDER — POLYETHYLENE GLYCOL 3350 17 G/17G
1 POWDER, FOR SOLUTION ORAL DAILY
Status: DISCONTINUED | OUTPATIENT
Start: 2024-02-03 | End: 2024-02-05 | Stop reason: HOSPADM

## 2024-02-02 RX ORDER — HYDROXYZINE HYDROCHLORIDE 25 MG/1
25 TABLET, FILM COATED ORAL NIGHTLY PRN
Status: DISCONTINUED | OUTPATIENT
Start: 2024-02-02 | End: 2024-02-05 | Stop reason: HOSPADM

## 2024-02-02 RX ORDER — POLYETHYLENE GLYCOL 3350 17 G/17G
1 POWDER, FOR SOLUTION ORAL
Status: DISCONTINUED | OUTPATIENT
Start: 2024-02-02 | End: 2024-02-02

## 2024-02-02 RX ORDER — DEXAMETHASONE SODIUM PHOSPHATE 4 MG/ML
INJECTION, SOLUTION INTRA-ARTICULAR; INTRALESIONAL; INTRAMUSCULAR; INTRAVENOUS; SOFT TISSUE PRN
Status: DISCONTINUED | OUTPATIENT
Start: 2024-02-02 | End: 2024-02-02 | Stop reason: SURG

## 2024-02-02 RX ORDER — LISINOPRIL 10 MG/1
10 TABLET ORAL DAILY
Status: CANCELLED | OUTPATIENT
Start: 2024-02-03

## 2024-02-02 RX ORDER — BACITRACIN ZINC 500 [USP'U]/G
OINTMENT TOPICAL
Status: DISCONTINUED | OUTPATIENT
Start: 2024-02-02 | End: 2024-02-02 | Stop reason: HOSPADM

## 2024-02-02 RX ADMIN — CEFAZOLIN 2 G: 1 INJECTION, POWDER, FOR SOLUTION INTRAMUSCULAR; INTRAVENOUS at 08:03

## 2024-02-02 RX ADMIN — SODIUM CHLORIDE, POTASSIUM CHLORIDE, SODIUM LACTATE AND CALCIUM CHLORIDE: 600; 310; 30; 20 INJECTION, SOLUTION INTRAVENOUS at 07:54

## 2024-02-02 RX ADMIN — SODIUM CHLORIDE, POTASSIUM CHLORIDE, SODIUM LACTATE AND CALCIUM CHLORIDE: 600; 310; 30; 20 INJECTION, SOLUTION INTRAVENOUS at 10:02

## 2024-02-02 RX ADMIN — SODIUM CHLORIDE, POTASSIUM CHLORIDE, SODIUM LACTATE AND CALCIUM CHLORIDE: 600; 310; 30; 20 INJECTION, SOLUTION INTRAVENOUS at 12:28

## 2024-02-02 RX ADMIN — OXYCODONE HYDROCHLORIDE 10 MG: 5 SOLUTION ORAL at 17:52

## 2024-02-02 RX ADMIN — DEXAMETHASONE SODIUM PHOSPHATE 4 MG: 4 INJECTION INTRA-ARTICULAR; INTRALESIONAL; INTRAMUSCULAR; INTRAVENOUS; SOFT TISSUE at 08:03

## 2024-02-02 RX ADMIN — FENTANYL CITRATE 25 MCG: 50 INJECTION, SOLUTION INTRAMUSCULAR; INTRAVENOUS at 10:43

## 2024-02-02 RX ADMIN — PHENYLEPHRINE HYDROCHLORIDE 25 MCG/MIN: 10 INJECTION INTRAVENOUS at 08:28

## 2024-02-02 RX ADMIN — FENTANYL CITRATE 75 MCG: 50 INJECTION, SOLUTION INTRAMUSCULAR; INTRAVENOUS at 07:59

## 2024-02-02 RX ADMIN — CELECOXIB 200 MG: 200 CAPSULE ORAL at 06:50

## 2024-02-02 RX ADMIN — CEFTRIAXONE SODIUM 1 G: 1 INJECTION, POWDER, FOR SOLUTION INTRAMUSCULAR; INTRAVENOUS at 09:24

## 2024-02-02 RX ADMIN — LIDOCAINE HYDROCHLORIDE 50 MG: 20 INJECTION, SOLUTION EPIDURAL; INFILTRATION; INTRACAUDAL at 07:59

## 2024-02-02 RX ADMIN — INSULIN HUMAN 2 UNITS: 100 INJECTION, SOLUTION PARENTERAL at 19:47

## 2024-02-02 RX ADMIN — ROCURONIUM BROMIDE 10 MG: 50 INJECTION, SOLUTION INTRAVENOUS at 13:02

## 2024-02-02 RX ADMIN — FENTANYL CITRATE 25 MCG: 50 INJECTION, SOLUTION INTRAMUSCULAR; INTRAVENOUS at 08:13

## 2024-02-02 RX ADMIN — ROCURONIUM BROMIDE 10 MG: 50 INJECTION, SOLUTION INTRAVENOUS at 12:27

## 2024-02-02 RX ADMIN — ROCURONIUM BROMIDE 20 MG: 50 INJECTION, SOLUTION INTRAVENOUS at 09:29

## 2024-02-02 RX ADMIN — PROPOFOL 100 MG: 10 INJECTION, EMULSION INTRAVENOUS at 07:59

## 2024-02-02 RX ADMIN — HEPARIN SODIUM 5000 UNITS: 5000 INJECTION, SOLUTION INTRAVENOUS; SUBCUTANEOUS at 23:04

## 2024-02-02 RX ADMIN — ONDANSETRON 4 MG: 2 INJECTION INTRAMUSCULAR; INTRAVENOUS at 13:31

## 2024-02-02 RX ADMIN — ROCURONIUM BROMIDE 20 MG: 50 INJECTION, SOLUTION INTRAVENOUS at 08:55

## 2024-02-02 RX ADMIN — INSULIN HUMAN 5 UNITS: 100 INJECTION, SOLUTION PARENTERAL at 14:53

## 2024-02-02 RX ADMIN — ROCURONIUM BROMIDE 50 MG: 50 INJECTION, SOLUTION INTRAVENOUS at 07:59

## 2024-02-02 RX ADMIN — ACETAMINOPHEN 1000 MG: 500 TABLET ORAL at 06:50

## 2024-02-02 RX ADMIN — SODIUM CHLORIDE: 9 INJECTION, SOLUTION INTRAVENOUS at 21:06

## 2024-02-02 RX ADMIN — SODIUM CHLORIDE, POTASSIUM CHLORIDE, SODIUM LACTATE AND CALCIUM CHLORIDE: 600; 310; 30; 20 INJECTION, SOLUTION INTRAVENOUS at 06:51

## 2024-02-02 RX ADMIN — SUGAMMADEX 200 MG: 100 INJECTION, SOLUTION INTRAVENOUS at 13:37

## 2024-02-02 RX ADMIN — OXYCODONE HYDROCHLORIDE 10 MG: 10 TABLET ORAL at 21:01

## 2024-02-02 RX ADMIN — ACETAMINOPHEN 1000 MG: 500 TABLET ORAL at 23:04

## 2024-02-02 RX ADMIN — ROCURONIUM BROMIDE 10 MG: 50 INJECTION, SOLUTION INTRAVENOUS at 10:23

## 2024-02-02 RX ADMIN — EPHEDRINE SULFATE 10 MG: 50 INJECTION, SOLUTION INTRAVENOUS at 08:09

## 2024-02-02 RX ADMIN — FENTANYL CITRATE 25 MCG: 50 INJECTION, SOLUTION INTRAMUSCULAR; INTRAVENOUS at 12:02

## 2024-02-02 RX ADMIN — INSULIN HUMAN 3 UNITS: 100 INJECTION, SOLUTION PARENTERAL at 17:35

## 2024-02-02 RX ADMIN — FENTANYL CITRATE 25 MCG: 50 INJECTION, SOLUTION INTRAMUSCULAR; INTRAVENOUS at 13:41

## 2024-02-02 RX ADMIN — EPHEDRINE SULFATE 15 MG: 50 INJECTION, SOLUTION INTRAVENOUS at 08:19

## 2024-02-02 RX ADMIN — FENTANYL CITRATE 50 MCG: 50 INJECTION, SOLUTION INTRAMUSCULAR; INTRAVENOUS at 14:12

## 2024-02-02 RX ADMIN — FENTANYL CITRATE 50 MCG: 50 INJECTION, SOLUTION INTRAMUSCULAR; INTRAVENOUS at 14:19

## 2024-02-02 RX ADMIN — EPHEDRINE SULFATE 15 MG: 50 INJECTION, SOLUTION INTRAVENOUS at 08:08

## 2024-02-02 RX ADMIN — FENTANYL CITRATE 25 MCG: 50 INJECTION, SOLUTION INTRAMUSCULAR; INTRAVENOUS at 08:58

## 2024-02-02 RX ADMIN — FENTANYL CITRATE 25 MCG: 50 INJECTION, SOLUTION INTRAMUSCULAR; INTRAVENOUS at 13:43

## 2024-02-02 RX ADMIN — FENTANYL CITRATE 50 MCG: 50 INJECTION, SOLUTION INTRAMUSCULAR; INTRAVENOUS at 08:36

## 2024-02-02 RX ADMIN — FENTANYL CITRATE 25 MCG: 50 INJECTION, SOLUTION INTRAMUSCULAR; INTRAVENOUS at 11:02

## 2024-02-02 RX ADMIN — ROCURONIUM BROMIDE 20 MG: 50 INJECTION, SOLUTION INTRAVENOUS at 11:16

## 2024-02-02 ASSESSMENT — PAIN DESCRIPTION - PAIN TYPE
TYPE: SURGICAL PAIN
TYPE: ACUTE PAIN;SURGICAL PAIN
TYPE: ACUTE PAIN;SURGICAL PAIN

## 2024-02-02 ASSESSMENT — COGNITIVE AND FUNCTIONAL STATUS - GENERAL
HELP NEEDED FOR BATHING: A LITTLE
MOVING TO AND FROM BED TO CHAIR: A LITTLE
CLIMB 3 TO 5 STEPS WITH RAILING: A LITTLE
DAILY ACTIVITIY SCORE: 21
STANDING UP FROM CHAIR USING ARMS: A LITTLE
TOILETING: A LITTLE
MOBILITY SCORE: 18
WALKING IN HOSPITAL ROOM: A LITTLE
TURNING FROM BACK TO SIDE WHILE IN FLAT BAD: A LITTLE
DRESSING REGULAR LOWER BODY CLOTHING: A LITTLE
MOVING FROM LYING ON BACK TO SITTING ON SIDE OF FLAT BED: A LITTLE
SUGGESTED CMS G CODE MODIFIER MOBILITY: CK
SUGGESTED CMS G CODE MODIFIER DAILY ACTIVITY: CJ

## 2024-02-02 ASSESSMENT — ENCOUNTER SYMPTOMS
FLANK PAIN: 0
TREMORS: 0
DIARRHEA: 0
HALLUCINATIONS: 0
PHOTOPHOBIA: 0
FOCAL WEAKNESS: 0
SPEECH CHANGE: 0
BRUISES/BLEEDS EASILY: 0
POLYDIPSIA: 0
HEARTBURN: 0
HEADACHES: 0
PALPITATIONS: 0
CHILLS: 0
CONSTIPATION: 0
NECK PAIN: 0
BACK PAIN: 0
SPUTUM PRODUCTION: 0
NAUSEA: 0
ORTHOPNEA: 0
BLURRED VISION: 0
COUGH: 0
HEMOPTYSIS: 0
FEVER: 0
NERVOUS/ANXIOUS: 0
WEIGHT LOSS: 0
DOUBLE VISION: 0
ABDOMINAL PAIN: 1
VOMITING: 0

## 2024-02-02 ASSESSMENT — LIFESTYLE VARIABLES
SUBSTANCE_ABUSE: 0
HAVE YOU EVER FELT YOU SHOULD CUT DOWN ON YOUR DRINKING: NO
TOTAL SCORE: 0
HAVE PEOPLE ANNOYED YOU BY CRITICIZING YOUR DRINKING: NO
HOW MANY TIMES IN THE PAST YEAR HAVE YOU HAD 5 OR MORE DRINKS IN A DAY: 0
ALCOHOL_USE: YES
EVER HAD A DRINK FIRST THING IN THE MORNING TO STEADY YOUR NERVES TO GET RID OF A HANGOVER: NO
TOTAL SCORE: 0
EVER FELT BAD OR GUILTY ABOUT YOUR DRINKING: NO
AVERAGE NUMBER OF DAYS PER WEEK YOU HAVE A DRINK CONTAINING ALCOHOL: 7
TOTAL SCORE: 0
ON A TYPICAL DAY WHEN YOU DRINK ALCOHOL HOW MANY DRINKS DO YOU HAVE: 1
CONSUMPTION TOTAL: NEGATIVE

## 2024-02-02 ASSESSMENT — PATIENT HEALTH QUESTIONNAIRE - PHQ9
2. FEELING DOWN, DEPRESSED, IRRITABLE, OR HOPELESS: NOT AT ALL
SUM OF ALL RESPONSES TO PHQ9 QUESTIONS 1 AND 2: 0
1. LITTLE INTEREST OR PLEASURE IN DOING THINGS: NOT AT ALL

## 2024-02-02 ASSESSMENT — PAIN SCALES - GENERAL: PAIN_LEVEL: 5

## 2024-02-02 ASSESSMENT — FIBROSIS 4 INDEX: FIB4 SCORE: 1.5

## 2024-02-02 NOTE — H&P
No changes to below note.     Plan for: 1) Cystoscopy, L ureteroscopy, L stent removal, instillation of ICG in L ureter, 2) robotic assisted L pelvic LND, L distal ureterectomy with ureteral reimplant vs L nephroureterectomy.    _______________________  Shnae Escalona MD  Urologic Surgical Oncology  [facility]    ---  Mr. Centeno is recovering appropriately status post 1/4/2024 cystoscopy, L ureteroscopy/biopsy/stent. 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 No definitive muscularis present. This is consistent with at least T1 disease of the distal left urinary tract. On the basis of imaging, the length of the malignant segment is up to 4 to 5 cm.    He has reduced kidney function with a creatinine of 1.5. This is likely affected by the malignant obstruction of the left kidney. 12/2023 MAG 3 scan depicted retention of tracer in the left kidney with some Lasix response, concerning for partial obstruction. 12/2023 MRI depicted mild diffuse bladder wall thickening and trabeculation, somewhat more focal thickening about the LEFT UVJ, and no adenopathy.     I spent today's visit reviewing the natural history of urothelial carcinoma of the upper tract. We discussed the standard of care for high-grade or invasive disease is a distal ureterectomy with bladder cuff excision and a ureteral reimplant versus a nephroureterectomy with bladder cuff excision. The choice depends on whether there is no evidence of disease proximally, and in our case the disease appears to be confined to the distal left ureter therefore a distal ureterectomy can be considered.     Additionally, I recommended a regional lymphadenectomy and intravesical instillation of gemcitabine to reduce the risk of recurrence in the bladder. I explained that the risks of surgery include bleeding, infection, injury to  surrounding bowel and organs, urine leak, failure to cure, deep venous thrombosis, pulmonary embolus, stroke, and heart attack. I spoke frankly about the relatively high complication rate associated with this procedure, including a small risk of mortality in the 90 days following surgery.    He does not yet have established care with Medical Oncology. I reviewed that the use of neoadjuvant chemotherapy prior to surgery can be considered for muscle invasive disease on the basis of randomized controlled trials for urothelial carcinoma in the bladder. It is possible that there is muscle-invasive disease, but this has not yet been established by the biopsy. I do recommend establishing care with Medical Oncology prior to surgery and to also review neoadjuvant/adjuvant systemic therapy. I offered a referral to either Dr. Hanson or Dr. Gu. Patient will let us know with whom he would like to be seen.     After a good discussion, he elected to proceed with the recommended surgery. Order placed for robotic assisted LEFT distal ureterectomy, bladder cuff excision, lymph node dissection, and ureteral reimplant, with Dr. Sousa assisting. My plan will be to start with a left ureterscopy with ICG to singh left proximity of malignant stricture and utilize florescence robotically to define the proximal extent of the resection. We will need preoperative clearance and completion of staging with a CT of the chest.    Postoperatively: rodarte catheter in for a minimum of 2 weeks and CT cystogram prior, stent will remain in place for at least 6 weeks.    Order CT, chest, w/o contrast  Send for medical oncologist referral  Order cardiac clearance*  Order ureterectomy with bladder cuff (SURG)  pre-surgery testing  Will send orders for pre-operative testing with CBC, BMP, UCx, CT Chest w/o, and EKG.  Check CBC w/ auto diff  Check BMP, serum or plasma  Order electrocardiogram  Check culture, urine + sensitivity  clot retention of  urine  Multiple episodes of clot retention (about 8-9 times) which prompted hospitalization from 10/30/2023 to 11/06/2023. Rodarte was removed (after discussion via televisit with patient and Dr. ZHANG on 11/02/2023) on 11/03/2023 during hospital admission, though patient was unable to void, residuals ~500cc, rodarte was replaced on 11/04/2023. Patient was discharged on 11/06/2023 with rodarte catheter in place and resolution of gross hematuria. TOV (11/15/2023) with 300cc in, 300 out, however patient with spasms during filling. A re-attempted filling with 400cc in, patient spasmed out 400cc. Lastly, the bladder was filled with 200cc, though bladder spasms were also appreciated at this time. PVR=35cc. Presented (12/13/2023) as an add-on for urinary retention, c/o inability to urinate last night (12/12/2023) and that morning.     TOV successful 01/10/2024.    RX sent for Tamsulosin 0.4mg refill.  Change tamsulosin 0.4 mg capsule from TAKE 1 CAPSULE BY MOUTH 1/2 HOUR AFTER BREAKFAST to 1 capsule every day for 90 days  urinary tract infectious disease  Cultures:  12/13/2023: UCx: Klebsiella oxytoca  10/30/2023: UCx: negative  10/12/2023: UCx: E coli  hydronephrosis  s/p cystoscopy, left retrograde pyelogram and ureteroscopy, with bladder bx and left ureteral stent placement by Dr. Londono on 10/25/2023. Findings consisted of L hydroureteronephrosis, 2.5cm severe narrowing of the distal L ureter. Cystoscopy on 11/13/2023 with no obvious new disease in bladder. The stent was left in place. Several days after that did require admission for urosepsis after self-catheterization attempt. Now voiding independently, nocturia 4-5x, no signs of infection, and completed anticoagulation.     MAG 3 scan (12/06/2023) depicted retention of tracer in the left kidney with some Lasix response, concerning for partial obstruction. MRI (12/07/2023) depicted mild diffuse bladder wall thickening and trabeculation. Somewhat more focal thickening  about the LEFT UVJ which may be inflammatory or less likely neoplastic. No adenopathy demonstrated. Imaging reviewed in detail with patient and family.  mass of urinary bladder  Bladder wall and left distal ureter thickening.     MRI (12/07/2023) depicted mild diffuse bladder wall thickening and trabeculation. Somewhat more focal thickening about the LEFT UVJ which may be inflammatory or less likely neoplastic. No adenopathy demonstrated.  abnormal findings on diagnostic imaging of urinary organs  s/p cystoscopy, left retrograde pyelogram and ureteroscopy, with bladder bx and left ureteral stent placement by Dr. Londono on 10/25/2023. Findings consisted of L hydroureteronephrosis, 2.5cm severe narrowing of the distal L ureter. Circumferential narrowing of the L distal ureter concerning for malignancy. Unable to obtain a tissue specimen given space restriction, cytology was taken. There was some rough erythema surrounding the L UO, biopsy taken. 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 Both are possible given history of radiation. Left stent remains in place as hematuria resolved.  history of malignant neoplasm of prostate  Hx CaP, diagnosed in 2004. Treated with brachytherapy at that time. Latest PSA <0.02 in 08/2022.  Discussion Notes      - I recommended surgical intervention.  - Order placed for robotic assisted LEFT ureterectomy, bladder cuff excision, lymph node dissection, and ureteral reimplant, with  assisting.   - Will start with left ureterscopy with ICG to singh left proximity of stricture and utilize florescence.   - Patient will need rodarte catheter in for a minimum of 2 weeks and CT cystogram prior.  - Stent will remain in place for at least 6 weeks.  - Pre-operative clearance  will need to be obtained.  - Staging will be complete with CT Chest w/o, order placed.  - Referred to medical oncology, patient will call if he prefers  or .   - Surgery is to be scheduled in 2-3 weeks at [facility].  History of Present Illness  82yoM with history of high-grade prostate cancer (treated ~2003 in the [place] with XRT/brachytherapy, presented to the ER on 10/2023 for GH, LLQ abdominal pain. CTU findings (10/2023) suggestive of a possible obstruction at the level of the left UVJ with no evidence of obstructive stone. S/p cystoscopy, left retrograde pyelogram and ureteroscopy, with bladder bx and left ureteral stent placement by Dr. Londono on 10/25/2023. Findings consisted of L hydroureteronephrosis, 2.5cm severe narrowing of the distal L ureter. Filling defect of the L distal ureter concerning for malignancy. Unable to obtain a tissue specimen, cytology was taken, There was some rough erythema surrounding the L UO, biopsy taken. Pathology was benign, but cytology found to have atypical cells, suspicious for malignancy. Multiple episodes of clot retention on 10/30/23 required hand irrigation and CBI in the ER. Rodarte was removed (after discussion via tele-visit with patient and Dr. Escalona on 11/02/2023) on 11/03/2023 during hospital admission, though patient was unable to void, residuals ~500cc, rodarte was replaced on 11/04/2023. Patient was discharged on 11/06/2023 with rodarte catheter in place and resolution of gross hematuria. 11/13/2023 cystoscopy with no evidence of malignancy in bladder, L stent remained in place as no hematuria and to help with kidney function and dilation of ureter. TOV 11/13/2023 successful. MAG 3 scan (12/06/2023) depicted retention of tracer in the left kidney with some Lasix response, concerning for partial obstruction. MRI (12/07/2023) depicted mild diffuse bladder wall thickening and trabeculation. Somewhat more focal thickening about the  LEFT UVJ which may be inflammatory or less likely neoplastic. No adenopathy demonstrated. He reports nocturia 4-5x. No issues with infections. Completed anticoagulation.     Presents today for a postoperative visit. He is accompanied by his daughter and son-in-law. He is status post 2024 cysto, L ureteroscopy/biopsy/stent is not on strings. 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 No definitive muscularis present. There was a few clusters of viable tumor cells admixed with necrotic/fibrinopurulent debris and blood. In urine there was a few scattered atypical cells, suspicious but not entirely diagnostic for carcinoma. L upper tract urine was positive for high grade urothelial carcinoma admixed with sheets of acute inflammatory cells.     Renal Function:  2023: eGFR: 44, Cr: 1.53, BUN: 39  2023: BUN: 32, Cr: 1.54, GFR: 44  2023: BUN: 26, Cr: 1.32, GFR: 53   2023: BUN: 27, Cr: 1.35, GFR: 43   2023: BUN: 29, Cr: 1.69, GFR: 39     Cultures:  2023: UCx: Klebsiella oxytoca  10/30/2023: UCx: negative  10/12/2023: UCx: E coli    PSA Timeline:  2022: Total PSA: <0.02    Imagin2023: MRI Pelvis w/wo: Mild diffuse bladder wall thickening and trabeculation. Somewhat more focal thickening about the LEFT Ureterovesicular junction which may be inflammatory or less likely neoplastic.   No adenopathy demonstrated.  2023: MAG 3: Normal right kidney. Retention of tracer in the left kidney with some Lasix response, concerning for partial obstruction. 40% Function on the left and 60% on the right.   10/2023 CTU showed mild to moderate left hydronephrosis and hydroureter extending down to the level of the left UVJ, concerning for neoplasm causing obstruction, fibrosis or invasion from prostate carcinoma also possible.   Review of Systems  ROS as noted in the  HPI  Screening  None recorded  Physical Exam  UN - Problem focused exam - MALE    Constitutional  General Appearance: well-nourished, healthy-appearing, cooperative, well groomed  Level of Distress: no acute distress    Respiratory  Respiratory Movements normal respiration effort, good air entry    Skin  General Appearance of extremities: no edema  Inspection and palpation: no rash    Neurological/Psychiatric  Neurological/Psychiatric: normal mood, normal affect, (normal) orientation: person, (normal) orientation: time, (normal) orientation: place

## 2024-02-02 NOTE — OR NURSING
Assume care for pt in pre-op. Pt pre op checklist complete. Consents for surgical procedure signed and on chart. Iv placed. Cup for hearing aides and glasses case at bedside. Bed in lowest position, call light within reach, all questions answered.

## 2024-02-02 NOTE — OR SURGEON
Immediate Post OP Note    PreOp Diagnosis: Left ureteral urothelial carcinoma      PostOp Diagnosis: Same      Procedure(s):  CYSTOSCOPY WITH LEFT URETEROSCOPY AND LEFT STENT REMOVAL - Wound Class: Clean Contaminated  ROBOTIC ASSISTED LEFT URETERECTOMY BLADDER CUFF EXCISION AND LEFT URETERAL REIMPLANT - Wound Class: Clean Contaminated  ROBOTIC PELVIC LYMPH NODE DISSECTION - Wound Class: Clean Contaminated    Surgeon(s):  REGINE Gardiner M.D.    Anesthesiologist/Type of Anesthesia:  Anesthesiologist: Kristofer Chung D.O./General    Surgical Staff:  Circulator: Pilar Valencia R.N.; Lin Ruth R.N.  Scrub Person: Yolanda Leyva; Kristy Kenyon, C.N.A.; Danya Michele    Specimens removed if any:  ID Type Source Tests Collected by Time Destination   A : Left Common Iliac Lymph Node Tissue Lymph Node PATHOLOGY SPECIMEN Shane ALVAREZ M.D. 2/2/2024  9:11 AM    B : Left Pelvic Lymph Node Tissue Lymph Node PATHOLOGY SPECIMEN Shane ALVAREZ M.D. 2/2/2024  9:26 AM    C : Left Distal Eerie Ureteral [place] Tissue Ureter PATHOLOGY SPECIMEN Shane ALVAREZ M.D. 2/2/2024 10:42 AM    D : Left Distal Ureteral Margin Tissue Ureter PATHOLOGY SPECIMEN Shane ALVAREZ M.D. 2/2/2024 10:58 AM    E : Left Distal Ureter Tissue Ureter PATHOLOGY SPECIMEN Shane ALVAREZ M.D. 2/2/2024 11:15 AM        Estimated Blood Loss: 50 ml    Findings:   Normal bladder, left ureteral stent removed, left distal ureter stained with ICG  Left pelvic lymph node dissection: common iliac node sent separately, pelvic lymph node dissection without obturator injury  Left distal ureterectomy and bladder cuff complex due to scarring from radiation, inflammatory reaction. Wide bladder cuff performed - water tight on 180 ml instillation in bladder. Bladder cuff closed in 3 layers.  Left ureteral reimplant with psoas hitch (Dr. Sousa) - tension free and watertight anastomosis.    Complications: None    Plan:   PACU - CBC,  BMP, KUB for L ureteral stent position  Inpatient - Anticipate 2-3 day stay. Hospitalist consult given history of admission for sepsis and medical comorbidity. CTX tomorrow, then transition to oral Abx for 5 day post-op course. SQH, SCD, Clears today and advance as tolerated. NUZHAT removed prior to discharge if no evidence of leak.  Outpatient - 1) Post-op with me to review pathology, 2) CT cystogram 2.5 weeks post-op and if no leak remove rodarte, 3) Cysto L stent removal in 6 weeks post-op.      2/2/2024 2:13 PM Shane ALVAREZ M.D.

## 2024-02-02 NOTE — ANESTHESIA PROCEDURE NOTES
Airway    Date/Time: 2/2/2024 8:01 AM    Performed by: Kristofer Chung D.O.  Authorized by: Kristofer Chung D.O.    Location:  OR  Urgency:  Elective  Difficult Airway: No    Indications for Airway Management:  Anesthesia      Spontaneous Ventilation: absent    Sedation Level:  Deep  Preoxygenated: Yes    Patient Position:  Sniffing  Mask Difficulty Assessment:  2 - vent by mask + OA or adjuvant +/- NMBA  Final Airway Type:  Endotracheal airway  Final Endotracheal Airway:  ETT  Cuffed: Yes    Technique Used for Successful ETT Placement:  Direct laryngoscopy    Insertion Site:  Oral  Blade Type:  [place]  Laryngoscope Blade/Videolaryngoscope Blade Size:  3  ETT Size (mm):  7.5  Measured from:  Teeth  ETT to Teeth (cm):  23  Placement Verified by: auscultation and capnometry    Cormack-Lehane Classification:  Grade IIa - partial view of glottis  Number of Attempts at Approach:  1

## 2024-02-02 NOTE — PROGRESS NOTES
Medication history reviewed with PT at bedside    Med rec is complete per PT reporting    Allergies reviewed.     Patient denies any outpatient antibiotics in the last 30 days.     Patient is not taking anticoagulants.    Preferred pharmacy for this visit - [facility] ([phone])

## 2024-02-02 NOTE — ANESTHESIA PREPROCEDURE EVALUATION
Case: [id number] Date/Time: 02/02/24 0715    Procedures:       CYSTOSCOPY WITH LEFT URETEROSCOPY AND LEFT STENT REMOVAL FOLLOWED BY ROBOTIC ASSISTED LEFT URETERECTOMY BLADDER CUFF EXCISION LYMPH NODE EXCISION AND URETERAL IMPLANT      URETEROSCOPY      NEPHRECTOMY, ROBOT-ASSISTED, USING DA SAMY XI      LYMPHADENECTOMY, LAPAROSCOPIC    Pre-op diagnosis: HIGH-GRADE UROTHELIAL CARCINOMA FOUND ON URINE CYTOLOGY    Location: [facility] OR 14 / SURGERY [facility]    Surgeons: Shane ALVAREZ M.D.            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)   (positive) Pulmonary hypertension (HCC)         (positive) Acute kidney injury superimposed on chronic kidney disease (HCC)   (positive) Hydronephrosis of left kidney   (positive) Hydronephrosis, left   (positive) Stage 2 chronic kidney disease   (positive) Stage 3b chronic kidney disease (HCC)      ENDO   (positive) Non-insulin dependent type 2 diabetes mellitus (HCC)       Physical Exam    Airway   Mallampati: II  TM distance: >3 FB  Neck ROM: full       Cardiovascular   Rhythm: regular  Rate: normal  (+) murmur     Dental - normal exam           Pulmonary - normal exam  Breath sounds clear to auscultation     Abdominal    Neurological - normal exam                   Anesthesia Plan    ASA 3   ASA physical status 3 criteria: CAD/stents (> 3 months) and MI or angina - history (> 3 months)    Plan -  general       Airway plan will be ETT          Induction: intravenous    Postoperative Plan: Postoperative administration of opioids is intended.    Pertinent diagnostic labs and testing reviewed    Informed Consent:    Anesthetic plan and risks discussed with patient.    Use of blood products discussed with: patient whom consented to blood products.

## 2024-02-02 NOTE — ANESTHESIA POSTPROCEDURE EVALUATION
Patient: Star Centeno    Procedure Summary       Date: 02/02/24 Room / Location: [place] [address] / SURGERY [facility]    Anesthesia Start: 0754 Anesthesia Stop: 1403    Procedures:       CYSTOSCOPY WITH LEFT URETEROSCOPY AND LEFT STENT REMOVAL (Urethra)      ROBOTIC ASSISTED LEFT URETERECTOMY BLADDER CUFF EXCISION AND LEFT URETERAL REIMPLANT (Urethra)      ROBOTIC PELVIC LYMPH NODE DISSECTION (Abdomen) Diagnosis: (HIGH-GRADE UROTHELIAL CARCINOMA)    Surgeons: Shane ALVAREZ M.D. Responsible Provider: Kristofer Chung D.O.    Anesthesia Type: general ASA Status: 3            Final Anesthesia Type: general  Last vitals  BP   Blood Pressure : 98/57    Temp   (!) 35.6 °C (96.1 °F)    Pulse   72   Resp   16    SpO2   97 %      Anesthesia Post Evaluation    Patient location during evaluation: PACU  Patient participation: complete - patient participated  Level of consciousness: awake and alert  Pain score: 5    Airway patency: patent  Anesthetic complications: no  Cardiovascular status: hemodynamically stable  Respiratory status: acceptable  Hydration status: euvolemic    PONV: none          No notable events documented.     Nurse Pain Score: 0 (NPRS)

## 2024-02-02 NOTE — ANESTHESIA TIME REPORT
Anesthesia Start and Stop Event Times       Date Time Event    2/2/2024 0650 Ready for Procedure     0754 Anesthesia Start     1403 Anesthesia Stop          Responsible Staff  02/02/24      Name Role Begin End    Kristofer Chung D.O. Anesth 0754 1408          Overtime Reason:  no overtime (within assigned shift)    Comments:

## 2024-02-03 PROBLEM — R05.1 ACUTE COUGH: Status: ACTIVE | Noted: 2024-02-03

## 2024-02-03 LAB
ALBUMIN SERPL BCP-MCNC: 3.4 G/DL (ref 3.2–4.9)
ALBUMIN/GLOB SERPL: 1.4 G/DL
ALP SERPL-CCNC: 60 U/L (ref 30–99)
ALT SERPL-CCNC: 12 U/L (ref 2–50)
ANION GAP SERPL CALC-SCNC: 11 MMOL/L (ref 7–16)
AST SERPL-CCNC: 11 U/L (ref 12–45)
BASOPHILS # BLD AUTO: 0.2 % (ref 0–1.8)
BASOPHILS # BLD: 0.02 K/UL (ref 0–0.12)
BILIRUB SERPL-MCNC: 0.2 MG/DL (ref 0.1–1.5)
BUN SERPL-MCNC: 29 MG/DL (ref 8–22)
CALCIUM ALBUM COR SERPL-MCNC: 9.1 MG/DL (ref 8.5–10.5)
CALCIUM SERPL-MCNC: 8.6 MG/DL (ref 8.5–10.5)
CHLORIDE SERPL-SCNC: 107 MMOL/L (ref 96–112)
CO2 SERPL-SCNC: 18 MMOL/L (ref 20–33)
CREAT SERPL-MCNC: 1.25 MG/DL (ref 0.5–1.4)
EOSINOPHIL # BLD AUTO: 0.01 K/UL (ref 0–0.51)
EOSINOPHIL NFR BLD: 0.1 % (ref 0–6.9)
ERYTHROCYTE [DISTWIDTH] IN BLOOD BY AUTOMATED COUNT: 48.8 FL (ref 35.9–50)
GFR SERPLBLD CREATININE-BSD FMLA CKD-EPI: 56 ML/MIN/1.73 M 2
GLOBULIN SER CALC-MCNC: 2.4 G/DL (ref 1.9–3.5)
GLUCOSE BLD STRIP.AUTO-MCNC: 162 MG/DL (ref 65–99)
GLUCOSE BLD STRIP.AUTO-MCNC: 167 MG/DL (ref 65–99)
GLUCOSE BLD STRIP.AUTO-MCNC: 177 MG/DL (ref 65–99)
GLUCOSE SERPL-MCNC: 127 MG/DL (ref 65–99)
HCT VFR BLD AUTO: 32.5 % (ref 42–52)
HGB BLD-MCNC: 10.5 G/DL (ref 14–18)
IMM GRANULOCYTES # BLD AUTO: 0.06 K/UL (ref 0–0.11)
IMM GRANULOCYTES NFR BLD AUTO: 0.5 % (ref 0–0.9)
LYMPHOCYTES # BLD AUTO: 1.2 K/UL (ref 1–4.8)
LYMPHOCYTES NFR BLD: 9.2 % (ref 22–41)
MCH RBC QN AUTO: 30.6 PG (ref 27–33)
MCHC RBC AUTO-ENTMCNC: 32.3 G/DL (ref 32.3–36.5)
MCV RBC AUTO: 94.8 FL (ref 81.4–97.8)
MONOCYTES # BLD AUTO: 0.88 K/UL (ref 0–0.85)
MONOCYTES NFR BLD AUTO: 6.8 % (ref 0–13.4)
NEUTROPHILS # BLD AUTO: 10.85 K/UL (ref 1.82–7.42)
NEUTROPHILS NFR BLD: 83.2 % (ref 44–72)
NRBC # BLD AUTO: 0 K/UL
NRBC BLD-RTO: 0 /100 WBC (ref 0–0.2)
NT-PROBNP SERPL IA-MCNC: 745 PG/ML (ref 0–125)
PLATELET # BLD AUTO: 192 K/UL (ref 164–446)
PMV BLD AUTO: 9.6 FL (ref 9–12.9)
POTASSIUM SERPL-SCNC: 5.1 MMOL/L (ref 3.6–5.5)
PROT SERPL-MCNC: 5.8 G/DL (ref 6–8.2)
RBC # BLD AUTO: 3.43 M/UL (ref 4.7–6.1)
SODIUM SERPL-SCNC: 136 MMOL/L (ref 135–145)
WBC # BLD AUTO: 13 K/UL (ref 4.8–10.8)

## 2024-02-03 PROCEDURE — A9270 NON-COVERED ITEM OR SERVICE: HCPCS | Performed by: UROLOGY

## 2024-02-03 PROCEDURE — 83880 ASSAY OF NATRIURETIC PEPTIDE: CPT

## 2024-02-03 PROCEDURE — A9270 NON-COVERED ITEM OR SERVICE: HCPCS | Performed by: INTERNAL MEDICINE

## 2024-02-03 PROCEDURE — 99232 SBSQ HOSP IP/OBS MODERATE 35: CPT | Performed by: STUDENT IN AN ORGANIZED HEALTH CARE EDUCATION/TRAINING PROGRAM

## 2024-02-03 PROCEDURE — 700102 HCHG RX REV CODE 250 W/ 637 OVERRIDE(OP): Performed by: INTERNAL MEDICINE

## 2024-02-03 PROCEDURE — 85025 COMPLETE CBC W/AUTO DIFF WBC: CPT

## 2024-02-03 PROCEDURE — 700105 HCHG RX REV CODE 258: Performed by: UROLOGY

## 2024-02-03 PROCEDURE — 700102 HCHG RX REV CODE 250 W/ 637 OVERRIDE(OP): Performed by: PHYSICIAN ASSISTANT

## 2024-02-03 PROCEDURE — A9270 NON-COVERED ITEM OR SERVICE: HCPCS | Performed by: PHYSICIAN ASSISTANT

## 2024-02-03 PROCEDURE — 770020 HCHG ROOM/CARE - TELE (206)

## 2024-02-03 PROCEDURE — 700105 HCHG RX REV CODE 258: Performed by: INTERNAL MEDICINE

## 2024-02-03 PROCEDURE — 700111 HCHG RX REV CODE 636 W/ 250 OVERRIDE (IP): Performed by: UROLOGY

## 2024-02-03 PROCEDURE — 36415 COLL VENOUS BLD VENIPUNCTURE: CPT

## 2024-02-03 PROCEDURE — 82962 GLUCOSE BLOOD TEST: CPT | Mod: 91

## 2024-02-03 PROCEDURE — 700102 HCHG RX REV CODE 250 W/ 637 OVERRIDE(OP): Performed by: UROLOGY

## 2024-02-03 PROCEDURE — 80053 COMPREHEN METABOLIC PANEL: CPT

## 2024-02-03 RX ORDER — SODIUM CHLORIDE 9 MG/ML
INJECTION, SOLUTION INTRAVENOUS EVERY 6 HOURS
Status: ACTIVE | OUTPATIENT
Start: 2024-02-03 | End: 2024-02-04

## 2024-02-03 RX ORDER — DEXTROSE MONOHYDRATE 25 G/50ML
25 INJECTION, SOLUTION INTRAVENOUS
Status: DISCONTINUED | OUTPATIENT
Start: 2024-02-03 | End: 2024-02-05 | Stop reason: HOSPADM

## 2024-02-03 RX ORDER — CEFDINIR 300 MG/1
300 CAPSULE ORAL EVERY 12 HOURS
Status: DISCONTINUED | OUTPATIENT
Start: 2024-02-03 | End: 2024-02-05 | Stop reason: HOSPADM

## 2024-02-03 RX ADMIN — ROSUVASTATIN CALCIUM 5 MG: 5 TABLET, FILM COATED ORAL at 05:16

## 2024-02-03 RX ADMIN — HEPARIN SODIUM 5000 UNITS: 5000 INJECTION, SOLUTION INTRAVENOUS; SUBCUTANEOUS at 05:16

## 2024-02-03 RX ADMIN — ACETAMINOPHEN 1000 MG: 500 TABLET ORAL at 17:40

## 2024-02-03 RX ADMIN — ACETAMINOPHEN 1000 MG: 500 TABLET ORAL at 05:16

## 2024-02-03 RX ADMIN — HEPARIN SODIUM 5000 UNITS: 5000 INJECTION, SOLUTION INTRAVENOUS; SUBCUTANEOUS at 20:12

## 2024-02-03 RX ADMIN — ALPRAZOLAM 0.25 MG: 0.25 TABLET ORAL at 00:04

## 2024-02-03 RX ADMIN — ACETAMINOPHEN 1000 MG: 500 TABLET ORAL at 11:44

## 2024-02-03 RX ADMIN — CEFTRIAXONE SODIUM 1000 MG: 10 INJECTION, POWDER, FOR SOLUTION INTRAVENOUS at 10:06

## 2024-02-03 RX ADMIN — CEFDINIR 300 MG: 300 CAPSULE ORAL at 17:40

## 2024-02-03 RX ADMIN — SODIUM CHLORIDE: 9 INJECTION, SOLUTION INTRAVENOUS at 07:45

## 2024-02-03 RX ADMIN — TAMSULOSIN HYDROCHLORIDE 0.4 MG: 0.4 CAPSULE ORAL at 10:06

## 2024-02-03 RX ADMIN — SODIUM CHLORIDE: 9 INJECTION, SOLUTION INTRAVENOUS at 05:17

## 2024-02-03 RX ADMIN — SODIUM CHLORIDE: 9 INJECTION, SOLUTION INTRAVENOUS at 20:11

## 2024-02-03 RX ADMIN — METOPROLOL SUCCINATE 25 MG: 25 TABLET, EXTENDED RELEASE ORAL at 05:16

## 2024-02-03 RX ADMIN — OXYCODONE HYDROCHLORIDE 10 MG: 10 TABLET ORAL at 05:16

## 2024-02-03 ASSESSMENT — ENCOUNTER SYMPTOMS
BLURRED VISION: 0
CHILLS: 0
FEVER: 0
PALPITATIONS: 0
ABDOMINAL PAIN: 1
COUGH: 1
VOMITING: 0
WEAKNESS: 1
NAUSEA: 0

## 2024-02-03 ASSESSMENT — PAIN DESCRIPTION - PAIN TYPE: TYPE: ACUTE PAIN

## 2024-02-03 NOTE — DIETARY
[employer] - Alert received for newly identified wound. Per chart review, pt appears to have an incision in abdomen, but no staged wounds noted. Does not appear to have any wound team consults at this time.     RD will monitor per dept policy.

## 2024-02-03 NOTE — PROGRESS NOTES
Note to reader: this note follows the APSO format rather than the historical SOAP format. Assessment and plan located at the top of the note for ease of use.    Chief Complaint  83 y.o. year old male here with history of prostate cancer and urothelial cancer s/p left distal ureterectomy with reimplant.     Assessment/Plan  Interval History   Left urothelial cancer s/p left distal ureterctomy with ureteral reimplant 2/2/24  History of prostate cancer      Ambulate  Start Cefdinir for 5 day course  ADAT  Plan NUZHAT out at discharge if output decreasing  Hedrick to remain with plan for outpatient cystogram in 2.5 weeks  Will have left ureteral stent removal in 6 weeks  Appreciate Hospitalist care        Case discussed with patient, family and Urology-Dr. Jen ANGEL  Patient seen and examined    2/3. POD #1. Doing well. Pain 5/10 and stable. Tolerates diet. No N/V. Passing flatus. No BM. Drain output 144cc/24 hours. Good UOP. On Heparin. Rocephin given 2/2. Afebrile, WBC 13. Creat 1.25           Review of Systems  Physical Exam   Review of Systems   Constitutional:  Negative for chills and fever.   Gastrointestinal:  Positive for abdominal pain. Negative for nausea and vomiting.     Vitals:    02/03/24 0514 02/03/24 0530 02/03/24 0725 02/03/24 1130   BP: 101/58  90/47 102/50   Pulse: 73  67 75   Resp:   16 20   Temp:       TempSrc:   Temporal Temporal   SpO2:  92% 96% 92%   Weight:       Height:         Physical Exam  Vitals and nursing note reviewed.   Constitutional:       Appearance: Normal appearance.   HENT:      Mouth/Throat:      Mouth: Mucous membranes are moist.   Eyes:      Pupils: Pupils are equal, round, and reactive to light.   Pulmonary:      Effort: Pulmonary effort is normal.   Abdominal:      General: Abdomen is flat. There is distension.      Palpations: Abdomen is soft.      Tenderness: There is no abdominal tenderness. There is no guarding.      Comments: Post surgical incisions healing well. NUZHAT BARNETT  with serosanguinous drainage   Genitourinary:     Comments: Hedrick draining clear  Skin:     General: Skin is warm and dry.   Neurological:      General: No focal deficit present.      Mental Status: He is alert and oriented to person, place, and time.   Psychiatric:         Mood and Affect: Mood normal.         Behavior: Behavior normal.          Hematology Chemistry   Lab Results   Component Value Date/Time    WBC 13.0 (H) 02/03/2024 01:46 AM    HEMOGLOBIN 10.5 (L) 02/03/2024 01:46 AM    HEMATOCRIT 32.5 (L) 02/03/2024 01:46 AM    PLATELETCT 192 02/03/2024 01:46 AM     Lab Results   Component Value Date/Time    SODIUM 136 02/03/2024 01:46 AM    POTASSIUM 5.1 02/03/2024 01:46 AM    CHLORIDE 107 02/03/2024 01:46 AM    CO2 18 (L) 02/03/2024 01:46 AM    GLUCOSE 127 (H) 02/03/2024 01:46 AM    BUN 29 (H) 02/03/2024 01:46 AM    CREATININE 1.25 02/03/2024 01:46 AM         Labs not explicitly included in this progress note were reviewed by the author.   Radiology/imaging not explicitly included in this progress note was reviewed by the author.     Medications reviewed and Labs reviewed

## 2024-02-03 NOTE — CARE PLAN
Problem: Knowledge Deficit - Standard  Goal: Patient and family/care givers will demonstrate understanding of plan of care, disease process/condition, diagnostic tests and medications  Outcome: Progressing     Problem: Pain - Standard  Goal: Alleviation of pain or a reduction in pain to the patient’s comfort goal  Outcome: Progressing     Problem: Fall Risk  Goal: Patient will remain free from falls  Outcome: Progressing   The patient is Stable - Low risk of patient condition declining or worsening    Shift Goals  Clinical Goals: monitor I/O, titrate o2 needs, comfort  Patient Goals: pain mgmt  Family Goals: comfort, updates

## 2024-02-03 NOTE — PROGRESS NOTES
Hospital Medicine Daily Progress Note    Date of Service  2/3/2024    Chief Complaint  Star Centeno is a 83 y.o. male admitted 2/2/2024 by urology service for left ureterectomy with bladder cuff excision and left ureteral reimplant.  Neurology requesting consult to assist with management of medical problems while admitted to the hospital.    Hospital Course  Star is an 83-year-old male with a PMHx DMT2, prostate cancer s/p radiation, urothelial cancer, HTN, HLD, history of pulmonary embolism 11/2023, CAD.  Admitted 2/2 for cystoscopy with left ureteroscopy and left stent removal, robotic assisted left ureterectomy with bladder cuff excision and left ureteral reimplant by .  Hospitalist consult requested for management of above medical problems.  Initially patient was requiring 6 L/min supplemental oxygen in the PACU.  Additionally, he had hyperkalemia with a potassium of 5.9.  Glucose was elevated at 290.      2/3: Currently on 0.5 L/min supplemental oxygen.  Potassium improved to 5.3.  Glucose has ranged between 138-183.  NUZHAT drain is in place to lower left quadrant.  There is serosanguineous drainage present.  This will be managed by urology.    Interval Problem Update  2/3: Patient states he is having some mild abdominal pain.  It seems to be worst when he moves around.  Otherwise, he has no acute concerns.  He denies chest pain, shortness of breath.  Patient lives at home with his dog named Lana.  His wife passed away in June 2023.  He does have a daughter named Melissa who is in [place].    I have discussed this patient's plan of care and discharge plan at IDT rounds today with Case Management, Nursing, Nursing leadership, and other members of the IDT team.    Consultants/Specialty  Hospital service    Code Status  Full Code    Disposition  Medically Cleared  I have placed the appropriate orders for post-discharge needs.    Review of Systems  Review of Systems   Constitutional:  Negative for chills  and fever.   Eyes:  Negative for blurred vision.   Respiratory:  Positive for cough.    Cardiovascular:  Negative for chest pain, palpitations and leg swelling.   Gastrointestinal:  Negative for nausea and vomiting.   Neurological:  Positive for weakness.        Physical Exam  Temp:  [36.3 °C (97.3 °F)-36.5 °C (97.7 °F)] 36.5 °C (97.7 °F)  Pulse:  [66-75] 74  Resp:  [16-20] 18  BP: ()/(47-61) 105/55  SpO2:  [90 %-96 %] 91 %    Physical Exam  Vitals and nursing note reviewed.   Constitutional:       General: He is not in acute distress.     Appearance: Normal appearance. He is not ill-appearing.   HENT:      Nose:      Comments: Nasal cannula in place     Mouth/Throat:      Mouth: Mucous membranes are moist.      Pharynx: No oropharyngeal exudate.   Cardiovascular:      Rate and Rhythm: Normal rate and regular rhythm.      Heart sounds: Murmur heard.   Pulmonary:      Effort: Pulmonary effort is normal. No respiratory distress.      Breath sounds: Normal breath sounds.      Comments: Cough is present  Abdominal:      General: Bowel sounds are normal. There is distension.      Palpations: Abdomen is soft.      Comments: Mild abdominal distention  NUZHAT drain in place to left lower quadrant.  Serosanguineous fluid is present   Musculoskeletal:         General: No swelling or tenderness. Normal range of motion.      Cervical back: Normal range of motion and neck supple.   Skin:     General: Skin is warm and dry.      Findings: Bruising present.   Neurological:      Mental Status: He is alert.         Fluids    Intake/Output Summary (Last 24 hours) at 2/3/2024 1947  Last data filed at 2/3/2024 1800  Gross per 24 hour   Intake 2829.57 ml   Output 2609 ml   Net 220.57 ml       Laboratory  Recent Labs     02/02/24  1534 02/03/24  0146   WBC 13.2* 13.0*   RBC 3.54* 3.43*   HEMOGLOBIN 11.3* 10.5*   HEMATOCRIT 34.0* 32.5*   MCV 96.0 94.8   MCH 31.9 30.6   MCHC 33.2 32.3   RDW 50.0 48.8   PLATELETCT 180 192   MPV 9.2 9.6      Recent Labs     02/02/24  1534 02/02/24  1946 02/03/24  0146   SODIUM 134* 137 136   POTASSIUM 5.9* 5.3 5.1   CHLORIDE 105 105 107   CO2 16* 18* 18*   GLUCOSE 297* 178* 127*   BUN 30* 29* 29*   CREATININE 1.57* 1.36 1.25   CALCIUM 8.6 8.8 8.6                   Imaging  [id number] VIEW   Final Result      1.  Left ureteral stent.           Assessment/Plan  * Hypoxia  Assessment & Plan  In PACU requiring 6L/min supplemental oxygen  2/3: down to 0.5L/min  No baseline supplemental oxygen requirement   Continue to wean as tolerated    Acute cough  Assessment & Plan  Mild cough on exam.  Nonproductive.  Likely secondary to recent anesthesia and atelectasis  Continue to encourage incentive spirometry  If worsening/fever develops plan for CXR    Hyperkalemia  Assessment & Plan  Potassium 5.9 2/2  Improvement with insulin and IVF  Potassium 5.3 2/3  Daily BMP  Continuous telemetry monitoring     Metabolic acidosis  Assessment & Plan  Bicarb improved to 18  Bicarb 16, anion gap 13 the setting of hyperglycemia and CKD stage III  Continue to  control glucose       Stage 3b chronic kidney disease (HCC)- (present on admission)  Assessment & Plan  Stable  Avoid nephrotoxins  Monitor input and output    History of pulmonary embolism- (present on admission)  Assessment & Plan  History of PE late 2023  Not on anticoagulation due to hematuria   Continue SQ heparin   Close monitoring for worsening hematuria     Coronary artery disease involving native coronary artery of native heart without angina pectoris- (present on admission)  Assessment & Plan  Stent placement x6 2011 in [place]  No chest pain  ECHO 11/2022: EF 60%; RVSP 35mmhg  Stat EKG and troponin if CP should develop  Continuous telemetry monitoring    Non-insulin dependent type 2 diabetes mellitus (HCC)- (present on admission)  Assessment & Plan  A1c 1/30: 6.7  Hyperglycemia with glucose ranging 150-200s   Mange with SSI  Consider PO agent prior to discharge; although given  patient's age A1C of 6.7 is appropriate  Glipizide PO okay in CKD    Hyperlipidemia- (present on admission)  Assessment & Plan  Continue statin    History of adenocarcinoma of prostate and urothelial carcinoma- (present on admission)  Assessment & Plan  Biopsy 1/4/2024 showed high-grade urothelial carcinoma.  Status post left ureterectomy with bladder cuff excision 2/2  NUZHAT drain LL ABD 2/2;  serosanguineous drainage present  Management per urology    Essential hypertension, benign- (present on admission)  Assessment & Plan  Hold lisinopril due to hypotension  Hyperkalemia 2/2  Continue metoprolol  Monitor blood pressure         VTE prophylaxis:    heparin ppx      I have performed a physical exam and reviewed and updated ROS and Plan today (2/3/2024). In review of yesterday's note (2/2/2024), there are no changes except as documented above.

## 2024-02-03 NOTE — OR NURSING
"1750  Patient medicated for complaint of soreness in lower abdomen; especially on left side    1845  Dr DESTINEE Almaguer at bedside; exam completed; orders received    1930  Daughter, Peyton, at bedside    1945  Lab draw completed    2000  Patient tolerating fluids well; no complaints of nausea  Fingerstick glucose completed X 3; 2 hours after last insulin medication; please see lab results.  Insulin given sub q as ordered on sliding scale  States pain tolerable for discharge to room  Requested warming blanket; bear paws applied.  Patient expressed \"this is the first time I am comfortable and warm\",  Bear paws unit sent with patient for comfort.  Patient on O2 @ 1 l/m via nasal cannula    2030  Patient transferred to room via bed  O2 tank > 50% full  Pertinent post op orders reviewed with receiving RN        "

## 2024-02-03 NOTE — ASSESSMENT & PLAN NOTE
Stent placement x6 2011 in [place]  No chest pain  ECHO 11/2022: EF 60%; RVSP 35mmhg  Stat EKG and troponin if CP should develop  Continuous telemetry monitoring

## 2024-02-03 NOTE — OR NURSING
Patient very restless; trying to get out of bed, pulling at his gown and lines; removing oxygen.  Initially he could not tell us what he wanted; as he awakened more, states he needs to get up; wanting to urinate.  Patient reorientated frequently    1415  Medicated for potential pain; patient unable to say what he needed. Just restless and trying to get out of bed    1530  Lab at bedside    1600  Patient dozing comfortably; denies pain  Tolerating fluids well; no complaints of nausea

## 2024-02-03 NOTE — ASSESSMENT & PLAN NOTE
Mild cough on exam.  Nonproductive.  Likely secondary to recent anesthesia and atelectasis  Continue to encourage incentive spirometry  If worsening/fever develops plan for CXR

## 2024-02-03 NOTE — OP REPORT
Surgeon: Anmol Sousa MD  Assistant: Shane Ecsalona MD  Anesthesia: MD Sheldon; general  Date of surgery: 2/2/24    The surgery was performed as a Co- surgery and please see 's operative report for cystoscopy with left stent removal, ureteroscopy, and injection of indocyanine green into the left ureter.  I was the surgical assistant for his portion of the procedure and likewise Dr. Escalona was the first assistant at bedside while I performed the below procedure.     Due to the significant complexity of the surgery it required MD assistant to perform adequate retraction, passing of instruments, and there is not another qualified assistant available.    After Dr. Escalona's portion of the procedure,  left distal ureter was excised robotically and the left bladder cuff was closed, we switched positions and I performed the remainder of the robotic procedure.    Procedures performed:   Left ureteral reimplantation (ureteroneocystotomy)  2. Psoas Hitch  3.  Vascularized local tissue transfer, 8 x 12 cm, see below findings  4.  Placement of left ureteral stent  5.  Modifier please see below findings    Findings:   Left ureter was transected at the level of the crossing over the external iliac artery.  The mucosa although it had pre-existing stent appeared healthy pink and bleeding after I removed the Hem-o-marian Weck clip from the distal aspect of the ureter.  This was spatulated 3 cm.  The reimplantation was performed just left of midline at the posterior dome as this area had the most mobility.  2.  22 modifier: Given the patient's previous radiation, stent inflammation from pre-existing left ureteral stent in the overall hide transection of the left ureter this procedure was significantly more complex compared to a standard ureteral reimplantation.  This added an additional 90 minutes to my portion of the procedure.  3.  Tension-free anastomosis with psoas hitch and vascularized tissue transfer to wrap the left  distal ureter using the posterior peritoneum of the bladder, blood supply from the superior vesicle arteries bilaterally.  This added an additional layer of vascularized tissue to wrap the ureter circumferentially and this was tacked to both the ureter and back to itself.  Total size of vascularized tissue transfer 8 x 12 cm, vascularized peritoneum and underlying soft tissue and fat from the posterior bladder.      EBL: <50ml  Complications: none apparent    Operative Detail:     After the left distal ureter was excised robotically and the left bladder cuff was closed, we switch positions and I performed the remainder of the robotic procedure.    I sharply excised the left distal ureteral Hem-o-marian Weck clip and this was removed from the patient's abdomen.  The ureter was spatulated along the posterior and medial portion, 3 cm sharply.  This demonstrated brisk bleeding and overall healthy ureter.  I attempted to mobilize the left mid ureter only a small amount due to the concern for previous radiation and preserving as much blood supply as possible.    The anterior bladder was then dropped using combination of blunt and electrocautery.  I mobilized the entire anterior surface of the bladder down to the level of the prostate.  I did not take the vascular pedicle on the contralateral side due to the concern for decreased blood supply in a previously radiated field from his prostate cancer.  Once the bladder was fully mobilized we filled the bladder with approximately 240 cc.    I then mobilized the posterior bladder peritoneum and underlying fat to dissect and cleared off the detrusor muscle  an 8 x 12 cm vascularized tissue flap that would later be used to wrap circumferentially around the ureteral reimplant.    I made a corresponding 3 cm cystotomy without opening the mucosa.  This allowed me to place our first interrupted 4-0 Vicryl sutures to tack the ureter to the bladder prior to emptying the bladder.   After both the distal and the spatulated apex of the ureter were individually sutured to the corresponding bladder detrusor and mucosa I completed the cystotomy opening up the mucosa.  A series of running 4-0 Vicryl sutures were then used to complete the anastomosis along each side of the ureter.  Prior to completion of the anastomosis 6 x 28 double-J left ureteral stent was placed through the assistant port and up the left ureter.  The distal aspect of the stent was placed inside the bladder.  I completed the anastomosis with her remaining 4-0 Vicryl sutures.  This was then leak tested to 180cc, by filling the bladder and leak test was negative.  I then brought the posterior peritoneum and underlying fat from the posterior bladder circumferentially bringing it and wrapping it around the ureter creating a 360 degree second layer coverage at the level of the anastomosis.    I then performed psoas hitch with a 3-0 Monocryl 12 inch STRATAFIX suture by suturing the bladder that is just caudal to the ureteral anastomosis to the psoas muscle with a series of 3 running sutures which nicely removed any potential tension from the ureteral anastomosis.  The same suture was used to then complete the wrapping of lower vascularized tissue transfer and this was tacked both to the bladder as well as the ureter in a running fashion.  Vistaseal agent was then placed both at the bladder cuff repair as well as the ureteral anastomosis.    19 [place] NUZHAT drain was placed through the left most lateral robotic port.  All sutures and needle counts were accounted for and this concluded the robotic portion of the procedure.    Instruments were removed, the robot was undocked, and the incisions were closed with subcu particular 4-0 Monocryl and then covered with Dermabond.  Patient was awoken the operating room and transferred to the PACU with no known difficulties or complications.    Drains: 18 [place] round NUZHAT drain, 18 [place] Hedrick  catheter, 6 x 28 left double-J ureteral stent without strings

## 2024-02-03 NOTE — ASSESSMENT & PLAN NOTE
Bicarb improved to 18  Bicarb 16, anion gap 13 the setting of hyperglycemia and CKD stage III  Continue to  control glucose

## 2024-02-03 NOTE — PROGRESS NOTES
4 Eyes Skin Assessment Completed by JB Peña and JB Quezada.    Head WDL  Ears WDL  Nose WDL  Mouth WDL  Neck WDL  Breast/Chest WDL  Shoulder Blades WDL  Spine WDL  (R) Arm/Elbow/Hand WDL  (L) Arm/Elbow/Hand WDL  Abdomen Incision x6 closed with dermabond, 7th to NUZHAT drain  Groin WDL with rodarte  Scrotum/Coccyx/Buttocks Redness and Blanching  (R) Leg WDL  (L) Leg WDL  (R) Heel/Foot/Toe WDL  (L) Heel/Foot/Toe WDL          Devices In Places Tele Box, Blood Pressure Cuff, Pulse Ox, Rodarte, SCD's, and Nasal Cannula      Interventions In Place NC W/Ear Foams and Pillows    Possible Skin Injury No    Pictures Uploaded Into Epic N/A  Wound Consult Placed N/A  RN Wound Prevention Protocol Ordered Yes

## 2024-02-03 NOTE — ASSESSMENT & PLAN NOTE
Biopsy 1/4/2024 showed high-grade urothelial carcinoma.  Status post left ureterectomy with bladder cuff excision 2/2  NUZHAT drain LL ABD 2/2;  serosanguineous drainage present  Management per urology

## 2024-02-03 NOTE — ASSESSMENT & PLAN NOTE
A1c 1/30: 6.7  Hyperglycemia with glucose ranging 150-200s   Mange with SSI  Consider PO agent prior to discharge; although given patient's age A1C of 6.7 is appropriate  Glipizide PO okay in CKD

## 2024-02-03 NOTE — ASSESSMENT & PLAN NOTE
In PACU requiring 6L/min supplemental oxygen  2/3: down to 0.5L/min  No baseline supplemental oxygen requirement   Continue to wean as tolerated

## 2024-02-03 NOTE — CARE PLAN
The patient is Stable - Low risk of patient condition declining or worsening    Shift Goals  Clinical Goals: monitor I/O, titrate o2 needs, comfort  Patient Goals: pain mgmt  Family Goals: comfort, updates    Progress made toward(s) clinical / shift goals:      Problem: Knowledge Deficit - Standard  Goal: Patient and family/care givers will demonstrate understanding of plan of care, disease process/condition, diagnostic tests and medications  Outcome: Progressing     Problem: Pain - Standard  Goal: Alleviation of pain or a reduction in pain to the patient’s comfort goal  Outcome: Progressing     Problem: Fall Risk  Goal: Patient will remain free from falls  Outcome: Progressing       Patient is not progressing towards the following goals:

## 2024-02-03 NOTE — PROGRESS NOTES
Pt tolerating CLD well. Also consumed 2 applesauces, 2 orange gelatins, yolie crackers, a bag of pretzels, and 2 string cheeses without complaints.   I will SWITCH the dose or number of times a day I take the medications listed below when I get home from the hospital:  None

## 2024-02-03 NOTE — HOSPITAL COURSE
Star is an 83-year-old male with a PMHx DMT2, prostate cancer s/p radiation, urothelial cancer, HTN, HLD, history of pulmonary embolism 11/2023, CAD.  Admitted 2/2 for cystoscopy with left ureteroscopy and left stent removal, robotic assisted left ureterectomy with bladder cuff excision and left ureteral reimplant by .  Hospitalist consult requested for management of above medical problems.  Initially patient was requiring 6 L/min supplemental oxygen in the PACU.  Additionally, he had hyperkalemia with a potassium of 5.9.  Glucose was elevated at 290.      2/3: Currently on 0.5 L/min supplemental oxygen.  Potassium improved to 5.3.  Glucose has ranged between 138-183.  NUZHAT drain is in place to lower left quadrant.  There is serosanguineous drainage present.  This will be managed by urology.

## 2024-02-03 NOTE — PROGRESS NOTES
Bedside report received from night RN; assumed pt care. Pt assessment complete. Pt A&Ox4 Reviewed plan of care with pt. Pt tele mnitors. Chart and labs reviewed. Bed in lowest position, and 2 side rails up. Pt educated on call light; call light with in reach.

## 2024-02-03 NOTE — ASSESSMENT & PLAN NOTE
Potassium 5.9 2/2  Improvement with insulin and IVF  Potassium 5.3 2/3  Daily BMP  Continuous telemetry monitoring

## 2024-02-03 NOTE — ASSESSMENT & PLAN NOTE
History of PE late 2023  Not on anticoagulation due to hematuria   Continue SQ heparin   Close monitoring for worsening hematuria

## 2024-02-03 NOTE — CONSULTS
Hospital Medicine Consultation    Date of Service  2/2/2024    Referring Physician  Shane ALVAREZ M.D.    Consulting Physician  Pedrito Almaguer M.D.    Reason for Consultation  Medical problems management    History of Presenting Illness  83 y.o. male with history of type 2 diabetes on metformin, prostate cancer post radiation treatment, urothelial cancer, hypertension, dyslipidemia, pulmonary embolism, CAD, MI, who undergone cystoscopy with left ureteroscopy and left stent removal, robotic assisted left ureterectomy with bladder cuff excision and left ureteral reimplant by  earlier today.  Consult requested for management of medical problems.  He was seen in PACU without significant distress.  He has Hedrick catheter and NUZHAT drain left out after surgery, 70 cc of blood already evacuated.  He has blood work that was done earlier today reviewed: Trace hyperkalemia with potassium 5.9, sugar 297, metabolic acidosis with bicarb 16, creatinine appears at baseline 1.57.  He has not eaten today.  I ordered repeat BMP and venous blood gases.  I re directed admission to telemetry due to hyperkalemia.    Review of Systems  Review of Systems   Constitutional:  Negative for chills, fever and weight loss.   HENT:  Negative for ear pain, hearing loss and tinnitus.    Eyes:  Negative for blurred vision, double vision and photophobia.   Respiratory:  Negative for cough, hemoptysis and sputum production.    Cardiovascular:  Negative for chest pain, palpitations and orthopnea.   Gastrointestinal:  Positive for abdominal pain. Negative for constipation, diarrhea, heartburn, nausea and vomiting.   Genitourinary:  Negative for dysuria, flank pain, frequency and hematuria.   Musculoskeletal:  Negative for back pain, joint pain and neck pain.   Skin:  Negative for itching and rash.   Neurological:  Negative for tremors, speech change, focal weakness and headaches.   Endo/Heme/Allergies:  Negative for environmental allergies  and polydipsia. Does not bruise/bleed easily.   Psychiatric/Behavioral:  Negative for hallucinations and substance abuse. The patient is not nervous/anxious.        Past Medical History   has a past medical history of Cancer (HCC), Cataract, Diabetes (HCC), High cholesterol, History of heart artery stent, Hyperlipidemia, Hypertension, Myocardial infarct (HCC), Pulmonary emboli (HCC), Renal disorder, and Urinary bladder disorder.    Surgical History   has a past surgical history that includes eye surgery; prostatectomy, radical retro; pr cystourethroscopy,biopsies (N/A, 10/24/2023); pr cysto/uretero/pyeloscopy, dx (Left, 10/24/2023); pr cystoscopy,insert ureteral stent (Left, 10/24/2023); pr cystoscopy,insert ureteral stent (Left, 01/04/2024); pr cysto/uretero/pyeloscopy, dx (01/04/2024); and other cardiac surgery.    Family History  family history includes Diabetes in his brother and sister; Genetic Disorder in his brother, sister, and sister; Hyperlipidemia in his brother; Hypertension in his brother.    Social History   reports that he quit smoking about 23 years ago. His smoking use included cigarettes. He started smoking about 68 years ago. He has a 45.0 pack-year smoking history. He has never used smokeless tobacco. He reports current alcohol use of about 8.4 oz of alcohol per week. He reports that he does not use drugs.    Medications  Prior to Admission Medications   Prescriptions Last Dose Informant Patient Reported? Taking?   ALPRAZolam (XANAX) 0.25 MG Tab 1/29/2024 at PRN Patient Yes No   Sig: Take 0.25 mg by mouth at bedtime as needed for Sleep.   Coenzyme Q10 (COQ-10) 30 MG Cap 1/25/2024 at HOLD Patient Yes No   Sig: Take 30 mg by mouth every evening. Indications: heart health   Empagliflozin 25 MG Tab 1/25/2024 at HOLD Patient No No   Sig: Take 25 mg by mouth every day for 360 days.   Loperamide HCl (IMODIUM A-D) 1 MG/7.5ML Liquid 2 WEEKS AGO at PRN Patient Yes No   Sig: Take 30 mL by mouth 2 times a  day as needed (diarrhea). Indications: Diarrhea   Omega-3 Fatty Acids (FISH OIL) 1200 MG Cap 2024 at [facility] Patient Yes No   Sig: Take 1,200 mg by mouth every morning. Indications: heart health   Prenatal Multivit-Min-Fe-FA (PRE-COLE FORMULA PO) 2024 at [facility] Patient Yes No   Sig: Take 1 Tablet by mouth every morning. Indications: supplement   acetaminophen (TYLENOL) 500 MG Tab 2024 at PM Patient No No   Sig: Take 1 Tablet by mouth every 6 hours as needed for Mild Pain or Moderate Pain.   amoxicillin-clavulanate (AUGMENTIN) 500-125 MG Tab 2024 at [facility] Patient Yes Yes   Sig: Take 1 Tablet by mouth 2 times a day. 7 day course started 2024   aspirin 81 MG EC tablet 2024 at [facility] Patient Yes No   Sig: Take 81 mg by mouth every evening. Indications: blood thinner   bismuth subsalicylate (PEPTO-BISMOL) 262 MG/15ML Suspension 2 WEEKS AGO at PRN Patient Yes No   Sig: Take 30 mL by mouth every 6 hours as needed (diarrhea). Indications: Diarrhea, Heartburn   diclofenac sodium (VOLTAREN) 1 % Gel 2 WEEKS AGO at PRN Patient Yes No   Sig: Apply 2 g topically 4 times a day as needed (Shoulder Pain). Indications: Joint Damage causing Pain and Loss of Function   ezetimibe (ZETIA) 10 MG Tab 2024 at [facility] Patient No No   Sig: Take 1 Tablet by mouth every day. Indications: High Amount of Fats in the Blood   lisinopril (PRINIVIL) 10 MG Tab 2024 at [facility] Patient No No   Sig: Take 1 Tablet by mouth every day.   metFORMIN (GLUCOPHAGE) 500 MG Tab 2024 at PM Patient No No   Sig: Take 1 Tablet by mouth 2 times a day with meals for 360 days.   metoprolol SR (TOPROL XL) 25 MG TABLET SR 24 HR 2024 at 0400 Patient No No   Sig: Take 1 Tablet by mouth every day.   oxyCODONE immediate-release (ROXICODONE) 5 MG Tab 2 WEEKS AGO at PRN Patient Yes No   Si mg every 6 hours as needed.   rosuvastatin (CRESTOR) 5 MG Tab 2024 at PM Patient No No   Sig: TAKE 1 TABLET BY MOUTH EVERY DAY   tamsulosin  (FLOMAX) 0.4 MG capsule 2/2/2024 at 0400 Patient No No   Sig: Take 1 Capsule by mouth 1/2 hour after breakfast.      Facility-Administered Medications: None       Allergies  No Known Allergies    Physical Exam  Temp:  [35.6 °C (96.1 °F)-35.8 °C (96.4 °F)] 35.8 °C (96.4 °F)  Pulse:  [62-86] 71  Resp:  [12-30] 20  BP: ()/(57-74) 134/64  SpO2:  [94 %-97 %] 95 %    Physical Exam  Vitals and nursing note reviewed.   Constitutional:       General: He is not in acute distress.     Appearance: Normal appearance.   HENT:      Head: Normocephalic and atraumatic.      Nose: Nose normal.      Mouth/Throat:      Mouth: Mucous membranes are moist.   Eyes:      Extraocular Movements: Extraocular movements intact.      Pupils: Pupils are equal, round, and reactive to light.   Cardiovascular:      Rate and Rhythm: Normal rate and regular rhythm.   Pulmonary:      Effort: Pulmonary effort is normal.      Breath sounds: Normal breath sounds.   Abdominal:      General: Abdomen is flat. There is no distension.      Tenderness: There is no abdominal tenderness.      Comments: JPdrain in the left flank  Hedrick catheter   Musculoskeletal:         General: No swelling or deformity. Normal range of motion.      Cervical back: Normal range of motion and neck supple.   Skin:     General: Skin is warm and dry.   Neurological:      General: No focal deficit present.      Mental Status: He is alert and oriented to person, place, and time.   Psychiatric:         Mood and Affect: Mood normal.         Behavior: Behavior normal.         Fluids  Date 02/02/24 0700 - 02/03/24 0659   Shift 1604-2999 [id number] 1616-9772 24 Hour Total   INTAKE   P.O.  300  300   I.V. 2300   2300   Shift Total 2300 300  2600   OUTPUT   Drains  70  70   Blood 50   50   Shift Total 50 70  120   Weight (kg) 79.3 79.3 79.3 79.3       Laboratory  Recent Labs     02/02/24  1534   WBC 13.2*   RBC 3.54*   HEMOGLOBIN 11.3*   HEMATOCRIT 34.0*   MCV 96.0   MCH 31.9   MCHC 33.2    RDW 50.0   PLATELETCT 180   MPV 9.2     Recent Labs     02/02/24  1534   SODIUM 134*   POTASSIUM 5.9*   CHLORIDE 105   CO2 16*   GLUCOSE 297*   BUN 30*   CREATININE 1.57*   CALCIUM 8.6                     Imaging  [id number] VIEW   Final Result      1.  Left ureteral stent.          Assessment/Plan  Hyperkalemia  Assessment & Plan  Potassium 5.9.  Given patient already received insulin and fluids, will repeat BMP to consider further interventions if potassium still elevated  Admit to telemetry  DC lisinopril for now  Renal diet    Metabolic acidosis  Assessment & Plan  Bicarb 16, anion gap 13 the setting of hyperglycemia and CKD stage III  Will check venous blood gases, BHB  Blood sugar control      Postoperative hypoxia  Assessment & Plan  Wean off oxygen as tolerated  Incentive spirometry  Mobilize    Stage 3b chronic kidney disease (HCC)- (present on admission)  Assessment & Plan  Avoid nephrotoxins  Monitor input and output    History of pulmonary embolism- (present on admission)  Assessment & Plan  Not on anticoagulation  Start subcu heparin for DVT prophylaxis  Mobilize    Non-insulin dependent type 2 diabetes mellitus (HCC)- (present on admission)  Assessment & Plan  Uncontrolled hyperglycemia, possible mild DKA  Continue subcu insulin sliding scale  IV fluids    Hyperlipidemia- (present on admission)  Assessment & Plan  Continue statin    History of adenocarcinoma of prostate and urothelial carcinoma- (present on admission)  Assessment & Plan  Biopsy 1/4/2024 showed high-grade urothelial carcinoma.  Status post left ureterectomy with bladder cuff excision today  Management per urology    Essential hypertension, benign- (present on admission)  Assessment & Plan  Hold lisinopril due to hyperkalemia  Continue metoprolol  Monitor blood pressure

## 2024-02-04 LAB
ANION GAP SERPL CALC-SCNC: 10 MMOL/L (ref 7–16)
BUN SERPL-MCNC: 23 MG/DL (ref 8–22)
CALCIUM SERPL-MCNC: 8.3 MG/DL (ref 8.5–10.5)
CHLORIDE SERPL-SCNC: 110 MMOL/L (ref 96–112)
CO2 SERPL-SCNC: 17 MMOL/L (ref 20–33)
CREAT SERPL-MCNC: 1.2 MG/DL (ref 0.5–1.4)
ERYTHROCYTE [DISTWIDTH] IN BLOOD BY AUTOMATED COUNT: 50.3 FL (ref 35.9–50)
GFR SERPLBLD CREATININE-BSD FMLA CKD-EPI: 59 ML/MIN/1.73 M 2
GLUCOSE BLD STRIP.AUTO-MCNC: 132 MG/DL (ref 65–99)
GLUCOSE BLD STRIP.AUTO-MCNC: 140 MG/DL (ref 65–99)
GLUCOSE BLD STRIP.AUTO-MCNC: 149 MG/DL (ref 65–99)
GLUCOSE SERPL-MCNC: 118 MG/DL (ref 65–99)
HCT VFR BLD AUTO: 29.5 % (ref 42–52)
HGB BLD-MCNC: 9.6 G/DL (ref 14–18)
MCH RBC QN AUTO: 31.1 PG (ref 27–33)
MCHC RBC AUTO-ENTMCNC: 32.5 G/DL (ref 32.3–36.5)
MCV RBC AUTO: 95.5 FL (ref 81.4–97.8)
PLATELET # BLD AUTO: 158 K/UL (ref 164–446)
PMV BLD AUTO: 9.8 FL (ref 9–12.9)
POTASSIUM SERPL-SCNC: 4.6 MMOL/L (ref 3.6–5.5)
RBC # BLD AUTO: 3.09 M/UL (ref 4.7–6.1)
SODIUM SERPL-SCNC: 137 MMOL/L (ref 135–145)
WBC # BLD AUTO: 9.1 K/UL (ref 4.8–10.8)

## 2024-02-04 PROCEDURE — 770020 HCHG ROOM/CARE - TELE (206)

## 2024-02-04 PROCEDURE — A9270 NON-COVERED ITEM OR SERVICE: HCPCS | Performed by: INTERNAL MEDICINE

## 2024-02-04 PROCEDURE — 700102 HCHG RX REV CODE 250 W/ 637 OVERRIDE(OP): Performed by: INTERNAL MEDICINE

## 2024-02-04 PROCEDURE — 85027 COMPLETE CBC AUTOMATED: CPT

## 2024-02-04 PROCEDURE — 36415 COLL VENOUS BLD VENIPUNCTURE: CPT

## 2024-02-04 PROCEDURE — 80048 BASIC METABOLIC PNL TOTAL CA: CPT

## 2024-02-04 PROCEDURE — 82962 GLUCOSE BLOOD TEST: CPT

## 2024-02-04 PROCEDURE — 99233 SBSQ HOSP IP/OBS HIGH 50: CPT | Performed by: INTERNAL MEDICINE

## 2024-02-04 PROCEDURE — A9270 NON-COVERED ITEM OR SERVICE: HCPCS | Performed by: UROLOGY

## 2024-02-04 PROCEDURE — 700111 HCHG RX REV CODE 636 W/ 250 OVERRIDE (IP): Performed by: UROLOGY

## 2024-02-04 PROCEDURE — 700102 HCHG RX REV CODE 250 W/ 637 OVERRIDE(OP): Performed by: UROLOGY

## 2024-02-04 PROCEDURE — A9270 NON-COVERED ITEM OR SERVICE: HCPCS | Performed by: PHYSICIAN ASSISTANT

## 2024-02-04 PROCEDURE — 700102 HCHG RX REV CODE 250 W/ 637 OVERRIDE(OP): Performed by: PHYSICIAN ASSISTANT

## 2024-02-04 RX ADMIN — CEFDINIR 300 MG: 300 CAPSULE ORAL at 16:35

## 2024-02-04 RX ADMIN — ACETAMINOPHEN 1000 MG: 500 TABLET ORAL at 12:54

## 2024-02-04 RX ADMIN — ACETAMINOPHEN 1000 MG: 500 TABLET ORAL at 00:27

## 2024-02-04 RX ADMIN — HEPARIN SODIUM 5000 UNITS: 5000 INJECTION, SOLUTION INTRAVENOUS; SUBCUTANEOUS at 16:35

## 2024-02-04 RX ADMIN — ACETAMINOPHEN 1000 MG: 500 TABLET ORAL at 05:17

## 2024-02-04 RX ADMIN — HEPARIN SODIUM 5000 UNITS: 5000 INJECTION, SOLUTION INTRAVENOUS; SUBCUTANEOUS at 05:17

## 2024-02-04 RX ADMIN — TAMSULOSIN HYDROCHLORIDE 0.4 MG: 0.4 CAPSULE ORAL at 07:40

## 2024-02-04 RX ADMIN — CEFDINIR 300 MG: 300 CAPSULE ORAL at 05:16

## 2024-02-04 RX ADMIN — ALPRAZOLAM 0.25 MG: 0.25 TABLET ORAL at 23:58

## 2024-02-04 RX ADMIN — ROSUVASTATIN CALCIUM 5 MG: 5 TABLET, FILM COATED ORAL at 05:18

## 2024-02-04 RX ADMIN — METOPROLOL SUCCINATE 25 MG: 25 TABLET, EXTENDED RELEASE ORAL at 05:16

## 2024-02-04 RX ADMIN — ALPRAZOLAM 0.25 MG: 0.25 TABLET ORAL at 01:00

## 2024-02-04 RX ADMIN — HEPARIN SODIUM 5000 UNITS: 5000 INJECTION, SOLUTION INTRAVENOUS; SUBCUTANEOUS at 21:26

## 2024-02-04 ASSESSMENT — ENCOUNTER SYMPTOMS
NAUSEA: 0
ABDOMINAL PAIN: 1
BLURRED VISION: 0
CHILLS: 0
WEAKNESS: 1
PALPITATIONS: 0
FEVER: 0
VOMITING: 0
COUGH: 1

## 2024-02-04 ASSESSMENT — FIBROSIS 4 INDEX: FIB4 SCORE: 1.71

## 2024-02-04 ASSESSMENT — PAIN DESCRIPTION - PAIN TYPE
TYPE: ACUTE PAIN
TYPE: ACUTE PAIN

## 2024-02-04 NOTE — PROGRESS NOTES
Hospital Medicine Daily Progress Note    Date of Service  2/4/2024    Chief Complaint  Star Centeno is a 83 y.o. male admitted 2/2/2024 by urology service for left ureterectomy with bladder cuff excision and left ureteral reimplant.  Neurology requesting consult to assist with management of medical problems while admitted to the hospital.    Hospital Course  Star is an 83-year-old male with a PMHx DMT2, prostate cancer s/p radiation, urothelial cancer, HTN, HLD, history of pulmonary embolism 11/2023, CAD.  Admitted 2/2 for cystoscopy with left ureteroscopy and left stent removal, robotic assisted left ureterectomy with bladder cuff excision and left ureteral reimplant by .  Hospitalist consult requested for management of above medical problems.  Initially patient was requiring 6 L/min supplemental oxygen in the PACU.  Additionally, he had hyperkalemia with a potassium of 5.9.  Glucose was elevated at 290.      2/3: Currently on 0.5 L/min supplemental oxygen.  Potassium improved to 5.3.  Glucose has ranged between 138-183.  NUZHAT drain is in place to lower left quadrant.  There is serosanguineous drainage present.  This will be managed by urology.    Interval Problem Update  2/3: Patient states he is having some mild abdominal pain.  It seems to be worst when he moves around.  Otherwise, he has no acute concerns.  He denies chest pain, shortness of breath.  Patient lives at home with his dog named Lana.  His wife passed away in June 2023.  He does have a daughter named Melissa who is in [place].  2/4: Patient seen and examined, afebrile no nausea or vomiting. Now on cefdinir, Urology following and case discussed, appreciate rec.  NUZHAT drain in place    I have discussed this patient's plan of care and discharge plan at IDT rounds today with Case Management, Nursing, Nursing leadership, and other members of the IDT team.    Consultants/Specialty  Hospital service    Code Status  Full Code    Disposition  The  patient is not medically cleared for discharge to home or a post-acute facility.      I have placed the appropriate orders for post-discharge needs.    Review of Systems  Review of Systems   Constitutional:  Negative for chills and fever.   Eyes:  Negative for blurred vision.   Respiratory:  Positive for cough.    Cardiovascular:  Negative for chest pain, palpitations and leg swelling.   Gastrointestinal:  Negative for nausea and vomiting.   Neurological:  Positive for weakness.        Physical Exam  Temp:  [36.4 °C (97.5 °F)-36.6 °C (97.9 °F)] 36.4 °C (97.5 °F)  Pulse:  [58-74] 58  Resp:  [16-18] 17  BP: (105-130)/(55-65) 130/65  SpO2:  [90 %-94 %] 94 %    Physical Exam  Vitals and nursing note reviewed.   Constitutional:       General: He is not in acute distress.     Appearance: Normal appearance. He is not ill-appearing.   HENT:      Nose:      Comments: Nasal cannula in place     Mouth/Throat:      Mouth: Mucous membranes are moist.      Pharynx: No oropharyngeal exudate.   Cardiovascular:      Rate and Rhythm: Normal rate and regular rhythm.      Heart sounds: Murmur heard.   Pulmonary:      Effort: Pulmonary effort is normal. No respiratory distress.      Breath sounds: Normal breath sounds.      Comments: Cough is present  Abdominal:      General: Bowel sounds are normal. There is distension.      Palpations: Abdomen is soft.      Comments: Mild abdominal distention  NUZHAT drain in place to left lower quadrant.  Serosanguineous fluid is present   Musculoskeletal:         General: No swelling or tenderness. Normal range of motion.      Cervical back: Normal range of motion and neck supple.   Skin:     General: Skin is warm and dry.      Findings: Bruising present.   Neurological:      Mental Status: He is alert.         Fluids    Intake/Output Summary (Last 24 hours) at 2/4/2024 1406  Last data filed at 2/4/2024 1100  Gross per 24 hour   Intake 262.31 ml   Output 4363 ml   Net -4100.69 ml          Laboratory  Recent Labs     02/02/24  1534 02/03/24  0146 02/04/24  0109   WBC 13.2* 13.0* 9.1   RBC 3.54* 3.43* 3.09*   HEMOGLOBIN 11.3* 10.5* 9.6*   HEMATOCRIT 34.0* 32.5* 29.5*   MCV 96.0 94.8 95.5   MCH 31.9 30.6 31.1   MCHC 33.2 32.3 32.5   RDW 50.0 48.8 50.3*   PLATELETCT 180 192 158*   MPV 9.2 9.6 9.8       Recent Labs     02/02/24  1946 02/03/24  0146 02/04/24  0109   SODIUM 137 136 137   POTASSIUM 5.3 5.1 4.6   CHLORIDE 105 107 110   CO2 18* 18* 17*   GLUCOSE 178* 127* 118*   BUN 29* 29* 23*   CREATININE 1.36 1.25 1.20   CALCIUM 8.8 8.6 8.3*                     Imaging  [id number] VIEW   Final Result      1.  Left ureteral stent.           Assessment/Plan  * Hypoxia  Assessment & Plan  In PACU requiring 6L/min supplemental oxygen  2/3: down to 0.5L/min  No baseline supplemental oxygen requirement   Continue to wean as tolerated    Acute cough  Assessment & Plan  Mild cough on exam.  Nonproductive.  Likely secondary to recent anesthesia and atelectasis  Continue to encourage incentive spirometry  If worsening/fever develops plan for CXR    Hyperkalemia  Assessment & Plan  Potassium 5.9 2/2  Improvement with insulin and IVF  Potassium 5.3 2/3  Daily BMP  Continuous telemetry monitoring     Metabolic acidosis  Assessment & Plan  Bicarb improved to 18  Bicarb 16, anion gap 13 the setting of hyperglycemia and CKD stage III  Continue to  control glucose       Stage 3b chronic kidney disease (HCC)- (present on admission)  Assessment & Plan  Stable  Avoid nephrotoxins  Monitor input and output    History of pulmonary embolism- (present on admission)  Assessment & Plan  History of PE late 2023  Not on anticoagulation due to hematuria   Continue SQ heparin   Close monitoring for worsening hematuria     Coronary artery disease involving native coronary artery of native heart without angina pectoris- (present on admission)  Assessment & Plan  Stent placement x6 2011 in [place]  No chest pain  ECHO 11/2022: EF 60%;  RVSP 35mmhg  Stat EKG and troponin if CP should develop  Continuous telemetry monitoring    Non-insulin dependent type 2 diabetes mellitus (HCC)- (present on admission)  Assessment & Plan  A1c 1/30: 6.7  Hyperglycemia with glucose ranging 150-200s   Mange with SSI  Consider PO agent prior to discharge; although given patient's age A1C of 6.7 is appropriate  Glipizide PO okay in CKD    Hyperlipidemia- (present on admission)  Assessment & Plan  Continue statin    History of adenocarcinoma of prostate and urothelial carcinoma- (present on admission)  Assessment & Plan  Biopsy 1/4/2024 showed high-grade urothelial carcinoma.  Status post left ureterectomy with bladder cuff excision 2/2  NUZHAT drain LL ABD 2/2;  serosanguineous drainage present  Management per urology    Essential hypertension, benign- (present on admission)  Assessment & Plan  Hold lisinopril due to hypotension  Hyperkalemia 2/2  Continue metoprolol  Monitor blood pressure         VTE prophylaxis: heparin PPX    Greater than 51 minutes spent prepping to see patient (e.g. review of tests) obtaining and/or reviewing separately obtained history. Performing a medically appropriate examination and/ evaluation.  Counseling and educating the patient/family/caregiver.  Ordering medications, tests, or procedures.  Referring and communicating with other health care professionals.  Documenting clinical information in EPIC.  Independently interpreting results and communicating results to patient/family/caregiver.  Care coordination.      I have performed a physical exam and reviewed and updated ROS and Plan today (2/4/2024). In review of yesterday's note (2/3/2024), there are no changes except as documented above.

## 2024-02-04 NOTE — CARE PLAN
The patient is Stable - Low risk of patient condition declining or worsening    Shift Goals  Clinical Goals: drain monitoring  Patient Goals: sleep  Family Goals: MORALES    Progress made toward(s) clinical / shift goals:    Problem: Fall Risk  Goal: Patient will remain free from falls  Outcome: Progressing     Problem: Knowledge Deficit - Standard  Goal: Patient and family/care givers will demonstrate understanding of plan of care, disease process/condition, diagnostic tests and medications  Outcome: Progressing       Patient is not progressing towards the following goals: N/A

## 2024-02-04 NOTE — PROGRESS NOTES
Note to reader: this note follows the APSO format rather than the historical SOAP format. Assessment and plan located at the top of the note for ease of use.    Chief Complaint  83 y.o. year old male here with history of prostate cancer and urothelial cancer s/p left distal ureterectomy with reimplant.     Assessment/Plan  Interval History   Left urothelial cancer s/p left distal ureterctomy with ureteral reimplant 2/2/24  History of prostate cancer      ADAT  Ambulate  ON Cefdinir for 5 day course from 2/3/24  Plan NUZHAT out at discharge if output decreasing  Hedrick to remain with plan for outpatient cystogram in 2.5 weeks  Will have left ureteral stent removal in 6 weeks  Appreciate Hospitalist care  Anticipate home in am        Case discussed with patient, family and Urology-Dr. Jen ANGEL  Patient seen and examined    2/4. POD #2. Feeling  better today. Less abdominal bloating. Reports some flatus. No BM. Tolerates liquids. Ambulated x2 yesterday. Pre op urine culture mixed skin wiley. On Cefdinir. No fever. WBC normal. Hgb 9.6. NUZHAT output 113 cc/24 hours but less bloody. Good clear UOP.     2/3. POD #1. Doing well. Pain 5/10 and stable. Tolerates diet. No N/V. Passing flatus. No BM. Drain output 144cc/24 hours. Good UOP. On Heparin. Rocephin given 2/2. Afebrile, WBC 13. Creat 1.25           Review of Systems  Physical Exam   Review of Systems   Constitutional:  Negative for chills and fever.   Gastrointestinal:  Positive for abdominal pain. Negative for nausea and vomiting.     Vitals:    02/03/24 2356 02/04/24 0408 02/04/24 0516 02/04/24 0742   BP: 125/61 126/61 127/63 130/65   Pulse: 64 63 63 (!) 58   Resp: 16 16  17   Temp: 36.6 °C (97.9 °F) 36.4 °C (97.5 °F)     TempSrc: Temporal Temporal     SpO2: 90% 93%  94%   Weight:  95 kg (209 lb 7 oz)     Height:         Physical Exam  Vitals and nursing note reviewed.   Constitutional:       Appearance: Normal appearance.   HENT:      Mouth/Throat:      Mouth: Mucous  membranes are moist.   Eyes:      Pupils: Pupils are equal, round, and reactive to light.   Pulmonary:      Effort: Pulmonary effort is normal.   Abdominal:      General: Abdomen is flat. There is no distension.      Palpations: Abdomen is soft.      Tenderness: There is no abdominal tenderness. There is no guarding.      Comments: Post surgical incisions healing well. NUZHAT LLQ with serosanguinous drainage   Genitourinary:     Comments: Hedrick draining clear yellow urine  Skin:     General: Skin is warm and dry.   Neurological:      General: No focal deficit present.      Mental Status: He is alert and oriented to person, place, and time.   Psychiatric:         Mood and Affect: Mood normal.         Behavior: Behavior normal.          Hematology Chemistry   Lab Results   Component Value Date/Time    WBC 9.1 02/04/2024 01:09 AM    HEMOGLOBIN 9.6 (L) 02/04/2024 01:09 AM    HEMATOCRIT 29.5 (L) 02/04/2024 01:09 AM    PLATELETCT 158 (L) 02/04/2024 01:09 AM     Lab Results   Component Value Date/Time    SODIUM 137 02/04/2024 01:09 AM    POTASSIUM 4.6 02/04/2024 01:09 AM    CHLORIDE 110 02/04/2024 01:09 AM    CO2 17 (L) 02/04/2024 01:09 AM    GLUCOSE 118 (H) 02/04/2024 01:09 AM    BUN 23 (H) 02/04/2024 01:09 AM    CREATININE 1.20 02/04/2024 01:09 AM         Labs not explicitly included in this progress note were reviewed by the author.   Radiology/imaging not explicitly included in this progress note was reviewed by the author.     Medications reviewed and Labs reviewed

## 2024-02-04 NOTE — PROGRESS NOTES
Monitor summary: SR with 1st degree HB 58-76, NJ -0.26, QRS -0.09, QT -0.37, with rare PVCs and triplet per strip from the monitor room.

## 2024-02-04 NOTE — OP REPORT
Full Operative Note    Patient Name: Star Centeno    MRN: [id number]    Date of Surgery: 02/02/24    Pre-operative diagnosis: Left upper tract urothelial carcinoma  Post-operative diagnosis: Same    The surgery was performed as a Co- surgery and please see Dr. Sousa's operative report for left ureteral reimplantation, psoas hitch, vascularized local tissue transfer, and placement of left ureteral stent.  I was the surgical assistant for his portion of the procedure and likewise Dr. Sousa was the first assistant at bedside while I performed the below procedures.      Due to the significant complexity of the surgery it required MD assistant to perform adequate retraction, passing of instruments, and there is not another qualified assistant available.     Procedures performed:  1.  Robotic laparoscopic left distal ureterectomy with bladder cuff excision  2.  Robotic laparoscopic closure of bladder cuff  3.  Robotic laparoscopic pelvic lymph node dissection  4.  Cystoscopy and left ureteral stent removal  5.  Left ureteroscopy    Assistant: Anmol Sousa MD    Anesthesia Type: General    Estimated Blood Loss: <50 mL    Fluids in: Crystalloid only, see anesthesia records    Specimens removed if any:  ID Type Source Tests Collected by Time Destination   A : Left Common Iliac Lymph Node Tissue Lymph Node PATHOLOGY SPECIMEN Shane ALVAREZ M.D. 2/2/2024  9:11 AM     B : Left Pelvic Lymph Node Tissue Lymph Node PATHOLOGY SPECIMEN Shane ALVAREZ M.D. 2/2/2024  9:26 AM     C : Left Distal Eerie Ureteral [place] Tissue Ureter PATHOLOGY SPECIMEN Shane ALVAREZ M.D. 2/2/2024 10:42 AM     D : Left Distal Ureteral Margin Tissue Ureter PATHOLOGY SPECIMEN Shane ALVAREZ M.D. 2/2/2024 10:58 AM     E : Left Distal Ureter Tissue Ureter PATHOLOGY SPECIMEN Shane ALVAREZ M.D. 2/2/2024 11:15 AM             Drains:  1.  18-Q[place]ari urethral Hedrick catheter, 10 ml in balloon   2.  19-[place] angelic drain exiting the left  abdomen    Indications for Prodecure: The patient is a 83 y.o. male with a diagnosis of high-grade urothelial cell carcinoma of the distal left ureter.  He presents today for a robotic left distal ureterectomy with bladder cuff excision, regional lymph node dissection, and left ureteral reimplant.  Prior to the robotic component, I will perform a cystoscopy, left ureteral stent removal, and left ureteroscopy to denote the most proximal area of tumor involvement with ICG for intraoperative identification.  After good discussion including the risks, benefits, and alternatives, she elected to proceed.  Consent was obtained.     Procedure Findings: :   Normal bladder, left ureteral stent removed, left ureteroscopy with expected narrowing involving the distal ureter, distal ureter stained with ICG  Left pelvic lymph node dissection: common iliac node sent separately, pelvic lymph node dissection without obturator injury  Left distal ureterectomy and bladder cuff complex due to scarring from radiation, inflammatory reaction. Wide bladder cuff performed - water tight on 180 ml instillation in bladder. Bladder cuff closed in 3 layers.  Left ureteral reimplant with psoas hitch (Dr. Sousa) - tension free and watertight anastomosis.    Description of Procedure: The patient was taken to the operating room, placed in supine position on the operating table. General anesthesia was achieved. Perioperative antibiotics were administered.  A timeout was performed.    He was positioned in the lithotomy position.  The perineum and genitalia were prepped and draped in the standard fashion.  A 22 [place] cystoscope was inserted atraumatically into the bladder.  The above findings were noted.  The left ureteral stent was grasped and removed entirely from the body.  The cystoscope was reinserted and a sensor wire was guided into the left ureter.  The cystoscope was removed and a semirigid ureteroscope was inserted atraumatically into the  bladder and then alongside the wire into the left distal ureter.  The ureteroscope was advanced to the most proximal area of pathology and at that level ICG was administered into the lumen of the ureter via the ureteroscope.  The ureteroscope was removed from the body.    The patient was then repositioned in the supine position.  All pressure points were padded. The patient was secured to the operating table with tape and then prepped and draped in usual sterile fashion.  A Hedrick catheter was placed into the bladder under sterile conditions.    Pneumoperitoneum was achieved via Veress needle. Four robotic 8 mm ports were placed in a straight line several centimeters above the level of the umbilicus.  A 12 mm port was placed in right lower quadrant and a 5 mm port was placed in the right upper quadrant for the assistance.  The robot was then docked. Evaluation of the intraperitoneal space revealed no evidence of unexpected pathology.      A lymph node dissection was performed first.  This included a node in the area of the left common iliac vessels and then the nodes in the left obturator fossa.  Cautery and clips were used for the lymph node dissection, with care to avoid any spillage of lymphatic fluid.  There was no injury to the vessels nor to the left obturator nerve.  The specimens were removed from the body and sent for pathology.    The left distal ureterectomy and bladder cuff excision were performed.  The descending colon was mobilized medially by incising along the white line of Toldt.  This allowed for exposure of the left ureter.  Under the fluorescent setting, the ICG could be visualized and this marked the approximate proximal most extent of disease, with my plan to transect the ureter several centimeters above that to ensure a clean margin.  The fluorescence was at the level of the iliac vessels.  Careful ureterolysis was performed starting several centimeters above the level of the iliac vessels  where the tissue was less adherent.  2 hemoclips were placed several centimeters above the area of fluorescence and the ureter was divided between clips.  A frozen section was sent from the proximal clip and returned with no evidence of urothelial carcinoma.  Ureterolysis was continued distally.  The tissue was extremely adherent and friable.  The dissection was tedious and I carefully ensure that there was as wide a resection as possible, without any injury to surrounding organs or structures. The obliterated umbilical artery (medial ligament) and superior vesical artery were identified and ligated with clips. With cephalad retraction of the ureter, the serosal attachments were dissected away.  Once the detrusor muscle was identified, it was dissected circumferentially around the ureter until a widening of the caliber of the ureter was observed, consistent with exposure of the bladder cuff.  A 3-0 Vicryl suture was placed just distal to the area of dissection along the lateral aspect of the bladder. The bladder contents were manually evacuated via the catheter. Sharp ureterolysis was performed until the intravesical space was entered and the inside of the bladder visualized. The ureteral orifice was visualized and dissection was continued around this structure.  Prior to complete transection of the bladder cuff, the cystotomy was closed in a running fashion with 3-0 Stratafix suture in 2 layers. The ureter and bladder cuff were completely mobilized.  The specimen was immediately placed in a bag brought in by the assistant.  The specimen bag was removed from the body via the assistant 12 mm port site.  The closure was then tested by instilling the bladder with  180 mL of saline  and there was no leak. A third layer was performed with a 2-0 stratafix involving the detrusor muscle.  This completed my robotic component of the surgery.      I then scrubbed out and was the bedside assistant for Dr. Sousa for his  component of the surgery, which included left ureteral reimplantation, psoas hitch, vascularized local tissue transfer, and placement of left ureteral stent.  Please see separate Operative Report by Dr. Sousa for details, which includes closure.    The patient tolerated the operation well. He was returned to the supine position, extubated and accompanied to the recovery room in stable condition.     Attestation: I was the attending for this case as dictated above. I was present and participated for all aspects of the operation including insertion and removal of all endoscopic equipment. Please note that Anmol Sousa MD, assisted me for my portions of this complex procedure, given the the complexity of the case to perform adequate retraction and as there was no qualified provider to assist at the bedside.  Likewise, I was Dr. Sousa's bedside surgical assistant for his portion of the case.    Plan:   PACU - CBC, BMP, KUB for L ureteral stent position  Inpatient - Anticipate 2-3 day stay. Hospitalist consult given history of admission for sepsis and medical comorbidity. CTX tomorrow, then transition to oral Abx for 5 day post-op course. SQH, SCD, Clears today and advance as tolerated. NUZHAT removed prior to discharge if no evidence of leak.  Outpatient - 1) Post-op with me to review pathology, 2) CT cystogram 2.5 weeks post-op and if no leak remove rodarte, 3) Cysto L stent removal in 6 weeks post-op.    _______________________  Shane Escalona MD  Urologic Surgical Oncology  [facility]

## 2024-02-05 VITALS
OXYGEN SATURATION: 93 % | SYSTOLIC BLOOD PRESSURE: 126 MMHG | DIASTOLIC BLOOD PRESSURE: 66 MMHG | RESPIRATION RATE: 16 BRPM | BODY MASS INDEX: 29.98 KG/M2 | WEIGHT: 209.44 LBS | HEART RATE: 66 BPM | TEMPERATURE: 97.9 F | HEIGHT: 70 IN

## 2024-02-05 LAB
ANION GAP SERPL CALC-SCNC: 11 MMOL/L (ref 7–16)
BUN SERPL-MCNC: 16 MG/DL (ref 8–22)
CALCIUM SERPL-MCNC: 8.7 MG/DL (ref 8.5–10.5)
CHLORIDE SERPL-SCNC: 106 MMOL/L (ref 96–112)
CO2 SERPL-SCNC: 19 MMOL/L (ref 20–33)
CREAT SERPL-MCNC: 1.12 MG/DL (ref 0.5–1.4)
ERYTHROCYTE [DISTWIDTH] IN BLOOD BY AUTOMATED COUNT: 49.4 FL (ref 35.9–50)
GFR SERPLBLD CREATININE-BSD FMLA CKD-EPI: 64 ML/MIN/1.73 M 2
GLUCOSE BLD STRIP.AUTO-MCNC: 149 MG/DL (ref 65–99)
GLUCOSE SERPL-MCNC: 139 MG/DL (ref 65–99)
HCT VFR BLD AUTO: 33.6 % (ref 42–52)
HGB BLD-MCNC: 10.9 G/DL (ref 14–18)
MCH RBC QN AUTO: 30.9 PG (ref 27–33)
MCHC RBC AUTO-ENTMCNC: 32.4 G/DL (ref 32.3–36.5)
MCV RBC AUTO: 95.2 FL (ref 81.4–97.8)
PLATELET # BLD AUTO: 183 K/UL (ref 164–446)
PMV BLD AUTO: 9.5 FL (ref 9–12.9)
POTASSIUM SERPL-SCNC: 4.2 MMOL/L (ref 3.6–5.5)
RBC # BLD AUTO: 3.53 M/UL (ref 4.7–6.1)
SODIUM SERPL-SCNC: 136 MMOL/L (ref 135–145)
WBC # BLD AUTO: 9.3 K/UL (ref 4.8–10.8)

## 2024-02-05 PROCEDURE — 700102 HCHG RX REV CODE 250 W/ 637 OVERRIDE(OP): Performed by: UROLOGY

## 2024-02-05 PROCEDURE — A9270 NON-COVERED ITEM OR SERVICE: HCPCS | Performed by: UROLOGY

## 2024-02-05 PROCEDURE — 700111 HCHG RX REV CODE 636 W/ 250 OVERRIDE (IP): Performed by: UROLOGY

## 2024-02-05 PROCEDURE — 700102 HCHG RX REV CODE 250 W/ 637 OVERRIDE(OP): Performed by: INTERNAL MEDICINE

## 2024-02-05 PROCEDURE — A9270 NON-COVERED ITEM OR SERVICE: HCPCS | Performed by: PHYSICIAN ASSISTANT

## 2024-02-05 PROCEDURE — A9270 NON-COVERED ITEM OR SERVICE: HCPCS | Performed by: INTERNAL MEDICINE

## 2024-02-05 PROCEDURE — 99233 SBSQ HOSP IP/OBS HIGH 50: CPT | Performed by: INTERNAL MEDICINE

## 2024-02-05 PROCEDURE — 80048 BASIC METABOLIC PNL TOTAL CA: CPT

## 2024-02-05 PROCEDURE — 82962 GLUCOSE BLOOD TEST: CPT

## 2024-02-05 PROCEDURE — 700102 HCHG RX REV CODE 250 W/ 637 OVERRIDE(OP): Performed by: PHYSICIAN ASSISTANT

## 2024-02-05 PROCEDURE — 36415 COLL VENOUS BLD VENIPUNCTURE: CPT

## 2024-02-05 PROCEDURE — 85027 COMPLETE CBC AUTOMATED: CPT

## 2024-02-05 RX ADMIN — TAMSULOSIN HYDROCHLORIDE 0.4 MG: 0.4 CAPSULE ORAL at 07:47

## 2024-02-05 RX ADMIN — HEPARIN SODIUM 5000 UNITS: 5000 INJECTION, SOLUTION INTRAVENOUS; SUBCUTANEOUS at 05:24

## 2024-02-05 RX ADMIN — ACETAMINOPHEN 1000 MG: 500 TABLET ORAL at 05:24

## 2024-02-05 RX ADMIN — CEFDINIR 300 MG: 300 CAPSULE ORAL at 05:24

## 2024-02-05 RX ADMIN — ROSUVASTATIN CALCIUM 5 MG: 5 TABLET, FILM COATED ORAL at 05:24

## 2024-02-05 RX ADMIN — METOPROLOL SUCCINATE 25 MG: 25 TABLET, EXTENDED RELEASE ORAL at 05:24

## 2024-02-05 ASSESSMENT — ENCOUNTER SYMPTOMS
WEAKNESS: 1
CHILLS: 0
FEVER: 0
COUGH: 1
BLURRED VISION: 0
VOMITING: 0
NAUSEA: 0
PALPITATIONS: 0

## 2024-02-05 ASSESSMENT — PAIN DESCRIPTION - PAIN TYPE: TYPE: ACUTE PAIN

## 2024-02-05 NOTE — PROGRESS NOTES
Report received from janet RN, pt care assumed, tele box on. VSS, pt assessment complete. Pt AAOx4, no signs of distress noted at this time. POC discussed with pt and verbalizes no questions. Pt denies any additional needs at this time. Bed in lowest position, bed alarm on, pt educated on fall risk and verbalized understanding, call light within reach, hourly rounding initiated.

## 2024-02-05 NOTE — CARE PLAN
The patient is Stable - Low risk of patient condition declining or worsening    Shift Goals  Clinical Goals: VSS, monitor drain output  Patient Goals: sleep  Family Goals: MORALES    Progress made toward(s) clinical / shift goals:      Problem: Fall Risk  Goal: Patient will remain free from falls  Outcome: Progressing     Problem: Knowledge Deficit - Standard  Goal: Patient and family/care givers will demonstrate understanding of plan of care, disease process/condition, diagnostic tests and medications  Outcome: Progressing       Patient is not progressing towards the following goals:

## 2024-02-05 NOTE — DISCHARGE SUMMARY
Discharge Summary    CHIEF COMPLAINT ON ADMISSION  No chief complaint on file.      Reason for Admission  Other abnormal findings on cytolog*     Admission Date  2/2/2024    CODE STATUS  Full Code    HPI & HOSPITAL COURSE  This is a 83 y.o. male here with distal L ureteral UCC, who underwent robotic assisted laparoscopic distal left ureterectomy and ureteral reimplant on 2/2 with Dr Escalona and Dr Sousa. The hospitalist service was consulted for assistance with medical management. On POD3 he was seen and examined, sitting up in bed in NAD. He has ambulated multiple times around the unit without difficulty, is passing flatus, pain is well controlled on PO medications, and he is tolerating a full diet. His labs are stable and his NUZHAT drain output was 120cc (110cc) so NUZHAT was removed prior to discharge. His rodaret was kept in place and instructions/supplies were provided.   No notes on file    Therefore, he is discharged in good and stable condition to home with close outpatient follow-up.    The patient met 2-midnight criteria for an inpatient stay at the time of discharge.    Discharge Date  02/05/24      FOLLOW UP ITEMS POST DISCHARGE  Please keep follow up appointments as scheduled.    DISCHARGE DIAGNOSES  Principal Problem:    Hypoxia (POA: No)  Active Problems:    Essential hypertension, benign (POA: Yes)    History of adenocarcinoma of prostate and urothelial carcinoma (POA: Yes)      Overview: Formatting of this note might be different from the original.      s/p radio tx Dr Wright; PSA <0.1 since    Hyperlipidemia (POA: Yes)    Non-insulin dependent type 2 diabetes mellitus (HCC) (POA: Yes)    Coronary artery disease involving native coronary artery of native heart without angina pectoris (POA: Yes)    History of pulmonary embolism (POA: Yes)    Stage 3b chronic kidney disease (HCC) (Chronic) (POA: Yes)    Metabolic acidosis (POA: Unknown)    Hyperkalemia (POA: Unknown)    Acute cough (POA: Unknown)  Resolved  Problems:    * No resolved hospital problems. *      FOLLOW UP  Future Appointments   Date Time Provider Department Center   3/18/2024 10:00 AM Onel Leonard M.D. [facility] None   9[facility]2024 11:00 [facility]     No follow-up provider specified.    MEDICATIONS ON DISCHARGE     Medication List        CONTINUE taking these medications        Instructions   acetaminophen 500 MG Tabs  Commonly known as: Tylenol   Take 1 Tablet by mouth every 6 hours as needed for Mild Pain or Moderate Pain.  Dose: 500 mg     ALPRAZolam 0.25 MG Tabs  Commonly known as: Xanax   Take 0.25 mg by mouth at bedtime as needed for Sleep.  Dose: 0.25 mg     aspirin 81 MG EC tablet   Take 81 mg by mouth every evening. Indications: blood thinner  Dose: 81 mg     bismuth subsalicylate 262 MG/15ML Susp  Commonly known as: Pepto-Bismol   Take 30 mL by mouth every 6 hours as needed (diarrhea). Indications: Diarrhea, Heartburn  Dose: 30 mL     CoQ-10 30 MG Caps   Take 30 mg by mouth every evening. Indications: heart health  Dose: 30 mg     Empagliflozin 25 MG Tabs   Take 25 mg by mouth every day for 360 days.  Dose: 25 mg     ezetimibe 10 MG Tabs  Commonly known as: Zetia   Take 1 Tablet by mouth every day. Indications: High Amount of Fats in the Blood  Dose: 10 mg     Fish Oil 1200 MG Caps   Take 1,200 mg by mouth every morning. Indications: heart health  Dose: 1,200 mg     Imodium A-D 1 MG/7.5ML Liqd  Generic drug: Loperamide HCl   Take 30 mL by mouth 2 times a day as needed (diarrhea). Indications: Diarrhea  Dose: 30 mL     lisinopril 10 MG Tabs  Commonly known as: Prinivil   Take 1 Tablet by mouth every day.  Dose: 10 mg     metFORMIN 500 MG Tabs  Commonly known as: Glucophage   Take 1 Tablet by mouth 2 times a day with meals for 360 days.  Dose: 500 mg     metoprolol SR 25 MG Tb24  Commonly known as: Toprol XL   Take 1 Tablet by mouth every day.  Dose: 25 mg     oxyCODONE immediate-release 5 MG Tabs  Commonly known as: Roxicodone   5  mg every 6 hours as needed.  Dose: 5 mg     PRE-COLE FORMULA PO   Take 1 Tablet by mouth every morning. Indications: supplement  Dose: 1 Tablet     rosuvastatin 5 MG Tabs  Commonly known as: Crestor   Doctor's comments: Plan requires a 100 day supply. Thank you  TAKE 1 TABLET BY MOUTH EVERY DAY  Dose: 5 mg     tamsulosin 0.4 MG capsule  Commonly known as: Flomax   Take 1 Capsule by mouth 1/2 hour after breakfast.  Dose: 0.4 mg     Voltaren 1 % Gel  Generic drug: diclofenac sodium   Apply 2 g topically 4 times a day as needed (Shoulder Pain). Indications: Joint Damage causing Pain and Loss of Function  Dose: 2 g            STOP taking these medications      amoxicillin-clavulanate 500-125 MG Tabs  Commonly known as: Augmentin              Allergies  No Known Allergies    DIET  Orders Placed This Encounter   Procedures    Diet Order Diet: Consistent CHO (Diabetic)     Standing Status:   Standing     Number of Occurrences:   1     Order Specific Question:   Diet:     Answer:   Consistent CHO (Diabetic) [4]       ACTIVITY  Light duty.  10-lb lifting restriction    CONSULTATIONS  Hospitalist    PROCEDURES  Cystoscopy, L ureteroscopy, L ureteral stent removal, robotic assisted left ureterectomy, bladder cuff excision, and left ureteral reimplant, and robotic pelvic lymph node dissection on  with Dr Escalona and Dr Sousa.    LABORATORY  Lab Results   Component Value Date    SODIUM 136 2024    POTASSIUM 4.2 2024    CHLORIDE 106 2024    CO2 19 (L) 2024    GLUCOSE 139 (H) 2024    BUN 16 2024    CREATININE 1.12 2024        Lab Results   Component Value Date    WBC 9.3 2024    HEMOGLOBIN 10.9 (L) 2024    HEMATOCRIT 33.6 (L) 2024    PLATELETCT 183 2024        Total time of the discharge process exceeds 35 minutes.

## 2024-02-05 NOTE — PROGRESS NOTES
[facility] Medicine Daily Progress Note    Date of Service  2/5/2024    Chief Complaint  Star Cetneno is a 83 y.o. male admitted 2/2/2024 by urology service for left ureterectomy with bladder cuff excision and left ureteral reimplant.  Neurology requesting consult to assist with management of medical problems while admitted to the hospital.    Hospital Course  Star is an 83-year-old male with a PMHx DMT2, prostate cancer s/p radiation, urothelial cancer, HTN, HLD, history of pulmonary embolism 11/2023, CAD.  Admitted 2/2 for cystoscopy with left ureteroscopy and left stent removal, robotic assisted left ureterectomy with bladder cuff excision and left ureteral reimplant by .  Hospitalist consult requested for management of above medical problems.  Initially patient was requiring 6 L/min supplemental oxygen in the PACU.  Additionally, he had hyperkalemia with a potassium of 5.9.  Glucose was elevated at 290.      2/3: Currently on 0.5 L/min supplemental oxygen.  Potassium improved to 5.3.  Glucose has ranged between 138-183.  NUZHAT drain is in place to lower left quadrant.  There is serosanguineous drainage present.  This will be managed by urology.    Interval Problem Update  2/3: Patient states he is having some mild abdominal pain.  It seems to be worst when he moves around.  Otherwise, he has no acute concerns.  He denies chest pain, shortness of breath.  Patient lives at home with his dog named Lana.  His wife passed away in June 2023.  He does have a daughter named Melissa who is in [place].  2/4: Patient seen and examined, afebrile no nausea or vomiting. Now on cefdinir, Urology following and case discussed, appreciate rec.  NUZHAT drain in place  2/5: Patient seen and examined, afebrile. No nausea or vomiting NUZHAT drain in place . Urology following   Appreciate rec.  Cont on abx     I have discussed this patient's plan of care and discharge plan at IDT rounds today with Case Management, Nursing, Nursing  leadership, and other members of the IDT team.    Consultants/Specialty  Hospital service    Code Status  Full Code    Disposition  The patient is not medically cleared for discharge to home or a post-acute facility.      I have placed the appropriate orders for post-discharge needs.    Review of Systems  Review of Systems   Constitutional:  Negative for chills and fever.   Eyes:  Negative for blurred vision.   Respiratory:  Positive for cough.    Cardiovascular:  Negative for chest pain, palpitations and leg swelling.   Gastrointestinal:  Negative for nausea and vomiting.   Neurological:  Positive for weakness.        Physical Exam  Temp:  [36.2 °C (97.2 °F)-36.6 °C (97.9 °F)] 36.2 °C (97.2 °F)  Pulse:  [60-73] 64  Resp:  [16-18] 18  BP: (127-153)/(65-78) 127/65  SpO2:  [90 %-96 %] 90 %    Physical Exam  Vitals and nursing note reviewed.   Constitutional:       General: He is not in acute distress.     Appearance: Normal appearance. He is not ill-appearing.   HENT:      Nose:      Comments: Nasal cannula in place     Mouth/Throat:      Mouth: Mucous membranes are moist.      Pharynx: No oropharyngeal exudate.   Cardiovascular:      Rate and Rhythm: Normal rate and regular rhythm.      Heart sounds: Murmur heard.   Pulmonary:      Effort: Pulmonary effort is normal. No respiratory distress.      Breath sounds: Normal breath sounds.      Comments: Cough is present  Abdominal:      General: Bowel sounds are normal. There is distension.      Palpations: Abdomen is soft.      Comments: Mild abdominal distention  NUZHAT drain in place to left lower quadrant.  Serosanguineous fluid is present   Musculoskeletal:         General: No swelling or tenderness. Normal range of motion.      Cervical back: Normal range of motion and neck supple.   Skin:     General: Skin is warm and dry.      Findings: Bruising present.   Neurological:      Mental Status: He is alert.         Fluids    Intake/Output Summary (Last 24 hours) at 2/5/2024  1327  Last data filed at 2/5/2024 1200  Gross per 24 hour   Intake 360 ml   Output 4170 ml   Net -3810 ml         Laboratory  Recent Labs     02/03/24  0146 02/04/24  0109 02/05/24  0314   WBC 13.0* 9.1 9.3   RBC 3.43* 3.09* 3.53*   HEMOGLOBIN 10.5* 9.6* 10.9*   HEMATOCRIT 32.5* 29.5* 33.6*   MCV 94.8 95.5 95.2   MCH 30.6 31.1 30.9   MCHC 32.3 32.5 32.4   RDW 48.8 50.3* 49.4   PLATELETCT 192 158* 183   MPV 9.6 9.8 9.5       Recent Labs     02/03/24  0146 02/04/24 0109 02/05/24 0314   SODIUM 136 137 136   POTASSIUM 5.1 4.6 4.2   CHLORIDE 107 110 106   CO2 18* 17* 19*   GLUCOSE 127* 118* 139*   BUN 29* 23* 16   CREATININE 1.25 1.20 1.12   CALCIUM 8.6 8.3* 8.7                     Imaging  [id number] VIEW   Final Result      1.  Left ureteral stent.           Assessment/Plan  * Hypoxia  Assessment & Plan  In PACU requiring 6L/min supplemental oxygen  2/3: down to 0.5L/min  No baseline supplemental oxygen requirement   Continue to wean as tolerated    Acute cough  Assessment & Plan  Mild cough on exam.  Nonproductive.  Likely secondary to recent anesthesia and atelectasis  Continue to encourage incentive spirometry  If worsening/fever develops plan for CXR    Hyperkalemia  Assessment & Plan  Potassium 5.9 2/2  Improvement with insulin and IVF  Potassium 5.3 2/3  Daily BMP  Continuous telemetry monitoring     Metabolic acidosis  Assessment & Plan  Bicarb improved to 18  Bicarb 16, anion gap 13 the setting of hyperglycemia and CKD stage III  Continue to  control glucose       Stage 3b chronic kidney disease (HCC)- (present on admission)  Assessment & Plan  Stable  Avoid nephrotoxins  Monitor input and output    History of pulmonary embolism- (present on admission)  Assessment & Plan  History of PE late 2023  Not on anticoagulation due to hematuria   Continue SQ heparin   Close monitoring for worsening hematuria     Coronary artery disease involving native coronary artery of native heart without angina pectoris-  (present on admission)  Assessment & Plan  Stent placement x6 2011 in [place]  No chest pain  ECHO 11/2022: EF 60%; RVSP 35mmhg  Stat EKG and troponin if CP should develop  Continuous telemetry monitoring    Non-insulin dependent type 2 diabetes mellitus (HCC)- (present on admission)  Assessment & Plan  A1c 1/30: 6.7  Hyperglycemia with glucose ranging 150-200s   Mange with SSI  Consider PO agent prior to discharge; although given patient's age A1C of 6.7 is appropriate  Glipizide PO okay in CKD    Hyperlipidemia- (present on admission)  Assessment & Plan  Continue statin    History of adenocarcinoma of prostate and urothelial carcinoma- (present on admission)  Assessment & Plan  Biopsy 1/4/2024 showed high-grade urothelial carcinoma.  Status post left ureterectomy with bladder cuff excision 2/2  NUZHAT drain LL ABD 2/2;  serosanguineous drainage present  Management per urology    Essential hypertension, benign- (present on admission)  Assessment & Plan  Hold lisinopril due to hypotension  Hyperkalemia 2/2  Continue metoprolol  Monitor blood pressure         Greater than 51 minutes spent prepping to see patient (e.g. review of tests) obtaining and/or reviewing separately obtained history. Performing a medically appropriate examination and/ evaluation.  Counseling and educating the patient/family/caregiver.  Ordering medications, tests, or procedures.  Referring and communicating with other health care professionals.  Documenting clinical information in EPIC.  Independently interpreting results and communicating results to patient/family/caregiver.  Care coordination.      VTE prophylaxis: heparin PPX    I have performed a physical exam and reviewed and updated ROS and Plan today (2/5/2024). In review of yesterday's note (2/4/2024), there are no changes except as documented above.

## 2024-02-05 NOTE — PROGRESS NOTES
Monitor summary:    SR 64-75  (R)PAC, (R)PVC  15 bts VT  1.8 second pause  10 bts accelerated idio  .19/.09/.37    APRN Vilma Tobin notified of pause and accelerated idio. No new orders.

## 2024-02-14 ENCOUNTER — HOSPITAL ENCOUNTER (OUTPATIENT)
Dept: RADIOLOGY | Facility: MEDICAL CENTER | Age: 84
End: 2024-02-14
Attending: UROLOGY
Payer: MEDICARE

## 2024-02-14 ENCOUNTER — HOSPITAL ENCOUNTER (OUTPATIENT)
Dept: LAB | Facility: MEDICAL CENTER | Age: 84
End: 2024-02-14
Attending: FAMILY MEDICINE
Payer: MEDICARE

## 2024-02-14 DIAGNOSIS — R82.89 OTHER ABNORMAL FINDINGS ON CYTOLOGICAL AND HISTOLOGICAL EXAMINATION OF URINE: ICD-10-CM

## 2024-02-14 LAB
ANION GAP SERPL CALC-SCNC: 15 MMOL/L (ref 7–16)
BUN SERPL-MCNC: 35 MG/DL (ref 8–22)
CALCIUM SERPL-MCNC: 9.2 MG/DL (ref 8.5–10.5)
CHLORIDE SERPL-SCNC: 105 MMOL/L (ref 96–112)
CO2 SERPL-SCNC: 18 MMOL/L (ref 20–33)
CREAT SERPL-MCNC: 1.89 MG/DL (ref 0.5–1.4)
ERYTHROCYTE [DISTWIDTH] IN BLOOD BY AUTOMATED COUNT: 49.3 FL (ref 35.9–50)
GFR SERPLBLD CREATININE-BSD FMLA CKD-EPI: 34 ML/MIN/1.73 M 2
GLUCOSE SERPL-MCNC: 124 MG/DL (ref 65–99)
HCT VFR BLD AUTO: 35.5 % (ref 42–52)
HGB BLD-MCNC: 11.4 G/DL (ref 14–18)
MCH RBC QN AUTO: 30.6 PG (ref 27–33)
MCHC RBC AUTO-ENTMCNC: 32.1 G/DL (ref 32.3–36.5)
MCV RBC AUTO: 95.4 FL (ref 81.4–97.8)
PLATELET # BLD AUTO: 335 K/UL (ref 164–446)
PMV BLD AUTO: 9.8 FL (ref 9–12.9)
POTASSIUM SERPL-SCNC: 4.6 MMOL/L (ref 3.6–5.5)
RBC # BLD AUTO: 3.72 M/UL (ref 4.7–6.1)
SODIUM SERPL-SCNC: 138 MMOL/L (ref 135–145)
WBC # BLD AUTO: 13.3 K/UL (ref 4.8–10.8)

## 2024-02-14 PROCEDURE — 80048 BASIC METABOLIC PNL TOTAL CA: CPT

## 2024-02-14 PROCEDURE — 700117 HCHG RX CONTRAST REV CODE 255: Performed by: UROLOGY

## 2024-02-14 PROCEDURE — 85027 COMPLETE CBC AUTOMATED: CPT

## 2024-02-14 PROCEDURE — 36415 COLL VENOUS BLD VENIPUNCTURE: CPT

## 2024-02-14 PROCEDURE — 51600 INJECTION FOR BLADDER X-RAY: CPT

## 2024-02-14 RX ADMIN — IOHEXOL 100 ML: 240 INJECTION, SOLUTION INTRATHECAL; INTRAVASCULAR; INTRAVENOUS; ORAL at 16:01

## 2024-02-14 RX ADMIN — IOHEXOL 50 ML: 240 INJECTION, SOLUTION INTRATHECAL; INTRAVASCULAR; INTRAVENOUS; ORAL at 16:02

## 2024-02-20 ENCOUNTER — HOSPITAL ENCOUNTER (OUTPATIENT)
Facility: MEDICAL CENTER | Age: 84
End: 2024-02-20
Attending: UROLOGY
Payer: MEDICARE

## 2024-02-20 PROCEDURE — 87077 CULTURE AEROBIC IDENTIFY: CPT

## 2024-02-20 PROCEDURE — 87086 URINE CULTURE/COLONY COUNT: CPT

## 2024-02-25 LAB
BACTERIA UR CULT: NORMAL
SIGNIFICANT IND 70042: NORMAL
SITE SITE: NORMAL
SOURCE SOURCE: NORMAL

## 2024-02-28 ENCOUNTER — HOSPITAL ENCOUNTER (OUTPATIENT)
Dept: LAB | Facility: MEDICAL CENTER | Age: 84
End: 2024-02-28
Attending: INTERNAL MEDICINE
Payer: MEDICARE

## 2024-02-28 LAB
BASOPHILS # BLD AUTO: 0.3 % (ref 0–1.8)
BASOPHILS # BLD: 0.03 K/UL (ref 0–0.12)
EOSINOPHIL # BLD AUTO: 0.71 K/UL (ref 0–0.51)
EOSINOPHIL NFR BLD: 6.5 % (ref 0–6.9)
ERYTHROCYTE [DISTWIDTH] IN BLOOD BY AUTOMATED COUNT: 46.8 FL (ref 35.9–50)
HCT VFR BLD AUTO: 34.1 % (ref 42–52)
HGB BLD-MCNC: 10.9 G/DL (ref 14–18)
IMM GRANULOCYTES # BLD AUTO: 0.09 K/UL (ref 0–0.11)
IMM GRANULOCYTES NFR BLD AUTO: 0.8 % (ref 0–0.9)
LYMPHOCYTES # BLD AUTO: 1.68 K/UL (ref 1–4.8)
LYMPHOCYTES NFR BLD: 15.3 % (ref 22–41)
MCH RBC QN AUTO: 29.9 PG (ref 27–33)
MCHC RBC AUTO-ENTMCNC: 32 G/DL (ref 32.3–36.5)
MCV RBC AUTO: 93.4 FL (ref 81.4–97.8)
MONOCYTES # BLD AUTO: 0.79 K/UL (ref 0–0.85)
MONOCYTES NFR BLD AUTO: 7.2 % (ref 0–13.4)
NEUTROPHILS # BLD AUTO: 7.69 K/UL (ref 1.82–7.42)
NEUTROPHILS NFR BLD: 69.9 % (ref 44–72)
NRBC # BLD AUTO: 0 K/UL
NRBC BLD-RTO: 0 /100 WBC (ref 0–0.2)
PLATELET # BLD AUTO: 387 K/UL (ref 164–446)
PMV BLD AUTO: 9.2 FL (ref 9–12.9)
RBC # BLD AUTO: 3.65 M/UL (ref 4.7–6.1)
WBC # BLD AUTO: 11 K/UL (ref 4.8–10.8)

## 2024-02-28 PROCEDURE — 80053 COMPREHEN METABOLIC PANEL: CPT

## 2024-02-28 PROCEDURE — 36415 COLL VENOUS BLD VENIPUNCTURE: CPT

## 2024-02-28 PROCEDURE — 85025 COMPLETE CBC W/AUTO DIFF WBC: CPT

## 2024-02-29 LAB
ALBUMIN SERPL BCP-MCNC: 3.7 G/DL (ref 3.2–4.9)
ALBUMIN/GLOB SERPL: 1.2 G/DL
ALP SERPL-CCNC: 124 U/L (ref 30–99)
ALT SERPL-CCNC: 24 U/L (ref 2–50)
ANION GAP SERPL CALC-SCNC: 14 MMOL/L (ref 7–16)
AST SERPL-CCNC: 24 U/L (ref 12–45)
BILIRUB SERPL-MCNC: 0.3 MG/DL (ref 0.1–1.5)
BUN SERPL-MCNC: 33 MG/DL (ref 8–22)
CALCIUM ALBUM COR SERPL-MCNC: 9.3 MG/DL (ref 8.5–10.5)
CALCIUM SERPL-MCNC: 9.1 MG/DL (ref 8.5–10.5)
CHLORIDE SERPL-SCNC: 104 MMOL/L (ref 96–112)
CO2 SERPL-SCNC: 17 MMOL/L (ref 20–33)
CREAT SERPL-MCNC: 1.65 MG/DL (ref 0.5–1.4)
GFR SERPLBLD CREATININE-BSD FMLA CKD-EPI: 40 ML/MIN/1.73 M 2
GLOBULIN SER CALC-MCNC: 3.1 G/DL (ref 1.9–3.5)
GLUCOSE SERPL-MCNC: 203 MG/DL (ref 65–99)
POTASSIUM SERPL-SCNC: 5.2 MMOL/L (ref 3.6–5.5)
PROT SERPL-MCNC: 6.8 G/DL (ref 6–8.2)
SODIUM SERPL-SCNC: 135 MMOL/L (ref 135–145)

## 2024-03-03 ENCOUNTER — APPOINTMENT (OUTPATIENT)
Dept: RADIOLOGY | Facility: MEDICAL CENTER | Age: 84
DRG: 699 | End: 2024-03-03
Attending: EMERGENCY MEDICINE
Payer: MEDICARE

## 2024-03-03 ENCOUNTER — HOSPITAL ENCOUNTER (INPATIENT)
Facility: MEDICAL CENTER | Age: 84
LOS: 3 days | DRG: 699 | End: 2024-03-06
Attending: EMERGENCY MEDICINE | Admitting: HOSPITALIST
Payer: MEDICARE

## 2024-03-03 DIAGNOSIS — N39.0 URINARY TRACT INFECTION WITHOUT HEMATURIA, SITE UNSPECIFIED: ICD-10-CM

## 2024-03-03 DIAGNOSIS — N39.0 COMPLICATED URINARY TRACT INFECTION: ICD-10-CM

## 2024-03-03 PROBLEM — R19.7 DIARRHEA: Status: ACTIVE | Noted: 2024-03-03

## 2024-03-03 PROBLEM — R93.5 ABNORMAL CT SCAN, PELVIS: Status: ACTIVE | Noted: 2024-03-03

## 2024-03-03 PROBLEM — Z71.89 ACP (ADVANCE CARE PLANNING): Status: ACTIVE | Noted: 2024-03-03

## 2024-03-03 LAB
ALBUMIN SERPL BCP-MCNC: 3.8 G/DL (ref 3.2–4.9)
ALBUMIN/GLOB SERPL: 1.2 G/DL
ALP SERPL-CCNC: 100 U/L (ref 30–99)
ALT SERPL-CCNC: 15 U/L (ref 2–50)
ANION GAP SERPL CALC-SCNC: 18 MMOL/L (ref 7–16)
APPEARANCE UR: ABNORMAL
AST SERPL-CCNC: 15 U/L (ref 12–45)
BACTERIA #/AREA URNS HPF: ABNORMAL /HPF
BASOPHILS # BLD AUTO: 0.3 % (ref 0–1.8)
BASOPHILS # BLD: 0.03 K/UL (ref 0–0.12)
BILIRUB SERPL-MCNC: 0.4 MG/DL (ref 0.1–1.5)
BILIRUB UR QL STRIP.AUTO: ABNORMAL
BUN SERPL-MCNC: 28 MG/DL (ref 8–22)
CALCIUM ALBUM COR SERPL-MCNC: 9.6 MG/DL (ref 8.5–10.5)
CALCIUM SERPL-MCNC: 9.4 MG/DL (ref 8.4–10.2)
CHLORIDE SERPL-SCNC: 98 MMOL/L (ref 96–112)
CO2 SERPL-SCNC: 16 MMOL/L (ref 20–33)
COLOR UR: YELLOW
CREAT SERPL-MCNC: 1.51 MG/DL (ref 0.5–1.4)
EOSINOPHIL # BLD AUTO: 0.35 K/UL (ref 0–0.51)
EOSINOPHIL NFR BLD: 2.9 % (ref 0–6.9)
EPI CELLS #/AREA URNS HPF: ABNORMAL /HPF
ERYTHROCYTE [DISTWIDTH] IN BLOOD BY AUTOMATED COUNT: 46.7 FL (ref 35.9–50)
GFR SERPLBLD CREATININE-BSD FMLA CKD-EPI: 45 ML/MIN/1.73 M 2
GLOBULIN SER CALC-MCNC: 3.3 G/DL (ref 1.9–3.5)
GLUCOSE SERPL-MCNC: 132 MG/DL (ref 65–99)
GLUCOSE UR STRIP.AUTO-MCNC: NEGATIVE MG/DL
HCT VFR BLD AUTO: 32.7 % (ref 42–52)
HGB BLD-MCNC: 10.9 G/DL (ref 14–18)
HYALINE CASTS #/AREA URNS LPF: ABNORMAL /LPF
IMM GRANULOCYTES # BLD AUTO: 0.11 K/UL (ref 0–0.11)
IMM GRANULOCYTES NFR BLD AUTO: 0.9 % (ref 0–0.9)
KETONES UR STRIP.AUTO-MCNC: ABNORMAL MG/DL
LEUKOCYTE ESTERASE UR QL STRIP.AUTO: ABNORMAL
LYMPHOCYTES # BLD AUTO: 1.61 K/UL (ref 1–4.8)
LYMPHOCYTES NFR BLD: 13.4 % (ref 22–41)
MCH RBC QN AUTO: 30.8 PG (ref 27–33)
MCHC RBC AUTO-ENTMCNC: 33.3 G/DL (ref 32.3–36.5)
MCV RBC AUTO: 92.4 FL (ref 81.4–97.8)
MICRO URNS: ABNORMAL
MONOCYTES # BLD AUTO: 0.55 K/UL (ref 0–0.85)
MONOCYTES NFR BLD AUTO: 4.6 % (ref 0–13.4)
NEUTROPHILS # BLD AUTO: 9.33 K/UL (ref 1.82–7.42)
NEUTROPHILS NFR BLD: 77.9 % (ref 44–72)
NITRITE UR QL STRIP.AUTO: NEGATIVE
NRBC # BLD AUTO: 0 K/UL
NRBC BLD-RTO: 0 /100 WBC (ref 0–0.2)
PH UR STRIP.AUTO: 5.5 [PH] (ref 5–8)
PLATELET # BLD AUTO: 341 K/UL (ref 164–446)
PMV BLD AUTO: 9.5 FL (ref 9–12.9)
POTASSIUM SERPL-SCNC: 5.1 MMOL/L (ref 3.6–5.5)
PROT SERPL-MCNC: 7.1 G/DL (ref 6–8.2)
PROT UR QL STRIP: >=300 MG/DL
RBC # BLD AUTO: 3.54 M/UL (ref 4.7–6.1)
RBC # URNS HPF: ABNORMAL /HPF
RBC UR QL AUTO: ABNORMAL
SODIUM SERPL-SCNC: 132 MMOL/L (ref 135–145)
SP GR UR STRIP.AUTO: 1.02
UNIDENT CRYS URNS QL MICRO: ABNORMAL /HPF
WBC # BLD AUTO: 12 K/UL (ref 4.8–10.8)
WBC #/AREA URNS HPF: ABNORMAL /HPF

## 2024-03-03 PROCEDURE — A9270 NON-COVERED ITEM OR SERVICE: HCPCS | Performed by: HOSPITALIST

## 2024-03-03 PROCEDURE — 700102 HCHG RX REV CODE 250 W/ 637 OVERRIDE(OP): Performed by: HOSPITALIST

## 2024-03-03 PROCEDURE — 36415 COLL VENOUS BLD VENIPUNCTURE: CPT

## 2024-03-03 PROCEDURE — 700105 HCHG RX REV CODE 258: Performed by: HOSPITALIST

## 2024-03-03 PROCEDURE — 74177 CT ABD & PELVIS W/CONTRAST: CPT

## 2024-03-03 PROCEDURE — 99223 1ST HOSP IP/OBS HIGH 75: CPT | Mod: 25,AI | Performed by: HOSPITALIST

## 2024-03-03 PROCEDURE — 700117 HCHG RX CONTRAST REV CODE 255: Performed by: EMERGENCY MEDICINE

## 2024-03-03 PROCEDURE — 94760 N-INVAS EAR/PLS OXIMETRY 1: CPT

## 2024-03-03 PROCEDURE — 87493 C DIFF AMPLIFIED PROBE: CPT

## 2024-03-03 PROCEDURE — 87324 CLOSTRIDIUM AG IA: CPT

## 2024-03-03 PROCEDURE — 85025 COMPLETE CBC W/AUTO DIFF WBC: CPT

## 2024-03-03 PROCEDURE — 99285 EMERGENCY DEPT VISIT HI MDM: CPT

## 2024-03-03 PROCEDURE — 770001 HCHG ROOM/CARE - MED/SURG/GYN PRIV*

## 2024-03-03 PROCEDURE — 81001 URINALYSIS AUTO W/SCOPE: CPT

## 2024-03-03 PROCEDURE — 99497 ADVNCD CARE PLAN 30 MIN: CPT | Performed by: HOSPITALIST

## 2024-03-03 PROCEDURE — 700111 HCHG RX REV CODE 636 W/ 250 OVERRIDE (IP): Mod: JZ | Performed by: HOSPITALIST

## 2024-03-03 PROCEDURE — 80053 COMPREHEN METABOLIC PANEL: CPT

## 2024-03-03 RX ORDER — ACETAMINOPHEN 325 MG/1
650 TABLET ORAL EVERY 6 HOURS PRN
Status: DISCONTINUED | OUTPATIENT
Start: 2024-03-03 | End: 2024-03-06 | Stop reason: HOSPADM

## 2024-03-03 RX ORDER — ONDANSETRON 2 MG/ML
4 INJECTION INTRAMUSCULAR; INTRAVENOUS EVERY 4 HOURS PRN
Status: DISCONTINUED | OUTPATIENT
Start: 2024-03-03 | End: 2024-03-06 | Stop reason: HOSPADM

## 2024-03-03 RX ORDER — MORPHINE SULFATE 4 MG/ML
4 INJECTION INTRAVENOUS ONCE
Status: DISPENSED | OUTPATIENT
Start: 2024-03-03 | End: 2024-03-04

## 2024-03-03 RX ORDER — HEPARIN SODIUM 5000 [USP'U]/ML
5000 INJECTION, SOLUTION INTRAVENOUS; SUBCUTANEOUS EVERY 8 HOURS
Status: DISCONTINUED | OUTPATIENT
Start: 2024-03-04 | End: 2024-03-03

## 2024-03-03 RX ORDER — SODIUM CHLORIDE 9 MG/ML
2000 INJECTION, SOLUTION INTRAVENOUS CONTINUOUS
Status: ACTIVE | OUTPATIENT
Start: 2024-03-03 | End: 2024-03-04

## 2024-03-03 RX ORDER — TAMSULOSIN HYDROCHLORIDE 0.4 MG/1
0.4 CAPSULE ORAL
Status: DISCONTINUED | OUTPATIENT
Start: 2024-03-04 | End: 2024-03-06 | Stop reason: HOSPADM

## 2024-03-03 RX ORDER — EZETIMIBE 10 MG/1
10 TABLET ORAL
Status: DISCONTINUED | OUTPATIENT
Start: 2024-03-03 | End: 2024-03-06 | Stop reason: HOSPADM

## 2024-03-03 RX ORDER — ASPIRIN 81 MG/1
81 TABLET ORAL DAILY
Status: DISCONTINUED | OUTPATIENT
Start: 2024-03-04 | End: 2024-03-06 | Stop reason: HOSPADM

## 2024-03-03 RX ORDER — AMOXICILLIN AND CLAVULANATE POTASSIUM 875; 125 MG/1; MG/1
1 TABLET, FILM COATED ORAL 2 TIMES DAILY
Status: ON HOLD | COMMUNITY
End: 2024-03-06

## 2024-03-03 RX ORDER — LOPERAMIDE HYDROCHLORIDE 2 MG/1
2 CAPSULE ORAL ONCE
Status: COMPLETED | OUTPATIENT
Start: 2024-03-03 | End: 2024-03-03

## 2024-03-03 RX ORDER — POLYETHYLENE GLYCOL 3350 17 G/17G
1 POWDER, FOR SOLUTION ORAL
Status: DISCONTINUED | OUTPATIENT
Start: 2024-03-03 | End: 2024-03-03

## 2024-03-03 RX ORDER — OXYCODONE HYDROCHLORIDE 10 MG/1
10 TABLET ORAL
Status: DISCONTINUED | OUTPATIENT
Start: 2024-03-03 | End: 2024-03-06 | Stop reason: HOSPADM

## 2024-03-03 RX ORDER — OXYBUTYNIN CHLORIDE 5 MG/1
1 TABLET, EXTENDED RELEASE ORAL
COMMUNITY
End: 2024-03-28

## 2024-03-03 RX ORDER — METOPROLOL SUCCINATE 25 MG/1
25 TABLET, EXTENDED RELEASE ORAL DAILY
Status: DISCONTINUED | OUTPATIENT
Start: 2024-03-04 | End: 2024-03-06 | Stop reason: HOSPADM

## 2024-03-03 RX ORDER — OXYCODONE HYDROCHLORIDE 5 MG/1
5 TABLET ORAL
Status: DISCONTINUED | OUTPATIENT
Start: 2024-03-03 | End: 2024-03-06 | Stop reason: HOSPADM

## 2024-03-03 RX ORDER — AMOXICILLIN 250 MG
2 CAPSULE ORAL 2 TIMES DAILY
Status: DISCONTINUED | OUTPATIENT
Start: 2024-03-03 | End: 2024-03-03

## 2024-03-03 RX ORDER — ROSUVASTATIN CALCIUM 10 MG/1
5 TABLET, COATED ORAL EVERY EVENING
Status: DISCONTINUED | OUTPATIENT
Start: 2024-03-03 | End: 2024-03-06 | Stop reason: HOSPADM

## 2024-03-03 RX ORDER — ONDANSETRON 4 MG/1
4 TABLET, ORALLY DISINTEGRATING ORAL EVERY 4 HOURS PRN
Status: DISCONTINUED | OUTPATIENT
Start: 2024-03-03 | End: 2024-03-06 | Stop reason: HOSPADM

## 2024-03-03 RX ORDER — HYDROMORPHONE HYDROCHLORIDE 1 MG/ML
0.5 INJECTION, SOLUTION INTRAMUSCULAR; INTRAVENOUS; SUBCUTANEOUS
Status: DISCONTINUED | OUTPATIENT
Start: 2024-03-03 | End: 2024-03-06 | Stop reason: HOSPADM

## 2024-03-03 RX ORDER — DEXTROSE MONOHYDRATE 25 G/50ML
25 INJECTION, SOLUTION INTRAVENOUS
Status: DISCONTINUED | OUTPATIENT
Start: 2024-03-03 | End: 2024-03-06 | Stop reason: HOSPADM

## 2024-03-03 RX ORDER — CEFTRIAXONE 1 G/1
1000 INJECTION, POWDER, FOR SOLUTION INTRAMUSCULAR; INTRAVENOUS ONCE
Status: DISCONTINUED | OUTPATIENT
Start: 2024-03-03 | End: 2024-03-03

## 2024-03-03 RX ORDER — LISINOPRIL 5 MG/1
10 TABLET ORAL EVERY EVENING
Status: DISCONTINUED | OUTPATIENT
Start: 2024-03-03 | End: 2024-03-06 | Stop reason: HOSPADM

## 2024-03-03 RX ORDER — HEPARIN SODIUM 5000 [USP'U]/ML
5000 INJECTION, SOLUTION INTRAVENOUS; SUBCUTANEOUS EVERY 8 HOURS
Status: DISCONTINUED | OUTPATIENT
Start: 2024-03-03 | End: 2024-03-03

## 2024-03-03 RX ADMIN — LOPERAMIDE HYDROCHLORIDE 2 MG: 2 CAPSULE ORAL at 20:28

## 2024-03-03 RX ADMIN — IOHEXOL 100 ML: 350 INJECTION, SOLUTION INTRAVENOUS at 14:58

## 2024-03-03 RX ADMIN — PIPERACILLIN AND TAZOBACTAM 4.5 G: 4; .5 INJECTION, POWDER, FOR SOLUTION INTRAVENOUS at 20:31

## 2024-03-03 RX ADMIN — LISINOPRIL 10 MG: 5 TABLET ORAL at 18:30

## 2024-03-03 RX ADMIN — SODIUM CHLORIDE 1000 ML: 9 INJECTION, SOLUTION INTRAVENOUS at 18:21

## 2024-03-03 RX ADMIN — EZETIMIBE 10 MG: 10 TABLET ORAL at 20:28

## 2024-03-03 RX ADMIN — ROSUVASTATIN 5 MG: 10 TABLET, FILM COATED ORAL at 18:30

## 2024-03-03 ASSESSMENT — ENCOUNTER SYMPTOMS
FLANK PAIN: 0
VOMITING: 0
FEVER: 0
EYE DISCHARGE: 0
CHILLS: 0
SHORTNESS OF BREATH: 0
ABDOMINAL PAIN: 0
BRUISES/BLEEDS EASILY: 0
COUGH: 0
FOCAL WEAKNESS: 0
NERVOUS/ANXIOUS: 0
MYALGIAS: 0
EYE REDNESS: 0
STRIDOR: 0

## 2024-03-03 ASSESSMENT — COGNITIVE AND FUNCTIONAL STATUS - GENERAL
HELP NEEDED FOR BATHING: A LITTLE
DAILY ACTIVITIY SCORE: 18
TURNING FROM BACK TO SIDE WHILE IN FLAT BAD: A LITTLE
SUGGESTED CMS G CODE MODIFIER MOBILITY: CK
DRESSING REGULAR LOWER BODY CLOTHING: A LITTLE
WALKING IN HOSPITAL ROOM: A LITTLE
MOBILITY SCORE: 18
MOVING FROM LYING ON BACK TO SITTING ON SIDE OF FLAT BED: A LITTLE
MOVING TO AND FROM BED TO CHAIR: A LITTLE
DRESSING REGULAR UPPER BODY CLOTHING: A LITTLE
PERSONAL GROOMING: A LITTLE
CLIMB 3 TO 5 STEPS WITH RAILING: A LITTLE
TOILETING: A LITTLE
SUGGESTED CMS G CODE MODIFIER DAILY ACTIVITY: CK
EATING MEALS: A LITTLE
STANDING UP FROM CHAIR USING ARMS: A LITTLE

## 2024-03-03 ASSESSMENT — FIBROSIS 4 INDEX
FIB4 SCORE: 1.08
FIB4 SCORE: 0.97

## 2024-03-03 ASSESSMENT — PATIENT HEALTH QUESTIONNAIRE - PHQ9
9. THOUGHTS THAT YOU WOULD BE BETTER OFF DEAD, OR OF HURTING YOURSELF: NOT AT ALL
2. FEELING DOWN, DEPRESSED, IRRITABLE, OR HOPELESS: NOT AT ALL
SUM OF ALL RESPONSES TO PHQ9 QUESTIONS 1 AND 2: 0
4. FEELING TIRED OR HAVING LITTLE ENERGY: NOT AT ALL
7. TROUBLE CONCENTRATING ON THINGS, SUCH AS READING THE NEWSPAPER OR WATCHING TELEVISION: NOT AT ALL
5. POOR APPETITE OR OVEREATING: NOT AT ALL
6. FEELING BAD ABOUT YOURSELF - OR THAT YOU ARE A FAILURE OR HAVE LET YOURSELF OR YOUR FAMILY DOWN: NOT AL ALL
3. TROUBLE FALLING OR STAYING ASLEEP OR SLEEPING TOO MUCH: NOT AT ALL
SUM OF ALL RESPONSES TO PHQ QUESTIONS 1-9: 0
8. MOVING OR SPEAKING SO SLOWLY THAT OTHER PEOPLE COULD HAVE NOTICED. OR THE OPPOSITE, BEING SO FIGETY OR RESTLESS THAT YOU HAVE BEEN MOVING AROUND A LOT MORE THAN USUAL: NOT AT ALL
1. LITTLE INTEREST OR PLEASURE IN DOING THINGS: NOT AT ALL

## 2024-03-03 ASSESSMENT — LIFESTYLE VARIABLES
AVERAGE NUMBER OF DAYS PER WEEK YOU HAVE A DRINK CONTAINING ALCOHOL: 1
TOTAL SCORE: 0
CONSUMPTION TOTAL: NEGATIVE
EVER FELT BAD OR GUILTY ABOUT YOUR DRINKING: NO
HAVE YOU EVER FELT YOU SHOULD CUT DOWN ON YOUR DRINKING: NO
EVER HAD A DRINK FIRST THING IN THE MORNING TO STEADY YOUR NERVES TO GET RID OF A HANGOVER: NO
HOW MANY TIMES IN THE PAST YEAR HAVE YOU HAD 5 OR MORE DRINKS IN A DAY: 0
TOTAL SCORE: 0
ALCOHOL_USE: YES
ON A TYPICAL DAY WHEN YOU DRINK ALCOHOL HOW MANY DRINKS DO YOU HAVE: 1
TOTAL SCORE: 0
HAVE PEOPLE ANNOYED YOU BY CRITICIZING YOUR DRINKING: NO

## 2024-03-03 ASSESSMENT — PAIN DESCRIPTION - PAIN TYPE
TYPE: ACUTE PAIN
TYPE: ACUTE PAIN

## 2024-03-03 NOTE — ED TRIAGE NOTES
Chief Complaint   Patient presents with    Groin Pain      Pt has history  of left ureter surgery  last February and stage 2 bladder cancer. Complains of left groin pain worsening over the past week. Pt on ABX for UTI.

## 2024-03-03 NOTE — ED PROVIDER NOTES
ED Provider Note    CHIEF COMPLAINT  Chief Complaint   Patient presents with    Groin Pain       EXTERNAL RECORDS REVIEWED  Patient's last encounter was a hospital admission to the urology service for ureterectomy and ureteral julia.  Mplant    HPI/ROS  LIMITATION TO HISTORY   Select: : None  OUTSIDE HISTORIAN(S):  Family daughter    Star Centeno is a 83 y.o. male who presents to the emergency department accompanied by his daughter with a chief complaint of lower abdominal pain.  Patient recently underwent the above-mentioned surgery.  He had a Hedrick catheter in place until 2 weeks ago.  At that he seemed to be doing well.  Since the Hedrick catheter was removed he has had intermittent pain and has had less control of his urine which she was warned about.  He is on Augmentin, taking it twice daily.  He denies nausea or vomiting.  He has had no hematuria.  No fever.  He feels very tired and fatigued.  He has been more constipated, no diarrhea.  But is concerned about the pain, prompting his ED visit.    PAST MEDICAL HISTORY   has a past medical history of Cancer (HCC), Cataract, Diabetes (HCC), High cholesterol, History of heart artery stent, Hyperlipidemia, Hypertension, Myocardial infarct (HCC), Pulmonary emboli (HCC), Renal disorder, and Urinary bladder disorder.    SURGICAL HISTORY   has a past surgical history that includes eye surgery; prostatectomy, radical retro; cystourethroscopy,biopsies (N/A, 10/24/2023); cysto/uretero/pyeloscopy, dx (Left, 10/24/2023); cystoscopy,insert ureteral stent (Left, 10/24/2023); cystoscopy,insert ureteral stent (Left, 01/04/2024); cysto/uretero/pyeloscopy, dx (01/04/2024); other cardiac surgery; unlisted laparoscopy proc, ureter (2/2/2024); cysto stent placemnt pre surg (2/2/2024); and lymphadectomy laparoscopic (2/2/2024).    FAMILY HISTORY  Family History   Problem Relation Age of Onset    Genetic Disorder Brother     Diabetes Brother     Hypertension Brother     Hyperlipidemia  Brother     Genetic Disorder Sister     Diabetes Sister     Genetic Disorder Sister        SOCIAL HISTORY  Social History     Tobacco Use    Smoking status: Former     Current packs/day: 0.00     Average packs/day: 1 pack/day for 45.0 years (45.0 ttl pk-yrs)     Types: Cigarettes     Start date: 1956     Quit date: 2001     Years since quittin.1    Smokeless tobacco: Never   Vaping Use    Vaping Use: Never used   Substance and Sexual Activity    Alcohol use: Yes     Alcohol/week: 8.4 oz     Types: 14 Glasses of wine per week     Comment: 2 glasses of wine daily    Drug use: Never    Sexual activity: Not Currently     Partners: Female     Birth control/protection: Condom       CURRENT MEDICATIONS  Home Medications       Reviewed by Danya Ellis R.N. (Registered Nurse) on 24 at 1805  Med List Status: Complete     Medication Last Dose Status   acetaminophen (TYLENOL) 500 MG Tab 3/3/2024 Active   ALPRAZolam (XANAX) 0.25 MG Tab > 1 month Active   amoxicillin-clavulanate (AUGMENTIN) 875-125 MG Tab 3/3/2024 Active   aspirin 81 MG EC tablet 3/3/2024 Active   bismuth subsalicylate (PEPTO-BISMOL) 262 MG/15ML Suspension > 1 week Active   Coenzyme Q10 (COQ-10 PO) 3/2/2024 Active   diclofenac sodium (VOLTAREN) 1 % Gel > 2 weeks Active   Empagliflozin 25 MG Tab > 1 week Active   ezetimibe (ZETIA) 10 MG Tab 3/2/2024 Active   lisinopril (PRINIVIL) 10 MG Tab 3/2/2024 Active   Loperamide HCl (IMODIUM A-D) 1 MG/7.5ML Liquid 3/2/2024 Active   metFORMIN (GLUCOPHAGE) 500 MG Tab 3/3/2024 Active   metoprolol SR (TOPROL XL) 25 MG TABLET SR 24 HR 3/3/2024 Active   Omega-3 Fatty Acids (FISH OIL PO) 3/3/2024 Active   oxybutynin SR (DITROPAN-XL) 5 MG TABLET SR 24 HR  Active   oxyCODONE immediate-release (ROXICODONE) 5 MG Tab 3/3/2024 Active   Prenatal Multivit-Min-Fe-FA (PRE-COLE FORMULA PO) 3/3/2024 Active   rosuvastatin (CRESTOR) 5 MG Tab 3/2/2024 Active   tamsulosin (FLOMAX) 0.4 MG capsule 3/3/2024 Active               "      ALLERGIES  No Known Allergies    PHYSICAL EXAM  VITAL SIGN /66   Pulse 64   Temp 36.7 °C (98 °F) (Oral)   Resp 16   Ht 1.803 m (5' 10.98\")   Wt 80.7 kg (177 lb 14.6 oz)   SpO2 95%   BMI 24.82 kg/m²    Vitals reviewed.  Constitutional: Patient is oriented to person, place, and time. Appears well-developed and well-nourished. Mild distress.    Head: Normocephalic and atraumatic.   Ears: Normal external ears bilaterally.   Mouth/Throat: Oropharynx is clear  Eyes: Conjunctivae are normal. Pupils are equal, round  Neck: Normal range of motion.   Cardiovascular: Normal rate, regular rhythm and normal heart sounds.   Pulmonary/Chest: Effort normal and breath sounds normal. No respiratory distress, no wheezes, rhonchi, or rales. N  Abdominal: Soft. Bowel sounds are normal. There is suprapubic tenderness. No rebound or guarding, or peritoneal signs. No CVA tenderness.  Musculoskeletal: No edema and no tenderness.   Neurological: Normal motor and sensory exam. No focal deficits.   Skin: Skin is warm and dry. No erythema. No pallor.   Psychiatric: Patient has a normal mood and affect.     DIAGNOSTIC STUDIES / PROCEDURES  EKG  I have independently interpreted this EKG    LABS  Results for orders placed or performed during the hospital encounter of 03/03/24   URINALYSIS    Specimen: Urine, Clean Catch   Result Value Ref Range    Color Yellow     Character Hazy (A)     Specific Gravity 1.025 <1.035    Ph 5.5 5.0 - 8.0    Glucose Negative Negative mg/dL    Ketones Trace (A) Negative mg/dL    Protein >=300 (A) Negative mg/dL    Bilirubin Small (A) Negative    Nitrite Negative Negative    Leukocyte Esterase Moderate (A) Negative    Occult Blood Moderate (A) Negative    Micro Urine Req Microscopic    CBC WITH DIFFERENTIAL   Result Value Ref Range    WBC 12.0 (H) 4.8 - 10.8 K/uL    RBC 3.54 (L) 4.70 - 6.10 M/uL    Hemoglobin 10.9 (L) 14.0 - 18.0 g/dL    Hematocrit 32.7 (L) 42.0 - 52.0 %    MCV 92.4 81.4 - 97.8 fL    " MCH 30.8 27.0 - 33.0 pg    MCHC 33.3 32.3 - 36.5 g/dL    RDW 46.7 35.9 - 50.0 fL    Platelet Count 341 164 - 446 K/uL    MPV 9.5 9.0 - 12.9 fL    Neutrophils-Polys 77.90 (H) 44.00 - 72.00 %    Lymphocytes 13.40 (L) 22.00 - 41.00 %    Monocytes 4.60 0.00 - 13.40 %    Eosinophils 2.90 0.00 - 6.90 %    Basophils 0.30 0.00 - 1.80 %    Immature Granulocytes 0.90 0.00 - 0.90 %    Nucleated RBC 0.00 0.00 - 0.20 /100 WBC    Neutrophils (Absolute) 9.33 (H) 1.82 - 7.42 K/uL    Lymphs (Absolute) 1.61 1.00 - 4.80 K/uL    Monos (Absolute) 0.55 0.00 - 0.85 K/uL    Eos (Absolute) 0.35 0.00 - 0.51 K/uL    Baso (Absolute) 0.03 0.00 - 0.12 K/uL    Immature Granulocytes (abs) 0.11 0.00 - 0.11 K/uL    NRBC (Absolute) 0.00 K/uL   CMP   Result Value Ref Range    Sodium 132 (L) 135 - 145 mmol/L    Potassium 5.1 3.6 - 5.5 mmol/L    Chloride 98 96 - 112 mmol/L    Co2 16 (L) 20 - 33 mmol/L    Anion Gap 18.0 (H) 7.0 - 16.0    Glucose 132 (H) 65 - 99 mg/dL    Bun 28 (H) 8 - 22 mg/dL    Creatinine 1.51 (H) 0.50 - 1.40 mg/dL    Calcium 9.4 8.4 - 10.2 mg/dL    Correct Calcium 9.6 8.5 - 10.5 mg/dL    AST(SGOT) 15 12 - 45 U/L    ALT(SGPT) 15 2 - 50 U/L    Alkaline Phosphatase 100 (H) 30 - 99 U/L    Total Bilirubin 0.4 0.1 - 1.5 mg/dL    Albumin 3.8 3.2 - 4.9 g/dL    Total Protein 7.1 6.0 - 8.2 g/dL    Globulin 3.3 1.9 - 3.5 g/dL    A-G Ratio 1.2 g/dL   URINE MICROSCOPIC (W/UA)   Result Value Ref Range    -150 (A) /hpf    RBC 20-50 (A) /hpf    Bacteria Moderate (A) None /hpf    Epithelial Cells Few Few /hpf    Urine Crystals Rare Amorphous /hpf    Hyaline Cast 6-10 (A) /lpf   ESTIMATED GFR   Result Value Ref Range    GFR (CKD-EPI) 45 (A) >60 mL/min/1.73 m 2       RADIOLOGY  I have independently interpreted the diagnostic imaging associated with this visit and am waiting the final reading from the radiologist.   My preliminary interpretation is as follows: stent noted. No AF level. Small fluid collection lower abd  Radiologist interpretation:    CT-ABDOMEN-PELVIS WITH   Final Result      1.  Bladder wall thickening concerning for cystitis.   2.  LEFT ureteral stent in place.  Minimally delayed enhancement LEFT kidney, similar to prior.   3.  Probable postoperative fluid collection in the anterior lower pelvis on the RIGHT measuring 4.9 cm.   4.  Minimal dependent peritoneal fluid in the pelvis.   5.  Colonic diverticulosis without evidence for diverticulitis.             COURSE & MEDICAL DECISION MAKING    ED Observation Status? No; Patient does not meet criteria for ED Observation.     INITIAL ASSESSMENT, COURSE AND PLAN  Care Narrative:     This is a pleasant 83-year-old male.  He presents with his daughter.  He is about 1 month postop ureterotomy and ureteral reimplantation.  He is followed by [facility] [place].  He is on antibiotics.  He presents with lower abdominal pain.  No fever.  No nausea or vomiting.  Labs have been drawn and ordered per nursing protocols.  Have added CT imaging.  He will be treated with pain medication and kept n.p.o. at this time.    Data reviewed.  Patient's urine analysis is concerning for UTI with 100-150 WBCs, increased bacteria and leukocyte esterase.  CT shows thickened  bladder wall concerning for cystitis.  Left ureteral stent is again seen.  There is a postoperative fluid collection noted.  There is evidence of diverticulosis but no diverticulitis.  Will contact urology for consultation.    4:33 PM D/W Dr. Escalona, urology, this patient is well-known to him.  We reviewed CT together and he feels that this is in keeping with his recent surgery.  We discussed urine analysis results given the fact that he is on Augmentin, this is somewhat concerning.  Will plan for hospital admission, IV antibiotics, following urine cultures, assessing for development of retention which the patient does not appear to be experiencing at this time.  He will see the patient in consultation but not until tomorrow.    4:49 PM D/W patient and  daughter who remains at the bedside.  We discussed lab results.  There is suspicion for UTI and given the fact that he is on antibiotics, this is concerning.  I have relayed to them my conversation with urology.  They are recommending admission to the hospital for observation, assessment for urinary retention and follow urine cultures.  They are agreeable to this plan of care.    4:55 PM D/W Dr. Goodwin,Hospitalist, who agrees to admit the patient to their service, at this time there is no plan for operative intervention.  Stent not due to come out for 2 weeks.  Patient does not need to be NPO.  Await urine collection for further volume to obtain culture and patient will be started on IV antibiotics.  Patient is in stable condition.      DISPOSITION AND DISCUSSIONS  I have discussed management of the patient with the following physicians and CON's: Urology, hospitalist    Discussion of management with other QHP or appropriate source(s): None     Escalation of care considered, and ultimately not performed: None    Barriers to care at this time, including but not limited to: None    FINAL DIAGNOSIS  1. Urinary tract infection without hematuria, site unspecified         Electronically signed by: Mayra Paniagua D.O., 3/3/2024 12:57 PM

## 2024-03-03 NOTE — ED NOTES
Medication history reviewed with pt and pts daughter. Med rec is complete.  Allergies reviewed, per pt  Interviewed pt with daughter at bedside with permission from pt.    Pt had a list of medications at bedside, went over list of medications and returned list back to pts daughter at bedside.    Patient has had any outpatient antibiotics in the last 30 days, pt started AUGMENTIN 875-125MG on 2/28/2024 for 7 day course.  Pt last took this medication was on 3/3/2024 @ 0730    Pt is not on any anticoagulants

## 2024-03-04 ENCOUNTER — PATIENT MESSAGE (OUTPATIENT)
Dept: MEDICAL GROUP | Facility: LAB | Age: 84
End: 2024-03-04
Payer: MEDICARE

## 2024-03-04 DIAGNOSIS — F32.A DEPRESSION, UNSPECIFIED DEPRESSION TYPE: ICD-10-CM

## 2024-03-04 PROBLEM — D63.1 ANEMIA DUE TO STAGE 3B CHRONIC KIDNEY DISEASE: Chronic | Status: ACTIVE | Noted: 2024-03-04

## 2024-03-04 PROBLEM — N18.32 ANEMIA DUE TO STAGE 3B CHRONIC KIDNEY DISEASE: Chronic | Status: ACTIVE | Noted: 2024-03-04

## 2024-03-04 LAB
ALBUMIN SERPL BCP-MCNC: 3.1 G/DL (ref 3.2–4.9)
ALBUMIN/GLOB SERPL: 1.1 G/DL
ALP SERPL-CCNC: 82 U/L (ref 30–99)
ALT SERPL-CCNC: 12 U/L (ref 2–50)
ANION GAP SERPL CALC-SCNC: 16 MMOL/L (ref 7–16)
AST SERPL-CCNC: 11 U/L (ref 12–45)
BILIRUB SERPL-MCNC: 0.3 MG/DL (ref 0.1–1.5)
BUN SERPL-MCNC: 29 MG/DL (ref 8–22)
CALCIUM ALBUM COR SERPL-MCNC: 9.1 MG/DL (ref 8.5–10.5)
CALCIUM SERPL-MCNC: 8.4 MG/DL (ref 8.4–10.2)
CHLORIDE SERPL-SCNC: 104 MMOL/L (ref 96–112)
CO2 SERPL-SCNC: 16 MMOL/L (ref 20–33)
CREAT SERPL-MCNC: 1.51 MG/DL (ref 0.5–1.4)
ERYTHROCYTE [DISTWIDTH] IN BLOOD BY AUTOMATED COUNT: 46.2 FL (ref 35.9–50)
GFR SERPLBLD CREATININE-BSD FMLA CKD-EPI: 45 ML/MIN/1.73 M 2
GLOBULIN SER CALC-MCNC: 2.8 G/DL (ref 1.9–3.5)
GLUCOSE BLD STRIP.AUTO-MCNC: 115 MG/DL (ref 65–99)
GLUCOSE BLD STRIP.AUTO-MCNC: 121 MG/DL (ref 65–99)
GLUCOSE BLD STRIP.AUTO-MCNC: 176 MG/DL (ref 65–99)
GLUCOSE SERPL-MCNC: 112 MG/DL (ref 65–99)
HCT VFR BLD AUTO: 29.8 % (ref 42–52)
HGB BLD-MCNC: 9.7 G/DL (ref 14–18)
MAGNESIUM SERPL-MCNC: 2 MG/DL (ref 1.5–2.5)
MCH RBC QN AUTO: 30 PG (ref 27–33)
MCHC RBC AUTO-ENTMCNC: 32.6 G/DL (ref 32.3–36.5)
MCV RBC AUTO: 92.3 FL (ref 81.4–97.8)
PLATELET # BLD AUTO: 277 K/UL (ref 164–446)
PMV BLD AUTO: 8.7 FL (ref 9–12.9)
POTASSIUM SERPL-SCNC: 4.4 MMOL/L (ref 3.6–5.5)
PROT SERPL-MCNC: 5.9 G/DL (ref 6–8.2)
RBC # BLD AUTO: 3.23 M/UL (ref 4.7–6.1)
SODIUM SERPL-SCNC: 136 MMOL/L (ref 135–145)
WBC # BLD AUTO: 10.7 K/UL (ref 4.8–10.8)

## 2024-03-04 PROCEDURE — 94760 N-INVAS EAR/PLS OXIMETRY 1: CPT

## 2024-03-04 PROCEDURE — 700102 HCHG RX REV CODE 250 W/ 637 OVERRIDE(OP): Performed by: INTERNAL MEDICINE

## 2024-03-04 PROCEDURE — 700102 HCHG RX REV CODE 250 W/ 637 OVERRIDE(OP): Performed by: HOSPITALIST

## 2024-03-04 PROCEDURE — 82962 GLUCOSE BLOOD TEST: CPT | Mod: 91

## 2024-03-04 PROCEDURE — 87077 CULTURE AEROBIC IDENTIFY: CPT

## 2024-03-04 PROCEDURE — 99233 SBSQ HOSP IP/OBS HIGH 50: CPT | Performed by: INTERNAL MEDICINE

## 2024-03-04 PROCEDURE — 80053 COMPREHEN METABOLIC PANEL: CPT

## 2024-03-04 PROCEDURE — A9270 NON-COVERED ITEM OR SERVICE: HCPCS | Performed by: INTERNAL MEDICINE

## 2024-03-04 PROCEDURE — 83735 ASSAY OF MAGNESIUM: CPT

## 2024-03-04 PROCEDURE — 700111 HCHG RX REV CODE 636 W/ 250 OVERRIDE (IP): Mod: JZ | Performed by: HOSPITALIST

## 2024-03-04 PROCEDURE — 700105 HCHG RX REV CODE 258: Performed by: HOSPITALIST

## 2024-03-04 PROCEDURE — 87186 SC STD MICRODIL/AGAR DIL: CPT

## 2024-03-04 PROCEDURE — 770001 HCHG ROOM/CARE - MED/SURG/GYN PRIV*

## 2024-03-04 PROCEDURE — 36415 COLL VENOUS BLD VENIPUNCTURE: CPT

## 2024-03-04 PROCEDURE — 85027 COMPLETE CBC AUTOMATED: CPT

## 2024-03-04 PROCEDURE — 87086 URINE CULTURE/COLONY COUNT: CPT

## 2024-03-04 PROCEDURE — 97162 PT EVAL MOD COMPLEX 30 MIN: CPT

## 2024-03-04 PROCEDURE — A9270 NON-COVERED ITEM OR SERVICE: HCPCS | Performed by: HOSPITALIST

## 2024-03-04 RX ORDER — CHOLECALCIFEROL (VITAMIN D3) 125 MCG
5 CAPSULE ORAL NIGHTLY
Status: DISCONTINUED | OUTPATIENT
Start: 2024-03-04 | End: 2024-03-06 | Stop reason: HOSPADM

## 2024-03-04 RX ADMIN — Medication 5 MG: at 01:47

## 2024-03-04 RX ADMIN — ROSUVASTATIN 5 MG: 10 TABLET, FILM COATED ORAL at 17:09

## 2024-03-04 RX ADMIN — ASPIRIN 81 MG: 81 TABLET, COATED ORAL at 05:11

## 2024-03-04 RX ADMIN — Medication 5 MG: at 21:13

## 2024-03-04 RX ADMIN — TAMSULOSIN HYDROCHLORIDE 0.4 MG: 0.4 CAPSULE ORAL at 08:52

## 2024-03-04 RX ADMIN — LISINOPRIL 10 MG: 5 TABLET ORAL at 17:08

## 2024-03-04 RX ADMIN — INSULIN HUMAN 1 UNITS: 100 INJECTION, SOLUTION PARENTERAL at 11:12

## 2024-03-04 RX ADMIN — EZETIMIBE 10 MG: 10 TABLET ORAL at 21:13

## 2024-03-04 RX ADMIN — PIPERACILLIN AND TAZOBACTAM 4.5 G: 4; .5 INJECTION, POWDER, FOR SOLUTION INTRAVENOUS at 21:15

## 2024-03-04 RX ADMIN — SODIUM CHLORIDE 1000 ML: 9 INJECTION, SOLUTION INTRAVENOUS at 05:12

## 2024-03-04 ASSESSMENT — COGNITIVE AND FUNCTIONAL STATUS - GENERAL
STANDING UP FROM CHAIR USING ARMS: A LITTLE
SUGGESTED CMS G CODE MODIFIER MOBILITY: CJ
MOVING TO AND FROM BED TO CHAIR: A LITTLE
WALKING IN HOSPITAL ROOM: A LITTLE
MOBILITY SCORE: 20
CLIMB 3 TO 5 STEPS WITH RAILING: A LITTLE

## 2024-03-04 ASSESSMENT — ENCOUNTER SYMPTOMS
FEVER: 0
FLANK PAIN: 1
BACK PAIN: 0
HEADACHES: 0
CHILLS: 0
NAUSEA: 0
ABDOMINAL PAIN: 0
PALPITATIONS: 0
SHORTNESS OF BREATH: 0
DIARRHEA: 0
VOMITING: 0
ABDOMINAL PAIN: 1
DIZZINESS: 0
CONSTIPATION: 0
COUGH: 0

## 2024-03-04 ASSESSMENT — GAIT ASSESSMENTS
DISTANCE (FEET): 60
GAIT LEVEL OF ASSIST: SUPERVISED
DEVIATION: SHUFFLED GAIT

## 2024-03-04 ASSESSMENT — PAIN DESCRIPTION - PAIN TYPE
TYPE: ACUTE PAIN

## 2024-03-04 NOTE — DIETARY
"Nutrition services: Day 1 of admit.  Star Centeno is a 83 y.o. male with admitting DX of Complicated urinary tract infection     Consult received for MST of 3 on nutrition screed due to report of 14-23 lb wt loss x > 1 year and poor PO PTA. Pt was drinking Ensure supplements PTA.     Assessment:  Height: 180.3 cm (5' 10.98\")  Weight: 80.7 kg (177 lb 14.6 oz)  Body mass index is 24.82 kg/m²., BMI classification: WNL  Diet/Intake: cardiac, consistent CHO (diabetic)    PO intake 25-50% of dinner yesterday and % of breakfast.     Evaluation:   Pt presented w/ groin pain. Pt currently has diarrhea. C diff pending. Dx includes CKD st 3b.   History of hyperlipidemia, DM2, aortic aneurysm, prostate cancer/bladder cancer status post radiation and ureteral transposition and stent placement.    Labs: 3/4: glucose=112, Bun=29, creatinine=1.51, GFR=45. POC glucose: 121. 1/30/24: A1c=6.7  Meds: SSI, Zosyn  IV fluids: NS @ 83 ml/hour.   RD visited pt in his room. He reports wt loss of 17-18 lb (~9%) over the past year due to reduced intake caused by his wife's passing. He thinks his intake was ~ 1/2 of normal. He thinks has appetite has improved some over the year. Wt loss is not significant but worth noting. Pt did not appear malnourished.   Pt said that he was drinking 1 Ensure supplement daily PTA. He agreed to addition of Glucerna supplement during his hospitalization. This will be provided on his dinner tray.     Malnutrition Risk: ASPEN criteria for malnutrition not met.     Recommendations/Plan:  Add Glucerna supplement to dinner tray   Encourage intake of >50%  Document intake of all meals and supplement  as % taken in ADL's to provide interdisciplinary communication across all shifts.   Monitor weight.  Nutrition rep will continue to see patient for ongoing meal and snack preferences.       RD will follow  "

## 2024-03-04 NOTE — ASSESSMENT & PLAN NOTE
Avoid/minimize nephrotoxins as much as possible, renally dose medications, monitor inputs and outputs   Creatinine 1.40 unclear at baseline given recent urological procedures

## 2024-03-04 NOTE — HOSPITAL COURSE
83M PMHx HTN, DLD, T2DM, aortic aneurysm, PE stopped AC due to hematuria, history of prostate/bladder cancer s/p XRT and ureteral transposition and stent placement admitted 3/3/24 for generalized weakness and chronic pain.  He has associated dysuria and reduced urine output.  Patient was started on IV Zosyn.  Renal function has improved and urine cultures are positive for E faecalis which she has had in the past.

## 2024-03-04 NOTE — DISCHARGE PLANNING
Case Management Discharge Planning    Admission Date: 3/3/2024  GMLOS: 2.9  ALOS: 1    6-Clicks ADL Score: 18  6-Clicks Mobility Score: 18      Anticipated Discharge Dispo: Discharge Disposition: Discharged to home/self care (01)  Discharge Address: [facility] [address]  Discharge Contact Phone Number: [phone]    DME Needed: No    Action(s) Taken: DC Assessment Complete (See below)    LSW met with pt at bedside for discharge planning. No needs identified at this time.     1030-  Pt discussed in IDT rounds. Pt lives alone in a single story house. 6 clicks are 18, pt previously independent. LSW requested PT/OT consult.     Escalations Completed: None    Medically Clear: No    Next Steps: LSW to follow for needs    Barriers to Discharge: Medical clearance    Is the patient up for discharge tomorrow: No          Care Transition Team Assessment    LSW met with pt at bedside to complete discharge planning assessment.     Pt reported he currently lives alone in a one story house. Pt stated he has his daughter as support, and stated he has his son who lives in [place].   Pt reported this is his 6th admission since October 2023.   Pt reported his POA's are his daughter first, then his son.   Pt reported being independent prior to admission, uses cane occasionally at baseline.     Information Source  Orientation Level: Oriented X4  Information Given By: Patient  Who is responsible for making decisions for patient? : Patient    Readmission Evaluation  Is this a readmission?: Yes - unplanned readmission    Elopement Risk  Legal Hold: No  Ambulatory or Self Mobile in Wheelchair: No-Not an Elopement Risk  Disoriented: No  Psychiatric Symptoms: None  History of Wandering: No  Elopement this Admit: No  Vocalizing Wanting to Leave: No  Displays Behaviors, Body Language Wanting to Leave: No-Not at Risk for Elopement  Elopement Risk: Not at Risk for Elopement    Interdisciplinary Discharge Planning  Lives with - Patient's  Self Care Capacity: Alone and Able to Care For Self  Patient or legal guardian wants to designate a caregiver: No  Support Systems: Family Member(s), Friends / Neighbors  Housing / Facility: [address]  Durable Medical Equipment: Not Applicable    Discharge Preparedness  What is your plan after discharge?: Home with help  What are your discharge supports?: Child  Prior Functional Level: Ambulatory, Drives Self, Independent with Activities of Daily Living  Difficulity with ADLs: None  Difficulity with IADLs: None    Functional Assesment  Prior Functional Level: Ambulatory, Drives Self, Independent with Activities of Daily Living    Finances  Financial Barriers to Discharge: No  Prescription Coverage: Yes    Vision / Hearing Impairment  Vision Impairment : Yes  Right Eye Vision: Wears Glasses, Impaired  Left Eye Vision: Wears Glasses, Impaired  Hearing Impairment : Yes  Hearing Impairment: Both Ears, Hearing Device(s) Available  Does Pt Need Special Equipment for the Hearing Impaired?: No    Advance Directive  Advance Directive?: None    Domestic Abuse  Have you ever been the victim of abuse or violence?: No  Physical Abuse or Sexual Abuse: No  Verbal Abuse or Emotional Abuse: No  Possible Abuse/Neglect Reported to:: Not Applicable    Psychological Assessment  History of Substance Abuse: None  History of Psychiatric Problems: No  Non-compliant with Treatment: No  Newly Diagnosed Illness: No    Discharge Risks or Barriers  Discharge risks or barriers?: Complex medical needs  Patient risk factors: Vulnerable adult    Anticipated Discharge Information  Discharge Disposition: Discharged to home/self care (01)  Discharge Address: [address] [place] [address]  Discharge Contact Phone Number: [phone]

## 2024-03-04 NOTE — PROGRESS NOTES
ISOLATION PRECAUTIONS EDUCATION    Educated PATIENT, FAMILY, S.O: patient on isolation for C DIFFICILE.    Educated on reason for isolation, how the infection may be transmitted, and how to help prevent transmission to others. Educated precautions involves staff and visitors wearing PPE, following Standard Precautions and performing meticulous hand hygiene in order to prevent transmission of infection.     Special Contact Precautions: Educated that Special Contact Precautions involves staff and visitors wearing gowns and gloves when in the patient room, and using soap and water for hand hygiene when exiting patient’s room.     Educated that patient may leave the room if they or continent of stool, but prior to exiting the patient room each time, the patient needs to have on a fresh patient gown, ensure the potentially infectious area is covered, and perform hand hygiene with soap and water immediately prior to exiting the room.    In addition, educated that alcohol based hand rub is NOT sufficient for hand hygiene for Special Contact Precautions. A bonnet is placed over the hand  dispenser inside the patients’ room as a reminder to wash hands with soap and water.     Patient transport and mobilization on unit  Educated that they may leave their room, but prior to exiting, the patient needs to have on a fresh patient gown, ensure the potentially infectious area is covered, performing appropriate hand hygiene immediately prior to exiting the room.

## 2024-03-04 NOTE — PROGRESS NOTES
Hospital Medicine Daily Progress Note    Date of Service  3/4/2024    Chief Complaint  Star Centeno is a 83 y.o. male admitted 3/3/2024 with   Chief Complaint   Patient presents with    Groin Pain     Hospital Course  83M PMHx HTN, DLD, T2DM, aortic aneurysm, PE stopped AC due to hematuria, history of prostate/bladder cancer s/p XRT and ureteral transposition and stent placement admitted 3/3/24 for generalized weakness and chronic pain.  He has associated dysuria and reduced urine output.  Patient was started on IV Zosyn.    Interval Problem Update  Patient denied any acute pain today  Continue IV Zosyn  Pending urine culture  Pending urology  I reviewed previous urine cultures:  1/4/2024, 12/13/2023, 11/17/2023: Klebsiella oxytoca  11/17/2023: Enterococcus faecalis  10/12/2023: E. coli    I have discussed this patient's plan of care and discharge plan at IDT rounds today with Case Management, Nursing, Nursing leadership, and other members of the IDT team.    Consultants/Specialty  urology    Code Status  Full Code    Disposition  The patient is not medically cleared for discharge to home or a post-acute facility.      I have placed the appropriate orders for post-discharge needs.    Review of Systems  Review of Systems   Constitutional:  Negative for chills, fever and malaise/fatigue.   Respiratory:  Negative for cough and shortness of breath.    Cardiovascular:  Negative for chest pain and palpitations.   Gastrointestinal:  Negative for abdominal pain, constipation, diarrhea, nausea and vomiting.   Musculoskeletal:  Negative for back pain and joint pain.   Neurological:  Negative for dizziness and headaches.   All other systems reviewed and are negative.       Physical Exam  Temp:  [36.5 °C (97.7 °F)-36.7 °C (98.1 °F)] 36.5 °C (97.7 °F)  Pulse:  [60-76] 76  Resp:  [17-18] 18  BP: ()/(48-56) 114/56  SpO2:  [94 %-96 %] 96 %    Physical Exam  Vitals and nursing note reviewed.   Constitutional:        General: He is not in acute distress.     Appearance: He is not diaphoretic.   HENT:      Mouth/Throat:      Mouth: Mucous membranes are dry.      Pharynx: No oropharyngeal exudate.   Cardiovascular:      Rate and Rhythm: Normal rate and regular rhythm.      Pulses: Normal pulses.      Heart sounds: Normal heart sounds. No murmur heard.  Pulmonary:      Effort: Pulmonary effort is normal. No respiratory distress.      Breath sounds: Normal breath sounds. No wheezing or rales.   Abdominal:      General: Bowel sounds are normal. There is no distension.      Tenderness: There is no abdominal tenderness.   Genitourinary:     Comments: Condom catheter in place  Musculoskeletal:         General: No swelling or tenderness. Normal range of motion.   Skin:     General: Skin is warm.      Capillary Refill: Capillary refill takes less than 2 seconds.      Coloration: Skin is not jaundiced or pale.   Neurological:      General: No focal deficit present.      Mental Status: He is alert and oriented to person, place, and time. Mental status is at baseline.      Motor: No weakness.   Psychiatric:         Mood and Affect: Mood normal.         Behavior: Behavior normal.         Thought Content: Thought content normal.         Judgment: Judgment normal.         Fluids    Intake/Output Summary (Last 24 hours) at 3/4/2024 1814  Last data filed at 3/4/2024 1321  Gross per 24 hour   Intake 1434 ml   Output 1075 ml   Net 359 ml       Laboratory  Recent Labs     03/03/24  1330 03/04/24  0213   WBC 12.0* 10.7   RBC 3.54* 3.23*   HEMOGLOBIN 10.9* 9.7*   HEMATOCRIT 32.7* 29.8*   MCV 92.4 92.3   MCH 30.8 30.0   MCHC 33.3 32.6   RDW 46.7 46.2   PLATELETCT 341 277   MPV 9.5 8.7*     Recent Labs     03/03/24  1330 03/04/24  0213   SODIUM 132* 136   POTASSIUM 5.1 4.4   CHLORIDE 98 104   CO2 16* 16*   GLUCOSE 132* 112*   BUN 28* 29*   CREATININE 1.51* 1.51*   CALCIUM 9.4 8.4                   Imaging  CT-ABDOMEN-PELVIS WITH   Final Result      1.   Bladder wall thickening concerning for cystitis.   2.  LEFT ureteral stent in place.  Minimally delayed enhancement LEFT kidney, similar to prior.   3.  Probable postoperative fluid collection in the anterior lower pelvis on the RIGHT measuring 4.9 cm.   4.  Minimal dependent peritoneal fluid in the pelvis.   5.  Colonic diverticulosis without evidence for diverticulitis.              Assessment/Plan  * Complicated urinary tract infection- (present on admission)  Assessment & Plan  In the setting of bladder cancer, recent neurosurgical intervention and ureteral stent  Continue IV Zosyn  Urology pending  Urine culture pending    1/4/2024, 12/13/2023, 11/17/2023: Klebsiella oxytoca  11/17/2023: Enterococcus faecalis  10/12/2023: E. coli    Anemia due to stage 3b chronic kidney disease (HCC)- (present on admission)  Assessment & Plan  Hemoglobin 9.7  Checking iron panel and ferritin    Diarrhea- (present on admission)  Assessment & Plan  Differentials include viral infection, C. difficile colitis    Pending stool study    ACP (advance care planning)- (present on admission)  Assessment & Plan  Full code    Abnormal CT scan concerning for fluid collection in the anterior pelvis- (present on admission)  Assessment & Plan  CT imaging shows evidence for a fluid collection in the anterior pelvis inferiorly on the RIGHT side measuring 2.4 x 4.9 x 1.9 cm.  These findings are likely secondary to recent surgical intervention.  Continue IV Zosyn  Urology consulted on admission, pending    Metabolic acidosis- (present on admission)  Assessment & Plan  Likely due to reduced intravascular volume   Bicarb 16, chronically low    Stage 3b chronic kidney disease (HCC)- (present on admission)  Assessment & Plan  Avoid/minimize nephrotoxins as much as possible, renally dose medications, monitor inputs and outputs   Creatinine 1.51 unclear at baseline given recent urological procedures    Coronary artery disease involving native coronary  artery of native heart without angina pectoris- (present on admission)  Assessment & Plan  Continue home meds lisinopril, Crestor, Zetia, metoprolol    Pulmonary hypertension (HCC)- (present on admission)  Assessment & Plan  Currently saturating well on room air  Oxygen as needed, Respiratory protocol, Bronchodilators, Incentive spirometry     Non-insulin dependent type 2 diabetes mellitus (HCC)- (present on admission)  Assessment & Plan  With no significant hyperglycemia  Last glycated hemoglobin was 6.7%  Continue insulin sliding scale, Accu-Cheks and hypoglycemic protocol    Hyperlipidemia- (present on admission)  Assessment & Plan  Cardiac diet.  Continue rosuvastatin and ezetimibe    Essential hypertension, benign- (present on admission)  Assessment & Plan  Continue home lisinopril and metoprolol    Diastolic dysfunction- (present on admission)  Assessment & Plan  Not currently in exacerbation.  I reviewed prior echocardiogram, LVEF 60%, mildly dilated RV, enlarged RA, trace aortic insufficiency, mild tricuspid regurgitation, RVSP 35 mmHg.  Trace pulmonic insufficiency.  Aortic diameter 3.2 cm. (11/2022)         VTE prophylaxis:   SCDs/TEDs      I have performed a physical exam and reviewed and updated ROS and Plan today (3/4/2024). In review of yesterday's note (3/3/2024), there are no changes except as documented above.      Total time spent 51 minutes. I spent greater than 50% of the time for patient care, including unit/floor time, multiple face-to-face encounters with the patient, counseling on treatment plan and discussion with bedside RN.  Further, I spent time on my own review of patient's overnight events, RN notes, imaging and lab analysis, and developing my assessment and plan above.  In addition, working with , social workers, and Charge RN on case coordination on this date.

## 2024-03-04 NOTE — CARE PLAN
The patient is Stable - Low risk of patient condition declining or worsening    Shift Goals  Clinical Goals: Free from falls or injuries, able to sleep at least 4 hours, pain controlled at 3/10 or better with current regimen  Patient Goals: Free from falls or injuries, able to sleep at least 4 hours, pain controlled at 3/10 or better with current regimen    Progress made toward(s) clinical / shift goals: No falls or injuries overnight, patient was able to sleep at least 4 hours overnight, patient's pain controlled at 3/10 or better with current regimen    Patient is not progressing towards the following goals:

## 2024-03-04 NOTE — PROGRESS NOTES
4 Eyes Skin Assessment Completed by JB Buckley and JB Reynoso.    Head WDL  Ears WDL  Nose WDL  Mouth WDL  Neck WDL  Breast/Chest WDL  Shoulder Blades WDL  Spine WDL  (R) Arm/Elbow/Hand WDL  (L) Arm/Elbow/Hand WDL  Abdomen WDL  Groin WDL  Scrotum/Coccyx/Buttocks WDL  (R) Leg WDL  (L) Leg WDL  (R) Heel/Foot/Toe WDL  (L) Heel/Foot/Toe WDL          Devices In Places N/A      Interventions In Place Pillows    Possible Skin Injury No    Pictures Uploaded Into Epic N/A  Wound Consult Placed N/A  RN Wound Prevention Protocol Ordered No

## 2024-03-04 NOTE — ASSESSMENT & PLAN NOTE
With no significant hyperglycemia  Last glycated hemoglobin was 6.7%  Continue insulin sliding scale, Accu-Cheks and hypoglycemic protocol

## 2024-03-04 NOTE — CONSULTS
Consult/H&P Note    Primary Service: Medicine  Attending: Romaine Coppola M.D.  Patient's Name/MRN: Star Centeno, [id number]    Admit Date:3/3/2024  Today's Date: 3/4/2024   Length of stay:  LOS: 1 day   Room #: 2213/01      Reason for consult/chief complaint: Flank pain/groin pain  ID/HPI: Star Centeno is a 83 y.o. 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 He was admitted on 3/3 with c/o dysuria and L groin pain, and on admission his UA was highly suspicious for UTI. He was started on zosyn and ucx is pending. AFVSS, WBC and Cr WNL. Pain improving. Hedrick in place draining clear yellow urine. Reports he was taking oxybutynin at home without improvement in his pain.       Past Medical History:   Past Medical History:   Diagnosis Date    Cancer (HCC)     Cataract     iol    Diabetes (HCC)     High cholesterol     History of heart artery stent     Hyperlipidemia     Hypertension     Myocardial infarct (HCC)     x6 stents 2004    Pulmonary emboli (HCC)     Renal disorder     Urinary bladder disorder     Recurrent UTI        Past Surgical History:   Past Surgical History:   Procedure Laterality Date    SD UNLISTED LAPAROSCOPY PROC, URETER  2/2/2024    Procedure: URETERECTOMY BLADDER CUFF EXCISION AND LEFT URETERAL REIMPLANTATION, ROBOT-ASSISTED;  Surgeon: Shane ALVAREZ M.D.;  Location: SURGERY [facility];  Service: Uro Robotic    CYSTO STENT PLACEMNT PRE SURG  2/2/2024    Procedure: CYSTOSCOPY WITH LEFT URETEROSCOPY AND LEFT STENT REMOVAL;  Surgeon: Shane ALVAREZ M.D.;  Location: SURGERY [facility];  Service: Uro Robotic    LYMPHADECTOMY LAPAROSCOPIC  2/2/2024    Procedure: ROBOTIC PELVIC LYMPH NODE DISSECTION;  Surgeon: Shane ALVAREZ M.D.;  Location: SURGERY [facility];  Service: Uro Robotic    SD CYSTOSCOPY,INSERT URETERAL STENT Left 01/04/2024    Procedure:  CYSTOSCOPY, WITH LEFT URETEROSCOPY, WITH URETERAL BIOPSY WITH LEFT STENT REPLACEMENT;  Surgeon: Shane ALVAREZ M.D.;  Location: SURGERY [facility];  Service: Urology    KY CYSTO/URETERO/PYELOSCOPY, DX  2024    Procedure: URETEROSCOPY;  Surgeon: Shane ALVAREZ M.D.;  Location: SURGERY [facility];  Service: Urology    KY CYSTOURETHROSCOPY,BIOPSIES N/A 10/24/2023    Procedure: CYSTOSCOPY, WITH BLADDER BIOPSY, LEFT RETROGRADE PYELOGRAM;  Surgeon: Jourdan Londono M.D.;  Location: SURGERY [facility];  Service: Urology    [place] CYSTO/URETERO/PYELOSCOPY, DX Left 10/24/2023    Procedure: URETEROSCOPY, LEFT;  Surgeon: Jourdan Londono M.D.;  Location: [facility];  Service: Urology    [place] CYSTOSCOPY,INSERT URETERAL STENT Left 10/24/2023    Procedure: CYSTOSCOPY, WITH URETERAL STENT INSERTION;  Surgeon: Jourdan Londono M.D.;  Location: SURGERY [facility];  Service: Urology    EYE SURGERY      OTHER CARDIAC SURGERY      6 stents    PROSTATECTOMY, RADICAL RETRO          Family History:   Family History   Problem Relation Age of Onset    Genetic Disorder Brother     Diabetes Brother     Hypertension Brother     Hyperlipidemia Brother     Genetic Disorder Sister     Diabetes Sister     Genetic Disorder Sister          Social History:   Social History     Tobacco Use    Smoking status: Former     Current packs/day: 0.00     Average packs/day: 1 pack/day for 45.0 years (45.0 ttl pk-yrs)     Types: Cigarettes     Start date: 1956     Quit date: 2001     Years since quittin.1    Smokeless tobacco: Never   Vaping Use    Vaping Use: Never used   Substance Use Topics    Alcohol use: Yes     Alcohol/week: 8.4 oz     Types: 14 Glasses of wine per week     Comment: 2 glasses of wine daily    Drug use: Never      Social History     Social History Narrative    Not on file        Allergies: he Patient has no known allergies.    Medications:   Medications Prior to Admission   Medication  Sig Dispense Refill Last Dose    amoxicillin-clavulanate (AUGMENTIN) 875-125 MG Tab Take 1 Tablet by mouth 2 times a day. Pt started on 2/28/2024 for 7 day course   3/3/2024 at 0730    oxyCODONE immediate-release (ROXICODONE) 5 MG Tab Take 5 mg by mouth every 6 hours as needed for Severe Pain.   3/3/2024 at 1000    ezetimibe (ZETIA) 10 MG Tab Take 1 Tablet by mouth every day. Indications: High Amount of Fats in the Blood (Patient taking differently: Take 10 mg by mouth every evening. Indications: High Amount of Fats in the Blood) 100 Tablet 2 3/2/2024 at 2045    metoprolol SR (TOPROL XL) 25 MG TABLET SR 24 HR Take 1 Tablet by mouth every day. 90 Tablet 3 3/3/2024 at 0730    lisinopril (PRINIVIL) 10 MG Tab Take 1 Tablet by mouth every day. (Patient taking differently: Take 10 mg by mouth every evening. Indications: High Blood Pressure Disorder) 90 Tablet 3 3/2/2024 at 2045    Loperamide HCl (IMODIUM A-D) 1 MG/7.5ML Liquid Take 30 mL by mouth 2 times a day as needed (diarrhea). Indications: Diarrhea   3/2/2024 at 1200    tamsulosin (FLOMAX) 0.4 MG capsule Take 1 Capsule by mouth 1/2 hour after breakfast. 30 Capsule 1 3/3/2024 at 0730    acetaminophen (TYLENOL) 500 MG Tab Take 1 Tablet by mouth every 6 hours as needed for Mild Pain or Moderate Pain.   3/3/2024 at 0930    rosuvastatin (CRESTOR) 5 MG Tab TAKE 1 TABLET BY MOUTH EVERY DAY (Patient taking differently: Take 5 mg by mouth every evening. Indications: High Amount of Fats in the Blood) 100 Tablet 3 3/2/2024 at 2045    metFORMIN (GLUCOPHAGE) 500 MG Tab Take 1 Tablet by mouth 2 times a day with meals for 360 days. 200 Tablet 1 3/3/2024 at 0730    Coenzyme Q10 (COQ-10 PO) Take 1 Capsule by mouth every evening. Indications: heart health   3/2/2024 at 2045    Omega-3 Fatty Acids (FISH OIL PO) Take 1 Capsule by mouth every morning. Indications: heart health   3/3/2024 at 0730    aspirin 81 MG EC tablet Take 81 mg by mouth every day. Indications: blood thinner    "3/3/2024 at 0730    Prenatal Multivit-Min-Fe-FA (PRE-COLE FORMULA PO) Take 1 Tablet by mouth every morning. Indications: supplement   3/3/2024 at 0730    oxybutynin SR (DITROPAN-XL) 5 MG TABLET SR 24 HR Take 1 Tablet by mouth every day.       ALPRAZolam (XANAX) 0.25 MG Tab Take 0.25 mg by mouth 3 times a day as needed for Anxiety.   > 1 month at Unknown    bismuth subsalicylate (PEPTO-BISMOL) 262 MG/15ML Suspension Take 30 mL by mouth every 6 hours as needed (diarrhea). Indications: Diarrhea, Heartburn   > 1 week at unknown    diclofenac sodium (VOLTAREN) 1 % Gel Apply 2 g topically 4 times a day as needed (Shoulder Pain). Indications: Joint Damage causing Pain and Loss of Function   > 2 weeks at Unknown    Empagliflozin 25 MG Tab Take 25 mg by mouth every day for 360 days. 100 Tablet 3 > 1 week at Ran out         Review of Systems  Review of Systems   Constitutional:  Negative for chills and fever.   Gastrointestinal:  Positive for abdominal pain. Negative for nausea and vomiting.   Genitourinary:  Positive for dysuria and flank pain. Negative for hematuria.   All other systems reviewed and are negative.       Physical Exam  VITAL SIGNS: BP 99/50   Pulse 62   Temp 36.7 °C (98 °F) (Oral)   Resp 17   Ht 1.803 m (5' 10.98\")   Wt 80.7 kg (177 lb 14.6 oz)   SpO2 94%   BMI 24.82 kg/m²   Physical Exam  Vitals and nursing note reviewed.   HENT:      Head: Normocephalic.      Nose: Nose normal.      Mouth/Throat:      Pharynx: Oropharynx is clear.   Eyes:      Conjunctiva/sclera: Conjunctivae normal.   Pulmonary:      Effort: Pulmonary effort is normal.   Abdominal:      General: There is no distension.   Genitourinary:     Comments: Hedrick in place draining clear yellow urine  Musculoskeletal:         General: Normal range of motion.      Cervical back: Normal range of motion.   Skin:     General: Skin is warm.   Neurological:      General: No focal deficit present.      Mental Status: He is alert and oriented to " "person, place, and time.   Psychiatric:         Mood and Affect: Mood normal.         Behavior: Behavior normal.           Labs:  Recent Labs     03/03/24  1330 03/04/24  0213   WBC 12.0* 10.7   RBC 3.54* 3.23*   HEMOGLOBIN 10.9* 9.7*   HEMATOCRIT 32.7* 29.8*   MCV 92.4 92.3   MCH 30.8 30.0   MCHC 33.3 32.6   RDW 46.7 46.2   PLATELETCT 341 277   MPV 9.5 8.7*     Recent Labs     03/03/24  1330 03/04/24  0213   SODIUM 132* 136   POTASSIUM 5.1 4.4   CHLORIDE 98 104   CO2 16* 16*   GLUCOSE 132* 112*   BUN 28* 29*   CREATININE 1.51* 1.51*   CALCIUM 9.4 8.4         Glucose:  Recent Labs     03/03/24  1330 03/04/24  0213   GLUCOSE 132* 112*     Coags:  No results for input(s): \"INR\" in the last 72 hours.      Urinalysis:   Recent Labs     03/03/24  1357   COLORURINE Yellow   CLARITY Hazy*   SPECGRAVITY 1.025   PHURINE 5.5   GLUCOSEUR Negative   KETONES Trace*   NITRITE Negative   OCCULTBLOOD Moderate*   RBCURINE 20-50*   BACTERIA Moderate*   EPITHELCELL Few       Imaging:  CT-ABDOMEN-PELVIS WITH   Final Result      1.  Bladder wall thickening concerning for cystitis.   2.  LEFT ureteral stent in place.  Minimally delayed enhancement LEFT kidney, similar to prior.   3.  Probable postoperative fluid collection in the anterior lower pelvis on the RIGHT measuring 4.9 cm.   4.  Minimal dependent peritoneal fluid in the pelvis.   5.  Colonic diverticulosis without evidence for diverticulitis.             @lastct@     Assessment/Recommendation   83 y.o.M with UTI after recent ureterectomy and ureteral reimplant, pending stent removal on 3/21.    Continue abx per primary team, culture sensitivities when available  Upon discharge, recommend 2 week course of PO abx per sensitivities, to cover until stent is removed. Do not recommend removing stent early due to risk of compromising anastomosis  OK to give meloxicam 15mg QD when Cr normalizes  No indication for urologic intervention during this admission. Urology signing off, please " call with questions.    Dr Mayen is aware of consult and has directed this patient's plan of care.       Marian Silva P.A.-C.   [id number]  [place], [place] [address] [phone]

## 2024-03-04 NOTE — ASSESSMENT & PLAN NOTE
Likely related to Zosyn, C. 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

## 2024-03-04 NOTE — PROGRESS NOTES
Pt stating he is unable to feel when he voids so does not think he will be able to tell this RN for PVR. Bladder scanned at 1015, 0mls in bladder.

## 2024-03-04 NOTE — ASSESSMENT & PLAN NOTE
Not currently in exacerbation.  I reviewed prior echocardiogram, LVEF 60%, mildly dilated RV, enlarged RA, trace aortic insufficiency, mild tricuspid regurgitation, RVSP 35 mmHg.  Trace pulmonic insufficiency.  Aortic diameter 3.2 cm. (11/2022)

## 2024-03-04 NOTE — CARE PLAN
The patient is Stable - Low risk of patient condition declining or worsening    Shift Goals  Clinical Goals: Pt will remain free from falls this shift  Patient Goals: rest comfortably    Progress made toward(s) clinical / shift goals:  Assisted with ambulation throughout shift, bladder scans throughout shift    Patient is not progressing towards the following goals:

## 2024-03-04 NOTE — PROGRESS NOTES
Reported to attending MD regarding patient's 3 watery stools since admission to the floor. Patient also requesting IMODIUM.

## 2024-03-04 NOTE — ASSESSMENT & PLAN NOTE
Currently saturating well on room air  Oxygen as needed, Respiratory protocol, Bronchodilators, Incentive spirometry

## 2024-03-04 NOTE — PROGRESS NOTES
Patient to floor. Ambulated from WC to bed with no assistance. IV fluids started. Updated with plan of care.

## 2024-03-04 NOTE — ASSESSMENT & PLAN NOTE
CT imaging shows evidence for a fluid collection in the anterior pelvis inferiorly on the RIGHT side measuring 2.4 x 4.9 x 1.9 cm.  These findings are likely secondary to recent surgical intervention.  Allergy recommending antibiotics until the stent is removed, E faecalis positive on urine culture, sensitivities are currently pending and per the  should be available tomorrow morning

## 2024-03-04 NOTE — PROGRESS NOTES
Received bedside patient report from JB Warner. Patient resting comfortably in bed, no complaints at this time. Safety precautions in place. Will continue to monitor.

## 2024-03-04 NOTE — ED NOTES
Report given.  Pt transported to room 213-1.    RN aware that antibiotics have not been started due to urine needed for urine culture.  Condom cath placed to obtained urine sample.

## 2024-03-04 NOTE — H&P
Hospital Medicine History & Physical Note    Date of Service  3/3/2024    Primary Care Physician  Nirmal Gentile M.D.    Consultants  Urology, Dr Escalona        Code Status  Full Code    Chief Complaint  Chief Complaint   Patient presents with    Groin Pain     History of Presenting Illness  Star Centeno is a 83 y.o. male with a past medical history of hyperlipidemia, aortic aneurysm on annual surveillance, remote history of pulmonary embolism used to be on anticoagulation but stopped due to hematuria, prostate cancer/bladder cancer status postradiation and ureteral transposition and stent placement who presented 3/3/2024 with generalized weakness, dysuria and reduced urine output.  He denies having hematuria.      I discussed the plan of care with emergency physician, the patient and patient daughter present at bedside in the emergency room.    Review of Systems  Review of Systems   Constitutional:  Positive for malaise/fatigue. Negative for chills and fever.   Eyes:  Negative for discharge and redness.   Respiratory:  Negative for cough, shortness of breath and stridor.    Cardiovascular:  Negative for chest pain and leg swelling.   Gastrointestinal:  Negative for abdominal pain and vomiting.   Genitourinary:  Positive for dysuria. Negative for flank pain and hematuria.   Musculoskeletal:  Negative for myalgias.   Skin: Negative.    Neurological:  Negative for focal weakness.   Endo/Heme/Allergies:  Does not bruise/bleed easily.   Psychiatric/Behavioral:  The patient is not nervous/anxious.      Past Medical History   has a past medical history of Cancer (HCC), Cataract, Diabetes (HCC), High cholesterol, History of heart artery stent, Hyperlipidemia, Hypertension, Myocardial infarct (HCC), Pulmonary emboli (HCC), Renal disorder, and Urinary bladder disorder.    Surgical History   has a past surgical history that includes eye surgery; prostatectomy, radical retro; pr cystourethroscopy,biopsies (N/A,  10/24/2023); pr cysto/uretero/pyeloscopy, dx (Left, 10/24/2023); pr cystoscopy,insert ureteral stent (Left, 10/24/2023); pr cystoscopy,insert ureteral stent (Left, 2024); pr cysto/uretero/pyeloscopy, dx (2024); other cardiac surgery; pr unlisted laparoscopy proc, ureter (2024); cysto stent placemnt pre surg (2024); and lymphadectomy laparoscopic (2024).     Family History  family history includes Diabetes in his brother and sister; Genetic Disorder in his brother, sister, and sister; Hyperlipidemia in his brother; Hypertension in his brother.      Social History   reports that he quit smoking about 23 years ago. His smoking use included cigarettes. He started smoking about 68 years ago. He has a 45 pack-year smoking history. He has never used smokeless tobacco. He reports current alcohol use of about 8.4 oz of alcohol per week. He reports that he does not use drugs.    Allergies  No Known Allergies    Medications  Prior to Admission Medications   Prescriptions Last Dose Informant Patient Reported? Taking?   ALPRAZolam (XANAX) 0.25 MG Tab > 1 month at Unknown Patient, Family Member Yes No   Sig: Take 0.25 mg by mouth 3 times a day as needed for Anxiety.   Coenzyme Q10 (COQ-10 PO) 3/2/2024 at 2045 Patient, Family Member Yes Yes   Sig: Take 1 Capsule by mouth every evening. Indications: heart health   Empagliflozin 25 MG Tab > 1 week at Ran out Patient, Family Member No No   Sig: Take 25 mg by mouth every day for 360 days.   Loperamide HCl (IMODIUM A-D) 1 MG/7.5ML Liquid 3/2/2024 at 1200 Patient, Family Member Yes Yes   Sig: Take 30 mL by mouth 2 times a day as needed (diarrhea). Indications: Diarrhea   Omega-3 Fatty Acids (FISH OIL PO) 3/3/2024 at 0730 Patient, Family Member Yes Yes   Sig: Take 1 Capsule by mouth every morning. Indications: heart health   Prenatal Multivit-Min-Fe-FA (PRE- FORMULA PO) 3/3/2024 at 0730 Patient, Family Member Yes Yes   Sig: Take 1 Tablet by mouth every  morning. Indications: supplement   acetaminophen (TYLENOL) 500 MG Tab 3/3/2024 at 0930 Patient, Family Member No Yes   Sig: Take 1 Tablet by mouth every 6 hours as needed for Mild Pain or Moderate Pain.   amoxicillin-clavulanate (AUGMENTIN) 875-125 MG Tab 3/3/2024 at 0730 Patient, Family Member Yes Yes   Sig: Take 1 Tablet by mouth 2 times a day. Pt started on 2/28/2024 for 7 day course   aspirin 81 MG EC tablet 3/3/2024 at 0730 Patient, Family Member Yes Yes   Sig: Take 81 mg by mouth every evening. Indications: blood thinner   bismuth subsalicylate (PEPTO-BISMOL) 262 MG/15ML Suspension > 1 week at unknown Patient, Family Member Yes No   Sig: Take 30 mL by mouth every 6 hours as needed (diarrhea). Indications: Diarrhea, Heartburn   diclofenac sodium (VOLTAREN) 1 % Gel > 2 weeks at Unknown Patient, Family Member Yes No   Sig: Apply 2 g topically 4 times a day as needed (Shoulder Pain). Indications: Joint Damage causing Pain and Loss of Function   ezetimibe (ZETIA) 10 MG Tab 3/2/2024 at 2045 Patient, Family Member No Yes   Sig: Take 1 Tablet by mouth every day. Indications: High Amount of Fats in the Blood   Patient taking differently: Take 10 mg by mouth every evening. Indications: High Amount of Fats in the Blood   lisinopril (PRINIVIL) 10 MG Tab 3/2/2024 at 2045 Patient, Family Member No Yes   Sig: Take 1 Tablet by mouth every day.   Patient taking differently: Take 10 mg by mouth every evening. Indications: High Blood Pressure Disorder   metFORMIN (GLUCOPHAGE) 500 MG Tab 3/3/2024 at 0730 Patient, Family Member No Yes   Sig: Take 1 Tablet by mouth 2 times a day with meals for 360 days.   metoprolol SR (TOPROL XL) 25 MG TABLET SR 24 HR 3/3/2024 at 0730 Patient, Family Member No Yes   Sig: Take 1 Tablet by mouth every day.   oxyCODONE immediate-release (ROXICODONE) 5 MG Tab 3/3/2024 at 1000 Patient, Family Member Yes Yes   Sig: Take 5 mg by mouth every 6 hours as needed for Severe Pain.   rosuvastatin (CRESTOR) 5 MG  Tab 3/2/2024 at 2045 Patient, Family Member No Yes   Sig: TAKE 1 TABLET BY MOUTH EVERY DAY   Patient taking differently: Take 5 mg by mouth every evening. Indications: High Amount of Fats in the Blood   tamsulosin (FLOMAX) 0.4 MG capsule 3/3/2024 at 0730 Patient, Family Member No Yes   Sig: Take 1 Capsule by mouth 1/2 hour after breakfast.      Facility-Administered Medications: None     Physical Exam  Temp:  [36.2 °C (97.1 °F)-36.7 °C (98 °F)] 36.7 °C (98 °F)  Pulse:  [59-77] 64  Resp:  [16-24] 16  BP: ()/(52-66) 128/66  SpO2:  [89 %-97 %] 95 %  Blood Pressure : 101/52   Temperature: 36.6 °C (97.9 °F)   Pulse: 70   Respiration: 16   Pulse Oximetry: 93 %     Physical Exam  Constitutional:       General: He is not in acute distress.     Appearance: He is not ill-appearing or diaphoretic.   HENT:      Head: Atraumatic.      Right Ear: External ear normal.      Left Ear: External ear normal.      Nose: No congestion or rhinorrhea.      Mouth/Throat:      Mouth: Mucous membranes are dry.   Eyes:      General: No scleral icterus.        Right eye: No discharge.         Left eye: No discharge.      Pupils: Pupils are equal, round, and reactive to light.   Cardiovascular:      Rate and Rhythm: Normal rate and regular rhythm.   Pulmonary:      Effort: Pulmonary effort is normal.   Abdominal:      General: There is no distension.   Musculoskeletal:      Cervical back: Neck supple. No rigidity. No muscular tenderness.      Right lower leg: No edema.      Left lower leg: No edema.   Skin:     General: Skin is dry.      Coloration: Skin is not jaundiced or pale.   Neurological:      Mental Status: He is alert and oriented to person, place, and time.      Coordination: Coordination normal.   Psychiatric:         Mood and Affect: Mood normal.         Behavior: Behavior normal.       Laboratory:  Recent Labs     03/03/24  1330   WBC 12.0*   RBC 3.54*   HEMOGLOBIN 10.9*   HEMATOCRIT 32.7*   MCV 92.4   MCH 30.8   MCHC 33.3  "  RDW 46.7   PLATELETCT 341   MPV 9.5     Recent Labs     03/03/24  1330   SODIUM 132*   POTASSIUM 5.1   CHLORIDE 98   CO2 16*   GLUCOSE 132*   BUN 28*   CREATININE 1.51*   CALCIUM 9.4     Recent Labs     03/03/24  1330   ALTSGPT 15   ASTSGOT 15   ALKPHOSPHAT 100*   TBILIRUBIN 0.4   GLUCOSE 132*         No results for input(s): \"NTPROBNP\" in the last 72 hours.      No results for input(s): \"TROPONINT\" in the last 72 hours.    Imaging:  CT-ABDOMEN-PELVIS WITH   Final Result      1.  Bladder wall thickening concerning for cystitis.   2.  LEFT ureteral stent in place.  Minimally delayed enhancement LEFT kidney, similar to prior.   3.  Probable postoperative fluid collection in the anterior lower pelvis on the RIGHT measuring 4.9 cm.   4.  Minimal dependent peritoneal fluid in the pelvis.   5.  Colonic diverticulosis without evidence for diverticulitis.           Assessment/Plan:  Justification for Admission Status  I anticipate this patient will require at least two midnights for appropriate medical management, necessitating inpatient admission because patient has a complicated urinary tract infection in the setting of bladder cancer and recent neurosurgical procedure with stent placement and ureteral transposition..  Will require intravenous antibiotics, close monitoring of renal function, and urology consultation.    Patient will need a Med/Surg bed on MEDICAL service.  The patient has a complicated urine tract infection.    * Complicated urinary tract infection- (present on admission)  Assessment & Plan  In the setting of bladder cancer, recent neurosurgical intervention and ureteral stent  I will start Zosyn, follow on cultures and sensitivities  Urology has been consulted, appreciate recommendations    Abnormal CT scan concerning for fluid collection in the anterior pelvis- (present on admission)  Assessment & Plan  CT imaging shows evidence for a fluid collection in the anterior pelvis inferiorly on the RIGHT " side measuring 2.4 x 4.9 x 1.9 cm.  These findings are likely secondary to recent surgical intervention.  I am starting intravenous antibiotics empirically, follow on cultures and sensitivities.  Urology consulted, appreciate recommendations.    Diarrhea- (present on admission)  Assessment & Plan  Differentials include viral infection, C. difficile colitis    I will check stool for C. difficile toxins.  Supportive care with intravenous fluids monitor electrolytes and replace accordingly     Metabolic acidosis- (present on admission)  Assessment & Plan  Likely due to reduced intravascular volume   I will start intravenous fluids    Anticipate improvement with intravenous fluids  Continue to monitor, I will order follow-up bicarb level        Stage 3b chronic kidney disease (HCC)- (present on admission)  Assessment & Plan  Avoid/minimize nephrotoxins as much as possible, renally dose medications, monitor inputs and outputs     Coronary artery disease involving native coronary artery of native heart without angina pectoris- (present on admission)  Assessment & Plan  I will resume lisinopril and metoprolol with hold parameters.  Cardiac diet.  I will resume rosuvastatin    Pulmonary hypertension (HCC)- (present on admission)  Assessment & Plan  Currently saturating well on room air  Oxygen as needed, Respiratory protocol, Bronchodilators, Incentive spirometry     Non-insulin dependent type 2 diabetes mellitus (HCC)- (present on admission)  Assessment & Plan  With no significant hyperglycemia  Last glycated hemoglobin was 6.7%  I will start short acting insulin for now  I will order Accu-Checks, hypoglycemia protocol  Adjust according to blood sugars trend.     Essential hypertension, benign- (present on admission)  Assessment & Plan  I will start lisinopril and metoprolol with hold parameters    Diastolic dysfunction- (present on admission)  Assessment & Plan  Not currently in exacerbation.  Monitor volume status  closely    ACP (advance care planning)- (present on admission)  Assessment & Plan  I had a discussion with the patient and daughter present at bedside in the emergency room regarding goals of care, diagnoses, prognosis, and CODE STATUS. We discussed his prognosis and comorbidities.  The patient has advanced age and a number of comorbid conditions including chronic kidney disease, diabetes, hypertension, diastolic dysfunction, bladder and prostate cancer with recent neurosurgical interventions and multiple hospitalizations for associated complications.  He is presenting with a urinary tract infection.  However, the patient maintains a relatively good functional and cognitive ability.  At this point the patient wants to proceed with a full code.  He is open to all forms of invasive and noninvasive diagnostic and therapeutic interventions.      Hyperlipidemia- (present on admission)  Assessment & Plan  Cardiac diet.  I will start rosuvastatin and ezetimibe       VTE prophylaxis: SCDs/TEDs refusing blood thinners due to history of severe hematuria    I had a discussion with the patient and daughter present at bedside in the emergency room regarding goals of care, diagnoses, prognosis, and CODE STATUS. We discussed his prognosis and comorbidities.  The patient has advanced age and a number of comorbid conditions including chronic kidney disease, diabetes, hypertension, diastolic dysfunction, bladder and prostate cancer with recent neurosurgical interventions and multiple hospitalizations for associated complications.  He is presenting with a urinary tract infection.  However, the patient maintains a relatively good functional and cognitive ability.  At this point the patient wants to proceed with a full code.  He is open to all forms of invasive and noninvasive diagnostic and therapeutic interventions. I spent 17 minutes on advanced care planning in addition to the time spent managing the other medical problems.

## 2024-03-04 NOTE — ASSESSMENT & PLAN NOTE
In the setting of bladder cancer, recent neurosurgical intervention and ureteral stent  Urine culture showing E faecalis, previous E faecalis was sensitive to Macrobid, will discussed with antibiotic stewardship    1/4/2024, 12/13/2023, 11/17/2023: Klebsiella oxytoca  11/17/2023: Enterococcus faecalis  10/12/2023: E. coli

## 2024-03-05 LAB
ALBUMIN SERPL BCP-MCNC: 3.1 G/DL (ref 3.2–4.9)
BUN SERPL-MCNC: 24 MG/DL (ref 8–22)
C DIFF DNA SPEC QL NAA+PROBE: NEGATIVE
C DIFF TOX A+B STL QL IA: NEGATIVE
C DIFF TOX GENS STL QL NAA+PROBE: NORMAL
CALCIUM ALBUM COR SERPL-MCNC: 9 MG/DL (ref 8.5–10.5)
CALCIUM SERPL-MCNC: 8.3 MG/DL (ref 8.4–10.2)
CHLORIDE SERPL-SCNC: 107 MMOL/L (ref 96–112)
CO2 SERPL-SCNC: 16 MMOL/L (ref 20–33)
CREAT SERPL-MCNC: 1.4 MG/DL (ref 0.5–1.4)
ERYTHROCYTE [DISTWIDTH] IN BLOOD BY AUTOMATED COUNT: 46.5 FL (ref 35.9–50)
FERRITIN SERPL-MCNC: 290 NG/ML (ref 22–322)
GFR SERPLBLD CREATININE-BSD FMLA CKD-EPI: 49 ML/MIN/1.73 M 2
GLUCOSE BLD STRIP.AUTO-MCNC: 134 MG/DL (ref 65–99)
GLUCOSE BLD STRIP.AUTO-MCNC: 136 MG/DL (ref 65–99)
GLUCOSE BLD STRIP.AUTO-MCNC: 140 MG/DL (ref 65–99)
GLUCOSE BLD STRIP.AUTO-MCNC: 141 MG/DL (ref 65–99)
GLUCOSE SERPL-MCNC: 112 MG/DL (ref 65–99)
HCT VFR BLD AUTO: 29.8 % (ref 42–52)
HGB BLD-MCNC: 9.6 G/DL (ref 14–18)
IRON SATN MFR SERPL: 20 % (ref 15–55)
IRON SERPL-MCNC: 36 UG/DL (ref 50–180)
MAGNESIUM SERPL-MCNC: 2 MG/DL (ref 1.5–2.5)
MCH RBC QN AUTO: 29.6 PG (ref 27–33)
MCHC RBC AUTO-ENTMCNC: 32.2 G/DL (ref 32.3–36.5)
MCV RBC AUTO: 92 FL (ref 81.4–97.8)
PHOSPHATE SERPL-MCNC: 3.8 MG/DL (ref 2.5–4.5)
PLATELET # BLD AUTO: 297 K/UL (ref 164–446)
PMV BLD AUTO: 9 FL (ref 9–12.9)
POTASSIUM SERPL-SCNC: 4.4 MMOL/L (ref 3.6–5.5)
RBC # BLD AUTO: 3.24 M/UL (ref 4.7–6.1)
SODIUM SERPL-SCNC: 138 MMOL/L (ref 135–145)
TIBC SERPL-MCNC: 179 UG/DL (ref 250–450)
UIBC SERPL-MCNC: 143 UG/DL (ref 110–370)
WBC # BLD AUTO: 10.4 K/UL (ref 4.8–10.8)

## 2024-03-05 PROCEDURE — 94760 N-INVAS EAR/PLS OXIMETRY 1: CPT

## 2024-03-05 PROCEDURE — A9270 NON-COVERED ITEM OR SERVICE: HCPCS | Performed by: HOSPITALIST

## 2024-03-05 PROCEDURE — 99232 SBSQ HOSP IP/OBS MODERATE 35: CPT | Performed by: INTERNAL MEDICINE

## 2024-03-05 PROCEDURE — A9270 NON-COVERED ITEM OR SERVICE: HCPCS | Performed by: INTERNAL MEDICINE

## 2024-03-05 PROCEDURE — 700102 HCHG RX REV CODE 250 W/ 637 OVERRIDE(OP): Performed by: HOSPITALIST

## 2024-03-05 PROCEDURE — 700111 HCHG RX REV CODE 636 W/ 250 OVERRIDE (IP): Mod: JZ | Performed by: HOSPITALIST

## 2024-03-05 PROCEDURE — 51798 US URINE CAPACITY MEASURE: CPT

## 2024-03-05 PROCEDURE — 700102 HCHG RX REV CODE 250 W/ 637 OVERRIDE(OP): Performed by: INTERNAL MEDICINE

## 2024-03-05 PROCEDURE — 80069 RENAL FUNCTION PANEL: CPT

## 2024-03-05 PROCEDURE — 770001 HCHG ROOM/CARE - MED/SURG/GYN PRIV*

## 2024-03-05 PROCEDURE — 85027 COMPLETE CBC AUTOMATED: CPT

## 2024-03-05 PROCEDURE — 83550 IRON BINDING TEST: CPT

## 2024-03-05 PROCEDURE — 82962 GLUCOSE BLOOD TEST: CPT

## 2024-03-05 PROCEDURE — 83735 ASSAY OF MAGNESIUM: CPT

## 2024-03-05 PROCEDURE — 700105 HCHG RX REV CODE 258: Performed by: HOSPITALIST

## 2024-03-05 PROCEDURE — 83540 ASSAY OF IRON: CPT

## 2024-03-05 PROCEDURE — 36415 COLL VENOUS BLD VENIPUNCTURE: CPT

## 2024-03-05 PROCEDURE — 82728 ASSAY OF FERRITIN: CPT

## 2024-03-05 RX ORDER — MELOXICAM 7.5 MG/1
15 TABLET ORAL DAILY
Status: DISCONTINUED | OUTPATIENT
Start: 2024-03-05 | End: 2024-03-06 | Stop reason: HOSPADM

## 2024-03-05 RX ORDER — NITROFURANTOIN 25; 75 MG/1; MG/1
100 CAPSULE ORAL 2 TIMES DAILY WITH MEALS
Status: DISCONTINUED | OUTPATIENT
Start: 2024-03-05 | End: 2024-03-05

## 2024-03-05 RX ORDER — LEVOFLOXACIN 250 MG/1
250 TABLET, FILM COATED ORAL DAILY
Status: DISCONTINUED | OUTPATIENT
Start: 2024-03-06 | End: 2024-03-06 | Stop reason: HOSPADM

## 2024-03-05 RX ORDER — LEVOFLOXACIN 500 MG/1
500 TABLET, FILM COATED ORAL EVERY 24 HOURS
Status: DISCONTINUED | OUTPATIENT
Start: 2024-03-05 | End: 2024-03-05

## 2024-03-05 RX ORDER — LEVOFLOXACIN 500 MG/1
500 TABLET, FILM COATED ORAL ONCE
Status: COMPLETED | OUTPATIENT
Start: 2024-03-05 | End: 2024-03-05

## 2024-03-05 RX ADMIN — PIPERACILLIN AND TAZOBACTAM 4.5 G: 4; .5 INJECTION, POWDER, FOR SOLUTION INTRAVENOUS at 05:24

## 2024-03-05 RX ADMIN — TAMSULOSIN HYDROCHLORIDE 0.4 MG: 0.4 CAPSULE ORAL at 07:35

## 2024-03-05 RX ADMIN — PIPERACILLIN AND TAZOBACTAM 4.5 G: 4; .5 INJECTION, POWDER, FOR SOLUTION INTRAVENOUS at 12:37

## 2024-03-05 RX ADMIN — LEVOFLOXACIN 500 MG: 500 TABLET, FILM COATED ORAL at 15:31

## 2024-03-05 RX ADMIN — ASPIRIN 81 MG: 81 TABLET, COATED ORAL at 05:21

## 2024-03-05 RX ADMIN — ROSUVASTATIN 5 MG: 10 TABLET, FILM COATED ORAL at 17:57

## 2024-03-05 RX ADMIN — EZETIMIBE 10 MG: 10 TABLET ORAL at 20:12

## 2024-03-05 RX ADMIN — Medication 5 MG: at 21:01

## 2024-03-05 RX ADMIN — MELOXICAM 15 MG: 7.5 TABLET ORAL at 15:31

## 2024-03-05 ASSESSMENT — PAIN DESCRIPTION - PAIN TYPE
TYPE: ACUTE PAIN

## 2024-03-05 ASSESSMENT — ENCOUNTER SYMPTOMS
BLURRED VISION: 0
NERVOUS/ANXIOUS: 0
SPEECH CHANGE: 0
DIARRHEA: 0
DIZZINESS: 0
HEARTBURN: 0
WEAKNESS: 0
CHILLS: 0
INSOMNIA: 0
ABDOMINAL PAIN: 0
VOMITING: 0
CLAUDICATION: 0
SHORTNESS OF BREATH: 0
PHOTOPHOBIA: 0
MYALGIAS: 0
CONSTIPATION: 0
HEADACHES: 0
SENSORY CHANGE: 0
FEVER: 0
DEPRESSION: 0
COUGH: 0

## 2024-03-05 NOTE — PROGRESS NOTES
Stool sample collected and sent to lab last night. Pending another stool sample as last sample missing. Patient aware, no BM since last night.

## 2024-03-05 NOTE — THERAPY
Physical Therapy   Initial Evaluation     Patient Name: Star Centeno  Age:  83 y.o., Sex:  male  Medical Record #: [id number]  Today's Date: 3/4/2024        Assessment  Patient is 83 y.o. male with a diagnosis of UTI after recent ureterectomy and ureteral reimplant, pending stent removal on 3/21, hx of prostate CA, HLD, aortic aneurysm, PE. Pt appears to be at baseline for mobility, able to ambulate without an AD, gait is slow but steady with no LOB.  Pt reports feeling good physically. There are no further skilled PT needs, DC PT.     Plan    Physical Therapy Initial Treatment Plan   Duration: Evaluation only    DC Equipment Recommendations: None  Discharge Recommendations: Anticipate that the patient will have no further physical therapy needs after discharge from the hospital        03/04/24 1652   Prior Living Situation   Housing / Facility 1 Story House   Steps Into Home 1   Steps In Home 0   Equipment Owned Front-Wheel Walker;Single Point Cane   Lives with - Patient's Self Care Capacity Alone and Able to Care For Self   Comments Pt drives at baseline, has supportive dtr in town   Prior Level of Functional Mobility   Bed Mobility Independent   Transfer Status Independent   Ambulation Independent   Assistive Devices Used None   Stairs Independent   Comments occasional SPC use when goes outside   Cognition    Cognition / Consciousness WDL   Comments Oriented x4   Active ROM Lower Body    Active ROM Lower Body  WDL   Strength Lower Body   Lower Body Strength  WDL   Balance Assessment   Sitting Balance (Static) Good   Sitting Balance (Dynamic) Fair +   Standing Balance (Static) Fair +   Standing Balance (Dynamic) Fair   Weight Shift Sitting Good   Weight Shift Standing Fair   Comments stdg without AD   Bed Mobility    Supine to Sit Modified Independent   Gait Analysis   Gait Level Of Assist Supervised   Assistive Device None   Distance (Feet) 60   # of Times Distance was Traveled 1   Deviation Shuffled Gait    Comments no LOB with mobility, slow tentative gait   Functional Mobility   Sit to Stand Supervised   Bed, Chair, Wheelchair Transfer Supervised

## 2024-03-05 NOTE — PROGRESS NOTES
Received bedside patient report from J[facility]. Patient resting comfortably in bed, no complaints at this time. Safety precautions in place. Will continue to monitor.

## 2024-03-05 NOTE — PROGRESS NOTES
Hospital Medicine Daily Progress Note    Date of Service  3/5/2024    Chief Complaint  Star Centeno is a 83 y.o. male admitted 3/3/2024 with abdominal pain, weakness    Hospital Course  83M PMHx HTN, DLD, T2DM, aortic aneurysm, PE stopped AC due to hematuria, history of prostate/bladder cancer s/p XRT and ureteral transposition and stent placement admitted 3/3/24 for generalized weakness and chronic pain.  He has associated dysuria and reduced urine output.  Patient was started on IV Zosyn.  Renal function has improved and urine cultures are positive for E faecalis which she has had in the past.    Interval Problem Update  3/5 patient states overall he is feeling better and hoping to go home soon.  Urine culture is positive for E faecalis.  Sensitivities will be available in the morning per microbiology .  Given the side effect that he is having from Zosyn I will transition him over to Levaquin rather than Macrobid given the fact that urine culture may not be very accurate as it was run after antibiotics were received.  Plan is to continue antibiotics for 2 weeks.    I have discussed this patient's plan of care and discharge plan at IDT rounds today with Case Management, Nursing, Nursing leadership, and other members of the IDT team.    Consultants/Specialty  urology    Code Status  Full Code    Disposition  The patient is not medically cleared for discharge to home or a post-acute facility.  Anticipate discharge to: home with close outpatient follow-up    I have placed the appropriate orders for post-discharge needs.    Review of Systems  Review of Systems   Constitutional:  Negative for chills and fever.   HENT:  Negative for congestion.    Eyes:  Negative for blurred vision and photophobia.   Respiratory:  Negative for cough and shortness of breath.    Cardiovascular:  Negative for chest pain, claudication and leg swelling.   Gastrointestinal:  Negative for abdominal pain, constipation, diarrhea,  heartburn and vomiting.   Genitourinary:  Negative for dysuria, frequency, hematuria and urgency.   Musculoskeletal:  Negative for joint pain and myalgias.   Skin:  Negative for itching and rash.   Neurological:  Negative for dizziness, sensory change, speech change, weakness and headaches.   Psychiatric/Behavioral:  Negative for depression. The patient is not nervous/anxious and does not have insomnia.         Physical Exam  Temp:  [36.5 °C (97.7 °F)-36.7 °C (98.1 °F)] 36.7 °C (98.1 °F)  Pulse:  [60-76] 63  Resp:  [18] 18  BP: ()/(46-56) 92/46  SpO2:  [92 %-96 %] 92 %    Physical Exam  Vitals and nursing note reviewed.   Constitutional:       General: He is not in acute distress.     Appearance: Normal appearance.   HENT:      Head: Normocephalic and atraumatic.   Eyes:      General: No scleral icterus.     Extraocular Movements: Extraocular movements intact.   Cardiovascular:      Rate and Rhythm: Normal rate and regular rhythm.      Pulses: Normal pulses.      Heart sounds: Normal heart sounds. No murmur heard.  Pulmonary:      Effort: Pulmonary effort is normal. No respiratory distress.      Breath sounds: Normal breath sounds. No wheezing, rhonchi or rales.   Abdominal:      General: Abdomen is flat. Bowel sounds are normal. There is no distension.      Palpations: Abdomen is soft.      Tenderness: There is no rebound.   Musculoskeletal:         General: No swelling or tenderness.      Cervical back: Normal range of motion and neck supple.   Lymphadenopathy:      Cervical: No cervical adenopathy.   Skin:     Coloration: Skin is not jaundiced.      Findings: No erythema.   Neurological:      General: No focal deficit present.      Mental Status: He is alert and oriented to person, place, and time. Mental status is at baseline.      Cranial Nerves: No cranial nerve deficit.   Psychiatric:         Mood and Affect: Mood normal.         Behavior: Behavior normal.         Fluids    Intake/Output Summary (Last  24 hours) at 3/5/2024 1502  Last data filed at 3/5/2024 0813  Gross per 24 hour   Intake 1240 ml   Output 2050 ml   Net -810 ml       Laboratory  Recent Labs     03/03/24  1330 03/04/24  0213 03/05/24  0203   WBC 12.0* 10.7 10.4   RBC 3.54* 3.23* 3.24*   HEMOGLOBIN 10.9* 9.7* 9.6*   HEMATOCRIT 32.7* 29.8* 29.8*   MCV 92.4 92.3 92.0   MCH 30.8 30.0 29.6   MCHC 33.3 32.6 32.2*   RDW 46.7 46.2 46.5   PLATELETCT 341 277 297   MPV 9.5 8.7* 9.0     Recent Labs     03/03/24  1330 03/04/24  0213 03/05/24  0203   SODIUM 132* 136 138   POTASSIUM 5.1 4.4 4.4   CHLORIDE 98 104 107   CO2 16* 16* 16*   GLUCOSE 132* 112* 112*   BUN 28* 29* 24*   CREATININE 1.51* 1.51* 1.40   CALCIUM 9.4 8.4 8.3*                   Imaging  CT-ABDOMEN-PELVIS WITH   Final Result      1.  Bladder wall thickening concerning for cystitis.   2.  LEFT ureteral stent in place.  Minimally delayed enhancement LEFT kidney, similar to prior.   3.  Probable postoperative fluid collection in the anterior lower pelvis on the RIGHT measuring 4.9 cm.   4.  Minimal dependent peritoneal fluid in the pelvis.   5.  Colonic diverticulosis without evidence for diverticulitis.              Assessment/Plan  * Complicated urinary tract infection- (present on admission)  Assessment & Plan  In the setting of bladder cancer, recent neurosurgical intervention and ureteral stent  Urine culture showing E faecalis, previous E faecalis was sensitive to Macrobid, will discussed with antibiotic stewardship    1/4/2024, 12/13/2023, 11/17/2023: Klebsiella oxytoca  11/17/2023: Enterococcus faecalis  10/12/2023: E. coli    Anemia due to stage 3b chronic kidney disease (HCC)- (present on admission)  Assessment & Plan  Hemoglobin 9.7  Checking iron panel and ferritin    Diarrhea- (present on admission)  Assessment & Plan  Likely related to Zosyn, C. difficile came back as patient is a carrier without active infection      ACP (advance care planning)- (present on admission)  Assessment &  Plan  Full code    Abnormal CT scan concerning for fluid collection in the anterior pelvis- (present on admission)  Assessment & Plan  CT imaging shows evidence for a fluid collection in the anterior pelvis inferiorly on the RIGHT side measuring 2.4 x 4.9 x 1.9 cm.  These findings are likely secondary to recent surgical intervention.  Allergy recommending antibiotics until the stent is removed, E faecalis positive on urine culture, sensitivities are currently pending and per the  should be available tomorrow morning    Metabolic acidosis- (present on admission)  Assessment & Plan  Likely due to reduced intravascular volume   Bicarb 16, chronically low    Stage 3b chronic kidney disease (HCC)- (present on admission)  Assessment & Plan  Avoid/minimize nephrotoxins as much as possible, renally dose medications, monitor inputs and outputs   Creatinine 1.51 unclear at baseline given recent urological procedures    Coronary artery disease involving native coronary artery of native heart without angina pectoris- (present on admission)  Assessment & Plan  Continue home meds lisinopril, Crestor, Zetia, metoprolol    Pulmonary hypertension (HCC)- (present on admission)  Assessment & Plan  Currently saturating well on room air  Oxygen as needed, Respiratory protocol, Bronchodilators, Incentive spirometry     Non-insulin dependent type 2 diabetes mellitus (HCC)- (present on admission)  Assessment & Plan  With no significant hyperglycemia  Last glycated hemoglobin was 6.7%  Continue insulin sliding scale, Accu-Cheks and hypoglycemic protocol    Hyperlipidemia- (present on admission)  Assessment & Plan  Cardiac diet.  Continue rosuvastatin and ezetimibe    Essential hypertension, benign- (present on admission)  Assessment & Plan  Continue home lisinopril and metoprolol    Diastolic dysfunction- (present on admission)  Assessment & Plan  Not currently in exacerbation.  I reviewed prior echocardiogram, LVEF 60%, mildly  dilated RV, enlarged RA, trace aortic insufficiency, mild tricuspid regurgitation, RVSP 35 mmHg.  Trace pulmonic insufficiency.  Aortic diameter 3.2 cm. (11/2022)         VTE prophylaxis:   SCDs/TEDs      I have performed a physical exam and reviewed and updated ROS and Plan today (3/5/2024). In review of yesterday's note (3/4/2024), there are no changes except as documented above.

## 2024-03-05 NOTE — PROGRESS NOTES
Received patient from Night shift RN . Patient is awake and alert.No sign of distress.Denies any n/v. Has condom catheter in place, draining adequate color yellow urine. Fall precaution in place, kept bed in lowest position,bed alarm on  and call light within reach.

## 2024-03-05 NOTE — CARE PLAN
The patient is Stable - Low risk of patient condition declining or worsening    Shift Goals  Clinical Goals: Free from falls or injuries, able to sleep at least 4 hours, pain controlled at 3/10 or better with current regimen  Patient Goals: Free from falls or injuries, able to sleep at least 4 hours, pain controlled at 3/10 or better with current regimen  Family Goals: N/A    Progress made toward(s) clinical / shift goals: No falls or injuries overnight, patient was able to rest and sleep for at least 4 hours, pain is controlled at 3/10 or better with current regimen    Patient is not progressing towards the following goals:

## 2024-03-05 NOTE — DOCUMENTATION QUERY
[facility]                                                                       Query Response Note      PATIENT:               SOL HARRY  ACCT #:                  [id number]  MRN:                     [id number]  :                      1940  ADMIT DATE:       3/3/2024 12:50 PM  DISCH DATE:          RESPONDING  PROVIDER #:        [id number]           QUERY TEXT:    Please clarify in documentation the relationship, if any, between UTI and ureteral stents and/or urology procedure done prior to admission    The patient's Clinical Indicators include:  - Findings:  H&P:  prostate cancer/bladder cancer status post radiation and ureteral transposition and stent placement.  Complicated urinary tract infection in the setting of bladder cancer, recent neurosurgical intervention and ureteral stent    - 3/4 urology consult:  83 y.o.M with UTI after recent ureterectomy and ureteral re implant, pending stent removal on 3/21.   Do not recommend removing stent early due to risk of compromising anastomosis    - Treatments:  antibiotics, pending stent removal, urology consult, UA, cultures     - Risk factors:  UTI, recent ureterectomy and ureteral re implant, ureteral stents     Thank You,  Rosa Pires RN  Clinical Documentation   [email or web address]  Connect via [employer]  Options provided:   -- UTI is due to or associated with ureteral stents and/or urology procedure done prior to admission   -- UTI is not due to or associated with ureteral stents and/or urology procedure done prior to admission   -- Other explanation, Please specify other explanation      Query created by: Rosa Pires on 3/5/2024 9:08 AM    RESPONSE TEXT:    UTI is due to or associated with ureteral stents and/or urology procedure done prior to admission          Electronically signed by:  ROSEMARY RAMIREZ DO 3/5/2024 9:39 AM

## 2024-03-05 NOTE — CARE PLAN
The patient is Stable - Low risk of patient condition declining or worsening    Shift Goals  Clinical Goals: Patient will remain free from fall throughout the shift  Patient Goals: rest comfortably    Progress made toward(s) clinical / shift goals:  Patient is up to bathroom, no falls noted. Fall precautions observe    Patient is not progressing towards the following goals:    Problem: Pain - Standard  Goal: Alleviation of pain or a reduction in pain to the patient’s comfort goal  Outcome: Progressing     Problem: Knowledge Deficit - Standard  Goal: Patient and family/care givers will demonstrate understanding of plan of care, disease process/condition, diagnostic tests and medications  Outcome: Progressing     Problem: Skin Integrity  Goal: Skin integrity is maintained or improved  Outcome: Progressing     Problem: Fall Risk  Goal: Patient will remain free from falls  Outcome: Progressing

## 2024-03-06 VITALS
OXYGEN SATURATION: 92 % | DIASTOLIC BLOOD PRESSURE: 48 MMHG | TEMPERATURE: 97.4 F | HEART RATE: 74 BPM | RESPIRATION RATE: 16 BRPM | SYSTOLIC BLOOD PRESSURE: 102 MMHG | WEIGHT: 177.91 LBS | HEIGHT: 71 IN | BODY MASS INDEX: 24.91 KG/M2

## 2024-03-06 LAB
ANION GAP SERPL CALC-SCNC: 13 MMOL/L (ref 7–16)
BACTERIA UR CULT: ABNORMAL
BACTERIA UR CULT: ABNORMAL
BUN SERPL-MCNC: 23 MG/DL (ref 8–22)
CALCIUM SERPL-MCNC: 8.6 MG/DL (ref 8.4–10.2)
CHLORIDE SERPL-SCNC: 106 MMOL/L (ref 96–112)
CO2 SERPL-SCNC: 14 MMOL/L (ref 20–33)
CREAT SERPL-MCNC: 1.6 MG/DL (ref 0.5–1.4)
ERYTHROCYTE [DISTWIDTH] IN BLOOD BY AUTOMATED COUNT: 46.2 FL (ref 35.9–50)
GFR SERPLBLD CREATININE-BSD FMLA CKD-EPI: 42 ML/MIN/1.73 M 2
GLUCOSE BLD STRIP.AUTO-MCNC: 126 MG/DL (ref 65–99)
GLUCOSE BLD STRIP.AUTO-MCNC: 174 MG/DL (ref 65–99)
GLUCOSE SERPL-MCNC: 115 MG/DL (ref 65–99)
HCT VFR BLD AUTO: 30.5 % (ref 42–52)
HGB BLD-MCNC: 10 G/DL (ref 14–18)
MCH RBC QN AUTO: 30.5 PG (ref 27–33)
MCHC RBC AUTO-ENTMCNC: 32.8 G/DL (ref 32.3–36.5)
MCV RBC AUTO: 93 FL (ref 81.4–97.8)
PLATELET # BLD AUTO: 291 K/UL (ref 164–446)
PMV BLD AUTO: 9.1 FL (ref 9–12.9)
POTASSIUM SERPL-SCNC: 4.5 MMOL/L (ref 3.6–5.5)
RBC # BLD AUTO: 3.28 M/UL (ref 4.7–6.1)
SIGNIFICANT IND 70042: ABNORMAL
SITE SITE: ABNORMAL
SODIUM SERPL-SCNC: 133 MMOL/L (ref 135–145)
SOURCE SOURCE: ABNORMAL
WBC # BLD AUTO: 11.8 K/UL (ref 4.8–10.8)

## 2024-03-06 PROCEDURE — A9270 NON-COVERED ITEM OR SERVICE: HCPCS | Performed by: HOSPITALIST

## 2024-03-06 PROCEDURE — 700102 HCHG RX REV CODE 250 W/ 637 OVERRIDE(OP): Performed by: HOSPITALIST

## 2024-03-06 PROCEDURE — 85027 COMPLETE CBC AUTOMATED: CPT

## 2024-03-06 PROCEDURE — 82962 GLUCOSE BLOOD TEST: CPT

## 2024-03-06 PROCEDURE — 99239 HOSP IP/OBS DSCHRG MGMT >30: CPT | Performed by: INTERNAL MEDICINE

## 2024-03-06 PROCEDURE — 36415 COLL VENOUS BLD VENIPUNCTURE: CPT

## 2024-03-06 PROCEDURE — A9270 NON-COVERED ITEM OR SERVICE: HCPCS | Performed by: INTERNAL MEDICINE

## 2024-03-06 PROCEDURE — 94760 N-INVAS EAR/PLS OXIMETRY 1: CPT

## 2024-03-06 PROCEDURE — 80048 BASIC METABOLIC PNL TOTAL CA: CPT

## 2024-03-06 PROCEDURE — 700102 HCHG RX REV CODE 250 W/ 637 OVERRIDE(OP): Performed by: INTERNAL MEDICINE

## 2024-03-06 RX ORDER — LEVOFLOXACIN 250 MG/1
250 TABLET, FILM COATED ORAL DAILY
Qty: 11 TABLET | Refills: 0 | Status: ACTIVE | OUTPATIENT
Start: 2024-03-07 | End: 2024-03-18

## 2024-03-06 RX ADMIN — MELOXICAM 15 MG: 7.5 TABLET ORAL at 05:29

## 2024-03-06 RX ADMIN — TAMSULOSIN HYDROCHLORIDE 0.4 MG: 0.4 CAPSULE ORAL at 08:18

## 2024-03-06 RX ADMIN — METOPROLOL SUCCINATE 25 MG: 25 TABLET, EXTENDED RELEASE ORAL at 05:29

## 2024-03-06 RX ADMIN — INSULIN HUMAN 1 UNITS: 100 INJECTION, SOLUTION PARENTERAL at 11:05

## 2024-03-06 RX ADMIN — ASPIRIN 81 MG: 81 TABLET, COATED ORAL at 05:29

## 2024-03-06 RX ADMIN — LEVOFLOXACIN 250 MG: 250 TABLET, FILM COATED ORAL at 05:29

## 2024-03-06 ASSESSMENT — PATIENT HEALTH QUESTIONNAIRE - PHQ9
7. TROUBLE CONCENTRATING ON THINGS, SUCH AS READING THE NEWSPAPER OR WATCHING TELEVISION: NOT AT ALL
8. MOVING OR SPEAKING SO SLOWLY THAT OTHER PEOPLE COULD HAVE NOTICED. OR THE OPPOSITE, BEING SO FIGETY OR RESTLESS THAT YOU HAVE BEEN MOVING AROUND A LOT MORE THAN USUAL: NOT AT ALL
4. FEELING TIRED OR HAVING LITTLE ENERGY: NOT AT ALL
5. POOR APPETITE OR OVEREATING: NOT AT ALL
SUM OF ALL RESPONSES TO PHQ QUESTIONS 1-9: 0
2. FEELING DOWN, DEPRESSED, IRRITABLE, OR HOPELESS: NOT AT ALL
9. THOUGHTS THAT YOU WOULD BE BETTER OFF DEAD, OR OF HURTING YOURSELF: NOT AT ALL
1. LITTLE INTEREST OR PLEASURE IN DOING THINGS: NOT AT ALL
SUM OF ALL RESPONSES TO PHQ9 QUESTIONS 1 AND 2: 0
6. FEELING BAD ABOUT YOURSELF - OR THAT YOU ARE A FAILURE OR HAVE LET YOURSELF OR YOUR FAMILY DOWN: NOT AL ALL
3. TROUBLE FALLING OR STAYING ASLEEP OR SLEEPING TOO MUCH: NOT AT ALL

## 2024-03-06 ASSESSMENT — PAIN DESCRIPTION - PAIN TYPE: TYPE: ACUTE PAIN

## 2024-03-06 NOTE — CARE PLAN
The patient is Stable - Low risk of patient condition declining or worsening    Shift Goals  Clinical Goals: Pt will be free from falls this shift and will have good urine output w/ no bld clots, accuchecks.  Patient Goals: Rest and updates.  Family Goals: na    Progress made toward(s) clinical / shift goals:      Pt has no pain during shift. Pt voided and ambulated to bathroom, urine output yellow and no clots.     Patient is not progressing towards the following goals:    Problem: Pain - Standard  Goal: Alleviation of pain or a reduction in pain to the patient’s comfort goal  Outcome: Progressing      Problem: Fall Risk  Goal: Patient will remain free from falls  Outcome: Progressing

## 2024-03-06 NOTE — PROGRESS NOTES
- pt's latest vitals taken, toileted, ambulated, assisted in gathering personal belongings.  1010- discharge papers signed after instructions.  1112- IV access removed aseptically as per pt's request, donny mackey wants to remove it now, pt stated that her daughter will be here any minute at 1130.    1130- condom cath removed.    1138- wheeled pt w/ Candida jacobk as transport via wheelchair w/ daughter, personal belongings sent home.

## 2024-03-06 NOTE — PROGRESS NOTES
1900 Received report from [facility] nurse  2016 Performed assessment  Pt is A&Ox4, no complaints of pain, updated on POC, urine culture pending.  Call light within reach, hourly rounding in place, bed in lowest position.

## 2024-03-06 NOTE — PROGRESS NOTES
Received report from Leanna MUHAMMAD. Pt resting in bed, awake. Pt A&O x4, on RA. Pt updated with POC which includes blood glucose checks. Call light within reach, bed in lowest position, fall precautions in place, all needs met at this time.

## 2024-03-06 NOTE — CARE PLAN
The patient is Stable - Low risk of patient condition declining or worsening    Shift Goals  Clinical Goals: pt will remain free from falls throughout my shift, maintain adequate urine output of 0.5ml/kg/hr; checking cath for blood clots  Patient Goals: rest  Family Goals: N/A    Progress made toward(s) clinical / shift goals:  pt remained free from falls, pt had adequate urine output throughout my shift, no blood clots seen in urine    Problem: Pain - Standard  Goal: Alleviation of pain or a reduction in pain to the patient’s comfort goal  Outcome: Progressing     Patient is not progressing towards the following goals:

## 2024-03-06 NOTE — DISCHARGE SUMMARY
Discharge Summary    CHIEF COMPLAINT ON ADMISSION  Chief Complaint   Patient presents with    Groin Pain       Reason for Admission  Post-Op Complications, Groin Pain     Admission Date  3/3/2024    CODE STATUS  Full Code    HPI & HOSPITAL COURSE  83M PMHx HTN, DLD, T2DM, aortic aneurysm, PE stopped AC due to hematuria, history of prostate/bladder cancer s/p XRT and ureteral transposition and stent placement admitted 3/3/24 for generalized weakness and chronic pain.  He has associated dysuria and reduced urine output.  Patient was started on IV Zosyn.  Renal function has improved and urine cultures are positive for E faecalis which he has had in the past.  Sensitivities resulted and after further discussion with infectious disease pharmacist it was decided to place the patient on Levaquin for better coverage as he has already failed Augmentin in the past.  Given his renal function he was placed on Augmentin 250 mg daily after a 500 mg load and will have 11 more days of treatment.  He is supposed to have the stent removed on 3/21 with [facility] [place].  He is going to follow-up with nephrology Dr. Hodgson tomorrow and to follow-up with oncology Dr. Rosario next week.  Initially urology had recommended he be placed on Mobic as an anti-inflammatory to help with the bladder pain however with a single dose his creatinine betsy therefore he cannot tolerate this medication at this time until his renal function improves.    Therefore, he is discharged in good and stable condition to home with close outpatient follow-up.    The patient met 2-midnight criteria for an inpatient stay at the time of discharge.    Discharge Date  3/6/2024    FOLLOW UP ITEMS POST DISCHARGE  PCP, urology, oncology, nephrology    DISCHARGE DIAGNOSES  Principal Problem:    Complicated urinary tract infection (POA: Yes)  Active Problems:    Diastolic dysfunction (POA: Yes)    Essential hypertension, benign (POA: Yes)    Hyperlipidemia (POA: Yes)     Non-insulin dependent type 2 diabetes mellitus (HCC) (POA: Yes)    Pulmonary hypertension (HCC) (POA: Yes)    Coronary artery disease involving native coronary artery of native heart without angina pectoris (POA: Yes)    Stage 3b chronic kidney disease (HCC) (Chronic) (POA: Yes)    Metabolic acidosis (POA: Yes)    Abnormal CT scan concerning for fluid collection in the anterior pelvis (POA: Yes)    ACP (advance care planning) (POA: Yes)    Diarrhea (POA: Yes)    Anemia due to stage 3b chronic kidney disease (HCC) (Chronic) (POA: Yes)  Resolved Problems:    * No resolved hospital problems. *      FOLLOW UP  Future Appointments   Date Time Provider Department Center   3/18/2024 10:00 AM Onel Leonard M.D. [facility] None   9/9/2024 11:00 AM [facility]     No follow-up provider specified.    MEDICATIONS ON DISCHARGE     Medication List        START taking these medications        Instructions   levoFLOXacin 250 MG Tabs  Start taking on: March 7, 2024  Commonly known as: Levaquin   Take 1 Tablet by mouth every day for 11 days.  Dose: 250 mg            CHANGE how you take these medications        Instructions   ezetimibe 10 MG Tabs  What changed: when to take this  Commonly known as: Zetia   Take 1 Tablet by mouth every day. Indications: High Amount of Fats in the Blood  Dose: 10 mg     lisinopril 10 MG Tabs  What changed: when to take this  Commonly known as: Prinivil   Take 1 Tablet by mouth every day.  Dose: 10 mg     rosuvastatin 5 MG Tabs  What changed: when to take this  Commonly known as: Crestor   Doctor's comments: Plan requires a 100 day supply. Thank you  TAKE 1 TABLET BY MOUTH EVERY DAY  Dose: 5 mg            CONTINUE taking these medications        Instructions   acetaminophen 500 MG Tabs  Commonly known as: Tylenol   Take 1 Tablet by mouth every 6 hours as needed for Mild Pain or Moderate Pain.  Dose: 500 mg     ALPRAZolam 0.25 MG Tabs  Commonly known as: Xanax   Take 0.25 mg by mouth 3 times a  day as needed for Anxiety.  Dose: 0.25 mg     aspirin 81 MG EC tablet   Take 81 mg by mouth every day. Indications: blood thinner  Dose: 81 mg     bismuth subsalicylate 262 MG/15ML Susp  Commonly known as: Pepto-Bismol   Take 30 mL by mouth every 6 hours as needed (diarrhea). Indications: Diarrhea, Heartburn  Dose: 30 mL     COQ-10 PO   Take 1 Capsule by mouth every evening. Indications: heart health  Dose: 1 Capsule     Empagliflozin 25 MG Tabs   Take 25 mg by mouth every day for 360 days.  Dose: 25 mg     FISH OIL PO   Take 1 Capsule by mouth every morning. Indications: heart health  Dose: 1 Capsule     Imodium A-D 1 MG/7.5ML Liqd  Generic drug: Loperamide HCl   Take 30 mL by mouth 2 times a day as needed (diarrhea). Indications: Diarrhea  Dose: 30 mL     metFORMIN 500 MG Tabs  Commonly known as: Glucophage   Take 1 Tablet by mouth 2 times a day with meals for 360 days.  Dose: 500 mg     metoprolol SR 25 MG Tb24  Commonly known as: Toprol XL   Take 1 Tablet by mouth every day.  Dose: 25 mg     oxybutynin SR 5 MG Tb24  Commonly known as: Ditropan-XL   Take 1 Tablet by mouth every day.  Dose: 1 Tablet     oxyCODONE immediate-release 5 MG Tabs  Commonly known as: Roxicodone   Take 5 mg by mouth every 6 hours as needed for Severe Pain.  Dose: 5 mg     PRE-COLE FORMULA PO   Take 1 Tablet by mouth every morning. Indications: supplement  Dose: 1 Tablet     tamsulosin 0.4 MG capsule  Commonly known as: Flomax   Take 1 Capsule by mouth 1/2 hour after breakfast.  Dose: 0.4 mg     Voltaren 1 % Gel  Generic drug: diclofenac sodium   Apply 2 g topically 4 times a day as needed (Shoulder Pain). Indications: Joint Damage causing Pain and Loss of Function  Dose: 2 g            STOP taking these medications      amoxicillin-clavulanate 875-125 MG Tabs  Commonly known as: Augmentin              Allergies  No Known Allergies    DIET  Orders Placed This Encounter   Procedures    Diet Order Diet: Consistent CHO (Diabetic)      Standing Status:   Standing     Number of Occurrences:   1     Order Specific Question:   Diet:     Answer:   Consistent CHO (Diabetic) [4]       ACTIVITY  As tolerated.  Weight bearing as tolerated    CONSULTATIONS  Neurology-Dr. Mayen    PROCEDURES  None    LABORATORY  Lab Results   Component Value Date    SODIUM 133 (L) 03/06/2024    POTASSIUM 4.5 03/06/2024    CHLORIDE 106 03/06/2024    CO2 14 (L) 03/06/2024    GLUCOSE 115 (H) 03/06/2024    BUN 23 (H) 03/06/2024    CREATININE 1.60 (H) 03/06/2024        Lab Results   Component Value Date    WBC 11.8 (H) 03/06/2024    HEMOGLOBIN 10.0 (L) 03/06/2024    HEMATOCRIT 30.5 (L) 03/06/2024    PLATELETCT 291 03/06/2024        Total time of the discharge process exceeds 36 minutes.

## 2024-03-07 ENCOUNTER — PATIENT OUTREACH (OUTPATIENT)
Dept: MEDICAL GROUP | Facility: LAB | Age: 84
End: 2024-03-07
Payer: MEDICARE

## 2024-03-08 NOTE — PROGRESS NOTES
Transitional Care Management  TCM Outreach Date and Time: Filed (3/7/2024  9:58 AM)    Discharge Questions  Actual Discharge Date: 03/06/24  Now that you are home, how are you feeling?: Good (States he feels much better; can feel the AB working; no further pain; feels he has better control of his bladder; is eating and drinking well.)  Did you receive any new prescriptions?: Yes  Were you able to get them filled?: Yes  Meds to Bed or Pharmacy filled?: Pharmacy  Do you have any questions about your current medications or new medications (Review Med Rec)?: No (Med Rec reviewed;  he does state during his last nephrology appointment his physician told him to stop taking the Lisinopril daily.)  Did you have any durable medical equipment ordered?: No  Do you have a follow up appointment scheduled with your PCP?: No  Was an appointment scheduled for the patient?: No (Pt states he and his daughter have been in touch with PCP office and Dr. Gentile suggested he be seen after 3/21/24 when his stent is removed.  They will make that appointment then.)  Any issues or paperwork you wish to discuss with your PCP?: No  Are you (patient) able to get to the appointment?: Yes  Reason not scheduled?: Patient to Schedule  If Home Health was ordered, have they contacted you (Patient): Not Applicable  Did you have enough support after your last discharge?: Yes (Daughter and family)  Does this patient qualify for the CCM program?: No    Transitional Care  Number of attempts made to contact patient: 1  Current or previous attempts completed within two business days of discharge? : Yes  Provided education regarding treatment plan, medications, self-management, ADLs?: Yes (Discussed S/S to report)  Has patient completed an Advanced Directive?: No  Has the Care Manager's phone number provided?: No  Is there anything else I can help you with?: No    Discharge Summary  Chief Complaint: Groin Pain  Admitting Diagnosis: Post op Complications;  UTI  Discharge Diagnosis: Complicated UTI; Hx. prostate/bladder CA

## 2024-03-13 ENCOUNTER — TELEPHONE (OUTPATIENT)
Dept: VASCULAR LAB | Facility: MEDICAL CENTER | Age: 84
End: 2024-03-13
Payer: MEDICARE

## 2024-03-13 NOTE — TELEPHONE ENCOUNTER
Established patient  Chart prep for upcoming appointment with Dr. Leonard    Any pending/incomplete orders from last visit? Yes Imaging, patient reminded to try to complete prior to appointment  Were any records requested?  No  Correct appointment type (New/Established/virtual)? Yes  Referral up to date? Yes  Referral attached to appointment (renewals and New patients only)? N/A (established with up-to-date referral)

## 2024-03-18 ENCOUNTER — OFFICE VISIT (OUTPATIENT)
Dept: VASCULAR LAB | Facility: MEDICAL CENTER | Age: 84
End: 2024-03-18
Attending: FAMILY MEDICINE
Payer: MEDICARE

## 2024-03-18 VITALS
WEIGHT: 181 LBS | HEIGHT: 71 IN | DIASTOLIC BLOOD PRESSURE: 47 MMHG | BODY MASS INDEX: 25.34 KG/M2 | SYSTOLIC BLOOD PRESSURE: 97 MMHG | HEART RATE: 55 BPM

## 2024-03-18 DIAGNOSIS — K55.1 ATHEROSCLEROSIS OF SUPERIOR MESENTERIC ARTERY (HCC): ICD-10-CM

## 2024-03-18 DIAGNOSIS — I24.0 ISCHEMIC HEART DISEASE DUE TO CORONARY ARTERY OBSTRUCTION (HCC): ICD-10-CM

## 2024-03-18 DIAGNOSIS — I71.9 DESCENDING AORTIC ANEURYSM (HCC): ICD-10-CM

## 2024-03-18 DIAGNOSIS — I72.0 CAROTID ARTERY ANEURYSM (HCC): ICD-10-CM

## 2024-03-18 DIAGNOSIS — Z86.711 HISTORY OF PULMONARY EMBOLISM: ICD-10-CM

## 2024-03-18 DIAGNOSIS — I71.21 ANEURYSM OF ASCENDING AORTA WITHOUT RUPTURE (HCC): ICD-10-CM

## 2024-03-18 DIAGNOSIS — Z95.5 STATUS POST CORONARY ARTERY BALLOON DILATION: ICD-10-CM

## 2024-03-18 DIAGNOSIS — I70.8 CELIAC ARTERY ATHEROSCLEROSIS: ICD-10-CM

## 2024-03-18 DIAGNOSIS — I25.9 ISCHEMIC HEART DISEASE DUE TO CORONARY ARTERY OBSTRUCTION (HCC): ICD-10-CM

## 2024-03-18 DIAGNOSIS — I27.20 PULMONARY HYPERTENSION (HCC): ICD-10-CM

## 2024-03-18 DIAGNOSIS — I51.7 LVH (LEFT VENTRICULAR HYPERTROPHY): ICD-10-CM

## 2024-03-18 DIAGNOSIS — D64.9 NORMOCYTIC ANEMIA: ICD-10-CM

## 2024-03-18 DIAGNOSIS — I70.0 AORTIC ATHEROSCLEROSIS (HCC): ICD-10-CM

## 2024-03-18 DIAGNOSIS — I10 PRIMARY HYPERTENSION: ICD-10-CM

## 2024-03-18 DIAGNOSIS — N18.31 CKD STAGE G3A/A2, GFR 45-59 AND ALBUMIN CREATININE RATIO 30-299 MG/G: ICD-10-CM

## 2024-03-18 PROCEDURE — 99214 OFFICE O/P EST MOD 30 MIN: CPT | Performed by: FAMILY MEDICINE

## 2024-03-18 PROCEDURE — 99212 OFFICE O/P EST SF 10 MIN: CPT

## 2024-03-18 PROCEDURE — 3078F DIAST BP <80 MM HG: CPT | Performed by: FAMILY MEDICINE

## 2024-03-18 PROCEDURE — 3074F SYST BP LT 130 MM HG: CPT | Performed by: FAMILY MEDICINE

## 2024-03-18 ASSESSMENT — ENCOUNTER SYMPTOMS
CLAUDICATION: 0
PND: 0
BRUISES/BLEEDS EASILY: 0
BLOOD IN STOOL: 0
COUGH: 0
SHORTNESS OF BREATH: 0
PALPITATIONS: 0
FEVER: 0
SPUTUM PRODUCTION: 0
WHEEZING: 0
HEMOPTYSIS: 0
CHILLS: 0
ORTHOPNEA: 0

## 2024-03-18 ASSESSMENT — FIBROSIS 4 INDEX: FIB4 SCORE: 0.93

## 2024-03-18 NOTE — PROGRESS NOTES
INITIAL VASCULAR ANTICOAGULATION VISIT  11/22/23     Star Centeno is a 82 y.o. male who presents for initial visit   Initially referred by Nirmal Gentile for length of therapy (LOT) determination and management of anticoagulation in context of acute venothromboembolic disease, est 11/2023    Subjective      Interval hx/concerns: last seen 11/2023, low BP and stopped lisinopril last week per nephrology and jardiance.  Has had formal dx of stage 2 bladder CA and starting therapy soon.  No new CVD sx       VTE disease / Anticoagulation:   Date of initiation of anticoagulation: 9/2023   Date of Diagnosis: same   Type of Venous thromboembolic disease (VTE): incidental RML PE - asymptomatic   Initial visit hx/sx:  in 9/2023 had CTA for ascending AA surveillance and noted to have incidentally found RML PE.  Subsequent CTA showed resolution 11/2023.   No symptoms at the time of initial dx, felt well.    Denies current SOB, CP, leg swelling, edema, leg pain, cyanosis of digits, redness of extremities.  Antithrombotic therapy at time of VTE event: none   Current antithrombotic agent: none   VTE tx course: Xarelto 15mg BID x 21d, then 20mg daily for about 3 weeks total   Complications: none, tolerating well, no bleeding or bruising   Adherence: reports complete, no missed doses    _____PROVOKING FACTORS:  Any personal VTE hx? No  Any family VTE hx? No  MEN:  Hx of cancer or abnormal cancer screenings: bladder cancer   Hypercoagulability work-up completed?  No    Ascending, descending aortic aneurysms  Initial visit hx: noted on CT 2021 at 4.3cm, moved from [facility] 2/2022  Current sx: denies chest, back, abdominal pain, SOB, dysphagia, worsening cough, hemoptysis.  _____Pertinent pmhx:  Hx of HTN: yes  Hx of tobacco:   reports that he quit smoking about 23 years ago. His smoking use included cigarettes. He started smoking about 68 years ago. He has a 45 pack-year smoking history. He has never used smokeless  tobacco.  No hx of Connective tissue disease such as Marfans, Alan-Danlos, Loeys-Damaris  No hx of inflammatory/autoimmune vasculitis (Takayasu's arteritis, Giant Cell arteritis)  No hx of infective aortitis, HIV, syphilis   No hx of MVA, chest wall trauma, deceleration injuries  No hx of Biscuspid Aortic Valve per echo   No hx of anabolic steroid use or stimulants (cocaine, methamphetamine, etc)  _____Pertinent fhx:  No fhx of connective tissue disease   No fhx of aneurysmal disease or known familial thoracic aortic aneurysm syndrome    HTN: Stable, tolerating meds with good adherence, Home BP log: not checking.  Likely low BP     Antithrombotic tx: ASA 81mg - no hx of bleeding      HLD: Stable, current treatment: Moderate intensity statin and zetia - tolerating, good adherence       Current Outpatient Medications:     levoFLOXacin, 250 mg, Oral, DAILY, Taking    oxybutynin SR, 1 Tablet, Oral, QDAY, Taking    ALPRAZolam, 0.25 mg, Oral, TID PRN, PRN    oxyCODONE immediate-release, 5 mg, Oral, Q6HRS PRN, PRN    ezetimibe, 10 mg, Oral, DAILY (Patient taking differently: 10 mg, Oral, EVERY EVENING, Indications: Hyperlipidemia), Taking    metoprolol SR, 25 mg, Oral, DAILY, Taking    bismuth subsalicylate, 30 mL, Oral, Q6HRS PRN, Taking    Imodium A-D, 30 mL, Oral, BID PRN, Taking    tamsulosin, 0.4 mg, Oral, AFTER BREAKFAST, Taking    acetaminophen, 500 mg, Oral, Q6HRS PRN, Taking    diclofenac sodium, 2 g, Topical, 4X/DAY PRN, Taking    rosuvastatin, 5 mg, Oral, DAILY (Patient taking differently: 5 mg, Oral, EVERY EVENING, Indications: Hyperlipidemia), Taking    metFORMIN, 500 mg, Oral, BID WITH MEALS, Taking    Coenzyme Q10 (COQ-10 PO), 1 Capsule, Oral, Q EVENING, Taking    Omega-3 Fatty Acids (FISH OIL PO), 1 Capsule, Oral, QAM, Taking    aspirin, 81 mg, Oral, DAILY, Taking    Prenatal Multivit-Min-Fe-FA (PRE-COLE FORMULA PO), 1 Tablet, Oral, QAM, Taking     No Known Allergies    Social History     Tobacco Use     "Smoking status: Former     Current packs/day: 0.00     Average packs/day: 1 pack/day for 45.0 years (45.0 ttl pk-yrs)     Types: Cigarettes     Start date: 1956     Quit date: 2001     Years since quittin.2    Smokeless tobacco: Never   Vaping Use    Vaping Use: Never used   Substance Use Topics    Alcohol use: Yes     Alcohol/week: 8.4 oz     Types: 14 Glasses of wine per week     Comment: 2 glasses of wine daily    Drug use: Never     DIET AND EXERCISE:  Weight Change:stable   Wt Readings from Last 3 Encounters:   24 82.1 kg (181 lb)   24 80.7 kg (177 lb 14.6 oz)   24 95 kg (209 lb 7 oz)      Diet: common adult  Exercise: no regular exercise program     Review of Systems   Constitutional:  Negative for chills, fever and malaise/fatigue.   HENT:  Negative for nosebleeds.    Respiratory:  Negative for cough, hemoptysis, sputum production, shortness of breath and wheezing.    Cardiovascular:  Negative for chest pain, palpitations, orthopnea, claudication, leg swelling and PND.   Gastrointestinal:  Negative for blood in stool.   Endo/Heme/Allergies:  Does not bruise/bleed easily.       Objective    Vitals:    24 1005   BP: 97/47   BP Location: Left arm   Patient Position: Sitting   BP Cuff Size: Adult   Pulse: (!) 55   Weight: 82.1 kg (181 lb)   Height: 1.803 m (5' 11\")      BP Readings from Last 5 Encounters:   24 97/47   24 102/48   24 126/66   24 120/57   23 100/50      Body mass index is 25.24 kg/m².  Physical Exam  Vitals reviewed.   Constitutional:       Appearance: Normal appearance.   HENT:      Head: Normocephalic and atraumatic.      Nose: Nose normal.      Mouth/Throat:      Mouth: Mucous membranes are moist.      Pharynx: Oropharynx is clear.   Eyes:      Extraocular Movements: Extraocular movements intact.      Conjunctiva/sclera: Conjunctivae normal.   Cardiovascular:      Rate and Rhythm: Normal rate and regular rhythm.      Pulses: " "Normal pulses.           Carotid pulses are 2+ on the right side and 2+ on the left side.       Radial pulses are 2+ on the right side and 2+ on the left side.        Dorsalis pedis pulses are 2+ on the right side and 2+ on the left side.        Posterior tibial pulses are 2+ on the right side and 2+ on the left side.      Heart sounds: Normal heart sounds.      Comments:    Spider telangectasia:       RLE:  None      LLE: none   Varicosities:           RLE: none      LLE: none   Corona phlebectatica:      RLE:  None        LLE:  None   Cording:         RLE:  None     LLE: None   Pulmonary:      Effort: Pulmonary effort is normal.      Breath sounds: Normal breath sounds.   Musculoskeletal:         General: Normal range of motion.      Cervical back: Normal range of motion and neck supple.      Right lower leg: No edema.      Left lower leg: No edema.   Skin:     General: Skin is warm and dry.      Capillary Refill: Capillary refill takes less than 2 seconds.   Neurological:      General: No focal deficit present.      Mental Status: He is alert and oriented to person, place, and time. Mental status is at baseline.   Psychiatric:         Mood and Affect: Mood normal.         Behavior: Behavior normal.     Lab Results   Component Value Date    CHOLSTRLTOT 168 05/22/2023    LDL 95 05/22/2023    HDL 36 (A) 05/22/2023    TRIGLYCERIDE 187 (H) 05/22/2023      No results found for: \"LIPOPROTA\"   No results found for: \"APOB\"    Lab Results   Component Value Date    PROTHROMBTM 13.0 01/30/2024    INR 0.97 01/30/2024       Lab Results   Component Value Date    HBA1C 6.7 (H) 01/30/2024      Lab Results   Component Value Date    SODIUM 133 (L) 03/06/2024    POTASSIUM 4.5 03/06/2024    CHLORIDE 106 03/06/2024    CO2 14 (L) 03/06/2024    GLUCOSE 115 (H) 03/06/2024    BUN 23 (H) 03/06/2024    CREATININE 1.60 (H) 03/06/2024        Lab Results   Component Value Date    WBC 11.8 (H) 03/06/2024    RBC 3.28 (L) 03/06/2024    HEMOGLOBIN " 10.0 (L) 03/06/2024    HEMATOCRIT 30.5 (L) 03/06/2024    MCV 93.0 03/06/2024    MCH 30.5 03/06/2024    MCHC 32.8 03/06/2024    MPV 9.1 03/06/2024      VASCULAR IMAGING:    CT chest 7/2021  1. Aneurysmal dilatation at the aortic root with maximal diameter of 4.3 cm, not appreciably changed from the prior study allowing for limitations of the study.   2.  Aortic and coronary artery calcifications.   3.  5 mm right lower lobe nodule, stable compared to 10/07/2020.   4.  Emphysematous changes.     CTA chest 10/2021  1.  Mild aortic root dilatation at the level of the sinuses of   Valsalva, 4.3 cm. The remainder of the ascending aorta measures   up to 3.9 cm, near the upper limit of normal.  Minimal fusiform   ectasia of the isthmic segment, 3.3 cm.   Mild dilatation of the   innominate and proximal right subclavian arteries, 1.8 cm and 1.6   cm, respectively.   2. Diffuse atherosclerosis. No evidence of aortic dissection. Few   incidental findings.     Echo 11/2022  No prior study is available for comparison.   Normal left ventricular size and systolic function.  Left ventricle ejection fraction estimated to be 60%.  No regional wall motion abnormality noted.  No hemodynamically significant valvular heart disease noted.  Estimated right ventricular systolic pressure is 35 mmHg.  Aorta  Normal aortic root for body surface area. The ascending aorta diameter is 3.2 cm.    CTA chest 9/2/23   Aorta and vasculature:  Ascending thoracic aorta measures 3.5 cm in maximum diameter without dissection. Descending thoracic aorta measures 3.1 cm in maximum diameter without dissection. Visualized upper abdominal aorta is normal in caliber without   dissection. Great vessel origins are patent with atherosclerotic plaque at the origins. Celiac axis and SMA are patent with moderate calcific plaque at both origins an estimated less than 50% stenosis.  1.  There is borderline aneurysmal dilatation of the descending thoracic aorta with  ascending thoracic aorta upper limits of normal in caliber at 3.5 cm.  2.  No evidence of thoracic aortic dissection.  3.  incidentally noted is a nonocclusive right middle lobe pulmonary artery embolism.  4.  Lung parenchyma demonstrates changes suggestive of emphysema favored over interstitial lung disease.    BLE venous 10/2023   No deep venous thrombosis.     BLE venous 11/2023    No deep venous thrombosis.     CTPA 11/17/23  1.  No central or segmental pulmonary embolus or evidence of other acute intrathoracic pathology  2.  Emphysema  3.  Atherosclerosis    CT a/p 11/17/23   Vasculature: Diffuse calcific atherosclerotic plaque.  1.  Interval placement of a LEFT ureteral stent with reduced but persistent LEFT hydroureteronephrosis, perinephric fat stranding and delayed LEFT nephrogram suspicious for some degree of ongoing obstructive uropathy and possibly infection  2.  Gas in the LEFT  3.  Findings suspicious for cystitis renal collecting system and urinary bladder to be due to the recent instrumentation or infection.  4.  Atherosclerosis  5.  Colonic diverticulosis          Medical Decision Making:  Today's Assessment / Status / Plan:     1. Aneurysm of ascending aorta without rupture (HCC)        2. Descending aortic aneurysm (HCC)        3. Aortic atherosclerosis (HCC)        4. Pulmonary hypertension (HCC)        5. Carotid artery aneurysm (HCC)        6. 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 CKD stage G3a/A2, GFR 45-59 and albumin creatinine ratio  mg/g (HCC)        10. Status post coronary artery balloon dilation  REFERRAL TO CARDIOLOGY      11. Primary hypertension  Basic Metabolic Panel    Lipid Profile    CBC WITHOUT DIFFERENTIAL      12. LVH (left ventricular hypertrophy)        13. History of pulmonary embolism        14. Celiac  artery atherosclerosis        15. Normocytic anemia      possible ACKD        Etiology of Established CVD if Present:     1) VTE disease   9/2023 - incidentally found, acute, asympatomtic RML PE - stable, no sx   Resolved per CTPA 11/2023, had negative BLE venous duplex    - no chronic provoking risk factor is bladder malignancy   Thrombophilia/hypercoag evaluation:  not recommended  Plan:  - no imaging surveillance needed at this time  - antithrombotic plan as noted below   - counseled on signs and symptoms of acute VTE that require seeking prompt attention in the ED to include shortness of breath, chest pain, pain with deep inhalation, acute leg swelling and/or pain in calf or leg   - elevate legs as much as possible, use compression stockings/socks if directed by your provider  - Avoid hormonal therapies including estrogen or testosterone-containing meds, or raloxifene or tamoxifene (commonly used for osteoporosis)  - Avoid sedentary periods  - continue complete avoidance of tobacco products  - if having any invasive procedure,please make sure the doctor knows of your history of blood clots and current anticoagulation status    2) Ascending aortic aneurysm - stable, asymptomatic, generally non-athero-related  - as reviewed with pt, surg repair indicated if 5.5+cm or rapid expansion rate (defined as >0.5 cm in 1 year or >0.3 cm/y in 2  consecutive years for those with sporadic aneurysms, and >0.3 cm in 1 year for those with hereditary thoracic Ao disease (HTAD) or bicuspid AV) due to increase risks for dissection/rupture  -no known HTAD, so presumed sporadic development from cystic medial degeneration  - No reported fhx or pmhx of connective tissue disease  - Echo shows no biscupid Ao valve  - at initial visit - reviewed anatomy, risks, emergency s/s requiring immediate attention, and thresholds for surgical intervention and gave handout   Surveillance:  7/2021 CT = 4.3cm   10/2020 CTA = 4.3cm   9/2023 CTA = 3.5cm  - sizing discrepancy from prior   Plan:   - continue med mgmt   - repeat CTA chest 1yr from last imaging (9/2024), then determine need for ongoing surveillance   - focus on ARB and BB for BP control  - activity restrictions including no extreme endurance activities, smoking, stimulants, and extreme weight lifting >20lbs     3) descending AA - presumed atherosclerosis related, no sx, generally athero-related  - as reviewed with pt, surg repair indicated if 5.5+cm or rapid expansion rate (defined as >0.5 cm in 1 year or >0.3 cm/y in 2  9/2023 CTA = 3.1cm - no growth over time   Plan:  - annual CTA chest for surveillance (Sept)  - continue med mgmt     4) CAD status post PCI to RCA and LCx 2002 , stable   Plan:  - continue secondary prevention med mgmt   - ref to  cardiology for ongoing care per pt request     ANTITHROMBOTIC THERAPY:  Date of initiation: 9/2023 - only completed 3 weeks   HAS-BLED bleeding risk calc ([email or web address]): 2 pt, 4.1%, moderate risk   Factors to consider for indefinite OAC: Unprovoked VTE event and Male sex   Expected duration: was only able to complete 3 weeks of OAC but subsequent CTA showed no persistent clot.  Early interruption can increase risk for recurrent VTE, so we will monitor closely     Plan:  - continue ASA 81mg daily   - may consider restart of OAC if new VTE event   - stressed strict adherence to tx and avoid early termination due to increased risk for recurrent VTE    LIPID MANAGEMENT:   Qualifies for Statin Therapy Based on 2018 ACC/AHA Guidelines: n/a  The ASCVD Risk score (Park DK, et al., 2019) failed to calculate., N/A  Major ASCVD events: None  High-risk conditions: N/A  Currently on Statin: Yes  Tx goals: LDL-C <100 (if HTG, consider apoB <90, non-HDL-C <130)  At goal? yes  Plan:   - reinforced ongoing TLC measures as noted   - monitor labs   Meds:   - continue rosuva 5mg daily  - continue zetia 10mg daily     BLOOD PRESSURE CONTROL   Office BP Goal ACC/AHA (2017) goal  <130/80  Home BP at goal:  yes  Office BP at goal:  yes - possibly hypoTN   24h ABPM:  not ordered to date   Plan:   Monitoring:   - start/continue home BP monitoring, reviewed correct technique, provide BP log and instructions  - push fluid intake to 60-80+oz/day to maintain eff arterial volume and maintain BP   Medications:off all meds     GLYCEMIC STATUS:  Diabetic, T2  Goal A1c < 7.5 reasonable   Lab Results   Component Value Date    HBA1C 6.7 (H) 01/30/2024      Lab Results   Component Value Date/Time    MALBCRT 35 (H) 05/22/2023 08:34 AM    MICROALBUR 2.9 05/22/2023 08:34 AM     Plan:  - continue current medication plan   - recommmend for routine care with PCP (or endocrine) to include regular A1c monitoring, annual albumin/creatinine ratio (ACR), annual diabetic retinopathy screening, foot exams, annual flu vaccine, and updates to pneumonia vaccines as appropriate      LIFESTYLE INTERVENTIONS:    SMOKING: Stages of Change: Maintenance (sustained change >6mo)    reports that he quit smoking about 23 years ago. His smoking use included cigarettes. He started smoking about 68 years ago. He has a 45 pack-year smoking history. He has never used smokeless tobacco.   - continued complete avoidance of all tobacco products     PHYSICAL ACTIVITY: continue healthy activity to improve CV fitness.  In general, targeting >150min/week of moderate-level activity or as much as tolerated in light of functional status and co-morbidities     WEIGHT MANAGEMENT AND NUTRITION: Mediterranean style dietary approach       OTHER:    # bladder cancer, stage 2 - pending further w/u for eval for malignancy   - if malignancy will have further increase risk for VTE events    # ureteral stent - in situ, pending retrieval     # anemia, normocytic, mild no sx   - defer mgmt to PCP     Studies to Be Obtained: CTA chest 9/2024 - ordered, RN to track   Labs to Be Obtained: as noted above     Follow up in:  Sept    Onel Leonard M.D.  Vascular  [facility]   [facility]   [phone]

## 2024-03-20 ENCOUNTER — HOSPITAL ENCOUNTER (OUTPATIENT)
Facility: MEDICAL CENTER | Age: 84
End: 2024-03-20
Payer: MEDICARE

## 2024-03-20 LAB
ALBUMIN SERPL BCP-MCNC: 3.6 G/DL (ref 3.2–4.9)
ALBUMIN/GLOB SERPL: 1.6 G/DL
ALP SERPL-CCNC: 63 U/L (ref 30–99)
ALT SERPL-CCNC: 9 U/L (ref 2–50)
AMBIGUOUS DTTM AMBI4: NORMAL
ANION GAP SERPL CALC-SCNC: 12 MMOL/L (ref 7–16)
AST SERPL-CCNC: 15 U/L (ref 12–45)
BILIRUB SERPL-MCNC: 0.3 MG/DL (ref 0.1–1.5)
BUN SERPL-MCNC: 19 MG/DL (ref 8–22)
CALCIUM ALBUM COR SERPL-MCNC: 8.8 MG/DL (ref 8.5–10.5)
CALCIUM SERPL-MCNC: 8.5 MG/DL (ref 8.5–10.5)
CHLORIDE SERPL-SCNC: 104 MMOL/L (ref 96–112)
CO2 SERPL-SCNC: 22 MMOL/L (ref 20–33)
CREAT SERPL-MCNC: 1.24 MG/DL (ref 0.5–1.4)
GFR SERPLBLD CREATININE-BSD FMLA CKD-EPI: 57 ML/MIN/1.73 M 2
GLOBULIN SER CALC-MCNC: 2.2 G/DL (ref 1.9–3.5)
GLUCOSE SERPL-MCNC: 192 MG/DL (ref 65–99)
HBV CORE AB SERPL QL IA: NONREACTIVE
HBV SURFACE AB SERPL IA-ACNC: <3.5 MIU/ML (ref 0–10)
HBV SURFACE AG SER QL: NORMAL
HCV AB SER QL: NORMAL
POTASSIUM SERPL-SCNC: 4.1 MMOL/L (ref 3.6–5.5)
PROT SERPL-MCNC: 5.8 G/DL (ref 6–8.2)
SODIUM SERPL-SCNC: 138 MMOL/L (ref 135–145)
TSH SERPL DL<=0.005 MIU/L-ACNC: 8.35 UIU/ML (ref 0.38–5.33)

## 2024-03-20 PROCEDURE — 80053 COMPREHEN METABOLIC PANEL: CPT

## 2024-03-20 PROCEDURE — 86706 HEP B SURFACE ANTIBODY: CPT | Mod: GA

## 2024-03-20 PROCEDURE — 87340 HEPATITIS B SURFACE AG IA: CPT | Mod: GA

## 2024-03-20 PROCEDURE — 84443 ASSAY THYROID STIM HORMONE: CPT

## 2024-03-20 PROCEDURE — 86704 HEP B CORE ANTIBODY TOTAL: CPT | Mod: GA

## 2024-03-20 PROCEDURE — 86803 HEPATITIS C AB TEST: CPT

## 2024-03-25 ENCOUNTER — HOSPITAL ENCOUNTER (OUTPATIENT)
Dept: LAB | Facility: MEDICAL CENTER | Age: 84
End: 2024-03-25
Attending: INTERNAL MEDICINE
Payer: MEDICARE

## 2024-03-25 LAB
ALBUMIN SERPL BCP-MCNC: 3.7 G/DL (ref 3.2–4.9)
ALBUMIN/GLOB SERPL: 1.4 G/DL
ALP SERPL-CCNC: 63 U/L (ref 30–99)
ALT SERPL-CCNC: 9 U/L (ref 2–50)
ANION GAP SERPL CALC-SCNC: 11 MMOL/L (ref 7–16)
AST SERPL-CCNC: 16 U/L (ref 12–45)
BASOPHILS # BLD AUTO: 0.2 % (ref 0–1.8)
BASOPHILS # BLD: 0.02 K/UL (ref 0–0.12)
BILIRUB SERPL-MCNC: 0.2 MG/DL (ref 0.1–1.5)
BUN SERPL-MCNC: 16 MG/DL (ref 8–22)
CALCIUM ALBUM COR SERPL-MCNC: 9.1 MG/DL (ref 8.5–10.5)
CALCIUM SERPL-MCNC: 8.9 MG/DL (ref 8.5–10.5)
CHLORIDE SERPL-SCNC: 109 MMOL/L (ref 96–112)
CO2 SERPL-SCNC: 21 MMOL/L (ref 20–33)
CREAT SERPL-MCNC: 1.25 MG/DL (ref 0.5–1.4)
EOSINOPHIL # BLD AUTO: 0.37 K/UL (ref 0–0.51)
EOSINOPHIL NFR BLD: 4.1 % (ref 0–6.9)
ERYTHROCYTE [DISTWIDTH] IN BLOOD BY AUTOMATED COUNT: 51.8 FL (ref 35.9–50)
GFR SERPLBLD CREATININE-BSD FMLA CKD-EPI: 56 ML/MIN/1.73 M 2
GLOBULIN SER CALC-MCNC: 2.6 G/DL (ref 1.9–3.5)
GLUCOSE SERPL-MCNC: 172 MG/DL (ref 65–99)
HCT VFR BLD AUTO: 31.1 % (ref 42–52)
HGB BLD-MCNC: 10.1 G/DL (ref 14–18)
IMM GRANULOCYTES # BLD AUTO: 0.03 K/UL (ref 0–0.11)
IMM GRANULOCYTES NFR BLD AUTO: 0.3 % (ref 0–0.9)
LYMPHOCYTES # BLD AUTO: 1.67 K/UL (ref 1–4.8)
LYMPHOCYTES NFR BLD: 18.6 % (ref 22–41)
MCH RBC QN AUTO: 30.3 PG (ref 27–33)
MCHC RBC AUTO-ENTMCNC: 32.5 G/DL (ref 32.3–36.5)
MCV RBC AUTO: 93.4 FL (ref 81.4–97.8)
MONOCYTES # BLD AUTO: 0.65 K/UL (ref 0–0.85)
MONOCYTES NFR BLD AUTO: 7.2 % (ref 0–13.4)
NEUTROPHILS # BLD AUTO: 6.25 K/UL (ref 1.82–7.42)
NEUTROPHILS NFR BLD: 69.6 % (ref 44–72)
NRBC # BLD AUTO: 0 K/UL
NRBC BLD-RTO: 0 /100 WBC (ref 0–0.2)
PLATELET # BLD AUTO: 269 K/UL (ref 164–446)
PMV BLD AUTO: 9.8 FL (ref 9–12.9)
POTASSIUM SERPL-SCNC: 4.1 MMOL/L (ref 3.6–5.5)
PROT SERPL-MCNC: 6.3 G/DL (ref 6–8.2)
RBC # BLD AUTO: 3.33 M/UL (ref 4.7–6.1)
SODIUM SERPL-SCNC: 141 MMOL/L (ref 135–145)
TSH SERPL DL<=0.005 MIU/L-ACNC: 7.33 UIU/ML (ref 0.38–5.33)
WBC # BLD AUTO: 9 K/UL (ref 4.8–10.8)

## 2024-03-25 PROCEDURE — 80053 COMPREHEN METABOLIC PANEL: CPT

## 2024-03-25 PROCEDURE — 36415 COLL VENOUS BLD VENIPUNCTURE: CPT

## 2024-03-25 PROCEDURE — 85025 COMPLETE CBC W/AUTO DIFF WBC: CPT

## 2024-03-25 PROCEDURE — 84443 ASSAY THYROID STIM HORMONE: CPT | Mod: GA

## 2024-03-28 ENCOUNTER — APPOINTMENT (OUTPATIENT)
Dept: RADIOLOGY | Facility: MEDICAL CENTER | Age: 84
End: 2024-03-28
Attending: EMERGENCY MEDICINE
Payer: MEDICARE

## 2024-03-28 ENCOUNTER — HOSPITAL ENCOUNTER (INPATIENT)
Facility: MEDICAL CENTER | Age: 84
LOS: 1 days | End: 2024-03-29
Attending: EMERGENCY MEDICINE | Admitting: HOSPITALIST
Payer: MEDICARE

## 2024-03-28 ENCOUNTER — OFFICE VISIT (OUTPATIENT)
Dept: URGENT CARE | Facility: CLINIC | Age: 84
End: 2024-03-28
Payer: MEDICARE

## 2024-03-28 VITALS
SYSTOLIC BLOOD PRESSURE: 134 MMHG | OXYGEN SATURATION: 95 % | DIASTOLIC BLOOD PRESSURE: 62 MMHG | HEIGHT: 71 IN | WEIGHT: 180 LBS | HEART RATE: 64 BPM | RESPIRATION RATE: 20 BRPM | TEMPERATURE: 97.4 F | BODY MASS INDEX: 25.2 KG/M2

## 2024-03-28 DIAGNOSIS — E87.79 VOLUME OVERLOAD STATE OF HEART: ICD-10-CM

## 2024-03-28 DIAGNOSIS — R60.9 PERIPHERAL EDEMA: ICD-10-CM

## 2024-03-28 DIAGNOSIS — R79.89 ELEVATED TROPONIN: ICD-10-CM

## 2024-03-28 DIAGNOSIS — R60.0 PERIPHERAL EDEMA: ICD-10-CM

## 2024-03-28 DIAGNOSIS — I82.812 ACUTE SUPERFICIAL VENOUS THROMBOSIS OF LEFT LOWER EXTREMITY: ICD-10-CM

## 2024-03-28 DIAGNOSIS — I82.462 ACUTE DEEP VEIN THROMBOSIS (DVT) OF CALF MUSCLE VEIN OF LEFT LOWER EXTREMITY (HCC): ICD-10-CM

## 2024-03-28 DIAGNOSIS — M79.89 LEG SWELLING: ICD-10-CM

## 2024-03-28 PROBLEM — I50.9 HEART FAILURE (HCC): Status: ACTIVE | Noted: 2024-03-28

## 2024-03-28 PROBLEM — I50.33 ACUTE ON CHRONIC DIASTOLIC HEART FAILURE (HCC): Status: ACTIVE | Noted: 2024-03-28

## 2024-03-28 PROBLEM — D64.9 ANEMIA: Status: ACTIVE | Noted: 2024-03-28

## 2024-03-28 LAB
ALBUMIN SERPL BCP-MCNC: 3.6 G/DL (ref 3.2–4.9)
ALBUMIN/GLOB SERPL: 1.3 G/DL
ALP SERPL-CCNC: 65 U/L (ref 30–99)
ALT SERPL-CCNC: 8 U/L (ref 2–50)
ANION GAP SERPL CALC-SCNC: 11 MMOL/L (ref 7–16)
APTT PPP: 32.8 SEC (ref 24.7–36)
AST SERPL-CCNC: 15 U/L (ref 12–45)
BASOPHILS # BLD AUTO: 0.4 % (ref 0–1.8)
BASOPHILS # BLD: 0.03 K/UL (ref 0–0.12)
BILIRUB SERPL-MCNC: 0.4 MG/DL (ref 0.1–1.5)
BUN SERPL-MCNC: 18 MG/DL (ref 8–22)
CALCIUM ALBUM COR SERPL-MCNC: 9.3 MG/DL (ref 8.5–10.5)
CALCIUM SERPL-MCNC: 9 MG/DL (ref 8.4–10.2)
CHLORIDE SERPL-SCNC: 107 MMOL/L (ref 96–112)
CO2 SERPL-SCNC: 22 MMOL/L (ref 20–33)
CREAT SERPL-MCNC: 1.44 MG/DL (ref 0.5–1.4)
EOSINOPHIL # BLD AUTO: 0.49 K/UL (ref 0–0.51)
EOSINOPHIL NFR BLD: 6.4 % (ref 0–6.9)
ERYTHROCYTE [DISTWIDTH] IN BLOOD BY AUTOMATED COUNT: 51.9 FL (ref 35.9–50)
FERRITIN SERPL-MCNC: 139 NG/ML (ref 22–322)
GFR SERPLBLD CREATININE-BSD FMLA CKD-EPI: 48 ML/MIN/1.73 M 2
GLOBULIN SER CALC-MCNC: 2.7 G/DL (ref 1.9–3.5)
GLUCOSE BLD STRIP.AUTO-MCNC: 122 MG/DL (ref 65–99)
GLUCOSE SERPL-MCNC: 125 MG/DL (ref 65–99)
HCT VFR BLD AUTO: 30.5 % (ref 42–52)
HGB BLD-MCNC: 9.8 G/DL (ref 14–18)
IMM GRANULOCYTES # BLD AUTO: 0.03 K/UL (ref 0–0.11)
IMM GRANULOCYTES NFR BLD AUTO: 0.4 % (ref 0–0.9)
INR PPP: 1.04 (ref 0.87–1.13)
IRON SATN MFR SERPL: 22 % (ref 15–55)
IRON SERPL-MCNC: 47 UG/DL (ref 50–180)
LYMPHOCYTES # BLD AUTO: 1.31 K/UL (ref 1–4.8)
LYMPHOCYTES NFR BLD: 17.1 % (ref 22–41)
MCH RBC QN AUTO: 30.3 PG (ref 27–33)
MCHC RBC AUTO-ENTMCNC: 32.1 G/DL (ref 32.3–36.5)
MCV RBC AUTO: 94.4 FL (ref 81.4–97.8)
MONOCYTES # BLD AUTO: 0.58 K/UL (ref 0–0.85)
MONOCYTES NFR BLD AUTO: 7.6 % (ref 0–13.4)
NEUTROPHILS # BLD AUTO: 5.22 K/UL (ref 1.82–7.42)
NEUTROPHILS NFR BLD: 68.1 % (ref 44–72)
NRBC # BLD AUTO: 0 K/UL
NRBC BLD-RTO: 0 /100 WBC (ref 0–0.2)
NT-PROBNP SERPL IA-MCNC: 3186 PG/ML (ref 0–125)
PLATELET # BLD AUTO: 233 K/UL (ref 164–446)
PMV BLD AUTO: 9 FL (ref 9–12.9)
POTASSIUM SERPL-SCNC: 4.1 MMOL/L (ref 3.6–5.5)
PROT SERPL-MCNC: 6.3 G/DL (ref 6–8.2)
PROTHROMBIN TIME: 14.1 SEC (ref 12–14.6)
RBC # BLD AUTO: 3.23 M/UL (ref 4.7–6.1)
SODIUM SERPL-SCNC: 140 MMOL/L (ref 135–145)
TIBC SERPL-MCNC: 213 UG/DL (ref 250–450)
TROPONIN T SERPL-MCNC: 36 NG/L (ref 6–19)
TROPONIN T SERPL-MCNC: 36 NG/L (ref 6–19)
UIBC SERPL-MCNC: 166 UG/DL (ref 110–370)
WBC # BLD AUTO: 7.7 K/UL (ref 4.8–10.8)

## 2024-03-28 PROCEDURE — 99223 1ST HOSP IP/OBS HIGH 75: CPT | Mod: 25,AI | Performed by: HOSPITALIST

## 2024-03-28 PROCEDURE — 85610 PROTHROMBIN TIME: CPT

## 2024-03-28 PROCEDURE — A9270 NON-COVERED ITEM OR SERVICE: HCPCS | Performed by: HOSPITALIST

## 2024-03-28 PROCEDURE — 83540 ASSAY OF IRON: CPT

## 2024-03-28 PROCEDURE — 700102 HCHG RX REV CODE 250 W/ 637 OVERRIDE(OP): Performed by: HOSPITALIST

## 2024-03-28 PROCEDURE — 36415 COLL VENOUS BLD VENIPUNCTURE: CPT

## 2024-03-28 PROCEDURE — 93970 EXTREMITY STUDY: CPT

## 2024-03-28 PROCEDURE — 93005 ELECTROCARDIOGRAM TRACING: CPT | Performed by: EMERGENCY MEDICINE

## 2024-03-28 PROCEDURE — 71045 X-RAY EXAM CHEST 1 VIEW: CPT

## 2024-03-28 PROCEDURE — 82728 ASSAY OF FERRITIN: CPT

## 2024-03-28 PROCEDURE — 700102 HCHG RX REV CODE 250 W/ 637 OVERRIDE(OP): Performed by: INTERNAL MEDICINE

## 2024-03-28 PROCEDURE — 80053 COMPREHEN METABOLIC PANEL: CPT

## 2024-03-28 PROCEDURE — A9270 NON-COVERED ITEM OR SERVICE: HCPCS | Performed by: INTERNAL MEDICINE

## 2024-03-28 PROCEDURE — 94760 N-INVAS EAR/PLS OXIMETRY 1: CPT

## 2024-03-28 PROCEDURE — 82962 GLUCOSE BLOOD TEST: CPT

## 2024-03-28 PROCEDURE — 770020 HCHG ROOM/CARE - TELE (206)

## 2024-03-28 PROCEDURE — 700111 HCHG RX REV CODE 636 W/ 250 OVERRIDE (IP): Performed by: HOSPITALIST

## 2024-03-28 PROCEDURE — 85025 COMPLETE CBC W/AUTO DIFF WBC: CPT

## 2024-03-28 PROCEDURE — 84484 ASSAY OF TROPONIN QUANT: CPT

## 2024-03-28 PROCEDURE — 99497 ADVNCD CARE PLAN 30 MIN: CPT | Performed by: HOSPITALIST

## 2024-03-28 PROCEDURE — 99214 OFFICE O/P EST MOD 30 MIN: CPT | Performed by: NURSE PRACTITIONER

## 2024-03-28 PROCEDURE — 83880 ASSAY OF NATRIURETIC PEPTIDE: CPT

## 2024-03-28 PROCEDURE — 96374 THER/PROPH/DIAG INJ IV PUSH: CPT

## 2024-03-28 PROCEDURE — 83550 IRON BINDING TEST: CPT

## 2024-03-28 PROCEDURE — 3075F SYST BP GE 130 - 139MM HG: CPT | Performed by: NURSE PRACTITIONER

## 2024-03-28 PROCEDURE — 3078F DIAST BP <80 MM HG: CPT | Performed by: NURSE PRACTITIONER

## 2024-03-28 PROCEDURE — 99285 EMERGENCY DEPT VISIT HI MDM: CPT

## 2024-03-28 PROCEDURE — 85730 THROMBOPLASTIN TIME PARTIAL: CPT

## 2024-03-28 RX ORDER — ALUMINA, MAGNESIA, AND SIMETHICONE 2400; 2400; 240 MG/30ML; MG/30ML; MG/30ML
30 SUSPENSION ORAL EVERY 4 HOURS PRN
Status: ACTIVE | OUTPATIENT
Start: 2024-03-28 | End: 2024-03-28

## 2024-03-28 RX ORDER — OXYCODONE HYDROCHLORIDE 5 MG/1
5 TABLET ORAL
Status: DISCONTINUED | OUTPATIENT
Start: 2024-03-28 | End: 2024-03-29 | Stop reason: HOSPADM

## 2024-03-28 RX ORDER — SODIUM BICARBONATE 650 MG/1
650 TABLET ORAL 3 TIMES DAILY
Status: DISCONTINUED | OUTPATIENT
Start: 2024-03-28 | End: 2024-03-29 | Stop reason: HOSPADM

## 2024-03-28 RX ORDER — SODIUM BICARBONATE 650 MG/1
TABLET ORAL
COMMUNITY
Start: 2024-03-07

## 2024-03-28 RX ORDER — AMOXICILLIN 250 MG
2 CAPSULE ORAL 2 TIMES DAILY
Status: DISCONTINUED | OUTPATIENT
Start: 2024-03-28 | End: 2024-03-29 | Stop reason: HOSPADM

## 2024-03-28 RX ORDER — ROSUVASTATIN CALCIUM 10 MG/1
5 TABLET, COATED ORAL NIGHTLY
Status: DISCONTINUED | OUTPATIENT
Start: 2024-03-28 | End: 2024-03-29 | Stop reason: HOSPADM

## 2024-03-28 RX ORDER — ASPIRIN 81 MG/1
81 TABLET ORAL EVERY MORNING
Status: DISCONTINUED | OUTPATIENT
Start: 2024-03-29 | End: 2024-03-29

## 2024-03-28 RX ORDER — TAMSULOSIN HYDROCHLORIDE 0.4 MG/1
0.4 CAPSULE ORAL
Status: DISCONTINUED | OUTPATIENT
Start: 2024-03-29 | End: 2024-03-29 | Stop reason: HOSPADM

## 2024-03-28 RX ORDER — CEPHALEXIN 250 MG/1
CAPSULE ORAL
COMMUNITY
Start: 2024-03-08

## 2024-03-28 RX ORDER — NITROGLYCERIN 0.4 MG/1
0.4 TABLET SUBLINGUAL
Status: DISCONTINUED | OUTPATIENT
Start: 2024-03-28 | End: 2024-03-29 | Stop reason: HOSPADM

## 2024-03-28 RX ORDER — ALPRAZOLAM 0.25 MG/1
0.25 TABLET ORAL 3 TIMES DAILY PRN
Status: DISCONTINUED | OUTPATIENT
Start: 2024-03-28 | End: 2024-03-29 | Stop reason: HOSPADM

## 2024-03-28 RX ORDER — CHOLECALCIFEROL (VITAMIN D3) 125 MCG
5 CAPSULE ORAL NIGHTLY
Status: DISCONTINUED | OUTPATIENT
Start: 2024-03-28 | End: 2024-03-29 | Stop reason: HOSPADM

## 2024-03-28 RX ORDER — FUROSEMIDE 10 MG/ML
20 INJECTION INTRAMUSCULAR; INTRAVENOUS
Status: DISCONTINUED | OUTPATIENT
Start: 2024-03-28 | End: 2024-03-29 | Stop reason: HOSPADM

## 2024-03-28 RX ORDER — HYDROMORPHONE HYDROCHLORIDE 1 MG/ML
0.25 INJECTION, SOLUTION INTRAMUSCULAR; INTRAVENOUS; SUBCUTANEOUS
Status: DISCONTINUED | OUTPATIENT
Start: 2024-03-28 | End: 2024-03-29 | Stop reason: HOSPADM

## 2024-03-28 RX ORDER — METOPROLOL SUCCINATE 25 MG/1
25 TABLET, EXTENDED RELEASE ORAL DAILY
Status: DISCONTINUED | OUTPATIENT
Start: 2024-03-29 | End: 2024-03-29 | Stop reason: HOSPADM

## 2024-03-28 RX ORDER — ACETAMINOPHEN 325 MG/1
650 TABLET ORAL EVERY 6 HOURS PRN
Status: DISCONTINUED | OUTPATIENT
Start: 2024-03-28 | End: 2024-03-29 | Stop reason: HOSPADM

## 2024-03-28 RX ORDER — EMPAGLIFLOZIN 25 MG/1
25 TABLET, FILM COATED ORAL EVERY MORNING
COMMUNITY

## 2024-03-28 RX ORDER — OXYCODONE HYDROCHLORIDE 5 MG/1
2.5 TABLET ORAL
Status: DISCONTINUED | OUTPATIENT
Start: 2024-03-28 | End: 2024-03-29 | Stop reason: HOSPADM

## 2024-03-28 RX ORDER — POLYETHYLENE GLYCOL 3350 17 G/17G
1 POWDER, FOR SOLUTION ORAL
Status: DISCONTINUED | OUTPATIENT
Start: 2024-03-28 | End: 2024-03-29 | Stop reason: HOSPADM

## 2024-03-28 RX ADMIN — ROSUVASTATIN 5 MG: 10 TABLET, FILM COATED ORAL at 21:56

## 2024-03-28 RX ADMIN — FUROSEMIDE 20 MG: 10 INJECTION, SOLUTION INTRAMUSCULAR; INTRAVENOUS at 15:12

## 2024-03-28 RX ADMIN — SODIUM BICARBONATE 650 MG: 650 TABLET ORAL at 17:13

## 2024-03-28 RX ADMIN — Medication 5 MG: at 23:38

## 2024-03-28 RX ADMIN — SODIUM BICARBONATE 650 MG: 650 TABLET ORAL at 21:56

## 2024-03-28 RX ADMIN — APIXABAN 5 MG: 5 TABLET, FILM COATED ORAL at 17:13

## 2024-03-28 RX ADMIN — DOCUSATE SODIUM 50MG AND SENNOSIDES 8.6MG 2 TABLET: 8.6; 5 TABLET, FILM COATED ORAL at 17:13

## 2024-03-28 ASSESSMENT — COGNITIVE AND FUNCTIONAL STATUS - GENERAL
DAILY ACTIVITIY SCORE: 24
MOBILITY SCORE: 24
SUGGESTED CMS G CODE MODIFIER MOBILITY: CH
SUGGESTED CMS G CODE MODIFIER DAILY ACTIVITY: CH

## 2024-03-28 ASSESSMENT — PATIENT HEALTH QUESTIONNAIRE - PHQ9
2. FEELING DOWN, DEPRESSED, IRRITABLE, OR HOPELESS: SEVERAL DAYS
7. TROUBLE CONCENTRATING ON THINGS, SUCH AS READING THE NEWSPAPER OR WATCHING TELEVISION: NOT AT ALL
5. POOR APPETITE OR OVEREATING: SEVERAL DAYS
9. THOUGHTS THAT YOU WOULD BE BETTER OFF DEAD, OR OF HURTING YOURSELF: NOT AT ALL
6. FEELING BAD ABOUT YOURSELF - OR THAT YOU ARE A FAILURE OR HAVE LET YOURSELF OR YOUR FAMILY DOWN: NOT AL ALL
3. TROUBLE FALLING OR STAYING ASLEEP OR SLEEPING TOO MUCH: NOT AT ALL
SUM OF ALL RESPONSES TO PHQ9 QUESTIONS 1 AND 2: 2
4. FEELING TIRED OR HAVING LITTLE ENERGY: NOT AT ALL
8. MOVING OR SPEAKING SO SLOWLY THAT OTHER PEOPLE COULD HAVE NOTICED. OR THE OPPOSITE, BEING SO FIGETY OR RESTLESS THAT YOU HAVE BEEN MOVING AROUND A LOT MORE THAN USUAL: NOT AT ALL
SUM OF ALL RESPONSES TO PHQ QUESTIONS 1-9: 3
1. LITTLE INTEREST OR PLEASURE IN DOING THINGS: SEVERAL DAYS

## 2024-03-28 ASSESSMENT — HEART SCORE
HISTORY: MODERATELY SUSPICIOUS
ECG: NON-SPECIFIC REPOLARIZATION DISTURBANCE
RISK FACTORS: >2 RISK FACTORS OR HX OF ATHEROSCLEROTIC DISEASE
TROPONIN: 1-3 TIMES NORMAL LIMIT
HEART SCORE: 7
AGE: 65+

## 2024-03-28 ASSESSMENT — LIFESTYLE VARIABLES
EVER FELT BAD OR GUILTY ABOUT YOUR DRINKING: NO
DOES PATIENT WANT TO STOP DRINKING: NO
HAVE PEOPLE ANNOYED YOU BY CRITICIZING YOUR DRINKING: NO
EVER HAD A DRINK FIRST THING IN THE MORNING TO STEADY YOUR NERVES TO GET RID OF A HANGOVER: NO
ON A TYPICAL DAY WHEN YOU DRINK ALCOHOL HOW MANY DRINKS DO YOU HAVE: 1
CONSUMPTION TOTAL: NEGATIVE
AVERAGE NUMBER OF DAYS PER WEEK YOU HAVE A DRINK CONTAINING ALCOHOL: 7
ALCOHOL_USE: YES
TOTAL SCORE: 0
HOW MANY TIMES IN THE PAST YEAR HAVE YOU HAD 5 OR MORE DRINKS IN A DAY: 0
TOTAL SCORE: 0
HAVE YOU EVER FELT YOU SHOULD CUT DOWN ON YOUR DRINKING: NO
TOTAL SCORE: 0

## 2024-03-28 ASSESSMENT — ENCOUNTER SYMPTOMS
LEG SWELLING: 1
ABDOMINAL PAIN: 0
MYALGIAS: 0
VOMITING: 0
FEVER: 0
SHORTNESS OF BREATH: 0
FLANK PAIN: 0
EYE DISCHARGE: 0
FOCAL WEAKNESS: 0
BRUISES/BLEEDS EASILY: 0
NERVOUS/ANXIOUS: 0
CHILLS: 0
EYE REDNESS: 0
STRIDOR: 0
COUGH: 0

## 2024-03-28 ASSESSMENT — PAIN DESCRIPTION - PAIN TYPE: TYPE: ACUTE PAIN

## 2024-03-28 ASSESSMENT — FIBROSIS 4 INDEX
FIB4 SCORE: 1.69
FIB4 SCORE: 1.93
FIB4 SCORE: 1.69

## 2024-03-28 NOTE — ASSESSMENT & PLAN NOTE
Stopped anticoagulation in the past due to refractory hematuria.  Now presenting with acute deep vein thrombosis.  I discussed risks and benefits of anticoagulation.  At this point the patient is agreeable to start therapeutic anticoagulation.  He wants to avoid high-dose/loading doses.  I will start apixaban 5 mg twice daily.  Monitor closely for hematuria.

## 2024-03-28 NOTE — ASSESSMENT & PLAN NOTE
Ultrasound shows evidence for acute team vein thrombosis within the left gastrocnemius vein.  I discussed the finding with the patient.  The patient used to be on anticoagulation for history of pulmonary embolism however he stopped anticoagulation due to refractory hematuria.  I discussed risks and benefits of anticoagulation.  At this point the patient is agreeable to start therapeutic anticoagulation.  He wants to avoid high-dose/loading doses.  I will start apixaban 5 mg twice daily.  Monitor closely for hematuria.  I will order a follow-up CBC

## 2024-03-28 NOTE — ASSESSMENT & PLAN NOTE
Appears to be chronic.  Likely secondary to bladder cancer and nutritional.  Has history of hematuria.  No evidence of gross bleeding.  Monitor hemoglobin. I will order a follow-up CBC.  Transfuse for a hemoglobin of less than or equal to 7

## 2024-03-28 NOTE — ED NOTES
Pharmacy Medication Reconciliation      ~Medication reconciliation updated and complete per patient & patient family at bedside with medication list. Reviewed list and returned at bedside   ~Allergies have been verified   ~Patient completed a 14 day course of Keflex that was started on 3/8/2024  ~Patient home pharmacy :  [facility]      ~Anticoagulants (rivaroxaban, apixaban, edoxaban, dabigatran, warfarin, enoxaparin) taken in the last 14 days? No

## 2024-03-28 NOTE — ED PROVIDER NOTES
ED Provider Note    CHIEF COMPLAINT  Chief Complaint   Patient presents with    Leg Swelling     Pt sent to  for Left lower leg swelling, pt states swelling began 10 days ago and has gotten worse over the last 2 days. Mild redness to left lower leg and denies tenderness on palpitation.        EXTERNAL RECORDS REVIEWED  Outpatient Notes patient was seen for leg swelling earlier this morning for right leg swelling bilateral lower legs and was sent here to the emergency department for further evaluation.  He had stopped taking his hydrochlorothiazide    HPI/ROS  LIMITATION TO HISTORY   Select: : None  OUTSIDE HISTORIAN(S):  Family patient's son states that he has been diagnosed with stage II bladder cancer and is supposed to go to his first immunotherapy appointment today at 1:00pm    Star Centeno is a 83 y.o. male who presents complaining of bilateral lower extremity edema left worse than right that began 10 days ago but has gotten much worse over the last 2 days.  He denies any pain or redness in the leg.  He does have a history of DVT in the past but is not on any blood thinning medications.  He also has a history of myocardial infarction with 6 stents in the past.  He denies any chest pain or shortness of breath PND orthopnea.  States he used to be on hydrochlorothiazide but was taken off that by his primary care physician a year ago.  He currently has no cardiologist.  Denies any numbness or tingling to his legs.  The patient uses alcohol 1 glass of wine in the evenings but denies any tobacco use.    PAST MEDICAL HISTORY   has a past medical history of Cancer (HCC), Cataract, Diabetes (HCC), High cholesterol, History of heart artery stent, Hyperlipidemia, Hypertension, Myocardial infarct (HCC), Pulmonary emboli (HCC), Renal disorder, and Urinary bladder disorder.    SURGICAL HISTORY   has a past surgical history that includes eye surgery; prostatectomy, radical retro; cystourethroscopy,biopsies (N/A,  10/24/2023); cysto/uretero/pyeloscopy, dx (Left, 10/24/2023); cystoscopy,insert ureteral stent (Left, 10/24/2023); cystoscopy,insert ureteral stent (Left, 2024); cysto/uretero/pyeloscopy, dx (2024); other cardiac surgery; unlisted laparoscopy proc, ureter (2024); cysto stent placemnt pre surg (2024); and lymphadectomy laparoscopic (2024).    FAMILY HISTORY  Family History   Problem Relation Age of Onset    Genetic Disorder Brother     Diabetes Brother     Hypertension Brother     Hyperlipidemia Brother     Genetic Disorder Sister     Diabetes Sister     Genetic Disorder Sister        SOCIAL HISTORY  Social History     Tobacco Use    Smoking status: Former     Current packs/day: 0.00     Average packs/day: 1 pack/day for 45.0 years (45.0 ttl pk-yrs)     Types: Cigarettes     Start date: 1956     Quit date: 2001     Years since quittin.2    Smokeless tobacco: Never   Vaping Use    Vaping Use: Never used   Substance and Sexual Activity    Alcohol use: Yes     Alcohol/week: 4.2 oz     Types: 7 Glasses of wine per week     Comment: 1 glass of wine/nightly    Drug use: Never    Sexual activity: Not Currently     Partners: Female     Birth control/protection: Condom       CURRENT MEDICATIONS  Home Medications       Reviewed by Rosa Rodriguez (Pharmacy Tech) on 24 at 1051  Med List Status: Complete     Medication Last Dose Status   acetaminophen (TYLENOL) 500 MG Tab PRN Active   ALPRAZolam (XANAX) 0.25 MG Tab PRN Active   aspirin 81 MG EC tablet 3/28/2024 Active   bismuth subsalicylate (PEPTO-BISMOL) 262 MG/15ML Suspension PRN Active   cephALEXin (KEFLEX) 250 MG Cap COMPLETE Active   Coenzyme Q10 (COQ-10 PO) 3/27/2024 Active   diclofenac sodium (VOLTAREN) 1 % Gel PRN Active   Empagliflozin (JARDIANCE) 25 MG Tab ON HOLD Active   ezetimibe (ZETIA) 10 MG Tab 3/27/2024 Active   Loperamide HCl (IMODIUM A-D) 1 MG/7.5ML Liquid 3/27/2024 Active   metFORMIN (GLUCOPHAGE) 500 MG Tab  "3/28/2024 Active   metoprolol SR (TOPROL XL) 25 MG TABLET SR 24 HR 3/28/2024 Active   Omega-3 Fatty Acids (FISH OIL PO) 3/28/2024 Active   oxyCODONE immediate-release (ROXICODONE) 5 MG Tab PRN Active   Prenatal Multivit-Min-Fe-FA (PRE-COLE FORMULA PO) 3/28/2024 Active   rosuvastatin (CRESTOR) 5 MG Tab 3/27/2024 Active   sodium bicarbonate (SODIUM BICARBONATE) 650 MG Tab 3/28/2024 Active   tamsulosin (FLOMAX) 0.4 MG capsule 3/28/2024 Active                    ALLERGIES  No Known Allergies    PHYSICAL EXAM  VITAL SIGNS: BP (!) 160/91   Pulse 65   Temp 35.9 °C (96.7 °F) (Temporal)   Resp 16   Ht 1.803 m (5' 11\")   Wt 83 kg (182 lb 15.7 oz)   SpO2 94%   BMI 25.52 kg/m²      Constitutional: Well developed, Well nourished, No acute distress, Non-toxic appearance.   HEENT: Normocephalic, Atraumatic,  external ears normal, pharynx pink,  Mucous  Membranes moist, No rhinorrhea or mucosal edema  Eyes: PERRL, EOMI, Conjunctiva normal, No discharge.   Neck: Normal range of motion, No tenderness, Supple, No stridor.   Lymphatic: No lymphadenopathy    Cardiovascular: Regular Rate and Rhythm, No murmurs,  rubs, or gallops.   Thorax & Lungs: Lungs clear to auscultation bilaterally, No respiratory distress, No wheezes, rhales or rhonchi, No chest wall tenderness.   Abdomen: Bowel sounds normal, Soft, non tender, non distended,  No pulsatile masses., no rebound guarding or peritoneal signs.   Skin: Warm, Dry, No erythema, No rash,   Back:  No CVA tenderness,  No spinal tenderness, bony crepitance step offs or instability.   Extremities: Equal, intact distal pulses, No cyanosis, clubbing 3+ edema left lower extremity 2+ edema right lower extremity,  No tenderness.   Musculoskeletal: Good range of motion in all major joints. No tenderness to palpation or major deformities noted.   Neurologic: Alert & oriented No focal deficits noted.  Psychiatric: Affect normal, Judgment normal, Mood normal.      EKG/LABS  Results for orders " placed or performed during the hospital encounter of 03/28/24   CBC with Differential   Result Value Ref Range    WBC 7.7 4.8 - 10.8 K/uL    RBC 3.23 (L) 4.70 - 6.10 M/uL    Hemoglobin 9.8 (L) 14.0 - 18.0 g/dL    Hematocrit 30.5 (L) 42.0 - 52.0 %    MCV 94.4 81.4 - 97.8 fL    MCH 30.3 27.0 - 33.0 pg    MCHC 32.1 (L) 32.3 - 36.5 g/dL    RDW 51.9 (H) 35.9 - 50.0 fL    Platelet Count 233 164 - 446 K/uL    MPV 9.0 9.0 - 12.9 fL    Neutrophils-Polys 68.10 44.00 - 72.00 %    Lymphocytes 17.10 (L) 22.00 - 41.00 %    Monocytes 7.60 0.00 - 13.40 %    Eosinophils 6.40 0.00 - 6.90 %    Basophils 0.40 0.00 - 1.80 %    Immature Granulocytes 0.40 0.00 - 0.90 %    Nucleated RBC 0.00 0.00 - 0.20 /100 WBC    Neutrophils (Absolute) 5.22 1.82 - 7.42 K/uL    Lymphs (Absolute) 1.31 1.00 - 4.80 K/uL    Monos (Absolute) 0.58 0.00 - 0.85 K/uL    Eos (Absolute) 0.49 0.00 - 0.51 K/uL    Baso (Absolute) 0.03 0.00 - 0.12 K/uL    Immature Granulocytes (abs) 0.03 0.00 - 0.11 K/uL    NRBC (Absolute) 0.00 K/uL   Complete Metabolic Panel (CMP)   Result Value Ref Range    Sodium 140 135 - 145 mmol/L    Potassium 4.1 3.6 - 5.5 mmol/L    Chloride 107 96 - 112 mmol/L    Co2 22 20 - 33 mmol/L    Anion Gap 11.0 7.0 - 16.0    Glucose 125 (H) 65 - 99 mg/dL    Bun 18 8 - 22 mg/dL    Creatinine 1.44 (H) 0.50 - 1.40 mg/dL    Calcium 9.0 8.4 - 10.2 mg/dL    Correct Calcium 9.3 8.5 - 10.5 mg/dL    AST(SGOT) 15 12 - 45 U/L    ALT(SGPT) 8 2 - 50 U/L    Alkaline Phosphatase 65 30 - 99 U/L    Total Bilirubin 0.4 0.1 - 1.5 mg/dL    Albumin 3.6 3.2 - 4.9 g/dL    Total Protein 6.3 6.0 - 8.2 g/dL    Globulin 2.7 1.9 - 3.5 g/dL    A-G Ratio 1.3 g/dL   Troponin STAT   Result Value Ref Range    Troponin T 36 (H) 6 - 19 ng/L   proBrain Natriuretic Peptide, NT   Result Value Ref Range    NT-proBNP 3186 (H) 0 - 125 pg/mL   APTT   Result Value Ref Range    APTT 32.8 24.7 - 36.0 sec   Prothrombin Time   Result Value Ref Range    PT 14.1 12.0 - 14.6 sec    INR 1.04 0.87 - 1.13    ESTIMATED GFR   Result Value Ref Range    GFR (CKD-EPI) 48 (A) >60 mL/min/1.73 m 2     I have independently interpreted this EKG    Labs Reviewed   CBC WITH DIFFERENTIAL - Abnormal; Notable for the following components:       Result Value    RBC 3.23 (*)     Hemoglobin 9.8 (*)     Hematocrit 30.5 (*)     MCHC 32.1 (*)     RDW 51.9 (*)     Lymphocytes 17.10 (*)     All other components within normal limits   COMP METABOLIC PANEL - Abnormal; Notable for the following components:    Glucose 125 (*)     Creatinine 1.44 (*)     All other components within normal limits   TROPONIN - Abnormal; Notable for the following components:    Troponin T 36 (*)     All other components within normal limits   PROBRAIN NATRIURETIC PEPTIDE, NT - Abnormal; Notable for the following components:    NT-proBNP 3186 (*)     All other components within normal limits   ESTIMATED GFR - Abnormal; Notable for the following components:    GFR (CKD-EPI) 48 (*)     All other components within normal limits   APTT   PROTHROMBIN TIME       RADIOLOGY  I have independently interpreted the diagnostic imaging associated with this visit and am waiting the final reading from the radiologist.   My preliminary interpretation is as follows: Chest x-ray no infiltrate or pleural effusion    Radiologist interpretation:  US-EXTREMITY VENOUS LOWER BILAT   Final Result      DX-CHEST-PORTABLE (1 VIEW)   Final Result         1. No acute cardiopulmonary abnormalities are identified.          COURSE & MEDICAL DECISION MAKING    ASSESSMENT, COURSE AND PLAN  Care Narrative: Star Centeno is a 83 y.o. male who presents complaining of bilateral lower extremity edema left worse than right that began 10 days ago but has gotten much worse over the last 2 days.  He denies any pain or redness in the leg.  He does have a history of DVT in the past but is not on any blood thinning medications.  He also has a history of myocardial infarction with 6 stents in the past.  He  denies any chest pain or shortness of breath PND orthopnea.  States he used to be on hydrochlorothiazide but was taken off that by his primary care physician a year ago.  He currently has no cardiologist.  Denies any numbness or tingling to his legs.  The patient uses alcohol 1 glass of wine in the evenings but denies any tobacco use.  Chest Pain: Heart score: 7          ADDITIONAL PROBLEMS MANAGED  Bladder cancer  Diabetes  High cholesterol   Myocardial infarct 6 stents 2004  Pulmonary emboli  Renal disorder    DISPOSITION AND DISCUSSIONS    An IV was started and labs were drawn.  I ordered a BNP because of his history of cardiac disease and bilateral lower extremity edema.  I also ordered a duplex ultrasound of his bilateral lower extremities for evaluation for possible DVT.    Patient's white blood cell count is normal at 7.7 hemoglobin is chronically anemic at 9.8.  He has 17% lymphocytes.  Comprehensive metabolic panel is a slightly elevated glucose of 125 creatinine is elevated at 144 but been as high as 1.60 on 3/6/2024.  Liver function tests and electrolytes are normal.  Troponin is slightly evaded at 36 BNP is elevated at 3186.  His coags are normal.    Patient's chest x-ray shows no infiltrate or pleural effusion.    Duplex ultrasound of both legs show superficial venous thrombosis of the left gastrocnemius vein.     Given the patient's elevated heart score elevated BNP and superficial thrombosis of the leg I believe he needs to be admitted to the hospital for further cardiac evaluation and care.  I will discuss the use of Lovenox versus Eliquis as observation with the admitting physician Dr Sanchez.      I have discussed management of the patient with the following physicians and CON's: Hospitalist Dr. Sanchez who will admit the patient for further care of his superficial vein thrombosis and volume overload of his heart    Discussion of management with other [facility] or appropriate source(s): None      Escalation of care considered, and ultimately not performed: Eliquis or Lovenox was considered but because it is a focal facial vein thrombosis these were deferred for now      Barriers to care at this time, including but not limited to: Patient has bladder cancer.     Decision tools and prescription drugs considered including, but not limited to: HEART Score 7 .    The patient will be admitted in guarded condition  FINAL DIAGNOSIS  1. Volume overload state of heart    2. Elevated troponin    3. Peripheral edema    4. Acute superficial venous thrombosis of left lower extremity           Electronically signed by: Dinorah Schaefer M.D., 3/28/2024 9:32 AM

## 2024-03-28 NOTE — ASSESSMENT & PLAN NOTE
I had a prolonged discussion with the patient regarding goals of care, diagnoses, prognosis, and CODE STATUS. We discussed his prognosis and comorbidities.  The patient has advanced age and number of comorbid conditions including chronic diastolic heart failure, diabetes, hypertension, chronic kidney disease, pulmonary hypertension, history of pulmonary embolism and bladder cancer.  He had multiple hospitalizations/up to 5 over the past 6 months.  Today he is here with acute deep vein thrombosis and acute on chronic diastolic heart failure.  At this point the patient wants to proceed with a full code.  He is open to all forms of invasive and noninvasive diagnostic and therapeutic medical interventions.

## 2024-03-28 NOTE — H&P
Hospital Medicine History & Physical Note    Date of Service  3/28/2024    Primary Care Physician  Nirmal Gentile M.D.    Consultants  None     Code Status  Full Code    Chief Complaint  Chief Complaint   Patient presents with    Leg Swelling     Pt sent to  for Left lower leg swelling, pt states swelling began 10 days ago and has gotten worse over the last 2 days. Mild redness to left lower leg and denies tenderness on palpitation.      History of Presenting Illness  Star Centeno is a 83 y.o. male with a past medical history of bladder cancer, diabetes, hyperlipidemia history of pulmonary embolism not currently on anticoagulation, and chronic kidney disease who presented 3/28/2024 with bilateral lower extremity swelling.  Patient reports that he has been having progressive worsening of urinary swelling over the past 10 days but then got much worse over the past 2 days.  Left lower extremity is more red and swollen compared to the right.  Patient denies having fevers or chills.  He denies having chest pain or significant shortness of breath.      I discussed the plan of care with emergency physician, and the patient.    Review of Systems  Review of Systems   Constitutional:  Positive for malaise/fatigue. Negative for chills and fever.   Eyes:  Negative for discharge and redness.   Respiratory:  Negative for cough and stridor.    Cardiovascular:  Positive for leg swelling. Negative for chest pain.   Gastrointestinal:  Negative for abdominal pain and vomiting.   Genitourinary:  Negative for flank pain.   Musculoskeletal:  Negative for myalgias.        Bilateral lower extremity swelling, left more than right   Skin: Negative.    Neurological:  Negative for focal weakness.   Endo/Heme/Allergies:  Does not bruise/bleed easily.   Psychiatric/Behavioral:  The patient is not nervous/anxious.      Past Medical History   has a past medical history of Cancer (HCC), Cataract, Diabetes (HCC), High cholesterol,  History of heart artery stent, Hyperlipidemia, Hypertension, Myocardial infarct (HCC), Pulmonary emboli (HCC), Renal disorder, and Urinary bladder disorder.    Surgical History   has a past surgical history that includes eye surgery; prostatectomy, radical retro; pr cystourethroscopy,biopsies (N/A, 10/24/2023); pr cysto/uretero/pyeloscopy, dx (Left, 10/24/2023); pr cystoscopy,insert ureteral stent (Left, 10/24/2023); pr cystoscopy,insert ureteral stent (Left, 01/04/2024); pr cysto/uretero/pyeloscopy, dx (01/04/2024); other cardiac surgery; pr unlisted laparoscopy proc, ureter (2/2/2024); cysto stent placemnt pre surg (2/2/2024); and lymphadectomy laparoscopic (2/2/2024).     Family History  family history includes Diabetes in his brother and sister; Genetic Disorder in his brother, sister, and sister; Hyperlipidemia in his brother; Hypertension in his brother.      Social History   reports that he quit smoking about 23 years ago. His smoking use included cigarettes. He started smoking about 68 years ago. He has a 45 pack-year smoking history. He has never used smokeless tobacco. He reports current alcohol use of about 4.2 oz of alcohol per week. He reports that he does not use drugs.    Allergies  No Known Allergies    Medications  Prior to Admission Medications   Prescriptions Last Dose Informant Patient Reported? Taking?   ALPRAZolam (XANAX) 0.25 MG Tab PRN at PRN Family Member Yes No   Sig: Take 0.25 mg by mouth 3 times a day as needed for Anxiety.   Coenzyme Q10 (COQ-10 PO) 3/27/2024 at PM Family Member Yes Yes   Sig: Take 1 Capsule by mouth every evening. Indications: heart health   Empagliflozin (JARDIANCE) 25 MG Tab ON HOLD at ON HOLD Family Member Yes Yes   Sig: Take 25 mg by mouth every morning.   Loperamide HCl (IMODIUM A-D) 1 MG/7.5ML Liquid 3/27/2024 at PRN Family Member Yes Yes   Sig: Take 30 mL by mouth 2 times a day as needed (diarrhea). Indications: Diarrhea   Omega-3 Fatty Acids (FISH OIL PO)  3/28/2024 at 0630 Family Member Yes Yes   Sig: Take 1 Capsule by mouth every morning. Indications: heart health   Prenatal Multivit-Min-Fe-FA (PRE-COLE FORMULA PO) 3/28/2024 at 0630 Family Member Yes Yes   Sig: Take 1 Tablet by mouth every morning. Indications: supplement   acetaminophen (TYLENOL) 500 MG Tab PRN at PRN Family Member No No   Sig: Take 1 Tablet by mouth every 6 hours as needed for Mild Pain or Moderate Pain.   aspirin 81 MG EC tablet 3/28/2024 at 0630 Family Member Yes Yes   Sig: Take 81 mg by mouth every morning. Indications: blood thinner   bismuth subsalicylate (PEPTO-BISMOL) 262 MG/15ML Suspension PRN at PRN Family Member Yes No   Sig: Take 30 mL by mouth every 6 hours as needed (diarrhea). Indications: Diarrhea, Heartburn   cephALEXin (KEFLEX) 250 MG Cap COMPLETE at COMPLETE Family Member Yes No   Sig: TAKE 1 CAPSULE BY MOUTH EVERY DAY FOR 14 DAYS   diclofenac sodium (VOLTAREN) 1 % Gel PRN at PRN Family Member Yes No   Sig: Apply 2 g topically 4 times a day as needed (Shoulder Pain). Indications: Joint Damage causing Pain and Loss of Function   ezetimibe (ZETIA) 10 MG Tab 3/27/2024 at PM Family Member No Yes   Sig: Take 1 Tablet by mouth every day. Indications: High Amount of Fats in the Blood   Patient taking differently: Take 10 mg by mouth every evening. Indications: High Amount of Fats in the Blood   metFORMIN (GLUCOPHAGE) 500 MG Tab 3/28/2024 at 0630 Family Member No Yes   Sig: Take 1 Tablet by mouth 2 times a day with meals for 360 days.   metoprolol SR (TOPROL XL) 25 MG TABLET SR 24 HR 3/28/2024 at 0630 Family Member No Yes   Sig: Take 1 Tablet by mouth every day.   oxyCODONE immediate-release (ROXICODONE) 5 MG Tab PRN at PRN Family Member Yes No   Sig: Take 5 mg by mouth every 6 hours as needed for Severe Pain.   rosuvastatin (CRESTOR) 5 MG Tab 3/27/2024 at PM Family Member No Yes   Sig: TAKE 1 TABLET BY MOUTH EVERY DAY   Patient taking differently: Take 5 mg by mouth every evening.  Indications: High Amount of Fats in the Blood   sodium bicarbonate (SODIUM BICARBONATE) 650 MG Tab 3/28/2024 at 0630 Family Member Yes Yes   Sig: TAKE 2 TABLET BY MOUTH 3 TIMES A DAY *TAKE FOR ACID IN THE BLOOD   tamsulosin (FLOMAX) 0.4 MG capsule 3/28/2024 at 0630 Family Member No Yes   Sig: Take 1 Capsule by mouth 1/2 hour after breakfast.      Facility-Administered Medications: None     Physical Exam  Temp:  [35.9 °C (96.7 °F)-36.3 °C (97.4 °F)] 36.3 °C (97.3 °F)  Pulse:  [55-65] 62  Resp:  [16-20] 18  BP: (134-160)/(62-91) 149/62  SpO2:  [89 %-97 %] 93 %  Blood Pressure : (!) 140/72   Temperature: 35.9 °C (96.7 °F)   Pulse: (!) 55   Respiration: 16   Pulse Oximetry: 92 %     Physical Exam  Constitutional:       General: He is not in acute distress.     Appearance: He is not ill-appearing or diaphoretic.   HENT:      Head: Atraumatic.      Right Ear: External ear normal.      Left Ear: External ear normal.      Nose: No congestion or rhinorrhea.      Mouth/Throat:      Mouth: Mucous membranes are moist.   Eyes:      General: No scleral icterus.        Right eye: No discharge.         Left eye: No discharge.      Pupils: Pupils are equal, round, and reactive to light.   Cardiovascular:      Rate and Rhythm: Normal rate and regular rhythm.   Pulmonary:      Effort: Pulmonary effort is normal.   Abdominal:      General: There is no distension.   Musculoskeletal:      Cervical back: Neck supple. No rigidity. No muscular tenderness.      Right lower leg: Edema present.      Left lower leg: Edema present.      Comments: Bilateral lower extremity edema, left more than right   Skin:     Coloration: Skin is not jaundiced or pale.   Neurological:      Mental Status: He is alert and oriented to person, place, and time.      Coordination: Coordination normal.   Psychiatric:         Mood and Affect: Mood normal.         Behavior: Behavior normal.       Laboratory:  Recent Labs     03/28/24  0938   WBC 7.7   RBC 3.23*    HEMOGLOBIN 9.8*   HEMATOCRIT 30.5*   MCV 94.4   MCH 30.3   MCHC 32.1*   RDW 51.9*   PLATELETCT 233   MPV 9.0     Recent Labs     03/28/24  0938   SODIUM 140   POTASSIUM 4.1   CHLORIDE 107   CO2 22   GLUCOSE 125*   BUN 18   CREATININE 1.44*   CALCIUM 9.0     Recent Labs     03/28/24  0938   ALTSGPT 8   ASTSGOT 15   ALKPHOSPHAT 65   TBILIRUBIN 0.4   GLUCOSE 125*     Recent Labs     03/28/24  0938   APTT 32.8   INR 1.04     Recent Labs     03/28/24  0938   NTPROBNP 3186*         Recent Labs     03/28/24  0938   TROPONINT 36*     Imaging:  US-EXTREMITY VENOUS LOWER BILAT   Final Result      DX-CHEST-PORTABLE (1 VIEW)   Final Result         1. No acute cardiopulmonary abnormalities are identified.      EC-ECHOCARDIOGRAM COMPLETE W/O CONT    (Results Pending)     Assessment/Plan:  Justification for Admission Status  I anticipate this patient will require at least two midnights for appropriate medical management, necessitating inpatient admission because the patient has acute on chronic diastolic heart failure with acute deep vein thrombosis.  He will require intravenous diuretics and initiating anticoagulation.    Patient will need a Telemetry bed on medical service.  The patient has acute heart failure.    * Acute on chronic diastolic heart failure (HCC)- (present on admission)  Assessment & Plan  I will start Intravenous diuretics.  Log daily strict input and output  I will start metoprolol with hold parameters.  Log daily standing weights.  I will order a follow up BMP to watch renal function, & electrolytes.  Replace electrolytes as needed.    Acute deep vein thrombosis (DVT) of calf muscle vein of left lower extremity (HCC)- (present on admission)  Assessment & Plan  Ultrasound shows evidence for acute team vein thrombosis within the left gastrocnemius vein.  I discussed the finding with the patient.  The patient used to be on anticoagulation for history of pulmonary embolism however he stopped anticoagulation due  to refractory hematuria.  I discussed risks and benefits of anticoagulation.  At this point the patient is agreeable to start therapeutic anticoagulation.  He wants to avoid high-dose/loading doses.  I will start apixaban 5 mg twice daily.  Monitor closely for hematuria.  I will order a follow-up CBC    Elevated troponin- (present on admission)  Assessment & Plan  In the indeterminate range  Denies chest pain.  Elevated troponin is likely secondary to reduced renal clearance given his current renal function.  I will place on continuous cardiac monitoring    I will trend troponin levels  Stat EKG, troponin for chest pain or worsening shortness of breath     Anemia- (present on admission)  Assessment & Plan  Appears to be chronic.  Likely secondary to bladder cancer and nutritional.  Has history of hematuria.  No evidence of gross bleeding.  Monitor hemoglobin. I will order a follow-up CBC.  Transfuse for a hemoglobin of less than or equal to 7     Stage 3b chronic kidney disease (HCC)- (present on admission)  Assessment & Plan  Avoid/minimize nephrotoxins as much as possible, renally dose medications, monitor inputs and outputs     History of pulmonary embolism- (present on admission)  Assessment & Plan  Stopped anticoagulation in the past due to refractory hematuria.  Now presenting with acute deep vein thrombosis.  I discussed risks and benefits of anticoagulation.  At this point the patient is agreeable to start therapeutic anticoagulation.  He wants to avoid high-dose/loading doses.  I will start apixaban 5 mg twice daily.  Monitor closely for hematuria.    Pulmonary hypertension (HCC)- (present on admission)  Assessment & Plan  I will start intravenous Lasix.  Oxygen as needed, Respiratory protocol, Bronchodilators, Incentive spirometry     Non-insulin dependent type 2 diabetes mellitus (HCC)- (present on admission)  Assessment & Plan  With no significant hyperglycemia  Last glycated hemoglobin was 6.7%  I will  start short acting insulin for now  I will order Accu-Checks, hypoglycemia protocol  Adjust according to blood sugars trend.     Primary hypertension- (present on admission)  Assessment & Plan  I will start metoprolol with hold parameters    ACP (advance care planning)- (present on admission)  Assessment & Plan  I had a prolonged discussion with the patient regarding goals of care, diagnoses, prognosis, and CODE STATUS. We discussed his prognosis and comorbidities.  The patient has advanced age and number of comorbid conditions including chronic diastolic heart failure, diabetes, hypertension, chronic kidney disease, pulmonary hypertension, history of pulmonary embolism and bladder cancer.  He had multiple hospitalizations/up to 5 over the past 6 months.  Today he is here with acute deep vein thrombosis and acute on chronic diastolic heart failure.  At this point the patient wants to proceed with a full code.  He is open to all forms of invasive and noninvasive diagnostic and therapeutic medical interventions.       VTE prophylaxis: SCDs/TEDs and therapeutic anticoagulation with apixaban.    I had a prolonged discussion with the patient regarding goals of care, diagnoses, prognosis, and CODE STATUS. We discussed his prognosis and comorbidities. The patient has advanced age and number of comorbid conditions including chronic diastolic heart failure, diabetes, hypertension, chronic kidney disease, pulmonary hypertension, history of pulmonary embolism and bladder cancer.  He had multiple hospitalizations/up to 5 over the past 6 months.  Today he is here with acute deep vein thrombosis and acute on chronic diastolic heart failure.  At this point the patient wants to proceed with a full code.  He is open to all forms of invasive and noninvasive diagnostic and therapeutic medical interventions. I spent 17 minutes on advanced care planning in addition to the time spent managing the other medical problems.

## 2024-03-28 NOTE — ASSESSMENT & PLAN NOTE
I will start intravenous Lasix.  Oxygen as needed, Respiratory protocol, Bronchodilators, Incentive spirometry

## 2024-03-28 NOTE — ASSESSMENT & PLAN NOTE
With no significant hyperglycemia  Last glycated hemoglobin was 6.7%  I will start short acting insulin for now  I will order Accu-Checks, hypoglycemia protocol  Adjust according to blood sugars trend.

## 2024-03-28 NOTE — ED TRIAGE NOTES
"Chief Complaint   Patient presents with    Leg Swelling     Pt sent to  for Left lower leg swelling, pt states swelling began 10 days ago and has gotten worse over the last 2 days. Mild redness to left lower leg and denies tenderness on palpitation.      Pt has appt today to begin treatment for Bladder Cancer.     BP (!) 160/91   Pulse 65   Temp 35.9 °C (96.7 °F) (Temporal)   Resp 16   Ht 1.803 m (5' 11\")   Wt 83 kg (182 lb 15.7 oz)   SpO2 94%   BMI 25.52 kg/m²     "

## 2024-03-28 NOTE — PROGRESS NOTES
Subjective:     Star Centeno is a 83 y.o. male who presents for Other (Bilateral lower legs(mainly Lt leg) swollen; was taking Hydrochlorothiazide 25 MG Tab.  and stopped it, has had this issues since 2024,  will start a Bladder Cancer Lv 2, Bicarbonate Sodium: unsure if it affected the swelling )      Presents for leg swelling x 10 days. Left side is greater. Had stopped taking HCTZ October or November. His Nephrologist started him on Sodium bicarb. He also been on antibiotics. No other new medications. Denies leg pain. Is due to start cancer treatment today at 1:30.           Leg Swelling  This is a recurrent problem. The current episode started 1 to 4 weeks ago. Pertinent negatives include no chest pain.       Past Medical History:   Diagnosis Date    Cancer (HCC)     Cataract     iol    Diabetes (HCC)     High cholesterol     History of heart artery stent     Hyperlipidemia     Hypertension     Myocardial infarct (HCC)     x6 stents 2004    Pulmonary emboli (HCC)     Renal disorder     Urinary bladder disorder     Recurrent UTI       Past Surgical History:   Procedure Laterality Date    IN UNLISTED LAPAROSCOPY PROC, URETER  2/2/2024    Procedure: URETERECTOMY BLADDER CUFF EXCISION AND LEFT URETERAL REIMPLANTATION, ROBOT-ASSISTED;  Surgeon: Shane LAVAREZ M.D.;  Location: [facility];  Service: Uro Robotic    CYSTO STENT PLACEMNT PRE SURG  2/2/2024    Procedure: CYSTOSCOPY WITH LEFT URETEROSCOPY AND LEFT STENT REMOVAL;  Surgeon: Shane ALVAREZ M.D.;  Location: [facility];  Service: Uro Robotic    LYMPHADECTOMY LAPAROSCOPIC  2/2/2024    Procedure: ROBOTIC PELVIC LYMPH NODE DISSECTION;  Surgeon: Shane ALVAREZ M.D.;  Location: [facility];  Service: Uro Robotic    IN CYSTOSCOPY,INSERT URETERAL STENT Left 01/04/2024    Procedure: CYSTOSCOPY, WITH LEFT URETEROSCOPY, WITH URETERAL BIOPSY WITH LEFT STENT REPLACEMENT;  Surgeon: Shane ALVAREZ M.D.;  Location: [facility];  Service: Urology    UT CYSTO/URETERO/PYELOSCOPY, DX  2024    Procedure: URETEROSCOPY;  Surgeon: Shane ALVAREZ M.D.;  Location: [facility];  Service: Urology    UT CYSTOURETHROSCOPY,BIOPSIES N/A 10/24/2023    Procedure: CYSTOSCOPY, WITH BLADDER BIOPSY, LEFT RETROGRADE PYELOGRAM;  Surgeon: Jourdan Londono M.D.;  Location: [facility];  Service: Urology    UT CYSTO/URETERO/PYELOSCOPY, DX Left 10/24/2023    Procedure: URETEROSCOPY, LEFT;  Surgeon: Jourdan Londono M.D.;  Location: SUR[facility]ERY [facility];  Service: Urology    UT CYSTOSCOPY,INSERT URETERAL STENT Left 10/24/2023    Procedure: CYSTOSCOPY, WITH URETERAL STENT INSERTION;  Surgeon: Jourdan Londono M.D.;  Location: SUR[facility]ERY [facility];  Service: Urology    EYE SURGERY      OTHER CARDIAC SURGERY      6 stents    PROSTATECTOMY, RADICAL RETRO         Social History     Socioeconomic History    Marital status:      Spouse name: Not on file    Number of children: Not on file    Years of education: Not on file    Highest education level: Associate degree: academic program   Occupational History    Not on file   Tobacco Use    Smoking status: Former     Current packs/day: 0.00     Average packs/day: 1 pack/day for 45.0 years (45.0 ttl pk-yrs)     Types: Cigarettes     Start date: 1956     Quit date: 2001     Years since quittin.2    Smokeless tobacco: Never   Vaping Use    Vaping Use: Never used   Substance and Sexual Activity    Alcohol use: Yes     Alcohol/week: 8.4 oz     Types: 14 Glasses of wine per week     Comment: 1 glass of wine/nightly    Drug use: Never    Sexual activity: Not Currently     Partners: Female     Birth control/protection: Condom   Other Topics Concern    Not on file   Social History Narrative    Not on file     Social Determinants of Health     Financial Resource Strain: Low Risk  (2022)    Overall Financial Resource Strain (CARDIA)     Difficulty of Paying  "Living Expenses: Not hard at all   Food Insecurity: No Food Insecurity (7/25/2022)    Hunger Vital Sign     Worried About Running Out of Food in the Last Year: Never true     Ran Out of Food in the Last Year: Never true   Transportation Needs: No Transportation Needs (7/25/2022)    PRAPARE - Transportation     Lack of Transportation (Medical): No     Lack of Transportation (Non-Medical): No   Physical Activity: Inactive (7/25/2022)    Exercise Vital Sign     Days of Exercise per Week: 0 days     Minutes of Exercise per Session: 0 min   Stress: Stress Concern Present (7/25/2022)    [employer] [place] [employer] - Occupational Stress Questionnaire     Feeling of Stress : To some extent   Social Connections: Feeling Socially Isolated (12/14/2023)    OASIS : Social Isolation     Frequency of experiencing loneliness or isolation: Often   Intimate Partner Violence: Not on file   Housing Stability: Low Risk  (7/25/2022)    Housing Stability Vital Sign     Unable to Pay for Housing in the Last Year: No     Number of Places Lived in the Last Year: 2     Unstable Housing in the Last Year: No        Family History   Problem Relation Age of Onset    Genetic Disorder Brother     Diabetes Brother     Hypertension Brother     Hyperlipidemia Brother     Genetic Disorder Sister     Diabetes Sister     Genetic Disorder Sister         No Known Allergies    Review of Systems   Respiratory:  Negative for shortness of breath.    Cardiovascular:  Positive for leg swelling. Negative for chest pain.   All other systems reviewed and are negative.       Objective:   /62 (BP Location: Left arm, Patient Position: Sitting, BP Cuff Size: Adult)   Pulse 64   Temp 36.3 °C (97.4 °F) (Temporal)   Resp 20   Ht 1.803 m (5' 11\")   Wt 81.6 kg (180 lb)   SpO2 95%   BMI 25.10 kg/m²     Physical Exam  Vitals reviewed.   Constitutional:       General: He is not in acute distress.     Appearance: He is not toxic-appearing. "   Cardiovascular:      Rate and Rhythm: Normal rate.      Pulses:           Dorsalis pedis pulses are 2+ on the left side.      Comments: Peripheral leg swelling, left leg greater. No knee swelling. No heat or erythema  Pulmonary:      Effort: Pulmonary effort is normal. No respiratory distress.      Breath sounds: Normal breath sounds.   Musculoskeletal:      Right lower leg: Edema present.      Left lower leg: Edema present.   Skin:     General: Skin is warm and dry.      Capillary Refill: Capillary refill takes less than 2 seconds.      Findings: No erythema.   Neurological:      Mental Status: He is alert and oriented to person, place, and time.         Assessment/Plan:   1. Leg swelling  - US-EXTREMITY VENOUS LOWER UNILAT LEFT; Future    Other orders  - sodium bicarbonate (SODIUM BICARBONATE) 650 MG Tab; TAKE 2 TABLET BY MOUTH 3 TIMES A DAY *TAKE FOR ACID IN THE BLOOD  - cephALEXin (KEFLEX) 250 MG Cap; TAKE 1 CAPSULE BY MOUTH EVERY DAY FOR 14 DAYS    -Reviewed labs form 3 days ago, renal function is stable. Patient does have a significant cardiac history. Denies SOB or CP. Presents with acute peripheral edema, significantly greater on the left. Discussed imaging to r/o DVT.  Was unable to obtain imaging today. Referred to the ED for further evaluation.    Differential diagnosis, natural history, supportive care, and indications for immediate follow-up discussed.

## 2024-03-28 NOTE — ASSESSMENT & PLAN NOTE
I will start Intravenous diuretics.  Log daily strict input and output  I will start metoprolol with hold parameters.  Log daily standing weights.  I will order a follow up BMP to watch renal function, & electrolytes.  Replace electrolytes as needed.

## 2024-03-28 NOTE — ASSESSMENT & PLAN NOTE
In the indeterminate range  Denies chest pain.  Elevated troponin is likely secondary to reduced renal clearance given his current renal function.  I will place on continuous cardiac monitoring    I will trend troponin levels  Stat EKG, troponin for chest pain or worsening shortness of breath

## 2024-03-29 ENCOUNTER — APPOINTMENT (OUTPATIENT)
Dept: CARDIOLOGY | Facility: MEDICAL CENTER | Age: 84
End: 2024-03-29
Attending: HOSPITALIST
Payer: MEDICARE

## 2024-03-29 VITALS
DIASTOLIC BLOOD PRESSURE: 83 MMHG | HEIGHT: 71 IN | BODY MASS INDEX: 24.81 KG/M2 | TEMPERATURE: 98.3 F | SYSTOLIC BLOOD PRESSURE: 146 MMHG | OXYGEN SATURATION: 95 % | RESPIRATION RATE: 20 BRPM | WEIGHT: 177.25 LBS | HEART RATE: 76 BPM

## 2024-03-29 LAB
ALBUMIN SERPL BCP-MCNC: 3.4 G/DL (ref 3.2–4.9)
ALBUMIN/GLOB SERPL: 1.2 G/DL
ALP SERPL-CCNC: 64 U/L (ref 30–99)
ALT SERPL-CCNC: 8 U/L (ref 2–50)
ANION GAP SERPL CALC-SCNC: 13 MMOL/L (ref 7–16)
AST SERPL-CCNC: 15 U/L (ref 12–45)
BILIRUB SERPL-MCNC: 0.5 MG/DL (ref 0.1–1.5)
BUN SERPL-MCNC: 19 MG/DL (ref 8–22)
CALCIUM ALBUM COR SERPL-MCNC: 9.4 MG/DL (ref 8.5–10.5)
CALCIUM SERPL-MCNC: 8.9 MG/DL (ref 8.4–10.2)
CHLORIDE SERPL-SCNC: 106 MMOL/L (ref 96–112)
CO2 SERPL-SCNC: 20 MMOL/L (ref 20–33)
CREAT SERPL-MCNC: 1.2 MG/DL (ref 0.5–1.4)
ERYTHROCYTE [DISTWIDTH] IN BLOOD BY AUTOMATED COUNT: 51.8 FL (ref 35.9–50)
GFR SERPLBLD CREATININE-BSD FMLA CKD-EPI: 59 ML/MIN/1.73 M 2
GLOBULIN SER CALC-MCNC: 2.8 G/DL (ref 1.9–3.5)
GLUCOSE BLD STRIP.AUTO-MCNC: 114 MG/DL (ref 65–99)
GLUCOSE BLD STRIP.AUTO-MCNC: 120 MG/DL (ref 65–99)
GLUCOSE BLD STRIP.AUTO-MCNC: 128 MG/DL (ref 65–99)
GLUCOSE SERPL-MCNC: 102 MG/DL (ref 65–99)
HCT VFR BLD AUTO: 32 % (ref 42–52)
HGB BLD-MCNC: 10.1 G/DL (ref 14–18)
LV EJECT FRACT MOD 2C 99903: 75.12
LV EJECT FRACT MOD 4C 99902: 54.99
LV EJECT FRACT MOD BP 99901: 63.11
MAGNESIUM SERPL-MCNC: 1.9 MG/DL (ref 1.5–2.5)
MCH RBC QN AUTO: 29.7 PG (ref 27–33)
MCHC RBC AUTO-ENTMCNC: 31.6 G/DL (ref 32.3–36.5)
MCV RBC AUTO: 94.1 FL (ref 81.4–97.8)
PLATELET # BLD AUTO: 253 K/UL (ref 164–446)
PMV BLD AUTO: 9.5 FL (ref 9–12.9)
POTASSIUM SERPL-SCNC: 3.8 MMOL/L (ref 3.6–5.5)
PROT SERPL-MCNC: 6.2 G/DL (ref 6–8.2)
RBC # BLD AUTO: 3.4 M/UL (ref 4.7–6.1)
SODIUM SERPL-SCNC: 139 MMOL/L (ref 135–145)
WBC # BLD AUTO: 8.2 K/UL (ref 4.8–10.8)

## 2024-03-29 PROCEDURE — 94760 N-INVAS EAR/PLS OXIMETRY 1: CPT

## 2024-03-29 PROCEDURE — 99239 HOSP IP/OBS DSCHRG MGMT >30: CPT | Performed by: INTERNAL MEDICINE

## 2024-03-29 PROCEDURE — 93306 TTE W/DOPPLER COMPLETE: CPT

## 2024-03-29 PROCEDURE — 93306 TTE W/DOPPLER COMPLETE: CPT | Mod: 26 | Performed by: STUDENT IN AN ORGANIZED HEALTH CARE EDUCATION/TRAINING PROGRAM

## 2024-03-29 PROCEDURE — 700102 HCHG RX REV CODE 250 W/ 637 OVERRIDE(OP): Performed by: HOSPITALIST

## 2024-03-29 PROCEDURE — 85027 COMPLETE CBC AUTOMATED: CPT

## 2024-03-29 PROCEDURE — 83735 ASSAY OF MAGNESIUM: CPT

## 2024-03-29 PROCEDURE — 82962 GLUCOSE BLOOD TEST: CPT

## 2024-03-29 PROCEDURE — 80053 COMPREHEN METABOLIC PANEL: CPT

## 2024-03-29 PROCEDURE — 36415 COLL VENOUS BLD VENIPUNCTURE: CPT

## 2024-03-29 PROCEDURE — A9270 NON-COVERED ITEM OR SERVICE: HCPCS | Performed by: HOSPITALIST

## 2024-03-29 PROCEDURE — 700111 HCHG RX REV CODE 636 W/ 250 OVERRIDE (IP): Performed by: HOSPITALIST

## 2024-03-29 RX ORDER — FUROSEMIDE 20 MG/1
20 TABLET ORAL DAILY
Qty: 30 TABLET | Refills: 2 | Status: SHIPPED | OUTPATIENT
Start: 2024-03-29 | End: 2024-04-28

## 2024-03-29 RX ADMIN — FUROSEMIDE 20 MG: 10 INJECTION, SOLUTION INTRAMUSCULAR; INTRAVENOUS at 04:39

## 2024-03-29 RX ADMIN — APIXABAN 5 MG: 5 TABLET, FILM COATED ORAL at 04:39

## 2024-03-29 RX ADMIN — METOPROLOL SUCCINATE 25 MG: 25 TABLET, EXTENDED RELEASE ORAL at 06:27

## 2024-03-29 RX ADMIN — SODIUM BICARBONATE 650 MG: 650 TABLET ORAL at 07:45

## 2024-03-29 RX ADMIN — TAMSULOSIN HYDROCHLORIDE 0.4 MG: 0.4 CAPSULE ORAL at 07:46

## 2024-03-29 RX ADMIN — ASPIRIN 81 MG: 81 TABLET, COATED ORAL at 06:27

## 2024-03-29 ASSESSMENT — PAIN DESCRIPTION - PAIN TYPE: TYPE: ACUTE PAIN

## 2024-03-29 NOTE — PROGRESS NOTES
Pt. Output 100ml overnight, was not able to record any other output from pt. Due to incontinence episode this am.

## 2024-03-29 NOTE — DISCHARGE PLANNING
Case Management Discharge Planning    Admission Date: 3/28/2024  GMLOS: 3  ALOS: 1    6-Clicks ADL Score: 24  6-Clicks Mobility Score: 24      Anticipated Discharge Dispo: Discharge Disposition: Discharged to home/self care (01)    DME Needed: No    Action(s) Taken: DC Assessment Complete (See below)    Escalations Completed: None    Medically Clear: No    Barriers to Discharge: Medical clearance     Course of Action    Patient has had several readmissions in the past month. Assessment completed via chart review as he was last discharged about two weeks ago. Patient reports he currently lives alone in a one story house. Patient identifies his daughter as support, and shares he has his son who lives in [place]. Patient reports being independent with ADLs prior to admission, uses cane occasionally at baseline.     Care Transition Team Assessment    Information Source  Orientation Level: Oriented X4  Information Given By: Patient  Who is responsible for making decisions for patient? : Patient    Readmission Evaluation  Is this a readmission?: No    Elopement Risk  Legal Hold: No  Ambulatory or Self Mobile in Wheelchair: Yes  Disoriented: No  Psychiatric Symptoms: None  History of Wandering: No  Elopement this Admit: No  Vocalizing Wanting to Leave: No  Displays Behaviors, Body Language Wanting to Leave: No-Not at Risk for Elopement  Elopement Risk: Not at Risk for Elopement    Interdisciplinary Discharge Planning  Lives with - Patient's Self Care Capacity: Alone and Able to Care For Self  Patient or legal guardian wants to designate a caregiver: No  Housing / Facility: 1 Story Apartment / Condo    Discharge Preparedness  What is your plan after discharge?: Home with help  What are your discharge supports?: Child  Prior Functional Level: Independent with Activities of Daily Living, Independent with Medication Management, Uses Cane    Functional Assesment  Prior Functional Level: Independent with Activities of Daily  Living, Independent with Medication Management, Uses Cane    Finances  Financial Barriers to Discharge: No  Prescription Coverage: Yes    Vision / Hearing Impairment  Vision Impairment : Yes  Hearing Impairment : Yes  Hearing Impairment: Hearing Device(s) Available    Advance Directive  Advance Directive?: DPOA for Health Care    Domestic Abuse  Have you ever been the victim of abuse or violence?: No  Physical Abuse or Sexual Abuse: No  Verbal Abuse or Emotional Abuse: No  Possible Abuse/Neglect Reported to:: Not Applicable    Psychological Assessment  History of Substance Abuse: None    Anticipated Discharge Information  Discharge Disposition: Discharged to home/self care (01)

## 2024-03-29 NOTE — CARE PLAN
The patient is Watcher - Medium risk of patient condition declining or worsening    Shift Goals  Clinical Goals: Diurese, monitor labs and vital signs  Patient Goals: Rest    Progress made toward(s) clinical / shift goals:  VSS no complaints of pain, pt. Is experiencing some diarrhea tonight.      Problem: Knowledge Deficit - Standard  Goal: Patient and family/care givers will demonstrate understanding of plan of care, disease process/condition, diagnostic tests and medications  Outcome: Progressing     Problem: Pain - Standard  Goal: Alleviation of pain or a reduction in pain to the patient’s comfort goal  Outcome: Progressing     Problem: Fall Risk  Goal: Patient will remain free from falls  Outcome: Progressing

## 2024-03-29 NOTE — DISCHARGE INSTRUCTIONS
Resume normal activity. Take medications as prescribed. Follow up with primary. Follow up with heart center.

## 2024-03-29 NOTE — PROGRESS NOTES
4 Eyes Skin Assessment Completed by JB Gaston and JB Cummins.    Head WDL  Ears WDL  Nose WDL  Mouth WDL  Neck WDL  Breast/Chest WDL  Shoulder Blades WDL  Spine WDL  (R) Arm/Elbow/Hand Redness and Blanching on elbow  (L) Arm/Elbow/Hand Redness and Blanching on elbow   Abdomen Scar  Groin WDL  Scrotum/Coccyx/Buttocks Redness, Blanching, and Excoriation on sacrum   (R) Leg WDL  (L) Leg Swelling on ankle  (R) Heel/Foot/Toe WDL  (L) Heel/Foot/Toe WDL          Devices In Places Tele Box      Interventions In Place Pillows    Possible Skin Injury No    Pictures Uploaded Into Epic Yes  Wound Consult Placed N/A  RN Wound Prevention Protocol Ordered No

## 2024-03-29 NOTE — PROGRESS NOTES
Telemetry Summary     Rhythm: SR  Rate: 61-70  Ectopy: o-rPVC, fPAC  Measurements: .24/.08/.44    Normal Values   Rhythm: SR  HR Range:   Measurement: 0.12-0.20/0.06-0.10/0.30-0.52

## 2024-03-29 NOTE — DIETARY
"Nutrition services: Day 1 of admit.  Star Centeno is a 83 y.o. male with admitting DX of  Left lower extremity swelling,  Heart failure     Consult received for MST of 3 on nutrition screen due to report of 14-23 lb wt loss x > 1 year and poor PO PTA.       Assessment:  Height: 180.3 cm (5' 11\")  Weight: 80.4 kg (177 lb 4 oz)  Body mass index is 24.72 kg/m²., BMI classification: WNL   Diet/Intake: cardiac    25-50% of breakfast this morning    Evaluation:   Pt presented w/ left lower leg swelling  History of bladder cancer, diabetes, hyperlipidemia, and chronic kidney disease.  Labs: 3/29/24: glucose=102. POC glucose: 128, 114, 122. 1/30/24: A1c=6.7  Meds: Lasix, SSI  Pt's glucose levels are well controlled on cardiac diet. Current diet order appears to be appropriate.    RD met w/ pt in his room. Pt said that he lost 20 lb starting in June because his wife passed away. His appetite was poor at that time but it has improved. He is now eating at least 2 meals a day- primarily breakfast and dinner. His meals are well balanced consisting of high protein foods and veggies such as salad. He will drink Ensure supplements for lunch at times. He agreed to Ensure supplements w/ meals during his hospitalization. He is hoping he will go home today.     Malnutrition Risk: ASPEN criteria for malnutrition not met.     Recommendations/Plan:  Add Ensure Plus to meals TID   Encourage intake of >50%  Document intake of all meals and supplements  as % taken in ADL's to provide interdisciplinary communication across all shifts.   Monitor weight.  Nutrition rep will continue to see patient for ongoing meal and snack preferences.      RD will follow  "

## 2024-03-29 NOTE — PROGRESS NOTES
PT is ready for discharge. Removed IV. Discussed AVS, follow ups, and new medications Lasix and Xarelto. Kelly'ts son is his ride home.

## 2024-03-29 NOTE — DISCHARGE SUMMARY
Discharge Summary    CHIEF COMPLAINT ON ADMISSION  Chief Complaint   Patient presents with    Leg Swelling     Pt sent to  for Left lower leg swelling, pt states swelling began 10 days ago and has gotten worse over the last 2 days. Mild redness to left lower leg and denies tenderness on palpitation.        Reason for Admission  Leg Swelling     Admission Date  3/28/2024    CODE STATUS  Full Code    HPI & HOSPITAL COURSE  Is an 83-year-old male with history of bladder cancer, diabetes, hyper lipidemia and history of PE not recently on anticoagulation for the last 2 months.  Presents with bilateral lower extremity swelling left greater than right.  Initially he presented to urgent care and was told he needed to come to the emergency room.  Patient had ultrasound of the lower extremity and found to have DVT within the left gastrocnemius vein and no evidence of DVT in the right lower extremity.  2D echocardiogram was also completed to make sure there was no evidence of heart failure and patient with a ejection fraction of 65% and grade 1 diastolic dysfunction.  Only new finding from the echo since 2022 is mild aortic regurg and mild pulmonary hypertension.  Patient responded very well to the IV diuresis and his lower extremities are no longer swollen.  Given the grade 1 diastolic dysfunction I feel that he would benefit from low-dose Lasix to make sure he does not continue to have lower extremity edema and to follow-up with cardiology.  Patient states he is in the process of getting another appointment with [facility].  He was started on Eliquis in the hospital for his DVT however the cost of Eliquis was significantly higher than Xarelto for him therefore he was transition to oral Xarelto 15 mg daily.  Patient agreeable with discharge.    Therefore, he is discharged in good and stable condition to home with close outpatient follow-up.    The patient recovered much more quickly than anticipated on  admission.    Discharge Date  3/29/2024    FOLLOW UP ITEMS POST DISCHARGE  PCP, cardiology    DISCHARGE DIAGNOSES  Principal Problem:    Acute on chronic diastolic heart failure (HCC) (POA: Yes)  Active Problems:    Primary hypertension (POA: Yes)    Non-insulin dependent type 2 diabetes mellitus (HCC) (POA: Yes)    Pulmonary hypertension (HCC) (POA: Yes)    History of pulmonary embolism (POA: Yes)    Stage 3b chronic kidney disease (HCC) (Chronic) (POA: Yes)    ACP (advance care planning) (POA: Yes)    Acute deep vein thrombosis (DVT) of calf muscle vein of left lower extremity (HCC) (POA: Yes)    Anemia (POA: Yes)    Elevated troponin (POA: Yes)  Resolved Problems:    * No resolved hospital problems. *      FOLLOW UP  Future Appointments   Date Time Provider Department Center   4/24/2024 10:20 AM Nirmal Gentile M.D. [facility] [place]   9/9/2024 11:00 AM [facility] CT 2 [facility] [place]   9/10/2024 11:20 AM Onel Leonard M.D. [facility] None     Nirmal Gentile M.D.  [address]  [facility] [id number] [phone]    Follow up      [facility]  [address]  [facility] [address]  [phone]  Follow up        MEDICATIONS ON DISCHARGE     Medication List        START taking these medications        Instructions   furosemide 20 MG Tabs  Commonly known as: Lasix   Take 1 Tablet by mouth every day for 30 days.  Dose: 20 mg     rivaroxaban 15 MG Tabs tablet  Commonly known as: Xarelto   Take 1 Tablet by mouth with dinner for 30 days.  Dose: 15 mg            CHANGE how you take these medications        Instructions   ezetimibe 10 MG Tabs  What changed: when to take this  Commonly known as: Zetia   Take 1 Tablet by mouth every day. Indications: High Amount of Fats in the Blood  Dose: 10 mg     rosuvastatin 5 MG Tabs  What changed: when to take this  Commonly known as: Crestor   Doctor's comments: Plan requires a 100 day supply. Thank you  TAKE 1 TABLET  BY MOUTH EVERY DAY  Dose: 5 mg            CONTINUE taking these medications        Instructions   acetaminophen 500 MG Tabs  Commonly known as: Tylenol   Take 1 Tablet by mouth every 6 hours as needed for Mild Pain or Moderate Pain.  Dose: 500 mg     ALPRAZolam 0.25 MG Tabs  Commonly known as: Xanax   Take 0.25 mg by mouth 3 times a day as needed for Anxiety.  Dose: 0.25 mg     aspirin 81 MG EC tablet   Take 81 mg by mouth every morning. Indications: blood thinner  Dose: 81 mg     bismuth subsalicylate 262 MG/15ML Susp  Commonly known as: Pepto-Bismol   Take 30 mL by mouth every 6 hours as needed (diarrhea). Indications: Diarrhea, Heartburn  Dose: 30 mL     cephALEXin 250 MG Caps  Commonly known as: Keflex   TAKE 1 CAPSULE BY MOUTH EVERY DAY FOR 14 DAYS     COQ-10 PO   Take 1 Capsule by mouth every evening. Indications: heart health  Dose: 1 Capsule     FISH OIL PO   Take 1 Capsule by mouth every morning. Indications: heart health  Dose: 1 Capsule     Imodium A-D 1 MG/7.5ML Liqd  Generic drug: Loperamide HCl   Take 30 mL by mouth 2 times a day as needed (diarrhea). Indications: Diarrhea  Dose: 30 mL     Jardiance 25 MG Tabs  Generic drug: Empagliflozin   Take 25 mg by mouth every morning.  Dose: 25 mg     metFORMIN 500 MG Tabs  Commonly known as: Glucophage   Take 1 Tablet by mouth 2 times a day with meals for 360 days.  Dose: 500 mg     metoprolol SR 25 MG Tb24  Commonly known as: Toprol XL   Take 1 Tablet by mouth every day.  Dose: 25 mg     oxyCODONE immediate-release 5 MG Tabs  Commonly known as: Roxicodone   Take 5 mg by mouth every 6 hours as needed for Severe Pain.  Dose: 5 mg     PRE- FORMULA PO   Take 1 Tablet by mouth every morning. Indications: supplement  Dose: 1 Tablet     sodium bicarbonate 650 MG Tabs  Commonly known as: Sodium Bicarbonate   TAKE 2 TABLET BY MOUTH 3 TIMES A DAY *TAKE FOR ACID IN THE BLOOD     tamsulosin 0.4 MG capsule  Commonly known as: Flomax   Take 1 Capsule by mouth 1/2 hour  after breakfast.  Dose: 0.4 mg     Voltaren 1 % Gel  Generic drug: diclofenac sodium   Apply 2 g topically 4 times a day as needed (Shoulder Pain). Indications: Joint Damage causing Pain and Loss of Function  Dose: 2 g              Allergies  No Known Allergies    DIET  Orders Placed This Encounter   Procedures    Diet Order Diet: Cardiac     Standing Status:   Standing     Number of Occurrences:   1     Order Specific Question:   Diet:     Answer:   Cardiac [6]       ACTIVITY  As tolerated.  Weight bearing as tolerated    CONSULTATIONS  none    PROCEDURES  none    LABORATORY  Lab Results   Component Value Date    SODIUM 139 03/29/2024    POTASSIUM 3.8 03/29/2024    CHLORIDE 106 03/29/2024    CO2 20 03/29/2024    GLUCOSE 102 (H) 03/29/2024    BUN 19 03/29/2024    CREATININE 1.20 03/29/2024        Lab Results   Component Value Date    WBC 8.2 03/29/2024    HEMOGLOBIN 10.1 (L) 03/29/2024    HEMATOCRIT 32.0 (L) 03/29/2024    PLATELETCT 253 03/29/2024        Total time of the discharge process exceeds 36 minutes.

## 2024-03-29 NOTE — PROGRESS NOTES
Received report from Dave MUHAMMAD. Pt reported back discomfort and pain at 2/10, patient declined intervention. POC discussed, patient agreeable. Call light and belongings within reach. Bed locked and in low position. Alarm on and fall precautions in place.

## 2024-03-29 NOTE — PROGRESS NOTES
Pt. Having diarrhea overnight. Report given to Dave MUHAMMAD.  Pt. In bed w/ bed alarm, VSS, axox4.

## 2024-03-30 ENCOUNTER — TELEPHONE (OUTPATIENT)
Dept: HOSPITALIST | Facility: MEDICAL CENTER | Age: 84
End: 2024-03-30
Payer: MEDICARE

## 2024-03-30 DIAGNOSIS — I82.462 ACUTE DEEP VEIN THROMBOSIS (DVT) OF CALF MUSCLE VEIN OF LEFT LOWER EXTREMITY (HCC): ICD-10-CM

## 2024-03-30 NOTE — PROGRESS NOTES
Unfortunately it turns out that the patient's Xarelto was more expensive than Eliquis.  Patient called back to the floor and I was able to prescribe a 30-day prescription for Eliquis with a savings coupon that should make it free then I also prescribed a 90-day prescription for the Eliquis which comes out to 717.75 -or 239.25/month.  I left the savings card up at the  of the emergency room and notified the patient and his son who will come pick it up.

## 2024-04-01 ENCOUNTER — PATIENT OUTREACH (OUTPATIENT)
Dept: MEDICAL GROUP | Facility: LAB | Age: 84
End: 2024-04-01
Payer: MEDICARE

## 2024-04-01 NOTE — PROGRESS NOTES
Transitional Care Management  TCM Outreach Date and Time: Filed (4/1/2024  8:51 AM)    Discharge Questions  Actual Discharge Date: 03/29/24  Now that you are home, how are you feeling?: Good  Did you receive any new prescriptions?: Yes (Prescribed lasix and rivaroxaban)  Were you able to get them filled?: Yes  Meds to Bed or Pharmacy filled?: Pharmacy (Sent to [id number], [place] [facility])  Do you have any questions about your current medications or new medications (Review Med Rec)?: No  Did you have any durable medical equipment ordered?: No  Do you have a follow up appointment scheduled with your PCP?: Yes  Was an appointment scheduled for the patient?: Yes  Appointment Date: 04/08/24  Appointment Time: 1040  Any issues or paperwork you wish to discuss with your PCP?: No  Are you (patient) able to get to the appointment?: Yes  If Home Health was ordered, have they contacted you (Patient): Not Applicable  Did you have enough support after your last discharge?: Yes  Does this patient qualify for the CCM program?: No    Transitional Care  Number of attempts made to contact patient: 1  Current or previous attempts completed within two business days of discharge? : Yes  Provided education regarding treatment plan, medications, self-management, ADLs?: No  Has patient completed an Advanced Directive?: No  Has the Care Manager's phone number provided?: No  Is there anything else I can help you with?: No    Discharge Summary  Chief Complaint: Leg swelling  Admitting Diagnosis: Acute on chronic diastolic heart failure  Discharge Diagnosis: Acute on chronic diastolic heart failure

## 2024-04-08 ENCOUNTER — APPOINTMENT (OUTPATIENT)
Dept: MEDICAL GROUP | Facility: LAB | Age: 84
End: 2024-04-08
Payer: MEDICARE

## 2024-04-09 ENCOUNTER — HOSPITAL ENCOUNTER (OUTPATIENT)
Dept: LAB | Facility: MEDICAL CENTER | Age: 84
End: 2024-04-09
Attending: INTERNAL MEDICINE
Payer: MEDICARE

## 2024-04-09 LAB
25(OH)D3 SERPL-MCNC: 35 NG/ML (ref 30–100)
ALBUMIN SERPL BCP-MCNC: 4.2 G/DL (ref 3.2–4.9)
APPEARANCE UR: ABNORMAL
BACTERIA #/AREA URNS HPF: ABNORMAL /HPF
BASOPHILS # BLD AUTO: 0.3 % (ref 0–1.8)
BASOPHILS # BLD: 0.02 K/UL (ref 0–0.12)
BILIRUB UR QL STRIP.AUTO: NEGATIVE
BUN SERPL-MCNC: 25 MG/DL (ref 8–22)
CALCIUM ALBUM COR SERPL-MCNC: 9.1 MG/DL (ref 8.5–10.5)
CALCIUM SERPL-MCNC: 9.3 MG/DL (ref 8.5–10.5)
CHLORIDE SERPL-SCNC: 103 MMOL/L (ref 96–112)
CO2 SERPL-SCNC: 24 MMOL/L (ref 20–33)
COLOR UR: YELLOW
CREAT SERPL-MCNC: 1.48 MG/DL (ref 0.5–1.4)
CREAT UR-MCNC: 87.15 MG/DL
CREAT UR-MCNC: 97.13 MG/DL
EOSINOPHIL # BLD AUTO: 0.57 K/UL (ref 0–0.51)
EOSINOPHIL NFR BLD: 7.2 % (ref 0–6.9)
EPI CELLS #/AREA URNS HPF: NEGATIVE /HPF
ERYTHROCYTE [DISTWIDTH] IN BLOOD BY AUTOMATED COUNT: 50.5 FL (ref 35.9–50)
FERRITIN SERPL-MCNC: 206 NG/ML (ref 22–322)
GFR SERPLBLD CREATININE-BSD FMLA CKD-EPI: 46 ML/MIN/1.73 M 2
GLUCOSE SERPL-MCNC: 147 MG/DL (ref 65–99)
GLUCOSE UR STRIP.AUTO-MCNC: NEGATIVE MG/DL
HCT VFR BLD AUTO: 35.4 % (ref 42–52)
HGB BLD-MCNC: 11.7 G/DL (ref 14–18)
HYALINE CASTS #/AREA URNS LPF: ABNORMAL /LPF
IMM GRANULOCYTES # BLD AUTO: 0.03 K/UL (ref 0–0.11)
IMM GRANULOCYTES NFR BLD AUTO: 0.4 % (ref 0–0.9)
IRON SATN MFR SERPL: 14 % (ref 15–55)
IRON SERPL-MCNC: 36 UG/DL (ref 50–180)
KETONES UR STRIP.AUTO-MCNC: NEGATIVE MG/DL
LEUKOCYTE ESTERASE UR QL STRIP.AUTO: ABNORMAL
LYMPHOCYTES # BLD AUTO: 1.69 K/UL (ref 1–4.8)
LYMPHOCYTES NFR BLD: 21.5 % (ref 22–41)
MCH RBC QN AUTO: 30.6 PG (ref 27–33)
MCHC RBC AUTO-ENTMCNC: 33.1 G/DL (ref 32.3–36.5)
MCV RBC AUTO: 92.7 FL (ref 81.4–97.8)
MICRO URNS: ABNORMAL
MICROALBUMIN UR-MCNC: 6.9 MG/DL
MICROALBUMIN/CREAT UR: 79 MG/G (ref 0–30)
MONOCYTES # BLD AUTO: 0.74 K/UL (ref 0–0.85)
MONOCYTES NFR BLD AUTO: 9.4 % (ref 0–13.4)
NEUTROPHILS # BLD AUTO: 4.82 K/UL (ref 1.82–7.42)
NEUTROPHILS NFR BLD: 61.2 % (ref 44–72)
NITRITE UR QL STRIP.AUTO: NEGATIVE
NRBC # BLD AUTO: 0 K/UL
NRBC BLD-RTO: 0 /100 WBC (ref 0–0.2)
PH UR STRIP.AUTO: 7 [PH] (ref 5–8)
PHOSPHATE SERPL-MCNC: 3.3 MG/DL (ref 2.5–4.5)
PLATELET # BLD AUTO: 254 K/UL (ref 164–446)
PMV BLD AUTO: 9.9 FL (ref 9–12.9)
POTASSIUM SERPL-SCNC: 4.2 MMOL/L (ref 3.6–5.5)
PROT UR QL STRIP: 30 MG/DL
PROT UR-MCNC: 31 MG/DL (ref 0–15)
PROT/CREAT UR: 319 MG/G (ref 15–68)
PTH-INTACT SERPL-MCNC: 63.4 PG/ML (ref 14–72)
RBC # BLD AUTO: 3.82 M/UL (ref 4.7–6.1)
RBC # URNS HPF: ABNORMAL /HPF
RBC UR QL AUTO: ABNORMAL
SODIUM SERPL-SCNC: 140 MMOL/L (ref 135–145)
SP GR UR STRIP.AUTO: 1.01
TIBC SERPL-MCNC: 250 UG/DL (ref 250–450)
TRANS CELLS #/AREA URNS HPF: ABNORMAL /HPF
UIBC SERPL-MCNC: 214 UG/DL (ref 110–370)
UROBILINOGEN UR STRIP.AUTO-MCNC: 0.2 MG/DL
WBC # BLD AUTO: 7.9 K/UL (ref 4.8–10.8)
WBC #/AREA URNS HPF: ABNORMAL /HPF

## 2024-04-09 PROCEDURE — 87186 SC STD MICRODIL/AGAR DIL: CPT

## 2024-04-09 PROCEDURE — 87077 CULTURE AEROBIC IDENTIFY: CPT

## 2024-04-09 PROCEDURE — 81001 URINALYSIS AUTO W/SCOPE: CPT

## 2024-04-09 PROCEDURE — 82306 VITAMIN D 25 HYDROXY: CPT

## 2024-04-09 PROCEDURE — 80069 RENAL FUNCTION PANEL: CPT

## 2024-04-09 PROCEDURE — 36415 COLL VENOUS BLD VENIPUNCTURE: CPT

## 2024-04-09 PROCEDURE — 84156 ASSAY OF PROTEIN URINE: CPT

## 2024-04-09 PROCEDURE — 87086 URINE CULTURE/COLONY COUNT: CPT

## 2024-04-09 PROCEDURE — 82570 ASSAY OF URINE CREATININE: CPT

## 2024-04-09 PROCEDURE — 82043 UR ALBUMIN QUANTITATIVE: CPT

## 2024-04-09 PROCEDURE — 85025 COMPLETE CBC W/AUTO DIFF WBC: CPT

## 2024-04-09 PROCEDURE — 83550 IRON BINDING TEST: CPT

## 2024-04-09 PROCEDURE — 83540 ASSAY OF IRON: CPT

## 2024-04-09 PROCEDURE — 83970 ASSAY OF PARATHORMONE: CPT

## 2024-04-09 PROCEDURE — 82728 ASSAY OF FERRITIN: CPT

## 2024-04-15 ENCOUNTER — PATIENT MESSAGE (OUTPATIENT)
Dept: VASCULAR LAB | Facility: MEDICAL CENTER | Age: 84
End: 2024-04-15
Payer: MEDICARE

## 2024-04-15 DIAGNOSIS — I71.9 DESCENDING AORTIC ANEURYSM (HCC): ICD-10-CM

## 2024-04-15 DIAGNOSIS — I82.462 ACUTE DEEP VEIN THROMBOSIS (DVT) OF CALF MUSCLE VEIN OF LEFT LOWER EXTREMITY (HCC): ICD-10-CM

## 2024-04-15 DIAGNOSIS — I26.99 OTHER ACUTE PULMONARY EMBOLISM WITHOUT ACUTE COR PULMONALE (HCC): ICD-10-CM

## 2024-04-17 ENCOUNTER — TELEPHONE (OUTPATIENT)
Dept: SCHEDULING | Facility: IMAGING CENTER | Age: 84
End: 2024-04-17
Payer: MEDICARE

## 2024-04-22 ENCOUNTER — TELEPHONE (OUTPATIENT)
Dept: VASCULAR LAB | Facility: MEDICAL CENTER | Age: 84
End: 2024-04-22

## 2024-04-22 ENCOUNTER — DOCUMENTATION (OUTPATIENT)
Dept: VASCULAR LAB | Facility: MEDICAL CENTER | Age: 84
End: 2024-04-22

## 2024-04-22 ENCOUNTER — ANTICOAGULATION VISIT (OUTPATIENT)
Dept: VASCULAR LAB | Facility: MEDICAL CENTER | Age: 84
End: 2024-04-22
Attending: INTERNAL MEDICINE
Payer: MEDICARE

## 2024-04-22 DIAGNOSIS — I82.4Z2 ACUTE DEEP VEIN THROMBOSIS (DVT) OF DISTAL VEIN OF LEFT LOWER EXTREMITY (HCC): ICD-10-CM

## 2024-04-22 PROBLEM — I82.409 DEEP VEIN THROMBOSIS (HCC): Status: ACTIVE | Noted: 2024-04-22

## 2024-04-22 PROCEDURE — 99213 OFFICE O/P EST LOW 20 MIN: CPT | Performed by: NURSE PRACTITIONER

## 2024-04-22 NOTE — TELEPHONE ENCOUNTER
Received Refill PA request via [facility]  for ELIQUIS 5MG TABLETS. (Quantity:180, Day Supply:90)     Insurance: [employer] D   Member ID:  [id number]  BIN: [id number]  PCN: [id number]  Group: [id number]     Ran Test claim via [facility] & medication  pays for $659.97/90DS ; $423.88/30DS. per provider's note, dispense it for 340b pricing. NEW COPAY: $203.10/90DS ; $67.70/30DS      ADRIANO Poole, PhT  Vascular Pharmacy Liaison (Rx Coordinator)  P: [phone]  4/22/2024 2:07 PM          
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 Tracheostomy with bloody secretions and   possible tracheitis previously now with right neck swelling and pain.    TECHNIQUE:   Axial images were obtained following the intravenous   administration of contrast material. Sagittal and coronal reformatted   images were obtained. 90 cc Omnipaque 350 were administered. 10 cc were   discarded.    COMPARISON EXAMINATION:  CT scans 2/23/2023, 8/3/2020 and 10/21/2014.   Whole-body PET/CT 5/29/2015    FINDINGS:  AERODIGESTIVE TRACT:  Again noted is ulceration of the posterior   hypopharyngeal wall at the C3 level with air and foamy secretions   extending into the retropharyngeal/prevertebral space. No surrounding   enhancement or nodularity is seen. A focus of fluid/air is now seen   posterior to the right carotid space, not seen previously (302:52) which   likely communicates with the adjacent previously described collection.    There is swelling of the right sternocleidomastoid muscle and thickening   of the tissues of the anterior cervical space medially adjacent to the   tracheostomy site, not seen previously. Nodular soft tissue thickening is   seen within the subglottis and upper trachea surrounding the tracheostomy   (302:41-38). Soft tissue thickening and fat plane haziness is seen within   the right posterior cervical space from the level of the hyoid to the   thyroid gland.  LYMPH NODES:  A 1 cm right level III lymph node is again seen without   significant change. No new adenopathy is seen.  PAROTID GLANDS:  Decreased in size particularly on the left.  SUBMANDIBULAR GLANDS:  Atrophic and fatty infiltrated.  THYROID GLAND:  Heterogeneous.  VISUALIZED PARANASAL SINUSES:  Clear.  VISUALIZED TYMPANOMASTOID CAVITIES: Clear.  BONES:  Reversal of the cervical lordosis. Multilevel posterior   instrumented fusion as seen previously with vertebral body screws and   interlocking rods in place. Lucency surrounds the bilateral C5 lateral   mass screws raising the possibility of screw loosening. Status post C5   and C6 laminectomies. C2-C3, C4-C5 and C5-C6 disc space narrowing as seen   on the most recent exam.  MISCELLANEOUS:  Biapical fibrosis.    IMPRESSION:  Persistent posterior pharyngeal wall ulceration with presumed associated   retropharyngeal/prevertebral space fistulization and collection as seen   previously. The collection now extends to the right posterior to the   carotid space not seen previously. Possibilities include post radiation   necrosis, abscess or tumor infiltration.    Soft tissue swelling involving the anterior and posterior aspect of the   right neck which may be inflammatory or may represent tumor involvement.    Nodular soft tissue adjacent to the tracheostomy tube within the   subglottis/upper trachea. The possibility of recurrent tumor in this   location cannot be excluded and correlation with direct visualization is   advised.    No cervical adenopathy.    --- End of Report ---            KOBY BOSE MD; Attending Radiologist  This document has been electronically signed. Mar  9 2023 12:03PM    < end of copied text >

## 2024-04-22 NOTE — PROGRESS NOTES
Addendum: Reviewed case with Dr Leonard. Pt to stop aspirin. Spoke with pt by phone to let him know. He verbalized understanding.    NEW DOAC   .  Anticoagulation Patient Findings      PCP: Nirmal Gentile M.D.  Cardiologist: AMBER - he is in the process of establishing with [facility] cards  Dx: Acute below the knee DVT  CHADSVASC = n/a  HAS-BLED = 2 (age, asa 81 mg daily)  Target End Date = TBD    Pt Hx: Pt is new to our clinic but follows with our vas med clinic. He has a hx of incidental single subseg PE in Sept 2023 for which he had ~3 weeks of OAC w/ Xarelto but subsequent CTA in Nov 2023 showed no persistent PE and he had refractory hematuria while on Eliquis so decision made to stop OAC and resume ASA 81 mg daily. He was admitted 3/28-3/29/24 for boris LE swelling, L>R. U/s showed gastrocnemius DVT of LLE.    3/28/24 Boris LE venous duplex:   Acute deep venous thrombus within the left gastrocnemius vein which is    considered below the knee thrombus      No other deep venous thrombus within the left lower extremity or the right    lower extremity    PMH includes stage 2 bladder cancer, diabetes, hyperlipidemia. He was started on Eliquis 5 mg BID and taking as instructed. No problems with bleeding. LE swelling has improved significantly. Currently, his biggest concerns is the cost of Eliquis.    Labs:  Lab Results   Component Value Date/Time    WBC 7.9 04/09/2024 10:36 AM    RBC 3.82 (L) 04/09/2024 10:36 AM    HEMOGLOBIN 11.7 (L) 04/09/2024 10:36 AM    HEMATOCRIT 35.4 (L) 04/09/2024 10:36 AM    MCV 92.7 04/09/2024 10:36 AM    MCH 30.6 04/09/2024 10:36 AM    MCHC 33.1 04/09/2024 10:36 AM    MPV 9.9 04/09/2024 10:36 AM    NEUTSPOLYS 61.20 04/09/2024 10:36 AM    LYMPHOCYTES 21.50 (L) 04/09/2024 10:36 AM    MONOCYTES 9.40 04/09/2024 10:36 AM    EOSINOPHILS 7.20 (H) 04/09/2024 10:36 AM    BASOPHILS 0.30 04/09/2024 10:36 AM      Lab Results   Component Value Date/Time    SODIUM 140 04/09/2024 10:36 AM    POTASSIUM  4.2 04/09/2024 10:36 AM    CHLORIDE 103 04/09/2024 10:36 AM    CO2 24 04/09/2024 10:36 AM    GLUCOSE 147 (H) 04/09/2024 10:36 AM    BUN 25 (H) 04/09/2024 10:36 AM    CREATININE 1.48 (H) 04/09/2024 10:36 AM    BUNCREATRAT 15 06/24/2021 11:54 AM      Cr cl ~41 ml/min    Pt is new to apixaban (Eliquis) and new to [facility]. Discussed:   Indication for DOAC therapy.  Importance of monitoring and compliance.   Monitoring parameters, signs and symptoms of bleeding or clotting.  DOAC therapy, side effects, potential DDIs, OTC medications  Hormonal therapy: No  Pregnancy:  n/a  DDI: ASA 81 mg  Pt is on antiplatelet/NSAID therapy: Aspirin 81mg for hx of CAD and stents. I will check with Dr MENDOZA about discontinuing.  Lifestyle safety, ie smoking, ETOH, hobby safety, fall safety/prevention  Procedures for missed doses or suspected missed doses, surgeries/procedures, travel, dental work, any medication changes    Continue taking Eliquis 5 mg by mouth twice daily.    DOAC affordable: no; $435 for 30 DS. Discussed 340b pricing with pt. Let him know current Eliquis price is $67.46 which is affordable. Rx sent to [facility] pharmacy. Let him know 340b pricing is subect to change quarterly. Pt knows to  Eliquis prior to running out of his current supply. He has ~1-1.5 of Eliquis at home.    Samples provided: no;     Labs to be completed prior to next f/u - CBC, CMP    F/U: 3 month(s)    KILLIAN Cristina    Added [facility] to care team   Send to [employer]

## 2024-04-22 NOTE — Clinical Note
Jose L MENDOZA, I set this pt up with 340b pricing for Eliquis.   Is it okay for him to stop aspirin while taking Eliquis?  Returning to anticoag clinic in 3 months. Scheduled to see you in Sept.  Thanks, Mary

## 2024-04-23 ENCOUNTER — TELEPHONE (OUTPATIENT)
Dept: VASCULAR LAB | Facility: MEDICAL CENTER | Age: 84
End: 2024-04-23
Payer: MEDICARE

## 2024-04-23 NOTE — TELEPHONE ENCOUNTER
Attempted to contact patient at [phone] to discuss [facility] and services/benefits offered. No answer, left voicemail.    Gloria Hopkins

## 2024-04-23 NOTE — PROGRESS NOTES
Anticoag note and most recent d/c summary reviewed.   Patient with below knee dvt limited to gastroc vein. Started on oac by inpatient team. Patient with h/o hematuria on anticoagulants in the past.   Difficult risk-benefit calculation.   Agree with trial of anticoag with close follow up in anticoag clinic and vascular medicine.    Michael Bloch, MD  Anticoagulation Clinic

## 2024-04-24 ENCOUNTER — APPOINTMENT (OUTPATIENT)
Dept: MEDICAL GROUP | Facility: LAB | Age: 84
End: 2024-04-24
Payer: MEDICARE

## 2024-05-06 ENCOUNTER — APPOINTMENT (OUTPATIENT)
Dept: MEDICAL GROUP | Facility: LAB | Age: 84
End: 2024-05-06
Payer: MEDICARE

## 2024-05-06 ENCOUNTER — HOSPITAL ENCOUNTER (OUTPATIENT)
Dept: LAB | Facility: MEDICAL CENTER | Age: 84
End: 2024-05-06
Attending: FAMILY MEDICINE
Payer: MEDICARE

## 2024-05-06 ENCOUNTER — HOSPITAL ENCOUNTER (OUTPATIENT)
Dept: LAB | Facility: MEDICAL CENTER | Age: 84
End: 2024-05-06
Attending: INTERNAL MEDICINE
Payer: MEDICARE

## 2024-05-06 VITALS
HEART RATE: 68 BPM | OXYGEN SATURATION: 95 % | SYSTOLIC BLOOD PRESSURE: 110 MMHG | BODY MASS INDEX: 25.06 KG/M2 | RESPIRATION RATE: 15 BRPM | TEMPERATURE: 97.5 F | WEIGHT: 179 LBS | HEIGHT: 71 IN | DIASTOLIC BLOOD PRESSURE: 70 MMHG

## 2024-05-06 DIAGNOSIS — E03.9 ACQUIRED HYPOTHYROIDISM: ICD-10-CM

## 2024-05-06 DIAGNOSIS — C67.9 MALIGNANT NEOPLASM OF URINARY BLADDER, UNSPECIFIED SITE (HCC): ICD-10-CM

## 2024-05-06 DIAGNOSIS — I82.462 ACUTE DEEP VEIN THROMBOSIS (DVT) OF CALF MUSCLE VEIN OF LEFT LOWER EXTREMITY (HCC): ICD-10-CM

## 2024-05-06 DIAGNOSIS — N18.31 CKD STAGE G3A/A2, GFR 45-59 AND ALBUMIN CREATININE RATIO 30-299 MG/G: ICD-10-CM

## 2024-05-06 LAB
ALBUMIN SERPL BCP-MCNC: 4 G/DL (ref 3.2–4.9)
ALBUMIN/GLOB SERPL: 1.6 G/DL
ALP SERPL-CCNC: 65 U/L (ref 30–99)
ALT SERPL-CCNC: 8 U/L (ref 2–50)
ANION GAP SERPL CALC-SCNC: 12 MMOL/L (ref 7–16)
AST SERPL-CCNC: 15 U/L (ref 12–45)
BASOPHILS # BLD AUTO: 0.1 % (ref 0–1.8)
BASOPHILS # BLD: 0.01 K/UL (ref 0–0.12)
BILIRUB SERPL-MCNC: 0.3 MG/DL (ref 0.1–1.5)
BUN SERPL-MCNC: 22 MG/DL (ref 8–22)
CALCIUM ALBUM COR SERPL-MCNC: 8.9 MG/DL (ref 8.5–10.5)
CALCIUM SERPL-MCNC: 8.9 MG/DL (ref 8.5–10.5)
CHLORIDE SERPL-SCNC: 103 MMOL/L (ref 96–112)
CO2 SERPL-SCNC: 21 MMOL/L (ref 20–33)
CREAT SERPL-MCNC: 1.66 MG/DL (ref 0.5–1.4)
EOSINOPHIL # BLD AUTO: 0.24 K/UL (ref 0–0.51)
EOSINOPHIL NFR BLD: 3.1 % (ref 0–6.9)
ERYTHROCYTE [DISTWIDTH] IN BLOOD BY AUTOMATED COUNT: 48.9 FL (ref 35.9–50)
GFR SERPLBLD CREATININE-BSD FMLA CKD-EPI: 40 ML/MIN/1.73 M 2
GLOBULIN SER CALC-MCNC: 2.5 G/DL (ref 1.9–3.5)
GLUCOSE SERPL-MCNC: 124 MG/DL (ref 65–99)
HCT VFR BLD AUTO: 33.1 % (ref 42–52)
HGB BLD-MCNC: 10.8 G/DL (ref 14–18)
IMM GRANULOCYTES # BLD AUTO: 0.03 K/UL (ref 0–0.11)
IMM GRANULOCYTES NFR BLD AUTO: 0.4 % (ref 0–0.9)
LYMPHOCYTES # BLD AUTO: 1.65 K/UL (ref 1–4.8)
LYMPHOCYTES NFR BLD: 21.1 % (ref 22–41)
MCH RBC QN AUTO: 29.8 PG (ref 27–33)
MCHC RBC AUTO-ENTMCNC: 32.6 G/DL (ref 32.3–36.5)
MCV RBC AUTO: 91.4 FL (ref 81.4–97.8)
MONOCYTES # BLD AUTO: 0.67 K/UL (ref 0–0.85)
MONOCYTES NFR BLD AUTO: 8.6 % (ref 0–13.4)
NEUTROPHILS # BLD AUTO: 5.23 K/UL (ref 1.82–7.42)
NEUTROPHILS NFR BLD: 66.7 % (ref 44–72)
NRBC # BLD AUTO: 0 K/UL
NRBC BLD-RTO: 0 /100 WBC (ref 0–0.2)
PLATELET # BLD AUTO: 246 K/UL (ref 164–446)
PMV BLD AUTO: 9.8 FL (ref 9–12.9)
POTASSIUM SERPL-SCNC: 3.9 MMOL/L (ref 3.6–5.5)
PROT SERPL-MCNC: 6.5 G/DL (ref 6–8.2)
RBC # BLD AUTO: 3.62 M/UL (ref 4.7–6.1)
SODIUM SERPL-SCNC: 136 MMOL/L (ref 135–145)
TSH SERPL DL<=0.005 MIU/L-ACNC: 4.78 UIU/ML (ref 0.38–5.33)
TSH SERPL DL<=0.005 MIU/L-ACNC: 4.92 UIU/ML (ref 0.38–5.33)
WBC # BLD AUTO: 7.8 K/UL (ref 4.8–10.8)

## 2024-05-06 PROCEDURE — 3074F SYST BP LT 130 MM HG: CPT | Performed by: FAMILY MEDICINE

## 2024-05-06 PROCEDURE — 99214 OFFICE O/P EST MOD 30 MIN: CPT | Performed by: FAMILY MEDICINE

## 2024-05-06 PROCEDURE — 3078F DIAST BP <80 MM HG: CPT | Performed by: FAMILY MEDICINE

## 2024-05-06 RX ORDER — LEVOTHYROXINE SODIUM 0.03 MG/1
25 TABLET ORAL
Qty: 90 TABLET | Refills: 3 | Status: SHIPPED | OUTPATIENT
Start: 2024-05-06

## 2024-05-06 RX ORDER — LEVOTHYROXINE SODIUM 0.03 MG/1
25 TABLET ORAL
COMMUNITY
Start: 2024-04-10 | End: 2024-05-06 | Stop reason: SDUPTHER

## 2024-05-06 ASSESSMENT — FIBROSIS 4 INDEX: FIB4 SCORE: 1.77

## 2024-05-06 NOTE — PROGRESS NOTES
Verbal consent was acquired by the patient to use [employer] ambient listening note generation during this visit Yes    Subjective:   Star Centeno is a 83 y.o. male here today for   Chief Complaint   Patient presents with    Hospital Follow-up     History of Present Illness  The patient is a 3-year-old male here today for hospital follow-up after recent hospitalization. He was seen in the emergency department and subsequently admitted from 03/28/2025 through 03/29/2025. At that time, he was having significant leg swelling, which was concerning given his history of chronic diastolic heart failure. Since hospitalization, the patient has been on anticoagulants including Eliquis as well as on daily Lasix. The patient is here today to follow up as well as to discuss a new diagnosis of bladder cancer.    The patient was diagnosed with a blood clot in 03/2024 and is currently on anticoagulant therapy, administered twice daily. He reports no current swelling or pain in his legs. He has a follow-up appointment with Dr. Leonard at the vascular clinic. He experienced sudden swelling in his left leg, which led to the discovery of small clots in his lower leg. Dr. Leonard has observed him to be prone to clots. He has a known history of chest aneurysms, for which he undergoes biannual check-ups. Additionally, small aneurysms were discovered in his neck. He has stents in place.    The patient was recently diagnosed with stage 2 bladder cancer and is currently undergoing immunotherapy. His second session is scheduled for Wednesday. Dr. BENITEZ, his cancer care specialist, is leaving the practice on 05/31/2025. A second procedure was performed to excise the affected part from the kidney and bladder, followed by a biopsy, revealing the cancer. He initially tolerated the treatment well, but experienced bowel irregularities, necessitating 4 to 5 trips to the bathroom daily. He easily experiences diarrhea, for which he takes Imodium,  which provides relief. He has been advised to monitor for side effects. His nephrologist prescribed iron tablets, but the prescription did not arrive. He denies any abdominal pain. He is experiencing difficulty adjusting to his health over the past year. He is unsure if he needs to attend a therapist, as he was previously quite depressed.    Furthermore during last hospitalization labs are completed showing elevated TSH.  Patient was started on levothyroxine at that time. The patient was only given two refills of levothyroxine during his hospital stay and is unsure if he should continue this.  Patient states that he is doing well.  Does have some diarrhea which he attributes to the immunotherapy.  Denies any significant weight loss, tremors, changes in vision.    Supplemental Information  His nephrologist stopped his Jardiance and lisinopril.      No Known Allergies      Current medicines (including changes today)  Current Outpatient Medications   Medication Sig Dispense Refill    levothyroxine (SYNTHROID) 25 MCG Tab Take 25 mcg by mouth every day.      apixaban (ELIQUIS) 5mg Tab Take 1 Tablet by mouth 2 times a day. 60 Tablet 5    sodium bicarbonate (SODIUM BICARBONATE) 650 MG Tab TAKE 2 TABLET BY MOUTH 3 TIMES A DAY *TAKE FOR ACID IN THE BLOOD      cephALEXin (KEFLEX) 250 MG Cap TAKE 1 CAPSULE BY MOUTH EVERY DAY FOR 14 DAYS      Empagliflozin (JARDIANCE) 25 MG Tab Take 25 mg by mouth every morning.      ALPRAZolam (XANAX) 0.25 MG Tab Take 0.25 mg by mouth 3 times a day as needed for Anxiety.      oxyCODONE immediate-release (ROXICODONE) 5 MG Tab Take 5 mg by mouth every 6 hours as needed for Severe Pain.      ezetimibe (ZETIA) 10 MG Tab Take 1 Tablet by mouth every day. Indications: High Amount of Fats in the Blood (Patient taking differently: Take 10 mg by mouth every evening. Indications: High Amount of Fats in the Blood) 100 Tablet 2    metoprolol SR (TOPROL XL) 25 MG TABLET SR 24 HR Take 1 Tablet by mouth  "every day. 90 Tablet 3    bismuth subsalicylate (PEPTO-BISMOL) 262 MG/15ML Suspension Take 30 mL by mouth every 6 hours as needed (diarrhea). Indications: Diarrhea, Heartburn      Loperamide HCl (IMODIUM A-D) 1 MG/7.5ML Liquid Take 30 mL by mouth 2 times a day as needed (diarrhea). Indications: Diarrhea      tamsulosin (FLOMAX) 0.4 MG capsule Take 1 Capsule by mouth 1/2 hour after breakfast. 30 Capsule 1    acetaminophen (TYLENOL) 500 MG Tab Take 1 Tablet by mouth every 6 hours as needed for Mild Pain or Moderate Pain.      diclofenac sodium (VOLTAREN) 1 % Gel Apply 2 g topically 4 times a day as needed (Shoulder Pain). Indications: Joint Damage causing Pain and Loss of Function      rosuvastatin (CRESTOR) 5 MG Tab TAKE 1 TABLET BY MOUTH EVERY DAY (Patient taking differently: Take 5 mg by mouth every evening. Indications: High Amount of Fats in the Blood) 100 Tablet 3    metFORMIN (GLUCOPHAGE) 500 MG Tab Take 1 Tablet by mouth 2 times a day with meals for 360 days. 200 Tablet 1    Coenzyme Q10 (COQ-10 PO) Take 1 Capsule by mouth every evening. Indications: heart health      Omega-3 Fatty Acids (FISH OIL PO) Take 1 Capsule by mouth every morning. Indications: heart health      Prenatal Multivit-Min-Fe-FA (PRE- FORMULA PO) Take 1 Tablet by mouth every morning. Indications: supplement       No current facility-administered medications for this visit.     He  has a past medical history of Cancer (HCC), Cataract, Diabetes (HCC), High cholesterol, History of heart artery stent, Hyperlipidemia, Hypertension, Myocardial infarct (HCC), Pulmonary emboli (HCC), Renal disorder, and Urinary bladder disorder.    ROS   ROS  -See HPI     Objective:     Physical Exam:  /70   Pulse 68   Temp 36.4 °C (97.5 °F) (Temporal)   Resp 15   Ht 1.803 m (5' 10.98\")   Wt 81.2 kg (179 lb)   SpO2 95%  Body mass index is 24.98 kg/m².   Constitutional: Alert, no distress, well-groomed.  Skin: No rashes in visible areas.  Eye: Round. " Conjunctiva clear, lids normal. No icterus.   ENMT: Lips pink without lesions, good dentition, moist mucous membranes. Phonation normal.  Neck: No masses, no thyromegaly. Moves freely without pain.  Respiratory: Unlabored respiratory effort, no cough or audible wheeze  Psych: Alert and oriented x3, normal affect and mood.       Results  Laboratory Studies  Thyroid levels were a little low.    Imaging  Ultrasound confirmed DVT within the left gastrocnemius vein. Right lower extremity was clear.    Testing  Echocardiogram showed ejection fraction of 65% and grade 1 diastolic dysfunction.    Assessment and Plan:     Assessment & Plan  1.  DVT  The patient will persist with the anticoagulant therapy. He is advised to schedule check-ups with anticoagulation team, with the provision for an earlier appointment if deemed necessary.    2. Stage 2 bladder cancer.  The patient was informed about the potential side effects of immunotherapy, including mild colitis and bowel issues. The patient will maintain his current regimen of immunotherapy. I will request his medical records from [facility] Neph[facility]. The patient is instructed to report any significant weight loss, hematochezia, or melena.    3. Acquired hypothyroidism.  The patient's thyroid levels were found to be slightly low during his hospital stay. The patient will continue his levothyroxine regimen.  Will call in refill of medication, I will reassess his thyroid hormone levels titrate if needed.    4.  CKD  Chronic condition, stable.  Patient continues to follow-up with nephrology.  Was recently taken off lisinopril, patient never started Jardiance due to financial constraints.  Patient currently not on an ACE or ARB.  Will continue to follow-up with nephrology.    Follow-up  The patient is scheduled for a follow-up visit in a few months.    Orders:  1. Malignant neoplasm of urinary bladder, unspecified site (HCC)    2. Acute deep vein thrombosis (DVT) of calf  muscle vein of left lower extremity (HCC)    3. CKD stage G3a/A2, GFR 45-59 and albumin creatinine ratio  mg/g    4. Acquired hypothyroidism  - TSH WITH REFLEX TO FT4; Future  - levothyroxine (SYNTHROID) 25 MCG Tab; Take 1 Tablet by mouth every day.  Dispense: 90 Tablet; Refill: 3        Followup: No follow-ups on file.         PLEASE NOTE: This dictation was created using voice recognition and [employer] ambient listening software. I have made every reasonable attempt to correct obvious errors, but I expect that there are errors of grammar and possibly content that I did not discover before finalizing the note.

## 2024-05-06 NOTE — LETTER
[facility]  Nirmal Gentile M.D.  [address] [facility] [address]  [place] [address]  Fax: [phone]   Authorization for Release/Disclosure of   Protected Health Information   Name: SOL HARRY : 1940 SSN: [id number]   Address: [address] [place] [address]  [place] [address] Phone:    There are no phone numbers on file.   I authorize the entity listed below to release/disclose the PHI below to:   [facility]/Nirmal Gentile M.D. and Nirmal Gentile M.D.   Provider or Entity Name:DONALD JOSE M.D.    Address   [facility], Zip: [address] [place] [address]  [phone]    [phone]   Reason for request: continuity of care   Information to be released:    [  ] LAST COLONOSCOPY,  including any PATH REPORT and follow-up  [  ] LAST FIT/COLOGUARD RESULT [  ] LAST DEXA  [  ] LAST MAMMOGRAM  [  ] LAST PAP  [  ] LAST LABS [  ] RETINA EXAM REPORT  [  ] IMMUNIZATION RECORDS  [ XX ] Release all info, chart notes       [  ] Check here and initial the line next to each item to release ALL health information INCLUDING  _____ Care and treatment for drug and / or alcohol abuse  _____ HIV testing, infection status, or AIDS  _____ Genetic Testing    DATES OF SERVICE OR TIME PERIOD TO BE DISCLOSED: _____________  I understand and acknowledge that:  * This Authorization may be revoked at any time by you in writing, except if your health information has already been used or disclosed.  * Your health information that will be used or disclosed as a result of you signing this authorization could be re-disclosed by the recipient. If this occurs, your re-disclosed health information may no longer be protected by State or Federal laws.  * You may refuse to sign this Authorization. Your refusal will not affect your ability to obtain treatment.  * This Authorization becomes effective upon signing and will  on (date) __________.      If no date is indicated, this  Authorization will  one (1) year from the signature date.    Name: Star Centeno  Signature: PCP requesting Continuity of care.  Date:   2024     PLEASE FAX REQUESTED RECORDS BACK TO: [phone]

## 2024-05-06 NOTE — LETTER
[facility]  Nirmal Gentile M.D.  [address] [facility] [address]  [place] [address]  Fax: [phone]   Authorization for Release/Disclosure of   Protected Health Information   Name: SOL HARRY : 1940 SSN: [id number]   Address: [address] [place] [address]  [place] [address] Phone:    There are no phone numbers on file.   I authorize the entity listed below to release/disclose the PHI below to:   [facility]/Nirmal Gentile M.D. and Nirmal Gentile M.D.   Provider or Entity Name: [employer]      Address   City, State, [facility]   [phone]    [phone]     Reason for request: continuity of care   Information to be released:    [  ] LAST COLONOSCOPY,  including any PATH REPORT and follow-up  [  ] LAST FIT/COLOGUARD RESULT [  ] LAST DEXA  [  ] LAST MAMMOGRAM  [  ] LAST PAP  [  ] LAST LABS [ XXX ] RETINA EXAM REPORT  [  ] IMMUNIZATION RECORDS  [  ] Release all info      [  ] Check here and initial the line next to each item to release ALL health information INCLUDING  _____ Care and treatment for drug and / or alcohol abuse  _____ HIV testing, infection status, or AIDS  _____ Genetic Testing    DATES OF SERVICE OR TIME PERIOD TO BE DISCLOSED: _____________  I understand and acknowledge that:  * This Authorization may be revoked at any time by you in writing, except if your health information has already been used or disclosed.  * Your health information that will be used or disclosed as a result of you signing this authorization could be re-disclosed by the recipient. If this occurs, your re-disclosed health information may no longer be protected by State or Federal laws.  * You may refuse to sign this Authorization. Your refusal will not affect your ability to obtain treatment.  * This Authorization becomes effective upon signing and will  on (date) __________.      If no date is indicated, this Authorization will  one (1) year from the signature date.     Name: Star Centeno  Signature: Date:   5/6/2024     PLEASE FAX REQUESTED RECORDS BACK TO: [phone]

## 2024-05-08 ENCOUNTER — HOSPITAL ENCOUNTER (OUTPATIENT)
Facility: MEDICAL CENTER | Age: 84
End: 2024-05-08
Attending: INTERNAL MEDICINE
Payer: MEDICARE

## 2024-05-08 LAB
C DIFF DNA SPEC QL NAA+PROBE: NEGATIVE
C DIFF TOX A+B STL QL IA: NEGATIVE
C DIFF TOX GENS STL QL NAA+PROBE: NORMAL

## 2024-05-09 LAB
E COLI SXT1+2 STL IA: NORMAL
SIGNIFICANT IND 70042: NORMAL
SITE SITE: NORMAL
SOURCE SOURCE: NORMAL

## 2024-05-11 LAB
BACTERIA STL CULT: NORMAL
E COLI SXT1+2 STL IA: NORMAL
SIGNIFICANT IND 70042: NORMAL
SITE SITE: NORMAL
SOURCE SOURCE: NORMAL

## 2024-05-14 ENCOUNTER — HOSPITAL ENCOUNTER (OUTPATIENT)
Dept: LAB | Facility: MEDICAL CENTER | Age: 84
End: 2024-05-14
Attending: INTERNAL MEDICINE
Payer: MEDICARE

## 2024-05-14 LAB
ALBUMIN SERPL BCP-MCNC: 4 G/DL (ref 3.2–4.9)
ALBUMIN/GLOB SERPL: 1.7 G/DL
ALP SERPL-CCNC: 68 U/L (ref 30–99)
ALT SERPL-CCNC: 11 U/L (ref 2–50)
ANION GAP SERPL CALC-SCNC: 12 MMOL/L (ref 7–16)
AST SERPL-CCNC: 14 U/L (ref 12–45)
BASOPHILS # BLD AUTO: 0.3 % (ref 0–1.8)
BASOPHILS # BLD: 0.02 K/UL (ref 0–0.12)
BILIRUB SERPL-MCNC: 0.5 MG/DL (ref 0.1–1.5)
BUN SERPL-MCNC: 17 MG/DL (ref 8–22)
CALCIUM ALBUM COR SERPL-MCNC: 9.1 MG/DL (ref 8.5–10.5)
CALCIUM SERPL-MCNC: 9.1 MG/DL (ref 8.5–10.5)
CHLORIDE SERPL-SCNC: 106 MMOL/L (ref 96–112)
CO2 SERPL-SCNC: 21 MMOL/L (ref 20–33)
CREAT SERPL-MCNC: 1.29 MG/DL (ref 0.5–1.4)
EOSINOPHIL # BLD AUTO: 0.27 K/UL (ref 0–0.51)
EOSINOPHIL NFR BLD: 3.7 % (ref 0–6.9)
ERYTHROCYTE [DISTWIDTH] IN BLOOD BY AUTOMATED COUNT: 47.5 FL (ref 35.9–50)
GFR SERPLBLD CREATININE-BSD FMLA CKD-EPI: 54 ML/MIN/1.73 M 2
GLOBULIN SER CALC-MCNC: 2.4 G/DL (ref 1.9–3.5)
GLUCOSE SERPL-MCNC: 138 MG/DL (ref 65–99)
HCT VFR BLD AUTO: 33.7 % (ref 42–52)
HGB BLD-MCNC: 11.3 G/DL (ref 14–18)
IMM GRANULOCYTES # BLD AUTO: 0.03 K/UL (ref 0–0.11)
IMM GRANULOCYTES NFR BLD AUTO: 0.4 % (ref 0–0.9)
LYMPHOCYTES # BLD AUTO: 1.35 K/UL (ref 1–4.8)
LYMPHOCYTES NFR BLD: 18.4 % (ref 22–41)
MCH RBC QN AUTO: 30.1 PG (ref 27–33)
MCHC RBC AUTO-ENTMCNC: 33.5 G/DL (ref 32.3–36.5)
MCV RBC AUTO: 89.6 FL (ref 81.4–97.8)
MONOCYTES # BLD AUTO: 0.51 K/UL (ref 0–0.85)
MONOCYTES NFR BLD AUTO: 7 % (ref 0–13.4)
NEUTROPHILS # BLD AUTO: 5.14 K/UL (ref 1.82–7.42)
NEUTROPHILS NFR BLD: 70.2 % (ref 44–72)
NRBC # BLD AUTO: 0 K/UL
NRBC BLD-RTO: 0 /100 WBC (ref 0–0.2)
PLATELET # BLD AUTO: 236 K/UL (ref 164–446)
PMV BLD AUTO: 9.9 FL (ref 9–12.9)
POTASSIUM SERPL-SCNC: 4.3 MMOL/L (ref 3.6–5.5)
PROT SERPL-MCNC: 6.4 G/DL (ref 6–8.2)
RBC # BLD AUTO: 3.76 M/UL (ref 4.7–6.1)
SODIUM SERPL-SCNC: 139 MMOL/L (ref 135–145)
WBC # BLD AUTO: 7.3 K/UL (ref 4.8–10.8)

## 2024-05-17 ENCOUNTER — TELEPHONE (OUTPATIENT)
Dept: VASCULAR LAB | Facility: MEDICAL CENTER | Age: 84
End: 2024-05-17
Payer: MEDICARE

## 2024-05-17 NOTE — TELEPHONE ENCOUNTER
Phone Number Called:[phone]    Call outcome: Spoke to patient regarding message below.    Message: was not able to get patient in June due to conflicting schedules. Was able to agree on 5/22/24 for reschedule.    [facility]  [facility]   Phone: [phone]   Fax: [phone]

## 2024-05-17 NOTE — TELEPHONE ENCOUNTER
----- Message from Michael J Bloch, M.D. sent at 5/13/2024 11:37 AM PDT -----  Regarding: RE: ? june craw f/u -  high risk with oac  K - pls move up patients GISELA follow up to june Mb    ----- Message -----  From: Michael J Bloch, M.D.  Sent: 4/23/2024   6:45 PM PDT  To: Michael J Bloch, M.D.  Subject: ? june craw f/u -  high risk with oac              ----- Message -----  From: Onel Leonard M.D.  Sent: 4/23/2024   7:43 AM PDT  To: Mary Malik A.P.NCasandra; Michael J Bloch, M.D.  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  Prior BLE venous negative 11/2023.  So this appears to be genuine new low risk DVT.  Hx bladder cancer.  Initial LOT would likely be short course (3mo) full dose DOAC and then determine next steps as shared MDM.      ----- Message -----  From: Michael J Bloch, M.D.  Sent: 4/22/2024   9:08 PM PDT  To: Onel Leonard M.D.; Mary Malik, AILYNPCasandraN.; #  Subject: high risk with oac                               See my note.   Tough risk benefit given h/o hematuria on doac and clot limited to gastroc    I'd recommend:    1) 3 week f/u in oac clinic to assess affordability and tolerability    2) 6 week f/u with Dr. Leonard to determine LOT (and dose).     If you in agreement please have MAs schedule    Thx  mb  ----- Message -----  From: Mary Malik, A.P.NCasandra  Sent: 4/22/2024   1:46 PM PDT  To: Michael J Bloch, M.D.    New start Eliquis for BTK DVT. Previous hx of small incidental PE. Follows with Dr Leonard.

## 2024-05-22 ENCOUNTER — OFFICE VISIT (OUTPATIENT)
Dept: VASCULAR LAB | Facility: MEDICAL CENTER | Age: 84
End: 2024-05-22
Attending: FAMILY MEDICINE
Payer: MEDICARE

## 2024-05-22 VITALS
DIASTOLIC BLOOD PRESSURE: 58 MMHG | BODY MASS INDEX: 24.53 KG/M2 | SYSTOLIC BLOOD PRESSURE: 98 MMHG | WEIGHT: 175.8 LBS | HEART RATE: 71 BPM

## 2024-05-22 DIAGNOSIS — I27.20 PULMONARY HYPERTENSION (HCC): ICD-10-CM

## 2024-05-22 DIAGNOSIS — I72.0 CAROTID ARTERY ANEURYSM (HCC): ICD-10-CM

## 2024-05-22 DIAGNOSIS — I71.9 DESCENDING AORTIC ANEURYSM (HCC): ICD-10-CM

## 2024-05-22 DIAGNOSIS — I51.7 LVH (LEFT VENTRICULAR HYPERTROPHY): ICD-10-CM

## 2024-05-22 DIAGNOSIS — I24.0 ISCHEMIC HEART DISEASE DUE TO CORONARY ARTERY OBSTRUCTION (HCC): ICD-10-CM

## 2024-05-22 DIAGNOSIS — N18.31 CKD STAGE G3A/A2, GFR 45-59 AND ALBUMIN CREATININE RATIO 30-299 MG/G: ICD-10-CM

## 2024-05-22 DIAGNOSIS — I70.8 CELIAC ARTERY ATHEROSCLEROSIS: ICD-10-CM

## 2024-05-22 DIAGNOSIS — I25.9 ISCHEMIC HEART DISEASE DUE TO CORONARY ARTERY OBSTRUCTION (HCC): ICD-10-CM

## 2024-05-22 DIAGNOSIS — Z79.01 CHRONIC ANTICOAGULATION: ICD-10-CM

## 2024-05-22 DIAGNOSIS — I25.10 CORONARY ARTERY DISEASE INVOLVING NATIVE CORONARY ARTERY OF NATIVE HEART WITHOUT ANGINA PECTORIS: ICD-10-CM

## 2024-05-22 DIAGNOSIS — Z86.711 HISTORY OF PULMONARY EMBOLISM: ICD-10-CM

## 2024-05-22 DIAGNOSIS — I82.4Z2 ACUTE DEEP VEIN THROMBOSIS (DVT) OF DISTAL VEIN OF LEFT LOWER EXTREMITY (HCC): ICD-10-CM

## 2024-05-22 PROCEDURE — 3074F SYST BP LT 130 MM HG: CPT | Performed by: FAMILY MEDICINE

## 2024-05-22 PROCEDURE — 3078F DIAST BP <80 MM HG: CPT | Performed by: FAMILY MEDICINE

## 2024-05-22 PROCEDURE — 99214 OFFICE O/P EST MOD 30 MIN: CPT | Performed by: FAMILY MEDICINE

## 2024-05-22 RX ORDER — FERROUS SULFATE 325(65) MG
325 TABLET ORAL
COMMUNITY

## 2024-05-22 ASSESSMENT — ENCOUNTER SYMPTOMS
SPUTUM PRODUCTION: 0
BLOOD IN STOOL: 0
PND: 0
HEMOPTYSIS: 0
WHEEZING: 0
PALPITATIONS: 0
CLAUDICATION: 0
FEVER: 0
SHORTNESS OF BREATH: 0
CHILLS: 0
COUGH: 0
BRUISES/BLEEDS EASILY: 0
ORTHOPNEA: 0

## 2024-05-22 ASSESSMENT — FIBROSIS 4 INDEX: FIB4 SCORE: 1.52

## 2024-05-22 NOTE — PROGRESS NOTES
FOLLOW-UP VASCULAR ANTICOAGULATION VISIT  05/22/24     Star Centeno is a male who presents for initial visit   Initially referred by Nirmal Gentile MD for LOT/VTE mgmt, est 11/2023    Subjective      Interval hx/concerns: last seen 3/2024, had interval acute L gastroc v DVT, started on OAC and seeing AC clinic. Started on eliquis 5mg BID but concerned about ongoing cost and wondering if xarelto may be better options.  Last week told to reduce eliquis to 2.5mg BID per his nephrologist but only just initiated.    No hx of AF      VTE disease / Anticoagulation:   Current antithrombotic agent: eliquis 5mg BID (per RX)  Date of initiation of anticoagulation: 3/2024, prior use 9/2023   Date of Diagnosis: same   Type of Venous thromboembolic disease (VTE):   3/2024 - acute L gastroc v DVT   9/2023 -  incidental RML PE - asymptomatic   Initial visit hx/sx:    - in 3/2024 - L > RLE swelling and admit to [facility] on 3/28/24 for new DVT, d/c home 3/29/24 in stable condition after restart on Eliquis   - in 9/2023 had CTA for ascending AA surveillance and noted to have incidentally found RML PE.  Subsequent CTA showed resolution 11/2023.   No symptoms at the time of initial dx, felt well.    Denies current SOB, CP, leg swelling, edema, leg pain, cyanosis of digits, redness of extremities.  Antithrombotic therapy at time of VTE event: none   VTE tx course: Xarelto 15mg BID x 21d, then 20mg daily for about 3 weeks total   _____PROVOKING FACTORS:  Any personal VTE hx? No  Any family VTE hx? No  MEN:  Hx of cancer or abnormal cancer screenings: bladder cancer   Hypercoagulability work-up completed?  No    Ascending, descending aortic aneurysms  Initial visit hx: noted on CT 2021 at 4.3cm, moved from [facility] 2/2022  Current sx: denies chest, back, abdominal pain, SOB, dysphagia, worsening cough, hemoptysis.  _____Pertinent pmhx:  Hx of HTN: yes  Hx of tobacco:   reports that he quit smoking about 23 years ago. His smoking  use included cigarettes. He started smoking about 68 years ago. He has a 45 pack-year smoking history. He has never used smokeless tobacco.  No hx of Connective tissue disease such as Marfans, Alan-Danlos, Loeys-Damaris  No hx of inflammatory/autoimmune vasculitis (Takayasu's arteritis, Giant Cell arteritis)  No hx of infective aortitis, HIV, syphilis   No hx of MVA, chest wall trauma, deceleration injuries  No hx of Biscuspid Aortic Valve per echo   No hx of anabolic steroid use or stimulants (cocaine, methamphetamine, etc)  _____Pertinent fhx:  No fhx of connective tissue disease   No fhx of aneurysmal disease or known familial thoracic aortic aneurysm syndrome    HTN: Stable, tolerating meds, Home BP lo-110/70s, good adherence     Antithrombotic tx: ASA 81mg - no hx of bleeding      HLD: Stable on Moderate intensity statin and zetia - tolerating, good adherence     Current Outpatient Medications on File Prior to Visit   Medication Sig Dispense Refill    metFORMIN (GLUCOPHAGE) 500 MG Tab Take 500 mg by mouth 2 times a day with meals.      ferrous sulfate 325 (65 Fe) MG tablet Take 325 mg by mouth 3 times a week.      levothyroxine (SYNTHROID) 25 MCG Tab Take 1 Tablet by mouth every day. 90 Tablet 3    sodium bicarbonate (SODIUM BICARBONATE) 650 MG Tab Take 650 mg by mouth 2 times a day.      ALPRAZolam (XANAX) 0.25 MG Tab Take 0.25 mg by mouth 1 time a day as needed for Anxiety.      oxyCODONE immediate-release (ROXICODONE) 5 MG Tab Take 5 mg by mouth every 6 hours as needed for Severe Pain.      ezetimibe (ZETIA) 10 MG Tab Take 1 Tablet by mouth every day. Indications: High Amount of Fats in the Blood (Patient taking differently: Take 10 mg by mouth every evening. Indications: High Amount of Fats in the Blood) 100 Tablet 2    metoprolol SR (TOPROL XL) 25 MG TABLET SR 24 HR Take 1 Tablet by mouth every day. 90 Tablet 3    bismuth subsalicylate (PEPTO-BISMOL) 262 MG/15ML Suspension Take 30 mL by mouth every 6  hours as needed (diarrhea). Indications: Diarrhea, Heartburn      Loperamide HCl (IMODIUM A-D) 1 MG/7.5ML Liquid Take 30 mL by mouth 2 times a day as needed (diarrhea). Indications: Diarrhea      acetaminophen (TYLENOL) 500 MG Tab Take 1 Tablet by mouth every 6 hours as needed for Mild Pain or Moderate Pain.      diclofenac sodium (VOLTAREN) 1 % Gel Apply 2 g topically 4 times a day as needed (Shoulder Pain). Indications: Joint Damage causing Pain and Loss of Function      Coenzyme Q10 (COQ-10 PO) Take 1 Capsule by mouth every evening. Indications: heart health      Empagliflozin (JARDIANCE) 25 MG Tab Take 25 mg by mouth every morning. (Patient not taking: Reported on 2024)      tamsulosin (FLOMAX) 0.4 MG capsule Take 1 Capsule by mouth 1/2 hour after breakfast. 30 Capsule 1    rosuvastatin (CRESTOR) 5 MG Tab TAKE 1 TABLET BY MOUTH EVERY DAY (Patient taking differently: Take 5 mg by mouth every evening. Indications: High Amount of Fats in the Blood) 100 Tablet 3    Omega-3 Fatty Acids (FISH OIL PO) Take 1 Capsule by mouth every morning. Indications: heart health (Patient not taking: Reported on 2024)      Prenatal Multivit-Min-Fe-FA (PRE-COLE FORMULA PO) Take 1 Tablet by mouth every morning. Indications: supplement (Patient not taking: Reported on 2024)       No current facility-administered medications on file prior to visit.    No Known Allergies      No Known Allergies    Social History     Tobacco Use    Smoking status: Former     Current packs/day: 0.00     Average packs/day: 1 pack/day for 45.0 years (45.0 ttl pk-yrs)     Types: Cigarettes     Start date: 1956     Quit date: 2001     Years since quittin.4    Smokeless tobacco: Never   Vaping Use    Vaping status: Never Used   Substance Use Topics    Alcohol use: Yes     Alcohol/week: 4.2 oz     Types: 7 Glasses of wine per week     Comment: 1 glass of wine/nightly    Drug use: Never     DIET AND EXERCISE:  Weight Change:stable   Wt  Readings from Last 3 Encounters:   05/22/24 79.7 kg (175 lb 12.8 oz)   05/06/24 81.2 kg (179 lb)   03/28/24 80.4 kg (177 lb 4 oz)      Diet: common adult  Exercise: no regular exercise program     Review of Systems   Constitutional:  Negative for chills, fever and malaise/fatigue.   HENT:  Negative for nosebleeds.    Respiratory:  Negative for cough, hemoptysis, sputum production, shortness of breath and wheezing.    Cardiovascular:  Negative for chest pain, palpitations, orthopnea, claudication, leg swelling and PND.   Gastrointestinal:  Negative for blood in stool.   Endo/Heme/Allergies:  Does not bruise/bleed easily.       Objective    Vitals:    05/22/24 1131   BP: 98/58   BP Location: Left arm   Patient Position: Sitting   BP Cuff Size: Adult   Pulse: 71   Weight: 79.7 kg (175 lb 12.8 oz)      BP Readings from Last 5 Encounters:   05/22/24 98/58   05/06/24 110/70   03/29/24 (!) 146/83   03/28/24 134/62   03/18/24 97/47      Body mass index is 24.53 kg/m².  Physical Exam  Vitals reviewed.   Constitutional:       Appearance: Normal appearance.   HENT:      Head: Normocephalic and atraumatic.      Nose: Nose normal.      Mouth/Throat:      Mouth: Mucous membranes are moist.      Pharynx: Oropharynx is clear.   Eyes:      Extraocular Movements: Extraocular movements intact.      Conjunctiva/sclera: Conjunctivae normal.   Cardiovascular:      Rate and Rhythm: Normal rate and regular rhythm.      Pulses: Normal pulses.           Carotid pulses are 2+ on the right side and 2+ on the left side.       Radial pulses are 2+ on the right side and 2+ on the left side.        Dorsalis pedis pulses are 2+ on the right side and 2+ on the left side.        Posterior tibial pulses are 2+ on the right side and 2+ on the left side.      Heart sounds: Normal heart sounds.      Comments:    Spider telangectasia:       RLE:  None      LLE: none   Varicosities:           RLE: none      LLE: none   Corona phlebectatica:      RLE:  None  "       LLE:  None   Cording:         RLE:  None     LLE: None   Pulmonary:      Effort: Pulmonary effort is normal.      Breath sounds: Normal breath sounds.   Musculoskeletal:         General: Normal range of motion.      Cervical back: Normal range of motion and neck supple.      Right lower leg: No edema.      Left lower leg: No edema.   Skin:     General: Skin is warm and dry.      Capillary Refill: Capillary refill takes less than 2 seconds.   Neurological:      General: No focal deficit present.      Mental Status: He is alert and oriented to person, place, and time. Mental status is at baseline.   Psychiatric:         Mood and Affect: Mood normal.         Behavior: Behavior normal.     Lab Results   Component Value Date    CHOLSTRLTOT 168 05/22/2023    LDL 95 05/22/2023    HDL 36 (A) 05/22/2023    TRIGLYCERIDE 187 (H) 05/22/2023      No results found for: \"LIPOPROTA\"   No results found for: \"APOB\"    Lab Results   Component Value Date    PROTHROMBTM 14.1 03/28/2024    INR 1.04 03/28/2024       Lab Results   Component Value Date    HBA1C 6.7 (H) 01/30/2024      Lab Results   Component Value Date    SODIUM 139 05/14/2024    POTASSIUM 4.3 05/14/2024    CHLORIDE 106 05/14/2024    CO2 21 05/14/2024    GLUCOSE 138 (H) 05/14/2024    BUN 17 05/14/2024    CREATININE 1.29 05/14/2024        Lab Results   Component Value Date    WBC 7.3 05/14/2024    RBC 3.76 (L) 05/14/2024    HEMOGLOBIN 11.3 (L) 05/14/2024    HEMATOCRIT 33.7 (L) 05/14/2024    MCV 89.6 05/14/2024    MCH 30.1 05/14/2024    MCHC 33.5 05/14/2024    MPV 9.9 05/14/2024      VASCULAR IMAGING:    CT chest 7/2021  1. Aneurysmal dilatation at the aortic root with maximal diameter of 4.3 cm, not appreciably changed from the prior study allowing for limitations of the study.   2.  Aortic and coronary artery calcifications.   3.  5 mm right lower lobe nodule, stable compared to 10/07/2020.   4.  Emphysematous changes.     CTA chest 10/2021  1.  Mild aortic root " dilatation at the level of the sinuses of   Valsalva, 4.3 cm. The remainder of the ascending aorta measures   up to 3.9 cm, near the upper limit of normal.  Minimal fusiform   ectasia of the isthmic segment, 3.3 cm.   Mild dilatation of the   innominate and proximal right subclavian arteries, 1.8 cm and 1.6   cm, respectively.   2. Diffuse atherosclerosis. No evidence of aortic dissection. Few   incidental findings.     Echo 11/2022  No prior study is available for comparison.   Normal left ventricular size and systolic function.  Left ventricle ejection fraction estimated to be 60%.  No regional wall motion abnormality noted.  No hemodynamically significant valvular heart disease noted.  Estimated right ventricular systolic pressure is 35 mmHg.  Aorta  Normal aortic root for body surface area. The ascending aorta diameter is 3.2 cm.    CTA chest 9/2/23   Aorta and vasculature:  Ascending thoracic aorta measures 3.5 cm in maximum diameter without dissection. Descending thoracic aorta measures 3.1 cm in maximum diameter without dissection. Visualized upper abdominal aorta is normal in caliber without   dissection. Great vessel origins are patent with atherosclerotic plaque at the origins. Celiac axis and SMA are patent with moderate calcific plaque at both origins an estimated less than 50% stenosis.  1.  There is borderline aneurysmal dilatation of the descending thoracic aorta with ascending thoracic aorta upper limits of normal in caliber at 3.5 cm.  2.  No evidence of thoracic aortic dissection.  3.  incidentally noted is a nonocclusive right middle lobe pulmonary artery embolism.  4.  Lung parenchyma demonstrates changes suggestive of emphysema favored over interstitial lung disease.    BLE venous 10/2023   No deep venous thrombosis.     BLE venous 11/2023    No deep venous thrombosis.     CTPA 11/17/23  1.  No central or segmental pulmonary embolus or evidence of other acute intrathoracic pathology  2.   Emphysema  3.  Atherosclerosis    CT a/p 11/17/23   Vasculature: Diffuse calcific atherosclerotic plaque.  1.  Interval placement of a LEFT ureteral stent with reduced but persistent LEFT hydroureteronephrosis, perinephric fat stranding and delayed LEFT nephrogram suspicious for some degree of ongoing obstructive uropathy and possibly infection  2.  Gas in the LEFT  3.  Findings suspicious for cystitis renal collecting system and urinary bladder to be due to the recent instrumentation or infection.  4.  Atherosclerosis  5.  Colonic diverticulosis    BLE venous 3/2024   Acute deep venous thrombus within the left gastrocnemius vein which is    considered below the knee thrombus    No other deep venous thrombus within the left lower extremity or the right    lower extremity    Echo 3/2024  Normal left ventricular systolic function. The left ventricular   ejection fraction is visually estimated to be 65%.   Mild concentric left ventricular hypertrophy.  Grade I diastolic   dysfunction.  The right ventricle is normal in size and systolic function.  Mild aortic insufficiency.  Mild tricuspid regurgitation. Right atrial pressure is estimated to be   3 mmHg. Estimated right ventricular systolic pressure is 43 mmHg (mild   pulmonary hypertension).   Compared to the prior study on 11/17/22, there is now mild aortic   regurgiation as well as mild pulmonary hypertension.         Medical Decision Making:  Today's Assessment / Status / Plan:     1. Acute deep vein thrombosis (DVT) of distal vein of left lower extremity (HCC)  rivaroxaban (XARELTO) 20 MG Tab tablet      2. Descending aortic aneurysm (HCC)        3. Carotid artery aneurysm (HCC)        4. Pulmonary hypertension (HCC)        5. 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 Chronic anticoagulation  rivaroxaban (XARELTO) 20 MG Tab tablet      7. History of pulmonary embolism        8. LVH (left ventricular hypertrophy)        9. 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   9/2023 - presumed cancer-associated, incidentally found, acute, asymptomatic RML PE , resolved 11/2023  3/2024 - acute, cancer-associated, LLE gastroc v DVT   - no chronic provoking risk factor is bladder malignancy   - Thrombophilia/hypercoag evaluation:  not recommended  Plan:  - no imaging surveillance needed at this time  - antithrombotic plan as noted below   - counseled on signs and symptoms of acute VTE that require seeking prompt attention in the ED to include shortness of breath, chest pain, pain with deep inhalation, acute leg swelling and/or pain in calf or leg   - elevate legs as much as possible, use compression stockings/socks if directed by your provider  - Avoid hormonal therapies including estrogen or testosterone-containing meds, or raloxifene or tamoxifene (commonly used for osteoporosis)  - Avoid sedentary periods  - continue complete avoidance of tobacco products  - if having any invasive procedure,please make sure the doctor knows of your history of blood clots and current anticoagulation status    2) Ascending aortic aneurysm - stable, asymptomatic  - generally non-athero-related  7/2021 CT = 4.3cm   10/2020 CTA = 4.3cm   9/2023 CTA = 3.5cm - sizing discrepancy from prior   - as reviewed with pt, surg repair indicated if 5.5+cm or rapid expansion rate (defined as >0.5 cm in 1 year or >0.3 cm/y in 2  consecutive years for those with sporadic aneurysms, and >0.3 cm in 1 year for those with hereditary thoracic Ao disease (HTAD) or bicuspid AV) due to increase risks for dissection/rupture  -no known HTAD, so presumed sporadic development from cystic medial degeneration  - No reported fhx or pmhx  of connective tissue disease  - Echo shows no biscupid Ao valve  - at initial visit - reviewed anatomy, risks, emergency s/s requiring immediate attention, and thresholds for surgical intervention and gave handout   Plan:   - continue med mgmt   - repeat CTA chest 1yr from last imaging (9/2024), then determine need for ongoing surveillance   - focus on ARB and BB for BP control  - activity restrictions including no extreme endurance activities, smoking, stimulants, and extreme weight lifting >20lbs     3) descending AA - presumed atherosclerosis related, no sx  -  generally athero-related  9/2023 CTA = 3.1cm - no growth over time   - as reviewed with pt, surg repair indicated if 5.5+cm or rapid expansion rate (defined as >0.5 cm in 1 year or >0.3 cm/y in 2  Plan:  - annual CTA chest for surveillance (Sept)  - continue med mgmt     4) CAD status post PCI to RCA and LCx 2002 , stable, no sx    Plan:  - continue secondary prevention med mgmt   - ref to  cardiology for ongoing care per pt request     5) mild TR, mild pHTN = 43mmHg - stable, no sx  Plan:  - recommended to establish with cardiology     ANTITHROMBOTIC THERAPY:  Date of initiation: 9/2023 - only completed 3 weeks   HAS-BLED bleeding risk calc ([email or web address]): 2 pt, 4.1%, moderate risk   Factors to consider for indefinite OAC: Unprovoked VTE event and Male sex   Expected duration: minimal tx for distal LE DVT is 3mo full dose OAC, then consider early reduction to xarelto 10mg indefinitely due to recurrent VTE in context of bladder CA   Plan:  - transition eliquis to xarelto 20mg daily due to cost-purposes,  current CrCl = 52.9 (MDRD) - no indications for reduced dosing for now   - may consider restart of OAC if new VTE event   - stressed strict adherence to tx and avoid early termination due to increased risk for recurrent VTE    LIPID MANAGEMENT:   Qualifies for Statin Therapy Based on 2018 ACC/AHA Guidelines: n/a  The ASCVD Risk score (Park MILNER, et al.,  2019) failed to calculate., N/A  Major ASCVD events: None  High-risk conditions: N/A  Currently on Statin: Yes  Tx goals: LDL-C <100   At goal? yes  Plan:   - reinforced ongoing TLC measures as noted   - monitor labs   Meds:   - continue rosuva 5mg daily  - continue zetia 10mg daily     BLOOD PRESSURE CONTROL   Office BP Goal ACC/AHA (2017) goal <130/80  Home BP at goal:  yes  Office BP at goal:  yes  24h ABPM:  not ordered to date   Plan:   - continue metoprolol 25mg ER daily     GLYCEMIC STATUS:  Diabetic, T2 with HLD, UACR  Goal A1c < 7.5 reasonable   Lab Results   Component Value Date    HBA1C 6.7 (H) 01/30/2024      Lab Results   Component Value Date/Time    MALBCRT 79 (H) 04/09/2024 10:36 AM    MICROALBUR 6.9 04/09/2024 10:36 AM   Plan:  - continue current medication plan   - recommmend for routine care with PCP (or endocrine) to include regular A1c monitoring, annual albumin/creatinine ratio (ACR), annual diabetic retinopathy screening, foot exams, annual flu vaccine, and updates to pneumonia vaccines as appropriate      LIFESTYLE INTERVENTIONS  TOBACCO: Stages of Change: Maintenance (sustained change >6mo)    reports that he quit smoking about 23 years ago. His smoking use included cigarettes. He started smoking about 68 years ago. He has a 45 pack-year smoking history. He has never used smokeless tobacco.   - continued complete avoidance of all tobacco products   PHYSICAL ACTIVITY: >150min/week of mod-intensity activity or as much as tolerated  NUTRITION: Mediterranean and reduce Na to 1500mg/day   ETOH: complete abstinence recommended  WT MGMT: maintain healthy weight    OTHER:    # bladder cancer, stage 2 - stable  - if malignancy will have further increase risk for VTE events  Plan:  - continue f/u with oncology     # ureteral stent - in situ, pending retrieval     # anemia, normocytic, mild no sx   - defer mgmt to PCP     Studies to Be Obtained: CTA chest 9/2024 - ordered, RN to track   Labs to Be  Obtained: as noted above     Follow up in:  Sept    Onel Leonard M.D.  [facility]   [facility]   [phone]

## 2024-05-24 ENCOUNTER — TELEPHONE (OUTPATIENT)
Dept: VASCULAR LAB | Facility: MEDICAL CENTER | Age: 84
End: 2024-05-24
Payer: MEDICARE

## 2024-05-24 NOTE — TELEPHONE ENCOUNTER
Received New start PA request via [employer]  for XARELTO 20MG TABLETS . (Quantity:90, Day Supply:90)     Insurance: [employer]   Member ID:  [id number]  BIN: [id number]  PCN: [id number]   Group: [id number]     Ran Test claim via [place] & medication Pays for a $352.17/90DS ; $125.94/30DS copay. Will outreach to patient to offer specialty pharmacy services and or release to preferred pharmacy    ADRIANO Poole, PhT  Vascular Pharmacy Liaison (Rx Coordinator)  P: [phone]  5/24/2024 4:15 PM

## 2024-05-24 NOTE — TELEPHONE ENCOUNTER
Attempted to contact pt and left a detailed message at this time. Will contact pt again at a later date.    ADRIANO Poole, PhT  Vascular Pharmacy Liaison (Rx Coordinator)  P: [phone]  5/24/2024 4:17 PM       Recent PHQ 2/9 Score    PHQ 2:  Date Adult PHQ 2 Score   7/8/2019 0       PHQ 9:  Date Adult PHQ 9 Score   7/8/2019 0

## 2024-05-28 PROCEDURE — RXMED WILLOW AMBULATORY MEDICATION CHARGE: Performed by: FAMILY MEDICINE

## 2024-05-28 NOTE — TELEPHONE ENCOUNTER
Called and spoke to Pt today in regards to setting his first initial fill with [facility]. Per pt, he would like to pick it up at the [facility] pharmacy. Offered Pt for financial assistance to cover the costs for his Xarelto fill, and pt opted in for the enrollment process. Informed pt that I will need to reach out to the [facility] to request for the enrollment process and then will submit the information of approval to the current pharmacy. Pt also states that he would like to get all of his active medications to be transferred to [facility].     Called [facility] pharmacy and spoke with Frannie [facility] to ran the test claim for the Xarelto. Per Frannie, it shows refill too soon. Informed her that I will give the previous pharmacy a call to reverse the claim.     Called [facility] pharmacy @ [phone] and spoke with Shanika to reverse claim and request for transfers for all active medication.     Called [facility] pharmacy and spoke with Frannie to ran another test claim, and states still rejected. She will reach out to me once she receives an approved claim.     Reyna Pantoja, ADRIANO, PhT  Vascular Pharmacy Liaison (Rx Coordinator)  P: [phone]  5/28/2024 2:55 PM

## 2024-05-29 PROCEDURE — RXMED WILLOW AMBULATORY MEDICATION CHARGE: Performed by: INTERNAL MEDICINE

## 2024-05-29 PROCEDURE — RXMED WILLOW AMBULATORY MEDICATION CHARGE: Performed by: STUDENT IN AN ORGANIZED HEALTH CARE EDUCATION/TRAINING PROGRAM

## 2024-05-29 PROCEDURE — RXMED WILLOW AMBULATORY MEDICATION CHARGE: Performed by: UROLOGY

## 2024-05-30 ENCOUNTER — HOSPITAL ENCOUNTER (OUTPATIENT)
Dept: LAB | Facility: MEDICAL CENTER | Age: 84
End: 2024-05-30
Attending: INTERNAL MEDICINE
Payer: MEDICARE

## 2024-05-30 LAB
ALBUMIN SERPL BCP-MCNC: 3.9 G/DL (ref 3.2–4.9)
ALBUMIN/GLOB SERPL: 1.4 G/DL
ALP SERPL-CCNC: 66 U/L (ref 30–99)
ALT SERPL-CCNC: 7 U/L (ref 2–50)
ANION GAP SERPL CALC-SCNC: 14 MMOL/L (ref 7–16)
AST SERPL-CCNC: 14 U/L (ref 12–45)
BASOPHILS # BLD AUTO: 0.4 % (ref 0–1.8)
BASOPHILS # BLD: 0.03 K/UL (ref 0–0.12)
BILIRUB SERPL-MCNC: 0.4 MG/DL (ref 0.1–1.5)
BUN SERPL-MCNC: 22 MG/DL (ref 8–22)
CALCIUM ALBUM COR SERPL-MCNC: 9.4 MG/DL (ref 8.5–10.5)
CALCIUM SERPL-MCNC: 9.3 MG/DL (ref 8.5–10.5)
CHLORIDE SERPL-SCNC: 103 MMOL/L (ref 96–112)
CO2 SERPL-SCNC: 18 MMOL/L (ref 20–33)
CREAT SERPL-MCNC: 1.28 MG/DL (ref 0.5–1.4)
EOSINOPHIL # BLD AUTO: 0.25 K/UL (ref 0–0.51)
EOSINOPHIL NFR BLD: 3 % (ref 0–6.9)
ERYTHROCYTE [DISTWIDTH] IN BLOOD BY AUTOMATED COUNT: 48.2 FL (ref 35.9–50)
GFR SERPLBLD CREATININE-BSD FMLA CKD-EPI: 55 ML/MIN/1.73 M 2
GLOBULIN SER CALC-MCNC: 2.7 G/DL (ref 1.9–3.5)
GLUCOSE SERPL-MCNC: 220 MG/DL (ref 65–99)
HCT VFR BLD AUTO: 36.3 % (ref 42–52)
HGB BLD-MCNC: 11.8 G/DL (ref 14–18)
IMM GRANULOCYTES # BLD AUTO: 0.04 K/UL (ref 0–0.11)
IMM GRANULOCYTES NFR BLD AUTO: 0.5 % (ref 0–0.9)
LYMPHOCYTES # BLD AUTO: 1.62 K/UL (ref 1–4.8)
LYMPHOCYTES NFR BLD: 19.2 % (ref 22–41)
MCH RBC QN AUTO: 29.7 PG (ref 27–33)
MCHC RBC AUTO-ENTMCNC: 32.5 G/DL (ref 32.3–36.5)
MCV RBC AUTO: 91.4 FL (ref 81.4–97.8)
MONOCYTES # BLD AUTO: 0.59 K/UL (ref 0–0.85)
MONOCYTES NFR BLD AUTO: 7 % (ref 0–13.4)
NEUTROPHILS # BLD AUTO: 5.89 K/UL (ref 1.82–7.42)
NEUTROPHILS NFR BLD: 69.9 % (ref 44–72)
NRBC # BLD AUTO: 0 K/UL
NRBC BLD-RTO: 0 /100 WBC (ref 0–0.2)
PLATELET # BLD AUTO: 242 K/UL (ref 164–446)
PMV BLD AUTO: 9.4 FL (ref 9–12.9)
POTASSIUM SERPL-SCNC: 4.2 MMOL/L (ref 3.6–5.5)
PROT SERPL-MCNC: 6.6 G/DL (ref 6–8.2)
RBC # BLD AUTO: 3.97 M/UL (ref 4.7–6.1)
SODIUM SERPL-SCNC: 135 MMOL/L (ref 135–145)
TSH SERPL DL<=0.005 MIU/L-ACNC: 4.08 UIU/ML (ref 0.38–5.33)
WBC # BLD AUTO: 8.4 K/UL (ref 4.8–10.8)

## 2024-06-03 ENCOUNTER — PHARMACY VISIT (OUTPATIENT)
Dept: PHARMACY | Facility: MEDICAL CENTER | Age: 84
End: 2024-06-03
Payer: COMMERCIAL

## 2024-06-10 ENCOUNTER — HOSPITAL ENCOUNTER (OUTPATIENT)
Facility: MEDICAL CENTER | Age: 84
End: 2024-06-10
Attending: UROLOGY
Payer: MEDICARE

## 2024-06-10 PROCEDURE — 87077 CULTURE AEROBIC IDENTIFY: CPT

## 2024-06-10 PROCEDURE — 87186 SC STD MICRODIL/AGAR DIL: CPT

## 2024-06-10 PROCEDURE — 87086 URINE CULTURE/COLONY COUNT: CPT

## 2024-06-17 ENCOUNTER — APPOINTMENT (OUTPATIENT)
Dept: BEHAVIORAL HEALTH | Facility: CLINIC | Age: 84
End: 2024-06-17
Payer: MEDICARE

## 2024-06-17 DIAGNOSIS — F43.20 ADJUSTMENT DISORDER, UNSPECIFIED TYPE: ICD-10-CM

## 2024-06-17 PROCEDURE — 90791 PSYCH DIAGNOSTIC EVALUATION: CPT

## 2024-06-17 ASSESSMENT — ANXIETY QUESTIONNAIRES
IF YOU CHECKED OFF ANY PROBLEMS ON THIS QUESTIONNAIRE, HOW DIFFICULT HAVE THESE PROBLEMS MADE IT FOR YOU TO DO YOUR WORK, TAKE CARE OF THINGS AT HOME, OR GET ALONG WITH OTHER PEOPLE: NOT DIFFICULT AT ALL
6. BECOMING EASILY ANNOYED OR IRRITABLE: NOT AT ALL
2. NOT BEING ABLE TO STOP OR CONTROL WORRYING: NOT AT ALL
7. FEELING AFRAID AS IF SOMETHING AWFUL MIGHT HAPPEN: NOT AT ALL
1. FEELING NERVOUS, ANXIOUS, OR ON EDGE: NOT AT ALL
GAD7 TOTAL SCORE: 0
4. TROUBLE RELAXING: NOT AT ALL
3. WORRYING TOO MUCH ABOUT DIFFERENT THINGS: NOT AT ALL
5. BEING SO RESTLESS THAT IT IS HARD TO SIT STILL: NOT AT ALL

## 2024-06-17 ASSESSMENT — PATIENT HEALTH QUESTIONNAIRE - PHQ9
CLINICAL INTERPRETATION OF PHQ2 SCORE: 1
5. POOR APPETITE OR OVEREATING: 0 - NOT AT ALL
SUM OF ALL RESPONSES TO PHQ QUESTIONS 1-9: 4

## 2024-06-17 NOTE — PROGRESS NOTES
[facility]   Initial Assessment      Therapy was provided on this date in coordination with the [facility] approved Clinical Supervisor under the direct supervision of Dr. Salazar who was on site during this visit.    Therapist reviewed informed consent, limits of confidentiality and [facility] policies; patient expressed understanding and agreed to voluntarily proceed with evaluation and treatment.    Name: Star Centeno  MRN: [id number]  : 1940  Age: 83 y.o.  Date of assessment: 2024  PCP: Nirmal Gentile M.D.  Persons in attendance: Patient and DRE Cortez-Intern  Total session time: 50 minutes      CHIEF COMPLAINT AND HISTORY OF PRESENTING PROBLEM:  (as stated by Patient):  Star Centeno is a 83 y.o., White male referred for assessment by Nirmal Gentile*.  Primary presenting issue includes mild depression due to ongoing medical issues.       BEHAVIORAL HEALTH TREATMENT HISTORY  Does patient/parent report a history of prior behavioral health treatment for patient? No:  History of untreated behavioral health issues identified? No  Does patient/parent report change in appetite or weight loss/gain? No  Does patient/parent report physical pain? Yes - Related to medical issues, arthritis, shoulder pain              Indicate if pain is acute or chronic, and location: Chronic               Pain scale rating:  3/10         FAMILY/SOCIAL HISTORY  Current living situation/household members: Currently lives alone with dogLana, in close proximity to daughter. Client and wife, Gayatri of 50 years moved to [place] in  to be closer to daughter and Gayatri passed unexpectedly in  - possibly from heart disease  DaughterPeyton lives locally  SonHusam lives in [place], [place] - Visits frequently  Lalo Mansfield lives in [place], [place] - Visits frequently  Sister and brother also live in [place] and have a close relationship    Does patient/parent report a family  history of behavioral health issues, diagnoses, or treatment?   Family History   Problem Relation Age of Onset    Genetic Disorder Brother     Diabetes Brother     Hypertension Brother     Hyperlipidemia Brother     Genetic Disorder Sister     Diabetes Sister     Genetic Disorder Sister           EMPLOYMENT/RESOURCES  Is the patient currently employed? No - Retired approx 15 years; Previously worked on electronics in [employer]  Does the patient/parent report adequate financial resources? Yes       HISTORY:  Does patient report current or past enlistment? Yes     [employer] - 24 months  Does patient report history of exposure to combat? No      SPIRITUAL/CULTURAL/IDENTITY:  What are the patient's/family's spiritual beliefs or practices? Raised Judaism - Not practicing but believes in God       ABUSE/NEGLECT/TRAUMA SCREENING  Does patient report feeling “unsafe” in his/her home, or afraid of anyone? No  Does patient report any history of physical, sexual, or emotional abuse? No  Is there evidence of neglect by self? No  Is there evidence of neglect by a caregiver? No                                                                                                          SAFETY ASSESSMENT - SELF  Does patient acknowledge current or past symptoms of dangerousness to self? No  Recent change in frequency/specificity/intensity of suicidal thoughts or self-harm behavior? No  Current access to firearms, medications, or other identified means of suicide/self-harm? No  If yes, willing to restrict access to means of suicide/self-harm? No      Current Suicide Risk: Not applicable  Crisis Safety Plan completed and copy given to patient: No      SAFETY ASSESSMENT - OTHERS  Recent change in frequency/specificity/intensity of thoughts or threats to harm others? No      Current Homicide Risk:  Not applicable  Crisis Safety Plan completed and copy given to patient? No  Based on information provided during the  "current assessment, is a mandated “duty to warn” being exercised? No      SUBSTANCE USE/ADDICTION HISTORY  Patient denies use of any substance/addictive behaviors Yes    If No:  Is there a family history of substance use/addiction? No  Does patient acknowledge or parent/significant other report use of/dependence on substances? No  Last time patient used alcohol: A glass of wine with dinner  Within the past week? Yes  Last time patient used marijuana: Denied  Within the past month? No  Any other street drugs ever tried even once? No  Any use of prescription medications/pills without a prescription, or for reasons others than originally prescribed?  No  Any other addictive behavior reported (gambling, shopping, sex)? No     Drug History:  Denied    Alcohol: \"A glass of wine with dinner every night, but I'm thinking about giving it up because of all of the medications I am taking\"      MENTAL STATUS/OBSERVATIONS              Participation: Active verbal participation, Attentive, Engaged, and Open to feedback  Grooming: Casual  Orientation:Alert and Fully Oriented   Behavior: Calm  Eye contact: Good          Mood: Content  Affect:Congruent with content  Thought process: Logical and Goal-directed  Thought content:  Within normal limits  Speech: Rate within normal limits and Volume within normal limits  Perception: Within normal limits  Memory: No gross evidence of memory deficits  Insight: Good  Judgment:  Good              Family/couple interaction observations: SonHusam accompanied client, stayed in waiting area during session      Patient's motivation/readiness for change: Client reported he believes he is doing well, was depressed and anxious when medical issues first arose but is comfortable with quality of treatment at this time.    Topics addressed in psychotherapy include: Initial assessment and building rapport. Client reported feeling depressed a couple of months prior due to increase in medical issues and " their complexity, but believes things have improved. Client reported confidence in his medical team and current treatment plan which has reduced anxiety and depression. Client reported his senior dog was also recently diagnosed with cancer. 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 Additional interests and hobbies included restoring and showing old [employer] vehicles, target shooting with Lalo gibson, and kayaking. Client reported his three children are very supportive, visit frequently and have helped throughout his medical treatments. Client has also started going to the gym, which he believes has been beneficial. Client reported he does not feel he needs therapy at this time but will call to schedule appts if anything changes.     Depression Screening    Little interest or pleasure in doing things?  1 - several days   Feeling down, depressed , or hopeless? 0 - not at all   Trouble falling or staying asleep, or sleeping too much?  2 - more than half the days   Feeling tired or having little energy?  1 - several days   Poor appetite or overeating?  0 - not at all   Feeling bad about yourself - or that you are a failure or have let yourself or your family down? 0 - not at all   Trouble concentrating on things, such as reading the newspaper or watching television? 0 - not at all   Moving or speaking so slowly that other people could have noticed.  Or the opposite - being so fidgety or restless that you have been moving around a lot more than usual?  0 - not at all   Thoughts that you would be better off dead, or of hurting yourself?  0 - not at all   Patient Health Questionnaire Score: 4       Interpretation of PHQ-9 Total Score   Score Severity   1-4 No Depression   5-9 Mild Depression   10-14 Moderate Depression   15-19 Moderately Severe Depression   20-27 Severe Depression     SHOLA-7 Questionnaire    Feeling nervous,  anxious, or on edge: Not at all  Not being able to sop or control worrying: Not at all  Worrying too much about different things: Not at all  Trouble relaxing: Not at all  Being so restless that it's hard to sit still: Not at all  Becoming easily annoyed or irritable: Not at all  Feeling afraid as if something awful might happen: Not at all  Total: 0    Interpretation of SHOLA 7 Total Score   Score Severity :  0-4 No Anxiety   5-9 Mild Anxiety  10-14 Moderate Anxiety  15-21 Severe Anxiety       Diagnosis:  1. Adjustment disorder, unspecified type          Referral appointment(s) scheduled?  Client declined future appts at this time, will contact  if anything changes        Maira Curiel LMSW, CSW-Intern

## 2024-06-25 ENCOUNTER — TELEPHONE (OUTPATIENT)
Dept: VASCULAR LAB | Facility: MEDICAL CENTER | Age: 84
End: 2024-06-25
Payer: MEDICARE

## 2024-06-25 NOTE — TELEPHONE ENCOUNTER
Established patient  Chart prep for upcoming appointment.    Any pending/incomplete orders from last visit? Yes Labs  Was patient called and reminded to complete pending orders? Yes  Were any records requested?  No    Referral up to date? Yes  Referral attached to appointment (renewals and New patients only)? N/A (established with up-to-date referral)  Virtual appointment? No

## 2024-06-28 ENCOUNTER — OFFICE VISIT (OUTPATIENT)
Dept: MEDICAL GROUP | Facility: LAB | Age: 84
End: 2024-06-28
Payer: MEDICARE

## 2024-06-28 ENCOUNTER — HOSPITAL ENCOUNTER (OUTPATIENT)
Dept: LAB | Facility: MEDICAL CENTER | Age: 84
End: 2024-06-28
Attending: PHYSICIAN ASSISTANT
Payer: MEDICARE

## 2024-06-28 VITALS
RESPIRATION RATE: 20 BRPM | BODY MASS INDEX: 25.34 KG/M2 | HEART RATE: 66 BPM | TEMPERATURE: 96.8 F | DIASTOLIC BLOOD PRESSURE: 64 MMHG | WEIGHT: 177 LBS | SYSTOLIC BLOOD PRESSURE: 118 MMHG | OXYGEN SATURATION: 95 % | HEIGHT: 70 IN

## 2024-06-28 DIAGNOSIS — D50.9 IRON DEFICIENCY ANEMIA, UNSPECIFIED IRON DEFICIENCY ANEMIA TYPE: ICD-10-CM

## 2024-06-28 DIAGNOSIS — E11.69 TYPE 2 DIABETES MELLITUS WITH OTHER SPECIFIED COMPLICATION, WITHOUT LONG-TERM CURRENT USE OF INSULIN (HCC): ICD-10-CM

## 2024-06-28 DIAGNOSIS — R19.7 DIARRHEA, UNSPECIFIED TYPE: ICD-10-CM

## 2024-06-28 PROBLEM — E78.2 MIXED DYSLIPIDEMIA: Status: ACTIVE | Noted: 2024-05-30

## 2024-06-28 PROBLEM — E87.1 HYPONATREMIA: Status: RESOLVED | Noted: 2023-11-18 | Resolved: 2024-06-28

## 2024-06-28 PROBLEM — E11.65 TYPE 2 DIABETES MELLITUS WITH HYPERGLYCEMIA (HCC): Status: ACTIVE | Noted: 2024-05-30

## 2024-06-28 PROBLEM — I95.9 HYPOTENSION: Status: RESOLVED | Noted: 2023-11-18 | Resolved: 2024-06-28

## 2024-06-28 PROBLEM — L23.9 CONTACT DERMATITIS, ALLERGIC: Status: ACTIVE | Noted: 2024-05-30

## 2024-06-28 PROBLEM — N17.9 ACUTE KIDNEY INJURY SUPERIMPOSED ON CHRONIC KIDNEY DISEASE (HCC): Status: RESOLVED | Noted: 2023-10-21 | Resolved: 2024-06-28

## 2024-06-28 PROBLEM — S60.00XA CONTUSION, FINGER: Status: ACTIVE | Noted: 2024-05-30

## 2024-06-28 PROBLEM — N18.9 ACUTE KIDNEY INJURY SUPERIMPOSED ON CHRONIC KIDNEY DISEASE (HCC): Status: RESOLVED | Noted: 2023-10-21 | Resolved: 2024-06-28

## 2024-06-28 PROBLEM — A41.9 SEPSIS (HCC): Status: RESOLVED | Noted: 2023-11-18 | Resolved: 2024-06-28

## 2024-06-28 LAB
ALBUMIN SERPL BCP-MCNC: 4.2 G/DL (ref 3.2–4.9)
ALBUMIN/GLOB SERPL: 1.6 G/DL
ALP SERPL-CCNC: 64 U/L (ref 30–99)
ALT SERPL-CCNC: 7 U/L (ref 2–50)
ANION GAP SERPL CALC-SCNC: 12 MMOL/L (ref 7–16)
AST SERPL-CCNC: 13 U/L (ref 12–45)
BASOPHILS # BLD AUTO: 0.3 % (ref 0–1.8)
BASOPHILS # BLD: 0.03 K/UL (ref 0–0.12)
BILIRUB SERPL-MCNC: 0.3 MG/DL (ref 0.1–1.5)
BUN SERPL-MCNC: 25 MG/DL (ref 8–22)
CALCIUM ALBUM COR SERPL-MCNC: 8.8 MG/DL (ref 8.5–10.5)
CALCIUM SERPL-MCNC: 9 MG/DL (ref 8.5–10.5)
CHLORIDE SERPL-SCNC: 102 MMOL/L (ref 96–112)
CO2 SERPL-SCNC: 22 MMOL/L (ref 20–33)
CREAT SERPL-MCNC: 1.49 MG/DL (ref 0.5–1.4)
EOSINOPHIL # BLD AUTO: 0.31 K/UL (ref 0–0.51)
EOSINOPHIL NFR BLD: 3.6 % (ref 0–6.9)
ERYTHROCYTE [DISTWIDTH] IN BLOOD BY AUTOMATED COUNT: 46.5 FL (ref 35.9–50)
GFR SERPLBLD CREATININE-BSD FMLA CKD-EPI: 46 ML/MIN/1.73 M 2
GLOBULIN SER CALC-MCNC: 2.6 G/DL (ref 1.9–3.5)
GLUCOSE SERPL-MCNC: 118 MG/DL (ref 65–99)
HCT VFR BLD AUTO: 36.3 % (ref 42–52)
HGB BLD-MCNC: 12 G/DL (ref 14–18)
IMM GRANULOCYTES # BLD AUTO: 0.03 K/UL (ref 0–0.11)
IMM GRANULOCYTES NFR BLD AUTO: 0.3 % (ref 0–0.9)
IRON SATN MFR SERPL: 22 % (ref 15–55)
IRON SERPL-MCNC: 58 UG/DL (ref 50–180)
LYMPHOCYTES # BLD AUTO: 1.78 K/UL (ref 1–4.8)
LYMPHOCYTES NFR BLD: 20.7 % (ref 22–41)
MAGNESIUM SERPL-MCNC: 2 MG/DL (ref 1.5–2.5)
MCH RBC QN AUTO: 30.5 PG (ref 27–33)
MCHC RBC AUTO-ENTMCNC: 33.1 G/DL (ref 32.3–36.5)
MCV RBC AUTO: 92.1 FL (ref 81.4–97.8)
MONOCYTES # BLD AUTO: 0.63 K/UL (ref 0–0.85)
MONOCYTES NFR BLD AUTO: 7.3 % (ref 0–13.4)
NEUTROPHILS # BLD AUTO: 5.83 K/UL (ref 1.82–7.42)
NEUTROPHILS NFR BLD: 67.8 % (ref 44–72)
NRBC # BLD AUTO: 0 K/UL
NRBC BLD-RTO: 0 /100 WBC (ref 0–0.2)
PLATELET # BLD AUTO: 229 K/UL (ref 164–446)
PMV BLD AUTO: 9.3 FL (ref 9–12.9)
POTASSIUM SERPL-SCNC: 4.1 MMOL/L (ref 3.6–5.5)
PROT SERPL-MCNC: 6.8 G/DL (ref 6–8.2)
RBC # BLD AUTO: 3.94 M/UL (ref 4.7–6.1)
SODIUM SERPL-SCNC: 136 MMOL/L (ref 135–145)
TIBC SERPL-MCNC: 261 UG/DL (ref 250–450)
UIBC SERPL-MCNC: 203 UG/DL (ref 110–370)
WBC # BLD AUTO: 8.6 K/UL (ref 4.8–10.8)

## 2024-06-28 PROCEDURE — 83550 IRON BINDING TEST: CPT

## 2024-06-28 PROCEDURE — 80053 COMPREHEN METABOLIC PANEL: CPT

## 2024-06-28 PROCEDURE — 83735 ASSAY OF MAGNESIUM: CPT

## 2024-06-28 PROCEDURE — 36415 COLL VENOUS BLD VENIPUNCTURE: CPT

## 2024-06-28 PROCEDURE — 83540 ASSAY OF IRON: CPT

## 2024-06-28 PROCEDURE — 85025 COMPLETE CBC W/AUTO DIFF WBC: CPT

## 2024-06-28 RX ORDER — METFORMIN HYDROCHLORIDE 500 MG/1
500 TABLET, EXTENDED RELEASE ORAL 2 TIMES DAILY
Qty: 180 TABLET | Refills: 3 | Status: SHIPPED | OUTPATIENT
Start: 2024-06-28

## 2024-06-28 ASSESSMENT — ENCOUNTER SYMPTOMS: DIARRHEA: 1

## 2024-06-28 ASSESSMENT — FIBROSIS 4 INDEX: FIB4 SCORE: 1.86

## 2024-06-28 NOTE — PROGRESS NOTES
Subjective:     Chief Complaint   Patient presents with    Diarrhea     Last night;      HPI:   Star is a 83 y.o. male who presents today with diarrhea x 1 day. PCP unavailable.    History of Present Illness  The patient is an 83-year-old male coming in to discuss diarrhea.    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 His diarrhea, which intensified early this morning around 2:00 AM, persisted until afternoon. He is uncertain whether this is a side effect of his immunotherapy or another underlying issue. His cancer care has requested lab tests which he already had done today prior to his appointment and he is scheduled to return tomorrow to complete a stool sample looking for C. difficile.  Previously he had negative C. difficile testing in May.     His nephrologist has prescribed iron supplements, which he takes 3 times weekly, Monday, Wednesday, and Friday. Imodium has been beneficial for his diarrhea in the past but he states that his oncology team is considering Lomotil pending his results. He is not currently taking magnesium.     He has been on metformin 500 mg twice daily for several years.  He denies experiencing abdominal pain or fever. He experienced blood in his stool approximately 6 to 8 months ago when he was on blood thinners, but this has since resolved. He maintains hydration by drinking water and regular Gatorade. He does not drink coffee, but drinks tea with orange and young.     Review of Systems   Constitutional:  Negative for chills, fever, malaise/fatigue and weight loss.   Respiratory:  Negative for cough and shortness of breath.    Cardiovascular:  Negative for chest pain and palpitations.   Gastrointestinal:  Positive for diarrhea. Negative for abdominal pain, blood in stool, constipation, nausea and vomiting.   Skin:  Negative for rash.   Neurological:  Negative for dizziness.     Objective:     Exam:  /64 (BP Location: Left arm, Patient Position:  "Sitting, BP Cuff Size: Adult)   Pulse 66   Temp 36 °C (96.8 °F) (Temporal)   Resp 20   Ht 1.778 m (5' 10\")   Wt 80.3 kg (177 lb)   SpO2 95%   BMI 25.40 kg/m²  Body mass index is 25.4 kg/m².    Physical Exam  Vitals reviewed.   Constitutional:       General: He is not in acute distress.     Appearance: Normal appearance. He is not ill-appearing.   HENT:      Head: Normocephalic and atraumatic.      Nose: Nose normal.      Mouth/Throat:      Mouth: Mucous membranes are moist.   Eyes:      General: No scleral icterus.  Cardiovascular:      Rate and Rhythm: Normal rate and regular rhythm.      Heart sounds: Normal heart sounds. No murmur heard.  Pulmonary:      Effort: Pulmonary effort is normal. No respiratory distress.      Breath sounds: Normal breath sounds. No wheezing, rhonchi or rales.   Abdominal:      General: Abdomen is flat. Bowel sounds are normal. There is no distension.      Palpations: Abdomen is soft. There is no mass.      Tenderness: There is no abdominal tenderness. There is no guarding or rebound.      Hernia: No hernia is present.   Musculoskeletal:      Right lower leg: No edema.      Left lower leg: No edema.   Skin:     General: Skin is warm and dry.      Coloration: Skin is not jaundiced.   Neurological:      General: No focal deficit present.      Mental Status: He is alert and oriented to person, place, and time.   Psychiatric:         Mood and Affect: Mood normal.         Behavior: Behavior normal.         Thought Content: Thought content normal.       Labs: Reviewed from 2024    Assessment & Plan:     83 y.o. male with the following -     1. Diarrhea, unspecified type  Acute on chronic diarrhea in this patient currently undergoing treatment for bladder cancer.  Suspect likely related to cancer treatment, already has a plan with his oncology team and should have C. difficile testing tomorrow.  Okay to continue Imodium and he states that oncology may consider Lomotil pending his " results.  Ensure TSH normal, already has CBC/CMP ordered by oncology.  Switch to extended release metformin and advised adequate hydration but avoiding sugary beverages which may exacerbate diarrhea as well as his diabetes.  Gave strict ER precautions.  - TSH WITH REFLEX TO FT4; Future    2. Type 2 diabetes mellitus with other specified complication, without long-term current use of insulin (HCC)  Chronic, controlled.  May be beneficial to switch to extended release metformin given diarrhea as above.  Patient working with nephrology and may consider starting farxiga/Jardiance which would be of more benefit than metformin given CKD however previously Jardiance was cost prohibitive.  Continue care with nephrology.  Scheduled an appointment for repeat A1c in August.  - metFORMIN ER (GLUCOPHAGE XR) 500 MG TABLET SR 24 HR; Take 1 Tablet by mouth 2 times a day. (Ok to take 2 tablets once a day instead if desired)  Dispense: 180 Tablet; Refill: 3    3. Iron deficiency anemia, unspecified iron deficiency anemia type  Noted prior, followed by nephrology and taking iron as directed 3 times a week.  Suspect anemia also related to cancer and chronic disease.  Trend.  - FERRITIN; Future  - IRON/TOTAL IRON BIND; Future    Return in about 6 weeks (around 8/9/2024), or if symptoms worsen or fail to improve, for Diabetes (A1C).    Please note that this dictation was created using voice recognition software. I have made every reasonable attempt to correct obvious errors, but I expect that there are errors of grammar and possibly content that I did not discover before finalizing the note.

## 2024-06-29 ENCOUNTER — HOSPITAL ENCOUNTER (OUTPATIENT)
Facility: MEDICAL CENTER | Age: 84
End: 2024-06-29
Attending: INTERNAL MEDICINE
Payer: MEDICARE

## 2024-06-29 ENCOUNTER — HOSPITAL ENCOUNTER (OUTPATIENT)
Dept: LAB | Facility: MEDICAL CENTER | Age: 84
End: 2024-06-29
Attending: STUDENT IN AN ORGANIZED HEALTH CARE EDUCATION/TRAINING PROGRAM
Payer: MEDICARE

## 2024-06-29 ENCOUNTER — APPOINTMENT (OUTPATIENT)
Dept: LAB | Facility: MEDICAL CENTER | Age: 84
End: 2024-06-29
Attending: INTERNAL MEDICINE
Payer: MEDICARE

## 2024-06-29 DIAGNOSIS — R19.7 DIARRHEA, UNSPECIFIED TYPE: ICD-10-CM

## 2024-06-29 DIAGNOSIS — D50.9 IRON DEFICIENCY ANEMIA, UNSPECIFIED IRON DEFICIENCY ANEMIA TYPE: ICD-10-CM

## 2024-06-29 LAB
C DIFF DNA SPEC QL NAA+PROBE: NEGATIVE
C DIFF TOX A+B STL QL IA: NEGATIVE
C DIFF TOX GENS STL QL NAA+PROBE: NORMAL
FERRITIN SERPL-MCNC: 123 NG/ML (ref 22–322)
IRON SATN MFR SERPL: 31 % (ref 15–55)
IRON SERPL-MCNC: 76 UG/DL (ref 50–180)
TIBC SERPL-MCNC: 246 UG/DL (ref 250–450)
TSH SERPL DL<=0.005 MIU/L-ACNC: 4.9 UIU/ML (ref 0.38–5.33)
UIBC SERPL-MCNC: 170 UG/DL (ref 110–370)

## 2024-06-29 PROCEDURE — 87493 C DIFF AMPLIFIED PROBE: CPT

## 2024-06-29 PROCEDURE — 83540 ASSAY OF IRON: CPT

## 2024-06-29 PROCEDURE — 36415 COLL VENOUS BLD VENIPUNCTURE: CPT

## 2024-06-29 PROCEDURE — 83550 IRON BINDING TEST: CPT

## 2024-06-29 PROCEDURE — 84443 ASSAY THYROID STIM HORMONE: CPT

## 2024-06-29 PROCEDURE — 82728 ASSAY OF FERRITIN: CPT

## 2024-06-29 PROCEDURE — 87324 CLOSTRIDIUM AG IA: CPT

## 2024-06-29 NOTE — PATIENT INSTRUCTIONS
Metformin switch to extended release to hopefully improve diarrhea  -Ok to take 2 tablets once a day instead if desired (with a meal)    Jardiance (check price), would start at 10mg daily

## 2024-06-30 PROBLEM — E87.20 METABOLIC ACIDOSIS: Status: RESOLVED | Noted: 2024-02-02 | Resolved: 2024-06-30

## 2024-06-30 PROBLEM — N30.00 ACUTE CYSTITIS: Status: RESOLVED | Noted: 2023-10-21 | Resolved: 2024-06-30

## 2024-06-30 PROBLEM — D50.9 IRON DEFICIENCY ANEMIA: Status: RESOLVED | Noted: 2024-06-28 | Resolved: 2024-06-30

## 2024-06-30 PROBLEM — N39.0 COMPLICATED URINARY TRACT INFECTION: Status: RESOLVED | Noted: 2024-03-03 | Resolved: 2024-06-30

## 2024-06-30 PROBLEM — E87.5 HYPERKALEMIA: Status: RESOLVED | Noted: 2024-02-02 | Resolved: 2024-06-30

## 2024-06-30 PROBLEM — R05.1 ACUTE COUGH: Status: RESOLVED | Noted: 2024-02-03 | Resolved: 2024-06-30

## 2024-06-30 ASSESSMENT — ENCOUNTER SYMPTOMS
DIZZINESS: 0
WEIGHT LOSS: 0
SHORTNESS OF BREATH: 0
FEVER: 0
VOMITING: 0
CONSTIPATION: 0
ABDOMINAL PAIN: 0
COUGH: 0
PALPITATIONS: 0
BLOOD IN STOOL: 0
NAUSEA: 0
CHILLS: 0

## 2024-07-01 PROCEDURE — RXMED WILLOW AMBULATORY MEDICATION CHARGE: Performed by: STUDENT IN AN ORGANIZED HEALTH CARE EDUCATION/TRAINING PROGRAM

## 2024-07-02 ENCOUNTER — PHARMACY VISIT (OUTPATIENT)
Dept: PHARMACY | Facility: MEDICAL CENTER | Age: 84
End: 2024-07-02
Payer: COMMERCIAL

## 2024-07-02 ENCOUNTER — OFFICE VISIT (OUTPATIENT)
Dept: VASCULAR LAB | Facility: MEDICAL CENTER | Age: 84
End: 2024-07-02
Attending: FAMILY MEDICINE
Payer: MEDICARE

## 2024-07-02 VITALS
BODY MASS INDEX: 25.77 KG/M2 | HEART RATE: 62 BPM | WEIGHT: 180 LBS | DIASTOLIC BLOOD PRESSURE: 70 MMHG | HEIGHT: 70 IN | SYSTOLIC BLOOD PRESSURE: 151 MMHG

## 2024-07-02 DIAGNOSIS — I72.0 CAROTID ARTERY ANEURYSM (HCC): ICD-10-CM

## 2024-07-02 DIAGNOSIS — Z86.711 HISTORY OF PULMONARY EMBOLISM: ICD-10-CM

## 2024-07-02 DIAGNOSIS — I71.9 DESCENDING AORTIC ANEURYSM (HCC): ICD-10-CM

## 2024-07-02 DIAGNOSIS — Z83.2 FAMILY HISTORY OF CLOTTING DISORDER: ICD-10-CM

## 2024-07-02 DIAGNOSIS — N18.31 CKD STAGE G3A/A2, GFR 45-59 AND ALBUMIN CREATININE RATIO 30-299 MG/G: ICD-10-CM

## 2024-07-02 DIAGNOSIS — I82.4Z2 ACUTE DEEP VEIN THROMBOSIS (DVT) OF DISTAL VEIN OF LEFT LOWER EXTREMITY (HCC): ICD-10-CM

## 2024-07-02 DIAGNOSIS — I10 PRIMARY HYPERTENSION: ICD-10-CM

## 2024-07-02 DIAGNOSIS — I24.0 ISCHEMIC HEART DISEASE DUE TO CORONARY ARTERY OBSTRUCTION (HCC): ICD-10-CM

## 2024-07-02 DIAGNOSIS — I25.9 ISCHEMIC HEART DISEASE DUE TO CORONARY ARTERY OBSTRUCTION (HCC): ICD-10-CM

## 2024-07-02 DIAGNOSIS — Z79.01 CHRONIC ANTICOAGULATION: ICD-10-CM

## 2024-07-02 DIAGNOSIS — I71.21 ANEURYSM OF ASCENDING AORTA WITHOUT RUPTURE (HCC): ICD-10-CM

## 2024-07-02 DIAGNOSIS — K55.1 ATHEROSCLEROSIS OF SUPERIOR MESENTERIC ARTERY (HCC): ICD-10-CM

## 2024-07-02 PROCEDURE — G2211 COMPLEX E/M VISIT ADD ON: HCPCS | Performed by: FAMILY MEDICINE

## 2024-07-02 PROCEDURE — 99214 OFFICE O/P EST MOD 30 MIN: CPT | Performed by: FAMILY MEDICINE

## 2024-07-02 PROCEDURE — 3077F SYST BP >= 140 MM HG: CPT | Performed by: FAMILY MEDICINE

## 2024-07-02 PROCEDURE — 3078F DIAST BP <80 MM HG: CPT | Performed by: FAMILY MEDICINE

## 2024-07-02 PROCEDURE — 99212 OFFICE O/P EST SF 10 MIN: CPT

## 2024-07-02 ASSESSMENT — ENCOUNTER SYMPTOMS
SHORTNESS OF BREATH: 0
PND: 0
COUGH: 0
BRUISES/BLEEDS EASILY: 0
SPUTUM PRODUCTION: 0
PALPITATIONS: 0
BLOOD IN STOOL: 0
HEMOPTYSIS: 0
CHILLS: 0
CLAUDICATION: 0
DIARRHEA: 1
WHEEZING: 0
ORTHOPNEA: 0
FEVER: 0

## 2024-07-02 ASSESSMENT — FIBROSIS 4 INDEX: FIB4 SCORE: 1.82

## 2024-07-05 ENCOUNTER — HOSPITAL ENCOUNTER (OUTPATIENT)
Dept: RADIOLOGY | Facility: MEDICAL CENTER | Age: 84
End: 2024-07-05
Attending: FAMILY MEDICINE
Payer: MEDICARE

## 2024-07-05 DIAGNOSIS — I72.0 CAROTID ARTERY ANEURYSM (HCC): ICD-10-CM

## 2024-07-05 PROCEDURE — 93880 EXTRACRANIAL BILAT STUDY: CPT

## 2024-07-08 ENCOUNTER — HOSPITAL ENCOUNTER (OUTPATIENT)
Dept: LAB | Facility: MEDICAL CENTER | Age: 84
End: 2024-07-08
Attending: INTERNAL MEDICINE
Payer: MEDICARE

## 2024-07-08 ENCOUNTER — HOSPITAL ENCOUNTER (OUTPATIENT)
Dept: LAB | Facility: MEDICAL CENTER | Age: 84
End: 2024-07-08
Attending: FAMILY MEDICINE
Payer: MEDICARE

## 2024-07-08 DIAGNOSIS — Z79.01 CHRONIC ANTICOAGULATION: ICD-10-CM

## 2024-07-08 DIAGNOSIS — Z83.2 FAMILY HISTORY OF CLOTTING DISORDER: ICD-10-CM

## 2024-07-08 DIAGNOSIS — Z86.711 HISTORY OF PULMONARY EMBOLISM: ICD-10-CM

## 2024-07-08 DIAGNOSIS — I82.4Z2 ACUTE DEEP VEIN THROMBOSIS (DVT) OF DISTAL VEIN OF LEFT LOWER EXTREMITY (HCC): ICD-10-CM

## 2024-07-08 LAB
ALBUMIN SERPL BCP-MCNC: 4.3 G/DL (ref 3.2–4.9)
ALBUMIN/GLOB SERPL: 2 G/DL
ALP SERPL-CCNC: 70 U/L (ref 30–99)
ALT SERPL-CCNC: 11 U/L (ref 2–50)
ANION GAP SERPL CALC-SCNC: 13 MMOL/L (ref 7–16)
AST SERPL-CCNC: 15 U/L (ref 12–45)
BASOPHILS # BLD AUTO: 0.3 % (ref 0–1.8)
BASOPHILS # BLD: 0.03 K/UL (ref 0–0.12)
BILIRUB SERPL-MCNC: 0.4 MG/DL (ref 0.1–1.5)
BUN SERPL-MCNC: 20 MG/DL (ref 8–22)
CALCIUM ALBUM COR SERPL-MCNC: 9.3 MG/DL (ref 8.5–10.5)
CALCIUM SERPL-MCNC: 9.5 MG/DL (ref 8.5–10.5)
CHLORIDE SERPL-SCNC: 101 MMOL/L (ref 96–112)
CO2 SERPL-SCNC: 20 MMOL/L (ref 20–33)
CREAT SERPL-MCNC: 1.35 MG/DL (ref 0.5–1.4)
EOSINOPHIL # BLD AUTO: 0.29 K/UL (ref 0–0.51)
EOSINOPHIL NFR BLD: 3.2 % (ref 0–6.9)
ERYTHROCYTE [DISTWIDTH] IN BLOOD BY AUTOMATED COUNT: 45.5 FL (ref 35.9–50)
FACT VIII ACT/NOR PPP: 153 % (ref 45–145)
GFR SERPLBLD CREATININE-BSD FMLA CKD-EPI: 51 ML/MIN/1.73 M 2
GLOBULIN SER CALC-MCNC: 2.2 G/DL (ref 1.9–3.5)
GLUCOSE SERPL-MCNC: 145 MG/DL (ref 65–99)
HCT VFR BLD AUTO: 38.2 % (ref 42–52)
HGB BLD-MCNC: 12.7 G/DL (ref 14–18)
IMM GRANULOCYTES # BLD AUTO: 0.04 K/UL (ref 0–0.11)
IMM GRANULOCYTES NFR BLD AUTO: 0.4 % (ref 0–0.9)
INHIBITOR INDICATED 1863: NO
LYMPHOCYTES # BLD AUTO: 1.57 K/UL (ref 1–4.8)
LYMPHOCYTES NFR BLD: 17.1 % (ref 22–41)
MCH RBC QN AUTO: 30.5 PG (ref 27–33)
MCHC RBC AUTO-ENTMCNC: 33.2 G/DL (ref 32.3–36.5)
MCV RBC AUTO: 91.8 FL (ref 81.4–97.8)
MONOCYTES # BLD AUTO: 0.61 K/UL (ref 0–0.85)
MONOCYTES NFR BLD AUTO: 6.7 % (ref 0–13.4)
NEUTROPHILS # BLD AUTO: 6.62 K/UL (ref 1.82–7.42)
NEUTROPHILS NFR BLD: 72.3 % (ref 44–72)
NRBC # BLD AUTO: 0 K/UL
NRBC BLD-RTO: 0 /100 WBC (ref 0–0.2)
PLATELET # BLD AUTO: 241 K/UL (ref 164–446)
PMV BLD AUTO: 10 FL (ref 9–12.9)
POTASSIUM SERPL-SCNC: 4.5 MMOL/L (ref 3.6–5.5)
PROT SERPL-MCNC: 6.5 G/DL (ref 6–8.2)
RBC # BLD AUTO: 4.16 M/UL (ref 4.7–6.1)
SODIUM SERPL-SCNC: 134 MMOL/L (ref 135–145)
TSH SERPL DL<=0.005 MIU/L-ACNC: 3.46 UIU/ML (ref 0.38–5.33)
WBC # BLD AUTO: 9.2 K/UL (ref 4.8–10.8)

## 2024-07-08 PROCEDURE — RXMED WILLOW AMBULATORY MEDICATION CHARGE: Performed by: UROLOGY

## 2024-07-08 PROCEDURE — 84443 ASSAY THYROID STIM HORMONE: CPT | Mod: GA

## 2024-07-08 PROCEDURE — 36415 COLL VENOUS BLD VENIPUNCTURE: CPT

## 2024-07-08 PROCEDURE — 81240 F2 GENE: CPT

## 2024-07-08 PROCEDURE — 85240 CLOT FACTOR VIII AHG 1 STAGE: CPT

## 2024-07-08 PROCEDURE — 80053 COMPREHEN METABOLIC PANEL: CPT

## 2024-07-08 PROCEDURE — 81241 F5 GENE: CPT

## 2024-07-08 PROCEDURE — 85025 COMPLETE CBC W/AUTO DIFF WBC: CPT

## 2024-07-10 ENCOUNTER — PHARMACY VISIT (OUTPATIENT)
Dept: PHARMACY | Facility: MEDICAL CENTER | Age: 84
End: 2024-07-10
Payer: COMMERCIAL

## 2024-07-14 LAB — F5 P.R506Q BLD/T QL: NEGATIVE

## 2024-07-16 PROBLEM — D68.52 PROTHROMBIN GENE MUTATION (HCC): Status: ACTIVE | Noted: 2024-07-16

## 2024-07-16 LAB — F2 C.20210G>A GENO BLD/T: ABNORMAL

## 2024-07-17 ENCOUNTER — TELEPHONE (OUTPATIENT)
Dept: VASCULAR LAB | Facility: MEDICAL CENTER | Age: 84
End: 2024-07-17
Payer: MEDICARE

## 2024-08-06 ENCOUNTER — HOSPITAL ENCOUNTER (OUTPATIENT)
Dept: LAB | Facility: MEDICAL CENTER | Age: 84
End: 2024-08-06
Attending: INTERNAL MEDICINE
Payer: MEDICARE

## 2024-08-06 LAB
ALBUMIN SERPL BCP-MCNC: 3.8 G/DL (ref 3.2–4.9)
ALBUMIN/GLOB SERPL: 1.4 G/DL
ALP SERPL-CCNC: 63 U/L (ref 30–99)
ALT SERPL-CCNC: 11 U/L (ref 2–50)
ANION GAP SERPL CALC-SCNC: 13 MMOL/L (ref 7–16)
AST SERPL-CCNC: 14 U/L (ref 12–45)
BASOPHILS # BLD AUTO: 0.5 % (ref 0–1.8)
BASOPHILS # BLD: 0.04 K/UL (ref 0–0.12)
BILIRUB SERPL-MCNC: 0.7 MG/DL (ref 0.1–1.5)
BUN SERPL-MCNC: 26 MG/DL (ref 8–22)
CALCIUM ALBUM COR SERPL-MCNC: 9.4 MG/DL (ref 8.5–10.5)
CALCIUM SERPL-MCNC: 9.2 MG/DL (ref 8.5–10.5)
CHLORIDE SERPL-SCNC: 104 MMOL/L (ref 96–112)
CO2 SERPL-SCNC: 21 MMOL/L (ref 20–33)
CREAT SERPL-MCNC: 1.43 MG/DL (ref 0.5–1.4)
EOSINOPHIL # BLD AUTO: 0.36 K/UL (ref 0–0.51)
EOSINOPHIL NFR BLD: 4.6 % (ref 0–6.9)
ERYTHROCYTE [DISTWIDTH] IN BLOOD BY AUTOMATED COUNT: 45.4 FL (ref 35.9–50)
GFR SERPLBLD CREATININE-BSD FMLA CKD-EPI: 48 ML/MIN/1.73 M 2
GLOBULIN SER CALC-MCNC: 2.7 G/DL (ref 1.9–3.5)
GLUCOSE SERPL-MCNC: 201 MG/DL (ref 65–99)
HCT VFR BLD AUTO: 36.6 % (ref 42–52)
HGB BLD-MCNC: 12.1 G/DL (ref 14–18)
IMM GRANULOCYTES # BLD AUTO: 0.06 K/UL (ref 0–0.11)
IMM GRANULOCYTES NFR BLD AUTO: 0.8 % (ref 0–0.9)
LYMPHOCYTES # BLD AUTO: 1.2 K/UL (ref 1–4.8)
LYMPHOCYTES NFR BLD: 15.5 % (ref 22–41)
MCH RBC QN AUTO: 30.7 PG (ref 27–33)
MCHC RBC AUTO-ENTMCNC: 33.1 G/DL (ref 32.3–36.5)
MCV RBC AUTO: 92.9 FL (ref 81.4–97.8)
MONOCYTES # BLD AUTO: 0.59 K/UL (ref 0–0.85)
MONOCYTES NFR BLD AUTO: 7.6 % (ref 0–13.4)
NEUTROPHILS # BLD AUTO: 5.51 K/UL (ref 1.82–7.42)
NEUTROPHILS NFR BLD: 71 % (ref 44–72)
NRBC # BLD AUTO: 0 K/UL
NRBC BLD-RTO: 0 /100 WBC (ref 0–0.2)
PLATELET # BLD AUTO: 204 K/UL (ref 164–446)
PMV BLD AUTO: 9.7 FL (ref 9–12.9)
POTASSIUM SERPL-SCNC: 4.3 MMOL/L (ref 3.6–5.5)
PROT SERPL-MCNC: 6.5 G/DL (ref 6–8.2)
RBC # BLD AUTO: 3.94 M/UL (ref 4.7–6.1)
SODIUM SERPL-SCNC: 138 MMOL/L (ref 135–145)
TSH SERPL-ACNC: 4.2 UIU/ML (ref 0.35–5.5)
WBC # BLD AUTO: 7.8 K/UL (ref 4.8–10.8)

## 2024-08-06 PROCEDURE — 80053 COMPREHEN METABOLIC PANEL: CPT

## 2024-08-06 PROCEDURE — 84443 ASSAY THYROID STIM HORMONE: CPT | Mod: GA

## 2024-08-06 PROCEDURE — 36415 COLL VENOUS BLD VENIPUNCTURE: CPT

## 2024-08-06 PROCEDURE — 85025 COMPLETE CBC W/AUTO DIFF WBC: CPT

## 2024-08-09 ENCOUNTER — OFFICE VISIT (OUTPATIENT)
Dept: MEDICAL GROUP | Facility: LAB | Age: 84
End: 2024-08-09
Payer: MEDICARE

## 2024-08-09 VITALS
OXYGEN SATURATION: 93 % | HEIGHT: 70 IN | DIASTOLIC BLOOD PRESSURE: 78 MMHG | BODY MASS INDEX: 26.48 KG/M2 | SYSTOLIC BLOOD PRESSURE: 126 MMHG | WEIGHT: 185 LBS | HEART RATE: 67 BPM | RESPIRATION RATE: 18 BRPM | TEMPERATURE: 97.4 F

## 2024-08-09 DIAGNOSIS — C66.2 MALIGNANT NEOPLASM OF LEFT URETER (HCC): ICD-10-CM

## 2024-08-09 DIAGNOSIS — E11.69 TYPE 2 DIABETES MELLITUS WITH OTHER SPECIFIED COMPLICATION, WITHOUT LONG-TERM CURRENT USE OF INSULIN (HCC): ICD-10-CM

## 2024-08-09 LAB
HBA1C MFR BLD: 7.2 % (ref ?–5.8)
POCT INT CON NEG: NEGATIVE
POCT INT CON POS: POSITIVE

## 2024-08-09 PROCEDURE — 99213 OFFICE O/P EST LOW 20 MIN: CPT | Performed by: FAMILY MEDICINE

## 2024-08-09 PROCEDURE — 3074F SYST BP LT 130 MM HG: CPT | Performed by: FAMILY MEDICINE

## 2024-08-09 PROCEDURE — 3078F DIAST BP <80 MM HG: CPT | Performed by: FAMILY MEDICINE

## 2024-08-09 PROCEDURE — 83036 HEMOGLOBIN GLYCOSYLATED A1C: CPT | Performed by: FAMILY MEDICINE

## 2024-08-09 ASSESSMENT — FIBROSIS 4 INDEX: FIB4 SCORE: 1.76

## 2024-08-09 NOTE — PROGRESS NOTES
Verbal consent was acquired by the patient to use [employer] ambient listening note generation during this visit Yes    Subjective:   Star Centeno is a 83 y.o. male here today for   No chief complaint on file.      History of Present Illness  The patient is an 83-year-old male with a history of type 2 diabetes, here today for follow-up. He is currently managing his diabetes with metformin 500 mg twice daily.    He underwent immunotherapy yesterday for treatment of left ureter neoplasm and was informed that his lab results were satisfactory, with the exception of his A1c levels. He is uncertain about the need to increase his metformin dosage.    He is nearing the 6-month singh of his immunotherapy treatment. Initially, he experienced side effects such as diarrhea and itching, but these have since resolved. The itching has lessened. Small spots were observed in his lungs, but they were not severe enough to warrant further investigation. He continues to take Imodium as needed.    Patient continues all medications clued metformin.  Working on appropriate diet and exercise regimen for age and condition.  Denies increased fatigue, polydipsia, polyuria, numbness/ting/pain lower extremities.    Supplemental Information  He is scheduled to see a cardiologist at the end of the month.      No Known Allergies      Current medicines (including changes today)  Current Outpatient Medications   Medication Sig Dispense Refill    metFORMIN ER (GLUCOPHAGE XR) 500 MG TABLET SR 24 HR Take 1 Tablet by mouth 2 times a day. (Ok to take 2 tablets once a day instead if desired) 180 Tablet 3    ferrous sulfate 325 (65 Fe) MG tablet Take 1 tablet by mouth three times a week on mon/wed/fri 39 Tablet 3    tamsulosin (FLOMAX) 0.4 MG capsule Take 1 capsule by mouth every day 90 Capsule 2    sodium bicarbonate (SODIUM BICARBONATE) 650 MG Tab Take 2 tablets by mouth three times a day 540 Tablet 3    rosuvastatin (CRESTOR) 5 MG Tab Take 1 tablet  by mouth every day 100 Tablet 3    nitrofurantoin (MACROBID) 100 MG Cap Take 1 capsule by mouth every 12 hours for 7 days 14 Capsule 0    rivaroxaban (XARELTO) 20 MG Tab tablet Take 1 Tablet by mouth with dinner. To thin blood, prevent clots 30 Tablet 1    levothyroxine (SYNTHROID) 25 MCG Tab Take 1 Tablet by mouth every day. 90 Tablet 3    ALPRAZolam (XANAX) 0.25 MG Tab Take 0.25 mg by mouth 1 time a day as needed for Anxiety.      ezetimibe (ZETIA) 10 MG Tab Take 1 Tablet by mouth every day. Indications: High Amount of Fats in the Blood (Patient taking differently: Take 10 mg by mouth every evening. Indications: High Amount of Fats in the Blood) 100 Tablet 2    metoprolol SR (TOPROL XL) 25 MG TABLET SR 24 HR Take 1 Tablet by mouth every day. 90 Tablet 3    bismuth subsalicylate (PEPTO-BISMOL) 262 MG/15ML Suspension Take 30 mL by mouth every 6 hours as needed (diarrhea). Indications: Diarrhea, Heartburn      Loperamide HCl (IMODIUM A-D) 1 MG/7.5ML Liquid Take 30 mL by mouth 2 times a day as needed (diarrhea). Indications: Diarrhea      acetaminophen (TYLENOL) 500 MG Tab Take 1 Tablet by mouth every 6 hours as needed for Mild Pain or Moderate Pain.      diclofenac sodium (VOLTAREN) 1 % Gel Apply 2 g topically 4 times a day as needed (Shoulder Pain). Indications: Joint Damage causing Pain and Loss of Function      rosuvastatin (CRESTOR) 5 MG Tab TAKE 1 TABLET BY MOUTH EVERY DAY (Patient taking differently: Take 5 mg by mouth every evening. Indications: High Amount of Fats in the Blood) 100 Tablet 3    Coenzyme Q10 (COQ-10 PO) Take 1 Capsule by mouth every evening. Indications: heart health       No current facility-administered medications for this visit.     He  has a past medical history of Acute cough (02/03/2024), Acute cystitis (10/21/2023), Acute kidney injury superimposed on chronic kidney disease (HCC) (10/21/2023), Cancer (HCC), Cataract, Complicated urinary tract infection (03/03/2024), Diabetes (HCC), High  "cholesterol, History of heart artery stent, Hyperkalemia (02/02/2024), Hyperlipidemia, Hypertension, Hyponatremia (11/18/2023), Hypotension (11/18/2023), Iron deficiency anemia (06/28/2024), Metabolic acidosis (02/02/2024), Myocardial infarct (HCC), Pulmonary emboli (HCC), Renal disorder, Sepsis (HCC) (11/18/2023), and Urinary bladder disorder.    ROS   ROS  -See HPI     Objective:     Physical Exam:  /78   Pulse 67   Temp 36.3 °C (97.4 °F) (Temporal)   Resp 18   Ht 1.778 m (5' 10\")   Wt 83.9 kg (185 lb)   SpO2 93%  Body mass index is 26.54 kg/m².   Constitutional: Alert, no distress, well-groomed.  Skin: No rashes in visible areas.  Eye: Round. Conjunctiva clear, lids normal. No icterus.   ENMT: Lips pink without lesions, good dentition, moist mucous membranes. Phonation normal.  Neck: No masses, no thyromegaly. Moves freely without pain.  Respiratory: Unlabored respiratory effort, no cough or audible wheeze  Psych: Alert and oriented x3, normal affect and mood.    Results  Laboratory Studies  A1c is 7.2.    Assessment and Plan:     Assessment & Plan  1. Type 2 diabetes.  This is a chronic condition that is stable. The patient is doing very well with a hemoglobin A1c today at 7.2. Given this number, I will continue with metformin 500 mg twice a day. I discussed continuation of appropriate diet and exercise according to the patient's age and condition.    2. Malignant neoplasm of left ureter.  This is an ongoing condition, currently being treated at cancer care specialist. He is continuing with immunotherapy and tolerating well. The patient is doing well and will continue follow-up with oncology and will be undergoing immunotherapy. He will be continuing immunotherapy over the next 6 months.    Orders:  1. Type 2 diabetes mellitus with other specified complication, without long-term current use of insulin (HCC)  - POCT Hemoglobin A1C        Followup: No follow-ups on file.         PLEASE NOTE: This " dictation was created using voice recognition and SM[employer] software. I have made every reasonable attempt to correct obvious errors, but I expect that there are errors of grammar and possibly content that I did not discover before finalizing the note.

## 2024-08-11 DIAGNOSIS — E03.9 ACQUIRED HYPOTHYROIDISM: ICD-10-CM

## 2024-08-11 DIAGNOSIS — I82.4Z2 ACUTE DEEP VEIN THROMBOSIS (DVT) OF DISTAL VEIN OF LEFT LOWER EXTREMITY (HCC): ICD-10-CM

## 2024-08-11 DIAGNOSIS — Z79.01 CHRONIC ANTICOAGULATION: ICD-10-CM

## 2024-08-12 PROCEDURE — RXMED WILLOW AMBULATORY MEDICATION CHARGE: Performed by: FAMILY MEDICINE

## 2024-08-12 NOTE — TELEPHONE ENCOUNTER
Received request via: Patient    Was the patient seen in the last year in this department? Yes    Does the patient have an active prescription (recently filled or refills available) for medication(s) requested? No    Pharmacy Name: [facility]     Does the patient have [facility] and need 100-day supply? (This applies to ALL medications) Patient does not have SCP     levothyroxine (SYNTHROID) 25 MCG Tab

## 2024-08-13 ENCOUNTER — PHARMACY VISIT (OUTPATIENT)
Dept: PHARMACY | Facility: MEDICAL CENTER | Age: 84
End: 2024-08-13
Payer: COMMERCIAL

## 2024-08-14 PROCEDURE — RXMED WILLOW AMBULATORY MEDICATION CHARGE: Performed by: FAMILY MEDICINE

## 2024-08-14 RX ORDER — LEVOTHYROXINE SODIUM 25 UG/1
25 TABLET ORAL
Qty: 90 TABLET | Refills: 3 | Status: SHIPPED | OUTPATIENT
Start: 2024-08-14 | End: 2025-08-09

## 2024-08-16 ENCOUNTER — PHARMACY VISIT (OUTPATIENT)
Dept: PHARMACY | Facility: MEDICAL CENTER | Age: 84
End: 2024-08-16
Payer: COMMERCIAL

## 2024-08-22 DIAGNOSIS — Z79.01 CHRONIC ANTICOAGULATION: ICD-10-CM

## 2024-08-30 ENCOUNTER — OFFICE VISIT (OUTPATIENT)
Dept: CARDIOLOGY | Facility: MEDICAL CENTER | Age: 84
End: 2024-08-30
Attending: FAMILY MEDICINE
Payer: MEDICARE

## 2024-08-30 VITALS
HEART RATE: 64 BPM | DIASTOLIC BLOOD PRESSURE: 60 MMHG | OXYGEN SATURATION: 98 % | HEIGHT: 70 IN | RESPIRATION RATE: 14 BRPM | SYSTOLIC BLOOD PRESSURE: 110 MMHG | WEIGHT: 184 LBS | BODY MASS INDEX: 26.34 KG/M2

## 2024-08-30 DIAGNOSIS — I70.0 AORTIC ATHEROSCLEROSIS (HCC): ICD-10-CM

## 2024-08-30 DIAGNOSIS — I51.7 LVH (LEFT VENTRICULAR HYPERTROPHY): ICD-10-CM

## 2024-08-30 DIAGNOSIS — E78.5 HYPERLIPIDEMIA, UNSPECIFIED HYPERLIPIDEMIA TYPE: ICD-10-CM

## 2024-08-30 DIAGNOSIS — I25.10 CORONARY ARTERY DISEASE INVOLVING NATIVE CORONARY ARTERY OF NATIVE HEART WITHOUT ANGINA PECTORIS: ICD-10-CM

## 2024-08-30 DIAGNOSIS — Z95.5 STATUS POST CORONARY ARTERY BALLOON DILATION: ICD-10-CM

## 2024-08-30 DIAGNOSIS — I10 PRIMARY HYPERTENSION: ICD-10-CM

## 2024-08-30 DIAGNOSIS — K55.1 ATHEROSCLEROSIS OF SUPERIOR MESENTERIC ARTERY (HCC): ICD-10-CM

## 2024-08-30 DIAGNOSIS — I27.20 PULMONARY HYPERTENSION (HCC): ICD-10-CM

## 2024-08-30 DIAGNOSIS — I24.0 ISCHEMIC HEART DISEASE DUE TO CORONARY ARTERY OBSTRUCTION (HCC): ICD-10-CM

## 2024-08-30 DIAGNOSIS — I25.9 ISCHEMIC HEART DISEASE DUE TO CORONARY ARTERY OBSTRUCTION (HCC): ICD-10-CM

## 2024-08-30 DIAGNOSIS — E78.5 DYSLIPIDEMIA: ICD-10-CM

## 2024-08-30 DIAGNOSIS — I77.810 AORTIC ROOT DILATION (HCC): ICD-10-CM

## 2024-08-30 DIAGNOSIS — Z95.5 STENTED CORONARY ARTERY: ICD-10-CM

## 2024-08-30 PROBLEM — I50.32 CHRONIC DIASTOLIC HEART FAILURE (HCC): Chronic | Status: ACTIVE | Noted: 2024-03-28

## 2024-08-30 PROBLEM — I71.21 ANEURYSM OF ASCENDING AORTA (HCC): Status: RESOLVED | Noted: 2022-09-20 | Resolved: 2024-08-30

## 2024-08-30 PROBLEM — I25.84 CORONARY ARTERY DISEASE DUE TO CALCIFIED CORONARY LESION: Chronic | Status: ACTIVE | Noted: 2023-03-09

## 2024-08-30 PROBLEM — I71.9 DESCENDING AORTIC ANEURYSM (HCC): Status: RESOLVED | Noted: 2023-11-22 | Resolved: 2024-08-30

## 2024-08-30 PROBLEM — R79.89 ELEVATED TROPONIN: Status: RESOLVED | Noted: 2024-03-28 | Resolved: 2024-08-30

## 2024-08-30 PROCEDURE — RXMED WILLOW AMBULATORY MEDICATION CHARGE: Performed by: INTERNAL MEDICINE

## 2024-08-30 PROCEDURE — 99213 OFFICE O/P EST LOW 20 MIN: CPT | Performed by: INTERNAL MEDICINE

## 2024-08-30 RX ORDER — EZETIMIBE 10 MG/1
10 TABLET ORAL DAILY
Qty: 90 TABLET | Refills: 3 | Status: SHIPPED | OUTPATIENT
Start: 2024-08-30

## 2024-08-30 RX ORDER — ROSUVASTATIN CALCIUM 5 MG/1
5 TABLET, COATED ORAL DAILY
Qty: 90 TABLET | Refills: 3 | Status: SHIPPED | OUTPATIENT
Start: 2024-08-30

## 2024-08-30 ASSESSMENT — FIBROSIS 4 INDEX: FIB4 SCORE: 1.76

## 2024-08-30 NOTE — PROGRESS NOTES
Chief Complaint   Patient presents with    Coronary Artery Disease     F/V Dx: Coronary artery disease involving native coronary artery of native heart without angina pectoris    Follow-Up     F/V Dx: Aneurysm of ascending aorta without rupture (HCC)       Subjective     Star Centeno is a 83 y.o. male who presents today wiCAD and aortic athero with ectatic  aorta with aortic athero    Doing well    Past Medical History:   Diagnosis Date    Acute cough 02/03/2024    Acute cystitis 10/21/2023    Acute kidney injury superimposed on chronic kidney disease (HCC) 10/21/2023    Cancer (HCC)     Cataract     iol    Complicated urinary tract infection 03/03/2024    Diabetes (HCC)     High cholesterol     History of heart artery stent     Hyperkalemia 02/02/2024    Hyperlipidemia     Hypertension     Hyponatremia 11/18/2023    Hypotension 11/18/2023    Iron deficiency anemia 06/28/2024    Metabolic acidosis 02/02/2024    Myocardial infarct (HCC)     x6 stents 2004    Pulmonary emboli (HCC)     Renal disorder     Sepsis (HCC) 11/18/2023    Urinary bladder disorder     Recurrent UTI     Past Surgical History:   Procedure Laterality Date    MT UNLISTED LAPAROSCOPY PROC, URETER  2/2/2024    Procedure: URETERECTOMY BLADDER CUFF EXCISION AND LEFT URETERAL REIMPLANTATION, ROBOT-ASSISTED;  Surgeon: Shane ALVAREZ M.D.;  Location: SURGERY [facility];  Service: Uro Robotic    CYSTO STENT PLACEMNT PRE SURG  2/2/2024    Procedure: CYSTOSCOPY WITH LEFT URETEROSCOPY AND LEFT STENT REMOVAL;  Surgeon: Shane ALVAREZ M.D.;  Location: SURGERY [facility];  Service: Uro Robotic    LYMPHADECTOMY LAPAROSCOPIC  2/2/2024    Procedure: ROBOTIC PELVIC LYMPH NODE DISSECTION;  Surgeon: Shane ALVAREZ M.D.;  Location: SURGERY [facility];  Service: Uro Robotic    MT CYSTOSCOPY,INSERT URETERAL STENT Left 01/04/2024    Procedure: CYSTOSCOPY, WITH LEFT URETEROSCOPY, WITH URETERAL BIOPSY WITH LEFT STENT REPLACEMENT;  Surgeon: Shane Escalona V  M.D.;  Location: [facility];  Service: Urology    OR CYSTO/URETERO/PYELOSCOPY, DX  2024    Procedure: URETEROSCOPY;  Surgeon: Shane ALVAREZ M.D.;  Location: [facility];  Service: Urology    OR CYSTOURETHROSCOPY,BIOPSIES N/A 10/24/2023    Procedure: CYSTOSCOPY, WITH BLADDER BIOPSY, LEFT RETROGRADE PYELOGRAM;  Surgeon: Jourdan Londono M.D.;  Location: SURGERY [facility];  Service: Urology    OR CYSTO/URETERO/PYELOSCOPY, DX Left 10/24/2023    Procedure: URETEROSCOPY, LEFT;  Surgeon: Jourdan Londono M.D.;  Location: [facility];  Service: Urology    OR CYSTOSCOPY,INSERT URETERAL STENT Left 10/24/2023    Procedure: CYSTOSCOPY, WITH URETERAL STENT INSERTION;  Surgeon: Jourdan Londono M.D.;  Location: [facility];  Service: Urology    EYE SURGERY      OTHER CARDIAC SURGERY      6 stents    PROSTATECTOMY, RADICAL RETRO       Family History   Problem Relation Age of Onset    Genetic Disorder Sister     Diabetes Sister     Heart Failure Sister     Genetic Disorder Sister     Heart Failure Sister     Heart Failure Sister     Genetic Disorder Brother     Diabetes Brother     Hypertension Brother     Hyperlipidemia Brother     Clotting Disorder Brother         elevated FVIII, prothrombin gene mutation    No Known Problems Daughter      Social History     Socioeconomic History    Marital status:      Spouse name: Not on file    Number of children: Not on file    Years of education: Not on file    Highest education level: Associate degree: academic program   Occupational History    Not on file   Tobacco Use    Smoking status: Former     Current packs/day: 0.00     Average packs/day: 1 pack/day for 45.0 years (45.0 ttl pk-yrs)     Types: Cigarettes     Start date: 1956     Quit date: 2001     Years since quittin.6    Smokeless tobacco: Never   Vaping Use    Vaping status: Never Used   Substance and Sexual Activity    Alcohol use: Yes      Alcohol/week: 4.2 oz     Types: 7 Glasses of wine per week     Comment: 1 glass of wine/nightly    Drug use: Never    Sexual activity: Not Currently     Partners: Female     Birth control/protection: Condom   Other Topics Concern    Not on file   Social History Narrative    Not on file     Social Determinants of Health     Financial Resource Strain: Low Risk  (7/25/2022)    Overall Financial Resource Strain (CARDIA)     Difficulty of Paying Living Expenses: Not hard at all   Food Insecurity: No Food Insecurity (7/25/2022)    Hunger Vital Sign     Worried About Running Out of Food in the Last Year: Never true     Ran Out of Food in the Last Year: Never true   Transportation Needs: No Transportation Needs (7/25/2022)    PRAPARE - Transportation     Lack of Transportation (Medical): No     Lack of Transportation (Non-Medical): No   Physical Activity: Inactive (7/25/2022)    Exercise Vital Sign     Days of Exercise per Week: 0 days     Minutes of Exercise per Session: 0 min   Stress: Stress Concern Present (7/25/2022)    [employer] [place] [employer] - Occupational Stress Questionnaire     Feeling of Stress : To some extent   Social Connections: Feeling Socially Isolated (12/14/2023)    OASIS : Social Isolation     Frequency of experiencing loneliness or isolation: Often   Intimate Partner Violence: Not on file   Housing Stability: Low Risk  (7/25/2022)    Housing Stability Vital Sign     Unable to Pay for Housing in the Last Year: No     Number of Places Lived in the Last Year: 2     Unstable Housing in the Last Year: No     No Known Allergies  Outpatient Encounter Medications as of 8/30/2024   Medication Sig Dispense Refill    levothyroxine (SYNTHROID) 25 MCG Tab Take 1 Tablet by mouth every day for 360 days. 90 Tablet 3    rivaroxaban (XARELTO) 20 MG Tab tablet Take 1 Tablet by mouth with dinner. To thin blood, prevent clots 30 Tablet 5    metFORMIN ER (GLUCOPHAGE XR) 500 MG TABLET SR 24 HR Take 1  Tablet by mouth 2 times a day. (Ok to take 2 tablets once a day instead if desired) 180 Tablet 3    ferrous sulfate 325 (65 Fe) MG tablet Take 1 tablet by mouth three times a week on mon/wed/fri 39 Tablet 3    tamsulosin (FLOMAX) 0.4 MG capsule Take 1 capsule by mouth every day 90 Capsule 2    sodium bicarbonate (SODIUM BICARBONATE) 650 MG Tab Take 2 tablets by mouth three times a day 540 Tablet 3    rosuvastatin (CRESTOR) 5 MG Tab Take 1 tablet by mouth every day 100 Tablet 3    ALPRAZolam (XANAX) 0.25 MG Tab Take 0.25 mg by mouth 1 time a day as needed for Anxiety.      ezetimibe (ZETIA) 10 MG Tab Take 1 Tablet by mouth every day. Indications: High Amount of Fats in the Blood (Patient taking differently: Take 10 mg by mouth every evening. Indications: High Amount of Fats in the Blood) 100 Tablet 2    metoprolol SR (TOPROL XL) 25 MG TABLET SR 24 HR Take 1 Tablet by mouth every day. 90 Tablet 3    bismuth subsalicylate (PEPTO-BISMOL) 262 MG/15ML Suspension Take 30 mL by mouth every 6 hours as needed (diarrhea). Indications: Diarrhea, Heartburn      Loperamide HCl (IMODIUM A-D) 1 MG/7.5ML Liquid Take 30 mL by mouth 2 times a day as needed (diarrhea). Indications: Diarrhea      acetaminophen (TYLENOL) 500 MG Tab Take 1 Tablet by mouth every 6 hours as needed for Mild Pain or Moderate Pain.      diclofenac sodium (VOLTAREN) 1 % Gel Apply 2 g topically 4 times a day as needed (Shoulder Pain). Indications: Joint Damage causing Pain and Loss of Function      rosuvastatin (CRESTOR) 5 MG Tab TAKE 1 TABLET BY MOUTH EVERY DAY (Patient taking differently: Take 5 mg by mouth every evening. Indications: High Amount of Fats in the Blood) 100 Tablet 3    Coenzyme Q10 (COQ-10 PO) Take 1 Capsule by mouth every evening. Indications: heart health      [DISCONTINUED] nitrofurantoin (MACROBID) 100 MG Cap Take 1 capsule by mouth every 12 hours for 7 days 14 Capsule 0     No facility-administered encounter medications on file as of  "8/30/2024.     ROS           Objective     /60 (BP Location: Left arm, Patient Position: Sitting, BP Cuff Size: Adult)   Pulse 64   Resp 14   Ht 1.778 m (5' 10\")   Wt 83.5 kg (184 lb)   SpO2 98%   BMI 26.40 kg/m²     Physical Exam  Constitutional:       General: He is not in acute distress.     Appearance: He is not diaphoretic.   Eyes:      General: No scleral icterus.  Neck:      Vascular: No JVD.   Cardiovascular:      Rate and Rhythm: Normal rate.      Heart sounds: Normal heart sounds. No murmur heard.     No friction rub. No gallop.   Pulmonary:      Effort: No respiratory distress.      Breath sounds: No wheezing or rales.   Abdominal:      General: Bowel sounds are normal.      Palpations: Abdomen is soft.   Musculoskeletal:      Right lower leg: No edema.      Left lower leg: No edema.   Skin:     Findings: No rash.   Neurological:      Mental Status: He is alert. Mental status is at baseline.   Psychiatric:         Mood and Affect: Mood normal.          We reviewed in person the most recent labs  Recent Results (from the past 5040 hour(s))   POCT glucose device results    Collection Time: 02/02/24  2:11 PM   Result Value Ref Range    POC Glucose, Blood 290 (H) 65 - 99 mg/dL   CBC WITHOUT DIFFERENTIAL    Collection Time: 02/02/24  3:34 PM   Result Value Ref Range    WBC 13.2 (H) 4.8 - 10.8 K/uL    RBC 3.54 (L) 4.70 - 6.10 M/uL    Hemoglobin 11.3 (L) 14.0 - 18.0 g/dL    Hematocrit 34.0 (L) 42.0 - 52.0 %    MCV 96.0 81.4 - 97.8 fL    MCH 31.9 27.0 - 33.0 pg    MCHC 33.2 32.3 - 36.5 g/dL    RDW 50.0 35.9 - 50.0 fL    Platelet Count 180 164 - 446 K/uL    MPV 9.2 9.0 - 12.9 fL   Basic Metabolic Panel    Collection Time: 02/02/24  3:34 PM   Result Value Ref Range    Sodium 134 (L) 135 - 145 mmol/L    Potassium 5.9 (H) 3.6 - 5.5 mmol/L    Chloride 105 96 - 112 mmol/L    Co2 16 (L) 20 - 33 mmol/L    Glucose 297 (H) 65 - 99 mg/dL    Bun 30 (H) 8 - 22 mg/dL    Creatinine 1.57 (H) 0.50 - 1.40 mg/dL    " Calcium 8.6 8.5 - 10.5 mg/dL    Anion Gap 13.0 7.0 - 16.0   ESTIMATED GFR    Collection Time: 02/02/24  3:34 PM   Result Value Ref Range    GFR (CKD-EPI) 43 (A) >60 mL/min/1.73 m 2   POCT glucose device results    Collection Time: 02/02/24  5:04 PM   Result Value Ref Range    POC Glucose, Blood 233 (H) 65 - 99 mg/dL   POCT glucose device results    Collection Time: 02/02/24  7:44 PM   Result Value Ref Range    POC Glucose, Blood 183 (H) 65 - 99 mg/dL   Basic Metabolic Panel    Collection Time: 02/02/24  7:46 PM   Result Value Ref Range    Sodium 137 135 - 145 mmol/L    Potassium 5.3 3.6 - 5.5 mmol/L    Chloride 105 96 - 112 mmol/L    Co2 18 (L) 20 - 33 mmol/L    Glucose 178 (H) 65 - 99 mg/dL    Bun 29 (H) 8 - 22 mg/dL    Creatinine 1.36 0.50 - 1.40 mg/dL    Calcium 8.8 8.5 - 10.5 mg/dL    Anion Gap 14.0 7.0 - 16.0   VENOUS BLOOD GAS    Collection Time: 02/02/24  7:46 PM   Result Value Ref Range    Venous Bg Ph 7.36 7.31 - 7.45    Venous Bg Pco2 35.0 (L) 41.0 - 51.0 mmHg    Venous Bg Po2 32.8 25.0 - 40.0 mmHg    Venous Bg O2 Saturation 61.5 %    Venous Bg Hco3 19 (L) 24 - 28 mmol/L    Venous Bg Base Excess -5 mmol/L    Body Temp 36.1 Centigrade    O2 Therapy 1L    BETA-HYDROXYBUTYRIC ACID    Collection Time: 02/02/24  7:46 PM   Result Value Ref Range    beta-Hydroxybutyric Acid 0.07 0.02 - 0.27 mmol/L   MAGNESIUM    Collection Time: 02/02/24  7:46 PM   Result Value Ref Range    Magnesium 2.1 1.5 - 2.5 mg/dL   ESTIMATED GFR    Collection Time: 02/02/24  7:46 PM   Result Value Ref Range    GFR (CKD-EPI) 51 (A) >60 mL/min/1.73 m 2   POCT glucose device results    Collection Time: 02/02/24  9:08 PM   Result Value Ref Range    POC Glucose, Blood 138 (H) 65 - 99 mg/dL   CBC with Differential    Collection Time: 02/03/24  1:46 AM   Result Value Ref Range    WBC 13.0 (H) 4.8 - 10.8 K/uL    RBC 3.43 (L) 4.70 - 6.10 M/uL    Hemoglobin 10.5 (L) 14.0 - 18.0 g/dL    Hematocrit 32.5 (L) 42.0 - 52.0 %    MCV 94.8 81.4 - 97.8 fL     MCH 30.6 27.0 - 33.0 pg    MCHC 32.3 32.3 - 36.5 g/dL    RDW 48.8 35.9 - 50.0 fL    Platelet Count 192 164 - 446 K/uL    MPV 9.6 9.0 - 12.9 fL    Neutrophils-Polys 83.20 (H) 44.00 - 72.00 %    Lymphocytes 9.20 (L) 22.00 - 41.00 %    Monocytes 6.80 0.00 - 13.40 %    Eosinophils 0.10 0.00 - 6.90 %    Basophils 0.20 0.00 - 1.80 %    Immature Granulocytes 0.50 0.00 - 0.90 %    Nucleated RBC 0.00 0.00 - 0.20 /100 WBC    Neutrophils (Absolute) 10.85 (H) 1.82 - 7.42 K/uL    Lymphs (Absolute) 1.20 1.00 - 4.80 K/uL    Monos (Absolute) 0.88 (H) 0.00 - 0.85 K/uL    Eos (Absolute) 0.01 0.00 - 0.51 K/uL    Baso (Absolute) 0.02 0.00 - 0.12 K/uL    Immature Granulocytes (abs) 0.06 0.00 - 0.11 K/uL    NRBC (Absolute) 0.00 K/uL   Comp Metabolic Panel (CMP)    Collection Time: 02/03/24  1:46 AM   Result Value Ref Range    Sodium 136 135 - 145 mmol/L    Potassium 5.1 3.6 - 5.5 mmol/L    Chloride 107 96 - 112 mmol/L    Co2 18 (L) 20 - 33 mmol/L    Anion Gap 11.0 7.0 - 16.0    Glucose 127 (H) 65 - 99 mg/dL    Bun 29 (H) 8 - 22 mg/dL    Creatinine 1.25 0.50 - 1.40 mg/dL    Calcium 8.6 8.5 - 10.5 mg/dL    Correct Calcium 9.1 8.5 - 10.5 mg/dL    AST(SGOT) 11 (L) 12 - 45 U/L    ALT(SGPT) 12 2 - 50 U/L    Alkaline Phosphatase 60 30 - 99 U/L    Total Bilirubin 0.2 0.1 - 1.5 mg/dL    Albumin 3.4 3.2 - 4.9 g/dL    Total Protein 5.8 (L) 6.0 - 8.2 g/dL    Globulin 2.4 1.9 - 3.5 g/dL    A-G Ratio 1.4 g/dL   ESTIMATED GFR    Collection Time: 02/03/24  1:46 AM   Result Value Ref Range    GFR (CKD-EPI) 56 (A) >60 mL/min/1.73 m 2   proBrain Natriuretic Peptide, NT    Collection Time: 02/03/24  1:46 AM   Result Value Ref Range    NT-proBNP 745 (H) 0 - 125 pg/mL   POCT glucose device results    Collection Time: 02/03/24 11:43 AM   Result Value Ref Range    POC Glucose, Blood 177 (H) 65 - 99 mg/dL   POCT glucose device results    Collection Time: 02/03/24  5:43 PM   Result Value Ref Range    POC Glucose, Blood 162 (H) 65 - 99 mg/dL   POCT glucose device  results    Collection Time: 02/03/24  8:15 PM   Result Value Ref Range    POC Glucose, Blood 167 (H) 65 - 99 mg/dL   Basic Metabolic Panel    Collection Time: 02/04/24  1:09 AM   Result Value Ref Range    Sodium 137 135 - 145 mmol/L    Potassium 4.6 3.6 - 5.5 mmol/L    Chloride 110 96 - 112 mmol/L    Co2 17 (L) 20 - 33 mmol/L    Glucose 118 (H) 65 - 99 mg/dL    Bun 23 (H) 8 - 22 mg/dL    Creatinine 1.20 0.50 - 1.40 mg/dL    Calcium 8.3 (L) 8.5 - 10.5 mg/dL    Anion Gap 10.0 7.0 - 16.0   CBC WITHOUT DIFFERENTIAL    Collection Time: 02/04/24  1:09 AM   Result Value Ref Range    WBC 9.1 4.8 - 10.8 K/uL    RBC 3.09 (L) 4.70 - 6.10 M/uL    Hemoglobin 9.6 (L) 14.0 - 18.0 g/dL    Hematocrit 29.5 (L) 42.0 - 52.0 %    MCV 95.5 81.4 - 97.8 fL    MCH 31.1 27.0 - 33.0 pg    MCHC 32.5 32.3 - 36.5 g/dL    RDW 50.3 (H) 35.9 - 50.0 fL    Platelet Count 158 (L) 164 - 446 K/uL    MPV 9.8 9.0 - 12.9 fL   ESTIMATED GFR    Collection Time: 02/04/24  1:09 AM   Result Value Ref Range    GFR (CKD-EPI) 59 (A) >60 mL/min/1.73 m 2   POCT glucose device results    Collection Time: 02/04/24 11:29 AM   Result Value Ref Range    POC Glucose, Blood 149 (H) 65 - 99 mg/dL   POCT glucose device results    Collection Time: 02/04/24  4:34 PM   Result Value Ref Range    POC Glucose, Blood 132 (H) 65 - 99 mg/dL   POCT glucose device results    Collection Time: 02/04/24  9:25 PM   Result Value Ref Range    POC Glucose, Blood 140 (H) 65 - 99 mg/dL   CBC WITHOUT DIFFERENTIAL    Collection Time: 02/05/24  3:14 AM   Result Value Ref Range    WBC 9.3 4.8 - 10.8 K/uL    RBC 3.53 (L) 4.70 - 6.10 M/uL    Hemoglobin 10.9 (L) 14.0 - 18.0 g/dL    Hematocrit 33.6 (L) 42.0 - 52.0 %    MCV 95.2 81.4 - 97.8 fL    MCH 30.9 27.0 - 33.0 pg    MCHC 32.4 32.3 - 36.5 g/dL    RDW 49.4 35.9 - 50.0 fL    Platelet Count 183 164 - 446 K/uL    MPV 9.5 9.0 - 12.9 fL   Basic Metabolic Panel    Collection Time: 02/05/24  3:14 AM   Result Value Ref Range    Sodium 136 135 - 145 mmol/L     Potassium 4.2 3.6 - 5.5 mmol/L    Chloride 106 96 - 112 mmol/L    Co2 19 (L) 20 - 33 mmol/L    Glucose 139 (H) 65 - 99 mg/dL    Bun 16 8 - 22 mg/dL    Creatinine 1.12 0.50 - 1.40 mg/dL    Calcium 8.7 8.5 - 10.5 mg/dL    Anion Gap 11.0 7.0 - 16.0   ESTIMATED GFR    Collection Time: 02/05/24  3:14 AM   Result Value Ref Range    GFR (CKD-EPI) 64 >60 mL/min/1.73 m 2   POCT glucose device results    Collection Time: 02/05/24  7:46 AM   Result Value Ref Range    POC Glucose, Blood 149 (H) 65 - 99 mg/dL   CBC WITHOUT DIFFERENTIAL    Collection Time: 02/14/24  2:53 PM   Result Value Ref Range    WBC 13.3 (H) 4.8 - 10.8 K/uL    RBC 3.72 (L) 4.70 - 6.10 M/uL    Hemoglobin 11.4 (L) 14.0 - 18.0 g/dL    Hematocrit 35.5 (L) 42.0 - 52.0 %    MCV 95.4 81.4 - 97.8 fL    MCH 30.6 27.0 - 33.0 pg    MCHC 32.1 (L) 32.3 - 36.5 g/dL    RDW 49.3 35.9 - 50.0 fL    Platelet Count 335 164 - 446 K/uL    MPV 9.8 9.0 - 12.9 fL   Basic Metabolic Panel    Collection Time: 02/14/24  2:53 PM   Result Value Ref Range    Sodium 138 135 - 145 mmol/L    Potassium 4.6 3.6 - 5.5 mmol/L    Chloride 105 96 - 112 mmol/L    Co2 18 (L) 20 - 33 mmol/L    Glucose 124 (H) 65 - 99 mg/dL    Bun 35 (H) 8 - 22 mg/dL    Creatinine 1.89 (H) 0.50 - 1.40 mg/dL    Calcium 9.2 8.5 - 10.5 mg/dL    Anion Gap 15.0 7.0 - 16.0   ESTIMATED GFR    Collection Time: 02/14/24  2:53 PM   Result Value Ref Range    GFR (CKD-EPI) 34 (A) >60 mL/min/1.73 m 2   URINE CULTURE(NEW)    Collection Time: 02/20/24  4:51 PM    Specimen: Urine   Result Value Ref Range    Significant Indicator NEG     Source UR     Site -     Culture Result       Usual urogenital wiley >100,000 cfu/mL  3 or more organisms isolated, culture of doubtful  significance, please recollect.     Comp Metabolic Panel    Collection Time: 02/28/24 10:11 AM   Result Value Ref Range    Sodium 135 135 - 145 mmol/L    Potassium 5.2 3.6 - 5.5 mmol/L    Chloride 104 96 - 112 mmol/L    Co2 17 (L) 20 - 33 mmol/L    Anion Gap 14.0  7.0 - 16.0    Glucose 203 (H) 65 - 99 mg/dL    Bun 33 (H) 8 - 22 mg/dL    Creatinine 1.65 (H) 0.50 - 1.40 mg/dL    Calcium 9.1 8.5 - 10.5 mg/dL    Correct Calcium 9.3 8.5 - 10.5 mg/dL    AST(SGOT) 24 12 - 45 U/L    ALT(SGPT) 24 2 - 50 U/L    Alkaline Phosphatase 124 (H) 30 - 99 U/L    Total Bilirubin 0.3 0.1 - 1.5 mg/dL    Albumin 3.7 3.2 - 4.9 g/dL    Total Protein 6.8 6.0 - 8.2 g/dL    Globulin 3.1 1.9 - 3.5 g/dL    A-G Ratio 1.2 g/dL   CBC WITH DIFFERENTIAL    Collection Time: 02/28/24 10:11 AM   Result Value Ref Range    WBC 11.0 (H) 4.8 - 10.8 K/uL    RBC 3.65 (L) 4.70 - 6.10 M/uL    Hemoglobin 10.9 (L) 14.0 - 18.0 g/dL    Hematocrit 34.1 (L) 42.0 - 52.0 %    MCV 93.4 81.4 - 97.8 fL    MCH 29.9 27.0 - 33.0 pg    MCHC 32.0 (L) 32.3 - 36.5 g/dL    RDW 46.8 35.9 - 50.0 fL    Platelet Count 387 164 - 446 K/uL    MPV 9.2 9.0 - 12.9 fL    Neutrophils-Polys 69.90 44.00 - 72.00 %    Lymphocytes 15.30 (L) 22.00 - 41.00 %    Monocytes 7.20 0.00 - 13.40 %    Eosinophils 6.50 0.00 - 6.90 %    Basophils 0.30 0.00 - 1.80 %    Immature Granulocytes 0.80 0.00 - 0.90 %    Nucleated RBC 0.00 0.00 - 0.20 /100 WBC    Neutrophils (Absolute) 7.69 (H) 1.82 - 7.42 K/uL    Lymphs (Absolute) 1.68 1.00 - 4.80 K/uL    Monos (Absolute) 0.79 0.00 - 0.85 K/uL    Eos (Absolute) 0.71 (H) 0.00 - 0.51 K/uL    Baso (Absolute) 0.03 0.00 - 0.12 K/uL    Immature Granulocytes (abs) 0.09 0.00 - 0.11 K/uL    NRBC (Absolute) 0.00 K/uL   ESTIMATED GFR    Collection Time: 02/28/24 10:11 AM   Result Value Ref Range    GFR (CKD-EPI) 40 (A) >60 mL/min/1.73 m 2   CBC WITH DIFFERENTIAL    Collection Time: 03/03/24  1:30 PM   Result Value Ref Range    WBC 12.0 (H) 4.8 - 10.8 K/uL    RBC 3.54 (L) 4.70 - 6.10 M/uL    Hemoglobin 10.9 (L) 14.0 - 18.0 g/dL    Hematocrit 32.7 (L) 42.0 - 52.0 %    MCV 92.4 81.4 - 97.8 fL    MCH 30.8 27.0 - 33.0 pg    MCHC 33.3 32.3 - 36.5 g/dL    RDW 46.7 35.9 - 50.0 fL    Platelet Count 341 164 - 446 K/uL    MPV 9.5 9.0 - 12.9 fL     Neutrophils-Polys 77.90 (H) 44.00 - 72.00 %    Lymphocytes 13.40 (L) 22.00 - 41.00 %    Monocytes 4.60 0.00 - 13.40 %    Eosinophils 2.90 0.00 - 6.90 %    Basophils 0.30 0.00 - 1.80 %    Immature Granulocytes 0.90 0.00 - 0.90 %    Nucleated RBC 0.00 0.00 - 0.20 /100 WBC    Neutrophils (Absolute) 9.33 (H) 1.82 - 7.42 K/uL    Lymphs (Absolute) 1.61 1.00 - 4.80 K/uL    Monos (Absolute) 0.55 0.00 - 0.85 K/uL    Eos (Absolute) 0.35 0.00 - 0.51 K/uL    Baso (Absolute) 0.03 0.00 - 0.12 K/uL    Immature Granulocytes (abs) 0.11 0.00 - 0.11 K/uL    NRBC (Absolute) 0.00 K/uL   CMP    Collection Time: 03/03/24  1:30 PM   Result Value Ref Range    Sodium 132 (L) 135 - 145 mmol/L    Potassium 5.1 3.6 - 5.5 mmol/L    Chloride 98 96 - 112 mmol/L    Co2 16 (L) 20 - 33 mmol/L    Anion Gap 18.0 (H) 7.0 - 16.0    Glucose 132 (H) 65 - 99 mg/dL    Bun 28 (H) 8 - 22 mg/dL    Creatinine 1.51 (H) 0.50 - 1.40 mg/dL    Calcium 9.4 8.4 - 10.2 mg/dL    Correct Calcium 9.6 8.5 - 10.5 mg/dL    AST(SGOT) 15 12 - 45 U/L    ALT(SGPT) 15 2 - 50 U/L    Alkaline Phosphatase 100 (H) 30 - 99 U/L    Total Bilirubin 0.4 0.1 - 1.5 mg/dL    Albumin 3.8 3.2 - 4.9 g/dL    Total Protein 7.1 6.0 - 8.2 g/dL    Globulin 3.3 1.9 - 3.5 g/dL    A-G Ratio 1.2 g/dL   ESTIMATED GFR    Collection Time: 03/03/24  1:30 PM   Result Value Ref Range    GFR (CKD-EPI) 45 (A) >60 mL/min/1.73 m 2   URINALYSIS    Collection Time: 03/03/24  1:57 PM    Specimen: Urine, Clean Catch   Result Value Ref Range    Color Yellow     Character Hazy (A)     Specific Gravity 1.025 <1.035    Ph 5.5 5.0 - 8.0    Glucose Negative Negative mg/dL    Ketones Trace (A) Negative mg/dL    Protein >=300 (A) Negative mg/dL    Bilirubin Small (A) Negative    Nitrite Negative Negative    Leukocyte Esterase Moderate (A) Negative    Occult Blood Moderate (A) Negative    Micro Urine Req Microscopic    URINE MICROSCOPIC (W/UA)    Collection Time: 03/03/24  1:57 PM   Result Value Ref Range    -150 (A)  /hpf    RBC 20-50 (A) /hpf    Bacteria Moderate (A) None /hpf    Epithelial Cells Few Few /hpf    Urine Crystals Rare Amorphous /hpf    Hyaline Cast 6-10 (A) /lpf   C Diff by PCR rflx Toxin    Collection Time: 03/03/24  8:15 PM    Specimen: Stool   Result Value Ref Range    C Diff by PCR See Toxin Negative    [id number] Presumptive Negative Negative   C Diff Toxin    Collection Time: 03/03/24  8:15 PM   Result Value Ref Range    C.Diff Toxin A&B Negative    URINE CULTURE(NEW)    Collection Time: 03/04/24  1:37 AM    Specimen: Urine, Clean Catch   Result Value Ref Range    Significant Indicator POS (POS)     Source UR     Site URINE, CLEAN CATCH     Culture Result - (A)     Culture Result Enterococcus faecalis  <10,000 cfu/mL   (A)        Susceptibility    Enterococcus faecalis - KASSI     Daptomycin 1 Sensitive mcg/mL     Nitrofurantoin <=32 Sensitive mcg/mL     Ampicillin <=2 Sensitive mcg/mL     Linezolid <=1 Sensitive mcg/mL     Vancomycin 1 Sensitive mcg/mL     Penicillin 2 Sensitive mcg/mL     Tetracycline <=4 Sensitive mcg/mL   CBC without Differential    Collection Time: 03/04/24  2:13 AM   Result Value Ref Range    WBC 10.7 4.8 - 10.8 K/uL    RBC 3.23 (L) 4.70 - 6.10 M/uL    Hemoglobin 9.7 (L) 14.0 - 18.0 g/dL    Hematocrit 29.8 (L) 42.0 - 52.0 %    MCV 92.3 81.4 - 97.8 fL    MCH 30.0 27.0 - 33.0 pg    MCHC 32.6 32.3 - 36.5 g/dL    RDW 46.2 35.9 - 50.0 fL    Platelet Count 277 164 - 446 K/uL    MPV 8.7 (L) 9.0 - 12.9 fL   Comp Metabolic Panel (CMP)    Collection Time: 03/04/24  2:13 AM   Result Value Ref Range    Sodium 136 135 - 145 mmol/L    Potassium 4.4 3.6 - 5.5 mmol/L    Chloride 104 96 - 112 mmol/L    Co2 16 (L) 20 - 33 mmol/L    Anion Gap 16.0 7.0 - 16.0    Glucose 112 (H) 65 - 99 mg/dL    Bun 29 (H) 8 - 22 mg/dL    Creatinine 1.51 (H) 0.50 - 1.40 mg/dL    Calcium 8.4 8.4 - 10.2 mg/dL    Correct Calcium 9.1 8.5 - 10.5 mg/dL    AST(SGOT) 11 (L) 12 - 45 U/L    ALT(SGPT) 12 2 - 50 U/L    Alkaline  Phosphatase 82 30 - 99 U/L    Total Bilirubin 0.3 0.1 - 1.5 mg/dL    Albumin 3.1 (L) 3.2 - 4.9 g/dL    Total Protein 5.9 (L) 6.0 - 8.2 g/dL    Globulin 2.8 1.9 - 3.5 g/dL    A-G Ratio 1.1 g/dL   Magnesium    Collection Time: 03/04/24  2:13 AM   Result Value Ref Range    Magnesium 2.0 1.5 - 2.5 mg/dL   ESTIMATED GFR    Collection Time: 03/04/24  2:13 AM   Result Value Ref Range    GFR (CKD-EPI) 45 (A) >60 mL/min/1.73 m 2   POCT glucose device results    Collection Time: 03/04/24  5:11 AM   Result Value Ref Range    POC Glucose, Blood 121 (H) 65 - 99 mg/dL   POCT glucose device results    Collection Time: 03/04/24 11:10 AM   Result Value Ref Range    POC Glucose, Blood 176 (H) 65 - 99 mg/dL   POCT glucose device results    Collection Time: 03/04/24  5:07 PM   Result Value Ref Range    POC Glucose, Blood 115 (H) 65 - 99 mg/dL   Renal Function Panel    Collection Time: 03/05/24  2:03 AM   Result Value Ref Range    Sodium 138 135 - 145 mmol/L    Potassium 4.4 3.6 - 5.5 mmol/L    Chloride 107 96 - 112 mmol/L    Co2 16 (L) 20 - 33 mmol/L    Glucose 112 (H) 65 - 99 mg/dL    Creatinine 1.40 0.50 - 1.40 mg/dL    Bun 24 (H) 8 - 22 mg/dL    Calcium 8.3 (L) 8.4 - 10.2 mg/dL    Correct Calcium 9.0 8.5 - 10.5 mg/dL    Phosphorus 3.8 2.5 - 4.5 mg/dL    Albumin 3.1 (L) 3.2 - 4.9 g/dL   MAGNESIUM    Collection Time: 03/05/24  2:03 AM   Result Value Ref Range    Magnesium 2.0 1.5 - 2.5 mg/dL   CBC WITHOUT DIFFERENTIAL    Collection Time: 03/05/24  2:03 AM   Result Value Ref Range    WBC 10.4 4.8 - 10.8 K/uL    RBC 3.24 (L) 4.70 - 6.10 M/uL    Hemoglobin 9.6 (L) 14.0 - 18.0 g/dL    Hematocrit 29.8 (L) 42.0 - 52.0 %    MCV 92.0 81.4 - 97.8 fL    MCH 29.6 27.0 - 33.0 pg    MCHC 32.2 (L) 32.3 - 36.5 g/dL    RDW 46.5 35.9 - 50.0 fL    Platelet Count 297 164 - 446 K/uL    MPV 9.0 9.0 - 12.9 fL   IRON/TOTAL IRON BIND    Collection Time: 03/05/24  2:03 AM   Result Value Ref Range    Iron 36 (L) 50 - 180 ug/dL    Total Iron Binding 179 (L) 250  - 450 ug/dL    Unsat Iron Binding 143 110 - 370 ug/dL    % Saturation 20 15 - 55 %   FERRITIN    Collection Time: 03/05/24  2:03 AM   Result Value Ref Range    Ferritin 290.0 22.0 - 322.0 ng/mL   ESTIMATED GFR    Collection Time: 03/05/24  2:03 AM   Result Value Ref Range    GFR (CKD-EPI) 49 (A) >60 mL/min/1.73 m 2   POCT glucose device results    Collection Time: 03/05/24  5:30 AM   Result Value Ref Range    POC Glucose, Blood 140 (H) 65 - 99 mg/dL   POCT glucose device results    Collection Time: 03/05/24 11:38 AM   Result Value Ref Range    POC Glucose, Blood 141 (H) 65 - 99 mg/dL   POCT glucose device results    Collection Time: 03/05/24  5:05 PM   Result Value Ref Range    POC Glucose, Blood 136 (H) 65 - 99 mg/dL   POCT glucose device results    Collection Time: 03/05/24  8:15 PM   Result Value Ref Range    POC Glucose, Blood 134 (H) 65 - 99 mg/dL   CBC WITHOUT DIFFERENTIAL    Collection Time: 03/06/24  1:07 AM   Result Value Ref Range    WBC 11.8 (H) 4.8 - 10.8 K/uL    RBC 3.28 (L) 4.70 - 6.10 M/uL    Hemoglobin 10.0 (L) 14.0 - 18.0 g/dL    Hematocrit 30.5 (L) 42.0 - 52.0 %    MCV 93.0 81.4 - 97.8 fL    MCH 30.5 27.0 - 33.0 pg    MCHC 32.8 32.3 - 36.5 g/dL    RDW 46.2 35.9 - 50.0 fL    Platelet Count 291 164 - 446 K/uL    MPV 9.1 9.0 - 12.9 fL   Basic Metabolic Panel    Collection Time: 03/06/24  1:07 AM   Result Value Ref Range    Sodium 133 (L) 135 - 145 mmol/L    Potassium 4.5 3.6 - 5.5 mmol/L    Chloride 106 96 - 112 mmol/L    Co2 14 (L) 20 - 33 mmol/L    Glucose 115 (H) 65 - 99 mg/dL    Bun 23 (H) 8 - 22 mg/dL    Creatinine 1.60 (H) 0.50 - 1.40 mg/dL    Calcium 8.6 8.4 - 10.2 mg/dL    Anion Gap 13.0 7.0 - 16.0   ESTIMATED GFR    Collection Time: 03/06/24  1:07 AM   Result Value Ref Range    GFR (CKD-EPI) 42 (A) >60 mL/min/1.73 m 2   POCT glucose device results    Collection Time: 03/06/24  6:27 AM   Result Value Ref Range    POC Glucose, Blood 126 (H) 65 - 99 mg/dL   POCT glucose device results     Collection Time: 03/06/24 10:57 AM   Result Value Ref Range    POC Glucose, Blood 174 (H) 65 - 99 mg/dL   Comp Metabolic Panel    Collection Time: 03/20/24 12:30 AM   Result Value Ref Range    Sodium 138 135 - 145 mmol/L    Potassium 4.1 3.6 - 5.5 mmol/L    Chloride 104 96 - 112 mmol/L    Co2 22 20 - 33 mmol/L    Anion Gap 12.0 7.0 - 16.0    Glucose 192 (H) 65 - 99 mg/dL    Bun 19 8 - 22 mg/dL    Creatinine 1.24 0.50 - 1.40 mg/dL    Calcium 8.5 8.5 - 10.5 mg/dL    Correct Calcium 8.8 8.5 - 10.5 mg/dL    AST(SGOT) 15 12 - 45 U/L    ALT(SGPT) 9 2 - 50 U/L    Alkaline Phosphatase 63 30 - 99 U/L    Total Bilirubin 0.3 0.1 - 1.5 mg/dL    Albumin 3.6 3.2 - 4.9 g/dL    Total Protein 5.8 (L) 6.0 - 8.2 g/dL    Globulin 2.2 1.9 - 3.5 g/dL    A-G Ratio 1.6 g/dL   TSH    Collection Time: 03/20/24 12:30 AM   Result Value Ref Range    TSH 8.350 (H) 0.380 - 5.330 uIU/mL   HEP B CORE AB TOTAL    Collection Time: 03/20/24 12:30 AM   Result Value Ref Range    Hepatitis B Core Ab, Total NonReactive Non-Reactive   HEP B SURFACE AB    Collection Time: 03/20/24 12:30 AM   Result Value Ref Range    Hep B Surface Antibody Quant <3.50 0.00 - 10.00 mIU/mL   HEP B SURFACE ANTIGEN    Collection Time: 03/20/24 12:30 AM   Result Value Ref Range    Hepatitis B Surface Antigen Non-Reactive Non-Reactive   HEP C VIRUS ANTIBODY    Collection Time: 03/20/24 12:30 AM   Result Value Ref Range    Hepatitis C Antibody Non-Reactive Non-Reactive   ESTIMATED GFR    Collection Time: 03/20/24 12:30 AM   Result Value Ref Range    GFR (CKD-EPI) 57 (A) >60 mL/min/1.73 m 2   AMBIGUOUS DATE/TIME    Collection Time: 03/20/24  9:01 PM   Result Value Ref Range    Ambiguous Date/Time See Below:    CBC WITH DIFFERENTIAL    Collection Time: 03/25/24  1:27 PM   Result Value Ref Range    WBC 9.0 4.8 - 10.8 K/uL    RBC 3.33 (L) 4.70 - 6.10 M/uL    Hemoglobin 10.1 (L) 14.0 - 18.0 g/dL    Hematocrit 31.1 (L) 42.0 - 52.0 %    MCV 93.4 81.4 - 97.8 fL    MCH 30.3 27.0 - 33.0 pg     MCHC 32.5 32.3 - 36.5 g/dL    RDW 51.8 (H) 35.9 - 50.0 fL    Platelet Count 269 164 - 446 K/uL    MPV 9.8 9.0 - 12.9 fL    Neutrophils-Polys 69.60 44.00 - 72.00 %    Lymphocytes 18.60 (L) 22.00 - 41.00 %    Monocytes 7.20 0.00 - 13.40 %    Eosinophils 4.10 0.00 - 6.90 %    Basophils 0.20 0.00 - 1.80 %    Immature Granulocytes 0.30 0.00 - 0.90 %    Nucleated RBC 0.00 0.00 - 0.20 /100 WBC    Neutrophils (Absolute) 6.25 1.82 - 7.42 K/uL    Lymphs (Absolute) 1.67 1.00 - 4.80 K/uL    Monos (Absolute) 0.65 0.00 - 0.85 K/uL    Eos (Absolute) 0.37 0.00 - 0.51 K/uL    Baso (Absolute) 0.02 0.00 - 0.12 K/uL    Immature Granulocytes (abs) 0.03 0.00 - 0.11 K/uL    NRBC (Absolute) 0.00 K/uL   Comp Metabolic Panel    Collection Time: 03/25/24  1:27 PM   Result Value Ref Range    Sodium 141 135 - 145 mmol/L    Potassium 4.1 3.6 - 5.5 mmol/L    Chloride 109 96 - 112 mmol/L    Co2 21 20 - 33 mmol/L    Anion Gap 11.0 7.0 - 16.0    Glucose 172 (H) 65 - 99 mg/dL    Bun 16 8 - 22 mg/dL    Creatinine 1.25 0.50 - 1.40 mg/dL    Calcium 8.9 8.5 - 10.5 mg/dL    Correct Calcium 9.1 8.5 - 10.5 mg/dL    AST(SGOT) 16 12 - 45 U/L    ALT(SGPT) 9 2 - 50 U/L    Alkaline Phosphatase 63 30 - 99 U/L    Total Bilirubin 0.2 0.1 - 1.5 mg/dL    Albumin 3.7 3.2 - 4.9 g/dL    Total Protein 6.3 6.0 - 8.2 g/dL    Globulin 2.6 1.9 - 3.5 g/dL    A-G Ratio 1.4 g/dL   TSH    Collection Time: 03/25/24  1:27 PM   Result Value Ref Range    TSH 7.330 (H) 0.380 - 5.330 uIU/mL   ESTIMATED GFR    Collection Time: 03/25/24  1:27 PM   Result Value Ref Range    GFR (CKD-EPI) 56 (A) >60 mL/min/1.73 m 2   CBC with Differential    Collection Time: 03/28/24  9:38 AM   Result Value Ref Range    WBC 7.7 4.8 - 10.8 K/uL    RBC 3.23 (L) 4.70 - 6.10 M/uL    Hemoglobin 9.8 (L) 14.0 - 18.0 g/dL    Hematocrit 30.5 (L) 42.0 - 52.0 %    MCV 94.4 81.4 - 97.8 fL    MCH 30.3 27.0 - 33.0 pg    MCHC 32.1 (L) 32.3 - 36.5 g/dL    RDW 51.9 (H) 35.9 - 50.0 fL    Platelet Count 233 164 - 446  K/uL    MPV 9.0 9.0 - 12.9 fL    Neutrophils-Polys 68.10 44.00 - 72.00 %    Lymphocytes 17.10 (L) 22.00 - 41.00 %    Monocytes 7.60 0.00 - 13.40 %    Eosinophils 6.40 0.00 - 6.90 %    Basophils 0.40 0.00 - 1.80 %    Immature Granulocytes 0.40 0.00 - 0.90 %    Nucleated RBC 0.00 0.00 - 0.20 /100 WBC    Neutrophils (Absolute) 5.22 1.82 - 7.42 K/uL    Lymphs (Absolute) 1.31 1.00 - 4.80 K/uL    Monos (Absolute) 0.58 0.00 - 0.85 K/uL    Eos (Absolute) 0.49 0.00 - 0.51 K/uL    Baso (Absolute) 0.03 0.00 - 0.12 K/uL    Immature Granulocytes (abs) 0.03 0.00 - 0.11 K/uL    NRBC (Absolute) 0.00 K/uL   Complete Metabolic Panel (CMP)    Collection Time: 03/28/24  9:38 AM   Result Value Ref Range    Sodium 140 135 - 145 mmol/L    Potassium 4.1 3.6 - 5.5 mmol/L    Chloride 107 96 - 112 mmol/L    Co2 22 20 - 33 mmol/L    Anion Gap 11.0 7.0 - 16.0    Glucose 125 (H) 65 - 99 mg/dL    Bun 18 8 - 22 mg/dL    Creatinine 1.44 (H) 0.50 - 1.40 mg/dL    Calcium 9.0 8.4 - 10.2 mg/dL    Correct Calcium 9.3 8.5 - 10.5 mg/dL    AST(SGOT) 15 12 - 45 U/L    ALT(SGPT) 8 2 - 50 U/L    Alkaline Phosphatase 65 30 - 99 U/L    Total Bilirubin 0.4 0.1 - 1.5 mg/dL    Albumin 3.6 3.2 - 4.9 g/dL    Total Protein 6.3 6.0 - 8.2 g/dL    Globulin 2.7 1.9 - 3.5 g/dL    A-G Ratio 1.3 g/dL   Troponin STAT    Collection Time: 03/28/24  9:38 AM   Result Value Ref Range    Troponin T 36 (H) 6 - 19 ng/L   proBrain Natriuretic Peptide, NT    Collection Time: 03/28/24  9:38 AM   Result Value Ref Range    NT-proBNP 3186 (H) 0 - 125 pg/mL   APTT    Collection Time: 03/28/24  9:38 AM   Result Value Ref Range    APTT 32.8 24.7 - 36.0 sec   Prothrombin Time    Collection Time: 03/28/24  9:38 AM   Result Value Ref Range    PT 14.1 12.0 - 14.6 sec    INR 1.04 0.87 - 1.13   ESTIMATED GFR    Collection Time: 03/28/24  9:38 AM   Result Value Ref Range    GFR (CKD-EPI) 48 (A) >60 mL/min/1.73 m 2   IRON/TOTAL IRON BIND    Collection Time: 03/28/24  9:38 AM   Result Value Ref Range     Iron 47 (L) 50 - 180 ug/dL    Total Iron Binding 213 (L) 250 - 450 ug/dL    Unsat Iron Binding 166 110 - 370 ug/dL    % Saturation 22 15 - 55 %   FERRITIN    Collection Time: 03/28/24  9:38 AM   Result Value Ref Range    Ferritin 139.0 22.0 - 322.0 ng/mL   Troponin in four (4) hours    Collection Time: 03/28/24  4:23 PM   Result Value Ref Range    Troponin T 36 (H) 6 - 19 ng/L   POCT glucose device results    Collection Time: 03/28/24  4:29 PM   Result Value Ref Range    POC Glucose, Blood 122 (H) 65 - 99 mg/dL   POCT glucose device results    Collection Time: 03/28/24 10:02 PM   Result Value Ref Range    POC Glucose, Blood 114 (H) 65 - 99 mg/dL   CBC without Differential    Collection Time: 03/29/24 12:36 AM   Result Value Ref Range    WBC 8.2 4.8 - 10.8 K/uL    RBC 3.40 (L) 4.70 - 6.10 M/uL    Hemoglobin 10.1 (L) 14.0 - 18.0 g/dL    Hematocrit 32.0 (L) 42.0 - 52.0 %    MCV 94.1 81.4 - 97.8 fL    MCH 29.7 27.0 - 33.0 pg    MCHC 31.6 (L) 32.3 - 36.5 g/dL    RDW 51.8 (H) 35.9 - 50.0 fL    Platelet Count 253 164 - 446 K/uL    MPV 9.5 9.0 - 12.9 fL   Comp Metabolic Panel (CMP)    Collection Time: 03/29/24 12:36 AM   Result Value Ref Range    Sodium 139 135 - 145 mmol/L    Potassium 3.8 3.6 - 5.5 mmol/L    Chloride 106 96 - 112 mmol/L    Co2 20 20 - 33 mmol/L    Anion Gap 13.0 7.0 - 16.0    Glucose 102 (H) 65 - 99 mg/dL    Bun 19 8 - 22 mg/dL    Creatinine 1.20 0.50 - 1.40 mg/dL    Calcium 8.9 8.4 - 10.2 mg/dL    Correct Calcium 9.4 8.5 - 10.5 mg/dL    AST(SGOT) 15 12 - 45 U/L    ALT(SGPT) 8 2 - 50 U/L    Alkaline Phosphatase 64 30 - 99 U/L    Total Bilirubin 0.5 0.1 - 1.5 mg/dL    Albumin 3.4 3.2 - 4.9 g/dL    Total Protein 6.2 6.0 - 8.2 g/dL    Globulin 2.8 1.9 - 3.5 g/dL    A-G Ratio 1.2 g/dL   Magnesium    Collection Time: 03/29/24 12:36 AM   Result Value Ref Range    Magnesium 1.9 1.5 - 2.5 mg/dL   ESTIMATED GFR    Collection Time: 03/29/24 12:36 AM   Result Value Ref Range    GFR (CKD-EPI) 59 (A) >60  mL/min/1.73 m 2   POCT glucose device results    Collection Time: 03/29/24  6:30 AM   Result Value Ref Range    POC Glucose, Blood 128 (H) 65 - 99 mg/dL   EC-ECHOCARDIOGRAM COMPLETE W/O CONT    Collection Time: 03/29/24  8:34 AM   Result Value Ref Range    Eject.Frac. MOD BP 63.11     Eject.Frac. MOD 4C 54.99     Eject.Frac. MOD 2C 75.12    POCT glucose device results    Collection Time: 03/29/24 11:37 AM   Result Value Ref Range    POC Glucose, Blood 120 (H) 65 - 99 mg/dL   MICROALBUMIN CREAT RATIO URINE    Collection Time: 04/09/24 10:36 AM   Result Value Ref Range    Creatinine, Urine 87.15 mg/dL    Microalbumin, Urine Random 6.9 mg/dL    Micro Alb Creat Ratio 79 (H) 0 - 30 mg/g   FERRITIN    Collection Time: 04/09/24 10:36 AM   Result Value Ref Range    Ferritin 206.0 22.0 - 322.0 ng/mL   IRON/TOTAL IRON BIND    Collection Time: 04/09/24 10:36 AM   Result Value Ref Range    Iron 36 (L) 50 - 180 ug/dL    Total Iron Binding 250 250 - 450 ug/dL    Unsat Iron Binding 214 110 - 370 ug/dL    % Saturation 14 (L) 15 - 55 %   CBC WITH DIFFERENTIAL    Collection Time: 04/09/24 10:36 AM   Result Value Ref Range    WBC 7.9 4.8 - 10.8 K/uL    RBC 3.82 (L) 4.70 - 6.10 M/uL    Hemoglobin 11.7 (L) 14.0 - 18.0 g/dL    Hematocrit 35.4 (L) 42.0 - 52.0 %    MCV 92.7 81.4 - 97.8 fL    MCH 30.6 27.0 - 33.0 pg    MCHC 33.1 32.3 - 36.5 g/dL    RDW 50.5 (H) 35.9 - 50.0 fL    Platelet Count 254 164 - 446 K/uL    MPV 9.9 9.0 - 12.9 fL    Neutrophils-Polys 61.20 44.00 - 72.00 %    Lymphocytes 21.50 (L) 22.00 - 41.00 %    Monocytes 9.40 0.00 - 13.40 %    Eosinophils 7.20 (H) 0.00 - 6.90 %    Basophils 0.30 0.00 - 1.80 %    Immature Granulocytes 0.40 0.00 - 0.90 %    Nucleated RBC 0.00 0.00 - 0.20 /100 WBC    Neutrophils (Absolute) 4.82 1.82 - 7.42 K/uL    Lymphs (Absolute) 1.69 1.00 - 4.80 K/uL    Monos (Absolute) 0.74 0.00 - 0.85 K/uL    Eos (Absolute) 0.57 (H) 0.00 - 0.51 K/uL    Baso (Absolute) 0.02 0.00 - 0.12 K/uL    Immature  Granulocytes (abs) 0.03 0.00 - 0.11 K/uL    NRBC (Absolute) 0.00 K/uL   PTH WITH CALCIUM    Collection Time: 04/09/24 10:36 AM   Result Value Ref Range    Pth, Intact 63.4 14.0 - 72.0 pg/mL    Calcium 9.3 8.5 - 10.5 mg/dL   VITAMIN D,25 HYDROXY (DEFICIENCY)    Collection Time: 04/09/24 10:36 AM   Result Value Ref Range    25-Hydroxy   Vitamin D 25 35 30 - 100 ng/mL   Renal Function Panel    Collection Time: 04/09/24 10:36 AM   Result Value Ref Range    Sodium 140 135 - 145 mmol/L    Potassium 4.2 3.6 - 5.5 mmol/L    Chloride 103 96 - 112 mmol/L    Co2 24 20 - 33 mmol/L    Glucose 147 (H) 65 - 99 mg/dL    Creatinine 1.48 (H) 0.50 - 1.40 mg/dL    Bun 25 (H) 8 - 22 mg/dL    Correct Calcium 9.1 8.5 - 10.5 mg/dL    Phosphorus 3.3 2.5 - 4.5 mg/dL    Albumin 4.2 3.2 - 4.9 g/dL   URINALYSIS,CULTURE IF INDICATED    Collection Time: 04/09/24 10:36 AM   Result Value Ref Range    Color Yellow     Character Cloudy (A)     Specific Gravity 1.014 <1.035    Ph 7.0 5.0 - 8.0    Glucose Negative Negative mg/dL    Ketones Negative Negative mg/dL    Protein 30 (A) Negative mg/dL    Bilirubin Negative Negative    Urobilinogen, Urine 0.2 Negative    Nitrite Negative Negative    Leukocyte Esterase Large (A) Negative    Occult Blood Small (A) Negative    Micro Urine Req Microscopic    PROTEIN/CREAT RATIO URINE    Collection Time: 04/09/24 10:36 AM   Result Value Ref Range    Total Protein, Urine 31.0 (H) 0.0 - 15.0 mg/dL    Creatinine, Random Urine 97.13 mg/dL    Protein Creatinine Ratio 319 (H) 15 - 68 mg/g   URINE MICROSCOPIC (W/UA)    Collection Time: 04/09/24 10:36 AM   Result Value Ref Range    WBC Packed (A) /hpf    RBC 5-10 (A) /hpf    Bacteria Few (A) None /hpf    Epithelial Cells Negative /hpf    Trans Epithelial Cells Rare /hpf    Hyaline Cast 0-2 /lpf   ESTIMATED GFR    Collection Time: 04/09/24 10:36 AM   Result Value Ref Range    GFR (CKD-EPI) 46 (A) >60 mL/min/1.73 m 2   URINE CULTURE(NEW)    Collection Time: 04/09/24 10:36  AM    Specimen: Urine   Result Value Ref Range    Significant Indicator POS (POS)     Source UR     Site -     Culture Result - (A)     Culture Result Enterococcus faecalis  >100,000 cfu/mL   (A)        Susceptibility    Enterococcus faecalis - KASSI     Ampicillin <=2 Sensitive mcg/mL     Daptomycin 2 Sensitive mcg/mL     Gent Synergy <=500 Sensitive mcg/mL     Vancomycin 1 Sensitive mcg/mL     Linezolid 2 Sensitive mcg/mL     Nitrofurantoin <=32 Sensitive mcg/mL     Tetracycline <=4 Sensitive mcg/mL   CBC WITH DIFFERENTIAL    Collection Time: 05/06/24  2:21 PM   Result Value Ref Range    WBC 7.8 4.8 - 10.8 K/uL    RBC 3.62 (L) 4.70 - 6.10 M/uL    Hemoglobin 10.8 (L) 14.0 - 18.0 g/dL    Hematocrit 33.1 (L) 42.0 - 52.0 %    MCV 91.4 81.4 - 97.8 fL    MCH 29.8 27.0 - 33.0 pg    MCHC 32.6 32.3 - 36.5 g/dL    RDW 48.9 35.9 - 50.0 fL    Platelet Count 246 164 - 446 K/uL    MPV 9.8 9.0 - 12.9 fL    Neutrophils-Polys 66.70 44.00 - 72.00 %    Lymphocytes 21.10 (L) 22.00 - 41.00 %    Monocytes 8.60 0.00 - 13.40 %    Eosinophils 3.10 0.00 - 6.90 %    Basophils 0.10 0.00 - 1.80 %    Immature Granulocytes 0.40 0.00 - 0.90 %    Nucleated RBC 0.00 0.00 - 0.20 /100 WBC    Neutrophils (Absolute) 5.23 1.82 - 7.42 K/uL    Lymphs (Absolute) 1.65 1.00 - 4.80 K/uL    Monos (Absolute) 0.67 0.00 - 0.85 K/uL    Eos (Absolute) 0.24 0.00 - 0.51 K/uL    Baso (Absolute) 0.01 0.00 - 0.12 K/uL    Immature Granulocytes (abs) 0.03 0.00 - 0.11 K/uL    NRBC (Absolute) 0.00 K/uL   Comp Metabolic Panel    Collection Time: 05/06/24  2:21 PM   Result Value Ref Range    Sodium 136 135 - 145 mmol/L    Potassium 3.9 3.6 - 5.5 mmol/L    Chloride 103 96 - 112 mmol/L    Co2 21 20 - 33 mmol/L    Anion Gap 12.0 7.0 - 16.0    Glucose 124 (H) 65 - 99 mg/dL    Bun 22 8 - 22 mg/dL    Creatinine 1.66 (H) 0.50 - 1.40 mg/dL    Calcium 8.9 8.5 - 10.5 mg/dL    Correct Calcium 8.9 8.5 - 10.5 mg/dL    AST(SGOT) 15 12 - 45 U/L    ALT(SGPT) 8 2 - 50 U/L    Alkaline  Phosphatase 65 30 - 99 U/L    Total Bilirubin 0.3 0.1 - 1.5 mg/dL    Albumin 4.0 3.2 - 4.9 g/dL    Total Protein 6.5 6.0 - 8.2 g/dL    Globulin 2.5 1.9 - 3.5 g/dL    A-G Ratio 1.6 g/dL   TSH    Collection Time: 05/06/24  2:21 PM   Result Value Ref Range    TSH 4.780 0.380 - 5.330 uIU/mL   ESTIMATED GFR    Collection Time: 05/06/24  2:21 PM   Result Value Ref Range    GFR (CKD-EPI) 40 (A) >60 mL/min/1.73 m 2   TSH WITH REFLEX TO FT4    Collection Time: 05/06/24  2:23 PM   Result Value Ref Range    TSH 4.920 0.380 - 5.330 uIU/mL   CULTURE STOOL    Collection Time: 05/08/24  1:05 PM    Specimen: Stool   Result Value Ref Range    Significant Indicator NEG     Source STL     Site -     Culture Result       No enteric pathogens isolated.  NOTE:  Stool cultures are screened for Shiga Toxins 1 and 2,  Salmonella, Shigella, Campylobacter, Aeromonas,  Plesiomonas, and Vibrio.      [facility] Negative for Shiga Toxin 1 and 2.    Cdiff By PCR Rflx Toxin    Collection Time: 05/08/24  1:05 PM   Result Value Ref Range    C Diff by PCR See Toxin Negative    [id number] Presumptive Negative Negative   C Diff Toxin    Collection Time: 05/08/24  1:05 PM   Result Value Ref Range    C.Diff Toxin A&B Negative    [facility](Shiga Toxin)Detection    Collection Time: 05/08/24  1:05 PM    Specimen: Stool   Result Value Ref Range    Significant Indicator NEG     Source STL     Site -     [facility] Negative for Shiga Toxin 1 and 2.    Comp Metabolic Panel    Collection Time: 05/14/24 10:34 AM   Result Value Ref Range    Sodium 139 135 - 145 mmol/L    Potassium 4.3 3.6 - 5.5 mmol/L    Chloride 106 96 - 112 mmol/L    Co2 21 20 - 33 mmol/L    Anion Gap 12.0 7.0 - 16.0    Glucose 138 (H) 65 - 99 mg/dL    Bun 17 8 - 22 mg/dL    Creatinine 1.29 0.50 - 1.40 mg/dL    Calcium 9.1 8.5 - 10.5 mg/dL    Correct Calcium 9.1 8.5 - 10.5 mg/dL    AST(SGOT) 14 12 - 45 U/L    ALT(SGPT) 11 2 - 50 U/L    Alkaline Phosphatase 68 30 - 99 U/L    Total Bilirubin 0.5 0.1 - 1.5 mg/dL     Albumin 4.0 3.2 - 4.9 g/dL    Total Protein 6.4 6.0 - 8.2 g/dL    Globulin 2.4 1.9 - 3.5 g/dL    A-G Ratio 1.7 g/dL   CBC WITH DIFFERENTIAL    Collection Time: 05/14/24 10:34 AM   Result Value Ref Range    WBC 7.3 4.8 - 10.8 K/uL    RBC 3.76 (L) 4.70 - 6.10 M/uL    Hemoglobin 11.3 (L) 14.0 - 18.0 g/dL    Hematocrit 33.7 (L) 42.0 - 52.0 %    MCV 89.6 81.4 - 97.8 fL    MCH 30.1 27.0 - 33.0 pg    MCHC 33.5 32.3 - 36.5 g/dL    RDW 47.5 35.9 - 50.0 fL    Platelet Count 236 164 - 446 K/uL    MPV 9.9 9.0 - 12.9 fL    Neutrophils-Polys 70.20 44.00 - 72.00 %    Lymphocytes 18.40 (L) 22.00 - 41.00 %    Monocytes 7.00 0.00 - 13.40 %    Eosinophils 3.70 0.00 - 6.90 %    Basophils 0.30 0.00 - 1.80 %    Immature Granulocytes 0.40 0.00 - 0.90 %    Nucleated RBC 0.00 0.00 - 0.20 /100 WBC    Neutrophils (Absolute) 5.14 1.82 - 7.42 K/uL    Lymphs (Absolute) 1.35 1.00 - 4.80 K/uL    Monos (Absolute) 0.51 0.00 - 0.85 K/uL    Eos (Absolute) 0.27 0.00 - 0.51 K/uL    Baso (Absolute) 0.02 0.00 - 0.12 K/uL    Immature Granulocytes (abs) 0.03 0.00 - 0.11 K/uL    NRBC (Absolute) 0.00 K/uL   ESTIMATED GFR    Collection Time: 05/14/24 10:34 AM   Result Value Ref Range    GFR (CKD-EPI) 54 (A) >60 mL/min/1.73 m 2   CBC WITH DIFFERENTIAL    Collection Time: 05/30/24 12:15 PM   Result Value Ref Range    WBC 8.4 4.8 - 10.8 K/uL    RBC 3.97 (L) 4.70 - 6.10 M/uL    Hemoglobin 11.8 (L) 14.0 - 18.0 g/dL    Hematocrit 36.3 (L) 42.0 - 52.0 %    MCV 91.4 81.4 - 97.8 fL    MCH 29.7 27.0 - 33.0 pg    MCHC 32.5 32.3 - 36.5 g/dL    RDW 48.2 35.9 - 50.0 fL    Platelet Count 242 164 - 446 K/uL    MPV 9.4 9.0 - 12.9 fL    Neutrophils-Polys 69.90 44.00 - 72.00 %    Lymphocytes 19.20 (L) 22.00 - 41.00 %    Monocytes 7.00 0.00 - 13.40 %    Eosinophils 3.00 0.00 - 6.90 %    Basophils 0.40 0.00 - 1.80 %    Immature Granulocytes 0.50 0.00 - 0.90 %    Nucleated RBC 0.00 0.00 - 0.20 /100 WBC    Neutrophils (Absolute) 5.89 1.82 - 7.42 K/uL    Lymphs (Absolute) 1.62 1.00 -  4.80 K/uL    Monos (Absolute) 0.59 0.00 - 0.85 K/uL    Eos (Absolute) 0.25 0.00 - 0.51 K/uL    Baso (Absolute) 0.03 0.00 - 0.12 K/uL    Immature Granulocytes (abs) 0.04 0.00 - 0.11 K/uL    NRBC (Absolute) 0.00 K/uL   Comp Metabolic Panel    Collection Time: 05/30/24 12:15 PM   Result Value Ref Range    Sodium 135 135 - 145 mmol/L    Potassium 4.2 3.6 - 5.5 mmol/L    Chloride 103 96 - 112 mmol/L    Co2 18 (L) 20 - 33 mmol/L    Anion Gap 14.0 7.0 - 16.0    Glucose 220 (H) 65 - 99 mg/dL    Bun 22 8 - 22 mg/dL    Creatinine 1.28 0.50 - 1.40 mg/dL    Calcium 9.3 8.5 - 10.5 mg/dL    Correct Calcium 9.4 8.5 - 10.5 mg/dL    AST(SGOT) 14 12 - 45 U/L    ALT(SGPT) 7 2 - 50 U/L    Alkaline Phosphatase 66 30 - 99 U/L    Total Bilirubin 0.4 0.1 - 1.5 mg/dL    Albumin 3.9 3.2 - 4.9 g/dL    Total Protein 6.6 6.0 - 8.2 g/dL    Globulin 2.7 1.9 - 3.5 g/dL    A-G Ratio 1.4 g/dL   TSH    Collection Time: 05/30/24 12:15 PM   Result Value Ref Range    TSH 4.080 0.380 - 5.330 uIU/mL   ESTIMATED GFR    Collection Time: 05/30/24 12:15 PM   Result Value Ref Range    GFR (CKD-EPI) 55 (A) >60 mL/min/1.73 m 2   URINE CULTURE(NEW)    Collection Time: 06/10/24  1:22 PM    Specimen: Urine   Result Value Ref Range    Significant Indicator POS (POS)     Source UR     Site -     Culture Result - (A)     Culture Result Enterococcus faecalis  10-50,000 cfu/mL   (A)        Susceptibility    Enterococcus faecalis - KASSI     Daptomycin 2 Sensitive mcg/mL     Nitrofurantoin <=32 Sensitive mcg/mL     Ampicillin <=2 Sensitive mcg/mL     Linezolid 2 Sensitive mcg/mL     Vancomycin 1 Sensitive mcg/mL     Penicillin 2 Sensitive mcg/mL     Tetracycline <=4 Sensitive mcg/mL   CBC WITH DIFFERENTIAL    Collection Time: 06/28/24  3:53 PM   Result Value Ref Range    WBC 8.6 4.8 - 10.8 K/uL    RBC 3.94 (L) 4.70 - 6.10 M/uL    Hemoglobin 12.0 (L) 14.0 - 18.0 g/dL    Hematocrit 36.3 (L) 42.0 - 52.0 %    MCV 92.1 81.4 - 97.8 fL    MCH 30.5 27.0 - 33.0 pg    MCHC 33.1 32.3  - 36.5 g/dL    RDW 46.5 35.9 - 50.0 fL    Platelet Count 229 164 - 446 K/uL    MPV 9.3 9.0 - 12.9 fL    Neutrophils-Polys 67.80 44.00 - 72.00 %    Lymphocytes 20.70 (L) 22.00 - 41.00 %    Monocytes 7.30 0.00 - 13.40 %    Eosinophils 3.60 0.00 - 6.90 %    Basophils 0.30 0.00 - 1.80 %    Immature Granulocytes 0.30 0.00 - 0.90 %    Nucleated RBC 0.00 0.00 - 0.20 /100 WBC    Neutrophils (Absolute) 5.83 1.82 - 7.42 K/uL    Lymphs (Absolute) 1.78 1.00 - 4.80 K/uL    Monos (Absolute) 0.63 0.00 - 0.85 K/uL    Eos (Absolute) 0.31 0.00 - 0.51 K/uL    Baso (Absolute) 0.03 0.00 - 0.12 K/uL    Immature Granulocytes (abs) 0.03 0.00 - 0.11 K/uL    NRBC (Absolute) 0.00 K/uL   MAGNESIUM    Collection Time: 06/28/24  3:53 PM   Result Value Ref Range    Magnesium 2.0 1.5 - 2.5 mg/dL   Comp Metabolic Panel    Collection Time: 06/28/24  3:53 PM   Result Value Ref Range    Sodium 136 135 - 145 mmol/L    Potassium 4.1 3.6 - 5.5 mmol/L    Chloride 102 96 - 112 mmol/L    Co2 22 20 - 33 mmol/L    Anion Gap 12.0 7.0 - 16.0    Glucose 118 (H) 65 - 99 mg/dL    Bun 25 (H) 8 - 22 mg/dL    Creatinine 1.49 (H) 0.50 - 1.40 mg/dL    Calcium 9.0 8.5 - 10.5 mg/dL    Correct Calcium 8.8 8.5 - 10.5 mg/dL    AST(SGOT) 13 12 - 45 U/L    ALT(SGPT) 7 2 - 50 U/L    Alkaline Phosphatase 64 30 - 99 U/L    Total Bilirubin 0.3 0.1 - 1.5 mg/dL    Albumin 4.2 3.2 - 4.9 g/dL    Total Protein 6.8 6.0 - 8.2 g/dL    Globulin 2.6 1.9 - 3.5 g/dL    A-G Ratio 1.6 g/dL   IRON/TOTAL IRON BIND    Collection Time: 06/28/24  3:53 PM   Result Value Ref Range    Iron 58 50 - 180 ug/dL    Total Iron Binding 261 250 - 450 ug/dL    Unsat Iron Binding 203 110 - 370 ug/dL    % Saturation 22 15 - 55 %   ESTIMATED GFR    Collection Time: 06/28/24  3:53 PM   Result Value Ref Range    GFR (CKD-EPI) 46 (A) >60 mL/min/1.73 m 2   Cdiff By PCR Rflx Toxin    Collection Time: 06/29/24  8:45 AM   Result Value Ref Range    C Diff by PCR See Toxin Negative    [id number] Presumptive Negative  Negative   C Diff Toxin    Collection Time: 06/29/24  8:45 AM   Result Value Ref Range    C.Diff Toxin A&B Negative    FERRITIN    Collection Time: 06/29/24  9:27 AM   Result Value Ref Range    Ferritin 123.0 22.0 - 322.0 ng/mL   IRON/TOTAL IRON BIND    Collection Time: 06/29/24  9:27 AM   Result Value Ref Range    Iron 76 50 - 180 ug/dL    Total Iron Binding 246 (L) 250 - 450 ug/dL    Unsat Iron Binding 170 110 - 370 ug/dL    % Saturation 31 15 - 55 %   TSH WITH REFLEX TO FT4    Collection Time: 06/29/24  9:27 AM   Result Value Ref Range    TSH 4.900 0.380 - 5.330 uIU/mL   FACTOR V LEIDEN MUTATION    Collection Time: 07/08/24  1:57 PM   Result Value Ref Range    V Leiden Factor Negative    FACTOR II DNA ANALYSIS    Collection Time: 07/08/24  1:57 PM   Result Value Ref Range    Factor Ii Dna Analysis Pt Gene Heterozygous (A)    FACTOR VIII    Collection Time: 07/08/24  1:57 PM   Result Value Ref Range    Factor VIII 153.0 (H) 45.0 - 145.0 %    Inhibitor Indicated No    CBC WITH DIFFERENTIAL    Collection Time: 07/08/24  2:00 PM   Result Value Ref Range    WBC 9.2 4.8 - 10.8 K/uL    RBC 4.16 (L) 4.70 - 6.10 M/uL    Hemoglobin 12.7 (L) 14.0 - 18.0 g/dL    Hematocrit 38.2 (L) 42.0 - 52.0 %    MCV 91.8 81.4 - 97.8 fL    MCH 30.5 27.0 - 33.0 pg    MCHC 33.2 32.3 - 36.5 g/dL    RDW 45.5 35.9 - 50.0 fL    Platelet Count 241 164 - 446 K/uL    MPV 10.0 9.0 - 12.9 fL    Neutrophils-Polys 72.30 (H) 44.00 - 72.00 %    Lymphocytes 17.10 (L) 22.00 - 41.00 %    Monocytes 6.70 0.00 - 13.40 %    Eosinophils 3.20 0.00 - 6.90 %    Basophils 0.30 0.00 - 1.80 %    Immature Granulocytes 0.40 0.00 - 0.90 %    Nucleated RBC 0.00 0.00 - 0.20 /100 WBC    Neutrophils (Absolute) 6.62 1.82 - 7.42 K/uL    Lymphs (Absolute) 1.57 1.00 - 4.80 K/uL    Monos (Absolute) 0.61 0.00 - 0.85 K/uL    Eos (Absolute) 0.29 0.00 - 0.51 K/uL    Baso (Absolute) 0.03 0.00 - 0.12 K/uL    Immature Granulocytes (abs) 0.04 0.00 - 0.11 K/uL    NRBC (Absolute) 0.00 K/uL    Comp Metabolic Panel    Collection Time: 07/08/24  2:00 PM   Result Value Ref Range    Sodium 134 (L) 135 - 145 mmol/L    Potassium 4.5 3.6 - 5.5 mmol/L    Chloride 101 96 - 112 mmol/L    Co2 20 20 - 33 mmol/L    Anion Gap 13.0 7.0 - 16.0    Glucose 145 (H) 65 - 99 mg/dL    Bun 20 8 - 22 mg/dL    Creatinine 1.35 0.50 - 1.40 mg/dL    Calcium 9.5 8.5 - 10.5 mg/dL    Correct Calcium 9.3 8.5 - 10.5 mg/dL    AST(SGOT) 15 12 - 45 U/L    ALT(SGPT) 11 2 - 50 U/L    Alkaline Phosphatase 70 30 - 99 U/L    Total Bilirubin 0.4 0.1 - 1.5 mg/dL    Albumin 4.3 3.2 - 4.9 g/dL    Total Protein 6.5 6.0 - 8.2 g/dL    Globulin 2.2 1.9 - 3.5 g/dL    A-G Ratio 2.0 g/dL   TSH    Collection Time: 07/08/24  2:00 PM   Result Value Ref Range    TSH 3.460 0.380 - 5.330 uIU/mL   ESTIMATED GFR    Collection Time: 07/08/24  2:00 PM   Result Value Ref Range    GFR (CKD-EPI) 51 (A) >60 mL/min/1.73 m 2   CBC WITH DIFFERENTIAL    Collection Time: 08/06/24 10:12 AM   Result Value Ref Range    WBC 7.8 4.8 - 10.8 K/uL    RBC 3.94 (L) 4.70 - 6.10 M/uL    Hemoglobin 12.1 (L) 14.0 - 18.0 g/dL    Hematocrit 36.6 (L) 42.0 - 52.0 %    MCV 92.9 81.4 - 97.8 fL    MCH 30.7 27.0 - 33.0 pg    MCHC 33.1 32.3 - 36.5 g/dL    RDW 45.4 35.9 - 50.0 fL    Platelet Count 204 164 - 446 K/uL    MPV 9.7 9.0 - 12.9 fL    Neutrophils-Polys 71.00 44.00 - 72.00 %    Lymphocytes 15.50 (L) 22.00 - 41.00 %    Monocytes 7.60 0.00 - 13.40 %    Eosinophils 4.60 0.00 - 6.90 %    Basophils 0.50 0.00 - 1.80 %    Immature Granulocytes 0.80 0.00 - 0.90 %    Nucleated RBC 0.00 0.00 - 0.20 /100 WBC    Neutrophils (Absolute) 5.51 1.82 - 7.42 K/uL    Lymphs (Absolute) 1.20 1.00 - 4.80 K/uL    Monos (Absolute) 0.59 0.00 - 0.85 K/uL    Eos (Absolute) 0.36 0.00 - 0.51 K/uL    Baso (Absolute) 0.04 0.00 - 0.12 K/uL    Immature Granulocytes (abs) 0.06 0.00 - 0.11 K/uL    NRBC (Absolute) 0.00 K/uL   Comp Metabolic Panel    Collection Time: 08/06/24 10:12 AM   Result Value Ref Range    Sodium 138  135 - 145 mmol/L    Potassium 4.3 3.6 - 5.5 mmol/L    Chloride 104 96 - 112 mmol/L    Co2 21 20 - 33 mmol/L    Anion Gap 13.0 7.0 - 16.0    Glucose 201 (H) 65 - 99 mg/dL    Bun 26 (H) 8 - 22 mg/dL    Creatinine 1.43 (H) 0.50 - 1.40 mg/dL    Calcium 9.2 8.5 - 10.5 mg/dL    Correct Calcium 9.4 8.5 - 10.5 mg/dL    AST(SGOT) 14 12 - 45 U/L    ALT(SGPT) 11 2 - 50 U/L    Alkaline Phosphatase 63 30 - 99 U/L    Total Bilirubin 0.7 0.1 - 1.5 mg/dL    Albumin 3.8 3.2 - 4.9 g/dL    Total Protein 6.5 6.0 - 8.2 g/dL    Globulin 2.7 1.9 - 3.5 g/dL    A-G Ratio 1.4 g/dL   TSH    Collection Time: 08/06/24 10:12 AM   Result Value Ref Range    TSH 4.200 0.350 - 5.500 uIU/mL   ESTIMATED GFR    Collection Time: 08/06/24 10:12 AM   Result Value Ref Range    GFR (CKD-EPI) 48 (A) >60 mL/min/1.73 m 2   POCT Hemoglobin A1C    Collection Time: 08/09/24  1:53 PM   Result Value Ref Range    Glycohemoglobin 7.2 (A) 5.8 %    Internal Control Positive Positive     Internal Control Negative Negative            Assessment & Plan     1. LVH (left ventricular hypertrophy)        2. Dyslipidemia  Lipid Profile      3. Aortic atherosclerosis (HCC)        4. Primary hypertension        5. 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 Status post coronary artery balloon dilation        7. Pulmonary hypertension (HCC)        8. Aortic root dilation (HCC)        9. Atherosclerosis of superior mesenteric artery (HCC)            Medical Decision Making: Today's Assessment/Status/Plan:        It was my pleasure to meet with Mr. Centeno.    We addressed the management of hypertension at today's visit. Blood pressure is well controlled.  We specifically assessed the labs on hypertension treatment    We addressed the management of dyslipidemia and atherosclerosis at today's visit. He is on appropriate statin.    We addressed the management of atherosclerotic artery disease.  He is on proper antiplatelet, cholesterol management and  beta-blockers as appropriate.  We addressed the potential side effects and laboratory follow-up for these medications.    I will see Mr. Centeno back in 1 year time and encouraged him to follow up with us over the phone or electronically using my MyChart as issues arise.    It is my pleasure to participate in the care of Mr. Centeno.  Please do not hesitate to contact me with questions or concerns.    Preston Ellis MD PhD [facility]  Cardiologist [facility]    Please note that this dictation was created using voice recognition software. There may be errors I did not discover before finalizing the note.     () Today's E/M visit is associated with medical care services that serve as the continuing focal point for all needed health care services and/or with medical care services that  are part of ongoing care related to a patient's single, serious condition, or a complex condition: This includes  furnishing services to patients on an ongoing basis that result in care that is personalized  to the patient. The services result in a comprehensive, longitudinal, and continuous  relationship with the patient and involve delivery of team-based care that is accessible, coordinated with other practitioners and providers, and integrated with the broader health  care landscape.

## 2024-08-30 NOTE — PATIENT INSTRUCTIONS
OPTIONS FOR BLOOD THINNERS TO DRAMATICALLY REDUCE YOUR RISK OF STROKE WITH ATRIAL FIBRILLATION  (REDUCES RISK BY ~2/3) (in order of preference):    Eliquis (apixaban) typically 5 mg twice a day  Generic not available so may cost $20-$600 per month copay.    Eliquis is preferred because it is the least likely to cause GI Bleeding (20% less likely 3.3%/yr vs 3.7%/yr than dabigitran  (Pradaxa)*, may also have 28% less risk of stroke (1.1% vs 1.4% in 100 person years) than dabigitran (Pradaxa)**  No great antidote   Preferred in patients with severe kidney disease, elderly underweight or morbidly obese    Dabigatran (Pradaxa) typically 150 mg twice a day   Now generic   antidote available in a few Hospitals   Should be taken with full glass of water, otherwise can cause acid indigestion    Warfarin plus blood testing typically monthly   Generic so very affordable  antidote available in all hospitals  NEED TO MONITOR VITAMIN K FOODS SUCH AS GREEN VEGETABLES  Interacts with multiple medicine requiring more frequent blood checks    Saveysa (Edoxaban) 60-90 mg daily   Generic not available so may cost $20-$600 copay.   Newest medication and not commonly prescribed   No great antidote    Xarelto (rivaroxaban) 15-20 mg once a day with food   Generic not available so may cost $20-$600 copay.   Preferred for those that once daily medicine without frequent lab draws and diet monitoring   No great antidote    For all blood thinners, unless you have a history of stroke when you have stopped them in the past, we will provide guidance to your surgeon on how to safely hold medications around the time of surgery.  If you have a history of stroke when off blood thinners in the past please be sure to inform us if you have to stop blood thinners.    IF YOU HIT YOUR HEAD GET EMERGENCY CHECK IN THE ER.  IF YOU HAVE A MAJOR INJURY AND PAIN/DIZZINESS GET EMERGENCY CHECK    You should check lab work at least yearly for anemia and metabolic  panel on all blood thinners, sooner for symptoms of anemia such as blood in the stool or progressive shortness of breath  You are not supposed to have grapefruit with any of the medications.    You should avoid excessive alcohol on blood thinners  You should avoid other natural blood thinners such as fish oil or other supplements while on prescription blood thinners  You are not supposed to take NSAIDS with anticoagulants  In most cases antiplatelets like aspirin and clopidrogrel are not advised unless your cardiologist specifically says to take together    *Based on large observational study of 500,000 patients  Comparative Effectiveness and Safety Between Apixaban, Dabigatran, Edoxaban, and Rivaroxaban Among Patients With Atrial Fibrillation A Multinational Population-Based Cohort Study   Chato Escobar, PhD*, Lucero Weber, MSc*, et al.  Annals of Internal Medicine Original Research November 2022  [email or web address]  ** Based on large observation study 37,000 patients in the   Effectiveness and Safety of Oral Anticoagulants Among Nonvalvular Atrial Fibrillation Patients The ARISTOPHANES Study  Chase Yoon et al. Stroke. 2018;49:5885-3379  [email or web address]    POSSIBLE OPTIONS FOR MORE AFFORDABLE MEDICATIONS    Footnote or [employer] is a great place to see cost of medications without insurance as well    [facility]  [address],   [place] [address], [place]  Phone: [phone]    [facility]  [place] tel [phone] fax [phone]    Online [facility] [phone] Fax [phone]    [facility] - [place], [place] - [address]  [phone]  THEY MAY HONOR A 365 DAY Rx    [facility] - [place] - [address]  [phone]  [place]  [place] [address] [address] [place]  [phone]    Look into POGO for blood glucose  monitoring    Generic medications with [facility]

## 2024-09-03 ENCOUNTER — PHARMACY VISIT (OUTPATIENT)
Dept: PHARMACY | Facility: MEDICAL CENTER | Age: 84
End: 2024-09-03
Payer: COMMERCIAL

## 2024-09-03 ENCOUNTER — HOSPITAL ENCOUNTER (OUTPATIENT)
Dept: LAB | Facility: MEDICAL CENTER | Age: 84
End: 2024-09-03
Attending: INTERNAL MEDICINE
Payer: MEDICARE

## 2024-09-03 LAB
ALBUMIN SERPL BCP-MCNC: 3.8 G/DL (ref 3.2–4.9)
ALBUMIN/GLOB SERPL: 1.4 G/DL
ALP SERPL-CCNC: 64 U/L (ref 30–99)
ALT SERPL-CCNC: 10 U/L (ref 2–50)
ANION GAP SERPL CALC-SCNC: 13 MMOL/L (ref 7–16)
AST SERPL-CCNC: 14 U/L (ref 12–45)
BASOPHILS # BLD AUTO: 0.3 % (ref 0–1.8)
BASOPHILS # BLD: 0.03 K/UL (ref 0–0.12)
BILIRUB SERPL-MCNC: 0.6 MG/DL (ref 0.1–1.5)
BUN SERPL-MCNC: 23 MG/DL (ref 8–22)
CALCIUM ALBUM COR SERPL-MCNC: 9.2 MG/DL (ref 8.5–10.5)
CALCIUM SERPL-MCNC: 9 MG/DL (ref 8.5–10.5)
CHLORIDE SERPL-SCNC: 101 MMOL/L (ref 96–112)
CO2 SERPL-SCNC: 22 MMOL/L (ref 20–33)
CREAT SERPL-MCNC: 1.48 MG/DL (ref 0.5–1.4)
EOSINOPHIL # BLD AUTO: 0.57 K/UL (ref 0–0.51)
EOSINOPHIL NFR BLD: 6.4 % (ref 0–6.9)
ERYTHROCYTE [DISTWIDTH] IN BLOOD BY AUTOMATED COUNT: 46.3 FL (ref 35.9–50)
GFR SERPLBLD CREATININE-BSD FMLA CKD-EPI: 46 ML/MIN/1.73 M 2
GLOBULIN SER CALC-MCNC: 2.7 G/DL (ref 1.9–3.5)
GLUCOSE SERPL-MCNC: 154 MG/DL (ref 65–99)
HCT VFR BLD AUTO: 37.7 % (ref 42–52)
HGB BLD-MCNC: 12.7 G/DL (ref 14–18)
IMM GRANULOCYTES # BLD AUTO: 0.04 K/UL (ref 0–0.11)
IMM GRANULOCYTES NFR BLD AUTO: 0.4 % (ref 0–0.9)
LYMPHOCYTES # BLD AUTO: 1.4 K/UL (ref 1–4.8)
LYMPHOCYTES NFR BLD: 15.7 % (ref 22–41)
MCH RBC QN AUTO: 31.2 PG (ref 27–33)
MCHC RBC AUTO-ENTMCNC: 33.7 G/DL (ref 32.3–36.5)
MCV RBC AUTO: 92.6 FL (ref 81.4–97.8)
MONOCYTES # BLD AUTO: 0.64 K/UL (ref 0–0.85)
MONOCYTES NFR BLD AUTO: 7.2 % (ref 0–13.4)
NEUTROPHILS # BLD AUTO: 6.21 K/UL (ref 1.82–7.42)
NEUTROPHILS NFR BLD: 70 % (ref 44–72)
NRBC # BLD AUTO: 0 K/UL
NRBC BLD-RTO: 0 /100 WBC (ref 0–0.2)
PLATELET # BLD AUTO: 236 K/UL (ref 164–446)
PMV BLD AUTO: 9.8 FL (ref 9–12.9)
POTASSIUM SERPL-SCNC: 4.9 MMOL/L (ref 3.6–5.5)
PROT SERPL-MCNC: 6.5 G/DL (ref 6–8.2)
RBC # BLD AUTO: 4.07 M/UL (ref 4.7–6.1)
SODIUM SERPL-SCNC: 136 MMOL/L (ref 135–145)
TSH SERPL-ACNC: 3.95 UIU/ML (ref 0.35–5.5)
WBC # BLD AUTO: 8.9 K/UL (ref 4.8–10.8)

## 2024-09-03 PROCEDURE — 85025 COMPLETE CBC W/AUTO DIFF WBC: CPT

## 2024-09-03 PROCEDURE — 80053 COMPREHEN METABOLIC PANEL: CPT

## 2024-09-03 PROCEDURE — 84443 ASSAY THYROID STIM HORMONE: CPT | Mod: GA

## 2024-09-03 PROCEDURE — 36415 COLL VENOUS BLD VENIPUNCTURE: CPT

## 2024-09-04 ENCOUNTER — HOSPITAL ENCOUNTER (OUTPATIENT)
Dept: LAB | Facility: MEDICAL CENTER | Age: 84
End: 2024-09-04
Attending: FAMILY MEDICINE
Payer: MEDICARE

## 2024-09-04 DIAGNOSIS — I10 PRIMARY HYPERTENSION: ICD-10-CM

## 2024-09-04 LAB
ERYTHROCYTE [DISTWIDTH] IN BLOOD BY AUTOMATED COUNT: 47.6 FL (ref 35.9–50)
HCT VFR BLD AUTO: 38.2 % (ref 42–52)
HGB BLD-MCNC: 12.7 G/DL (ref 14–18)
MCH RBC QN AUTO: 30.9 PG (ref 27–33)
MCHC RBC AUTO-ENTMCNC: 33.2 G/DL (ref 32.3–36.5)
MCV RBC AUTO: 92.9 FL (ref 81.4–97.8)
PLATELET # BLD AUTO: 244 K/UL (ref 164–446)
PMV BLD AUTO: 9.8 FL (ref 9–12.9)
RBC # BLD AUTO: 4.11 M/UL (ref 4.7–6.1)
WBC # BLD AUTO: 9.4 K/UL (ref 4.8–10.8)

## 2024-09-04 PROCEDURE — 36415 COLL VENOUS BLD VENIPUNCTURE: CPT

## 2024-09-04 PROCEDURE — 85027 COMPLETE CBC AUTOMATED: CPT

## 2024-09-04 PROCEDURE — 80061 LIPID PANEL: CPT

## 2024-09-04 PROCEDURE — 80048 BASIC METABOLIC PNL TOTAL CA: CPT

## 2024-09-05 LAB
ANION GAP SERPL CALC-SCNC: 12 MMOL/L (ref 7–16)
BUN SERPL-MCNC: 22 MG/DL (ref 8–22)
CALCIUM SERPL-MCNC: 9.2 MG/DL (ref 8.5–10.5)
CHLORIDE SERPL-SCNC: 103 MMOL/L (ref 96–112)
CHOLEST SERPL-MCNC: 113 MG/DL (ref 100–199)
CO2 SERPL-SCNC: 23 MMOL/L (ref 20–33)
CREAT SERPL-MCNC: 1.44 MG/DL (ref 0.5–1.4)
GFR SERPLBLD CREATININE-BSD FMLA CKD-EPI: 47 ML/MIN/1.73 M 2
GLUCOSE SERPL-MCNC: 169 MG/DL (ref 65–99)
HDLC SERPL-MCNC: 40 MG/DL
LDLC SERPL CALC-MCNC: 51 MG/DL
POTASSIUM SERPL-SCNC: 4.9 MMOL/L (ref 3.6–5.5)
SODIUM SERPL-SCNC: 138 MMOL/L (ref 135–145)
TRIGL SERPL-MCNC: 108 MG/DL (ref 0–149)

## 2024-09-06 ENCOUNTER — TELEPHONE (OUTPATIENT)
Dept: VASCULAR LAB | Facility: MEDICAL CENTER | Age: 84
End: 2024-09-06
Payer: MEDICARE

## 2024-09-09 ENCOUNTER — DOCUMENTATION (OUTPATIENT)
Dept: VASCULAR LAB | Facility: MEDICAL CENTER | Age: 84
End: 2024-09-09
Payer: MEDICARE

## 2024-09-09 ENCOUNTER — HOSPITAL ENCOUNTER (OUTPATIENT)
Dept: RADIOLOGY | Facility: MEDICAL CENTER | Age: 84
End: 2024-09-09
Attending: FAMILY MEDICINE
Payer: MEDICARE

## 2024-09-09 DIAGNOSIS — I71.21 ANEURYSM OF ASCENDING AORTA WITHOUT RUPTURE (HCC): ICD-10-CM

## 2024-09-09 DIAGNOSIS — I71.9 DESCENDING AORTIC ANEURYSM (HCC): ICD-10-CM

## 2024-09-09 PROCEDURE — 71275 CT ANGIOGRAPHY CHEST: CPT

## 2024-09-09 PROCEDURE — 700117 HCHG RX CONTRAST REV CODE 255: Performed by: FAMILY MEDICINE

## 2024-09-09 RX ADMIN — IOHEXOL 80 ML: 350 INJECTION, SOLUTION INTRAVENOUS at 11:38

## 2024-09-09 NOTE — PROGRESS NOTES
CTA chest reviewed.   Ascending aortic size is 3.5cm, no mention of descending aortic diameter or enlargement.   These parameters would indicate normal measurements for age/height/gender, so we will d/c ongoing surveillance.     Pending f/u to review and discuss.

## 2024-09-11 ENCOUNTER — OFFICE VISIT (OUTPATIENT)
Dept: VASCULAR LAB | Facility: MEDICAL CENTER | Age: 84
End: 2024-09-11
Attending: FAMILY MEDICINE
Payer: MEDICARE

## 2024-09-11 VITALS
HEART RATE: 63 BPM | BODY MASS INDEX: 26.05 KG/M2 | WEIGHT: 182 LBS | SYSTOLIC BLOOD PRESSURE: 123 MMHG | HEIGHT: 70 IN | DIASTOLIC BLOOD PRESSURE: 61 MMHG

## 2024-09-11 DIAGNOSIS — R79.1 ELEVATED FACTOR VIII LEVEL: Chronic | ICD-10-CM

## 2024-09-11 DIAGNOSIS — I25.10 CORONARY ARTERY DISEASE INVOLVING NATIVE CORONARY ARTERY OF NATIVE HEART WITHOUT ANGINA PECTORIS: ICD-10-CM

## 2024-09-11 DIAGNOSIS — I70.8 CELIAC ARTERY ATHEROSCLEROSIS: ICD-10-CM

## 2024-09-11 DIAGNOSIS — I65.23 ATHEROSCLEROSIS OF BOTH CAROTID ARTERIES: Chronic | ICD-10-CM

## 2024-09-11 DIAGNOSIS — Z86.711 HISTORY OF PULMONARY EMBOLISM: ICD-10-CM

## 2024-09-11 DIAGNOSIS — Z95.5 STATUS POST CORONARY ARTERY BALLOON DILATION: ICD-10-CM

## 2024-09-11 DIAGNOSIS — I82.4Z2 ACUTE DEEP VEIN THROMBOSIS (DVT) OF DISTAL VEIN OF LEFT LOWER EXTREMITY (HCC): ICD-10-CM

## 2024-09-11 DIAGNOSIS — Z83.2 FAMILY HISTORY OF CLOTTING DISORDER: ICD-10-CM

## 2024-09-11 DIAGNOSIS — Z79.01 CHRONIC ANTICOAGULATION: Chronic | ICD-10-CM

## 2024-09-11 DIAGNOSIS — D68.52 PROTHROMBIN GENE MUTATION (HCC): Chronic | ICD-10-CM

## 2024-09-11 DIAGNOSIS — I27.20 PULMONARY HYPERTENSION (HCC): ICD-10-CM

## 2024-09-11 DIAGNOSIS — K55.1 ATHEROSCLEROSIS OF SUPERIOR MESENTERIC ARTERY (HCC): Chronic | ICD-10-CM

## 2024-09-11 PROCEDURE — 3074F SYST BP LT 130 MM HG: CPT | Performed by: FAMILY MEDICINE

## 2024-09-11 PROCEDURE — 99212 OFFICE O/P EST SF 10 MIN: CPT

## 2024-09-11 PROCEDURE — RXMED WILLOW AMBULATORY MEDICATION CHARGE: Performed by: FAMILY MEDICINE

## 2024-09-11 PROCEDURE — 3078F DIAST BP <80 MM HG: CPT | Performed by: FAMILY MEDICINE

## 2024-09-11 PROCEDURE — 99214 OFFICE O/P EST MOD 30 MIN: CPT | Performed by: FAMILY MEDICINE

## 2024-09-11 PROCEDURE — G2211 COMPLEX E/M VISIT ADD ON: HCPCS | Performed by: FAMILY MEDICINE

## 2024-09-11 ASSESSMENT — ENCOUNTER SYMPTOMS
CLAUDICATION: 0
BRUISES/BLEEDS EASILY: 0
COUGH: 0
PALPITATIONS: 0
WHEEZING: 0
HEMOPTYSIS: 0
BLOOD IN STOOL: 0
CHILLS: 0
PND: 0
DIARRHEA: 1
ORTHOPNEA: 0
SHORTNESS OF BREATH: 0
FEVER: 0
SPUTUM PRODUCTION: 0

## 2024-09-11 ASSESSMENT — FIBROSIS 4 INDEX: FIB4 SCORE: 1.54

## 2024-09-11 NOTE — PROGRESS NOTES
FOLLOW-UP VASCULAR ANTICOAGULATION VISIT  07/02/24      Star Centeno is a male who presents for initial visit   Initially referred by Nirmal Gentile MD for LOT/VTE mgmt, est 11/2023    Subjective      Interval hx/concerns: last seen 7/2024, feeling well, no new issues.  Seeing Dr. Jaramillo (heme/onc).   Had interval CTA and labs.  Currently ongoing immunotherapy for bladder cancer. No other health changes.  Tolerating all meds       VTE disease / Anticoagulation:   Current agent: eliquis 5mg BID   Date of initiation of anticoagulation: 3/2024, prior use 9/2023   Type of Venous thromboembolic disease (VTE):   3/2024 - acute L gastroc v DVT   9/2023 -  incidental RML PE - asymptomatic   Initial visit hx/sx:    3/2024 - L > RLE swelling and admit to [facility] on 3/28/24 for new DVT, d/c home 3/29/24 in stable condition after restart on Eliquis   9/2023 had CTA for ascending AA surveillance and noted to have incidentally found RML PE.  Subsequent CTA showed resolution 11/2023.   No symptoms at the time of initial dx, felt well.    Denies current SOB, CP, leg swelling, edema, leg pain, cyanosis of digits, redness of extremities.  Antithrombotic therapy at time of VTE event: none   VTE tx course: Xarelto 15mg BID x 21d, then 20mg daily for about 3 weeks total   Possible PROVOKING FACTORS:  Any personal VTE hx? No  Any family VTE hx?  twin brother indicating he had elevated factor VIII activity and prothrombin gene mutation with recurrent VTE on chronic VKA therapy.   MEN:  Hx of cancer or abnormal cancer screenings: bladder cancer   Hypercoagulability work-up completed?  Yes - positive hetero PTGM, mild high FVIII    Ascending, descending aortic aneurysms  Initial visit hx: noted on CT 2021 at 4.3cm, moved from [facility] 2/2022  Current sx: denies chest, back, abdominal pain, SOB, dysphagia, worsening cough, hemoptysis.  Pertinent pmhx:  Hx of HTN: yes  Hx of tobacco:   reports that he quit smoking about 23 years  ago. His smoking use included cigarettes. He started smoking about 68 years ago. He has a 45 pack-year smoking history. He has never used smokeless tobacco.  No hx of Connective tissue disease such as Marfans, Alan-Danlos, Loeys-Damaris  No hx of inflammatory/autoimmune vasculitis (Takayasu's arteritis, Giant Cell arteritis)  No hx of infective aortitis, HIV, syphilis   No hx of MVA, chest wall trauma, deceleration injuries  No hx of Biscuspid Aortic Valve per echo   No hx of anabolic steroid use or stimulants (cocaine, methamphetamine, etc)  Pertinent fhx:  No fhx of connective tissue disease   No fhx of aneurysmal disease or known familial thoracic aortic aneurysm syndrome    HTN: Stable, tolerating meds, Home BP lo-110/70s, good adherence   Antithrombotic tx: ASA 81mg - no hx of bleeding    HLD: Stable on Moderate intensity statin and zetia - tolerating, good adherence     Current Outpatient Medications on File Prior to Visit   Medication Sig Dispense Refill    rosuvastatin (CRESTOR) 5 MG Tab Take 1 Tablet by mouth every day. Indications: High Amount of Fats in the Blood 90 Tablet 3    ezetimibe (ZETIA) 10 MG Tab Take 1 Tablet by mouth every day. Indications: High Amount of Fats in the Blood 90 Tablet 3    levothyroxine (SYNTHROID) 25 MCG Tab Take 1 Tablet by mouth every day for 360 days. 90 Tablet 3    metFORMIN ER (GLUCOPHAGE XR) 500 MG TABLET SR 24 HR Take 1 Tablet by mouth 2 times a day. (Ok to take 2 tablets once a day instead if desired) 180 Tablet 3    ferrous sulfate 325 (65 Fe) MG tablet Take 1 tablet by mouth three times a week on mon/wed/fri 39 Tablet 3    tamsulosin (FLOMAX) 0.4 MG capsule Take 1 capsule by mouth every day 90 Capsule 2    sodium bicarbonate (SODIUM BICARBONATE) 650 MG Tab Take 2 tablets by mouth three times a day 540 Tablet 3    rosuvastatin (CRESTOR) 5 MG Tab Take 1 tablet by mouth every day 100 Tablet 3    ALPRAZolam (XANAX) 0.25 MG Tab Take 0.25 mg by mouth 1 time a day as  needed for Anxiety.      metoprolol SR (TOPROL XL) 25 MG TABLET SR 24 HR Take 1 Tablet by mouth every day. 90 Tablet 3    bismuth subsalicylate (PEPTO-BISMOL) 262 MG/15ML Suspension Take 30 mL by mouth every 6 hours as needed (diarrhea). Indications: Diarrhea, Heartburn      Loperamide HCl (IMODIUM A-D) 1 MG/7.5ML Liquid Take 30 mL by mouth 2 times a day as needed (diarrhea). Indications: Diarrhea      acetaminophen (TYLENOL) 500 MG Tab Take 1 Tablet by mouth every 6 hours as needed for Mild Pain or Moderate Pain.      diclofenac sodium (VOLTAREN) 1 % Gel Apply 2 g topically 4 times a day as needed (Shoulder Pain). Indications: Joint Damage causing Pain and Loss of Function      Coenzyme Q10 (COQ-10 PO) Take 1 Capsule by mouth every evening. Indications: heart health       No current facility-administered medications on file prior to visit.      Allergies   Allergen Reactions    Atorvastatin      myalgias         Social History     Tobacco Use    Smoking status: Former     Current packs/day: 0.00     Average packs/day: 1 pack/day for 45.0 years (45.0 ttl pk-yrs)     Types: Cigarettes     Start date: 1956     Quit date: 2001     Years since quittin.7    Smokeless tobacco: Never   Vaping Use    Vaping status: Never Used   Substance Use Topics    Alcohol use: Yes     Alcohol/week: 4.2 oz     Types: 7 Glasses of wine per week     Comment: 1 glass of wine/nightly    Drug use: Never     DIET AND EXERCISE:  Weight Change: increased  Diet: common adult  Exercise: no regular exercise program     Review of Systems   Constitutional:  Negative for chills, fever and malaise/fatigue.   HENT:  Negative for nosebleeds.    Respiratory:  Negative for cough, hemoptysis, sputum production, shortness of breath and wheezing.    Cardiovascular:  Negative for chest pain, palpitations, orthopnea, claudication, leg swelling and PND.   Gastrointestinal:  Positive for diarrhea. Negative for blood in stool.   Skin:  Positive for  "itching.   Endo/Heme/Allergies:  Does not bruise/bleed easily.       Objective    Vitals:    09/11/24 1146   BP: 123/61   BP Location: Left arm   Patient Position: Sitting   BP Cuff Size: Adult   Pulse: 63   Weight: 82.6 kg (182 lb)   Height: 1.778 m (5' 10\")      BP Readings from Last 5 Encounters:   09/11/24 123/61   08/30/24 110/60   08/09/24 126/78   07/02/24 (!) 151/70   06/28/24 118/64      Body mass index is 26.11 kg/m².  Wt Readings from Last 3 Encounters:   09/11/24 82.6 kg (182 lb)   08/30/24 83.5 kg (184 lb)   08/09/24 83.9 kg (185 lb)      Physical Exam  Vitals reviewed.   Constitutional:       Appearance: Normal appearance.   HENT:      Head: Normocephalic and atraumatic.      Nose: Nose normal.      Mouth/Throat:      Mouth: Mucous membranes are moist.      Pharynx: Oropharynx is clear.   Eyes:      Extraocular Movements: Extraocular movements intact.      Conjunctiva/sclera: Conjunctivae normal.   Cardiovascular:      Rate and Rhythm: Normal rate and regular rhythm.      Pulses: Normal pulses.           Carotid pulses are 2+ on the right side and 2+ on the left side.       Radial pulses are 2+ on the right side and 2+ on the left side.        Dorsalis pedis pulses are 2+ on the right side and 2+ on the left side.        Posterior tibial pulses are 2+ on the right side and 2+ on the left side.      Heart sounds: Normal heart sounds.      Comments:    Spider telangectasia:       RLE:  None      LLE: none   Varicosities:           RLE: none      LLE: none   Corona phlebectatica:      RLE:  None        LLE:  None   Cording:         RLE:  None     LLE: None   Pulmonary:      Effort: Pulmonary effort is normal.      Breath sounds: Normal breath sounds.   Musculoskeletal:         General: Normal range of motion.      Cervical back: Normal range of motion and neck supple.      Right lower leg: No edema.      Left lower leg: No edema.   Skin:     General: Skin is warm and dry.      Capillary Refill: Capillary " "refill takes less than 2 seconds.   Neurological:      General: No focal deficit present.      Mental Status: He is alert and oriented to person, place, and time. Mental status is at baseline.   Psychiatric:         Mood and Affect: Mood normal.         Behavior: Behavior normal.       Lab Results   Component Value Date    CHOLSTRLTOT 113 09/04/2024    LDL 51 09/04/2024    HDL 40 09/04/2024    TRIGLYCERIDE 108 09/04/2024      No results found for: \"LIPOPROTA\"   No results found for: \"APOB\"   No results found for: \"CRPHIGHSEN\"      Lab Results   Component Value Date    PROTHROMBTM 14.1 03/28/2024    INR 1.04 03/28/2024       Lab Results   Component Value Date    HBA1C 7.2 (A) 08/09/2024      Lab Results   Component Value Date    SODIUM 138 09/04/2024    POTASSIUM 4.9 09/04/2024    CHLORIDE 103 09/04/2024    CO2 23 09/04/2024    GLUCOSE 169 (H) 09/04/2024    BUN 22 09/04/2024    CREATININE 1.44 (H) 09/04/2024        Lab Results   Component Value Date    WBC 9.4 09/04/2024    RBC 4.11 (L) 09/04/2024    HEMOGLOBIN 12.7 (L) 09/04/2024    HEMATOCRIT 38.2 (L) 09/04/2024    MCV 92.9 09/04/2024    MCH 30.9 09/04/2024    MCHC 33.2 09/04/2024    MPV 9.8 09/04/2024      VASCULAR IMAGING:    CT chest 7/2021  1. Aneurysmal dilatation at the aortic root with maximal diameter of 4.3 cm, not appreciably changed from the prior study allowing for limitations of the study.   2.  Aortic and coronary artery calcifications.   3.  5 mm right lower lobe nodule, stable compared to 10/07/2020.   4.  Emphysematous changes.     CTA chest 10/2021  1.  Mild aortic root dilatation at the level of the sinuses of   Valsalva, 4.3 cm. The remainder of the ascending aorta measures   up to 3.9 cm, near the upper limit of normal.  Minimal fusiform   ectasia of the isthmic segment, 3.3 cm.   Mild dilatation of the   innominate and proximal right subclavian arteries, 1.8 cm and 1.6   cm, respectively.   2. Diffuse atherosclerosis. No evidence of aortic " dissection. Few   incidental findings.     Echo 11/2022  No prior study is available for comparison.   Normal left ventricular size and systolic function.  Left ventricle ejection fraction estimated to be 60%.  No regional wall motion abnormality noted.  No hemodynamically significant valvular heart disease noted.  Estimated right ventricular systolic pressure is 35 mmHg.  Aorta  Normal aortic root for body surface area. The ascending aorta diameter is 3.2 cm.    CTA chest 9/2/23   Aorta and vasculature:  Ascending thoracic aorta measures 3.5 cm in maximum diameter without dissection. Descending thoracic aorta measures 3.1 cm in maximum diameter without dissection. Visualized upper abdominal aorta is normal in caliber without   dissection. Great vessel origins are patent with atherosclerotic plaque at the origins. Celiac axis and SMA are patent with moderate calcific plaque at both origins an estimated less than 50% stenosis.  1.  There is borderline aneurysmal dilatation of the descending thoracic aorta with ascending thoracic aorta upper limits of normal in caliber at 3.5 cm.  2.  No evidence of thoracic aortic dissection.  3.  incidentally noted is a nonocclusive right middle lobe pulmonary artery embolism.  4.  Lung parenchyma demonstrates changes suggestive of emphysema favored over interstitial lung disease.    BLE venous 10/2023   No deep venous thrombosis.     BLE venous 11/2023    No deep venous thrombosis.     CTPA 11/17/23  1.  No central or segmental pulmonary embolus or evidence of other acute intrathoracic pathology  2.  Emphysema  3.  Atherosclerosis    CT a/p 11/17/23   Vasculature: Diffuse calcific atherosclerotic plaque.  1.  Interval placement of a LEFT ureteral stent with reduced but persistent LEFT hydroureteronephrosis, perinephric fat stranding and delayed LEFT nephrogram suspicious for some degree of ongoing obstructive uropathy and possibly infection  2.  Gas in the LEFT  3.  Findings  suspicious for cystitis renal collecting system and urinary bladder to be due to the recent instrumentation or infection.  4.  Atherosclerosis  5.  Colonic diverticulosis    BLE venous 3/2024   Acute deep venous thrombus within the left gastrocnemius vein which is    considered below the knee thrombus    No other deep venous thrombus within the left lower extremity or the right    lower extremity    Echo 3/2024  Normal left ventricular systolic function. The left ventricular   ejection fraction is visually estimated to be 65%.   Mild concentric left ventricular hypertrophy.  Grade I diastolic   dysfunction.  The right ventricle is normal in size and systolic function.  Mild aortic insufficiency.  Mild tricuspid regurgitation. Right atrial pressure is estimated to be   3 mmHg. Estimated right ventricular systolic pressure is 43 mmHg (mild   pulmonary hypertension).   Compared to the prior study on 11/17/22, there is now mild aortic   regurgiation as well as mild pulmonary hypertension.     7/2024 Carotid   Mild bilateral internal carotid artery stenosis (<50%).     9/2024 CTA chest   1.  No evidence of aortic dissection or aneurysm.  2.  Ascending aortic diameter measures 2.5 x 3.4 cm.  3.  Atherosclerosis with coronary artery disease.  4.  Mild basilar predominant bilateral peripheral interstitial thickening which could indicate early fibrotic lung disease.  5.  Stable chronic T12 compression fracture.           Medical Decision Making:  Today's Assessment / Status / Plan:     1. Prothrombin gene mutation (HCC)        2. Acute deep vein thrombosis (DVT) of distal vein of left lower extremity (HCC)  rivaroxaban (XARELTO) 20 MG Tab tablet    CBC WITHOUT DIFFERENTIAL    Comp Metabolic Panel    Lipid Profile      3. 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 Coronary artery disease involving native coronary artery of native heart without angina pectoris        6. Atherosclerosis of both carotid arteries        7. History of pulmonary embolism        8. Family history of clotting disorder        9. Pulmonary hypertension (HCC)        10. Celiac artery atherosclerosis        11. 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    Though presence of PGM is not an absolute indication of lifelong OAC, it does strengthen the case of it.    Plan:  - no imaging surveillance needed at this time  - antithrombotic plan as noted below   - counseled on signs and symptoms of acute VTE that require seeking prompt attention in the ED to include shortness of breath, chest pain, pain with deep inhalation, acute leg swelling and/or pain in calf or leg   - elevate legs as much as possible, use compression stockings/socks if directed by your provider  - Avoid hormonal therapies including estrogen or testosterone-containing meds, or raloxifene or tamoxifene (commonly used for osteoporosis)  - Avoid sedentary periods  - continue complete avoidance of tobacco products  - if having any invasive procedure,please make sure the doctor knows of your history of blood clots and current anticoagulation status    2) Ascending aortic aneurysm - currently no CT  evidence of aneurysmal dz on 9/2023 and 9/2024 CT  7/2021 CT = 4.3cm (out of state imaging)  10/2020 CTA = 4.3cm   9/2023 CTA = 3.5cm - sizing discrepancy from prior and indicates normal size   9/2024 CTA = 3.4cm - normal size   Plan:   - no further surveillance recommended     3) Descending AA - prior mild dilation per past films, but recently normal x 2 yrs   9/2023 CTA = 3.1cm - normal size   9/2024 CTA = no dilation, normal size   Plan:  - no further surveillance recommended     4) CAD status post PCI to RCA and LCx 2002 - stable, no sx    Plan:  - continue secondary prevention med mgmt   - cardiology for ongoing care     5) mild TR, mild pHTN = 43mmHg - stable, no sx  Plan: ongoing care with cardiology     ANTITHROMBOTIC THERAPY:  Date of initiation:   9/2023 - only completed 3 weeks   3/2024 - restarted DOAC   HAS-BLED bleeding risk calc ([email or web address]): 2 pt, 4.1%, moderate risk   Factors to consider for indefinite OAC: Unprovoked VTE event and Male sex , active cancer therapy, hetero PTGM, mild high FVIII   Expected LOT: has completed 6mo full dose OAC (9/2024), through shared MDM in light of ongoing chronic risk factors and mild-mod thrombophilias, recommended for indefinitely therapy until consider clinically cured from cancer then can revisit LOT and determine ongoing needs   Plan:  - continue xarelto 20mg daily indefinitely, annual benefit/risk review - rx sent for 340B pricing unclear if he qualifies   - stressed strict adherence to tx  - consider additional input on LOT from his heme/onc MD     LIPID MANAGEMENT:   Qualifies for Statin Therapy Based on 2018 ACC/AHA Guidelines: n/a  The ASCVD Risk score (Park DK, et al., 2019) failed to calculate., N/A  Major ASCVD events: None  High-risk conditions: N/A  Currently on Statin: Yes  Tx goals: LDL-C <100   At goal? yes  Plan:   - reinforced ongoing lifestyle mgmt   - monitor labs   Meds:   - continue rosuva 5mg daily  - continue zetia 10mg daily     BLOOD  PRESSURE CONTROL   Office BP Goal ACC/AHA (2017) goal <130/80  Home BP at goal:  yes  Office BP at goal:  yes  24h ABPM:  not ordered to date   Plan:   - continue metoprolol 25mg ER daily     GLYCEMIC STATUS:  Diabetic, T2 with HLD, UACR  Goal A1c < 7.5 reasonable   Lab Results   Component Value Date    HBA1C 7.2 (A) 08/09/2024      Lab Results   Component Value Date/Time    MALBCRT 79 (H) 04/09/2024 10:36 AM    MICROALBUR 6.9 04/09/2024 10:36 AM   Plan:  - continue current medication plan   - recommmend for routine care with PCP (or endocrine) to include regular A1c monitoring, annual albumin/creatinine ratio (ACR), annual diabetic retinopathy screening, foot exams, annual flu vaccine, and updates to pneumonia vaccines as appropriate      LIFESTYLE INTERVENTIONS  TOBACCO: Stages of Change: Maintenance (sustained change >6mo)    reports that he quit smoking about 23 years ago. His smoking use included cigarettes. He started smoking about 68 years ago. He has a 45 pack-year smoking history. He has never used smokeless tobacco.   - continued complete avoidance of all tobacco products   PHYSICAL ACTIVITY: >150min/week of mod-intensity activity or as much as tolerated  NUTRITION: Mediterranean and reduce Na to 1500mg/day   ETOH: complete abstinence recommended  WT MGMT: maintain healthy weight    OTHER:    # CKD G3b/A1, stable  - avoid nephrotoxins  - continue HTN control with RAAS blockade   - monitor lytes/gfr routinely  - eval with nephrology if worsening over time     # hx of prostate CA (2001) - no prior VTE events     # bladder cancer, stage 2 - stable, persistent  - currently ongoing immunotherapy so considered active cancer for now   - still high risk for VTE events   Plan:  - continue f/u with oncology     # ureteral stent - in situ, pending retrieval     # anemia, normocytic, mild w/o sx - improving over time, most recent hgb 12.0  Normal ferritin, iron studies.  No further worsening while on DOAC   Plan:  -  defer mgmt to PCP     STUDIES: none   LABS: as noted above  Follow up in: RTC in 6 months     Onel Leonard M.D.  [facility]   [facility]   [phone]

## 2024-09-12 ENCOUNTER — PHARMACY VISIT (OUTPATIENT)
Dept: PHARMACY | Facility: MEDICAL CENTER | Age: 84
End: 2024-09-12
Payer: COMMERCIAL

## 2024-09-12 ENCOUNTER — HOSPITAL ENCOUNTER (OUTPATIENT)
Dept: LAB | Facility: MEDICAL CENTER | Age: 84
End: 2024-09-12
Attending: INTERNAL MEDICINE
Payer: MEDICARE

## 2024-09-12 LAB
ALBUMIN SERPL BCP-MCNC: 4 G/DL (ref 3.2–4.9)
APPEARANCE UR: ABNORMAL
BACTERIA #/AREA URNS HPF: ABNORMAL /HPF
BASOPHILS # BLD AUTO: 0.3 % (ref 0–1.8)
BASOPHILS # BLD: 0.03 K/UL (ref 0–0.12)
BILIRUB UR QL STRIP.AUTO: NEGATIVE
BUN SERPL-MCNC: 39 MG/DL (ref 8–22)
CALCIUM ALBUM COR SERPL-MCNC: 9.2 MG/DL (ref 8.5–10.5)
CALCIUM SERPL-MCNC: 9.2 MG/DL (ref 8.5–10.5)
CHLORIDE SERPL-SCNC: 105 MMOL/L (ref 96–112)
CO2 SERPL-SCNC: 20 MMOL/L (ref 20–33)
COLOR UR: YELLOW
CREAT SERPL-MCNC: 1.79 MG/DL (ref 0.5–1.4)
EOSINOPHIL # BLD AUTO: 0.6 K/UL (ref 0–0.51)
EOSINOPHIL NFR BLD: 6.1 % (ref 0–6.9)
EPI CELLS #/AREA URNS HPF: ABNORMAL /HPF
ERYTHROCYTE [DISTWIDTH] IN BLOOD BY AUTOMATED COUNT: 48.1 FL (ref 35.9–50)
FERRITIN SERPL-MCNC: 108 NG/ML (ref 22–322)
GFR SERPLBLD CREATININE-BSD FMLA CKD-EPI: 37 ML/MIN/1.73 M 2
GLUCOSE SERPL-MCNC: 126 MG/DL (ref 65–99)
GLUCOSE UR STRIP.AUTO-MCNC: NEGATIVE MG/DL
HCT VFR BLD AUTO: 37 % (ref 42–52)
HGB BLD-MCNC: 12.4 G/DL (ref 14–18)
HYALINE CASTS #/AREA URNS LPF: ABNORMAL /LPF
IMM GRANULOCYTES # BLD AUTO: 0.05 K/UL (ref 0–0.11)
IMM GRANULOCYTES NFR BLD AUTO: 0.5 % (ref 0–0.9)
IRON SATN MFR SERPL: 21 % (ref 15–55)
IRON SERPL-MCNC: 58 UG/DL (ref 50–180)
KETONES UR STRIP.AUTO-MCNC: NEGATIVE MG/DL
LEUKOCYTE ESTERASE UR QL STRIP.AUTO: ABNORMAL
LYMPHOCYTES # BLD AUTO: 1.64 K/UL (ref 1–4.8)
LYMPHOCYTES NFR BLD: 16.7 % (ref 22–41)
MCH RBC QN AUTO: 31.4 PG (ref 27–33)
MCHC RBC AUTO-ENTMCNC: 33.5 G/DL (ref 32.3–36.5)
MCV RBC AUTO: 93.7 FL (ref 81.4–97.8)
MICRO URNS: ABNORMAL
MONOCYTES # BLD AUTO: 0.79 K/UL (ref 0–0.85)
MONOCYTES NFR BLD AUTO: 8.1 % (ref 0–13.4)
NEUTROPHILS # BLD AUTO: 6.7 K/UL (ref 1.82–7.42)
NEUTROPHILS NFR BLD: 68.3 % (ref 44–72)
NITRITE UR QL STRIP.AUTO: NEGATIVE
NRBC # BLD AUTO: 0 K/UL
NRBC BLD-RTO: 0 /100 WBC (ref 0–0.2)
PH UR STRIP.AUTO: 6 [PH] (ref 5–8)
PHOSPHATE SERPL-MCNC: 3.8 MG/DL (ref 2.5–4.5)
PLATELET # BLD AUTO: 243 K/UL (ref 164–446)
PMV BLD AUTO: 9.7 FL (ref 9–12.9)
POTASSIUM SERPL-SCNC: 4.6 MMOL/L (ref 3.6–5.5)
PROT UR QL STRIP: 30 MG/DL
RBC # BLD AUTO: 3.95 M/UL (ref 4.7–6.1)
RBC # URNS HPF: ABNORMAL /HPF
RBC UR QL AUTO: ABNORMAL
SODIUM SERPL-SCNC: 140 MMOL/L (ref 135–145)
SP GR UR STRIP.AUTO: 1.01
TIBC SERPL-MCNC: 270 UG/DL (ref 250–450)
UIBC SERPL-MCNC: 212 UG/DL (ref 110–370)
UROBILINOGEN UR STRIP.AUTO-MCNC: 0.2 MG/DL
WBC # BLD AUTO: 9.8 K/UL (ref 4.8–10.8)
WBC #/AREA URNS HPF: ABNORMAL /HPF

## 2024-09-12 PROCEDURE — 87086 URINE CULTURE/COLONY COUNT: CPT

## 2024-09-12 PROCEDURE — 87077 CULTURE AEROBIC IDENTIFY: CPT

## 2024-09-12 PROCEDURE — 85025 COMPLETE CBC W/AUTO DIFF WBC: CPT

## 2024-09-12 PROCEDURE — 87186 SC STD MICRODIL/AGAR DIL: CPT

## 2024-09-12 PROCEDURE — 36415 COLL VENOUS BLD VENIPUNCTURE: CPT

## 2024-09-12 PROCEDURE — 83540 ASSAY OF IRON: CPT

## 2024-09-12 PROCEDURE — 84156 ASSAY OF PROTEIN URINE: CPT

## 2024-09-12 PROCEDURE — 83550 IRON BINDING TEST: CPT

## 2024-09-12 PROCEDURE — 80069 RENAL FUNCTION PANEL: CPT

## 2024-09-12 PROCEDURE — 82728 ASSAY OF FERRITIN: CPT

## 2024-09-12 PROCEDURE — 82570 ASSAY OF URINE CREATININE: CPT

## 2024-09-12 PROCEDURE — 81001 URINALYSIS AUTO W/SCOPE: CPT

## 2024-09-13 LAB
CREAT UR-MCNC: 79.51 MG/DL
PROT UR-MCNC: 32 MG/DL (ref 0–15)
PROT/CREAT UR: 402 MG/G (ref 15–68)

## 2024-09-16 ENCOUNTER — HOSPITAL ENCOUNTER (OUTPATIENT)
Facility: MEDICAL CENTER | Age: 84
End: 2024-09-16
Attending: UROLOGY
Payer: MEDICARE

## 2024-09-16 ENCOUNTER — PHARMACY VISIT (OUTPATIENT)
Dept: PHARMACY | Facility: MEDICAL CENTER | Age: 84
End: 2024-09-16
Payer: COMMERCIAL

## 2024-09-16 PROCEDURE — 87186 SC STD MICRODIL/AGAR DIL: CPT

## 2024-09-16 PROCEDURE — RXMED WILLOW AMBULATORY MEDICATION CHARGE: Performed by: UROLOGY

## 2024-09-16 PROCEDURE — 87086 URINE CULTURE/COLONY COUNT: CPT

## 2024-09-16 PROCEDURE — 87077 CULTURE AEROBIC IDENTIFY: CPT

## 2024-09-23 DIAGNOSIS — Z79.01 CHRONIC ANTICOAGULATION: ICD-10-CM

## 2024-09-30 PROCEDURE — RXMED WILLOW AMBULATORY MEDICATION CHARGE: Performed by: INTERNAL MEDICINE

## 2024-09-30 PROCEDURE — RXMED WILLOW AMBULATORY MEDICATION CHARGE: Performed by: STUDENT IN AN ORGANIZED HEALTH CARE EDUCATION/TRAINING PROGRAM

## 2024-10-01 ENCOUNTER — HOSPITAL ENCOUNTER (OUTPATIENT)
Dept: LAB | Facility: MEDICAL CENTER | Age: 84
End: 2024-10-01
Attending: INTERNAL MEDICINE
Payer: MEDICARE

## 2024-10-01 LAB
ALBUMIN SERPL BCP-MCNC: 4 G/DL (ref 3.2–4.9)
ALBUMIN/GLOB SERPL: 1.6 G/DL
ALP SERPL-CCNC: 68 U/L (ref 30–99)
ALT SERPL-CCNC: 10 U/L (ref 2–50)
ANION GAP SERPL CALC-SCNC: 14 MMOL/L (ref 7–16)
AST SERPL-CCNC: 14 U/L (ref 12–45)
BASOPHILS # BLD AUTO: 0.5 % (ref 0–1.8)
BASOPHILS # BLD: 0.04 K/UL (ref 0–0.12)
BILIRUB SERPL-MCNC: 0.6 MG/DL (ref 0.1–1.5)
BUN SERPL-MCNC: 24 MG/DL (ref 8–22)
CALCIUM ALBUM COR SERPL-MCNC: 9.3 MG/DL (ref 8.5–10.5)
CALCIUM SERPL-MCNC: 9.3 MG/DL (ref 8.5–10.5)
CHLORIDE SERPL-SCNC: 103 MMOL/L (ref 96–112)
CO2 SERPL-SCNC: 21 MMOL/L (ref 20–33)
CREAT SERPL-MCNC: 1.42 MG/DL (ref 0.5–1.4)
EOSINOPHIL # BLD AUTO: 0.68 K/UL (ref 0–0.51)
EOSINOPHIL NFR BLD: 7.8 % (ref 0–6.9)
ERYTHROCYTE [DISTWIDTH] IN BLOOD BY AUTOMATED COUNT: 46.6 FL (ref 35.9–50)
GFR SERPLBLD CREATININE-BSD FMLA CKD-EPI: 48 ML/MIN/1.73 M 2
GLOBULIN SER CALC-MCNC: 2.5 G/DL (ref 1.9–3.5)
GLUCOSE SERPL-MCNC: 159 MG/DL (ref 65–99)
HCT VFR BLD AUTO: 36.6 % (ref 42–52)
HGB BLD-MCNC: 12.5 G/DL (ref 14–18)
IMM GRANULOCYTES # BLD AUTO: 0.03 K/UL (ref 0–0.11)
IMM GRANULOCYTES NFR BLD AUTO: 0.3 % (ref 0–0.9)
LYMPHOCYTES # BLD AUTO: 1.41 K/UL (ref 1–4.8)
LYMPHOCYTES NFR BLD: 16.3 % (ref 22–41)
MCH RBC QN AUTO: 31.3 PG (ref 27–33)
MCHC RBC AUTO-ENTMCNC: 34.2 G/DL (ref 32.3–36.5)
MCV RBC AUTO: 91.7 FL (ref 81.4–97.8)
MONOCYTES # BLD AUTO: 0.6 K/UL (ref 0–0.85)
MONOCYTES NFR BLD AUTO: 6.9 % (ref 0–13.4)
NEUTROPHILS # BLD AUTO: 5.91 K/UL (ref 1.82–7.42)
NEUTROPHILS NFR BLD: 68.2 % (ref 44–72)
NRBC # BLD AUTO: 0 K/UL
NRBC BLD-RTO: 0 /100 WBC (ref 0–0.2)
PLATELET # BLD AUTO: 230 K/UL (ref 164–446)
PMV BLD AUTO: 9.4 FL (ref 9–12.9)
POTASSIUM SERPL-SCNC: 4.5 MMOL/L (ref 3.6–5.5)
PROT SERPL-MCNC: 6.5 G/DL (ref 6–8.2)
RBC # BLD AUTO: 3.99 M/UL (ref 4.7–6.1)
SODIUM SERPL-SCNC: 138 MMOL/L (ref 135–145)
WBC # BLD AUTO: 8.7 K/UL (ref 4.8–10.8)

## 2024-10-01 PROCEDURE — 84443 ASSAY THYROID STIM HORMONE: CPT | Mod: GA

## 2024-10-01 PROCEDURE — 80053 COMPREHEN METABOLIC PANEL: CPT

## 2024-10-01 PROCEDURE — 36415 COLL VENOUS BLD VENIPUNCTURE: CPT

## 2024-10-01 PROCEDURE — 85025 COMPLETE CBC W/AUTO DIFF WBC: CPT

## 2024-10-02 ENCOUNTER — PHARMACY VISIT (OUTPATIENT)
Dept: PHARMACY | Facility: MEDICAL CENTER | Age: 84
End: 2024-10-02
Payer: COMMERCIAL

## 2024-10-02 LAB — TSH SERPL-ACNC: 3.83 UIU/ML (ref 0.35–5.5)

## 2024-10-14 ENCOUNTER — PHARMACY VISIT (OUTPATIENT)
Dept: PHARMACY | Facility: MEDICAL CENTER | Age: 84
End: 2024-10-14
Payer: COMMERCIAL

## 2024-10-14 PROCEDURE — RXMED WILLOW AMBULATORY MEDICATION CHARGE: Performed by: FAMILY MEDICINE

## 2024-10-25 ENCOUNTER — HOSPITAL ENCOUNTER (OUTPATIENT)
Dept: RADIOLOGY | Facility: MEDICAL CENTER | Age: 84
End: 2024-10-25
Attending: INTERNAL MEDICINE
Payer: MEDICARE

## 2024-10-25 DIAGNOSIS — Z86.718 PERSONAL HISTORY OF OTHER VENOUS THROMBOSIS AND EMBOLISM: ICD-10-CM

## 2024-10-25 DIAGNOSIS — C66.2 MALIGNANT NEOPLASM OF LEFT URETER (HCC): ICD-10-CM

## 2024-10-25 DIAGNOSIS — Z51.12 ENCOUNTER FOR ANTINEOPLASTIC IMMUNOTHERAPY: ICD-10-CM

## 2024-10-25 DIAGNOSIS — Z79.01 LONG TERM (CURRENT) USE OF ANTICOAGULANTS: ICD-10-CM

## 2024-10-25 PROCEDURE — 71250 CT THORAX DX C-: CPT

## 2024-10-27 RX ORDER — TAMSULOSIN HYDROCHLORIDE 0.4 MG/1
CAPSULE ORAL
Qty: 90 CAPSULE | Refills: 2 | Status: CANCELLED | OUTPATIENT
Start: 2024-10-27

## 2024-10-28 ENCOUNTER — HOSPITAL ENCOUNTER (OUTPATIENT)
Dept: LAB | Facility: MEDICAL CENTER | Age: 84
End: 2024-10-28
Attending: INTERNAL MEDICINE
Payer: MEDICARE

## 2024-10-28 LAB
ALBUMIN SERPL BCP-MCNC: 4.2 G/DL (ref 3.2–4.9)
ALBUMIN/GLOB SERPL: 1.4 G/DL
ALP SERPL-CCNC: 76 U/L (ref 30–99)
ALT SERPL-CCNC: 11 U/L (ref 2–50)
ANION GAP SERPL CALC-SCNC: 12 MMOL/L (ref 7–16)
AST SERPL-CCNC: 16 U/L (ref 12–45)
BASOPHILS # BLD AUTO: 0.5 % (ref 0–1.8)
BASOPHILS # BLD: 0.05 K/UL (ref 0–0.12)
BILIRUB SERPL-MCNC: 0.6 MG/DL (ref 0.1–1.5)
BUN SERPL-MCNC: 31 MG/DL (ref 8–22)
CALCIUM ALBUM COR SERPL-MCNC: 9.3 MG/DL (ref 8.5–10.5)
CALCIUM SERPL-MCNC: 9.5 MG/DL (ref 8.5–10.5)
CHLORIDE SERPL-SCNC: 101 MMOL/L (ref 96–112)
CO2 SERPL-SCNC: 21 MMOL/L (ref 20–33)
CREAT SERPL-MCNC: 1.77 MG/DL (ref 0.5–1.4)
EOSINOPHIL # BLD AUTO: 0.92 K/UL (ref 0–0.51)
EOSINOPHIL NFR BLD: 8.6 % (ref 0–6.9)
ERYTHROCYTE [DISTWIDTH] IN BLOOD BY AUTOMATED COUNT: 47.6 FL (ref 35.9–50)
GFR SERPLBLD CREATININE-BSD FMLA CKD-EPI: 37 ML/MIN/1.73 M 2
GLOBULIN SER CALC-MCNC: 2.9 G/DL (ref 1.9–3.5)
GLUCOSE SERPL-MCNC: 153 MG/DL (ref 65–99)
HCT VFR BLD AUTO: 39.7 % (ref 42–52)
HGB BLD-MCNC: 13.2 G/DL (ref 14–18)
IMM GRANULOCYTES # BLD AUTO: 0.06 K/UL (ref 0–0.11)
IMM GRANULOCYTES NFR BLD AUTO: 0.6 % (ref 0–0.9)
LYMPHOCYTES # BLD AUTO: 1.55 K/UL (ref 1–4.8)
LYMPHOCYTES NFR BLD: 14.4 % (ref 22–41)
MCH RBC QN AUTO: 31.7 PG (ref 27–33)
MCHC RBC AUTO-ENTMCNC: 33.2 G/DL (ref 32.3–36.5)
MCV RBC AUTO: 95.2 FL (ref 81.4–97.8)
MONOCYTES # BLD AUTO: 0.8 K/UL (ref 0–0.85)
MONOCYTES NFR BLD AUTO: 7.4 % (ref 0–13.4)
NEUTROPHILS # BLD AUTO: 7.37 K/UL (ref 1.82–7.42)
NEUTROPHILS NFR BLD: 68.5 % (ref 44–72)
NRBC # BLD AUTO: 0 K/UL
NRBC BLD-RTO: 0 /100 WBC (ref 0–0.2)
PLATELET # BLD AUTO: 218 K/UL (ref 164–446)
PMV BLD AUTO: 9.8 FL (ref 9–12.9)
POTASSIUM SERPL-SCNC: 4.5 MMOL/L (ref 3.6–5.5)
PROT SERPL-MCNC: 7.1 G/DL (ref 6–8.2)
RBC # BLD AUTO: 4.17 M/UL (ref 4.7–6.1)
SODIUM SERPL-SCNC: 134 MMOL/L (ref 135–145)
TSH SERPL-ACNC: 4.34 UIU/ML (ref 0.35–5.5)
WBC # BLD AUTO: 10.8 K/UL (ref 4.8–10.8)

## 2024-10-28 PROCEDURE — 84443 ASSAY THYROID STIM HORMONE: CPT

## 2024-10-28 PROCEDURE — 85025 COMPLETE CBC W/AUTO DIFF WBC: CPT

## 2024-10-28 PROCEDURE — 36415 COLL VENOUS BLD VENIPUNCTURE: CPT

## 2024-10-28 PROCEDURE — 80053 COMPREHEN METABOLIC PANEL: CPT

## 2024-10-29 PROCEDURE — RXMED WILLOW AMBULATORY MEDICATION CHARGE: Performed by: FAMILY MEDICINE

## 2024-10-29 PROCEDURE — RXMED WILLOW AMBULATORY MEDICATION CHARGE: Performed by: INTERNAL MEDICINE

## 2024-10-29 RX ORDER — TAMSULOSIN HYDROCHLORIDE 0.4 MG/1
CAPSULE ORAL
Qty: 90 CAPSULE | Refills: 2 | Status: SHIPPED | OUTPATIENT
Start: 2024-10-29

## 2024-10-31 ENCOUNTER — PHARMACY VISIT (OUTPATIENT)
Dept: PHARMACY | Facility: MEDICAL CENTER | Age: 84
End: 2024-10-31
Payer: COMMERCIAL

## 2024-11-07 ENCOUNTER — PHARMACY VISIT (OUTPATIENT)
Dept: PHARMACY | Facility: MEDICAL CENTER | Age: 84
End: 2024-11-07
Payer: COMMERCIAL

## 2024-11-07 ENCOUNTER — OFFICE VISIT (OUTPATIENT)
Dept: URGENT CARE | Facility: CLINIC | Age: 84
End: 2024-11-07
Payer: MEDICARE

## 2024-11-07 VITALS
HEIGHT: 71 IN | SYSTOLIC BLOOD PRESSURE: 118 MMHG | HEART RATE: 64 BPM | DIASTOLIC BLOOD PRESSURE: 64 MMHG | WEIGHT: 187 LBS | BODY MASS INDEX: 26.18 KG/M2 | TEMPERATURE: 97 F | OXYGEN SATURATION: 98 % | RESPIRATION RATE: 12 BRPM

## 2024-11-07 DIAGNOSIS — H60.392 OTHER INFECTIVE ACUTE OTITIS EXTERNA OF LEFT EAR: Primary | ICD-10-CM

## 2024-11-07 PROCEDURE — 99213 OFFICE O/P EST LOW 20 MIN: CPT

## 2024-11-07 PROCEDURE — RXMED WILLOW AMBULATORY MEDICATION CHARGE

## 2024-11-07 PROCEDURE — 3078F DIAST BP <80 MM HG: CPT

## 2024-11-07 PROCEDURE — 3074F SYST BP LT 130 MM HG: CPT

## 2024-11-07 RX ORDER — OFLOXACIN 3 MG/ML
10 SOLUTION AURICULAR (OTIC) DAILY
Qty: 10 ML | Refills: 0 | Status: SHIPPED | OUTPATIENT
Start: 2024-11-07 | End: 2024-11-14

## 2024-11-07 ASSESSMENT — ENCOUNTER SYMPTOMS
EYE DISCHARGE: 0
SPUTUM PRODUCTION: 0
VOMITING: 0
MYALGIAS: 0
PALPITATIONS: 0
WHEEZING: 0
HEADACHES: 0
DIZZINESS: 0
FEVER: 0
ABDOMINAL PAIN: 0
COUGH: 0
SORE THROAT: 0
SHORTNESS OF BREATH: 0
STRIDOR: 0
NAUSEA: 0
DIARRHEA: 0
EYE PAIN: 0
EYE REDNESS: 0
CHILLS: 0

## 2024-11-07 ASSESSMENT — FIBROSIS 4 INDEX: FIB4 SCORE: 1.88

## 2024-11-07 NOTE — PROGRESS NOTES
Subjective:   Star Centeno is a 83 y.o. male who presents for Ear Drainage (LT ear drainage. He used a q tip this morning and saw a red tint at the end of it when cleaning it. )          I introduced myself to the patient and informed them that I am a Family Nurse Practitioner.    HPI:Star is an 83 year-old male with history of DM2, CKD, chronic diastolic heart failure, stented coronary artery, chronically anticoagulated on Xarelto who comes in today c/o L  ear pain, scant drainage, and  diminished hearing. Onset was 1 days ago.  Patient describes symptoms as constant. They describe the pain as sharp, throbbing, aching. Aggravating factors include touching the ear, lying on that side. Relieving factors include none. Treatments tried at home include none. They describe their symptoms as moderate.  They deny any other viral type symptoms.  They deny fever, chills, nausea or vomiting, lethargy, mastoid pain or swelling. States he did have several infections of the L ear in the remote past that led to hearing loss, had a prosthesis placed in his L ear by an ENT surgeon in [place]. in 2005 to improve his hearing, does not have any more details. He does not follow with ENT here, states he has not seen an ENT specialist in many years.     Review of Systems   Constitutional:  Negative for chills, fever and malaise/fatigue.   HENT:  Positive for ear discharge, ear pain and hearing loss. Negative for congestion and sore throat.    Eyes:  Negative for pain, discharge and redness.   Respiratory:  Negative for cough, sputum production, shortness of breath, wheezing and stridor.    Cardiovascular:  Negative for chest pain and palpitations.   Gastrointestinal:  Negative for abdominal pain, diarrhea, nausea and vomiting.   Genitourinary:  Negative for dysuria.   Musculoskeletal:  Negative for myalgias.   Skin:  Negative for rash.   Neurological:  Negative for dizziness and headaches.       Medications: acetaminophen  Tabs  ALPRAZolam Tabs  amoxicillin-clavulanate Tabs  bismuth subsalicylate Susp  COQ-10 PO  diclofenac sodium Gel  ezetimibe Tabs  ferrous sulfate  Imodium A-D Liqd  levothyroxine Tabs  metFORMIN ER Tb24  metoprolol SR Tb24  rivaroxaban Tabs  rosuvastatin Tabs  sodium bicarbonate Tabs  tamsulosin     Allergies: Atorvastatin    Problem List: does not have any pertinent problems on file.    Surgical History:  Past Surgical History:   Procedure Laterality Date    NV UNLISTED LAPAROSCOPY PROC, URETER  2/2/2024    Procedure: URETERECTOMY BLADDER CUFF EXCISION AND LEFT URETERAL REIMPLANTATION, ROBOT-ASSISTED;  Surgeon: Shane ALVAREZ M.D.;  Location: [facility];  Service: Uro Robotic    CYSTOSCOPY WITH URETERAL STENT INSERTION OR REMOVAL  2/2/2024    Procedure: CYSTOSCOPY WITH LEFT URETEROSCOPY AND LEFT STENT REMOVAL;  Surgeon: Shane ALVAREZ M.D.;  Location: [facility];  Service: Uro Robotic    LYMPHADECTOMY LAPAROSCOPIC  2/2/2024    Procedure: ROBOTIC PELVIC LYMPH NODE DISSECTION;  Surgeon: Shane ALVAREZ M.D.;  Location: [facility];  Service: Uro Robotic    NV CYSTOSCOPY,INSERT URETERAL STENT Left 01/04/2024    Procedure: CYSTOSCOPY, WITH LEFT URETEROSCOPY, WITH URETERAL BIOPSY WITH LEFT STENT REPLACEMENT;  Surgeon: Shane ALVAREZ M.D.;  Location: [facility];  Service: Urology    NV CYSTO/URETERO/PYELOSCOPY, DX  01/04/2024    Procedure: URETEROSCOPY;  Surgeon: Shane ALVAREZ M.D.;  Location: [facility];  Service: Urology    NV CYSTOURETHROSCOPY,BIOPSIES N/A 10/24/2023    Procedure: CYSTOSCOPY, WITH BLADDER BIOPSY, LEFT RETROGRADE PYELOGRAM;  Surgeon: Jourdan Londono M.D.;  Location: [facility];  Service: Urology    NV CYSTO/URETERO/PYELOSCOPY, DX Left 10/24/2023    Procedure: URETEROSCOPY, LEFT;  Surgeon: Jourdan Londono M.D.;  Location: [facility];  Service: Urology    NV CYSTOSCOPY,INSERT URETERAL STENT Left 10/24/2023     "Procedure: CYSTOSCOPY, WITH URETERAL STENT INSERTION;  Surgeon: Jourdan Londono M.D.;  Location: [facility];  Service: Urology    EYE SURGERY      OTHER CARDIAC SURGERY      6 stents    PROSTATECTOMY, RADICAL RETRO         Past Social Hx:   reports that he quit smoking about 23 years ago. His smoking use included cigarettes. He started smoking about 68 years ago. He has a 45 pack-year smoking history. He has never used smokeless tobacco. He reports current alcohol use of about 4.2 oz of alcohol per week. He reports that he does not use drugs.     Past Family Hx:   family history includes Clotting Disorder in his brother; Diabetes in his brother and sister; Genetic Disorder in his brother, sister, and sister; Heart Failure in his sister, sister, and sister; Hyperlipidemia in his brother; Hypertension in his brother; No Known Problems in his daughter.     Problem list, medications, and allergies reviewed by myself today in Epic.   I have documented what I find to be significant in regards to past medical, social, family and surgical history  in my HPI or under PMH/PSH/FH review section, otherwise it is noncontributory     Objective:     /64 (BP Location: Right arm, Patient Position: Sitting, BP Cuff Size: Adult)   Pulse 64   Temp 36.1 °C (97 °F) (Temporal)   Resp 12   Ht 1.803 m (5' 11\") Comment: Pt reported  Wt 84.8 kg (187 lb)   SpO2 98%   BMI 26.08 kg/m²     During this visit, appropriate PPE was worn, and hand hygiene was performed.    Physical Exam  Vitals reviewed.   Constitutional:       General: He is not in acute distress.     Appearance: Normal appearance. He is not ill-appearing or toxic-appearing.   HENT:      Head: Normocephalic and atraumatic.      Right Ear: Hearing, tympanic membrane, ear canal and external ear normal.      Left Ear: External ear normal. Decreased hearing noted. Drainage, swelling and tenderness present. There is no impacted cerumen. No mastoid tenderness. " No hemotympanum. Tympanic membrane is not injected, erythematous or bulging.      Ears:      Comments: External canal is erythematous and edematous, with some scant serosanguineous drainage, small scabbed area on the inferior canal.  There are also protrusions in the canal which may be polyps.  Unable to visualize the entire TM due to swelling of the canal, however the portion that I can see is intact and does not appear erythematous or infected.     Nose: No congestion or rhinorrhea.   Eyes:      General: No scleral icterus.        Right eye: No discharge.         Left eye: No discharge.      Extraocular Movements: Extraocular movements intact.      Conjunctiva/sclera: Conjunctivae normal.   Cardiovascular:      Rate and Rhythm: Normal rate.   Pulmonary:      Effort: Pulmonary effort is normal.   Abdominal:      General: There is no distension.      Palpations: Abdomen is soft.   Musculoskeletal:         General: Normal range of motion.      Cervical back: Normal range of motion. No rigidity.      Right lower leg: No edema.      Left lower leg: No edema.   Skin:     General: Skin is warm and dry.      Capillary Refill: Capillary refill takes 2 to 3 seconds.      Coloration: Skin is not jaundiced.   Neurological:      General: No focal deficit present.      Mental Status: He is alert and oriented to person, place, and time. Mental status is at baseline.      Gait: Gait normal.   Psychiatric:         Mood and Affect: Mood normal.         Behavior: Behavior normal.         Thought Content: Thought content normal.         Judgment: Judgment normal.         Assessment/Plan:     Diagnosis and associated orders:     1. Other infective acute otitis externa of left ear  ofloxacin otic sol (FLOXIN OTIC) 0.3 % Solution    Referral to ENT         Comments/MDM:     1. Other infective acute otitis externa of left ear (Primary)  I discussed with patient that their presentation and symptoms are consistent with acute otitis  externa.  Patient  possibly may have scratched the external canal causing infection.    I did instruct them in the following -    do not put any objects into the ear canal including Q-tips, try not to scratch the ear canal with fingernail, if the ear is itchy or irritated try gentle massage of the ear.    I instructed patient in the use of the eardrops, how to keep water out of her ear when they are in the shower, keeping the ears clean and dry, avoiding swimming or submerging in water until the antibiotic drops are completed   I did print out information regarding otitis externa and antibiotic eardrops for the patient and I did go over these instructions with them and answered their questions.    Discussed with patient I will make a referral to ENT for follow-up due to the fact that he does have some protrusions in the ear canal which may be polyps, history of some kind of implant, unsure what this is exactly.  I did give patient a copy of the referral, with the phone number to call, encouraged him to call for appointment ASAP.  Also encouraged him to follow-up with PCP.  Patient states he understands and is agreeable.  We discussed red flags and when to return to the urgent care versus when to go to the emergency room.  Patient states they understand all instructions and are agreeable to the plan of care.   - ofloxacin otic sol (FLOXIN OTIC) 0.3 % Solution; Administer 10 Drops into the left ear every day for 7 days.  Dispense: 10 mL; Refill: 0  - Referral to ENT         Pt is clinically stable at today's acute urgent care visit. Vital signs are normal and reassuring.  No acute distress noted. Appropriate for outpatient management at this time.        I personally reviewed prior external notes and test results pertinent to today's visit.  I have independently reviewed and interpreted all diagnostics ordered during this urgent care acute visit.        Please note that this dictation was created using voice  recognition software. I have made a reasonable attempt to correct obvious errors, but I expect that there are errors of grammar and possibly content that I did not discover before finalizing the note.    This note was electronically signed by THUAN Maynard, JUAN, MARTELL

## 2024-11-17 PROCEDURE — RXMED WILLOW AMBULATORY MEDICATION CHARGE: Performed by: FAMILY MEDICINE

## 2024-11-17 PROCEDURE — RXMED WILLOW AMBULATORY MEDICATION CHARGE: Performed by: INTERNAL MEDICINE

## 2024-11-19 ENCOUNTER — PHARMACY VISIT (OUTPATIENT)
Dept: PHARMACY | Facility: MEDICAL CENTER | Age: 84
End: 2024-11-19
Payer: COMMERCIAL

## 2024-11-24 DIAGNOSIS — I10 ESSENTIAL HYPERTENSION, BENIGN: ICD-10-CM

## 2024-11-25 ENCOUNTER — PHARMACY VISIT (OUTPATIENT)
Dept: PHARMACY | Facility: MEDICAL CENTER | Age: 84
End: 2024-11-25
Payer: COMMERCIAL

## 2024-11-25 PROCEDURE — RXMED WILLOW AMBULATORY MEDICATION CHARGE: Performed by: INTERNAL MEDICINE

## 2024-11-25 RX ORDER — METOPROLOL SUCCINATE 25 MG/1
25 TABLET, EXTENDED RELEASE ORAL DAILY
Qty: 90 TABLET | Refills: 3 | Status: SHIPPED | OUTPATIENT
Start: 2024-11-25

## 2024-11-25 RX ORDER — METOPROLOL SUCCINATE 25 MG/1
TABLET, EXTENDED RELEASE ORAL
Qty: 90 TABLET | Refills: 3 | OUTPATIENT
Start: 2024-11-25

## 2024-11-25 NOTE — TELEPHONE ENCOUNTER
Received request via: Patient    Was the patient seen in the last year in this department? Yes    Does the patient have an active prescription (recently filled or refills available) for medication(s) requested? No    Pharmacy Name: [facility]     Does the patient have half-way Plus and need 100-day supply? (This applies to ALL medications) Patient does not have SCP

## 2024-11-25 NOTE — TELEPHONE ENCOUNTER
Patient requested [facility] po martel please confirm if patient want it at [facility] before pending a new script to PCP. Thank you just sent to [facility] 11/25/2024

## 2024-11-26 ENCOUNTER — HOSPITAL ENCOUNTER (OUTPATIENT)
Dept: LAB | Facility: MEDICAL CENTER | Age: 84
End: 2024-11-26
Attending: INTERNAL MEDICINE
Payer: MEDICARE

## 2024-11-26 LAB
ALBUMIN SERPL BCP-MCNC: 4.3 G/DL (ref 3.2–4.9)
ALBUMIN/GLOB SERPL: 1.7 G/DL
ALP SERPL-CCNC: 75 U/L (ref 30–99)
ALT SERPL-CCNC: 11 U/L (ref 2–50)
ANION GAP SERPL CALC-SCNC: 12 MMOL/L (ref 7–16)
AST SERPL-CCNC: 14 U/L (ref 12–45)
BASOPHILS # BLD AUTO: 0.2 % (ref 0–1.8)
BASOPHILS # BLD: 0.03 K/UL (ref 0–0.12)
BILIRUB SERPL-MCNC: 0.5 MG/DL (ref 0.1–1.5)
BUN SERPL-MCNC: 28 MG/DL (ref 8–22)
CALCIUM ALBUM COR SERPL-MCNC: 8.7 MG/DL (ref 8.5–10.5)
CALCIUM SERPL-MCNC: 8.9 MG/DL (ref 8.5–10.5)
CHLORIDE SERPL-SCNC: 99 MMOL/L (ref 96–112)
CO2 SERPL-SCNC: 21 MMOL/L (ref 20–33)
CREAT SERPL-MCNC: 1.55 MG/DL (ref 0.5–1.4)
EOSINOPHIL # BLD AUTO: 0.74 K/UL (ref 0–0.51)
EOSINOPHIL NFR BLD: 6.1 % (ref 0–6.9)
ERYTHROCYTE [DISTWIDTH] IN BLOOD BY AUTOMATED COUNT: 46 FL (ref 35.9–50)
GFR SERPLBLD CREATININE-BSD FMLA CKD-EPI: 43 ML/MIN/1.73 M 2
GLOBULIN SER CALC-MCNC: 2.5 G/DL (ref 1.9–3.5)
GLUCOSE SERPL-MCNC: 116 MG/DL (ref 65–99)
HCT VFR BLD AUTO: 39.7 % (ref 42–52)
HGB BLD-MCNC: 13.3 G/DL (ref 14–18)
IMM GRANULOCYTES # BLD AUTO: 0.06 K/UL (ref 0–0.11)
IMM GRANULOCYTES NFR BLD AUTO: 0.5 % (ref 0–0.9)
LYMPHOCYTES # BLD AUTO: 2.27 K/UL (ref 1–4.8)
LYMPHOCYTES NFR BLD: 18.9 % (ref 22–41)
MCH RBC QN AUTO: 31.7 PG (ref 27–33)
MCHC RBC AUTO-ENTMCNC: 33.5 G/DL (ref 32.3–36.5)
MCV RBC AUTO: 94.5 FL (ref 81.4–97.8)
MONOCYTES # BLD AUTO: 0.97 K/UL (ref 0–0.85)
MONOCYTES NFR BLD AUTO: 8.1 % (ref 0–13.4)
NEUTROPHILS # BLD AUTO: 7.97 K/UL (ref 1.82–7.42)
NEUTROPHILS NFR BLD: 66.2 % (ref 44–72)
NRBC # BLD AUTO: 0 K/UL
NRBC BLD-RTO: 0 /100 WBC (ref 0–0.2)
PLATELET # BLD AUTO: 229 K/UL (ref 164–446)
PMV BLD AUTO: 9.8 FL (ref 9–12.9)
POTASSIUM SERPL-SCNC: 4.6 MMOL/L (ref 3.6–5.5)
PROT SERPL-MCNC: 6.8 G/DL (ref 6–8.2)
RBC # BLD AUTO: 4.2 M/UL (ref 4.7–6.1)
SODIUM SERPL-SCNC: 132 MMOL/L (ref 135–145)
TSH SERPL-ACNC: 5.47 UIU/ML (ref 0.35–5.5)
WBC # BLD AUTO: 12 K/UL (ref 4.8–10.8)

## 2024-11-26 PROCEDURE — 85025 COMPLETE CBC W/AUTO DIFF WBC: CPT

## 2024-11-26 PROCEDURE — 84443 ASSAY THYROID STIM HORMONE: CPT

## 2024-11-26 PROCEDURE — 80053 COMPREHEN METABOLIC PANEL: CPT

## 2024-11-26 PROCEDURE — 36415 COLL VENOUS BLD VENIPUNCTURE: CPT

## 2024-12-19 PROCEDURE — RXMED WILLOW AMBULATORY MEDICATION CHARGE: Performed by: FAMILY MEDICINE

## 2024-12-20 ENCOUNTER — PHARMACY VISIT (OUTPATIENT)
Dept: PHARMACY | Facility: MEDICAL CENTER | Age: 84
End: 2024-12-20
Payer: COMMERCIAL

## 2024-12-23 ENCOUNTER — HOSPITAL ENCOUNTER (OUTPATIENT)
Dept: LAB | Facility: MEDICAL CENTER | Age: 84
End: 2024-12-23
Attending: INTERNAL MEDICINE
Payer: MEDICARE

## 2024-12-23 LAB
25(OH)D3 SERPL-MCNC: 19 NG/ML (ref 30–100)
ALBUMIN SERPL BCP-MCNC: 3.9 G/DL (ref 3.2–4.9)
ALBUMIN SERPL BCP-MCNC: 3.9 G/DL (ref 3.2–4.9)
ALBUMIN/GLOB SERPL: 1.6 G/DL
ALP SERPL-CCNC: 65 U/L (ref 30–99)
ALT SERPL-CCNC: 10 U/L (ref 2–50)
ANION GAP SERPL CALC-SCNC: 10 MMOL/L (ref 7–16)
APPEARANCE UR: CLEAR
AST SERPL-CCNC: 16 U/L (ref 12–45)
BACTERIA #/AREA URNS HPF: ABNORMAL /HPF
BASOPHILS # BLD AUTO: 0.4 % (ref 0–1.8)
BASOPHILS # BLD AUTO: 0.4 % (ref 0–1.8)
BASOPHILS # BLD: 0.03 K/UL (ref 0–0.12)
BASOPHILS # BLD: 0.03 K/UL (ref 0–0.12)
BILIRUB SERPL-MCNC: 0.5 MG/DL (ref 0.1–1.5)
BILIRUB UR QL STRIP.AUTO: NEGATIVE
BUN SERPL-MCNC: 21 MG/DL (ref 8–22)
BUN SERPL-MCNC: 22 MG/DL (ref 8–22)
CALCIUM ALBUM COR SERPL-MCNC: 9.1 MG/DL (ref 8.5–10.5)
CALCIUM ALBUM COR SERPL-MCNC: 9.2 MG/DL (ref 8.5–10.5)
CALCIUM SERPL-MCNC: 9 MG/DL (ref 8.5–10.5)
CALCIUM SERPL-MCNC: 9.1 MG/DL (ref 8.5–10.5)
CASTS URNS QL MICRO: ABNORMAL /LPF (ref 0–2)
CHLORIDE SERPL-SCNC: 104 MMOL/L (ref 96–112)
CHLORIDE SERPL-SCNC: 105 MMOL/L (ref 96–112)
CO2 SERPL-SCNC: 20 MMOL/L (ref 20–33)
CO2 SERPL-SCNC: 21 MMOL/L (ref 20–33)
COLOR UR: YELLOW
CREAT SERPL-MCNC: 1.39 MG/DL (ref 0.5–1.4)
CREAT SERPL-MCNC: 1.4 MG/DL (ref 0.5–1.4)
CREAT UR-MCNC: 51.61 MG/DL
EOSINOPHIL # BLD AUTO: 0.66 K/UL (ref 0–0.51)
EOSINOPHIL # BLD AUTO: 0.7 K/UL (ref 0–0.51)
EOSINOPHIL NFR BLD: 8.4 % (ref 0–6.9)
EOSINOPHIL NFR BLD: 8.9 % (ref 0–6.9)
EPITHELIAL CELLS 1715: ABNORMAL /HPF (ref 0–5)
ERYTHROCYTE [DISTWIDTH] IN BLOOD BY AUTOMATED COUNT: 45.5 FL (ref 35.9–50)
ERYTHROCYTE [DISTWIDTH] IN BLOOD BY AUTOMATED COUNT: 45.7 FL (ref 35.9–50)
FASTING STATUS PATIENT QL REPORTED: NORMAL
FERRITIN SERPL-MCNC: 116 NG/ML (ref 22–322)
GFR SERPLBLD CREATININE-BSD FMLA CKD-EPI: 49 ML/MIN/1.73 M 2
GFR SERPLBLD CREATININE-BSD FMLA CKD-EPI: 49 ML/MIN/1.73 M 2
GLOBULIN SER CALC-MCNC: 2.5 G/DL (ref 1.9–3.5)
GLUCOSE SERPL-MCNC: 172 MG/DL (ref 65–99)
GLUCOSE SERPL-MCNC: 174 MG/DL (ref 65–99)
GLUCOSE UR STRIP.AUTO-MCNC: NEGATIVE MG/DL
HCT VFR BLD AUTO: 38.3 % (ref 42–52)
HCT VFR BLD AUTO: 38.7 % (ref 42–52)
HGB BLD-MCNC: 12.6 G/DL (ref 14–18)
HGB BLD-MCNC: 12.8 G/DL (ref 14–18)
IMM GRANULOCYTES # BLD AUTO: 0.03 K/UL (ref 0–0.11)
IMM GRANULOCYTES # BLD AUTO: 0.04 K/UL (ref 0–0.11)
IMM GRANULOCYTES NFR BLD AUTO: 0.4 % (ref 0–0.9)
IMM GRANULOCYTES NFR BLD AUTO: 0.5 % (ref 0–0.9)
IRON SATN MFR SERPL: 44 % (ref 15–55)
IRON SERPL-MCNC: 110 UG/DL (ref 50–180)
KETONES UR STRIP.AUTO-MCNC: NEGATIVE MG/DL
LEUKOCYTE ESTERASE UR QL STRIP.AUTO: ABNORMAL
LYMPHOCYTES # BLD AUTO: 1.54 K/UL (ref 1–4.8)
LYMPHOCYTES # BLD AUTO: 1.57 K/UL (ref 1–4.8)
LYMPHOCYTES NFR BLD: 19.5 % (ref 22–41)
LYMPHOCYTES NFR BLD: 20 % (ref 22–41)
MCH RBC QN AUTO: 30.7 PG (ref 27–33)
MCH RBC QN AUTO: 31.4 PG (ref 27–33)
MCHC RBC AUTO-ENTMCNC: 32.6 G/DL (ref 32.3–36.5)
MCHC RBC AUTO-ENTMCNC: 33.4 G/DL (ref 32.3–36.5)
MCV RBC AUTO: 94.1 FL (ref 81.4–97.8)
MCV RBC AUTO: 94.2 FL (ref 81.4–97.8)
MICRO URNS: ABNORMAL
MONOCYTES # BLD AUTO: 0.62 K/UL (ref 0–0.85)
MONOCYTES # BLD AUTO: 0.64 K/UL (ref 0–0.85)
MONOCYTES NFR BLD AUTO: 7.9 % (ref 0–13.4)
MONOCYTES NFR BLD AUTO: 8.1 % (ref 0–13.4)
NEUTROPHILS # BLD AUTO: 4.93 K/UL (ref 1.82–7.42)
NEUTROPHILS # BLD AUTO: 4.94 K/UL (ref 1.82–7.42)
NEUTROPHILS NFR BLD: 62.6 % (ref 44–72)
NEUTROPHILS NFR BLD: 62.9 % (ref 44–72)
NITRITE UR QL STRIP.AUTO: NEGATIVE
NRBC # BLD AUTO: 0 K/UL
NRBC # BLD AUTO: 0 K/UL
NRBC BLD-RTO: 0 /100 WBC (ref 0–0.2)
NRBC BLD-RTO: 0 /100 WBC (ref 0–0.2)
PH UR STRIP.AUTO: 7 [PH] (ref 5–8)
PHOSPHATE SERPL-MCNC: 3 MG/DL (ref 2.5–4.5)
PLATELET # BLD AUTO: 212 K/UL (ref 164–446)
PLATELET # BLD AUTO: 220 K/UL (ref 164–446)
PMV BLD AUTO: 9.5 FL (ref 9–12.9)
PMV BLD AUTO: 9.8 FL (ref 9–12.9)
POTASSIUM SERPL-SCNC: 4.8 MMOL/L (ref 3.6–5.5)
POTASSIUM SERPL-SCNC: 4.8 MMOL/L (ref 3.6–5.5)
PROT SERPL-MCNC: 6.4 G/DL (ref 6–8.2)
PROT UR QL STRIP: 30 MG/DL
PROT UR-MCNC: 28 MG/DL (ref 0–15)
PROT/CREAT UR: 543 MG/G (ref 15–68)
PTH-INTACT SERPL-MCNC: 89.1 PG/ML (ref 14–72)
RBC # BLD AUTO: 4.07 M/UL (ref 4.7–6.1)
RBC # BLD AUTO: 4.11 M/UL (ref 4.7–6.1)
RBC # URNS HPF: ABNORMAL /HPF (ref 0–2)
RBC UR QL AUTO: ABNORMAL
SODIUM SERPL-SCNC: 135 MMOL/L (ref 135–145)
SODIUM SERPL-SCNC: 136 MMOL/L (ref 135–145)
SP GR UR STRIP.AUTO: 1.01
TIBC SERPL-MCNC: 248 UG/DL (ref 250–450)
TSH SERPL-ACNC: 5.89 UIU/ML (ref 0.35–5.5)
UIBC SERPL-MCNC: 138 UG/DL (ref 110–370)
UROBILINOGEN UR STRIP.AUTO-MCNC: 0.2 EU/DL
WBC # BLD AUTO: 7.8 K/UL (ref 4.8–10.8)
WBC # BLD AUTO: 7.9 K/UL (ref 4.8–10.8)
WBC #/AREA URNS HPF: ABNORMAL /HPF

## 2024-12-23 PROCEDURE — 82306 VITAMIN D 25 HYDROXY: CPT

## 2024-12-23 PROCEDURE — 80069 RENAL FUNCTION PANEL: CPT

## 2024-12-23 PROCEDURE — 83540 ASSAY OF IRON: CPT

## 2024-12-23 PROCEDURE — 84443 ASSAY THYROID STIM HORMONE: CPT

## 2024-12-23 PROCEDURE — 80053 COMPREHEN METABOLIC PANEL: CPT

## 2024-12-23 PROCEDURE — 83970 ASSAY OF PARATHORMONE: CPT

## 2024-12-23 PROCEDURE — 84156 ASSAY OF PROTEIN URINE: CPT

## 2024-12-23 PROCEDURE — 81001 URINALYSIS AUTO W/SCOPE: CPT

## 2024-12-23 PROCEDURE — 36415 COLL VENOUS BLD VENIPUNCTURE: CPT

## 2024-12-23 PROCEDURE — 85025 COMPLETE CBC W/AUTO DIFF WBC: CPT | Mod: 91

## 2024-12-23 PROCEDURE — 82728 ASSAY OF FERRITIN: CPT

## 2024-12-23 PROCEDURE — 82570 ASSAY OF URINE CREATININE: CPT

## 2024-12-23 PROCEDURE — 83550 IRON BINDING TEST: CPT

## 2024-12-23 PROCEDURE — 85025 COMPLETE CBC W/AUTO DIFF WBC: CPT

## 2024-12-30 ENCOUNTER — HOSPITAL ENCOUNTER (OUTPATIENT)
Facility: MEDICAL CENTER | Age: 84
End: 2024-12-30
Attending: STUDENT IN AN ORGANIZED HEALTH CARE EDUCATION/TRAINING PROGRAM
Payer: MEDICARE

## 2024-12-30 PROCEDURE — 87086 URINE CULTURE/COLONY COUNT: CPT

## 2024-12-31 ENCOUNTER — HOSPITAL ENCOUNTER (OUTPATIENT)
Facility: MEDICAL CENTER | Age: 84
End: 2024-12-31
Payer: MEDICARE

## 2024-12-31 LAB — T3FREE SERPL-MCNC: 2.66 PG/ML (ref 2–4.4)

## 2024-12-31 PROCEDURE — 84481 FREE ASSAY (FT-3): CPT

## 2025-01-02 LAB
BACTERIA UR CULT: NORMAL
SIGNIFICANT IND 70042: NORMAL
SITE SITE: NORMAL
SOURCE SOURCE: NORMAL

## 2025-01-06 ENCOUNTER — PHARMACY VISIT (OUTPATIENT)
Dept: PHARMACY | Facility: MEDICAL CENTER | Age: 85
End: 2025-01-06
Payer: COMMERCIAL

## 2025-01-06 ENCOUNTER — HOSPITAL ENCOUNTER (OUTPATIENT)
Facility: MEDICAL CENTER | Age: 85
End: 2025-01-06
Attending: UROLOGY
Payer: MEDICARE

## 2025-01-06 PROCEDURE — RXMED WILLOW AMBULATORY MEDICATION CHARGE: Performed by: INTERNAL MEDICINE

## 2025-01-06 PROCEDURE — 87086 URINE CULTURE/COLONY COUNT: CPT

## 2025-01-07 ENCOUNTER — PHARMACY VISIT (OUTPATIENT)
Dept: PHARMACY | Facility: MEDICAL CENTER | Age: 85
End: 2025-01-07
Payer: COMMERCIAL

## 2025-01-07 PROCEDURE — RXMED WILLOW AMBULATORY MEDICATION CHARGE: Performed by: UROLOGY

## 2025-01-09 LAB
BACTERIA UR CULT: NORMAL
SIGNIFICANT IND 70042: NORMAL
SITE SITE: NORMAL
SOURCE SOURCE: NORMAL

## 2025-01-19 PROCEDURE — RXMED WILLOW AMBULATORY MEDICATION CHARGE: Performed by: FAMILY MEDICINE

## 2025-01-20 PROCEDURE — RXMED WILLOW AMBULATORY MEDICATION CHARGE: Performed by: FAMILY MEDICINE

## 2025-01-21 ENCOUNTER — APPOINTMENT (OUTPATIENT)
Dept: RADIOLOGY | Facility: MEDICAL CENTER | Age: 85
End: 2025-01-21
Attending: EMERGENCY MEDICINE
Payer: MEDICARE

## 2025-01-21 ENCOUNTER — HOSPITAL ENCOUNTER (EMERGENCY)
Facility: MEDICAL CENTER | Age: 85
End: 2025-01-21
Attending: EMERGENCY MEDICINE
Payer: MEDICARE

## 2025-01-21 ENCOUNTER — PHARMACY VISIT (OUTPATIENT)
Dept: PHARMACY | Facility: MEDICAL CENTER | Age: 85
End: 2025-01-21
Payer: COMMERCIAL

## 2025-01-21 VITALS
WEIGHT: 191.58 LBS | HEART RATE: 52 BPM | OXYGEN SATURATION: 93 % | BODY MASS INDEX: 26.82 KG/M2 | RESPIRATION RATE: 16 BRPM | HEIGHT: 71 IN | SYSTOLIC BLOOD PRESSURE: 159 MMHG | TEMPERATURE: 97.8 F | DIASTOLIC BLOOD PRESSURE: 74 MMHG

## 2025-01-21 DIAGNOSIS — M71.21 SYNOVIAL CYST OF RIGHT POPLITEAL SPACE: ICD-10-CM

## 2025-01-21 DIAGNOSIS — M79.661 RIGHT CALF PAIN: ICD-10-CM

## 2025-01-21 LAB
ANION GAP SERPL CALC-SCNC: 12 MMOL/L (ref 7–16)
BASOPHILS # BLD AUTO: 0.4 % (ref 0–1.8)
BASOPHILS # BLD: 0.03 K/UL (ref 0–0.12)
BUN SERPL-MCNC: 20 MG/DL (ref 8–22)
CA-I SERPL-SCNC: 1.16 MMOL/L (ref 1.1–1.3)
CALCIUM SERPL-MCNC: 8.9 MG/DL (ref 8.4–10.2)
CHLORIDE SERPL-SCNC: 101 MMOL/L (ref 96–112)
CO2 SERPL-SCNC: 20 MMOL/L (ref 20–33)
CREAT SERPL-MCNC: 1.38 MG/DL (ref 0.5–1.4)
EOSINOPHIL # BLD AUTO: 0.85 K/UL (ref 0–0.51)
EOSINOPHIL NFR BLD: 10.8 % (ref 0–6.9)
ERYTHROCYTE [DISTWIDTH] IN BLOOD BY AUTOMATED COUNT: 43.1 FL (ref 35.9–50)
GFR SERPLBLD CREATININE-BSD FMLA CKD-EPI: 50 ML/MIN/1.73 M 2
GLUCOSE SERPL-MCNC: 156 MG/DL (ref 65–99)
HCT VFR BLD AUTO: 39.4 % (ref 42–52)
HGB BLD-MCNC: 13.6 G/DL (ref 14–18)
IMM GRANULOCYTES # BLD AUTO: 0.02 K/UL (ref 0–0.11)
IMM GRANULOCYTES NFR BLD AUTO: 0.3 % (ref 0–0.9)
LYMPHOCYTES # BLD AUTO: 1.3 K/UL (ref 1–4.8)
LYMPHOCYTES NFR BLD: 16.5 % (ref 22–41)
MAGNESIUM SERPL-MCNC: 2.1 MG/DL (ref 1.5–2.5)
MCH RBC QN AUTO: 31.9 PG (ref 27–33)
MCHC RBC AUTO-ENTMCNC: 34.5 G/DL (ref 32.3–36.5)
MCV RBC AUTO: 92.3 FL (ref 81.4–97.8)
MONOCYTES # BLD AUTO: 0.51 K/UL (ref 0–0.85)
MONOCYTES NFR BLD AUTO: 6.5 % (ref 0–13.4)
NEUTROPHILS # BLD AUTO: 5.16 K/UL (ref 1.82–7.42)
NEUTROPHILS NFR BLD: 65.5 % (ref 44–72)
NRBC # BLD AUTO: 0 K/UL
NRBC BLD-RTO: 0 /100 WBC (ref 0–0.2)
PLATELET # BLD AUTO: 192 K/UL (ref 164–446)
PMV BLD AUTO: 9 FL (ref 9–12.9)
POTASSIUM SERPL-SCNC: 4.4 MMOL/L (ref 3.6–5.5)
RBC # BLD AUTO: 4.27 M/UL (ref 4.7–6.1)
SODIUM SERPL-SCNC: 133 MMOL/L (ref 135–145)
WBC # BLD AUTO: 7.9 K/UL (ref 4.8–10.8)

## 2025-01-21 PROCEDURE — 99284 EMERGENCY DEPT VISIT MOD MDM: CPT

## 2025-01-21 PROCEDURE — RXMED WILLOW AMBULATORY MEDICATION CHARGE: Performed by: INTERNAL MEDICINE

## 2025-01-21 PROCEDURE — 80048 BASIC METABOLIC PNL TOTAL CA: CPT

## 2025-01-21 PROCEDURE — 36415 COLL VENOUS BLD VENIPUNCTURE: CPT

## 2025-01-21 PROCEDURE — 93971 EXTREMITY STUDY: CPT | Mod: RT

## 2025-01-21 PROCEDURE — 83735 ASSAY OF MAGNESIUM: CPT

## 2025-01-21 PROCEDURE — 85025 COMPLETE CBC W/AUTO DIFF WBC: CPT

## 2025-01-21 PROCEDURE — 82330 ASSAY OF CALCIUM: CPT

## 2025-01-21 RX ORDER — DIPHENHYDRAMINE HCL 25 MG
50 TABLET ORAL EVERY 6 HOURS PRN
COMMUNITY

## 2025-01-21 RX ORDER — MULTIVIT-MIN/IRON/FOLIC ACID/K 18-600-40
1000 CAPSULE ORAL DAILY
Qty: 90 TABLET | Refills: 3 | OUTPATIENT
Start: 2025-01-21

## 2025-01-21 ASSESSMENT — FIBROSIS 4 INDEX: FIB4 SCORE: 2.05

## 2025-01-21 NOTE — ED PROVIDER NOTES
ED Provider Note    CHIEF COMPLAINT  Chief Complaint   Patient presents with    Knee Pain     Behind right knee. Pain, denies swelling, trauma or injury. Pt states he is at risk for blood clots and is concerned for this today. Pt on blood thinner.        EXTERNAL RECORDS REVIEWED  Outpatient Notes outpatient neurology note 12/30/2024 reviewed    HPI/ROS  LIMITATION TO HISTORY   Select: : None  OUTSIDE HISTORIAN(S):  Significant other daughter at bedside    Star Centeno is a 84 y.o. male who presents to the [facility] apartment with right calf pain.  Past medical history as document below.  Patient followed by Dr. Jaramillo with oncology for bladder cancer.  Nearly finished with a year-long immunotherapy.  Does have a history of DVT and is on Xarelto.  No prior history of breakthrough clots.  Yesterday morning he noticed some slight discomfort to his calf.  He did his routine workout yesterday trying to keep his calf in mind.  Did not feel that the pain was muscular in nature.  Today however due to persistence of symptoms he became increasingly concerned about possibility of DVT and therefore here for imaging.    PAST MEDICAL HISTORY   has a past medical history of Acute cough (02/03/2024), Acute cystitis (10/21/2023), Acute kidney injury superimposed on chronic kidney disease (HCC) (10/21/2023), Cancer ([facility]), Cataract, Complicated urinary tract infection (03/03/2024), Diabetes ([facility]), High cholesterol, History of heart artery stent, Hyperkalemia (02/02/2024), Hyperlipidemia, Hypertension, Hyponatremia (11/18/2023), Hypotension (11/18/2023), Iron deficiency anemia (06/28/2024), Metabolic acidosis (02/02/2024), Myocardial infarct ([facility]), Pulmonary emboli ([facility]), Renal disorder, Sepsis ([facility]) (11/18/2023), Stented coronary artery RCA and LCx 2002 (08/30/2024), and Urinary bladder disorder.    SURGICAL HISTORY   has a past surgical history that includes eye surgery; prostatectomy, radical retro; cystourethroscopy,biopsies  (N/A, 10/24/2023); cysto/uretero/pyeloscopy, dx (Left, 10/24/2023); cystoscopy,insert ureteral stent (Left, 10/24/2023); cystoscopy,insert ureteral stent (Left, 2024); cysto/uretero/pyeloscopy, dx (2024); other cardiac surgery; unlisted laparoscopy proc, ureter (2024); cystoscopy with ureteral stent insertion or removal (2024); and lymphadectomy laparoscopic (2024).    FAMILY HISTORY  Family History   Problem Relation Age of Onset    Genetic Disorder Sister     Diabetes Sister     Heart Failure Sister     Genetic Disorder Sister     Heart Failure Sister     Heart Failure Sister     Genetic Disorder Brother     Diabetes Brother     Hypertension Brother     Hyperlipidemia Brother     Clotting Disorder Brother         elevated FVIII, prothrombin gene mutation    No Known Problems Daughter        SOCIAL HISTORY  Social History     Tobacco Use    Smoking status: Former     Current packs/day: 0.00     Average packs/day: 1 pack/day for 45.0 years (45.0 ttl pk-yrs)     Types: Cigarettes     Start date: 1956     Quit date: 2001     Years since quittin.0    Smokeless tobacco: Never   Vaping Use    Vaping status: Never Used   Substance and Sexual Activity    Alcohol use: Yes     Alcohol/week: 4.2 oz     Types: 7 Glasses of wine per week     Comment: 1 glass of wine/nightly    Drug use: Never    Sexual activity: Not Currently     Partners: Female     Birth control/protection: Condom       CURRENT MEDICATIONS  Home Medications       Reviewed by oRsa Medina (Pharmacy Tech) on 25 at 0928  Med List Status: Complete     Medication Last Dose Status   ALPRAZolam (XANAX) 0.25 MG Tab 2024 Active   amoxicillin-clavulanate (AUGMENTIN) 875-125 MG Tab 2025 Active   Coenzyme Q10 (COQ-10 PO) 2025 Active   diphenhydrAMINE (BENADRYL) 25 MG Tab 2025 Active   ezetimibe (ZETIA) 10 MG Tab 2025 Active   ferrous sulfate 325 (65 Fe) MG tablet 2025 Active  "  levothyroxine (SYNTHROID) 25 MCG Tab 1/20/2025 Active   Loperamide HCl (IMODIUM A-D PO) 1/19/2025 Active   metFORMIN ER (GLUCOPHAGE XR) 500 MG TABLET SR 24 HR 1/20/2025 Active   metoprolol SR (TOPROL XL) 25 MG TABLET SR 24 HR 1/20/2025 Active   rivaroxaban (XARELTO) 20 MG Tab tablet 1/20/2025 Active   rosuvastatin (CRESTOR) 5 MG Tab 1/20/2025 Active   sodium bicarbonate (SODIUM BICARBONATE) 650 MG Tab 1/20/2025 Active   tamsulosin (FLOMAX) 0.4 MG capsule 1/20/2025 Active                  Audit from Redirected Encounters    **Home medications have not yet been reviewed for this encounter**         ALLERGIES  Allergies   Allergen Reactions    Atorvastatin      Pt reports that he does not have an allergie to this medication        PHYSICAL EXAM  VITAL SIGNS: BP (!) 159/74   Pulse (!) 52   Temp 36.6 °C (97.8 °F) (Temporal)   Resp 16   Ht 1.803 m (5' 11\")   Wt 86.9 kg (191 lb 9.3 oz)   SpO2 93%   BMI 26.72 kg/m²          Pulse ox interpretation: I interpret this pulse ox as normal.  Constitutional: Alert in no apparent distress.  HENT: No signs of trauma, Bilateral external ears normal, Nose normal.   Cardiovascular: Regular rate and rhythm, no murmurs.   Thorax & Lungs: Normal breath sounds, No respiratory distress  Skin: Warm, Dry, No erythema, No rash.   Extremities: Right lower extremity: Symmetric to the left.  No significant edema.  No calf tenderness.  No palpable cords.  Medial thigh also nontender.  Musculoskeletal: Good range of motion in all major joints. No tenderness to palpation or major deformities noted.   Neurologic: Alert , Normal motor function, Normal sensory function, No focal deficits noted.   Psychiatric: Affect normal, Judgment normal, Mood normal.         EKG/LABS  Results for orders placed or performed during the hospital encounter of 01/21/25   CBC WITH DIFFERENTIAL    Collection Time: 01/21/25  8:37 AM   Result Value Ref Range    WBC 7.9 4.8 - 10.8 K/uL    RBC 4.27 (L) 4.70 - 6.10 " M/uL    Hemoglobin 13.6 (L) 14.0 - 18.0 g/dL    Hematocrit 39.4 (L) 42.0 - 52.0 %    MCV 92.3 81.4 - 97.8 fL    MCH 31.9 27.0 - 33.0 pg    MCHC 34.5 32.3 - 36.5 g/dL    RDW 43.1 35.9 - 50.0 fL    Platelet Count 192 164 - 446 K/uL    MPV 9.0 9.0 - 12.9 fL    Neutrophils-Polys 65.50 44.00 - 72.00 %    Lymphocytes 16.50 (L) 22.00 - 41.00 %    Monocytes 6.50 0.00 - 13.40 %    Eosinophils 10.80 (H) 0.00 - 6.90 %    Basophils 0.40 0.00 - 1.80 %    Immature Granulocytes 0.30 0.00 - 0.90 %    Nucleated RBC 0.00 0.00 - 0.20 /100 WBC    Neutrophils (Absolute) 5.16 1.82 - 7.42 K/uL    Lymphs (Absolute) 1.30 1.00 - 4.80 K/uL    Monos (Absolute) 0.51 0.00 - 0.85 K/uL    Eos (Absolute) 0.85 (H) 0.00 - 0.51 K/uL    Baso (Absolute) 0.03 0.00 - 0.12 K/uL    Immature Granulocytes (abs) 0.02 0.00 - 0.11 K/uL    NRBC (Absolute) 0.00 K/uL   BASIC METABOLIC PANEL    Collection Time: 01/21/25  8:37 AM   Result Value Ref Range    Sodium 133 (L) 135 - 145 mmol/L    Potassium 4.4 3.6 - 5.5 mmol/L    Chloride 101 96 - 112 mmol/L    Co2 20 20 - 33 mmol/L    Glucose 156 (H) 65 - 99 mg/dL    Bun 20 8 - 22 mg/dL    Creatinine 1.38 0.50 - 1.40 mg/dL    Calcium 8.9 8.4 - 10.2 mg/dL    Anion Gap 12.0 7.0 - 16.0   MAGNESIUM    Collection Time: 01/21/25  8:37 AM   Result Value Ref Range    Magnesium 2.1 1.5 - 2.5 mg/dL   IONIZED CALCIUM    Collection Time: 01/21/25  8:37 AM   Result Value Ref Range    Ionized Calcium 1.16 1.10 - 1.30 mmol/L   ESTIMATED GFR    Collection Time: 01/21/25  8:37 AM   Result Value Ref Range    GFR (CKD-EPI) 50 (A) >60 mL/min/1.73 m 2           RADIOLOGY/PROCEDURES   I have independently interpreted the diagnostic imaging associated with this visit and am waiting the final reading from the radiologist.   My preliminary interpretation is as follows: no findings of dvt    Radiologist interpretation:  US-EXTREMITY VENOUS LOWER UNILAT RIGHT   Final Result          COURSE & MEDICAL DECISION MAKING    ASSESSMENT, COURSE AND  PLAN  Care Narrative: 84-year-old male presenting the [facility] apartment with right calf pain.  History of prior DVT.  Compliant with anticoagulation.  Mild discomfort starting yesterday which to his belief was otherwise on triggered.  Will get ultrasound imaging as well as labs given his past medical history to include oncologic therapies    DISPOSITION AND DISCUSSIONS  I have discussed management of the patient with the following physicians and CON's: None    Discussion of management with other QHP or appropriate source(s): None     Escalation of care considered, and ultimately not performed:acute inpatient care management, however at this time, the patient is most appropriate for outpatient management    Barriers to care at this time, including but not limited to:  None .     84-year-old presenting with calf pain.  Workup is reassuring.  Exam remains benign.  Patient resting comfortably.  Kumar's cyst incidentally noted.  Not ruptured.  Will discharge with outpatient follow-up by PCP.  Will return her to the ER with any change or worsening.  Will have him continue his regular medication regimen and normal daily routines.      FINAL DIAGNOSIS  1. Right calf pain    2. Synovial cyst of right popliteal space         Electronically signed by: Dean Coombs M.D., 1/21/2025 8:27 AM

## 2025-01-21 NOTE — ED NOTES
Patient endorses pain behind right knee and calf that started yesterday. Pt prone to blood clots and taking blood thinners.

## 2025-01-21 NOTE — ED NOTES
Medication history reviewed with pt. Med rec is complete.  Allergies reviewed, per pt  Interviewed pt with daughter at bedside with permission from pt.    Pt had a list of medications at bedside, went over list and returned pts list of medications back to daughter at bedside.  Pt reports that he has not taken TYLENOL, OXYCODONE IR 5MG, PEPTO BISMOL, or used VOLTAREN GEL 1% in the last 30 days or longer.    Pt reports that he takes his LEVOTHYROXINE 25MCG in the evening.    Pt reports that he receives immunotherapy treatment at [facility] ([phone]) very month , last treatment was the first of January.  Next treatment is on 1/28/2025.     Pt started AUGMENTIN 875-125MG on 1/7/2025 for 5 day course.  Per pt reports that he did finish course of antibiotic     Pt takes XARELTO 20MG, last dose was taken on 1/20/2025 at 2100

## 2025-01-21 NOTE — ED NOTES
Dc instructions and medications discussed with patient at bedside. All questions answered at this time. VSS. No IV in place at this time. Pt to lobby without incident. daughter to drive patient home.

## 2025-01-21 NOTE — ED TRIAGE NOTES
"BIB family for following complaints.   Chief Complaint   Patient presents with    Knee Pain     Behind right knee. Pain, denies swelling, trauma or injury. Pt states he is at risk for blood clots and is concerned for this today. Pt on blood thinner.      BP (!) 151/73   Pulse 70   Temp 36.6 °C (97.8 °F) (Temporal)   Resp 14   Ht 1.803 m (5' 11\")   Wt 86.9 kg (191 lb 9.3 oz)   SpO2 93%   BMI 26.72 kg/m²     "

## 2025-01-27 ENCOUNTER — OFFICE VISIT (OUTPATIENT)
Dept: MEDICAL GROUP | Facility: LAB | Age: 85
End: 2025-01-27
Payer: MEDICARE

## 2025-01-27 VITALS
BODY MASS INDEX: 25.9 KG/M2 | SYSTOLIC BLOOD PRESSURE: 104 MMHG | DIASTOLIC BLOOD PRESSURE: 68 MMHG | HEIGHT: 71 IN | HEART RATE: 96 BPM | WEIGHT: 185 LBS | TEMPERATURE: 97.9 F | RESPIRATION RATE: 16 BRPM | OXYGEN SATURATION: 93 %

## 2025-01-27 DIAGNOSIS — E11.69 TYPE 2 DIABETES MELLITUS WITH OTHER SPECIFIED COMPLICATION, WITHOUT LONG-TERM CURRENT USE OF INSULIN (HCC): ICD-10-CM

## 2025-01-27 DIAGNOSIS — M25.531 RIGHT WRIST PAIN: ICD-10-CM

## 2025-01-27 DIAGNOSIS — G89.29 CHRONIC PAIN OF RIGHT KNEE: ICD-10-CM

## 2025-01-27 DIAGNOSIS — M25.561 CHRONIC PAIN OF RIGHT KNEE: ICD-10-CM

## 2025-01-27 LAB
HBA1C MFR BLD: 7.5 % (ref ?–5.8)
POCT INT CON NEG: NEGATIVE
POCT INT CON POS: POSITIVE

## 2025-01-27 PROCEDURE — RXMED WILLOW AMBULATORY MEDICATION CHARGE: Performed by: INTERNAL MEDICINE

## 2025-01-27 PROCEDURE — 3074F SYST BP LT 130 MM HG: CPT | Performed by: FAMILY MEDICINE

## 2025-01-27 PROCEDURE — 83036 HEMOGLOBIN GLYCOSYLATED A1C: CPT | Performed by: FAMILY MEDICINE

## 2025-01-27 PROCEDURE — 99214 OFFICE O/P EST MOD 30 MIN: CPT | Performed by: FAMILY MEDICINE

## 2025-01-27 PROCEDURE — 3078F DIAST BP <80 MM HG: CPT | Performed by: FAMILY MEDICINE

## 2025-01-27 RX ORDER — METFORMIN HYDROCHLORIDE 500 MG/1
500 TABLET, EXTENDED RELEASE ORAL 2 TIMES DAILY
Qty: 180 TABLET | Refills: 3
Start: 2025-01-27

## 2025-01-27 ASSESSMENT — FIBROSIS 4 INDEX: FIB4 SCORE: 2.27

## 2025-01-27 ASSESSMENT — PATIENT HEALTH QUESTIONNAIRE - PHQ9: CLINICAL INTERPRETATION OF PHQ2 SCORE: 0

## 2025-01-27 NOTE — PROGRESS NOTES
Verbal consent was acquired by the patient to use [employer] ambient listening note generation during this visit Yes    Subjective:   Star Centeno is a 84 y.o. male here today for   No chief complaint on file.    History of Present Illness  The patient is an 84-year-old male who presents today to follow up on diabetes. He has a history of type 2 diabetes, currently managed with metformin 500 mg twice daily. The patient is also here to discuss right leg pain, which he believes is a Baker's cyst.    He reports experiencing intermittent pain in the posterior aspect of his right leg, the same leg previously affected by clots. He sought emergency care due to this pain, where he was diagnosed with a Baker's cyst and advised to schedule an appointment with us. He expresses concern about potential instability in his leg during ambulation, although he does not perceive any current instability. Prolonged sitting with the knee in a flexed position does not exacerbate the pain. He spends a lot of time walking with his head down to avoid tripping, as he has had a couple of falls in the past. He suspects fluid retention in his right leg, noting that it appears slightly larger than the left. Despite regular gym attendance, he does not believe the issue is muscular in nature. Topical application of Voltaren has not provided relief. An ultrasound of the knee was performed, but no x-ray.    He continues to take metformin twice daily for his diabetes management. He maintains a regular exercise routine, attending the gym three times weekly, and plans to resume swimming activities. He reports no numbness, tingling, or pain in his feet.    He experiences recurrent wrist pain at night, which he manages with a brace. The pain does not radiate. He has not been using Tylenol for pain management. He also reports generalized itching, for which he takes Benadryl.    Supplemental Information  He has received good news from his urologist  regarding his bladder cancer, as the last analysis showed he is free of cancer. He has two more sessions of immunotherapy remaining.    MEDICATIONS  metformin, Voltaren, Benadryl      No Known Allergies      Current medicines (including changes today)  Current Outpatient Medications   Medication Sig Dispense Refill    diphenhydrAMINE (BENADRYL) 25 MG Tab Take 50 mg by mouth every 6 hours as needed for Itching.      vitamin D (D3-1000) 1000 UNIT Tab Take 1 Tablet by mouth every day. 90 Tablet 3    metoprolol SR (TOPROL XL) 25 MG TABLET SR 24 HR Take 1 Tablet by mouth every day. Indications: Atrial Fibrillation, High Blood Pressure 90 Tablet 3    tamsulosin (FLOMAX) 0.4 MG capsule Take 1 capsule by mouth every day (Patient taking differently: Take 0.4 mg by mouth every day.) 90 Capsule 2    rivaroxaban (XARELTO) 20 MG Tab tablet Take 1 Tablet by mouth with dinner. To thin blood, prevent clots 30 Tablet 11    ezetimibe (ZETIA) 10 MG Tab Take 1 Tablet by mouth every day. Indications: High Amount of Fats in the Blood 90 Tablet 3    levothyroxine (SYNTHROID) 25 MCG Tab Take 1 Tablet by mouth every day for 360 days. 90 Tablet 3    metFORMIN ER (GLUCOPHAGE XR) 500 MG TABLET SR 24 HR Take 1 Tablet by mouth 2 times a day. (Ok to take 2 tablets once a day instead if desired) (Patient taking differently: Take 500 mg by mouth every day.) 180 Tablet 3    ferrous sulfate 325 (65 Fe) MG tablet Take 1 tablet by mouth three times a week on mon/wed/fri (Patient taking differently: Take 325 mg by mouth see administration instructions. Pt takes on Mon, Wed, and Friday) 39 Tablet 3    sodium bicarbonate (SODIUM BICARBONATE) 650 MG Tab Take 2 tablets by mouth three times a day (Patient taking differently: Take 1,300 mg by mouth 2 times a day.) 540 Tablet 3    rosuvastatin (CRESTOR) 5 MG Tab Take 1 tablet by mouth every day (Patient taking differently: Take 5 mg by mouth every evening.) 100 Tablet 3    Loperamide HCl (IMODIUM A-D PO) Take  "1 Tablet by mouth 2 times a day as needed (diarrhea). Indications: Diarrhea      Coenzyme Q10 (COQ-10 PO) Take 300 mg by mouth every evening. Indications: heart health      amoxicillin-clavulanate (AUGMENTIN) 875-125 MG Tab Take 1 Tablet by mouth 2 times a day. Pt started on 1/7/2025 for 5 day course  PRESCRIPTION COURSE WAS COMPLETED      ALPRAZolam (XANAX) 0.25 MG Tab Take 0.25 mg by mouth 1 time a day as needed for Anxiety.       No current facility-administered medications for this visit.     He  has a past medical history of Acute cough (02/03/2024), Acute cystitis (10/21/2023), Acute kidney injury superimposed on chronic kidney disease (HCC) (10/21/2023), Cancer ([facility]), Cataract, Complicated urinary tract infection (03/03/2024), Diabetes ([facility]), High cholesterol, History of heart artery stent, Hyperkalemia (02/02/2024), Hyperlipidemia, Hypertension, Hyponatremia (11/18/2023), Hypotension (11/18/2023), Iron deficiency anemia (06/28/2024), Metabolic acidosis (02/02/2024), Myocardial infarct ([facility]), Pulmonary emboli ([facility]), Renal disorder, Sepsis ([facility]) (11/18/2023), Stented coronary artery RCA and LCx 2002 (08/30/2024), and Urinary bladder disorder.    ROS   ROS  -See HPI     Objective:     Physical Exam:  /68   Pulse 96   Temp 36.6 °C (97.9 °F) (Temporal)   Resp 16   Ht 1.803 m (5' 10.98\")   Wt 83.9 kg (185 lb)   SpO2 93%  Body mass index is 25.81 kg/m².   Evaluated gait, posture, weightbearing status. Examination of Right knee:  -    Inspected/palpated bilaterally for warmth, erythema/discoloration, effusion/swelling, patellar malalignment/apprehension, and tenderness of the medial/lateral joint lines, patella, distal femur, proximal tibia/fibula, quadriceps/patellar tendon, IT band, LCL/MCL, and pes anserine bursa.  -    Assessed passive/active range of motion bilaterally in flexion and extension, noting below for pain or crepitation.  -    Assessed muscle strength bilaterally in flexion and extension, " noting below if less than 5/5 or pain. Observed for muscle atrophy.  -    Assessed stability bilaterally at ACL (Lachman, anterior drawer), PCL (posterior drawer), LCL (varus stress), MCL (valgus stress), menisci (Thessaly, Apley, Merlene).       All of the above were found to be normal, except:  Crepitus noted throughout knee.  Slight pain with valgus stress.  Slight pain with Apley maneuver.    Monofilament testing with a 10 gram force: sensation intact: decreased on right  Visual Inspection: Feet without maceration, ulcers, fissures.  Pedal pulses: intact bilaterally      Results  Laboratory Studies  A1c is 7.5.    Imaging  Ultrasound of the knee showed a Baker's cyst.    Assessment and Plan:     Assessment & Plan  1. Right knee pain.  The patient reports intermittent pain in the right leg, particularly behind the knee, which has been diagnosed as a Baker's cyst. Examination reveals mild fluid accumulation, likely due to arthritis. There is no significant swelling or instability reported. An x-ray of the knee will be ordered to further evaluate the condition. A referral to physical therapy will be made to develop a strengthening regimen for the knee. He is advised to continue his gym exercises and maintain leg strength to prevent falls or further injury.    2. Type 2 diabetes mellitus.  His A1c level is slightly elevated at 7.5. He is advised to continue his current metformin regimen, taking 500 mg twice daily. He is encouraged to maintain a healthy diet and regular exercise routine, including gym workouts and swimming. His A1c levels will be rechecked in 3 months.    3. Wrist pain.  The patient experiences wrist pain at night, which is relieved by wearing a brace. The pain is likely due to arthritis. He is advised to take two extra-strength Tylenol tablets at bedtime to manage the pain. If the pain worsens, a referral to a specialist for potential injections will be considered.    Follow-up  The patient will  follow up in 3 months.    Orders:  1. Type 2 diabetes mellitus with other specified complication, without long-term current use of insulin (HCC)  - POCT Hemoglobin A1C  - metFORMIN ER (GLUCOPHAGE XR) 500 MG TABLET SR 24 HR; Take 1 Tablet by mouth 2 times a day.  Dispense: 180 Tablet; Refill: 3  - Diabetic Monofilament LE Exam    2. Chronic pain of right knee  - DX-KNEE COMPLETE 4+ RIGHT; Future  - Referral to Physical Therapy    3. Right wrist pain        Followup: No follow-ups on file.         PLEASE NOTE: This dictation was created using voice recognition and [employer] ambient listening software. I have made every reasonable attempt to correct obvious errors, but I expect that there are errors of grammar and possibly content that I did not discover before finalizing the note.

## 2025-01-28 ENCOUNTER — PHARMACY VISIT (OUTPATIENT)
Dept: PHARMACY | Facility: MEDICAL CENTER | Age: 85
End: 2025-01-28
Payer: COMMERCIAL

## 2025-01-29 ENCOUNTER — APPOINTMENT (OUTPATIENT)
Dept: RADIOLOGY | Facility: MEDICAL CENTER | Age: 85
End: 2025-01-29
Attending: FAMILY MEDICINE
Payer: MEDICARE

## 2025-01-29 DIAGNOSIS — G89.29 CHRONIC PAIN OF RIGHT KNEE: ICD-10-CM

## 2025-01-29 DIAGNOSIS — M25.561 CHRONIC PAIN OF RIGHT KNEE: ICD-10-CM

## 2025-01-29 PROCEDURE — 73564 X-RAY EXAM KNEE 4 OR MORE: CPT | Mod: RT

## 2025-02-05 ENCOUNTER — PHARMACY VISIT (OUTPATIENT)
Dept: PHARMACY | Facility: MEDICAL CENTER | Age: 85
End: 2025-02-05
Payer: COMMERCIAL

## 2025-02-05 ENCOUNTER — OFFICE VISIT (OUTPATIENT)
Dept: MEDICAL GROUP | Facility: LAB | Age: 85
End: 2025-02-05
Payer: MEDICARE

## 2025-02-05 VITALS
WEIGHT: 185 LBS | SYSTOLIC BLOOD PRESSURE: 110 MMHG | BODY MASS INDEX: 25.9 KG/M2 | HEIGHT: 71 IN | TEMPERATURE: 97.7 F | HEART RATE: 81 BPM | DIASTOLIC BLOOD PRESSURE: 74 MMHG | RESPIRATION RATE: 16 BRPM | OXYGEN SATURATION: 93 %

## 2025-02-05 DIAGNOSIS — L02.519 CELLULITIS AND ABSCESS OF HAND: ICD-10-CM

## 2025-02-05 DIAGNOSIS — L03.119 CELLULITIS AND ABSCESS OF HAND: ICD-10-CM

## 2025-02-05 PROCEDURE — 3074F SYST BP LT 130 MM HG: CPT | Performed by: FAMILY MEDICINE

## 2025-02-05 PROCEDURE — 10060 I&D ABSCESS SIMPLE/SINGLE: CPT | Mod: F5 | Performed by: FAMILY MEDICINE

## 2025-02-05 PROCEDURE — 3078F DIAST BP <80 MM HG: CPT | Performed by: FAMILY MEDICINE

## 2025-02-05 PROCEDURE — RXMED WILLOW AMBULATORY MEDICATION CHARGE: Performed by: FAMILY MEDICINE

## 2025-02-05 RX ORDER — SULFAMETHOXAZOLE AND TRIMETHOPRIM 800; 160 MG/1; MG/1
1 TABLET ORAL 2 TIMES DAILY
Qty: 28 TABLET | Refills: 0 | Status: SHIPPED | OUTPATIENT
Start: 2025-02-05 | End: 2025-02-19

## 2025-02-05 ASSESSMENT — FIBROSIS 4 INDEX: FIB4 SCORE: 2.27

## 2025-02-06 ENCOUNTER — HOSPITAL ENCOUNTER (EMERGENCY)
Facility: MEDICAL CENTER | Age: 85
End: 2025-02-06
Attending: EMERGENCY MEDICINE
Payer: MEDICARE

## 2025-02-06 VITALS
DIASTOLIC BLOOD PRESSURE: 75 MMHG | OXYGEN SATURATION: 93 % | HEART RATE: 75 BPM | SYSTOLIC BLOOD PRESSURE: 153 MMHG | HEIGHT: 70 IN | WEIGHT: 193.56 LBS | BODY MASS INDEX: 27.71 KG/M2 | RESPIRATION RATE: 16 BRPM | TEMPERATURE: 97.1 F

## 2025-02-06 DIAGNOSIS — L03.012 PARONYCHIA OF LEFT THUMB: Primary | ICD-10-CM

## 2025-02-06 DIAGNOSIS — Z51.89 ENCOUNTER FOR WOUND RE-CHECK: ICD-10-CM

## 2025-02-06 PROCEDURE — 303977 HCHG I & D

## 2025-02-06 PROCEDURE — 99284 EMERGENCY DEPT VISIT MOD MDM: CPT | Mod: 25

## 2025-02-06 ASSESSMENT — FIBROSIS 4 INDEX: FIB4 SCORE: 2.27

## 2025-02-06 NOTE — PROCEDURES
I and D    Date/Time: 2/5/2025 4:04 PM    Performed by: Nirmal Gentile M.D.  Authorized by: Nirmal Gentile M.D.  Type: abscess  Body area: upper extremity  Location details: right thumb    Sedation:  Patient sedated: no    Scalpel size: 18-gauge needle.  Incision type: Single puncture with 18-gauge needle.  Incision depth: dermal  Complexity: simple  Drainage: purulent  Drainage amount: copious  Wound treatment: wound left open  Packing material: none  Patient tolerance: patient tolerated the procedure well with no immediate complications

## 2025-02-06 NOTE — PROGRESS NOTES
Verbal consent was acquired by the patient to use [employer] ambient listening note generation during this visit Yes    Subjective:   Star Centeno is a 84 y.o. male here today for   Chief Complaint   Patient presents with    Nail Problem     History of Present Illness  The patient is an 84-year-old male who presents with concerns regarding a potential infection in his thumb.    He reports that the symptoms began approximately 4 days ago, with a significant increase in size of the affected area noted overnight. He experiences mild pain upon movement and limited flexibility in the thumb. The swelling is localized to the area around the nail, which is also tender. He notes a decrease in mobility compared to his other thumb. Initially, he suspected the cause to be a result of trauma to the hand.    Supplemental Information  He has a history of urinary tract infections.    MEDICATIONS  Past: Bactrim      No Known Allergies      Current medicines (including changes today)  Current Outpatient Medications   Medication Sig Dispense Refill    sulfamethoxazole-trimethoprim (BACTRIM DS) 800-160 MG tablet Take 1 Tablet by mouth 2 times a day for 14 days. 28 Tablet 0    metFORMIN ER (GLUCOPHAGE XR) 500 MG TABLET SR 24 HR Take 1 Tablet by mouth 2 times a day. 180 Tablet 3    amoxicillin-clavulanate (AUGMENTIN) 875-125 MG Tab Take 1 Tablet by mouth 2 times a day. Pt started on 1/7/2025 for 5 day course  PRESCRIPTION COURSE WAS COMPLETED      diphenhydrAMINE (BENADRYL) 25 MG Tab Take 50 mg by mouth every 6 hours as needed for Itching.      vitamin D (D3-1000) 1000 UNIT Tab Take 1 Tablet by mouth every day. 90 Tablet 3    metoprolol SR (TOPROL XL) 25 MG TABLET SR 24 HR Take 1 Tablet by mouth every day. Indications: Atrial Fibrillation, High Blood Pressure 90 Tablet 3    tamsulosin (FLOMAX) 0.4 MG capsule Take 1 capsule by mouth every day (Patient taking differently: Take 0.4 mg by mouth every day.) 90 Capsule 2    rivaroxaban  (XARELTO) 20 MG Tab tablet Take 1 Tablet by mouth with dinner. To thin blood, prevent clots 30 Tablet 11    ezetimibe (ZETIA) 10 MG Tab Take 1 Tablet by mouth every day. Indications: High Amount of Fats in the Blood 90 Tablet 3    levothyroxine (SYNTHROID) 25 MCG Tab Take 1 Tablet by mouth every day for 360 days. 90 Tablet 3    ferrous sulfate 325 (65 Fe) MG tablet Take 1 tablet by mouth three times a week on mon/wed/fri (Patient taking differently: Take 325 mg by mouth see administration instructions. Pt takes on Mon, Wed, and Friday) 39 Tablet 3    sodium bicarbonate (SODIUM BICARBONATE) 650 MG Tab Take 2 tablets by mouth three times a day (Patient taking differently: Take 1,300 mg by mouth 2 times a day.) 540 Tablet 3    rosuvastatin (CRESTOR) 5 MG Tab Take 1 tablet by mouth every day (Patient taking differently: Take 5 mg by mouth every evening.) 100 Tablet 3    ALPRAZolam (XANAX) 0.25 MG Tab Take 0.25 mg by mouth 1 time a day as needed for Anxiety.      Loperamide HCl (IMODIUM A-D PO) Take 1 Tablet by mouth 2 times a day as needed (diarrhea). Indications: Diarrhea      Coenzyme Q10 (COQ-10 PO) Take 300 mg by mouth every evening. Indications: heart health       No current facility-administered medications for this visit.     He  has a past medical history of Acute cough (02/03/2024), Acute cystitis (10/21/2023), Acute kidney injury superimposed on chronic kidney disease (HCC) (10/21/2023), Cancer ([facility]), Cataract, Complicated urinary tract infection (03/03/2024), Diabetes ([facility]), High cholesterol, History of heart artery stent, Hyperkalemia (02/02/2024), Hyperlipidemia, Hypertension, Hyponatremia (11/18/2023), Hypotension (11/18/2023), Iron deficiency anemia (06/28/2024), Metabolic acidosis (02/02/2024), Myocardial infarct ([facility]), Pulmonary emboli ([facility]), Renal disorder, Sepsis ([facility]) (11/18/2023), Stented coronary artery RCA and LCx 2002 (08/30/2024), and Urinary bladder disorder.    ROS   ROS  -See HPI      "Objective:     Physical Exam:  /74   Pulse 81   Temp 36.5 °C (97.7 °F) (Temporal)   Resp 16   Ht 1.803 m (5' 10.98\")   Wt 83.9 kg (185 lb)   SpO2 93%  Body mass index is 25.81 kg/m².   Constitutional: Alert, no distress, well-groomed.  Skin: No rashes in visible areas.  Significant erythema, heat to touch, edema noted on the right thumb.  Induration noted across the paronychia of the right thumb on both ulnar and radial sides.  Paronychia does appear to be abscess.  Eye: Round. Conjunctiva clear, lids normal. No icterus.   ENMT: Lips pink without lesions, good dentition, moist mucous membranes. Phonation normal.  Neck: No masses, no thyromegaly. Moves freely without pain.  Respiratory: Unlabored respiratory effort, no cough or audible wheeze  Psych: Alert and oriented x3, normal affect and mood.    Results      Assessment and Plan:     Assessment & Plan  1. Thumb abscess.  The abscess was drained in the office today (see procedure note). There is a risk of losing the fingernail due to the abscess extending under the nailbed. A prescription for Bactrim DS twice a day for 14 days has been issued. He is advised to apply Polysporin ointment daily and change the bandage once a day. The abscess may continue to drain for some time, which is expected. He should monitor for signs of worsening, such as increased redness, swelling, or pain, and seek immediate medical attention at the emergency room if these symptoms occur.    PROCEDURE  The abscess on the thumb was drained in the office today.    Orders:  1. Cellulitis and abscess of hand  - sulfamethoxazole-trimethoprim (BACTRIM DS) 800-160 MG tablet; Take 1 Tablet by mouth 2 times a day for 14 days.  Dispense: 28 Tablet; Refill: 0  - I and D        Followup: No follow-ups on file.         PLEASE NOTE: This dictation was created using voice recognition and [employer] ambient listening software. I have made every reasonable attempt to correct obvious errors, but I " expect that there are errors of grammar and possibly content that I did not discover before finalizing the note.

## 2025-02-07 NOTE — ED TRIAGE NOTES
"Chief Complaint   Patient presents with    Wound Check     Had his left thumb \"drained of an infection\" Pt states that his dr told him to come to ER for evaluation if the swelling returned or he had other signs that infection has worsened.   Pt has swelling and redness at this time        BP (!) 153/75   Pulse 75   Temp 36.2 °C (97.1 °F) (Temporal)   Resp 16   Ht 1.778 m (5' 10\")   Wt 87.8 kg (193 lb 9 oz)   SpO2 93%   BMI 27.77 kg/m²     "

## 2025-02-07 NOTE — ED PROVIDER NOTES
"ED Provider Note    Scribed for Jourdan Payton by Jourdan Payton. 2/6/2025  5:21 PM    Primary care provider: Nirmal Gentile M.D.  Means of arrival: PRIVATE VEHICLE     History obtained from: Patient  History limited by: None    CHIEF COMPLAINT  Chief Complaint   Patient presents with    Wound Check     Had his left thumb \"drained of an infection\" Pt states that his dr told him to come to ER for evaluation if the swelling returned or he had other signs that infection has worsened.   Pt has swelling and redness at this time        EXTERNAL RECORDS REVIEWED  Outpatient Notes patient was seen for paronychia yesterday which was apparently drained in the office and he was told to follow-up in the ED if there is worsening swelling or resumption of symptoms    HPI/ROS  LIMITATION TO HISTORY   Select: : None  OUTSIDE HISTORIAN(S):  None    HPI  Star Centeno is a 84 y.o. male who presents to the Emergency Department with evaluation for wound recheck.  Patient developed what appears to be a paronychia over the last 2 days of his left thumb.  He went to his PCP and they stuck it with a needle with some output but fluid reaccumulated today and patient starting pain and pressure.  Denies any fevers.  No prior history of similar.  He is taking Bactrim antibiotic for this.    REVIEW OF SYSTEMS  As above, all other systems reviewed and are negative.   See HPI for further details.     PAST MEDICAL HISTORY   has a past medical history of Acute cough (02/03/2024), Acute cystitis (10/21/2023), Acute kidney injury superimposed on chronic kidney disease (HCC) (10/21/2023), Cancer ([facility]), Cataract, Complicated urinary tract infection (03/03/2024), Diabetes ([facility]), High cholesterol, History of heart artery stent, Hyperkalemia (02/02/2024), Hyperlipidemia, Hypertension, Hyponatremia (11/18/2023), Hypotension (11/18/2023), Iron deficiency anemia (06/28/2024), Metabolic acidosis (02/02/2024), Myocardial infarct ([facility]), " Pulmonary emboli (HCC), Renal disorder, Sepsis (HCC) (2023), Stented coronary artery RCA and LCx  (2024), and Urinary bladder disorder.  SURGICAL HISTORY   has a past surgical history that includes eye surgery; prostatectomy, radical retro; cystourethroscopy,biopsies (N/A, 10/24/2023); cysto/uretero/pyeloscopy, dx (Left, 10/24/2023); cystoscopy,insert ureteral stent (Left, 10/24/2023); cystoscopy,insert ureteral stent (Left, 2024); cysto/uretero/pyeloscopy, dx (2024); other cardiac surgery; unlisted laparoscopy proc, ureter (2024); cystoscopy with ureteral stent insertion or removal (2024); and lymphadectomy laparoscopic (2024).  SOCIAL HISTORY  Social History     Tobacco Use    Smoking status: Former     Current packs/day: 0.00     Average packs/day: 1 pack/day for 45.0 years (45.0 ttl pk-yrs)     Types: Cigarettes     Start date: 1956     Quit date: 2001     Years since quittin.1    Smokeless tobacco: Never   Vaping Use    Vaping status: Never Used   Substance Use Topics    Alcohol use: Yes     Alcohol/week: 4.2 oz     Types: 7 Glasses of wine per week     Comment: 1 glass of wine/nightly    Drug use: Never      Social History     Substance and Sexual Activity   Drug Use Never     FAMILY HISTORY  Family History   Problem Relation Age of Onset    Genetic Disorder Sister     Diabetes Sister     Heart Failure Sister     Genetic Disorder Sister     Heart Failure Sister     Heart Failure Sister     Genetic Disorder Brother     Diabetes Brother     Hypertension Brother     Hyperlipidemia Brother     Clotting Disorder Brother         elevated FVIII, prothrombin gene mutation    No Known Problems Daughter      CURRENT MEDICATIONS  Home Medications    **Home medications have not yet been reviewed for this encounter**       ALLERGIES  No Known Allergies    PHYSICAL EXAM    VITAL SIGNS:   Vitals:    25 1707   BP: (!) 153/75   Pulse: 75   Resp: 16   Temp: 36.2 °C  "(97.1 °F)   TempSrc: Temporal   SpO2: 93%   Weight: 87.8 kg (193 lb 9 oz)   Height: 1.778 m (5' 10\")     Vitals: My interpretation: normotensive, not tachycardic, afebrile, not hypoxic    Reinterpretation of vitals: Unchanged unremarkable    PE:   Gen: sitting comfortably, speaking clearly, appears in no acute distress   ENT: Mucous membranes moist, posterior pharynx clear, uvula midline, nares patent bilaterally   Neck: Supple, FROM  Pulmonary: Lungs are clear to auscultation bilaterally. No tachypnea  CV:  RRR, no murmur appreciated, pulses 2+ in both upper and lower extremities  Abdomen: soft, NT/ND; no rebound/guarding  : no CVA or suprapubic tenderness   Neuro: A&Ox4 (person, place, time, situation), speech fluent, gait steady, no focal deficits appreciated  Skin: No rash or lesions.  No pallor or jaundice.  No cyanosis.  Warm and dry.   Left thumb: There is swelling consistent with paronychia to the base of the eponychial fold.  Mild surrounding cellulitic changes with erythema to the thumb.    Point of Care Ultrasound    ED ULTRASOUND GUIDED ABSCESS INCISION AND DRAINAGE    Indication: Abscess    Procedure: Bedside ultrasound was utilized to identify and localize fluid collection and image retained as below.  The patient was positioned appropriately and the skin over the incision site was prepped with chlorhexidine. Local anesthesia was not performed at the patient's request.  An incision was then made over the apex of the lesion and approximately 5 cc of thick and purulent material was expressed. Loculations were broken up using forceps and more of the material was able to be expressed. The drainage cavity was then dressed with a sterile dressing. The patient's tetanus status was updated with a tetanus booster.    The patient tolerated the procedure well.    Complications: None     Image retained through [facility] as seen below:       Additional interpretation: Abscess on ultrasound    This study is a limited " ultrasound examination performed and interpreted to evaluate for limited conditions as outlined above. There may be other clinically important information contained in the images that is outside this scope. When clinically warranted, a comprehensive ultrasound through the appropriate department is considered.      COURSE & MEDICAL DECISION MAKING  Nursing notes, VS, PMSFHx, labs, imaging, EKG reviewed in chart.    ED Observation Status? No; Patient does not meet criteria for ED Observation.     Ddx: Paronychia, abscess, cellulitis    MDM: 5:21 PM Star Centeno is a 84 y.o. male who presents to the Emergency Department with evaluation for wound recheck.  Patient developed what appears to be a paronychia over the last 2 days of his left thumb.  He went to his PCP and they stuck it with a needle with some output but fluid reaccumulated today and patient starting pain and pressure.  Denies any fevers.  No prior history of similar.  He is taking Bactrim antibiotic for this.  On arrival here his vital signs are normal.  Physical exam shows obvious paronychia with surrounding cellulitic changes.  Ultrasound demonstrates obvious abscess collection and patient was consented and underwent incision and drainage, see procedure note and ultrasound above.  Tolerated well.  Significant purulent output was removed.  Wound was thoroughly cleaned and dressed.  Patient will be discharged to continue to take Bactrim with follow-up instructions discussed with him.  He verbalized understanding and is amenable.    ADDITIONAL PROBLEM LIST AND DISPOSITION    I have discussed management of the patient with the following physicians and CON's: None    Discussion of management with other QHP or appropriate source(s): None     Escalation of care considered, and ultimately not performed:Laboratory analysis    Barriers to care at this time, including but not limited to:  None .     Decision tools and prescription drugs considered including, but  not limited to: Antibiotics Bactrim .    FINAL IMPRESSION  1. Paronychia of left thumb Acute   2. Encounter for wound re-check Acute      The note accurately reflects work and decisions made by me.  Jouradn Payton  2/6/2025  5:21 PM

## 2025-02-07 NOTE — DISCHARGE INSTRUCTIONS
Please keep the wound dry clean.  You wash it once a day with warm soapy water do not scrub it.  Your thumbnail will come off likely soon.  Continue take the antibiotics.  If you are noting that you are not having improvement or there is a reaccumulation of fluid or abscess, return to the ED or follow-up with your primary doctor.  Thank you for coming in today.

## 2025-02-09 DIAGNOSIS — E78.5 DYSLIPIDEMIA: ICD-10-CM

## 2025-02-09 PROCEDURE — RXMED WILLOW AMBULATORY MEDICATION CHARGE: Performed by: FAMILY MEDICINE

## 2025-02-10 PROCEDURE — RXMED WILLOW AMBULATORY MEDICATION CHARGE: Performed by: INTERNAL MEDICINE

## 2025-02-10 RX ORDER — ROSUVASTATIN CALCIUM 5 MG/1
TABLET, COATED ORAL
Qty: 90 TABLET | Refills: 2 | Status: SHIPPED | OUTPATIENT
Start: 2025-02-10

## 2025-02-10 NOTE — TELEPHONE ENCOUNTER
Is the patient due for a refill? Yes    Was the patient seen the last 15 months? Yes    Date of last office visit: 8/30/24    Does the patient have an upcoming appointment?  No      Provider to refill:CW    Does the patient have half-way Plus and need 100-day supply? (This applies to ALL medications) Patient does not have SCP

## 2025-02-11 ENCOUNTER — PHARMACY VISIT (OUTPATIENT)
Dept: PHARMACY | Facility: MEDICAL CENTER | Age: 85
End: 2025-02-11
Payer: COMMERCIAL

## 2025-02-16 PROCEDURE — RXMED WILLOW AMBULATORY MEDICATION CHARGE: Performed by: INTERNAL MEDICINE

## 2025-02-17 ENCOUNTER — PHARMACY VISIT (OUTPATIENT)
Dept: PHARMACY | Facility: MEDICAL CENTER | Age: 85
End: 2025-02-17
Payer: COMMERCIAL

## 2025-02-20 ENCOUNTER — APPOINTMENT (OUTPATIENT)
Dept: MEDICAL GROUP | Facility: LAB | Age: 85
End: 2025-02-20
Payer: MEDICARE

## 2025-02-20 ENCOUNTER — HOSPITAL ENCOUNTER (OUTPATIENT)
Dept: LAB | Facility: MEDICAL CENTER | Age: 85
End: 2025-02-20
Attending: INTERNAL MEDICINE
Payer: MEDICARE

## 2025-02-20 ENCOUNTER — OFFICE VISIT (OUTPATIENT)
Dept: MEDICAL GROUP | Facility: LAB | Age: 85
End: 2025-02-20
Payer: MEDICARE

## 2025-02-20 VITALS
HEART RATE: 74 BPM | BODY MASS INDEX: 26.48 KG/M2 | DIASTOLIC BLOOD PRESSURE: 52 MMHG | OXYGEN SATURATION: 94 % | HEIGHT: 70 IN | RESPIRATION RATE: 16 BRPM | SYSTOLIC BLOOD PRESSURE: 96 MMHG | WEIGHT: 185 LBS | TEMPERATURE: 97.2 F

## 2025-02-20 DIAGNOSIS — L02.519 CELLULITIS AND ABSCESS OF HAND: ICD-10-CM

## 2025-02-20 DIAGNOSIS — L03.119 CELLULITIS AND ABSCESS OF HAND: ICD-10-CM

## 2025-02-20 LAB
ALBUMIN SERPL BCP-MCNC: 3.9 G/DL (ref 3.2–4.9)
ALBUMIN/GLOB SERPL: 1.4 G/DL
ALP SERPL-CCNC: 69 U/L (ref 30–99)
ALT SERPL-CCNC: 15 U/L (ref 2–50)
ANION GAP SERPL CALC-SCNC: 13 MMOL/L (ref 7–16)
AST SERPL-CCNC: 21 U/L (ref 12–45)
BASOPHILS # BLD AUTO: 0.5 % (ref 0–1.8)
BASOPHILS # BLD: 0.03 K/UL (ref 0–0.12)
BILIRUB SERPL-MCNC: 0.5 MG/DL (ref 0.1–1.5)
BUN SERPL-MCNC: 20 MG/DL (ref 8–22)
CALCIUM ALBUM COR SERPL-MCNC: 8.8 MG/DL (ref 8.5–10.5)
CALCIUM SERPL-MCNC: 8.7 MG/DL (ref 8.5–10.5)
CHLORIDE SERPL-SCNC: 99 MMOL/L (ref 96–112)
CO2 SERPL-SCNC: 19 MMOL/L (ref 20–33)
CREAT SERPL-MCNC: 2.08 MG/DL (ref 0.5–1.4)
EOSINOPHIL # BLD AUTO: 0.49 K/UL (ref 0–0.51)
EOSINOPHIL NFR BLD: 8 % (ref 0–6.9)
ERYTHROCYTE [DISTWIDTH] IN BLOOD BY AUTOMATED COUNT: 45.5 FL (ref 35.9–50)
GFR SERPLBLD CREATININE-BSD FMLA CKD-EPI: 30 ML/MIN/1.73 M 2
GLOBULIN SER CALC-MCNC: 2.7 G/DL (ref 1.9–3.5)
GLUCOSE SERPL-MCNC: 159 MG/DL (ref 65–99)
HCT VFR BLD AUTO: 35.8 % (ref 42–52)
HGB BLD-MCNC: 12.1 G/DL (ref 14–18)
IMM GRANULOCYTES # BLD AUTO: 0.03 K/UL (ref 0–0.11)
IMM GRANULOCYTES NFR BLD AUTO: 0.5 % (ref 0–0.9)
LYMPHOCYTES # BLD AUTO: 0.87 K/UL (ref 1–4.8)
LYMPHOCYTES NFR BLD: 14.3 % (ref 22–41)
MCH RBC QN AUTO: 31.8 PG (ref 27–33)
MCHC RBC AUTO-ENTMCNC: 33.8 G/DL (ref 32.3–36.5)
MCV RBC AUTO: 94.2 FL (ref 81.4–97.8)
MONOCYTES # BLD AUTO: 0.64 K/UL (ref 0–0.85)
MONOCYTES NFR BLD AUTO: 10.5 % (ref 0–13.4)
NEUTROPHILS # BLD AUTO: 4.04 K/UL (ref 1.82–7.42)
NEUTROPHILS NFR BLD: 66.2 % (ref 44–72)
NRBC # BLD AUTO: 0 K/UL
NRBC BLD-RTO: 0 /100 WBC (ref 0–0.2)
PLATELET # BLD AUTO: 166 K/UL (ref 164–446)
PMV BLD AUTO: 9.2 FL (ref 9–12.9)
POTASSIUM SERPL-SCNC: 4.5 MMOL/L (ref 3.6–5.5)
PROT SERPL-MCNC: 6.6 G/DL (ref 6–8.2)
RBC # BLD AUTO: 3.8 M/UL (ref 4.7–6.1)
SODIUM SERPL-SCNC: 131 MMOL/L (ref 135–145)
TSH SERPL-ACNC: 4.48 UIU/ML (ref 0.35–5.5)
WBC # BLD AUTO: 6.1 K/UL (ref 4.8–10.8)

## 2025-02-20 PROCEDURE — 84443 ASSAY THYROID STIM HORMONE: CPT

## 2025-02-20 PROCEDURE — 80053 COMPREHEN METABOLIC PANEL: CPT

## 2025-02-20 PROCEDURE — 3078F DIAST BP <80 MM HG: CPT | Performed by: FAMILY MEDICINE

## 2025-02-20 PROCEDURE — 36415 COLL VENOUS BLD VENIPUNCTURE: CPT

## 2025-02-20 PROCEDURE — 99213 OFFICE O/P EST LOW 20 MIN: CPT | Performed by: FAMILY MEDICINE

## 2025-02-20 PROCEDURE — 3074F SYST BP LT 130 MM HG: CPT | Performed by: FAMILY MEDICINE

## 2025-02-20 PROCEDURE — 85025 COMPLETE CBC W/AUTO DIFF WBC: CPT

## 2025-02-20 ASSESSMENT — FIBROSIS 4 INDEX: FIB4 SCORE: 2.27

## 2025-02-20 NOTE — PROGRESS NOTES
Verbal consent was acquired by the patient to use [employer] ambient listening note generation during this visit Yes    Subjective:   Star Centeno is a 84 y.o. male here today for   Chief Complaint   Patient presents with    Open Wound Finger With Complications       History of Present Illness  The patient is an 84-year-old male who presents today to follow up on recent paronychia and cellulitis with an abscess of the hand. He was originally seen in the office on 02/05/2025 and followed up in the emergency room the following day for an I & D. He was placed on Bactrim and is here today with continued symptoms.    He reports the presence of another pocket of fluid in his hand, although it is not associated with any pain. He notes that all previous pain has resolved, and he has regained satisfactory mobility in the affected area. He expresses concern over the deterioration of his nail, which he describes as being unable to visualize the edges. He completed his course of antibiotics as prescribed.      No Known Allergies      Current medicines (including changes today)  Current Outpatient Medications   Medication Sig Dispense Refill    rosuvastatin (CRESTOR) 5 MG Tab Take 1 tablet by mouth every day 90 Tablet 2    metFORMIN ER (GLUCOPHAGE XR) 500 MG TABLET SR 24 HR Take 1 Tablet by mouth 2 times a day. 180 Tablet 3    amoxicillin-clavulanate (AUGMENTIN) 875-125 MG Tab Take 1 Tablet by mouth 2 times a day. Pt started on 1/7/2025 for 5 day course  PRESCRIPTION COURSE WAS COMPLETED      diphenhydrAMINE (BENADRYL) 25 MG Tab Take 50 mg by mouth every 6 hours as needed for Itching.      vitamin D (D3-1000) 1000 UNIT Tab Take 1 Tablet by mouth every day. 90 Tablet 3    metoprolol SR (TOPROL XL) 25 MG TABLET SR 24 HR Take 1 Tablet by mouth every day. Indications: Atrial Fibrillation, High Blood Pressure 90 Tablet 3    tamsulosin (FLOMAX) 0.4 MG capsule Take 1 capsule by mouth every day (Patient taking differently: Take 0.4  mg by mouth every day.) 90 Capsule 2    rivaroxaban (XARELTO) 20 MG Tab tablet Take 1 Tablet by mouth with dinner. To thin blood, prevent clots 30 Tablet 11    ezetimibe (ZETIA) 10 MG Tab Take 1 Tablet by mouth every day. Indications: High Amount of Fats in the Blood 90 Tablet 3    levothyroxine (SYNTHROID) 25 MCG Tab Take 1 Tablet by mouth every day for 360 days. 90 Tablet 3    ferrous sulfate 325 (65 Fe) MG tablet Take 1 tablet by mouth three times a week on mon/wed/fri (Patient taking differently: Take 325 mg by mouth see administration instructions. Pt takes on Mon, Wed, and Friday) 39 Tablet 3    sodium bicarbonate (SODIUM BICARBONATE) 650 MG Tab Take 2 tablets by mouth three times a day (Patient taking differently: Take 1,300 mg by mouth 2 times a day.) 540 Tablet 3    ALPRAZolam (XANAX) 0.25 MG Tab Take 0.25 mg by mouth 1 time a day as needed for Anxiety.      Loperamide HCl (IMODIUM A-D PO) Take 1 Tablet by mouth 2 times a day as needed (diarrhea). Indications: Diarrhea      Coenzyme Q10 (COQ-10 PO) Take 300 mg by mouth every evening. Indications: heart health       No current facility-administered medications for this visit.     He  has a past medical history of Acute cough (02/03/2024), Acute cystitis (10/21/2023), Acute kidney injury superimposed on chronic kidney disease (HCC) (10/21/2023), Cancer ([facility]), Cataract, Complicated urinary tract infection (03/03/2024), Diabetes ([facility]), High cholesterol, History of heart artery stent, Hyperkalemia (02/02/2024), Hyperlipidemia, Hypertension, Hyponatremia (11/18/2023), Hypotension (11/18/2023), Iron deficiency anemia (06/28/2024), Metabolic acidosis (02/02/2024), Myocardial infarct ([facility]), Pulmonary emboli ([facility]), Renal disorder, Sepsis (formerly [facility]) (11/18/2023), Stented coronary artery RCA and LCx 2002 (08/30/2024), and Urinary bladder disorder.    ROS   ROS  -See HPI     Objective:     Physical Exam:  BP 96/52 (BP Location: Right arm, Patient Position: Sitting, BP Cuff  "Size: Adult)   Pulse 74   Temp 36.2 °C (97.2 °F) (Temporal)   Resp 16   Ht 1.778 m (5' 10\")   Wt 83.9 kg (185 lb)   SpO2 94%  Body mass index is 26.54 kg/m².   Constitutional: Alert, no distress, well-groomed.  Skin: No rashes in visible areas.  Eye: Round. Conjunctiva clear, lids normal. No icterus.   ENMT: Lips pink without lesions, good dentition, moist mucous membranes. Phonation normal.  Neck: No masses, no thyromegaly. Moves freely without pain.  Respiratory: Unlabored respiratory effort, no cough or audible wheeze  Psych: Alert and oriented x3, normal affect and mood.   MSK: Evaluation of left thumb shows absence of nail.  Nailbed appears to be intact, no tenderness to palpation.  No tenderness to flexion, extension of DIP of thumb on left hand.  No erythema, edema, heat to touch noted.    Results      Assessment and Plan:     Assessment & Plan  1. Paronychia and cellulitis with abscess of hand.  The condition appears to be improving, with no signs of pain or discomfort reported. The nailbed is expected to exhibit a pinkish hue and slight puffiness, which is normal. The healing process is underway, but complete recovery will take time. He has been advised to monitor for any symptoms such as throbbing, redness, pain, or heat in the area. He should continue to observe the condition of the nail.    Orders:  1. Cellulitis and abscess of hand        Followup: No follow-ups on file.         PLEASE NOTE: This dictation was created using voice recognition and [employer] ambient listening software. I have made every reasonable attempt to correct obvious errors, but I expect that there are errors of grammar and possibly content that I did not discover before finalizing the note.  "

## 2025-02-25 ENCOUNTER — PHARMACY VISIT (OUTPATIENT)
Dept: PHARMACY | Facility: MEDICAL CENTER | Age: 85
End: 2025-02-25
Payer: COMMERCIAL

## 2025-02-25 PROCEDURE — RXMED WILLOW AMBULATORY MEDICATION CHARGE: Performed by: INTERNAL MEDICINE

## 2025-02-25 RX ORDER — PREDNISONE 20 MG/1
TABLET ORAL
Qty: 14 TABLET | Refills: 0 | OUTPATIENT
Start: 2025-02-25

## 2025-03-03 ENCOUNTER — PHARMACY VISIT (OUTPATIENT)
Dept: PHARMACY | Facility: MEDICAL CENTER | Age: 85
End: 2025-03-03
Payer: COMMERCIAL

## 2025-03-03 PROCEDURE — RXMED WILLOW AMBULATORY MEDICATION CHARGE: Performed by: INTERNAL MEDICINE

## 2025-03-06 ENCOUNTER — PATIENT MESSAGE (OUTPATIENT)
Dept: MEDICAL GROUP | Facility: LAB | Age: 85
End: 2025-03-06
Payer: MEDICARE

## 2025-03-06 DIAGNOSIS — R43.0 LOSS OF SENSE OF SMELL: ICD-10-CM

## 2025-03-09 DIAGNOSIS — I10 ESSENTIAL HYPERTENSION, BENIGN: ICD-10-CM

## 2025-03-10 ENCOUNTER — HOSPITAL ENCOUNTER (OUTPATIENT)
Dept: LAB | Facility: MEDICAL CENTER | Age: 85
End: 2025-03-10
Attending: INTERNAL MEDICINE
Payer: MEDICARE

## 2025-03-10 DIAGNOSIS — I82.4Z2 ACUTE DEEP VEIN THROMBOSIS (DVT) OF DISTAL VEIN OF LEFT LOWER EXTREMITY (HCC): ICD-10-CM

## 2025-03-10 DIAGNOSIS — Z79.01 CHRONIC ANTICOAGULATION: Chronic | ICD-10-CM

## 2025-03-10 DIAGNOSIS — K55.1 ATHEROSCLEROSIS OF SUPERIOR MESENTERIC ARTERY (HCC): Chronic | ICD-10-CM

## 2025-03-10 LAB
ALBUMIN SERPL BCP-MCNC: 3.9 G/DL (ref 3.2–4.9)
ALBUMIN/GLOB SERPL: 1.4 G/DL
ALP SERPL-CCNC: 63 U/L (ref 30–99)
ALT SERPL-CCNC: 18 U/L (ref 2–50)
ANION GAP SERPL CALC-SCNC: 11 MMOL/L (ref 7–16)
AST SERPL-CCNC: 16 U/L (ref 12–45)
BILIRUB SERPL-MCNC: 0.5 MG/DL (ref 0.1–1.5)
BUN SERPL-MCNC: 33 MG/DL (ref 8–22)
CALCIUM ALBUM COR SERPL-MCNC: 9.3 MG/DL (ref 8.5–10.5)
CALCIUM SERPL-MCNC: 9.2 MG/DL (ref 8.5–10.5)
CHLORIDE SERPL-SCNC: 103 MMOL/L (ref 96–112)
CHOLEST SERPL-MCNC: 128 MG/DL (ref 100–199)
CO2 SERPL-SCNC: 22 MMOL/L (ref 20–33)
CREAT SERPL-MCNC: 1.45 MG/DL (ref 0.5–1.4)
ERYTHROCYTE [DISTWIDTH] IN BLOOD BY AUTOMATED COUNT: 48.1 FL (ref 35.9–50)
FASTING STATUS PATIENT QL REPORTED: NORMAL
GFR SERPLBLD CREATININE-BSD FMLA CKD-EPI: 47 ML/MIN/1.73 M 2
GLOBULIN SER CALC-MCNC: 2.7 G/DL (ref 1.9–3.5)
GLUCOSE SERPL-MCNC: 160 MG/DL (ref 65–99)
HCT VFR BLD AUTO: 37.7 % (ref 42–52)
HDLC SERPL-MCNC: 63 MG/DL
HGB BLD-MCNC: 12.5 G/DL (ref 14–18)
LDLC SERPL CALC-MCNC: 47 MG/DL
MCH RBC QN AUTO: 31.6 PG (ref 27–33)
MCHC RBC AUTO-ENTMCNC: 33.2 G/DL (ref 32.3–36.5)
MCV RBC AUTO: 95.4 FL (ref 81.4–97.8)
PLATELET # BLD AUTO: 246 K/UL (ref 164–446)
PMV BLD AUTO: 9.2 FL (ref 9–12.9)
POTASSIUM SERPL-SCNC: 4.3 MMOL/L (ref 3.6–5.5)
PROT SERPL-MCNC: 6.6 G/DL (ref 6–8.2)
RBC # BLD AUTO: 3.95 M/UL (ref 4.7–6.1)
SODIUM SERPL-SCNC: 136 MMOL/L (ref 135–145)
TRIGL SERPL-MCNC: 91 MG/DL (ref 0–149)
WBC # BLD AUTO: 12.9 K/UL (ref 4.8–10.8)

## 2025-03-10 PROCEDURE — 80061 LIPID PANEL: CPT

## 2025-03-10 PROCEDURE — 85027 COMPLETE CBC AUTOMATED: CPT

## 2025-03-10 PROCEDURE — 36415 COLL VENOUS BLD VENIPUNCTURE: CPT

## 2025-03-10 PROCEDURE — 80053 COMPREHEN METABOLIC PANEL: CPT

## 2025-03-10 RX ORDER — METOPROLOL SUCCINATE 25 MG/1
25 TABLET, EXTENDED RELEASE ORAL DAILY
Qty: 90 TABLET | Refills: 3 | Status: SHIPPED | OUTPATIENT
Start: 2025-03-10

## 2025-03-12 ENCOUNTER — OFFICE VISIT (OUTPATIENT)
Dept: VASCULAR LAB | Facility: MEDICAL CENTER | Age: 85
End: 2025-03-12
Attending: FAMILY MEDICINE
Payer: MEDICARE

## 2025-03-12 VITALS
WEIGHT: 189 LBS | HEIGHT: 70 IN | DIASTOLIC BLOOD PRESSURE: 54 MMHG | HEART RATE: 81 BPM | SYSTOLIC BLOOD PRESSURE: 92 MMHG | BODY MASS INDEX: 27.06 KG/M2

## 2025-03-12 DIAGNOSIS — I65.23 ATHEROSCLEROSIS OF BOTH CAROTID ARTERIES: ICD-10-CM

## 2025-03-12 DIAGNOSIS — I25.10 CORONARY ARTERY DISEASE INVOLVING NATIVE CORONARY ARTERY OF NATIVE HEART WITHOUT ANGINA PECTORIS: ICD-10-CM

## 2025-03-12 DIAGNOSIS — D68.52 PROTHROMBIN GENE MUTATION (HCC): ICD-10-CM

## 2025-03-12 DIAGNOSIS — I10 PRIMARY HYPERTENSION: ICD-10-CM

## 2025-03-12 DIAGNOSIS — I82.4Z2 ACUTE DEEP VEIN THROMBOSIS (DVT) OF DISTAL VEIN OF LEFT LOWER EXTREMITY (HCC): ICD-10-CM

## 2025-03-12 DIAGNOSIS — N18.31 CKD STAGE G3A/A2, GFR 45-59 AND ALBUMIN CREATININE RATIO 30-299 MG/G: ICD-10-CM

## 2025-03-12 DIAGNOSIS — Z95.5 STATUS POST CORONARY ARTERY BALLOON DILATION: ICD-10-CM

## 2025-03-12 DIAGNOSIS — Z83.2 FAMILY HISTORY OF CLOTTING DISORDER: ICD-10-CM

## 2025-03-12 DIAGNOSIS — Z79.01 CHRONIC ANTICOAGULATION: ICD-10-CM

## 2025-03-12 DIAGNOSIS — Z86.711 HISTORY OF PULMONARY EMBOLISM: ICD-10-CM

## 2025-03-12 DIAGNOSIS — I70.8 CELIAC ARTERY ATHEROSCLEROSIS: ICD-10-CM

## 2025-03-12 DIAGNOSIS — I27.20 PULMONARY HYPERTENSION (HCC): ICD-10-CM

## 2025-03-12 DIAGNOSIS — K55.1 ATHEROSCLEROSIS OF SUPERIOR MESENTERIC ARTERY (HCC): ICD-10-CM

## 2025-03-12 PROCEDURE — 99214 OFFICE O/P EST MOD 30 MIN: CPT | Performed by: FAMILY MEDICINE

## 2025-03-12 PROCEDURE — 99212 OFFICE O/P EST SF 10 MIN: CPT

## 2025-03-12 PROCEDURE — G2211 COMPLEX E/M VISIT ADD ON: HCPCS | Performed by: FAMILY MEDICINE

## 2025-03-12 ASSESSMENT — ENCOUNTER SYMPTOMS
CLAUDICATION: 0
BLOOD IN STOOL: 0
FEVER: 0
HEMOPTYSIS: 0
PND: 0
SHORTNESS OF BREATH: 0
PALPITATIONS: 0
CHILLS: 0
ORTHOPNEA: 0
COUGH: 0
BRUISES/BLEEDS EASILY: 0
DIARRHEA: 1
WHEEZING: 0
SPUTUM PRODUCTION: 0

## 2025-03-12 ASSESSMENT — FIBROSIS 4 INDEX: FIB4 SCORE: 1.32

## 2025-03-12 NOTE — PROGRESS NOTES
FOLLOW-UP VASCULAR MED ANTICOAGULATION CLINIC  23     Star Centeno, ref for LOT/VTE mgmt, est 2023  Initially referred by Nirmal Gentile MD     Subjective      Interval hx/concerns: last seen 2024, feeling well, no new issues.    Had interval labs with improving gfr and h/h.  Completed immunotx, still seeing Dr. Jaramillo (heme/onc).        VTE disease / Anticoagulation:   Current agent: xarelto 20mg-  good adherence/tolerance, no complications  VTE PMHX /  PROVOKING FACTORS REVIEW:  Date of initiation of anticoagulation: 3/2024, prior use 2023   Type of Venous thromboembolic disease (VTE):   3/2024 - acute L gastroc v DVT   2023 -  incidental RML PE - asymptomatic   Initial visit hx/sx:    3/2024 - L > RLE swelling and admit to [facility] on 3/28/24 for new DVT, d/c home 3/29/24 in stable condition after restart on Eliquis   2023 had CTA for ascending AA surveillance and noted to have incidentally found RML PE.  Subsequent CTA showed resolution 2023.   No symptoms at the time of initial dx, felt well.    Denies current SOB, CP, leg swelling, edema, leg pain, cyanosis of digits, redness of extremities.  VTE tx course: Xarelto 15mg BID x 21d, then 20mg daily for about 3 weeks total Any personal VTE hx? No  Any family VTE hx?  twin brother indicating he had elevated factor VIII activity and prothrombin gene mutation with recurrent VTE on chronic VKA therapy.   MEN:  Hx of cancer or abnormal cancer screenings: bladder cancer   Hypercoagulability work-up completed?  Yes - positive hetero PTGM, mild high FVIII    CAD s/p PCI (): stable, no sx, seeing cardiology annually     HTN: Stable, tolerating meds, Home BP lo-110/70s, good adherence     HLD: Stable on Moderate intensity statin and zetia - tolerating, good adherence     Antithrombotic tx: ASA 81mg - no hx of bleeding      Current Outpatient Medications on File Prior to Visit   Medication Sig Dispense Refill    metoprolol SR (TOPROL  XL) 25 MG TABLET SR 24 HR Take 1 Tablet by mouth every day. Indications: Atrial Fibrillation, High Blood Pressure 90 Tablet 3    predniSONE (DELTASONE) 20 MG Tab Take 1 tablet by mouth every day 14 Tablet 0    rosuvastatin (CRESTOR) 5 MG Tab Take 1 tablet by mouth every day 90 Tablet 2    metFORMIN ER (GLUCOPHAGE XR) 500 MG TABLET SR 24 HR Take 1 Tablet by mouth 2 times a day. 180 Tablet 3    amoxicillin-clavulanate (AUGMENTIN) 875-125 MG Tab Take 1 Tablet by mouth 2 times a day. Pt started on 1/7/2025 for 5 day course  PRESCRIPTION COURSE WAS COMPLETED      diphenhydrAMINE (BENADRYL) 25 MG Tab Take 50 mg by mouth every 6 hours as needed for Itching.      vitamin D (D3-1000) 1000 UNIT Tab Take 1 Tablet by mouth every day. 90 Tablet 3    tamsulosin (FLOMAX) 0.4 MG capsule Take 1 capsule by mouth every day (Patient taking differently: Take 0.4 mg by mouth every day.) 90 Capsule 2    rivaroxaban (XARELTO) 20 MG Tab tablet Take 1 Tablet by mouth with dinner. To thin blood, prevent clots 30 Tablet 11    ezetimibe (ZETIA) 10 MG Tab Take 1 Tablet by mouth every day. Indications: High Amount of Fats in the Blood 90 Tablet 3    levothyroxine (SYNTHROID) 25 MCG Tab Take 1 Tablet by mouth every day for 360 days. 90 Tablet 3    ferrous sulfate 325 (65 Fe) MG tablet Take 1 tablet by mouth three times a week on mon/wed/fri (Patient taking differently: Take 325 mg by mouth see administration instructions. Pt takes on Mon, Wed, and Friday) 39 Tablet 3    sodium bicarbonate (SODIUM BICARBONATE) 650 MG Tab Take 2 tablets by mouth three times a day (Patient taking differently: Take 1,300 mg by mouth 2 times a day.) 540 Tablet 3    ALPRAZolam (XANAX) 0.25 MG Tab Take 0.25 mg by mouth 1 time a day as needed for Anxiety.      Loperamide HCl (IMODIUM A-D PO) Take 1 Tablet by mouth 2 times a day as needed (diarrhea). Indications: Diarrhea      Coenzyme Q10 (COQ-10 PO) Take 300 mg by mouth every evening. Indications: heart health       No  "current facility-administered medications on file prior to visit.      No Known Allergies    Social History     Tobacco Use    Smoking status: Former     Current packs/day: 0.00     Average packs/day: 1 pack/day for 45.0 years (45.0 ttl pk-yrs)     Types: Cigarettes     Start date: 1956     Quit date: 2001     Years since quittin.2    Smokeless tobacco: Never   Vaping Use    Vaping status: Never Used   Substance Use Topics    Alcohol use: Yes     Alcohol/week: 4.2 oz     Types: 7 Glasses of wine per week     Comment: 1 glass of wine/nightly    Drug use: Never     DIET AND EXERCISE:  Weight Change: increased  Diet: common adult  Exercise: no regular exercise program     Review of Systems   Constitutional:  Negative for chills, fever and malaise/fatigue.   HENT:  Negative for nosebleeds.    Respiratory:  Negative for cough, hemoptysis, sputum production, shortness of breath and wheezing.    Cardiovascular:  Negative for chest pain, palpitations, orthopnea, claudication, leg swelling and PND.   Gastrointestinal:  Positive for diarrhea. Negative for blood in stool.   Skin:  Positive for itching.   Endo/Heme/Allergies:  Does not bruise/bleed easily.       Objective    Vitals:    25 1121   BP: 92/54   BP Location: Left arm   Patient Position: Sitting   BP Cuff Size: Large adult   Pulse: 81   Weight: 85.7 kg (189 lb)   Height: 1.778 m (5' 10\")      BP Readings from Last 5 Encounters:   25 92/54   25 96/52   25 (!) 153/75   25 110/74   25 104/68      Body mass index is 27.12 kg/m².  Wt Readings from Last 3 Encounters:   25 85.7 kg (189 lb)   25 83.9 kg (185 lb)   25 87.8 kg (193 lb 9 oz)      Physical Exam  Vitals reviewed.   Constitutional:       Appearance: Normal appearance.   HENT:      Head: Normocephalic and atraumatic.      Nose: Nose normal.      Mouth/Throat:      Mouth: Mucous membranes are moist.      Pharynx: Oropharynx is clear.   Eyes:      " "Extraocular Movements: Extraocular movements intact.      Conjunctiva/sclera: Conjunctivae normal.   Cardiovascular:      Rate and Rhythm: Normal rate and regular rhythm.      Pulses: Normal pulses.           Carotid pulses are 2+ on the right side and 2+ on the left side.       Radial pulses are 2+ on the right side and 2+ on the left side.        Dorsalis pedis pulses are 2+ on the right side and 2+ on the left side.        Posterior tibial pulses are 2+ on the right side and 2+ on the left side.      Heart sounds: Normal heart sounds.      Comments:    Spider telangectasia:       RLE:  None      LLE: none   Varicosities:           RLE: none      LLE: none   Corona phlebectatica:      RLE:  None        LLE:  None   Cording:         RLE:  None     LLE: None   Pulmonary:      Effort: Pulmonary effort is normal.      Breath sounds: Normal breath sounds.   Musculoskeletal:         General: Normal range of motion.      Cervical back: Normal range of motion and neck supple.      Right lower leg: No edema.      Left lower leg: No edema.   Skin:     General: Skin is warm and dry.      Capillary Refill: Capillary refill takes less than 2 seconds.   Neurological:      General: No focal deficit present.      Mental Status: He is alert and oriented to person, place, and time. Mental status is at baseline.   Psychiatric:         Mood and Affect: Mood normal.         Behavior: Behavior normal.       Lab Results   Component Value Date    CHOLSTRLTOT 128 03/10/2025    LDL 47 03/10/2025    HDL 63 03/10/2025    TRIGLYCERIDE 91 03/10/2025      Lab Results   Component Value Date/Time    LDL 47 03/10/2025 08:40 AM    LDL 51 09/04/2024 08:58 AM    LDL 95 05/22/2023 08:34 AM    LDL 57 08/04/2022 11:00 AM       No results found for: \"LIPOPROTA\"   No results found for: \"APOB\"   No results found for: \"CRPHIGHSEN\"      Lab Results   Component Value Date    PROTHROMBTM 14.1 03/28/2024    INR 1.04 03/28/2024       Lab Results   Component " Value Date/Time    HBA1C 7.5 (A) 01/27/2025 11:58 AM    HBA1C 7.2 (A) 08/09/2024 01:53 PM    HBA1C 6.7 (H) 01/30/2024 01:13 PM       Lab Results   Component Value Date/Time    MALBCRT 79 (H) 04/09/2024 10:36 AM    MICROALBUR 6.9 04/09/2024 10:36 AM      Lab Results   Component Value Date    SODIUM 136 03/10/2025    POTASSIUM 4.3 03/10/2025    CHLORIDE 103 03/10/2025    CO2 22 03/10/2025    GLUCOSE 160 (H) 03/10/2025    BUN 33 (H) 03/10/2025    CREATININE 1.45 (H) 03/10/2025        Lab Results   Component Value Date    WBC 12.9 (H) 03/10/2025    RBC 3.95 (L) 03/10/2025    HEMOGLOBIN 12.5 (L) 03/10/2025    HEMATOCRIT 37.7 (L) 03/10/2025    MCV 95.4 03/10/2025    MCH 31.6 03/10/2025    MCHC 33.2 03/10/2025    MPV 9.2 03/10/2025      IMAGING:    CT chest 7/2021  1. Aneurysmal dilatation at the aortic root with maximal diameter of 4.3 cm, not appreciably changed from the prior study allowing for limitations of the study.   2.  Aortic and coronary artery calcifications.   3.  5 mm right lower lobe nodule, stable compared to 10/07/2020.   4.  Emphysematous changes.     CTA chest 10/2021  1.  Mild aortic root dilatation at the level of the sinuses of   Valsalva, 4.3 cm. The remainder of the ascending aorta measures   up to 3.9 cm, near the upper limit of normal.  Minimal fusiform ectasia of the isthmic segment, 3.3 cm.     Mild dilatation of the innominate and proximal right subclavian arteries, 1.8 cm and 1.6 cm, respectively.   2. Diffuse atherosclerosis. No evidence of aortic dissection. Few   incidental findings.     Echo 11/2022  No prior study is available for comparison.   Normal left ventricular size and systolic function.  Left ventricle ejection fraction estimated to be 60%.  No regional wall motion abnormality noted.  No hemodynamically significant valvular heart disease noted.  Estimated right ventricular systolic pressure is 35 mmHg.  Normal aortic root for body surface area.   The ascending aorta diameter is  3.2 cm.    CTA chest 9/2/23   Aorta and vasculature:  Ascending thoracic aorta measures 3.5 cm in maximum diameter without dissection. Descending thoracic aorta measures 3.1 cm in maximum diameter without dissection. Visualized upper abdominal aorta is normal in caliber without   dissection. Great vessel origins are patent with atherosclerotic plaque at the origins. Celiac axis and SMA are patent with moderate calcific plaque at both origins an estimated less than 50% stenosis.  1.  There is borderline aneurysmal dilatation of the descending thoracic aorta with ascending thoracic aorta upper limits of normal in caliber at 3.5 cm.  2.  No evidence of thoracic aortic dissection.  3.  incidentally noted is a nonocclusive right middle lobe pulmonary artery embolism.  4.  Lung parenchyma demonstrates changes suggestive of emphysema favored over interstitial lung disease.    BLE venous 10/2023   No deep venous thrombosis.     BLE venous 11/2023    No deep venous thrombosis.     CTPA 11/17/23  1.  No central or segmental pulmonary embolus or evidence of other acute intrathoracic pathology  2.  Emphysema  3.  Atherosclerosis    CT a/p 11/17/23   Vasculature: Diffuse calcific atherosclerotic plaque.  1.  Interval placement of a LEFT ureteral stent with reduced but persistent LEFT hydroureteronephrosis, perinephric fat stranding and delayed LEFT nephrogram suspicious for some degree of ongoing obstructive uropathy and possibly infection  2.  Gas in the LEFT  3.  Findings suspicious for cystitis renal collecting system and urinary bladder to be due to the recent instrumentation or infection.  4.  Atherosclerosis  5.  Colonic diverticulosis    BLE venous 3/2024   Acute deep venous thrombus within the left gastrocnemius vein which is    considered below the knee thrombus    No other deep venous thrombus within the left lower extremity or the right    lower extremity    Echo 3/2024  Normal left ventricular systolic function. The  left ventricular   ejection fraction is visually estimated to be 65%.   Mild concentric left ventricular hypertrophy.  Grade I diastolic dysfunction.  The right ventricle is normal in size and systolic function.  Mild aortic insufficiency.  Mild tricuspid regurgitation. Right atrial pressure is estimated to be   3 mmHg.   Estimated right ventricular systolic pressure is 43 mmHg (mild   pulmonary hypertension).   Compared to the prior study on 11/17/22, there is now mild aortic   regurgiation as well as mild pulmonary hypertension.     7/2024 Carotid   Mild bilateral internal carotid artery stenosis (<50%).     9/2024 CTA chest   1.  No evidence of aortic dissection or aneurysm.  2.  Ascending aortic diameter measures 2.5 x 3.4 cm.  3.  Atherosclerosis with coronary artery disease.  4.  Mild basilar predominant bilateral peripheral interstitial thickening which could indicate early fibrotic lung disease.  5.  Stable chronic T12 compression fracture.           Medical Decision Making:  Today's Assessment / Status / Plan:     1. Prothrombin gene mutation (HCC)  Basic Metabolic Panel    CBC WITHOUT DIFFERENTIAL      2. 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 Coronary artery disease involving native coronary artery of native heart without angina pectoris        5. Atherosclerosis of both carotid arteries        6. History of pulmonary embolism        7. Family history of clotting disorder        8. Pulmonary hypertension (HCC)        9. Celiac artery atherosclerosis        10. Status post coronary artery balloon dilation        11. Primary hypertension        12. CKD stage G3a/A2, GFR 45-59 and albumin creatinine ratio  mg/g        13. 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    Though presence of PGM is not an absolute indication of lifelong OAC, it does strengthen the case of it.    Plan:  - no imaging surveillance needed at this time  - antithrombotic plan as noted below   - counseled on signs and symptoms of acute VTE that require seeking prompt attention in the ED to include shortness of breath, chest pain, pain with deep inhalation, acute leg swelling and/or pain in calf or leg   - elevate legs as much as possible, use compression stockings/socks if directed by your provider  - Avoid hormonal therapies including estrogen or testosterone-containing meds, or raloxifene or tamoxifene (commonly used for osteoporosis)  - Avoid sedentary periods  - continue complete avoidance of tobacco products  - if having any invasive procedure,please make sure the doctor knows of your history of blood clots and current anticoagulation status    2) Ascending aortic  dilation in the past, however currently no CT evidence of aneurysmal dz on 9/2023 and 9/2024 CT  7/2021 CT = 4.3cm (out of state imaging)  10/2020 CTA = 4.3cm   9/2023 CTA = 3.5cm - sizing discrepancy from prior and indicates normal size   9/2024 CTA = 3.4cm - normal size   Plan: no further surveillance recommended     3) Descending Ao dilation -  prior mild dilation per past films, but recently normal x 2 yrs   9/2023 CTA = 3.1cm - normal size   9/2024 CTA = no dilation, normal size   Plan:  no further  surveillance recommended     4) CAD status post PCI to RCA and LCx (2002) - stable, no sx    Plan:  - continue secondary prevention med mgmt   - cardiology for ongoing care     5) mild TR, mild pHTN = 43mmHg - stable, no sx  Plan: ongoing care with cardiology     ANTITHROMBOTIC THERAPY:  Date of initiation:   9/2023 - only completed 3 weeks   3/2024 - restarted DOAC   HAS-BLED bleeding risk calc ([email or web address]): 2 pt, 4.1%, moderate risk   Factors to consider for indefinite OAC: Unprovoked VTE event and Male sex , active cancer therapy, hetero PTGM, mild high FVIII   Expected LOT: has completed 6mo full dose OAC (9/2024), through shared MDM in light of ongoing chronic risk factors and mild-mod thrombophilias, recommended for indefinitely therapy until consider clinically cured from cancer then can revisit LOT and determine ongoing needs   Plan:  - continue xarelto 20mg daily indefinitely, annual benefit/risk review  - stressed strict adherence to tx  - q6mo CBC, BMP (monitor CrCl) while on DOAC   - avoid Aspirin and NSAIDs while anticoagulated   - limit or avoid sports or high-risk for head injury activities to reduce risk for intracranial bleeds  - advised to seek urgent eval for any GI bleeding, stroke-like sx, or minor bleeding >30min duration   - advised reversal agents are available for all agents      LIPID MANAGEMENT:   Qualifies for Statin Therapy per  ACC/AHA Guidelines: yes, hx of MI, PCI  Major ASCVD events: None    High-risk conditions: >64yr old   Currently on Statin: Yes  Tx goals: LDL-C <70 reasonable   At goal? yes  Plan:   - reinforced ongoing lifestyle mgmt   - monitor labs   Meds:   - continue rosuva 5mg daily  - continue zetia 10mg daily     BLOOD PRESSURE CONTROL   Office BP Goal per ACC/AHA <130/80  Home BP at goal:  yes  Office BP at goal:  yes  24h ABPM:  not ordered to date   Plan:   - continue metoprolol 25mg ER daily     GLYCEMIC STATUS:  Diabetic, T2 with HLD, UACR  Goal A1c < 7.5 reasonable    Lab Results   Component Value Date/Time    HBA1C 7.5 (A) 01/27/2025 11:58 AM    HBA1C 7.2 (A) 08/09/2024 01:53 PM    HBA1C 6.7 (H) 01/30/2024 01:13 PM       Lab Results   Component Value Date/Time    MALBCRT 79 (H) 04/09/2024 10:36 AM    MICROALBUR 6.9 04/09/2024 10:36 AM   Plan:  - continue current medication plan   - recommmend for routine care with PCP (or endocrine) to include regular A1c monitoring, annual albumin/creatinine ratio (ACR), annual diabetic retinopathy screening, foot exams, annual flu vaccine, and updates to pneumonia vaccines as appropriate      LIFESTYLE INTERVENTIONS  TOBACCO: Stages of Change: Maintenance (sustained change >6mo)    reports that he quit smoking about 24 years ago. His smoking use included cigarettes. He started smoking about 69 years ago. He has a 45 pack-year smoking history. He has never used smokeless tobacco.   - continued complete avoidance of all tobacco products   PHYSICAL ACTIVITY: >150min/week of mod-intensity activity or as much as tolerated  NUTRITION: Mediterranean and reduce Na to 1500mg/day   ETOH: complete abstinence recommended  WT MGMT: maintain healthy weight    OTHER:    # CKD G3a-b/A2, stable and slight improvement   - avoid nephrotoxins  - continue HTN control with RAAS blockade   - monitor lytes/gfr routinely  - eval with nephrology if worsening over time     # hx of prostate CA (2001) - no prior VTE events     # bladder cancer, stage 2 - stable, persistent  - currently ongoing immunotherapy so considered active cancer for now   - still high risk for VTE events   Plan:  - continue f/u with oncology     # ureteral stent - in situ, pending retrieval     # anemia, normocytic, mild w/o sx - improving over time  Normal ferritin, iron studies.  No further worsening and has actually improved while on DOAC   Plan:  defer mgmt to PCP, heme/onc for ongoing mgmt     STUDIES: none   LABS: as noted above  FOLLOW-UP: 6 months, if ongoing stability, would recommended  ongoing surveillance and Rx mgmt with AC clinic thereafter     Onel Leonard M.D.  [facility]   [facility]   [phone]

## 2025-03-12 NOTE — Clinical Note
REFERRAL APPROVAL NOTICE         Sent on March 12, 2025                   Star Centeno  [address]  [place] [address]                   Dear Mr. Centeno,    After a careful review of the medical information and benefit coverage, [facility] has processed your referral. See below for additional details.    If applicable, you must be actively enrolled with your insurance for coverage of the authorized service. If you have any questions regarding your coverage, please contact your insurance directly.    REFERRAL INFORMATION   Referral #:  [id number]  Referred-To Provider    Referred-By Provider:  Otolaryngology    Nirmal Gentile M.D.   [facility]      [address]  [place] [address]  [phone] [address]  [place] [address]  [phone]    Referral Start Date:  03/06/2025  Referral End Date:   03/06/2026             SCHEDULING  If you do not already have an appointment, please call [phone] to make an appointment.     MORE INFORMATION  If you do not already have a [employer] account, sign up at: [email or web address]  You can access your medical information, make appointments, see lab results, billing information, and more.  If you have questions regarding this referral, please contact  the [facility] Referrals department at:             [phone]. Monday - Friday 8:00AM - 5:00PM.     Sincerely,    [facility]

## 2025-03-24 PROCEDURE — RXMED WILLOW AMBULATORY MEDICATION CHARGE: Performed by: PHYSICIAN ASSISTANT

## 2025-03-24 RX ORDER — PREDNISONE 20 MG/1
TABLET ORAL
Qty: 14 TABLET | Refills: 0 | Status: SHIPPED | OUTPATIENT
Start: 2025-03-24

## 2025-04-06 DIAGNOSIS — E11.69 TYPE 2 DIABETES MELLITUS WITH OTHER SPECIFIED COMPLICATION, WITHOUT LONG-TERM CURRENT USE OF INSULIN (HCC): ICD-10-CM

## 2025-04-07 ENCOUNTER — PHARMACY VISIT (OUTPATIENT)
Dept: PHARMACY | Facility: MEDICAL CENTER | Age: 85
End: 2025-04-07
Payer: COMMERCIAL

## 2025-04-07 PROCEDURE — RXMED WILLOW AMBULATORY MEDICATION CHARGE: Performed by: UROLOGY

## 2025-04-07 RX ORDER — METFORMIN HYDROCHLORIDE 500 MG/1
500 TABLET, EXTENDED RELEASE ORAL 2 TIMES DAILY
Qty: 180 TABLET | Refills: 3 | Status: SHIPPED | OUTPATIENT
Start: 2025-04-07

## 2025-04-13 RX ORDER — SODIUM BICARBONATE 650 MG/1
TABLET ORAL
Qty: 540 TABLET | Refills: 3 | Status: CANCELLED | OUTPATIENT
Start: 2025-04-13

## 2025-04-14 ENCOUNTER — DOCUMENTATION (OUTPATIENT)
Dept: VASCULAR LAB | Facility: MEDICAL CENTER | Age: 85
End: 2025-04-14
Payer: MEDICARE

## 2025-04-16 ENCOUNTER — TELEPHONE (OUTPATIENT)
Dept: MEDICAL GROUP | Facility: LAB | Age: 85
End: 2025-04-16
Payer: MEDICARE

## 2025-04-16 NOTE — TELEPHONE ENCOUNTER
Catarina from [facility] with Dr. Callaway called because she needed to know the two previous appointments that the patient had in the clinic with his PCP for Medicare to approve his visit at their clinic.

## 2025-04-17 PROCEDURE — RXMED WILLOW AMBULATORY MEDICATION CHARGE: Performed by: FAMILY MEDICINE

## 2025-04-18 ENCOUNTER — HOSPITAL ENCOUNTER (OUTPATIENT)
Dept: LAB | Facility: MEDICAL CENTER | Age: 85
End: 2025-04-18
Attending: INTERNAL MEDICINE
Payer: MEDICARE

## 2025-04-18 ENCOUNTER — PHARMACY VISIT (OUTPATIENT)
Dept: PHARMACY | Facility: MEDICAL CENTER | Age: 85
End: 2025-04-18
Payer: COMMERCIAL

## 2025-04-18 LAB
BASOPHILS # BLD AUTO: 0.3 % (ref 0–1.8)
BASOPHILS # BLD: 0.04 K/UL (ref 0–0.12)
EOSINOPHIL # BLD AUTO: 0.16 K/UL (ref 0–0.51)
EOSINOPHIL NFR BLD: 1.2 % (ref 0–6.9)
ERYTHROCYTE [DISTWIDTH] IN BLOOD BY AUTOMATED COUNT: 47.9 FL (ref 35.9–50)
HCT VFR BLD AUTO: 39.8 % (ref 42–52)
HGB BLD-MCNC: 13.6 G/DL (ref 14–18)
IMM GRANULOCYTES # BLD AUTO: 0.09 K/UL (ref 0–0.11)
IMM GRANULOCYTES NFR BLD AUTO: 0.7 % (ref 0–0.9)
LYMPHOCYTES # BLD AUTO: 1.86 K/UL (ref 1–4.8)
LYMPHOCYTES NFR BLD: 14.1 % (ref 22–41)
MCH RBC QN AUTO: 32.5 PG (ref 27–33)
MCHC RBC AUTO-ENTMCNC: 34.2 G/DL (ref 32.3–36.5)
MCV RBC AUTO: 95.2 FL (ref 81.4–97.8)
MONOCYTES # BLD AUTO: 0.87 K/UL (ref 0–0.85)
MONOCYTES NFR BLD AUTO: 6.6 % (ref 0–13.4)
NEUTROPHILS # BLD AUTO: 10.17 K/UL (ref 1.82–7.42)
NEUTROPHILS NFR BLD: 77.1 % (ref 44–72)
NRBC # BLD AUTO: 0 K/UL
NRBC BLD-RTO: 0 /100 WBC (ref 0–0.2)
PLATELET # BLD AUTO: 186 K/UL (ref 164–446)
PMV BLD AUTO: 9.9 FL (ref 9–12.9)
RBC # BLD AUTO: 4.18 M/UL (ref 4.7–6.1)
WBC # BLD AUTO: 13.2 K/UL (ref 4.8–10.8)

## 2025-04-18 PROCEDURE — 83615 LACTATE (LD) (LDH) ENZYME: CPT

## 2025-04-18 PROCEDURE — 85025 COMPLETE CBC W/AUTO DIFF WBC: CPT

## 2025-04-18 PROCEDURE — RXMED WILLOW AMBULATORY MEDICATION CHARGE: Performed by: INTERNAL MEDICINE

## 2025-04-18 PROCEDURE — 80053 COMPREHEN METABOLIC PANEL: CPT

## 2025-04-18 PROCEDURE — 36415 COLL VENOUS BLD VENIPUNCTURE: CPT

## 2025-04-18 PROCEDURE — 84443 ASSAY THYROID STIM HORMONE: CPT

## 2025-04-19 LAB
ALBUMIN SERPL BCP-MCNC: 3.8 G/DL (ref 3.2–4.9)
ALBUMIN/GLOB SERPL: 1.6 G/DL
ALP SERPL-CCNC: 62 U/L (ref 30–99)
ALT SERPL-CCNC: 17 U/L (ref 2–50)
ANION GAP SERPL CALC-SCNC: 14 MMOL/L (ref 7–16)
AST SERPL-CCNC: 15 U/L (ref 12–45)
BILIRUB SERPL-MCNC: 0.8 MG/DL (ref 0.1–1.5)
BUN SERPL-MCNC: 29 MG/DL (ref 8–22)
CALCIUM ALBUM COR SERPL-MCNC: 9.5 MG/DL (ref 8.5–10.5)
CALCIUM SERPL-MCNC: 9.3 MG/DL (ref 8.5–10.5)
CHLORIDE SERPL-SCNC: 106 MMOL/L (ref 96–112)
CO2 SERPL-SCNC: 19 MMOL/L (ref 20–33)
CREAT SERPL-MCNC: 1.61 MG/DL (ref 0.5–1.4)
GFR SERPLBLD CREATININE-BSD FMLA CKD-EPI: 41 ML/MIN/1.73 M 2
GLOBULIN SER CALC-MCNC: 2.4 G/DL (ref 1.9–3.5)
GLUCOSE SERPL-MCNC: 196 MG/DL (ref 65–99)
LDH SERPL L TO P-CCNC: 191 U/L (ref 107–266)
POTASSIUM SERPL-SCNC: 4.8 MMOL/L (ref 3.6–5.5)
PROT SERPL-MCNC: 6.2 G/DL (ref 6–8.2)
SODIUM SERPL-SCNC: 139 MMOL/L (ref 135–145)
TSH SERPL-ACNC: 6.58 UIU/ML (ref 0.38–5.33)

## 2025-04-21 PROCEDURE — RXMED WILLOW AMBULATORY MEDICATION CHARGE: Performed by: FAMILY MEDICINE

## 2025-04-23 ENCOUNTER — PHARMACY VISIT (OUTPATIENT)
Dept: PHARMACY | Facility: MEDICAL CENTER | Age: 85
End: 2025-04-23
Payer: COMMERCIAL

## 2025-04-24 PROCEDURE — RXMED WILLOW AMBULATORY MEDICATION CHARGE: Performed by: INTERNAL MEDICINE

## 2025-04-24 RX ORDER — SODIUM BICARBONATE 650 MG/1
TABLET ORAL
Qty: 540 TABLET | Refills: 3 | Status: CANCELLED | OUTPATIENT
Start: 2025-04-13

## 2025-04-27 PROCEDURE — RXMED WILLOW AMBULATORY MEDICATION CHARGE: Performed by: FAMILY MEDICINE

## 2025-04-28 ENCOUNTER — PHARMACY VISIT (OUTPATIENT)
Dept: PHARMACY | Facility: MEDICAL CENTER | Age: 85
End: 2025-04-28
Payer: COMMERCIAL

## 2025-04-28 PROCEDURE — RXMED WILLOW AMBULATORY MEDICATION CHARGE: Performed by: PHYSICIAN ASSISTANT

## 2025-04-28 PROCEDURE — RXMED WILLOW AMBULATORY MEDICATION CHARGE: Performed by: INTERNAL MEDICINE

## 2025-05-06 ENCOUNTER — PHARMACY VISIT (OUTPATIENT)
Dept: PHARMACY | Facility: MEDICAL CENTER | Age: 85
End: 2025-05-06
Payer: COMMERCIAL

## 2025-05-06 PROCEDURE — RXMED WILLOW AMBULATORY MEDICATION CHARGE: Performed by: FAMILY MEDICINE

## 2025-05-06 RX ORDER — SODIUM BICARBONATE 650 MG/1
TABLET ORAL
Qty: 540 TABLET | Refills: 3 | Status: SHIPPED | OUTPATIENT
Start: 2025-05-06

## 2025-05-11 RX ORDER — FERROUS SULFATE 325(65) MG
TABLET ORAL
Qty: 39 TABLET | Refills: 3 | Status: CANCELLED | OUTPATIENT
Start: 2025-05-11

## 2025-05-13 ENCOUNTER — HOSPITAL ENCOUNTER (OUTPATIENT)
Dept: LAB | Facility: MEDICAL CENTER | Age: 85
End: 2025-05-13
Attending: INTERNAL MEDICINE
Payer: MEDICARE

## 2025-05-13 LAB
25(OH)D3 SERPL-MCNC: 26 NG/ML (ref 30–100)
ALBUMIN SERPL BCP-MCNC: 4 G/DL (ref 3.2–4.9)
ANION GAP SERPL CALC-SCNC: 12 MMOL/L (ref 7–16)
APPEARANCE UR: CLEAR
BACTERIA #/AREA URNS HPF: ABNORMAL /HPF
BASOPHILS # BLD AUTO: 0.1 % (ref 0–1.8)
BASOPHILS # BLD: 0.01 K/UL (ref 0–0.12)
BILIRUB UR QL STRIP.AUTO: NEGATIVE
BUN SERPL-MCNC: 19 MG/DL (ref 8–22)
CALCIUM ALBUM COR SERPL-MCNC: 8.7 MG/DL (ref 8.5–10.5)
CALCIUM SERPL-MCNC: 8.7 MG/DL (ref 8.5–10.5)
CASTS URNS QL MICRO: ABNORMAL /LPF (ref 0–2)
CHLORIDE SERPL-SCNC: 105 MMOL/L (ref 96–112)
CO2 SERPL-SCNC: 21 MMOL/L (ref 20–33)
COLOR UR: YELLOW
CREAT SERPL-MCNC: 1.64 MG/DL (ref 0.5–1.4)
EOSINOPHIL # BLD AUTO: 0.25 K/UL (ref 0–0.51)
EOSINOPHIL NFR BLD: 3.5 % (ref 0–6.9)
EPITHELIAL CELLS 1715: ABNORMAL /HPF (ref 0–5)
ERYTHROCYTE [DISTWIDTH] IN BLOOD BY AUTOMATED COUNT: 48 FL (ref 35.9–50)
FERRITIN SERPL-MCNC: 178 NG/ML (ref 22–322)
GFR SERPLBLD CREATININE-BSD FMLA CKD-EPI: 40 ML/MIN/1.73 M 2
GLUCOSE SERPL-MCNC: 186 MG/DL (ref 65–99)
GLUCOSE UR STRIP.AUTO-MCNC: NEGATIVE MG/DL
HCT VFR BLD AUTO: 40.1 % (ref 42–52)
HGB BLD-MCNC: 13.7 G/DL (ref 14–18)
IMM GRANULOCYTES # BLD AUTO: 0.04 K/UL (ref 0–0.11)
IMM GRANULOCYTES NFR BLD AUTO: 0.6 % (ref 0–0.9)
IRON SATN MFR SERPL: 15 % (ref 15–55)
IRON SERPL-MCNC: 40 UG/DL (ref 50–180)
KETONES UR STRIP.AUTO-MCNC: ABNORMAL MG/DL
LEUKOCYTE ESTERASE UR QL STRIP.AUTO: ABNORMAL
LYMPHOCYTES # BLD AUTO: 0.61 K/UL (ref 1–4.8)
LYMPHOCYTES NFR BLD: 8.6 % (ref 22–41)
MCH RBC QN AUTO: 32.3 PG (ref 27–33)
MCHC RBC AUTO-ENTMCNC: 34.2 G/DL (ref 32.3–36.5)
MCV RBC AUTO: 94.6 FL (ref 81.4–97.8)
MICRO URNS: ABNORMAL
MONOCYTES # BLD AUTO: 0.5 K/UL (ref 0–0.85)
MONOCYTES NFR BLD AUTO: 7 % (ref 0–13.4)
NEUTROPHILS # BLD AUTO: 5.69 K/UL (ref 1.82–7.42)
NEUTROPHILS NFR BLD: 80.2 % (ref 44–72)
NITRITE UR QL STRIP.AUTO: NEGATIVE
NRBC # BLD AUTO: 0 K/UL
NRBC BLD-RTO: 0 /100 WBC (ref 0–0.2)
PH UR STRIP.AUTO: 6 [PH] (ref 5–8)
PHOSPHATE SERPL-MCNC: 2.6 MG/DL (ref 2.5–4.5)
PLATELET # BLD AUTO: 227 K/UL (ref 164–446)
PMV BLD AUTO: 9.6 FL (ref 9–12.9)
POTASSIUM SERPL-SCNC: 4.2 MMOL/L (ref 3.6–5.5)
PROT UR QL STRIP: 100 MG/DL
RBC # BLD AUTO: 4.24 M/UL (ref 4.7–6.1)
RBC # URNS HPF: ABNORMAL /HPF (ref 0–2)
RBC UR QL AUTO: ABNORMAL
SODIUM SERPL-SCNC: 138 MMOL/L (ref 135–145)
SP GR UR STRIP.AUTO: 1.02
TIBC SERPL-MCNC: 261 UG/DL (ref 250–450)
UIBC SERPL-MCNC: 221 UG/DL (ref 110–370)
UROBILINOGEN UR STRIP.AUTO-MCNC: 0.2 EU/DL
WBC # BLD AUTO: 7.1 K/UL (ref 4.8–10.8)
WBC #/AREA URNS HPF: ABNORMAL /HPF

## 2025-05-13 PROCEDURE — 80069 RENAL FUNCTION PANEL: CPT

## 2025-05-13 PROCEDURE — 84156 ASSAY OF PROTEIN URINE: CPT

## 2025-05-13 PROCEDURE — 83550 IRON BINDING TEST: CPT

## 2025-05-13 PROCEDURE — 81001 URINALYSIS AUTO W/SCOPE: CPT

## 2025-05-13 PROCEDURE — 82306 VITAMIN D 25 HYDROXY: CPT

## 2025-05-13 PROCEDURE — 82728 ASSAY OF FERRITIN: CPT

## 2025-05-13 PROCEDURE — 36415 COLL VENOUS BLD VENIPUNCTURE: CPT

## 2025-05-13 PROCEDURE — 85025 COMPLETE CBC W/AUTO DIFF WBC: CPT

## 2025-05-13 PROCEDURE — 82570 ASSAY OF URINE CREATININE: CPT

## 2025-05-13 PROCEDURE — 83540 ASSAY OF IRON: CPT

## 2025-05-14 LAB
CREAT UR-MCNC: 174 MG/DL
PROT UR-MCNC: 60.5 MG/DL (ref 0–15)
PROT/CREAT UR: 348 MG/G (ref 15–68)

## 2025-05-20 ENCOUNTER — PHARMACY VISIT (OUTPATIENT)
Dept: PHARMACY | Facility: MEDICAL CENTER | Age: 85
End: 2025-05-20
Payer: COMMERCIAL

## 2025-05-20 ENCOUNTER — PATIENT MESSAGE (OUTPATIENT)
Dept: MEDICAL GROUP | Facility: LAB | Age: 85
End: 2025-05-20
Payer: MEDICARE

## 2025-05-20 PROCEDURE — RXMED WILLOW AMBULATORY MEDICATION CHARGE: Performed by: INTERNAL MEDICINE

## 2025-05-20 RX ORDER — FERROUS SULFATE 325(65) MG
TABLET ORAL
Qty: 90 TABLET | Refills: 3 | OUTPATIENT
Start: 2025-05-20

## 2025-05-20 RX ORDER — CHOLECALCIFEROL (VITAMIN D3) 25 MCG
TABLET ORAL
Qty: 180 TABLET | Refills: 3 | OUTPATIENT
Start: 2025-05-20

## 2025-05-30 ENCOUNTER — TELEPHONE (OUTPATIENT)
Dept: CARDIOLOGY | Facility: MEDICAL CENTER | Age: 85
End: 2025-05-30
Payer: MEDICARE

## 2025-07-14 ENCOUNTER — HOSPITAL ENCOUNTER (OUTPATIENT)
Dept: LAB | Facility: MEDICAL CENTER | Age: 85
End: 2025-07-14
Attending: INTERNAL MEDICINE
Payer: MEDICARE

## 2025-07-14 LAB
ALBUMIN SERPL BCP-MCNC: 4.1 G/DL (ref 3.2–4.9)
ALBUMIN/GLOB SERPL: 1.8 G/DL
ALP SERPL-CCNC: 60 U/L (ref 30–99)
ALT SERPL-CCNC: 12 U/L (ref 2–50)
ANION GAP SERPL CALC-SCNC: 14 MMOL/L (ref 7–16)
AST SERPL-CCNC: 15 U/L (ref 12–45)
BASOPHILS # BLD AUTO: 0.3 % (ref 0–1.8)
BASOPHILS # BLD: 0.03 K/UL (ref 0–0.12)
BILIRUB SERPL-MCNC: 0.5 MG/DL (ref 0.1–1.5)
BUN SERPL-MCNC: 18 MG/DL (ref 8–22)
CALCIUM ALBUM COR SERPL-MCNC: 8.9 MG/DL (ref 8.5–10.5)
CALCIUM SERPL-MCNC: 9 MG/DL (ref 8.5–10.5)
CHLORIDE SERPL-SCNC: 101 MMOL/L (ref 96–112)
CO2 SERPL-SCNC: 21 MMOL/L (ref 20–33)
CREAT SERPL-MCNC: 1.49 MG/DL (ref 0.5–1.4)
EOSINOPHIL # BLD AUTO: 0.6 K/UL (ref 0–0.51)
EOSINOPHIL NFR BLD: 6.6 % (ref 0–6.9)
ERYTHROCYTE [DISTWIDTH] IN BLOOD BY AUTOMATED COUNT: 45.9 FL (ref 35.9–50)
GFR SERPLBLD CREATININE-BSD FMLA CKD-EPI: 45 ML/MIN/1.73 M 2
GLOBULIN SER CALC-MCNC: 2.3 G/DL (ref 1.9–3.5)
GLUCOSE SERPL-MCNC: 140 MG/DL (ref 65–99)
HCT VFR BLD AUTO: 42 % (ref 42–52)
HGB BLD-MCNC: 13.8 G/DL (ref 14–18)
IMM GRANULOCYTES # BLD AUTO: 0.04 K/UL (ref 0–0.11)
IMM GRANULOCYTES NFR BLD AUTO: 0.4 % (ref 0–0.9)
LYMPHOCYTES # BLD AUTO: 1.54 K/UL (ref 1–4.8)
LYMPHOCYTES NFR BLD: 17 % (ref 22–41)
MCH RBC QN AUTO: 31.4 PG (ref 27–33)
MCHC RBC AUTO-ENTMCNC: 32.9 G/DL (ref 32.3–36.5)
MCV RBC AUTO: 95.5 FL (ref 81.4–97.8)
MONOCYTES # BLD AUTO: 0.64 K/UL (ref 0–0.85)
MONOCYTES NFR BLD AUTO: 7 % (ref 0–13.4)
NEUTROPHILS # BLD AUTO: 6.23 K/UL (ref 1.82–7.42)
NEUTROPHILS NFR BLD: 68.7 % (ref 44–72)
NRBC # BLD AUTO: 0 K/UL
NRBC BLD-RTO: 0 /100 WBC (ref 0–0.2)
PLATELET # BLD AUTO: 249 K/UL (ref 164–446)
PMV BLD AUTO: 9.7 FL (ref 9–12.9)
POTASSIUM SERPL-SCNC: 4.3 MMOL/L (ref 3.6–5.5)
PROT SERPL-MCNC: 6.4 G/DL (ref 6–8.2)
RBC # BLD AUTO: 4.4 M/UL (ref 4.7–6.1)
SODIUM SERPL-SCNC: 136 MMOL/L (ref 135–145)
TSH SERPL-ACNC: 6.14 UIU/ML (ref 0.38–5.33)
WBC # BLD AUTO: 9.1 K/UL (ref 4.8–10.8)

## 2025-07-14 PROCEDURE — 84443 ASSAY THYROID STIM HORMONE: CPT

## 2025-07-14 PROCEDURE — 85025 COMPLETE CBC W/AUTO DIFF WBC: CPT

## 2025-07-14 PROCEDURE — 80053 COMPREHEN METABOLIC PANEL: CPT

## 2025-07-14 PROCEDURE — 36415 COLL VENOUS BLD VENIPUNCTURE: CPT

## 2025-07-21 PROCEDURE — RXMED WILLOW AMBULATORY MEDICATION CHARGE: Performed by: FAMILY MEDICINE

## 2025-07-22 PROCEDURE — RXMED WILLOW AMBULATORY MEDICATION CHARGE: Performed by: INTERNAL MEDICINE

## 2025-07-23 ENCOUNTER — PHARMACY VISIT (OUTPATIENT)
Dept: PHARMACY | Facility: MEDICAL CENTER | Age: 85
End: 2025-07-23
Payer: COMMERCIAL

## 2025-07-24 ENCOUNTER — HOSPITAL ENCOUNTER (OUTPATIENT)
Dept: RADIOLOGY | Facility: MEDICAL CENTER | Age: 85
End: 2025-07-24
Payer: MEDICARE

## 2025-07-28 PROCEDURE — RXMED WILLOW AMBULATORY MEDICATION CHARGE: Performed by: FAMILY MEDICINE

## 2025-07-28 RX ORDER — TAMSULOSIN HYDROCHLORIDE 0.4 MG/1
CAPSULE ORAL
Qty: 90 CAPSULE | Refills: 0 | Status: SHIPPED | OUTPATIENT
Start: 2025-07-28

## 2025-07-29 PROCEDURE — RXMED WILLOW AMBULATORY MEDICATION CHARGE: Performed by: PHYSICIAN ASSISTANT

## 2025-07-30 ENCOUNTER — PHARMACY VISIT (OUTPATIENT)
Dept: PHARMACY | Facility: MEDICAL CENTER | Age: 85
End: 2025-07-30
Payer: COMMERCIAL

## 2025-08-05 ENCOUNTER — PHARMACY VISIT (OUTPATIENT)
Dept: PHARMACY | Facility: MEDICAL CENTER | Age: 85
End: 2025-08-05
Payer: COMMERCIAL

## 2025-08-05 PROCEDURE — RXMED WILLOW AMBULATORY MEDICATION CHARGE: Performed by: UROLOGY

## 2025-08-07 ENCOUNTER — APPOINTMENT (OUTPATIENT)
Dept: RADIOLOGY | Facility: IMAGING CENTER | Age: 85
End: 2025-08-07
Attending: NURSE PRACTITIONER
Payer: MEDICARE

## 2025-08-07 ENCOUNTER — OFFICE VISIT (OUTPATIENT)
Dept: URGENT CARE | Facility: CLINIC | Age: 85
End: 2025-08-07
Payer: MEDICARE

## 2025-08-07 VITALS
BODY MASS INDEX: 26.46 KG/M2 | HEIGHT: 71 IN | WEIGHT: 189 LBS | RESPIRATION RATE: 16 BRPM | SYSTOLIC BLOOD PRESSURE: 128 MMHG | TEMPERATURE: 97.1 F | OXYGEN SATURATION: 94 % | HEART RATE: 72 BPM | DIASTOLIC BLOOD PRESSURE: 82 MMHG

## 2025-08-07 DIAGNOSIS — M25.532 LEFT WRIST PAIN: ICD-10-CM

## 2025-08-07 DIAGNOSIS — M25.432 WRIST SWELLING, LEFT: ICD-10-CM

## 2025-08-07 DIAGNOSIS — M25.532 LEFT WRIST PAIN: Primary | ICD-10-CM

## 2025-08-07 DIAGNOSIS — M19.032 OSTEOARTHRITIS OF LEFT WRIST, UNSPECIFIED OSTEOARTHRITIS TYPE: ICD-10-CM

## 2025-08-07 PROCEDURE — 73110 X-RAY EXAM OF WRIST: CPT | Mod: TC,FY,LT | Performed by: RADIOLOGY

## 2025-08-07 PROCEDURE — 3074F SYST BP LT 130 MM HG: CPT | Performed by: NURSE PRACTITIONER

## 2025-08-07 PROCEDURE — 3079F DIAST BP 80-89 MM HG: CPT | Performed by: NURSE PRACTITIONER

## 2025-08-07 PROCEDURE — RXMED WILLOW AMBULATORY MEDICATION CHARGE: Performed by: NURSE PRACTITIONER

## 2025-08-07 PROCEDURE — 99213 OFFICE O/P EST LOW 20 MIN: CPT | Performed by: NURSE PRACTITIONER

## 2025-08-07 ASSESSMENT — FIBROSIS 4 INDEX: FIB4 SCORE: 1.5

## 2025-08-13 ENCOUNTER — PHARMACY VISIT (OUTPATIENT)
Dept: PHARMACY | Facility: MEDICAL CENTER | Age: 85
End: 2025-08-13
Payer: COMMERCIAL

## 2025-08-18 PROCEDURE — RXMED WILLOW AMBULATORY MEDICATION CHARGE: Performed by: INTERNAL MEDICINE

## 2025-08-18 PROCEDURE — RXMED WILLOW AMBULATORY MEDICATION CHARGE: Performed by: FAMILY MEDICINE

## 2025-08-20 ENCOUNTER — OFFICE VISIT (OUTPATIENT)
Dept: MEDICAL GROUP | Facility: LAB | Age: 85
End: 2025-08-20
Payer: MEDICARE

## 2025-08-20 VITALS
TEMPERATURE: 98.1 F | HEIGHT: 71 IN | SYSTOLIC BLOOD PRESSURE: 120 MMHG | BODY MASS INDEX: 25.9 KG/M2 | HEART RATE: 74 BPM | RESPIRATION RATE: 14 BRPM | WEIGHT: 185 LBS | DIASTOLIC BLOOD PRESSURE: 72 MMHG | OXYGEN SATURATION: 92 %

## 2025-08-20 DIAGNOSIS — I10 PRIMARY HYPERTENSION: ICD-10-CM

## 2025-08-20 DIAGNOSIS — E11.69 TYPE 2 DIABETES MELLITUS WITH OTHER SPECIFIED COMPLICATION, WITHOUT LONG-TERM CURRENT USE OF INSULIN (HCC): Primary | ICD-10-CM

## 2025-08-20 DIAGNOSIS — N18.31 CKD STAGE G3A/A2, GFR 45-59 AND ALBUMIN CREATININE RATIO 30-299 MG/G: ICD-10-CM

## 2025-08-20 DIAGNOSIS — E03.9 ACQUIRED HYPOTHYROIDISM: ICD-10-CM

## 2025-08-20 DIAGNOSIS — I70.0 AORTIC ATHEROSCLEROSIS (HCC): ICD-10-CM

## 2025-08-20 DIAGNOSIS — I77.810 AORTIC ROOT DILATION (HCC): ICD-10-CM

## 2025-08-20 DIAGNOSIS — I51.7 LVH (LEFT VENTRICULAR HYPERTROPHY): ICD-10-CM

## 2025-08-20 DIAGNOSIS — D68.52 PROTHROMBIN GENE MUTATION (HCC): ICD-10-CM

## 2025-08-20 DIAGNOSIS — I27.20 PULMONARY HYPERTENSION (HCC): ICD-10-CM

## 2025-08-20 DIAGNOSIS — I24.0 ISCHEMIC HEART DISEASE DUE TO CORONARY ARTERY OBSTRUCTION (HCC): ICD-10-CM

## 2025-08-20 DIAGNOSIS — Z79.01 CHRONIC ANTICOAGULATION: ICD-10-CM

## 2025-08-20 DIAGNOSIS — C66.2 MALIGNANT NEOPLASM OF LEFT URETER (HCC): ICD-10-CM

## 2025-08-20 DIAGNOSIS — Z00.00 ANNUAL WELLNESS VISIT: ICD-10-CM

## 2025-08-20 DIAGNOSIS — I50.32 CHRONIC DIASTOLIC HEART FAILURE (HCC): Chronic | ICD-10-CM

## 2025-08-20 DIAGNOSIS — C67.8 MALIGNANT NEOPLASM OF OVERLAPPING SITES OF BLADDER (HCC): ICD-10-CM

## 2025-08-20 DIAGNOSIS — I72.0 CAROTID ARTERY ANEURYSM (HCC): ICD-10-CM

## 2025-08-20 DIAGNOSIS — I25.9 ISCHEMIC HEART DISEASE DUE TO CORONARY ARTERY OBSTRUCTION (HCC): ICD-10-CM

## 2025-08-20 PROBLEM — N32.89 MASS OF URINARY BLADDER: Status: RESOLVED | Noted: 2023-11-13 | Resolved: 2025-08-20

## 2025-08-20 PROBLEM — E78.2 MIXED DYSLIPIDEMIA: Status: RESOLVED | Noted: 2024-05-30 | Resolved: 2025-08-20

## 2025-08-20 PROBLEM — S60.00XA CONTUSION, FINGER: Status: RESOLVED | Noted: 2024-05-30 | Resolved: 2025-08-20

## 2025-08-20 PROBLEM — S22.080A COMPRESSION FRACTURE OF T12 VERTEBRA (HCC): Status: RESOLVED | Noted: 2018-11-27 | Resolved: 2025-08-20

## 2025-08-20 PROBLEM — R19.7 DIARRHEA: Status: RESOLVED | Noted: 2024-03-03 | Resolved: 2025-08-20

## 2025-08-20 PROBLEM — I82.409 DEEP VEIN THROMBOSIS (HCC): Status: RESOLVED | Noted: 2024-04-22 | Resolved: 2025-08-20

## 2025-08-20 PROBLEM — R93.5 ABNORMAL CT SCAN, PELVIS: Status: RESOLVED | Noted: 2024-03-03 | Resolved: 2025-08-20

## 2025-08-20 PROBLEM — L23.9 CONTACT DERMATITIS, ALLERGIC: Status: RESOLVED | Noted: 2024-05-30 | Resolved: 2025-08-20

## 2025-08-20 PROBLEM — I82.462 ACUTE DEEP VEIN THROMBOSIS (DVT) OF CALF MUSCLE VEIN OF LEFT LOWER EXTREMITY (HCC): Status: RESOLVED | Noted: 2024-03-28 | Resolved: 2025-08-20

## 2025-08-20 PROBLEM — N18.32 STAGE 3B CHRONIC KIDNEY DISEASE: Chronic | Status: RESOLVED | Noted: 2023-10-30 | Resolved: 2025-08-20

## 2025-08-20 PROBLEM — Z83.2 FAMILY HISTORY OF CLOTTING DISORDER: Status: RESOLVED | Noted: 2024-07-02 | Resolved: 2025-08-20

## 2025-08-20 PROBLEM — R09.02 HYPOXIA: Status: RESOLVED | Noted: 2024-02-02 | Resolved: 2025-08-20

## 2025-08-20 PROBLEM — I25.10 CORONARY ARTERY CALCIFICATION SEEN ON CT SCAN: Status: RESOLVED | Noted: 2023-03-09 | Resolved: 2025-08-20

## 2025-08-20 PROBLEM — N13.30 HYDRONEPHROSIS, LEFT: Status: RESOLVED | Noted: 2023-10-21 | Resolved: 2025-08-20

## 2025-08-20 LAB
HBA1C MFR BLD: 7.3 % (ref ?–5.8)
POCT INT CON NEG: NEGATIVE
POCT INT CON POS: POSITIVE

## 2025-08-20 PROCEDURE — 3074F SYST BP LT 130 MM HG: CPT | Performed by: FAMILY MEDICINE

## 2025-08-20 PROCEDURE — 83036 HEMOGLOBIN GLYCOSYLATED A1C: CPT | Performed by: FAMILY MEDICINE

## 2025-08-20 PROCEDURE — 3078F DIAST BP <80 MM HG: CPT | Performed by: FAMILY MEDICINE

## 2025-08-20 PROCEDURE — G0439 PPPS, SUBSEQ VISIT: HCPCS | Performed by: FAMILY MEDICINE

## 2025-08-20 RX ORDER — LEVOTHYROXINE SODIUM 25 UG/1
25 TABLET ORAL
COMMUNITY

## 2025-08-20 ASSESSMENT — ACTIVITIES OF DAILY LIVING (ADL): BATHING_REQUIRES_ASSISTANCE: 0

## 2025-08-20 ASSESSMENT — FIBROSIS 4 INDEX: FIB4 SCORE: 1.5

## 2025-08-20 ASSESSMENT — PATIENT HEALTH QUESTIONNAIRE - PHQ9: CLINICAL INTERPRETATION OF PHQ2 SCORE: 0

## 2025-08-20 ASSESSMENT — ENCOUNTER SYMPTOMS: GENERAL WELL-BEING: GOOD

## 2025-08-22 ENCOUNTER — PHARMACY VISIT (OUTPATIENT)
Dept: PHARMACY | Facility: MEDICAL CENTER | Age: 85
End: 2025-08-22
Payer: COMMERCIAL

## (undated) DEVICE — SUTURE 3-0 VICRYL PLUS RB-1 - 8 X 18 INCH (12/BX)

## (undated) DEVICE — FORCEPS BIOPSY SPYBITE

## (undated) DEVICE — CLIP HEMOLOCK PURPLE - (14/BX)

## (undated) DEVICE — CATHETER URTH 18FR 5ML FOLEY 2W (12/CA)

## (undated) DEVICE — AIRLESS LAP SPRAY TIP VISTASEAL (3EA/BX)

## (undated) DEVICE — CATHETER URETHRAL FOLEY SILICONE OD18 FR 10 ML (10EA/CA)

## (undated) DEVICE — BAG URODRAIN WITH TUBING - (20/CA)

## (undated) DEVICE — TROCAR 5X100 NON BLADED Z-TH - READ KII (6/BX)

## (undated) DEVICE — GLOVE BIOGEL SZ 7.5 SURGICAL PF LTX - (50PR/BX 4BX/CA)

## (undated) DEVICE — DRAPE TABLE UROLOGY DRAINAGE (20EA/BX)

## (undated) DEVICE — GLOVE BIOGEL PI INDICATOR SZ 7.0 SURGICAL PF LF - (50/BX 4BX/CA)

## (undated) DEVICE — SET EXTENSION WITH 2 PORTS (48EA/CA) ***PART #2C8610 IS A SUBSTITUTE*****

## (undated) DEVICE — SYRINGE 30 ML LL (56/BX)

## (undated) DEVICE — SEAL 5MM-8MM UNIVERSAL  BOX OF 10

## (undated) DEVICE — SET IRRIGATION CYSTOSCOPY Y-TYPE L81 IN (20EA/CA)

## (undated) DEVICE — TRAY IRRI.W/PISTON SYRINGE & - GRADUATE  (20EA/CA)

## (undated) DEVICE — SUTURE 4-0 VICRYL PLUS PC-3 18 (12PK/BX)"

## (undated) DEVICE — PACK DAVINCI PROSTATECTOMY - (1EA/CA)

## (undated) DEVICE — SUTURE 2-0 20CM STRATAFIX SPIRAL SH NEEDLE (12/BX)

## (undated) DEVICE — STAPLER SKIN DISP - (6/BX 10BX/CA) VISISTAT

## (undated) DEVICE — ELECTRODE DUAL RETURN W/ CORD - (50/PK)

## (undated) DEVICE — SUCTION INSTRUMENT YANKAUER BULBOUS TIP W/O VENT (50EA/CA)

## (undated) DEVICE — SUTURE 0 VICRYL PLUS CT-1 - 36 INCH (36/BX)

## (undated) DEVICE — SUTURE 3-0 VICRYL PLUS SH - 27 INCH (36/BX)

## (undated) DEVICE — GLOVE BIOGEL INDICATOR SZ 8 SURGICAL PF LTX - (50/BX 4BX/CA)

## (undated) DEVICE — HUMID-VENT HEAT AND MOISTURE EXCHANGE- (50/BX)

## (undated) DEVICE — TRAY CATHETER FOLEY URINE METER W/STATLOCK 350ML (10EA/CA)

## (undated) DEVICE — SODIUM CHL IRRIGATION 0.9% 1000ML (12EA/CA)

## (undated) DEVICE — SUTURE GENERAL

## (undated) DEVICE — BAG DRAINAGE ANTIREFLUX TOWER SLIDE TAP 4000 ML (20EA/CA)

## (undated) DEVICE — GLOVE SZ 6.5 BIOGEL PI MICRO - PF LF (50PR/BX)

## (undated) DEVICE — COVER LIGHT HANDLE ALC PLUS DISP (18EA/BX)

## (undated) DEVICE — PEN SKIN MARKER W/RULER - (50EA/BX)

## (undated) DEVICE — CONTAINER SPECIMEN BAG OR - STERILE 4 OZ W/LID (100EA/CA)

## (undated) DEVICE — DRESSING NON-ADHERING 8 X 3 - (50/BX)

## (undated) DEVICE — GOWN SURGEONS X-LARGE - DISP. (30/CA)

## (undated) DEVICE — TUBE CONNECTING SUCTION - CLEAR PLASTIC STERILE 72 IN (50EA/CA)

## (undated) DEVICE — SODIUM CHL. IRRIGATION 0.9% 3000ML (4EA/CA 65CA/PF)

## (undated) DEVICE — GOWN SURGICAL X-LARGE ULTRA - FILM-REINFORCED (20/CA)

## (undated) DEVICE — SYSTEM CLEARIFY VISUALIZATION (10EA/PK)

## (undated) DEVICE — SLEEVE, VASO, THIGH, MED

## (undated) DEVICE — SET LEADWIRE 5 LEAD BEDSIDE DISPOSABLE ECG (1SET OF 5/EA)

## (undated) DEVICE — DRAPE ARM  BOX OF 20

## (undated) DEVICE — SUTURE 2-0 SILK SH (36PK/BX)

## (undated) DEVICE — RESERVOIR SUCTION 100 CC - SILICONE (20EA/CA)

## (undated) DEVICE — SYRINGE 50ML CATHETER TIP (40EA/BX 4BX/CA)

## (undated) DEVICE — WATER IRRIGATION STERILE 1000ML (12EA/CA)

## (undated) DEVICE — ARMREST CRADLE FOAM - (2PR/PK 12PR/CA)

## (undated) DEVICE — SENSOR OXIMETER ADULT SPO2 RD SET (20EA/BX)

## (undated) DEVICE — COVER TIP ENDOWRIST HOT SHEAR - (10EA/BX) DA VINCI

## (undated) DEVICE — SPONGE GAUZESTER 4 X 4 4PLY - (128PK/CA)

## (undated) DEVICE — WATER IRRIG. STER 3000 ML - (4/CA)

## (undated) DEVICE — DRAPE COLUMN  BOX OF 20

## (undated) DEVICE — PACK CYSTOSCOPY III - (8/CA)

## (undated) DEVICE — Device

## (undated) DEVICE — JELLY SURGILUBE STERILE FOIL 5 GM (150EA/BX)

## (undated) DEVICE — SUTURE 3-0 STRATAFIX SPIRAL PDS PLUS SH 15CM (12EA/BX)

## (undated) DEVICE — TUBE CONNECT SUCTION CLEAR 120 X 1/4" (50EA/CA)"

## (undated) DEVICE — TOWELS CLOTH SURGICAL - (4/PK 20PK/CA)

## (undated) DEVICE — LACTATED RINGERS INJ 1000 ML - (14EA/CA 60CA/PF)

## (undated) DEVICE — BAG RETRIEVAL 5MM (10EA/BX)

## (undated) DEVICE — TROCAR Z THREAD12MM OPTICAL - NON BLADED (6/BX)

## (undated) DEVICE — NEEDLE INSFL 120MM 14GA VRRS - (20/BX)

## (undated) DEVICE — GLOVE BIOGEL INDICATOR SZ 6.5 SURGICAL PF LTX - (50PR/BX 4BX/CA)

## (undated) DEVICE — WIRE GUIDE SENSOR DUAL FLEX - 5/BX

## (undated) DEVICE — SCISSORS 5MM CVD (6EA/BX)

## (undated) DEVICE — BAG RETRIEVAL 12/15 MM INZII (5EA/CA) THIS WILL REPLACE ITEM 75018

## (undated) DEVICE — TOWEL STOP TIMEOUT SAFETY FLAG (40EA/CA)

## (undated) DEVICE — BASKET ZERO 1.9FR X 120CM

## (undated) DEVICE — TUBING CLEARLINK DUO-VENT - C-FLO (48EA/CA)

## (undated) DEVICE — CANISTER SUCTION 3000ML MECHANICAL FILTER AUTO SHUTOFF MEDI-VAC NONSTERILE LF DISP  (40EA/CA)

## (undated) DEVICE — GOWN WARMING STANDARD FLEX - (30/CA)

## (undated) DEVICE — BLANKET WARMING LOWER BODY (10EA/CA)

## (undated) DEVICE — DRESSING TRANSPARENT FILM TEGADERM 4 X 4.75" (50EA/BX)"

## (undated) DEVICE — DRAIN J-VAC 19FR. ROUND - (10/CS)

## (undated) DEVICE — PACK TRENGUARD 450 PROCEDURE (12EA/CA)

## (undated) DEVICE — BLADE SURGICAL #15 - (50/BX 3BX/CA)

## (undated) DEVICE — CANISTER SUCTION RIGID RED 1500CC (40EA/CA)

## (undated) DEVICE — CATHETER URETHRAL FOLEY SILICONE 20 FR 30 ML 3-WAY

## (undated) DEVICE — VESSELOOP MINI BLUE STERILE - SURG-I-LOOP (10EA/BX)

## (undated) DEVICE — SEALER VESSEL EXTEND FROM DAVINCI ENERGY (6EA/BX)

## (undated) DEVICE — GLOVE BIOGEL SZ 8 SURGICAL PF LTX - (50PR/BX 4BX/CA)

## (undated) DEVICE — CATHETER URETHRAL OPEN END AXXCESS (10EA/BX)

## (undated) DEVICE — OBTURATOR BLADELESS STANDARD 8MM (6EA/BX)

## (undated) DEVICE — BAG SPONGE COUNT 10.25 X 32 - BLUE (250/CA)

## (undated) DEVICE — SEALANT TISSUE CLOSURE KIT FIBIRIN VISTASEAL 10ML (1EA/BX)

## (undated) DEVICE — COVER LIGHT HANDLE FLEXIBLE - SOFT (2EA/PK 80PK/CA)

## (undated) DEVICE — DERMABOND ADVANCED - (12EA/BX)

## (undated) DEVICE — BAG RETRIEVAL 10ML (10EA/BX)

## (undated) DEVICE — SUTURE 4-0 MONOCRYL PLUS PS-2 - 27 INCH (36/BX)

## (undated) DEVICE — SUTURE 3-0 VICRYL PLUS SH - 8X 18 INCH (12/BX)

## (undated) DEVICE — CLIP HEM-O-LOC GREEN - (14EA/BX)

## (undated) DEVICE — CHLORAPREP 26 ML APPLICATOR - ORANGE TINT(25/CA)

## (undated) DEVICE — SPONGE DRAIN 4 X 4IN 6-PLY - (2/PK25PK/BX12BX/CS)

## (undated) DEVICE — SLEEVE VASO CALF MED - (10PR/CA)

## (undated) DEVICE — SET TUBING PNEUMOCLEAR HIGH FLOW SMOKE EVACUATION (10EA/BX)

## (undated) DEVICE — GLOVE BIOGEL SZ 6.5 SURGICAL PF LTX (50PR/BX 4BX/CA)